# Patient Record
Sex: MALE | Race: BLACK OR AFRICAN AMERICAN | Employment: OTHER | URBAN - METROPOLITAN AREA
[De-identification: names, ages, dates, MRNs, and addresses within clinical notes are randomized per-mention and may not be internally consistent; named-entity substitution may affect disease eponyms.]

---

## 2017-01-25 RX ORDER — LORATADINE 10 MG/1
TABLET ORAL
Qty: 90 TABLET | Refills: 2 | Status: SHIPPED | OUTPATIENT
Start: 2017-01-25 | End: 2018-06-13 | Stop reason: SDUPTHER

## 2017-05-25 RX ORDER — ATORVASTATIN CALCIUM 20 MG/1
TABLET, FILM COATED ORAL
Qty: 30 TABLET | Refills: 4 | Status: SHIPPED | OUTPATIENT
Start: 2017-05-25 | End: 2017-10-26 | Stop reason: SDUPTHER

## 2017-05-25 RX ORDER — VALSARTAN 160 MG/1
TABLET ORAL
Qty: 30 TABLET | Refills: 4 | Status: SHIPPED | OUTPATIENT
Start: 2017-05-25 | End: 2017-10-26 | Stop reason: SDUPTHER

## 2017-05-25 RX ORDER — METOPROLOL SUCCINATE 25 MG/1
TABLET, EXTENDED RELEASE ORAL
Qty: 30 TABLET | Refills: 4 | Status: SHIPPED | OUTPATIENT
Start: 2017-05-25 | End: 2017-10-26 | Stop reason: SDUPTHER

## 2017-06-15 ENCOUNTER — OFFICE VISIT (OUTPATIENT)
Dept: FAMILY MEDICINE CLINIC | Age: 67
End: 2017-06-15

## 2017-06-15 VITALS
BODY MASS INDEX: 20.46 KG/M2 | RESPIRATION RATE: 16 BRPM | DIASTOLIC BLOOD PRESSURE: 60 MMHG | TEMPERATURE: 98.1 F | SYSTOLIC BLOOD PRESSURE: 110 MMHG | WEIGHT: 135 LBS | HEIGHT: 68 IN | HEART RATE: 80 BPM

## 2017-06-15 DIAGNOSIS — E78.5 HYPERLIPIDEMIA, UNSPECIFIED HYPERLIPIDEMIA TYPE: Chronic | ICD-10-CM

## 2017-06-15 DIAGNOSIS — Z11.59 NEED FOR HEPATITIS C SCREENING TEST: ICD-10-CM

## 2017-06-15 DIAGNOSIS — Z12.5 SCREENING PSA (PROSTATE SPECIFIC ANTIGEN): ICD-10-CM

## 2017-06-15 DIAGNOSIS — I10 ESSENTIAL HYPERTENSION: Primary | Chronic | ICD-10-CM

## 2017-06-15 PROCEDURE — G8599 NO ASA/ANTIPLAT THER USE RNG: HCPCS | Performed by: FAMILY MEDICINE

## 2017-06-15 PROCEDURE — G8420 CALC BMI NORM PARAMETERS: HCPCS | Performed by: FAMILY MEDICINE

## 2017-06-15 PROCEDURE — 3017F COLORECTAL CA SCREEN DOC REV: CPT | Performed by: FAMILY MEDICINE

## 2017-06-15 PROCEDURE — 4004F PT TOBACCO SCREEN RCVD TLK: CPT | Performed by: FAMILY MEDICINE

## 2017-06-15 PROCEDURE — 1123F ACP DISCUSS/DSCN MKR DOCD: CPT | Performed by: FAMILY MEDICINE

## 2017-06-15 PROCEDURE — G8427 DOCREV CUR MEDS BY ELIG CLIN: HCPCS | Performed by: FAMILY MEDICINE

## 2017-06-15 PROCEDURE — 99213 OFFICE O/P EST LOW 20 MIN: CPT | Performed by: FAMILY MEDICINE

## 2017-06-15 PROCEDURE — 4040F PNEUMOC VAC/ADMIN/RCVD: CPT | Performed by: FAMILY MEDICINE

## 2017-06-15 ASSESSMENT — ENCOUNTER SYMPTOMS
EYE PAIN: 0
COUGH: 0
ABDOMINAL PAIN: 0
CONSTIPATION: 0
RHINORRHEA: 0
SHORTNESS OF BREATH: 0
SORE THROAT: 0
NAUSEA: 0
DIARRHEA: 0
ABDOMINAL DISTENTION: 0
SINUS PRESSURE: 0

## 2017-07-12 LAB
ANTIBODY: NORMAL
CHOLESTEROL, TOTAL: 151 MG/DL
CHOLESTEROL/HDL RATIO: ABNORMAL
HDLC SERPL-MCNC: 82 MG/DL (ref 35–70)
LDL CHOLESTEROL CALCULATED: 56 MG/DL (ref 0–160)
PROSTATE SPECIFIC ANTIGEN: 2.4 NG/ML
TRIGL SERPL-MCNC: 66 MG/DL
VLDLC SERPL CALC-MCNC: ABNORMAL MG/DL

## 2017-07-27 RX ORDER — TADALAFIL 20 MG
TABLET ORAL
Qty: 18 TABLET | Refills: 5 | Status: SHIPPED | OUTPATIENT
Start: 2017-07-27 | End: 2018-10-16 | Stop reason: SDUPTHER

## 2017-10-26 RX ORDER — ATORVASTATIN CALCIUM 20 MG/1
TABLET, FILM COATED ORAL
Qty: 90 TABLET | Refills: 1 | Status: SHIPPED | OUTPATIENT
Start: 2017-10-26 | End: 2018-04-23 | Stop reason: SDUPTHER

## 2017-10-26 RX ORDER — VALSARTAN 160 MG/1
TABLET ORAL
Qty: 90 TABLET | Refills: 1 | Status: SHIPPED | OUTPATIENT
Start: 2017-10-26 | End: 2018-04-23 | Stop reason: SDUPTHER

## 2017-10-26 RX ORDER — METOPROLOL SUCCINATE 25 MG/1
TABLET, EXTENDED RELEASE ORAL
Qty: 90 TABLET | Refills: 1 | Status: SHIPPED | OUTPATIENT
Start: 2017-10-26 | End: 2018-04-23 | Stop reason: SDUPTHER

## 2018-04-24 RX ORDER — ATORVASTATIN CALCIUM 20 MG/1
TABLET, FILM COATED ORAL
Qty: 90 TABLET | Refills: 0 | Status: SHIPPED | OUTPATIENT
Start: 2018-04-24 | End: 2018-08-03 | Stop reason: SDUPTHER

## 2018-04-24 RX ORDER — VALSARTAN 160 MG/1
TABLET ORAL
Qty: 90 TABLET | Refills: 0 | Status: SHIPPED | OUTPATIENT
Start: 2018-04-24 | End: 2018-08-06

## 2018-04-24 RX ORDER — METOPROLOL SUCCINATE 25 MG/1
TABLET, EXTENDED RELEASE ORAL
Qty: 90 TABLET | Refills: 0 | Status: SHIPPED | OUTPATIENT
Start: 2018-04-24 | End: 2018-08-03 | Stop reason: SDUPTHER

## 2018-05-11 ENCOUNTER — OFFICE VISIT (OUTPATIENT)
Dept: FAMILY MEDICINE CLINIC | Age: 68
End: 2018-05-11
Payer: COMMERCIAL

## 2018-05-11 VITALS
WEIGHT: 137.2 LBS | RESPIRATION RATE: 20 BRPM | SYSTOLIC BLOOD PRESSURE: 108 MMHG | DIASTOLIC BLOOD PRESSURE: 80 MMHG | TEMPERATURE: 97.8 F | BODY MASS INDEX: 20.79 KG/M2 | HEIGHT: 68 IN | HEART RATE: 84 BPM

## 2018-05-11 DIAGNOSIS — E78.5 HYPERLIPIDEMIA, UNSPECIFIED HYPERLIPIDEMIA TYPE: Primary | Chronic | ICD-10-CM

## 2018-05-11 DIAGNOSIS — Z12.5 SCREENING PSA (PROSTATE SPECIFIC ANTIGEN): ICD-10-CM

## 2018-05-11 DIAGNOSIS — I10 ESSENTIAL HYPERTENSION: Chronic | ICD-10-CM

## 2018-05-11 PROCEDURE — G8420 CALC BMI NORM PARAMETERS: HCPCS | Performed by: FAMILY MEDICINE

## 2018-05-11 PROCEDURE — G8599 NO ASA/ANTIPLAT THER USE RNG: HCPCS | Performed by: FAMILY MEDICINE

## 2018-05-11 PROCEDURE — 4040F PNEUMOC VAC/ADMIN/RCVD: CPT | Performed by: FAMILY MEDICINE

## 2018-05-11 PROCEDURE — 3017F COLORECTAL CA SCREEN DOC REV: CPT | Performed by: FAMILY MEDICINE

## 2018-05-11 PROCEDURE — 1123F ACP DISCUSS/DSCN MKR DOCD: CPT | Performed by: FAMILY MEDICINE

## 2018-05-11 PROCEDURE — 99214 OFFICE O/P EST MOD 30 MIN: CPT | Performed by: FAMILY MEDICINE

## 2018-05-11 PROCEDURE — 4004F PT TOBACCO SCREEN RCVD TLK: CPT | Performed by: FAMILY MEDICINE

## 2018-05-11 PROCEDURE — G8427 DOCREV CUR MEDS BY ELIG CLIN: HCPCS | Performed by: FAMILY MEDICINE

## 2018-05-11 ASSESSMENT — PATIENT HEALTH QUESTIONNAIRE - PHQ9
SUM OF ALL RESPONSES TO PHQ9 QUESTIONS 1 & 2: 0
SUM OF ALL RESPONSES TO PHQ QUESTIONS 1-9: 0
2. FEELING DOWN, DEPRESSED OR HOPELESS: 0
1. LITTLE INTEREST OR PLEASURE IN DOING THINGS: 0

## 2018-05-13 ASSESSMENT — ENCOUNTER SYMPTOMS
COUGH: 0
EYE PAIN: 0
SINUS PRESSURE: 0
ABDOMINAL PAIN: 0
DIARRHEA: 0
ABDOMINAL DISTENTION: 0
NAUSEA: 0
SORE THROAT: 0
CONSTIPATION: 0
RHINORRHEA: 0
SHORTNESS OF BREATH: 0

## 2018-06-13 RX ORDER — LORATADINE 10 MG/1
TABLET ORAL
Qty: 90 TABLET | Refills: 3 | Status: SHIPPED | OUTPATIENT
Start: 2018-06-13 | End: 2019-08-01 | Stop reason: SDUPTHER

## 2018-08-03 RX ORDER — METOPROLOL SUCCINATE 25 MG/1
TABLET, EXTENDED RELEASE ORAL
Qty: 90 TABLET | Refills: 0 | Status: SHIPPED | OUTPATIENT
Start: 2018-08-03 | End: 2018-11-03 | Stop reason: SDUPTHER

## 2018-08-03 RX ORDER — ATORVASTATIN CALCIUM 20 MG/1
TABLET, FILM COATED ORAL
Qty: 90 TABLET | Refills: 0 | Status: SHIPPED | OUTPATIENT
Start: 2018-08-03 | End: 2018-11-03 | Stop reason: SDUPTHER

## 2018-08-06 RX ORDER — LOSARTAN POTASSIUM 50 MG/1
50 TABLET ORAL DAILY
Qty: 90 TABLET | Refills: 3 | Status: SHIPPED | OUTPATIENT
Start: 2018-08-06 | End: 2019-07-31 | Stop reason: SDUPTHER

## 2018-10-16 RX ORDER — TADALAFIL 20 MG/1
TABLET ORAL
Qty: 18 TABLET | Refills: 5 | Status: SHIPPED | OUTPATIENT
Start: 2018-10-16 | End: 2019-11-07 | Stop reason: SDUPTHER

## 2018-10-19 ENCOUNTER — NURSE ONLY (OUTPATIENT)
Dept: FAMILY MEDICINE CLINIC | Age: 68
End: 2018-10-19
Payer: COMMERCIAL

## 2018-10-19 DIAGNOSIS — Z12.5 SCREENING PSA (PROSTATE SPECIFIC ANTIGEN): ICD-10-CM

## 2018-10-19 DIAGNOSIS — E78.5 HYPERLIPIDEMIA, UNSPECIFIED HYPERLIPIDEMIA TYPE: Chronic | ICD-10-CM

## 2018-10-19 DIAGNOSIS — I10 ESSENTIAL HYPERTENSION: Chronic | ICD-10-CM

## 2018-10-19 LAB
ALBUMIN SERPL-MCNC: 5 G/DL (ref 3.5–5.1)
ALP BLD-CCNC: 66 U/L (ref 38–126)
ALT SERPL-CCNC: 15 U/L (ref 11–66)
ANION GAP SERPL CALCULATED.3IONS-SCNC: 15 MEQ/L (ref 8–16)
AST SERPL-CCNC: 25 U/L (ref 5–40)
BASOPHILS # BLD: 0.5 %
BASOPHILS ABSOLUTE: 0 THOU/MM3 (ref 0–0.1)
BILIRUB SERPL-MCNC: 1.4 MG/DL (ref 0.3–1.2)
BUN BLDV-MCNC: 11 MG/DL (ref 7–22)
CALCIUM SERPL-MCNC: 9.9 MG/DL (ref 8.5–10.5)
CHLORIDE BLD-SCNC: 102 MEQ/L (ref 98–111)
CHOLESTEROL, TOTAL: 165 MG/DL (ref 100–199)
CO2: 26 MEQ/L (ref 23–33)
CREAT SERPL-MCNC: 0.9 MG/DL (ref 0.4–1.2)
EOSINOPHIL # BLD: 0.2 %
EOSINOPHILS ABSOLUTE: 0 THOU/MM3 (ref 0–0.4)
ERYTHROCYTE [DISTWIDTH] IN BLOOD BY AUTOMATED COUNT: 11.9 % (ref 11.5–14.5)
ERYTHROCYTE [DISTWIDTH] IN BLOOD BY AUTOMATED COUNT: 48.4 FL (ref 35–45)
GFR SERPL CREATININE-BSD FRML MDRD: 84 ML/MIN/1.73M2
GLUCOSE BLD-MCNC: 70 MG/DL (ref 70–108)
HCT VFR BLD CALC: 41.1 % (ref 42–52)
HDLC SERPL-MCNC: 84 MG/DL
HEMOGLOBIN: 13.9 GM/DL (ref 14–18)
IMMATURE GRANS (ABS): 0.01 THOU/MM3 (ref 0–0.07)
IMMATURE GRANULOCYTES: 0.2 %
LDL CHOLESTEROL CALCULATED: 67 MG/DL
LYMPHOCYTES # BLD: 43.2 %
LYMPHOCYTES ABSOLUTE: 2.9 THOU/MM3 (ref 1–4.8)
MCH RBC QN AUTO: 37.2 PG (ref 26–33)
MCHC RBC AUTO-ENTMCNC: 33.8 GM/DL (ref 32.2–35.5)
MCV RBC AUTO: 109.9 FL (ref 80–94)
MONOCYTES # BLD: 9.7 %
MONOCYTES ABSOLUTE: 0.6 THOU/MM3 (ref 0.4–1.3)
NUCLEATED RED BLOOD CELLS: 0 /100 WBC
PLATELET # BLD: 238 THOU/MM3 (ref 130–400)
PMV BLD AUTO: 10 FL (ref 9.4–12.4)
POTASSIUM SERPL-SCNC: 4.3 MEQ/L (ref 3.5–5.2)
PROSTATE SPECIFIC ANTIGEN: 2.58 NG/ML (ref 0–1)
RBC # BLD: 3.74 MILL/MM3 (ref 4.7–6.1)
SEG NEUTROPHILS: 46.2 %
SEGMENTED NEUTROPHILS ABSOLUTE COUNT: 3 THOU/MM3 (ref 1.8–7.7)
SODIUM BLD-SCNC: 143 MEQ/L (ref 135–145)
TOTAL PROTEIN: 8 G/DL (ref 6.1–8)
TRIGL SERPL-MCNC: 72 MG/DL (ref 0–199)
WBC # BLD: 6.6 THOU/MM3 (ref 4.8–10.8)

## 2018-10-19 PROCEDURE — 90662 IIV NO PRSV INCREASED AG IM: CPT | Performed by: FAMILY MEDICINE

## 2018-10-19 PROCEDURE — 90471 IMMUNIZATION ADMIN: CPT | Performed by: FAMILY MEDICINE

## 2018-11-05 ENCOUNTER — TELEPHONE (OUTPATIENT)
Dept: FAMILY MEDICINE CLINIC | Age: 68
End: 2018-11-05

## 2018-11-05 RX ORDER — ATORVASTATIN CALCIUM 20 MG/1
TABLET, FILM COATED ORAL
Qty: 90 TABLET | Refills: 1 | Status: SHIPPED | OUTPATIENT
Start: 2018-11-05 | End: 2019-04-25 | Stop reason: SDUPTHER

## 2018-11-05 RX ORDER — METOPROLOL SUCCINATE 25 MG/1
TABLET, EXTENDED RELEASE ORAL
Qty: 90 TABLET | Refills: 1 | Status: SHIPPED | OUTPATIENT
Start: 2018-11-05 | End: 2019-04-25 | Stop reason: SDUPTHER

## 2018-11-29 ENCOUNTER — OFFICE VISIT (OUTPATIENT)
Dept: FAMILY MEDICINE CLINIC | Age: 68
End: 2018-11-29
Payer: COMMERCIAL

## 2018-11-29 VITALS
RESPIRATION RATE: 20 BRPM | BODY MASS INDEX: 20.61 KG/M2 | SYSTOLIC BLOOD PRESSURE: 110 MMHG | HEART RATE: 80 BPM | HEIGHT: 68 IN | WEIGHT: 136 LBS | DIASTOLIC BLOOD PRESSURE: 70 MMHG | TEMPERATURE: 97.6 F

## 2018-11-29 DIAGNOSIS — Z12.11 SCREEN FOR COLON CANCER: ICD-10-CM

## 2018-11-29 DIAGNOSIS — N40.1 BENIGN PROSTATIC HYPERPLASIA WITH WEAK URINARY STREAM: ICD-10-CM

## 2018-11-29 DIAGNOSIS — R39.12 BENIGN PROSTATIC HYPERPLASIA WITH WEAK URINARY STREAM: ICD-10-CM

## 2018-11-29 DIAGNOSIS — I10 ESSENTIAL HYPERTENSION: Primary | Chronic | ICD-10-CM

## 2018-11-29 DIAGNOSIS — E78.5 HYPERLIPIDEMIA, UNSPECIFIED HYPERLIPIDEMIA TYPE: Chronic | ICD-10-CM

## 2018-11-29 PROCEDURE — 4040F PNEUMOC VAC/ADMIN/RCVD: CPT | Performed by: FAMILY MEDICINE

## 2018-11-29 PROCEDURE — 3017F COLORECTAL CA SCREEN DOC REV: CPT | Performed by: FAMILY MEDICINE

## 2018-11-29 PROCEDURE — G8427 DOCREV CUR MEDS BY ELIG CLIN: HCPCS | Performed by: FAMILY MEDICINE

## 2018-11-29 PROCEDURE — 1101F PT FALLS ASSESS-DOCD LE1/YR: CPT | Performed by: FAMILY MEDICINE

## 2018-11-29 PROCEDURE — G8599 NO ASA/ANTIPLAT THER USE RNG: HCPCS | Performed by: FAMILY MEDICINE

## 2018-11-29 PROCEDURE — 90471 IMMUNIZATION ADMIN: CPT | Performed by: FAMILY MEDICINE

## 2018-11-29 PROCEDURE — G8482 FLU IMMUNIZE ORDER/ADMIN: HCPCS | Performed by: FAMILY MEDICINE

## 2018-11-29 PROCEDURE — G8420 CALC BMI NORM PARAMETERS: HCPCS | Performed by: FAMILY MEDICINE

## 2018-11-29 PROCEDURE — 4004F PT TOBACCO SCREEN RCVD TLK: CPT | Performed by: FAMILY MEDICINE

## 2018-11-29 PROCEDURE — 1123F ACP DISCUSS/DSCN MKR DOCD: CPT | Performed by: FAMILY MEDICINE

## 2018-11-29 PROCEDURE — 90732 PPSV23 VACC 2 YRS+ SUBQ/IM: CPT | Performed by: FAMILY MEDICINE

## 2018-11-29 PROCEDURE — 99214 OFFICE O/P EST MOD 30 MIN: CPT | Performed by: FAMILY MEDICINE

## 2018-11-29 RX ORDER — TAMSULOSIN HYDROCHLORIDE 0.4 MG/1
0.4 CAPSULE ORAL DAILY
Qty: 30 CAPSULE | Refills: 5 | Status: SHIPPED | OUTPATIENT
Start: 2018-11-29 | End: 2019-12-12 | Stop reason: SDUPTHER

## 2018-11-29 NOTE — PROGRESS NOTES
Immunization(s) given during visit:    Immunizations     Name Date Dose Route    Pneumococcal Polysaccharide ([id number]) 11/29/2018 0.5 mL Intramuscular    Site: Deltoid- Left    Lot: [id number]    NDC: 8260-2341-20          Most recent Vaccine Information Sheet given to pt

## 2018-12-02 ASSESSMENT — ENCOUNTER SYMPTOMS
BACK PAIN: 0
ABDOMINAL PAIN: 0
NAUSEA: 0
SHORTNESS OF BREATH: 0
VOMITING: 0
WHEEZING: 0
DIARRHEA: 0
SORE THROAT: 0
COUGH: 0
RHINORRHEA: 0
CONSTIPATION: 0

## 2018-12-02 NOTE — PROGRESS NOTES
[address] [place] [address],[address] [facility]  [address] [address]  Dept: [phone]  Dept Fax: [phone]  Loc: [phone]  PROGRESS NOTE      VisitDate: 11/29/2018    Diana Tafoya is a 70 y.o. male who presents today for:  Chief Complaint   Patient presents with    6 Month Follow-Up     HTN, Hyperlipidemia.  Labs Only     had labs in Oct    Other     needs a letter for a handicap placard    Immunizations     due for pneumovax         Subjective:  HPI  Follow up hypertension hyperlipidemia and arthralgias which appeared blood pressure cholesterol levels are stable. Most recent labs reviewed with the patient. He's had issues of erectile dysfunction gets good relief with Cialis. He's due for his Pneumovax. Review of Systems   Constitutional: Negative for chills, fatigue and fever. HENT: Negative for congestion, rhinorrhea and sore throat. Respiratory: Negative for cough, shortness of breath and wheezing. Cardiovascular: Negative for chest pain, palpitations and leg swelling. Gastrointestinal: Negative for abdominal pain, constipation, diarrhea, nausea and vomiting. Genitourinary: Negative for dysuria, frequency and urgency. Musculoskeletal: Negative for arthralgias, back pain and joint swelling. Skin: Negative for rash. Neurological: Negative for dizziness, light-headedness, numbness and headaches.      Past Medical History:   Diagnosis Date    CAD (coronary artery disease)     Erectile dysfunction     GERD (gastroesophageal reflux disease) 07/2012    Gastro ulcer    Hyperlipidemia     Hypertension       Past Surgical History:   Procedure Laterality Date    CARDIAC CATHETERIZATION      EYE SURGERY Left 2004    cataract    INGUINAL HERNIA REPAIR Right 5/27/15    Dr. Evaristo Sena 2011    Dr. Ryan Harding 2003    Dr. Irasema Quispe  5/27/15    Dr. Philippe Gutierrez Family History   Problem Relation Age of Onset    Heart Disease Mother      Social History   Substance Use Topics    Smoking status: Current Every Day Smoker     Packs/day: 1.00     Years: 40.00     Types: Cigarettes    Smokeless tobacco: Never Used      Comment: printed to avs    Alcohol use 0.0 oz/week      Comment: 2 -24 oz cans beer per day      Current Outpatient Prescriptions   Medication Sig Dispense Refill    tamsulosin (FLOMAX) 0.4 MG capsule Take 1 capsule by mouth daily 30 capsule 5    atorvastatin (LIPITOR) 20 MG tablet TAKE 1 TABLET BY MOUTH DAILY 90 tablet 1    metoprolol succinate (TOPROL XL) 25 MG extended release tablet TAKE 1 TABLET BY MOUTH DAILY 90 tablet 1    tadalafil (CIALIS) 20 MG tablet One qd prn 18 tablet 5    fluocinonide (LIDEX) 0.05 % cream APPLY EXTERNALLY TO THE AFFECTED AREA TWICE DAILY 60 g 4    losartan (COZAAR) 50 MG tablet Take 1 tablet by mouth daily 90 tablet 3    loratadine (CLARITIN) 10 MG tablet TAKE 1 TABLET BY MOUTH DAILY 90 tablet 3    COMBIGAN 0.2-0.5 % ophthalmic solution       nitroGLYCERIN (NITROSTAT) 0.4 MG SL tablet Place 0.4 mg under the tongue every 5 minutes as needed. No current facility-administered medications for this visit. No Known Allergies  Health Maintenance   Topic Date Due    AAA screen  1950    DTaP/Tdap/Td vaccine (1 - Tdap) 11/27/1968    Shingles Vaccine (1 of 2 - 2 Dose Series) 11/27/1999    Low dose CT lung screening  11/27/2004    Potassium monitoring  10/19/2019    Creatinine monitoring  10/19/2019    Colon cancer screen colonoscopy  08/31/2022    Lipid screen  10/19/2023    Flu vaccine  Completed    Pneumococcal low/med risk  Completed    Hepatitis C screen  Completed         Objective:     Physical Exam   Constitutional: He is oriented to person, place, and time. He appears well-developed and well-nourished. No distress. HENT:   Head: Normocephalic and atraumatic.    Right Ear: External ear

## 2019-04-25 RX ORDER — METOPROLOL SUCCINATE 25 MG/1
TABLET, EXTENDED RELEASE ORAL
Qty: 90 TABLET | Refills: 1 | Status: SHIPPED | OUTPATIENT
Start: 2019-04-25 | End: 2019-11-30 | Stop reason: SDUPTHER

## 2019-04-25 RX ORDER — ATORVASTATIN CALCIUM 20 MG/1
TABLET, FILM COATED ORAL
Qty: 90 TABLET | Refills: 1 | Status: SHIPPED | OUTPATIENT
Start: 2019-04-25 | End: 2019-11-17 | Stop reason: SDUPTHER

## 2019-06-09 ENCOUNTER — HOSPITAL ENCOUNTER (OUTPATIENT)
Age: 69
Setting detail: OBSERVATION
Discharge: HOME | End: 2019-06-10
Attending: INTERNAL MEDICINE | Admitting: INTERNAL MEDICINE
Payer: COMMERCIAL

## 2019-06-09 ENCOUNTER — APPOINTMENT (OUTPATIENT)
Dept: CT IMAGING | Age: 69
End: 2019-06-09
Payer: COMMERCIAL

## 2019-06-09 DIAGNOSIS — I63.9 CEREBROVASCULAR ACCIDENT (CVA), UNSPECIFIED MECHANISM (HCC): Primary | ICD-10-CM

## 2019-06-09 PROBLEM — R29.90 STROKE-LIKE SYMPTOM: Status: ACTIVE | Noted: 2019-06-09

## 2019-06-09 LAB
ALBUMIN SERPL-MCNC: 4.5 G/DL (ref 3.5–5.1)
ALP BLD-CCNC: 73 U/L (ref 38–126)
ALT SERPL-CCNC: 15 U/L (ref 11–66)
ANION GAP SERPL CALCULATED.3IONS-SCNC: 17 MEQ/L (ref 8–16)
AST SERPL-CCNC: 29 U/L (ref 5–40)
BACTERIA: ABNORMAL /HPF
BASOPHILS # BLD: 0.3 %
BASOPHILS ABSOLUTE: 0 THOU/MM3 (ref 0–0.1)
BILIRUB SERPL-MCNC: 1.2 MG/DL (ref 0.3–1.2)
BILIRUBIN DIRECT: < 0.2 MG/DL (ref 0–0.3)
BILIRUBIN URINE: NEGATIVE
BLOOD, URINE: NEGATIVE
BUN BLDV-MCNC: 14 MG/DL (ref 7–22)
CALCIUM SERPL-MCNC: 9.8 MG/DL (ref 8.5–10.5)
CASTS 2: ABNORMAL /LPF
CASTS UA: ABNORMAL /LPF
CHARACTER, URINE: CLEAR
CHLORIDE BLD-SCNC: 101 MEQ/L (ref 98–111)
CO2: 23 MEQ/L (ref 23–33)
COLOR: YELLOW
CREAT SERPL-MCNC: 1 MG/DL (ref 0.4–1.2)
CRYSTALS, UA: ABNORMAL
EKG ATRIAL RATE: 70 BPM
EKG P AXIS: 75 DEGREES
EKG P-R INTERVAL: 144 MS
EKG Q-T INTERVAL: 382 MS
EKG QRS DURATION: 86 MS
EKG QTC CALCULATION (BAZETT): 412 MS
EKG R AXIS: 26 DEGREES
EKG T AXIS: 55 DEGREES
EKG VENTRICULAR RATE: 70 BPM
EOSINOPHIL # BLD: 0.3 %
EOSINOPHILS ABSOLUTE: 0 THOU/MM3 (ref 0–0.4)
EPITHELIAL CELLS, UA: ABNORMAL /HPF
ERYTHROCYTE [DISTWIDTH] IN BLOOD BY AUTOMATED COUNT: 12 % (ref 11.5–14.5)
ERYTHROCYTE [DISTWIDTH] IN BLOOD BY AUTOMATED COUNT: 48.6 FL (ref 35–45)
GFR SERPL CREATININE-BSD FRML MDRD: 90 ML/MIN/1.73M2
GLUCOSE BLD-MCNC: 101 MG/DL (ref 70–108)
GLUCOSE BLD-MCNC: 99 MG/DL
GLUCOSE BLD-MCNC: 99 MG/DL (ref 70–108)
GLUCOSE URINE: NEGATIVE MG/DL
HCT VFR BLD CALC: 40.3 % (ref 42–52)
HEMOGLOBIN: 13.3 GM/DL (ref 14–18)
IMMATURE GRANS (ABS): 0.01 THOU/MM3 (ref 0–0.07)
IMMATURE GRANULOCYTES: 0.1 %
INR BLD: 0.95 (ref 0.85–1.13)
KETONES, URINE: NEGATIVE
LEUKOCYTE ESTERASE, URINE: NEGATIVE
LYMPHOCYTES # BLD: 26.4 %
LYMPHOCYTES ABSOLUTE: 1.8 THOU/MM3 (ref 1–4.8)
MCH RBC QN AUTO: 35.7 PG (ref 26–33)
MCHC RBC AUTO-ENTMCNC: 33 GM/DL (ref 32.2–35.5)
MCV RBC AUTO: 108 FL (ref 80–94)
MISCELLANEOUS 2: ABNORMAL
MONOCYTES # BLD: 7.5 %
MONOCYTES ABSOLUTE: 0.5 THOU/MM3 (ref 0.4–1.3)
NITRITE, URINE: NEGATIVE
NUCLEATED RED BLOOD CELLS: 0 /100 WBC
OSMOLALITY CALCULATION: 281.9 MOSMOL/KG (ref 275–300)
PH UA: 6 (ref 5–9)
PLATELET # BLD: 215 THOU/MM3 (ref 130–400)
PMV BLD AUTO: 9.5 FL (ref 9.4–12.4)
POTASSIUM SERPL-SCNC: 4.3 MEQ/L (ref 3.5–5.2)
PROTEIN UA: ABNORMAL
RBC # BLD: 3.73 MILL/MM3 (ref 4.7–6.1)
RBC URINE: ABNORMAL /HPF
RENAL EPITHELIAL, UA: ABNORMAL
SEG NEUTROPHILS: 65.4 %
SEGMENTED NEUTROPHILS ABSOLUTE COUNT: 4.5 THOU/MM3 (ref 1.8–7.7)
SODIUM BLD-SCNC: 141 MEQ/L (ref 135–145)
SPECIFIC GRAVITY, URINE: 1.02 (ref 1–1.03)
TOTAL PROTEIN: 7.8 G/DL (ref 6.1–8)
TROPONIN T: < 0.01 NG/ML
TROPONIN T: < 0.01 NG/ML
UROBILINOGEN, URINE: 0.2 EU/DL (ref 0–1)
WBC # BLD: 6.9 THOU/MM3 (ref 4.8–10.8)
WBC UA: ABNORMAL /HPF
YEAST: ABNORMAL

## 2019-06-09 PROCEDURE — 6370000000 HC RX 637 (ALT 250 FOR IP): Performed by: INTERNAL MEDICINE

## 2019-06-09 PROCEDURE — G0378 HOSPITAL OBSERVATION PER HR: HCPCS

## 2019-06-09 PROCEDURE — 2709999900 HC NON-CHARGEABLE SUPPLY

## 2019-06-09 PROCEDURE — 70498 CT ANGIOGRAPHY NECK: CPT

## 2019-06-09 PROCEDURE — 96372 THER/PROPH/DIAG INJ SC/IM: CPT

## 2019-06-09 PROCEDURE — 85610 PROTHROMBIN TIME: CPT

## 2019-06-09 PROCEDURE — 99220 PR INITIAL OBSERVATION CARE/DAY 70 MINUTES: CPT | Performed by: INTERNAL MEDICINE

## 2019-06-09 PROCEDURE — 70496 CT ANGIOGRAPHY HEAD: CPT

## 2019-06-09 PROCEDURE — 85025 COMPLETE CBC W/AUTO DIFF WBC: CPT

## 2019-06-09 PROCEDURE — 81001 URINALYSIS AUTO W/SCOPE: CPT

## 2019-06-09 PROCEDURE — 99285 EMERGENCY DEPT VISIT HI MDM: CPT

## 2019-06-09 PROCEDURE — 80048 BASIC METABOLIC PNL TOTAL CA: CPT

## 2019-06-09 PROCEDURE — 93010 ELECTROCARDIOGRAM REPORT: CPT | Performed by: NUCLEAR MEDICINE

## 2019-06-09 PROCEDURE — 82948 REAGENT STRIP/BLOOD GLUCOSE: CPT

## 2019-06-09 PROCEDURE — 2580000003 HC RX 258: Performed by: INTERNAL MEDICINE

## 2019-06-09 PROCEDURE — 36415 COLL VENOUS BLD VENIPUNCTURE: CPT

## 2019-06-09 PROCEDURE — 93005 ELECTROCARDIOGRAM TRACING: CPT | Performed by: INTERNAL MEDICINE

## 2019-06-09 PROCEDURE — 70450 CT HEAD/BRAIN W/O DYE: CPT

## 2019-06-09 PROCEDURE — 6360000004 HC RX CONTRAST MEDICATION: Performed by: INTERNAL MEDICINE

## 2019-06-09 PROCEDURE — 6360000002 HC RX W HCPCS: Performed by: INTERNAL MEDICINE

## 2019-06-09 PROCEDURE — 84484 ASSAY OF TROPONIN QUANT: CPT

## 2019-06-09 PROCEDURE — 80076 HEPATIC FUNCTION PANEL: CPT

## 2019-06-09 RX ORDER — SODIUM CHLORIDE 0.9 % (FLUSH) 0.9 %
10 SYRINGE (ML) INJECTION PRN
Status: DISCONTINUED | OUTPATIENT
Start: 2019-06-09 | End: 2019-06-10 | Stop reason: HOSPADM

## 2019-06-09 RX ORDER — POLYETHYLENE GLYCOL 3350 17 G/17G
17 POWDER, FOR SOLUTION ORAL DAILY
Status: DISCONTINUED | OUTPATIENT
Start: 2019-06-09 | End: 2019-06-10 | Stop reason: HOSPADM

## 2019-06-09 RX ORDER — TAMSULOSIN HYDROCHLORIDE 0.4 MG/1
0.4 CAPSULE ORAL DAILY
Status: DISCONTINUED | OUTPATIENT
Start: 2019-06-09 | End: 2019-06-10 | Stop reason: HOSPADM

## 2019-06-09 RX ORDER — METOPROLOL SUCCINATE 25 MG/1
25 TABLET, EXTENDED RELEASE ORAL DAILY
Status: DISCONTINUED | OUTPATIENT
Start: 2019-06-10 | End: 2019-06-10 | Stop reason: HOSPADM

## 2019-06-09 RX ORDER — SODIUM CHLORIDE 9 MG/ML
INJECTION, SOLUTION INTRAVENOUS CONTINUOUS
Status: DISCONTINUED | OUTPATIENT
Start: 2019-06-09 | End: 2019-06-10 | Stop reason: HOSPADM

## 2019-06-09 RX ORDER — ONDANSETRON 2 MG/ML
4 INJECTION INTRAMUSCULAR; INTRAVENOUS EVERY 6 HOURS PRN
Status: DISCONTINUED | OUTPATIENT
Start: 2019-06-09 | End: 2019-06-10 | Stop reason: HOSPADM

## 2019-06-09 RX ORDER — SODIUM CHLORIDE 0.9 % (FLUSH) 0.9 %
10 SYRINGE (ML) INJECTION EVERY 12 HOURS SCHEDULED
Status: DISCONTINUED | OUTPATIENT
Start: 2019-06-09 | End: 2019-06-10 | Stop reason: HOSPADM

## 2019-06-09 RX ORDER — ATORVASTATIN CALCIUM 20 MG/1
20 TABLET, FILM COATED ORAL NIGHTLY
Status: DISCONTINUED | OUTPATIENT
Start: 2019-06-09 | End: 2019-06-10 | Stop reason: HOSPADM

## 2019-06-09 RX ORDER — FAMOTIDINE 20 MG/1
20 TABLET, FILM COATED ORAL 2 TIMES DAILY
Status: DISCONTINUED | OUTPATIENT
Start: 2019-06-09 | End: 2019-06-10 | Stop reason: HOSPADM

## 2019-06-09 RX ORDER — ASPIRIN 81 MG/1
324 TABLET, CHEWABLE ORAL ONCE
Status: COMPLETED | OUTPATIENT
Start: 2019-06-09 | End: 2019-06-09

## 2019-06-09 RX ORDER — ASPIRIN 81 MG/1
81 TABLET ORAL DAILY
Status: DISCONTINUED | OUTPATIENT
Start: 2019-06-10 | End: 2019-06-10 | Stop reason: HOSPADM

## 2019-06-09 RX ORDER — ACETAMINOPHEN 325 MG/1
650 TABLET ORAL EVERY 4 HOURS PRN
Status: DISCONTINUED | OUTPATIENT
Start: 2019-06-09 | End: 2019-06-10 | Stop reason: HOSPADM

## 2019-06-09 RX ADMIN — SODIUM CHLORIDE, PRESERVATIVE FREE 10 ML: 5 INJECTION INTRAVENOUS at 22:33

## 2019-06-09 RX ADMIN — TAMSULOSIN HYDROCHLORIDE 0.4 MG: 0.4 CAPSULE ORAL at 22:31

## 2019-06-09 RX ADMIN — IOPAMIDOL 80 ML: 755 INJECTION, SOLUTION INTRAVENOUS at 12:42

## 2019-06-09 RX ADMIN — ENOXAPARIN SODIUM 40 MG: 40 INJECTION SUBCUTANEOUS at 18:35

## 2019-06-09 RX ADMIN — ATORVASTATIN CALCIUM 20 MG: 20 TABLET, FILM COATED ORAL at 22:31

## 2019-06-09 RX ADMIN — SODIUM CHLORIDE: 9 INJECTION, SOLUTION INTRAVENOUS at 18:35

## 2019-06-09 RX ADMIN — FAMOTIDINE 20 MG: 20 TABLET ORAL at 22:30

## 2019-06-09 RX ADMIN — ASPIRIN 81 MG 324 MG: 81 TABLET ORAL at 12:09

## 2019-06-09 ASSESSMENT — ENCOUNTER SYMPTOMS
DIARRHEA: 0
VOMITING: 0
NAUSEA: 0
COUGH: 0
SHORTNESS OF BREATH: 0
BACK PAIN: 0
WHEEZING: 0
EYE DISCHARGE: 0
SORE THROAT: 0
ABDOMINAL PAIN: 0
RHINORRHEA: 0
EYE REDNESS: 0

## 2019-06-09 ASSESSMENT — PAIN SCALES - GENERAL: PAINLEVEL_OUTOF10: 0

## 2019-06-09 NOTE — ED PROVIDER NOTES
[facility]  eMERGENCY dEPARTMENT eNCOUnter          CHIEF COMPLAINT       Chief Complaint   Patient presents with   Alejandro Song Cerebrovascular Accident       Nurses Notes reviewed and I agree except as noted in the HPI. HISTORY OF PRESENT ILLNESS    Jacqui Suarez Sr. is a 70 y.o. male who presents to the Emergency Department for the evaluation of dizziness. The patient reports that about 30 minutes ago he was walking and suddenly became dizzy and light headed. He states that he fell to the ground 2 separate times landing on his knees. He denies loss of consciousness or hitting his head. He has a history of a CVA. Patient's wife reports that the patient has a left sided facial droop. He denies fevers, chills, nausea, chest pain, or shortness of breath. Patient denies any further complaints at initial encounter. The HPI was provided by the patient. REVIEW OF SYSTEMS     Review of Systems   Constitutional: Negative for appetite change, chills, fatigue and fever. HENT: Negative for congestion, ear pain, rhinorrhea and sore throat. Eyes: Negative for discharge, redness and visual disturbance. Respiratory: Negative for cough, shortness of breath and wheezing. Cardiovascular: Negative for chest pain, palpitations and leg swelling. Gastrointestinal: Negative for abdominal pain, diarrhea, nausea and vomiting. Genitourinary: Negative for decreased urine volume, difficulty urinating, dysuria and hematuria. Musculoskeletal: Negative for arthralgias, back pain, joint swelling and neck pain. Skin: Negative for pallor and rash. Allergic/Immunologic: Negative for environmental allergies. Neurological: Positive for dizziness, facial asymmetry (per wife. left sided droop) and light-headedness. Negative for syncope, weakness, numbness and headaches. Hematological: Negative for adenopathy. Psychiatric/Behavioral: Negative for confusion and suicidal ideas. The patient is not nervous/anxious. PAST MEDICAL HISTORY    has a past medical history of CAD (coronary artery disease), Erectile dysfunction, GERD (gastroesophageal reflux disease), Hyperlipidemia, and Hypertension. SURGICAL HISTORY      has a past surgical history that includes Cardiac catheterization (-); Eye surgery (Left, ); Leg Surgery (Left, ); Testicle removal (Left, ); Inguinal hernia repair (Right, ); and Umbilical hernia repair (5/27/15). CURRENT MEDICATIONS       Previous Medications    ATORVASTATIN (LIPITOR) 20 MG TABLET    TAKE 1 TABLET BY MOUTH DAILY    COMBIGAN 0.2-0.5 % OPHTHALMIC SOLUTION        FLUOCINONIDE (LIDEX) 0.05 % CREAM    APPLY EXTERNALLY TO THE AFFECTED AREA TWICE DAILY    LORATADINE (CLARITIN) 10 MG TABLET    TAKE 1 TABLET BY MOUTH DAILY    LOSARTAN (COZAAR) 50 MG TABLET    Take 1 tablet by mouth daily    METOPROLOL SUCCINATE (TOPROL XL) 25 MG EXTENDED RELEASE TABLET    TAKE 1 TABLET BY MOUTH DAILY    TADALAFIL (CIALIS) 20 MG TABLET    One qd prn    TAMSULOSIN (FLOMAX) 0.4 MG CAPSULE    Take 1 capsule by mouth daily       ALLERGIES     has No Known Allergies. FAMILY HISTORY      indicated that his mother is . He indicated that his father is . family history includes Heart Disease in his mother. SOCIAL HISTORY      reports that he has been smoking cigarettes. He has a 40.00 pack-year smoking history. He has never used smokeless tobacco. He reports that he drinks alcohol. He reports that he has current or past drug history. Drug: Marijuana. PHYSICAL EXAM     INITIAL VITALS:  height is 5' 9\" (1.753 m) and weight is 135 lb (61.2 kg). His oral temperature is 98.4 °F (36.9 °C). His blood pressure is 130/78 and his pulse is 69. His respiration is 16 and oxygen saturation is 98%. Physical Exam   Constitutional: He is oriented to person, place, and time. He appears well-developed and well-nourished. Non-toxic appearance.    HENT:   Head: Normocephalic and atraumatic. Right Ear: Tympanic membrane and external ear normal.   Left Ear: Tympanic membrane and external ear normal.   Nose: Nose normal.   Mouth/Throat: Oropharynx is clear and moist and mucous membranes are normal. No oropharyngeal exudate, posterior oropharyngeal edema or posterior oropharyngeal erythema. Eyes: Conjunctivae and EOM are normal.   Neck: Normal range of motion. Neck supple. No JVD present. Cardiovascular: Normal rate, regular rhythm, normal heart sounds, intact distal pulses and normal pulses. Exam reveals no gallop and no friction rub. No murmur heard. Pulmonary/Chest: Effort normal and breath sounds normal. No respiratory distress. He has no decreased breath sounds. He has no wheezes. He has no rhonchi. He has no rales. Abdominal: Soft. Bowel sounds are normal. He exhibits no distension. There is no tenderness. There is no rebound, no guarding and no CVA tenderness. Musculoskeletal: Normal range of motion. He exhibits no edema. Neurological: He is alert and oriented to person, place, and time. He exhibits normal muscle tone. Coordination normal.   Patient has a left sided facial droop. He also has partial inattention. Skin: Skin is warm and dry. No rash noted. He is not diaphoretic. Nursing note and vitals reviewed. NIH Stroke Scale  NIH Stroke Scale Assessed: Yes  Interval: Baseline  Level of Consciousness (1a. ): Alert  LOC Questions (1b. ):  Answers both correctly  LOC Commands (1c. ): Obeys both correctly  Best Gaze (2. ): Normal  Visual (3. ): No visual loss  Facial Palsy (4. ): (!) Partial  Motor Arm, Left (5a. ): No drift  Motor Arm, Right (5b. ): No drift  Motor Leg, Left (6a. ): No drift  Motor Leg, Right (6b. ): No drift  Limb Ataxia (7. ): Absent  Sensory (8. ): Normal  Best Language (9. ): No aphasia  Dysarthria (10. ): Normal  Extinction and Inattention (11): (!) Partial neglect  Total: 3          DIAGNOSTIC RESULTS     EKG: All EKG's are interpreted by the within normal limits   ANION GAP - Abnormal; Notable for the following components:    Anion Gap 17.0 (*)     All other components within normal limits   URINE WITH REFLEXED MICRO - Abnormal; Notable for the following components:    Protein, UA TRACE (*)     All other components within normal limits   POCT GLUCOSE - Normal   BASIC METABOLIC PANEL   PROTIME-INR   HEPATIC FUNCTION PANEL   GLOMERULAR FILTRATION RATE, ESTIMATED   OSMOLALITY   POCT GLUCOSE       EMERGENCY DEPARTMENT COURSE:   Vitals:    Vitals:    06/09/19 1143 06/09/19 1222   BP: 121/71 130/78   Pulse: 68 69   Resp: 23 16   Temp: 98.4 °F (36.9 °C)    TempSrc: Oral    SpO2: 95% 98%   Weight: 135 lb (61.2 kg)    Height: 5' 9\" (1.753 m)        11:27 AM: The patient was seen and evaluated. MDM:  Patient's was promptly sent to CAT scan. \" Stroke was activated. Patient's CT scan of the head was normal. Patient was evaluated by Dr. Benita Meigs and it decided the patient should have a CTA done and be admitted. Patient was also started on aspirin. On his recommendation. Patient's labs, CT scan of the head and CT was reviewed. 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 Patient will be admitted by Dr. Cosmo Cardoso for further workup and management. CRITICAL CARE:   None     CONSULTS:  Dr. Dmitri Aly (neurologist)  Dr. Nico Edwards:  None     FINAL IMPRESSION     1. Cerebrovascular accident (CVA), unspecified mechanism ([facility] [address]          DISPOSITION/PLAN   Admitted    PATIENT REFERRED TO:  No follow-up provider specified.     DISCHARGE MEDICATIONS:     New Prescriptions    No medications on file       (Please note that portions of this note were completed with a voice recognition program.  Efforts were made to edit thedictations but occasionally words are mis-transcribed.)    Scribe: Guero Hayes   6/9/19 11:27 AM Scribing for and in the presence of Mi Lao MD    Signed by: Eun Sargent, 06/09/19 1:21 PM    Provider:  I personally performed the services described in the documentation, reviewed and edited the documentation which was dictated to the scribe in my presence, and it accurately records my words and actions.     Gely Hinds MD 6/9/19 1:21 PM       Gely Hinds MD  06/09/19 [id number]

## 2019-06-09 NOTE — H&P
History & Physical        Patient:  Mary Suarez Sr. YOB: 1950    MRN: [id number]     Acct: [de-identified]    PCP: Atul Schaffer MD    Date of Admission: 6/9/2019    Date of Service: Pt seen/examined on 6/9/2019 and Placed in Observation. Chief Complaint:    Chief Complaint   Patient presents with    Cerebrovascular Accident         History Of Present Illness:      70 y.o. male who presented to [facility] with \"evaluation of dizziness. The patient reports that about 30 minutes ago he was walking and suddenly became dizzy and light headed. He states that he fell to the ground 2 separate times landing on his knees. He denies loss of consciousness or hitting his head. He has a history of a CVA. Patient's wife reports that the patient has a left sided facial droop. He denies fevers, chills, nausea, chest pain, or shortness of breath. Patient denies any further complaints at initial encounter. \"-per er note and confirmed by myself    Addendum: he states he feel because he was dizzy, legs didn't work all that well        Past Medical History:          Diagnosis Date    CAD (coronary artery disease)     Erectile dysfunction     GERD (gastroesophageal reflux disease) 07/2012    Gastro ulcer    Hyperlipidemia     Hypertension        Past Surgical History:          Procedure Laterality Date    CARDIAC CATHETERIZATION      EYE SURGERY Left 2004    cataract   Earlene Dominic Right 5/27/15    Dr. Kimberlee Orellana 2011    Dr. Valerio Cooper Left 2003    Dr. Uma Stacy  5/27/15    Dr. Shayla Farmer       Medications Prior to Admission:      Prior to Admission medications    Medication Sig Start Date End Date Taking?  Authorizing Provider   metoprolol succinate (TOPROL XL) 25 MG extended release tablet TAKE 1 TABLET BY MOUTH DAILY 4/25/19  Yes Atul Schaffer MD   atorvastatin (LIPITOR) 20 MG tablet TAKE 1 TABLET BY MOUTH DAILY 4/25/19  Yes Amador Damon MD   tamsulosin [facility]) 0.4 MG capsule Take 1 capsule by mouth daily 11/29/18  Yes Amador Damon MD   tadalafil (CIALIS) 20 MG tablet One qd prn 10/16/18  Yes Amador Damon MD   fluocinonide (LIDEX) 0.05 % cream APPLY EXTERNALLY TO THE AFFECTED AREA TWICE DAILY 9/18/18  Yes Amador Damon MD   losartan (COZAAR) 50 MG tablet Take 1 tablet by mouth daily 8/6/18  Yes Amador Damon MD   loratadine (CLARITIN) 10 MG tablet TAKE 1 TABLET BY MOUTH DAILY 6/13/18  Yes Amador Damon MD   COMBIGAN 0.2-0.5 % ophthalmic solution  3/22/16  Yes Historical Provider, MD       Allergies:  Patient has no known allergies. Social History:      The patient currently lives spouse  TOBACCO:   reports that he has been smoking cigarettes. He has a 40.00 pack-year smoking history. He has never used smokeless tobacco.  ETOH:   reports that he drinks alcohol. Family History:       Reviewed in detail and negative for DM, CAD, Cancer, CVA. Positive as follows:        Problem Relation Age of Onset    Heart Disease Mother        Diet:  No diet orders on file    REVIEW OF SYSTEMS:   Pertinent positives as noted in the HPI. All other systems reviewed and negative. PHYSICAL EXAM:    /78   Pulse 69   Temp 98.4 °F (36.9 °C) (Oral)   Resp 16   Ht 5' 9\" (1.753 m)   Wt 135 lb (61.2 kg)   SpO2 98%   BMI 19.94 kg/m²     General appearance:  No apparent distress, appears stated age and cooperative. HEENT:  Normal cephalic, atraumatic without obvious deformity. Pupils equal, round, and reactive to light. Extra ocular muscles intact. Conjunctivae/corneas clear. l side facial droop  Neck: Supple, with full range of motion. no jugular venous distention. Trachea midline. no carotid bruits  Respiratory:  Normal respiratory effort. Clear to auscultation, bilaterally without Rales/Wheezes/Rhonchi.  Breath sounds equal bilaterally  Cardiovascular:  Regular rate and rhythm with normal S1/S2 without murmurs, rubs or gallops. PMI non displaced  Abdomen: Soft, non-tender, non-distended with normal bowel sounds. No guarding, rebound. Musculoskeletal:  No clubbing, cyanosis or edema bilaterally. Full range of motion without deformity. Skin: Skin color, texture, turgor normal.  No rashes or lesions, or suspicious lesions. Neurologic:  Neurovascularly intact without any focal sensory/motor deficits. Cranial nerves: II-XII intact, grossly non-focal.  Psychiatric:  Alert and oriented, thought content appropriate, normal insight  Capillary Refill: Brisk,< 2 seconds   Peripheral Pulses: +2 palpable, equal bilaterally upper and lower extremities  Lymphatics: no lymphadenopathy      Labs:     Recent Labs     06/09/19  1137   WBC 6.9   HGB 13.3*   HCT 40.3*        Recent Labs     06/09/19  1137      K 4.3      CO2 23   BUN 14   CREATININE 1.0   CALCIUM 9.8     Recent Labs     06/09/19  1137   AST 29   ALT 15   BILIDIR <0.2   BILITOT 1.2   ALKPHOS 73     Recent Labs     06/09/19  1152   INR 0.95     No results for input(s): CKTOTAL, TROPONINI in the last 72 hours. Urinalysis:      Lab Results   Component Value Date    NITRU NEGATIVE 06/09/2019    WBCUA 0-2 06/09/2019    BACTERIA NONE 06/09/2019    RBCUA 0-2 06/09/2019    BLOODU NEGATIVE 06/09/2019    GLUCOSEU NEGATIVE 06/09/2019       Radiology:     CXR: I have reviewed the CXR with the following interpretation:   EKG:  I have reviewed the EKG with the following interpretation:  Cta Head W Wo Contrast    Result Date: 6/9/2019  PROCEDURE: CTA NECK W WO CONTRAST, CTA HEAD W WO CONTRAST CLINICAL INFORMATION: cva. Dizziness. COMPARISON: Head CT 6/9/2019. TECHNIQUE: 1 mm axial images were obtained through the head and neck after the fast bolus administration of contrast. A noncontrast localizer was obtained. 3-D reconstructions were performed on a dedicated 3-D workstation. These include multiplanar MPR images and multiplanar MIP images. Centerline reconstructions were obtained of the carotid systems. Isovue intravenous contrast was given. All CT scans at this facility use dose modulation, iterative reconstruction, and/or weight-based dosing when appropriate to reduce radiation dose to as low as reasonably achievable. FINDINGS: CTA NECK: Aortic arch and branches: There is calcified atherosclerosis of the aortic arch. There is no stenosis of the origin innominate artery, left common carotid artery or either subclavian artery. Right common carotid artery/ICA: The right common carotid artery is normal. The right carotid bulb and right internal carotid artery normal. There is no atherosclerosis or stenosis. Left common carotid artery/ICA: The left common carotid artery is normal. The left carotid bulb is normal. In the proximal left internal carotid artery there is an area of eccentric soft plaque. There is a small calcified component. Using NASCET criteria  and the distal left internal carotid artery as the reference, there is a 55% stenosis. There is no stenosis distal to this. Vertebral arteries: There is a small area of calcified atherosclerosis near the origin the right vertebral artery. There is no stenosis. Both vertebral arteries are normal. The left vertebral artery is slightly larger than the right. CTA HEAD: Internal carotid arteries: There is calcified atherosclerosis of the cavernous segments of the internal carotid arteries bilaterally. There is no stenosis. Middle cerebral arteries: Normal. The proximal branches are also normal. Anterior cerebral arteries: Normal. The proximal branches are normal. There is a normal small anterior communicating artery. Vertebral arteries: The vertebral arteries are normal. Basilar artery: Normal. Superior cerebellar arteries: Normal. Posterior cerebral arteries: Normal. The proximal branches are also normal. No aneurysms, stenoses or occlusions are noted.  The superior sagittal sinus, vein of [place], internal cerebral veins, straight sinus, transverse sinuses and sigmoid sinuses are patent. Axial source data: The lung apices are clear. There is no upper mediastinal or cervical adenopathy. There are no suspicious findings in the neck soft tissues. Low-attenuation white matter the brain is likely related to chronic small vessel ischemic changes. 1. 55% stenosis in the proximal left internal carotid artery due to eccentric plaque. 2. No stenosis of the right carotid system. 3. No intracranial stenoses or occlusions. **This report has been created using voice recognition software. It may contain minor errors which are inherent in voice recognition technology. ** Final report electronically signed by Dr. Tracy Gonzalez on 6/9/2019 1:17 PM    Ct Head Wo Contrast (code Stroke)    Result Date: 6/9/2019  PROCEDURE: NONCONTRAST CT BRAIN CLINICAL INFORMATION: STROKE COMPARISON: 3/27/2008 TECHNIQUE: Multiple axial 5 mm images of the brain were obtained without administration of intravenous contrast material. ALL CT SCANS AT THIS FACILITY use dose modulation, iterative reconstruction, and/or weight-based dosing when appropriate to reduce radiation dose to as low as reasonably achievable. FINDINGS: Lateral, third, fourth ventricles: Moderately enlarged ventricles, consistent with central atrophy. Moderate diffuse cerebral cortical atrophy and mild cerebellar cortical atrophy are demonstrated. Parenchyma:  No acute infarction, mass lesion, or intracranial hemorrhage is seen. Evidence for moderate small vessel disease in the periventricular white matter bilaterally. Suggestion of an old punctate infarct in the centrum semiovale right side Mastoid processes: Well aerated. Normal in appearance. .   Paranasal sinuses/calvarium: Unremarkable     No acute intracranial process. **This report has been created using voice recognition software. It may contain minor errors which are inherent in voice recognition technology. ** Final report electronically signed by Dr. Jean Dodson on 6/9/2019 11:28 AM    Cta Neck W Wo Contrast    Result Date: 6/9/2019  PROCEDURE: CTA NECK W WO CONTRAST, CTA HEAD W WO CONTRAST CLINICAL INFORMATION: cva. Dizziness. COMPARISON: Head CT 6/9/2019. TECHNIQUE: 1 mm axial images were obtained through the head and neck after the fast bolus administration of contrast. A noncontrast localizer was obtained. 3-D reconstructions were performed on a dedicated 3-D workstation. These include multiplanar MPR images and multiplanar MIP images. Centerline reconstructions were obtained of the carotid systems. Isovue intravenous contrast was given. All CT scans at this facility use dose modulation, iterative reconstruction, and/or weight-based dosing when appropriate to reduce radiation dose to as low as reasonably achievable. FINDINGS: CTA NECK: Aortic arch and branches: There is calcified atherosclerosis of the aortic arch. There is no stenosis of the origin innominate artery, left common carotid artery or either subclavian artery. Right common carotid artery/ICA: The right common carotid artery is normal. The right carotid bulb and right internal carotid artery normal. There is no atherosclerosis or stenosis. Left common carotid artery/ICA: The left common carotid artery is normal. The left carotid bulb is normal. In the proximal left internal carotid artery there is an area of eccentric soft plaque. There is a small calcified component. Using NASCET criteria  and the distal left internal carotid artery as the reference, there is a 55% stenosis. There is no stenosis distal to this. Vertebral arteries: There is a small area of calcified atherosclerosis near the origin the right vertebral artery. There is no stenosis. Both vertebral arteries are normal. The left vertebral artery is slightly larger than the right. CTA HEAD: Internal carotid arteries:  There is calcified atherosclerosis of the cavernous segments of the internal carotid arteries bilaterally. There is no stenosis. Middle cerebral arteries: Normal. The proximal branches are also normal. Anterior cerebral arteries: Normal. The proximal branches are normal. There is a normal small anterior communicating artery. Vertebral arteries: The vertebral arteries are normal. Basilar artery: Normal. Superior cerebellar arteries: Normal. Posterior cerebral arteries: Normal. The proximal branches are also normal. No aneurysms, stenoses or occlusions are noted. The superior sagittal sinus, vein of [place], internal cerebral veins, straight sinus, transverse sinuses and sigmoid sinuses are patent. Axial source data: The lung apices are clear. There is no upper mediastinal or cervical adenopathy. There are no suspicious findings in the neck soft tissues. Low-attenuation white matter the brain is likely related to chronic small vessel ischemic changes. 1. 55% stenosis in the proximal left internal carotid artery due to eccentric plaque. 2. No stenosis of the right carotid system. 3. No intracranial stenoses or occlusions. **This report has been created using voice recognition software. It may contain minor errors which are inherent in voice recognition technology. ** Final report electronically signed by Dr. Estrella Campa on 6/9/2019 1:17 PM           DVT prophylaxis: [x] Lovenox                                 [] SCDs                                 [] SQ Heparin                                 [] Encourage ambulation           [] Already on Anticoagulation    Code Status: Prior      PT/OT Eval Status:     Disposition:    [x] Home       [] TCU       [] Rehab       [] Psych       [] SNF       [] [facility]       [] Other-    ASSESSMENT:    Active Hospital Problems    Diagnosis Date Noted    Stroke-like symptom [R29.90] 06/09/2019    Hypertension [I10]     Hyperlipidemia [E78.5]        PLAN:    1. Observation  2. Echo  3. Permissive htn  4. Asa  5. Neuro consulted  6.  Mri w/o        Thank you Wiley Hallman MD for the opportunity to be involved in this patient's care.     Electronically signed by Charles Good DO on 6/9/2019 at 1:32 PM

## 2019-06-09 NOTE — ED NOTES
Spoke to Layla Coker and let her know that the patient is on the way to the floor to [phone].      Senait Alfaro LPN  51/89/57 [id number]

## 2019-06-09 NOTE — ED NOTES
Bed: 024A  Expected date:   Expected time:   Means of arrival: LATISHA MÉNDEZ II.[facility] [employer]  Comments:     Micheline See RN  06/09/19 5255

## 2019-06-09 NOTE — PLAN OF CARE
Problem: Falls - Risk of:  Goal: Will remain free from falls  Description  Will remain free from falls  Outcome: Ongoing  Note:   Call light in reach, bed in lowest position, and bed alarm activated. Education given on use of call light before ambulation and when in need of assistance. Patient expressed understanding. Hourly visual checks performed and charted. Toileting offered to patient. No falls this shift, at any time. Arm band and falling star in place. Will continue to monitor. Problem: HEMODYNAMIC STATUS  Goal: Patient has stable vital signs and fluid balance  Outcome: Ongoing  Note:   VS stable  Vitals:    06/09/19 1929   BP: 118/69   Pulse: 69   Resp: 16   Temp: 98.6 °F (37 °C)   SpO2: 95%          Problem: ACTIVITY INTOLERANCE/IMPAIRED MOBILITY  Goal: Mobility/activity is maintained at optimum level for patient  Outcome: Ongoing  Note:   Ambulates with contact guard     Problem: COMMUNICATION IMPAIRMENT  Goal: Ability to express needs and understand communication  Outcome: Ongoing  Note:   Able to verbalize needs   Care plan reviewed with patient. Patient verbalizes understanding of the plan of care and contributed to goal setting.

## 2019-06-10 ENCOUNTER — APPOINTMENT (OUTPATIENT)
Dept: MRI IMAGING | Age: 69
End: 2019-06-10
Payer: COMMERCIAL

## 2019-06-10 ENCOUNTER — TELEPHONE (OUTPATIENT)
Dept: ADMINISTRATIVE | Age: 69
End: 2019-06-10

## 2019-06-10 VITALS
BODY MASS INDEX: 20.59 KG/M2 | TEMPERATURE: 98 F | HEART RATE: 76 BPM | RESPIRATION RATE: 18 BRPM | OXYGEN SATURATION: 98 % | DIASTOLIC BLOOD PRESSURE: 68 MMHG | HEIGHT: 69 IN | SYSTOLIC BLOOD PRESSURE: 118 MMHG | WEIGHT: 139 LBS

## 2019-06-10 LAB
CHOLESTEROL, TOTAL: 145 MG/DL (ref 100–199)
FOLATE: > 20 NG/ML (ref 4.8–24.2)
HDLC SERPL-MCNC: 68 MG/DL
LDL CHOLESTEROL CALCULATED: 57 MG/DL
LV EF: 65 %
LVEF MODALITY: NORMAL
SEDIMENTATION RATE, ERYTHROCYTE: 8 MM/HR (ref 0–10)
TRIGL SERPL-MCNC: 99 MG/DL (ref 0–199)
VITAMIN B-12: 666 PG/ML (ref 211–911)

## 2019-06-10 PROCEDURE — 70551 MRI BRAIN STEM W/O DYE: CPT

## 2019-06-10 PROCEDURE — 97165 OT EVAL LOW COMPLEX 30 MIN: CPT

## 2019-06-10 PROCEDURE — 6370000000 HC RX 637 (ALT 250 FOR IP): Performed by: INTERNAL MEDICINE

## 2019-06-10 PROCEDURE — G0378 HOSPITAL OBSERVATION PER HR: HCPCS

## 2019-06-10 PROCEDURE — 2709999900 HC NON-CHARGEABLE SUPPLY

## 2019-06-10 PROCEDURE — 93306 TTE W/DOPPLER COMPLETE: CPT

## 2019-06-10 PROCEDURE — 95819 EEG AWAKE AND ASLEEP: CPT

## 2019-06-10 PROCEDURE — 80061 LIPID PANEL: CPT

## 2019-06-10 PROCEDURE — 97116 GAIT TRAINING THERAPY: CPT

## 2019-06-10 PROCEDURE — 85651 RBC SED RATE NONAUTOMATED: CPT

## 2019-06-10 PROCEDURE — 83090 ASSAY OF HOMOCYSTEINE: CPT

## 2019-06-10 PROCEDURE — 82607 VITAMIN B-12: CPT

## 2019-06-10 PROCEDURE — 99217 PR OBSERVATION CARE DISCHARGE MANAGEMENT: CPT | Performed by: INTERNAL MEDICINE

## 2019-06-10 PROCEDURE — 97162 PT EVAL MOD COMPLEX 30 MIN: CPT

## 2019-06-10 PROCEDURE — 36415 COLL VENOUS BLD VENIPUNCTURE: CPT

## 2019-06-10 PROCEDURE — 82746 ASSAY OF FOLIC ACID SERUM: CPT

## 2019-06-10 RX ORDER — FAMOTIDINE 20 MG/1
20 TABLET, FILM COATED ORAL 2 TIMES DAILY
Qty: 60 TABLET | Refills: 3 | Status: SHIPPED | OUTPATIENT
Start: 2019-06-10 | End: 2021-01-18 | Stop reason: SDUPTHER

## 2019-06-10 RX ORDER — ASPIRIN 81 MG/1
81 TABLET ORAL DAILY
Qty: 30 TABLET | Refills: 3 | Status: SHIPPED | OUTPATIENT
Start: 2019-06-11 | End: 2021-01-18 | Stop reason: SDUPTHER

## 2019-06-10 RX ADMIN — FAMOTIDINE 20 MG: 20 TABLET ORAL at 09:22

## 2019-06-10 RX ADMIN — METOPROLOL SUCCINATE 25 MG: 25 TABLET, EXTENDED RELEASE ORAL at 09:22

## 2019-06-10 RX ADMIN — TAMSULOSIN HYDROCHLORIDE 0.4 MG: 0.4 CAPSULE ORAL at 09:22

## 2019-06-10 RX ADMIN — ASPIRIN 81 MG: 81 TABLET ORAL at 09:21

## 2019-06-10 ASSESSMENT — PAIN SCALES - GENERAL
PAINLEVEL_OUTOF10: 0

## 2019-06-10 NOTE — PROGRESS NOTES
[facility] Laboratory Technician Worksheet      EEG Date: 6/10/2019    Name: Jaki Sykes Sr.   : 1950   Age: 70 y.o.   SEX: male    ROOM: [place] [address] MRN: [id number]           CSN: [id number]      Ordering Provider: Francisca OCAMPO Number: [id number] Time of Test:  8618    Hand: Right   Sedation: no    H.V. Done: No NOT DONE Photic: Yes    Sleep: Yes  Drowsy: Yes   Sleep Deprived: No    Seizures observed: no    Technician Impression:3    Mentality: alert      Clinical History:ACUTE DIZZINESS WITH FALL, NO ACUTE ON MRI    Past Medical History:       Diagnosis Date    CAD (coronary artery disease)     [facility]     Erectile dysfunction     GERD (gastroesophageal reflux disease) 2012    Gastro ulcer    Hyperlipidemia     Hypertension        Scheduled Meds:   atorvastatin  20 mg Oral Nightly    metoprolol succinate  25 mg Oral Daily    tamsulosin  0.4 mg Oral Daily    sodium chloride flush  10 mL Intravenous 2 times per day    enoxaparin  40 mg Subcutaneous Q24H    aspirin  81 mg Oral Daily    polyethylene glycol  17 g Oral Daily    famotidine  20 mg Oral BID     Continuous Infusions:   sodium chloride 50 mL/hr at 19 1835     PRN Meds:.sodium chloride flush, ondansetron, acetaminophen    Technician: Letha Klinefelter 6/10/2019

## 2019-06-10 NOTE — DISCHARGE SUMMARY
Hospital Medicine Discharge Summary      Patient Identification:   Mak Suarez Sr.   : 1950  MRN: [id number]   Account: [de-identified]      Patient's PCP: Greg Holguin MD    Admit Date: 2019     Discharge Date:   6/10/19    Admitting Physician: Charity Jules DO     Discharge Physician: Yesenia Acevedo MD     Discharge Diagnoses: Active Hospital Problems    Diagnosis Date Noted    Stroke-like symptom [R29.90] 2019    Hypertension [I10]     Hyperlipidemia [E78.5]        The patient was seen and examined on day of discharge and this discharge summary is in conjunction with any daily progress note from day of discharge. Hospital Course:   70 y.o. male who presented to [address] [place] [address] with evaluation of dizziness. The patient reports that about 30 minutes ago he was walking and suddenly became dizzy and light headed. He states that he fell to the ground 2 separate times landing on his knees. He denies loss of consciousness or hitting his head. He has a history of a CVA. Patient's wife reports that the patient has a left sided facial droop. He denies fevers, chills, nausea, chest pain, or shortness of breath. Patient denies any further complaints at initial encounter. Patient received IVFs and felt much better the next day. MRI brain without any acute pathology. Discussed with neuro and OK for discharge.          Exam:     Vitals:  Vitals:    06/10/19 0339 06/10/19 0339 06/10/19 0441 06/10/19 0916   BP:  135/71  120/76   Pulse: 67 67  78   Resp:  16  18   Temp:  98 °F (36.7 °C)  98 °F (36.7 °C)   TempSrc:  Oral  Oral   SpO2:  91%  98%   Weight:   139 lb (63 kg)    Height:         Weight: Weight: 139 lb (63 kg)     24 hour intake/output:    Intake/Output Summary (Last 24 hours) at 6/10/2019 1345  Last data filed at 2019  Gross per 24 hour   Intake --   Output 200 ml   Net -200 ml         General appearance:  No apparent distress, appears stated age and cooperative. HEENT:  Normal cephalic, atraumatic without obvious deformity. Pupils equal, round, and reactive to light. Extra ocular muscles intact. Conjunctivae/corneas clear. Musculoskeletal:  No clubbing, cyanosis or edema bilaterally. Full range of motion without deformity. Skin: Skin color, texture, turgor normal.  No rashes or lesions. Neurologic:  Neurovascularly intact without any focal sensory/motor deficits. Cranial nerves: II-XII intact, grossly non-focal.  Psychiatric:  Alert and oriented, thought content appropriate, normal insight  Capillary Refill: Brisk,< 3 seconds   Peripheral Pulses: +2 palpable, equal bilaterally       Labs: For convenience and continuity at follow-up the following most recent labs are provided:      CBC:    Lab Results   Component Value Date    WBC 6.9 06/09/2019    HGB 13.3 06/09/2019    HCT 40.3 06/09/2019     06/09/2019       Renal:    Lab Results   Component Value Date     06/09/2019    K 4.3 06/09/2019     06/09/2019    CO2 23 06/09/2019    BUN 14 06/09/2019    CREATININE 1.0 06/09/2019    CALCIUM 9.8 06/09/2019         Significant Diagnostic Studies    Radiology:   MRI brain without contrast   Final Result       1. No evidence of an acute infarct. 2. Moderate severity chronic small vessel ischemic changes. This has progressed compared to 2008. **This report has been created using voice recognition software. It may contain minor errors which are inherent in voice recognition technology. **      Final report electronically signed by Dr. Nargis Childs on 6/10/2019 1:14 PM      CTA NECK W WO CONTRAST   Final Result       1. 55% stenosis in the proximal left internal carotid artery due to eccentric plaque. 2. No stenosis of the right carotid system. 3. No intracranial stenoses or occlusions. **This report has been created using voice recognition software.  It may contain minor errors which are inherent in voice recognition technology. **      Final report electronically signed by Dr. Zulema Pizarro on 6/9/2019 1:17 PM      CTA HEAD W WO CONTRAST   Final Result       1. 55% stenosis in the proximal left internal carotid artery due to eccentric plaque. 2. No stenosis of the right carotid system. 3. No intracranial stenoses or occlusions. **This report has been created using voice recognition software. It may contain minor errors which are inherent in voice recognition technology. **      Final report electronically signed by Dr. Zulema Pizarro on 6/9/2019 1:17 PM      CT HEAD WO CONTRAST (CODE STROKE)   Final Result   No acute intracranial process. **This report has been created using voice recognition software. It may contain minor errors which are inherent in voice recognition technology. **      Final report electronically signed by Dr. Maryana Brink on 6/9/2019 11:28 AM             Consults:     IP CONSULT TO NEUROLOGY    Disposition: Home  Condition at Discharge: Stable    Code Status:  Full Code     Patient Instructions:    Discharge lab work: none  Activity: activity as tolerated  Diet: DIET GENERAL;      Follow-up visits:   Paula Hillman MD  [address] [facility] [id number] [place] [address]. [place] [address]  [phone]               Discharge Medications:      Jannie Perez.    Home Medication Instructions EOP:[id number]    Printed on:06/10/19 1345   Medication Information                      aspirin 81 MG EC tablet  Take 1 tablet by mouth daily             atorvastatin (LIPITOR) 20 MG tablet  TAKE 1 TABLET BY MOUTH DAILY             COMBIGAN 0.2-0.5 % ophthalmic solution  Place 1 drop into the left eye every 12 hours              famotidine (PEPCID) 20 MG tablet  Take 1 tablet by mouth 2 times daily             fluocinonide (LIDEX) 0.05 % cream  APPLY EXTERNALLY TO THE AFFECTED AREA TWICE DAILY             loratadine (CLARITIN) 10 MG tablet  TAKE

## 2019-06-10 NOTE — TELEPHONE ENCOUNTER
06/10/2019 . Transition of Care visit scheduled.   Visit date not found  Patient is being discharged to home  Date of discharge [id number]  Discharge from facility [facility]  Reason for admission stroke like symptoms

## 2019-06-10 NOTE — PROGRESS NOTES
[facility]  INPATIENT PHYSICAL THERAPY  EVALUATION  [facility] 4A - 4A-18/018-A    Time In: 5463  Time Out: 1006  Timed Code Treatment Minutes: 8 Minutes  Minutes: 16          Date: 6/10/2019  Patient Name: Hu Chapman.,  Gender:  male        MRN: [id number]  : 1950  (70 y.o.)      Referring Practitioner: DR. Junaid Pearl  Diagnosis: stroke-like symptom  Additional Pertinent Hx: admit with above diagnosis, c/o dizziness, legs won't work, hx of old facial droop, hx TIAs     Past Medical History:   Diagnosis Date    CAD (coronary artery disease)     [facility] [address]     Erectile dysfunction     GERD (gastroesophageal reflux disease) 2012    Gastro ulcer    Hyperlipidemia     Hypertension      Past Surgical History:   Procedure Laterality Date    CARDIAC CATHETERIZATION      COLONOSCOPY      ENDOSCOPY, COLON, DIAGNOSTIC      EYE SURGERY Left     cataract    Ifeanyi Schuler Right 5/27/15    Dr. Gloria Stevens     Dr. Dara Ormond Left     Dr. Cherl Prader  5/27/15    Dr. Pastora Butler       Restrictions/Precautions:  General Precautions       Subjective:  Chart Reviewed: Yes  Patient assessed for rehabilitation services?: Yes  Family / Caregiver Present: No  Subjective: pleasant nad cooperative, per pt feels safe for return home    General:       Vision: Impaired(left eye with catarct-hx sx)    Hearing: Within functional limits    Pain:  Denies.   Pain Assessment  Pain Level: 0       Social/Functional History:    Type of Home: House  Home Layout: Two level, Performs ADL's on one level  Home Access: Stairs to enter with rails  Entrance Stairs - Number of Steps: 4  Home Equipment: Cane   Bathroom Shower/Tub: Tub/Shower unit  Bathroom Toilet: Standard  ADL Assistance: Independent  Homemaking Assistance: Needs assistance(does cooking & cleaning)  Ambulation NA    Goals:  Patient goals : return home  Short term goals  Time Frame for Short term goals: NA  Long term goals  Time Frame for Long term goals : NA    Evaluation Complexity: Based on the findings of patient history, examination, clinical presentation, and decision making during this evaluation, the evaluation of Obi Suarez Sr.  is of medium complexity.          AM-PAC Inpatient Mobility Raw Score : 24  AM-PAC Inpatient T-Scale Score : 61.14  Mobility Inpatient CMS 0-100% Score: 0  Mobility Inpatient CMS G-Code Modifier : [facility] AND [facility]

## 2019-06-10 NOTE — PROGRESS NOTES
[address]  INPATIENT OCCUPATIONAL THERAPY  [facility] NEUROSCIENCES 4A  EVALUATION    Time:  Time In: 1007  Time Out: 1021  Timed Code Treatment Minutes: 0 Minutes  Minutes: 14          Date: 6/10/2019  Patient Name: Guadalupe Cruz,   Gender: male      MRN: [id number]  : 1950  (70 y.o.)  Referring Practitioner: Dr. sIaura Maldonado  Diagnosis: stroke like symptoms  Additional Pertinent Hx: Per ER note on 19: 70 y.o. male who presents to the Emergency Department for the evaluation of dizziness. The patient reports that about 30 minutes ago he was walking and suddenly became dizzy and light headed. He states that he fell to the ground 2 separate times landing on his knees. He denies loss of consciousness or hitting his head. He has a history of a CVA. Patient's wife reports that the patient has a left sided facial droop. CT of head was  negative. Restrictions/Precautions:  General Precautions                            Past Medical History:   Diagnosis Date    CAD (coronary artery disease)     [facility] [address]     Erectile dysfunction     GERD (gastroesophageal reflux disease) 2012    Gastro ulcer    Hyperlipidemia     Hypertension      Past Surgical History:   Procedure Laterality Date    CARDIAC CATHETERIZATION      COLONOSCOPY      ENDOSCOPY, COLON, DIAGNOSTIC      EYE SURGERY Left     cataract    EYE SURGERY      INGUINAL HERNIA REPAIR Right 5/27/15    Dr. [facility]     Dr. Jaswinder Lundberg Left     Dr. Katherine Rawls  5/27/15    Dr. Solis Crew / Caregiver Present: No    Subjective: cooperative.  Pt reports he is back to himself, no further symptoms-no dizziness    General:       Vision: Impaired(left eye with catarct-hx sx)    Hearing: Within functional limits         Pain:  Pain Assessment  Patient Currently in Pain: No       Social/Functional History:  Type of Home: House  Home Layout: Two level, Performs ADL's on one level  Home Access: Stairs to enter with rails(2-3)     Bathroom Shower/Tub: Tub/Shower unit  Bathroom Toilet: Standard       ADL Assistance: Independent  Homemaking Assistance: Needs assistance(does cooking & cleaning)       Ambulation Assistance: Independent  Transfer Assistance: Independent          Additional Comments: Pt reports mobility without AD PLOF. Objective        Overall Cognitive Status: WFL         Sensation  Overall Sensation Status: WFL                  LUE AROM (degrees)  LUE AROM : WFL          RUE AROM (degrees)  RUE AROM : WFL       LUE Strength  Gross LUE Strength: WFL                RUE Strength  Gross RUE Strength: WFL             ADL  Grooming: Stand by assistance(stood at sink to put dentures in )  LE Dressing: Stand by assistance(for adjusting slipper socks)          Transfers  Sit to stand: Supervision  Stand to sit: Supervision    Balance  Sitting Balance: Independent  Standing Balance: Supervision           Functional Mobility  Functional - Mobility Device: No device  Activity: To/from bathroom  Assist Level: Stand by assistance  Functional Mobility Comments: no unsteadiness or LOB           Activity Tolerance:  Activity Tolerance: Patient Tolerated treatment well    TREATEMENT; Educated Pt on grab bar vs shower chair at home as Pt brought up concerns with tub/shower during discussion of bathroom s/u. Assessment:  Assessment: No further OT indicated at this time. Prognosis: Good  No Skilled OT: At baseline function    Clinical Decision Making: Clinical Decision making was of Low Complexity as the result of analysis of data from a problem focused assessment, a consideration of a limited number of treatment options, no significant comorbidities affecting the plan of care and no modification or assistance required to complete the evaluation.     Discharge Recommendations:  Home with assist PRN    Patient

## 2019-06-10 NOTE — CARE COORDINATION
6/10/19, 1:47 PM    Discharge plan discussed by  and . Discharge plan reviewed with patient/ family. Patient/ family verbalize understanding of discharge plan and are in agreement with plan. Understanding was demonstrated using the teach back method. Pt discharged to home with spouse. Denies needs or services.

## 2019-06-10 NOTE — CONSULTS
Consult to Neurology  Consult performed by: Faisal Valencia MD  Consult ordered by: Eric Zacarias DO          NEUROLOGY CONSULT NOTE      Requesting Physician: Mirna Merchant MD    Reason for Consult:  Evaluate for Stroke like symptoms    History of Present Illness:  Sudarshan Suarez Sr. is a 70 y.o. male admitted to [facility] on 6/9/2019. He presents?to the Emergency Department for the evaluation of dizziness. The patient reports he was walking and suddenly became dizzy and light headed. He states that he fell to the ground 2 separate times landing on his knees. He also reports his visual perception was off. Symptoms were severe and constant. Onset was sudden. Modifiers are unknown. Frequency is once, no history of similar symptoms in the past. He denies?loss of consciousness?or hitting his head. He has a history of a CVA. Patient's wife reports that the patient has a left sided facial droop. He denies fevers, chills, nausea, chest pain, or shortness of breath. He is not diabetic. No history of seizure. He does have history of stroke in the past that affected his left side. Reports no chest pain. No shortness of breath with exertion. Reports no neck pain. No vision changes. No dysphagia. No fever. No rash. No weight loss. CT head done 6/919 showed no concerning findings. CTA head and neck done 6/9/19 showed 55% stenosis in the proximal left internal carotid artery due to eccentric plaque. No stenosis of the right carotid system. No intracranial stenoses or occlusions. History provided by patient and review of medical record.     Past Medical History:        Diagnosis Date    CAD (coronary artery disease)     [facility] [address]     Erectile dysfunction     GERD (gastroesophageal reflux disease) 07/2012    Gastro ulcer    Hyperlipidemia     Hypertension            Procedure Laterality Date    CARDIAC CATHETERIZATION      COLONOSCOPY      ENDOSCOPY, COLON, place, and time and normal weight, he is awake laying in bed watching television, wife at the bedside. Mental status -Level of Alertness: awake  Orientation: person, place, time  Memory: normal  Fund of Knowledge: normal  Attention/Concentration: normal  Language: normal. Mood is normal  Neck - supple, no significant adenopathy, carotids upstroke normal bilaterally, no carotid bruits. There is no LAP in the neck. There is no thyroid enlargement. Neurological -   Cranial Lvulab-LE-JXC:   Cranial nerve II: Normal. There is full visual fields  Cranial nerve III: Pupils: equal, round, reactive to light   Cranial nerves III, IV, VI: Extraocular Movements: intact   Cranial nerve V: Facial sensation: intact   Cranial nerve VII:Facial strength: intact   Cranial nerve VIII: Hearing: intact   Cranial nerve IX: Palate Elevation intact bilaterally  Cranial nerve XI: Shoulder shrug intact bilaterally  Cranial nerve XII: Tongue midline   neck supple without rigidity  DTR's are decreased?distal and symmetric  Babinski sign is negative? on bilaterally. Motor exam is 5/5 in the upper and lower extremities. Normal muscle tone. There is no muscle atrophy. There is no muscle fasciculation   Sensory is intact forlight touch? Coordination: finger to nose intact  Gait and station not tested   Abnormal movement none. proprioception normal   Skin - no rashes or lesion  Superficial temporal artery pulses are normal.   Musculoskeletal: Has no hand arthritis, no limitation of ROM in any of the four extremities. no joint tenderness, deformity or swelling. There is no leg edema. The Heart was regular in rate and rhythm.  No heart murmur  Chest- Clear         Labs:    CBC: Recent Labs     06/09/19  1137   WBC 6.9   HGB 13.3*      .0*   MCH 35.7*   MCHC 33.0     CMP:  Recent Labs     06/09/19  1137 06/09/19  1150     --    K 4.3  --      --    CO2 23  --    BUN 14  --    CREATININE 1.0  --    LABGLOM 90  -- GLUCOSE 101 99   CALCIUM 9.8  --      Liver: Recent Labs     06/09/19  1137   AST 29   ALT 15   ALKPHOS 73   PROT 7.8   LABALBU 4.5   BILITOT 1.2      I reviewed the CTA brain and agree with interpretation. Results for orders placed during the hospital encounter of 06/09/19   CTA HEAD W WO CONTRAST    Narrative PROCEDURE: CTA NECK W WO CONTRAST, CTA HEAD W WO CONTRAST    CLINICAL INFORMATION: cva. Dizziness. COMPARISON: Head CT 6/9/2019. TECHNIQUE: 1 mm axial images were obtained through the head and neck after the fast bolus administration of contrast. A noncontrast localizer was obtained. 3-D reconstructions were performed on a dedicated 3-D workstation. These include multiplanar MPR   images and multiplanar MIP images. Centerline reconstructions were obtained of the carotid systems. Isovue intravenous contrast was given. All CT scans at this facility use dose modulation, iterative reconstruction, and/or weight-based dosing when appropriate to reduce radiation dose to as low as reasonably achievable. FINDINGS:      CTA NECK:    Aortic arch and branches: There is calcified atherosclerosis of the aortic arch. There is no stenosis of the origin innominate artery, left common carotid artery or either subclavian artery. Right common carotid artery/ICA: The right common carotid artery is normal. The right carotid bulb and right internal carotid artery normal. There is no atherosclerosis or stenosis. Left common carotid artery/ICA: The left common carotid artery is normal. The left carotid bulb is normal. In the proximal left internal carotid artery there is an area of eccentric soft plaque. There is a small calcified component. Using NASCET criteria   and the distal left internal carotid artery as the reference, there is a 55% stenosis. There is no stenosis distal to this. Vertebral arteries: There is a small area of calcified atherosclerosis near the origin the right vertebral artery.  There contrast. A noncontrast localizer was obtained. 3-D reconstructions were performed on a dedicated 3-D workstation. These include multiplanar MPR   images and multiplanar MIP images. Centerline reconstructions were obtained of the carotid systems. Isovue intravenous contrast was given. All CT scans at this facility use dose modulation, iterative reconstruction, and/or weight-based dosing when appropriate to reduce radiation dose to as low as reasonably achievable. FINDINGS:      CTA NECK:    Aortic arch and branches: There is calcified atherosclerosis of the aortic arch. There is no stenosis of the origin innominate artery, left common carotid artery or either subclavian artery. Right common carotid artery/ICA: The right common carotid artery is normal. The right carotid bulb and right internal carotid artery normal. There is no atherosclerosis or stenosis. Left common carotid artery/ICA: The left common carotid artery is normal. The left carotid bulb is normal. In the proximal left internal carotid artery there is an area of eccentric soft plaque. There is a small calcified component. Using NASCET criteria   and the distal left internal carotid artery as the reference, there is a 55% stenosis. There is no stenosis distal to this. Vertebral arteries: There is a small area of calcified atherosclerosis near the origin the right vertebral artery. There is no stenosis. Both vertebral arteries are normal. The left vertebral artery is slightly larger than the right. CTA HEAD:      Internal carotid arteries: There is calcified atherosclerosis of the cavernous segments of the internal carotid arteries bilaterally. There is no stenosis. Middle cerebral arteries: Normal. The proximal branches are also normal.    Anterior cerebral arteries: Normal. The proximal branches are normal. There is a normal small anterior communicating artery. Vertebral arteries:  The vertebral arteries are small vessel disease in the periventricular white matter bilaterally. Suggestion of an old punctate infarct in the centrum semiovale right side    Mastoid processes: Well aerated. Normal in appearance. .       Paranasal sinuses/calvarium: Unremarkable        Impression No acute intracranial process. **This report has been created using voice recognition software. It may contain minor errors which are inherent in voice recognition technology. **    Final report electronically signed by Dr. Mary Kay Ennis on 6/9/2019 11:28 AM         We reviewed the patient records and available information in the EHR       Impression:    Principal Problem:    Stroke-like symptom  Active Problems:    Hypertension    Hyperlipidemia  Resolved Problems:    * No resolved hospital problems. *  ? This is a 54-year-old male who presents for evaluation of stroke like symptoms. He reports he was walking when he suddenly became dizzy. He has history of stroke in the past that affected his left side. CT of the head on 6/9/2019 showed no concerning findings. He also underwent CTA head and neck that showed 55% stenosis of his left ICA. His exam is nonfocal. He will need to undergo MRI brain without contrast to evaluate for other organic causes of his symptoms, including stroke. After detailed discussion with patient we agreed on the following plan. Recommendations:                                              1. MRI brain WO contrast  2. Case was discussed with primary service. 3. All questions were answered. ?? It was my pleasure to evaluate [employer] today. Please call with questions.       Electronically signed by Axel Cline MD on 6/10/2019 at 8:27 AM

## 2019-06-11 ENCOUNTER — TELEPHONE (OUTPATIENT)
Dept: FAMILY MEDICINE CLINIC | Age: 69
End: 2019-06-11

## 2019-06-11 LAB — HOMOCYSTEINE, TOTAL: 8 UMOL/L

## 2019-06-11 NOTE — TELEPHONE ENCOUNTER
[facility] Transitions Initial Follow Up Call    Call within 2 business days of discharge: Yes     Patient: Pauline Barlow Sr. Patient : 1950 MRN: [id number]    [facility]- Stroke like symptoms.      RARS: Readmission Risk Score: 0       Spoke with: Patient    Discharge department/facility: [facility]    Non-face-to-face services provided:  Scheduled appointment with PCP- on 2019 @ 10:10am.     Follow Up  Future Appointments   Date Time Provider Rosie Roberts   2019 10:10 AM Luisa Dunbar MD [facility] - Imer Raman LPN

## 2019-06-25 ENCOUNTER — OFFICE VISIT (OUTPATIENT)
Dept: FAMILY MEDICINE CLINIC | Age: 69
End: 2019-06-25
Payer: COMMERCIAL

## 2019-06-25 VITALS
TEMPERATURE: 98.4 F | HEART RATE: 84 BPM | WEIGHT: 140.6 LBS | BODY MASS INDEX: 20.76 KG/M2 | RESPIRATION RATE: 16 BRPM | SYSTOLIC BLOOD PRESSURE: 120 MMHG | DIASTOLIC BLOOD PRESSURE: 64 MMHG

## 2019-06-25 DIAGNOSIS — J30.2 SEASONAL ALLERGIES: ICD-10-CM

## 2019-06-25 DIAGNOSIS — Z86.73 STATUS POST CVA: ICD-10-CM

## 2019-06-25 DIAGNOSIS — Z09 HOSPITAL DISCHARGE FOLLOW-UP: Primary | ICD-10-CM

## 2019-06-25 PROCEDURE — 1123F ACP DISCUSS/DSCN MKR DOCD: CPT | Performed by: NURSE PRACTITIONER

## 2019-06-25 PROCEDURE — 4004F PT TOBACCO SCREEN RCVD TLK: CPT | Performed by: NURSE PRACTITIONER

## 2019-06-25 PROCEDURE — G8420 CALC BMI NORM PARAMETERS: HCPCS | Performed by: NURSE PRACTITIONER

## 2019-06-25 PROCEDURE — 3017F COLORECTAL CA SCREEN DOC REV: CPT | Performed by: NURSE PRACTITIONER

## 2019-06-25 PROCEDURE — 99214 OFFICE O/P EST MOD 30 MIN: CPT | Performed by: NURSE PRACTITIONER

## 2019-06-25 PROCEDURE — 4040F PNEUMOC VAC/ADMIN/RCVD: CPT | Performed by: NURSE PRACTITIONER

## 2019-06-25 PROCEDURE — G8427 DOCREV CUR MEDS BY ELIG CLIN: HCPCS | Performed by: NURSE PRACTITIONER

## 2019-06-25 PROCEDURE — G8598 ASA/ANTIPLAT THER USED: HCPCS | Performed by: NURSE PRACTITIONER

## 2019-06-25 ASSESSMENT — PATIENT HEALTH QUESTIONNAIRE - PHQ9
2. FEELING DOWN, DEPRESSED OR HOPELESS: 0
SUM OF ALL RESPONSES TO PHQ QUESTIONS 1-9: 0
1. LITTLE INTEREST OR PLEASURE IN DOING THINGS: 0
SUM OF ALL RESPONSES TO PHQ QUESTIONS 1-9: 0
SUM OF ALL RESPONSES TO PHQ9 QUESTIONS 1 & 2: 0

## 2019-06-25 ASSESSMENT — ENCOUNTER SYMPTOMS
BACK PAIN: 0
EYE PAIN: 0
EYE DISCHARGE: 0
SORE THROAT: 0
SHORTNESS OF BREATH: 0
ABDOMINAL PAIN: 0
COLOR CHANGE: 0
CONSTIPATION: 0
VOMITING: 0
RHINORRHEA: 0
COUGH: 0
NAUSEA: 0
STRIDOR: 0
WHEEZING: 0
DIARRHEA: 0

## 2019-06-25 NOTE — PATIENT INSTRUCTIONS
Patient Education        Learning About Benefits From Quitting Smoking  How does quitting smoking make you healthier? If you're thinking about quitting smoking, you may have a few reasons to be smoke-free. Your health may be one of them. · When you quit smoking, you lower your risks for cancer, lung disease, heart attack, stroke, blood vessel disease, and blindness from macular degeneration. · When you're smoke-free, you get sick less often, and you heal faster. You are less likely to get colds, flu, bronchitis, and pneumonia. · As a nonsmoker, you may find that your mood is better and you are less stressed. When and how will you feel healthier? Quitting has real health benefits that start from day 1 of being smoke-free. And the longer you stay smoke-free, the healthier you get and the better you feel. The first hours  · After just 20 minutes, your blood pressure and heart rate go down. That means there's less stress on your heart and blood vessels. · Within 12 hours, the level of carbon monoxide in your blood drops back to normal. That makes room for more oxygen. With more oxygen in your body, you may notice that you have more energy than when you smoked. After 2 weeks  · Your lungs start to work better. · Your risk of heart attack starts to drop. After 1 month  · When your lungs are clear, you cough less and breathe deeper, so it's easier to be active. · Your sense of taste and smell return. That means you can enjoy food more than you have since you started smoking. Over the years  · After 1 year, your risk of heart disease is half what it would be if you kept smoking. · After 5 years, your risk of stroke starts to shrink. Within a few years after that, it's about the same as if you'd never smoked. · After 10 years, your risk of dying from lung cancer is cut by about half. And your risk for many other types of cancer is lower too. How would quitting help others in your life?   When you quit smoking, you improve the health of everyone who now breathes in your smoke. · Their heart, lung, and cancer risks drop, much like yours. · They are sick less. For babies and small children, living smoke-free means they're less likely to have ear infections, pneumonia, and bronchitis. · If you're a woman who is or will be pregnant someday, quitting smoking means a healthier . · Children who are close to you are less likely to become adult smokers. Where can you learn more? Go to https://[employer]. org and sign in to your [employer] account. Enter [phone] in the [facility] box to learn more about \"Learning About Benefits From Quitting Smoking. \"     If you do not have an account, please click on the \"Sign Up Now\" link. Current as of: 2018  Content Version: 12.0  © 8190-7400 [employer], [employer]. Care instructions adapted under license by [facility] ([facility]). If you have questions about a medical condition or this instruction, always ask your healthcare professional. [address] any warranty or liability for your use of this information.

## 2019-06-25 NOTE — PROGRESS NOTES
[address] [address]  Dept: [phone]  Dept Fax: [phone]  [address] is a 70 y.o.male here for follow-up after being hospitalized just over 2 weeks ago with a CVA. Since that time patient states he has been back to his normal life, mowing his lawn and has no residual weakness or problems. He states his family would like to know why he is having strokes. Patient Active Problem List   Diagnosis    CAD (coronary artery disease)    Hypertension    Erectile dysfunction    Hyperlipidemia    GI bleeding    Hx of asbestos exposure    Stroke-like symptom       Current Outpatient Medications   Medication Sig Dispense Refill    aspirin 81 MG EC tablet Take 1 tablet by mouth daily 30 tablet 3    famotidine (PEPCID) 20 MG tablet Take 1 tablet by mouth 2 times daily 60 tablet 3    metoprolol succinate (TOPROL XL) 25 MG extended release tablet TAKE 1 TABLET BY MOUTH DAILY 90 tablet 1    atorvastatin (LIPITOR) 20 MG tablet TAKE 1 TABLET BY MOUTH DAILY 90 tablet 1    tamsulosin (FLOMAX) 0.4 MG capsule Take 1 capsule by mouth daily 30 capsule 5    tadalafil (CIALIS) 20 MG tablet One qd prn 18 tablet 5    fluocinonide (LIDEX) 0.05 % cream APPLY EXTERNALLY TO THE AFFECTED AREA TWICE DAILY (Patient taking differently: Apply topically as needed APPLY EXTERNALLY TO THE AFFECTED AREA TWICE DAILY) 60 g 4    losartan (COZAAR) 50 MG tablet Take 1 tablet by mouth daily 90 tablet 3    loratadine (CLARITIN) 10 MG tablet TAKE 1 TABLET BY MOUTH DAILY 90 tablet 3    COMBIGAN 0.2-0.5 % ophthalmic solution Place 1 drop into the left eye every 12 hours        No current facility-administered medications for this visit. Review of Systems   Constitutional: Negative for activity change, appetite change, chills, fatigue and fever.    HENT: Negative for congestion, ear pain, rhinorrhea and sore throat. Eyes: Negative for pain and discharge. Respiratory: Negative for cough, shortness of breath, wheezing and stridor. Cardiovascular: Negative for chest pain. Gastrointestinal: Negative for abdominal pain, constipation, diarrhea, nausea and vomiting. Genitourinary: Negative for dysuria, flank pain and frequency. Musculoskeletal: Negative for arthralgias, back pain, myalgias and neck pain. Skin: Negative for color change and rash. Allergic/Immunologic: Negative for immunocompromised state. Neurological: Negative for dizziness, weakness and headaches. Psychiatric/Behavioral: Negative. OBJECTIVE     /64 (Site: Right Upper Arm)   Pulse 84   Temp 98.4 °F (36.9 °C) (Oral)   Resp 16   Wt 140 lb 9.6 oz (63.8 kg)   BMI 20.76 kg/m²     Wt Readings from Last 3 Encounters:   06/25/19 140 lb 9.6 oz (63.8 kg)   06/10/19 139 lb (63 kg)   11/29/18 136 lb (61.7 kg)     Body mass index is 20.76 kg/m². Physical Exam   Constitutional: He is oriented to person, place, and time. He appears well-developed and well-nourished. No distress. HENT:   Nose: Mucosal edema present. Eyes: Conjunctivae are normal. Right eye exhibits no discharge. Left eye exhibits no discharge. Neck: Normal range of motion. Neck supple. Cardiovascular: Normal rate, regular rhythm and normal heart sounds. No murmur heard. Pulmonary/Chest: Effort normal and breath sounds normal. No respiratory distress. Musculoskeletal: Normal range of motion. He exhibits no edema or tenderness. Neurological: He is alert and oriented to person, place, and time. He displays no tremor. No cranial nerve deficit. He displays a negative Romberg sign. Coordination and gait normal. GCS eye subscore is 4. GCS verbal subscore is 5. GCS motor subscore is 6. Skin: Skin is warm and dry. No rash noted. He is not diaphoretic. No erythema. No pallor. Psychiatric: He has a normal mood and affect.  His behavior is normal. Judgment and thought content normal.   Nursing note and vitals reviewed.       No results found for: LABA1C    Lab Results   Component Value Date    CHOL 145 06/10/2019    TRIG 99 06/10/2019    HDL 68 06/10/2019    LDLCALC 57 06/10/2019       The 10-year ASCVD risk score (Aureliano Werner et al., 2013) is: 22.6%    Values used to calculate the score:      Age: 70 years      Sex: Male      Is Non- : Yes      Diabetic: No      Tobacco smoker: Yes      Systolic Blood Pressure: 162 mmHg      Is BP treated: Yes      HDL Cholesterol: 68 mg/dL      Total Cholesterol: 145 mg/dL    Lab Results   Component Value Date     06/09/2019    K 4.3 06/09/2019     06/09/2019    CO2 23 06/09/2019    BUN 14 06/09/2019    CREATININE 1.0 06/09/2019    GLUCOSE 99 06/09/2019    CALCIUM 9.8 06/09/2019    PROT 7.8 06/09/2019    LABALBU 4.5 06/09/2019    BILITOT 1.2 06/09/2019    ALKPHOS 73 06/09/2019    AST 29 06/09/2019    ALT 15 06/09/2019    LABGLOM 90 06/09/2019       No results found for: LABMICR, [id number]    Lab Results   Component Value Date    TSH 0.790 11/07/2013       Lab Results   Component Value Date    WBC 6.9 06/09/2019    HGB 13.3 (L) 06/09/2019    HCT 40.3 (L) 06/09/2019    .0 (H) 06/09/2019     06/09/2019       Lab Results   Component Value Date    PSA 2.58 (H) 10/19/2018    PSA 2.4 07/12/2017    PSA 1.71 05/16/2015       Immunization History   Administered Date(s) Administered    Influenza 11/01/2011, 11/04/2013    Influenza Virus Vaccine 10/23/2014, 10/30/2015    Influenza Whole 10/05/2010    Influenza, High Dose (Fluzone 65 yrs and older) 10/19/2018    Influenza, Ashutosh Marshal, 3 Years and older, IM (Fluzone 3 yrs and older or Afluria 5 yrs and older) 10/05/2016    Pneumococcal Conjugate 13-valent ([id number]) 05/12/2016    Pneumococcal Polysaccharide ([id number]) 11/04/2013, 11/29/2018       Health Maintenance   Topic Date Due    AAA screen  1950    DTaP/Tdap/Td vaccine (1 - Tdap) 11/27/1968    Shingles Vaccine (1 of 2) 11/27/1999    Low dose CT lung screening  11/27/2004    Potassium monitoring  06/09/2020    Creatinine monitoring  06/09/2020    Lipid screen  06/10/2024    Colon cancer screen colonoscopy  02/04/2029    Flu vaccine  Completed    Pneumococcal 65+ years Vaccine  Completed    Hepatitis C screen  Completed       No future appointments. ASSESSMENT      Patient appears to be doing well today, he has a steady gait, normal Romberg and no abnormal coordination. His neurological exam is unremarkable for concerns. Lungs are clear and apical pulse is strong and regular, skin is warm and dry. He does have mild allergic rhinitis. Diagnosis Orders   1. Hospital discharge follow-up     2. Status post CVA     3. Seasonal allergies         PLAN      No prescriptions needed today. Activities as tolerated. Follow-up in 6 months or sooner as needed.   Electronically signed by ELAN De La O CNP on 6/25/2019 at 11:45 AM

## 2019-07-31 RX ORDER — LOSARTAN POTASSIUM 50 MG/1
50 TABLET ORAL DAILY
Qty: 90 TABLET | Refills: 3 | Status: SHIPPED | OUTPATIENT
Start: 2019-07-31 | End: 2020-04-13

## 2019-08-01 RX ORDER — LORATADINE 10 MG/1
TABLET ORAL
Qty: 90 TABLET | Refills: 3 | Status: SHIPPED | OUTPATIENT
Start: 2019-08-01 | End: 2020-10-09 | Stop reason: SDUPTHER

## 2019-11-08 RX ORDER — TADALAFIL 20 MG/1
TABLET ORAL
Qty: 18 TABLET | Refills: 0 | Status: SHIPPED | OUTPATIENT
Start: 2019-11-08 | End: 2020-05-15

## 2019-11-18 RX ORDER — ATORVASTATIN CALCIUM 20 MG/1
TABLET, FILM COATED ORAL
Qty: 90 TABLET | Refills: 0 | Status: SHIPPED | OUTPATIENT
Start: 2019-11-18 | End: 2020-02-03

## 2019-12-02 RX ORDER — METOPROLOL SUCCINATE 25 MG/1
TABLET, EXTENDED RELEASE ORAL
Qty: 90 TABLET | Refills: 1 | Status: SHIPPED | OUTPATIENT
Start: 2019-12-02 | End: 2020-05-28

## 2019-12-04 ENCOUNTER — OFFICE VISIT (OUTPATIENT)
Dept: FAMILY MEDICINE CLINIC | Age: 69
End: 2019-12-04
Payer: COMMERCIAL

## 2019-12-04 VITALS
RESPIRATION RATE: 18 BRPM | SYSTOLIC BLOOD PRESSURE: 110 MMHG | BODY MASS INDEX: 21.89 KG/M2 | TEMPERATURE: 99 F | DIASTOLIC BLOOD PRESSURE: 70 MMHG | WEIGHT: 148.2 LBS | HEART RATE: 77 BPM

## 2019-12-04 DIAGNOSIS — Z12.2 ENCOUNTER FOR SCREENING FOR LUNG CANCER: ICD-10-CM

## 2019-12-04 DIAGNOSIS — Z13.6 ENCOUNTER FOR ABDOMINAL AORTIC ANEURYSM (AAA) SCREENING: ICD-10-CM

## 2019-12-04 DIAGNOSIS — I25.10 CORONARY ARTERY DISEASE INVOLVING NATIVE HEART WITHOUT ANGINA PECTORIS, UNSPECIFIED VESSEL OR LESION TYPE: Chronic | ICD-10-CM

## 2019-12-04 DIAGNOSIS — Z87.891 PERSONAL HISTORY OF TOBACCO USE: Primary | ICD-10-CM

## 2019-12-04 DIAGNOSIS — I10 ESSENTIAL HYPERTENSION: Chronic | ICD-10-CM

## 2019-12-04 PROCEDURE — 3017F COLORECTAL CA SCREEN DOC REV: CPT | Performed by: FAMILY MEDICINE

## 2019-12-04 PROCEDURE — 4004F PT TOBACCO SCREEN RCVD TLK: CPT | Performed by: FAMILY MEDICINE

## 2019-12-04 PROCEDURE — G0296 VISIT TO DETERM LDCT ELIG: HCPCS | Performed by: FAMILY MEDICINE

## 2019-12-04 PROCEDURE — 4040F PNEUMOC VAC/ADMIN/RCVD: CPT | Performed by: FAMILY MEDICINE

## 2019-12-04 PROCEDURE — G8482 FLU IMMUNIZE ORDER/ADMIN: HCPCS | Performed by: FAMILY MEDICINE

## 2019-12-04 PROCEDURE — 1123F ACP DISCUSS/DSCN MKR DOCD: CPT | Performed by: FAMILY MEDICINE

## 2019-12-04 PROCEDURE — G8420 CALC BMI NORM PARAMETERS: HCPCS | Performed by: FAMILY MEDICINE

## 2019-12-04 PROCEDURE — 99213 OFFICE O/P EST LOW 20 MIN: CPT | Performed by: FAMILY MEDICINE

## 2019-12-04 PROCEDURE — G8598 ASA/ANTIPLAT THER USED: HCPCS | Performed by: FAMILY MEDICINE

## 2019-12-04 PROCEDURE — G8427 DOCREV CUR MEDS BY ELIG CLIN: HCPCS | Performed by: FAMILY MEDICINE

## 2019-12-08 ASSESSMENT — ENCOUNTER SYMPTOMS
RHINORRHEA: 0
DIARRHEA: 0
SINUS PRESSURE: 0
CONSTIPATION: 0
SORE THROAT: 0
ABDOMINAL PAIN: 0
EYE PAIN: 0
ABDOMINAL DISTENTION: 0
COUGH: 0
NAUSEA: 0
SHORTNESS OF BREATH: 0

## 2019-12-10 ENCOUNTER — HOSPITAL ENCOUNTER (OUTPATIENT)
Dept: INTERVENTIONAL RADIOLOGY/VASCULAR | Age: 69
Discharge: HOME | End: 2019-12-10

## 2019-12-10 DIAGNOSIS — Z13.6 ENCOUNTER FOR ABDOMINAL AORTIC ANEURYSM (AAA) SCREENING: ICD-10-CM

## 2019-12-12 RX ORDER — TAMSULOSIN HYDROCHLORIDE 0.4 MG/1
0.4 CAPSULE ORAL DAILY
Qty: 30 CAPSULE | Refills: 5 | Status: SHIPPED | OUTPATIENT
Start: 2019-12-12 | End: 2020-07-06

## 2019-12-23 ENCOUNTER — HOSPITAL ENCOUNTER (OUTPATIENT)
Dept: ULTRASOUND IMAGING | Age: 69
Discharge: HOME | End: 2019-12-23
Payer: MEDICARE

## 2019-12-23 ENCOUNTER — HOSPITAL ENCOUNTER (OUTPATIENT)
Dept: CT IMAGING | Age: 69
Discharge: HOME | End: 2019-12-23
Payer: MEDICARE

## 2019-12-23 DIAGNOSIS — Z87.891 PERSONAL HISTORY OF TOBACCO USE: ICD-10-CM

## 2019-12-23 PROCEDURE — G0297 LDCT FOR LUNG CA SCREEN: HCPCS

## 2019-12-23 PROCEDURE — 76706 US ABDL AORTA SCREEN AAA: CPT

## 2019-12-24 ENCOUNTER — TELEPHONE (OUTPATIENT)
Dept: FAMILY MEDICINE CLINIC | Age: 69
End: 2019-12-24

## 2020-02-03 RX ORDER — ATORVASTATIN CALCIUM 20 MG/1
TABLET, FILM COATED ORAL
Qty: 90 TABLET | Refills: 0 | Status: SHIPPED | OUTPATIENT
Start: 2020-02-03 | End: 2020-05-11

## 2020-04-13 RX ORDER — LOSARTAN POTASSIUM 50 MG/1
50 TABLET ORAL DAILY
Qty: 90 TABLET | Refills: 2 | Status: SHIPPED | OUTPATIENT
Start: 2020-04-13 | End: 2020-10-27 | Stop reason: SDUPTHER

## 2020-05-11 RX ORDER — ATORVASTATIN CALCIUM 20 MG/1
TABLET, FILM COATED ORAL
Qty: 90 TABLET | Refills: 2 | Status: SHIPPED | OUTPATIENT
Start: 2020-05-11 | End: 2021-01-18 | Stop reason: SDUPTHER

## 2020-05-15 RX ORDER — TADALAFIL 20 MG/1
TABLET ORAL
Qty: 18 TABLET | Refills: 2 | Status: SHIPPED | OUTPATIENT
Start: 2020-05-15 | End: 2021-01-18 | Stop reason: SDUPTHER

## 2020-05-28 RX ORDER — METOPROLOL SUCCINATE 25 MG/1
TABLET, EXTENDED RELEASE ORAL
Qty: 90 TABLET | Refills: 1 | Status: SHIPPED | OUTPATIENT
Start: 2020-05-28 | End: 2020-10-09 | Stop reason: SDUPTHER

## 2020-07-06 RX ORDER — TAMSULOSIN HYDROCHLORIDE 0.4 MG/1
0.4 CAPSULE ORAL DAILY
Qty: 90 CAPSULE | Refills: 1 | Status: SHIPPED | OUTPATIENT
Start: 2020-07-06 | End: 2021-01-18 | Stop reason: SDUPTHER

## 2020-10-12 RX ORDER — LORATADINE 10 MG/1
TABLET ORAL
Qty: 90 TABLET | Refills: 3 | Status: SHIPPED | OUTPATIENT
Start: 2020-10-12 | End: 2021-05-25 | Stop reason: SDUPTHER

## 2020-10-12 RX ORDER — METOPROLOL SUCCINATE 25 MG/1
TABLET, EXTENDED RELEASE ORAL
Qty: 90 TABLET | Refills: 0 | Status: SHIPPED | OUTPATIENT
Start: 2020-10-12 | End: 2021-01-18 | Stop reason: SDUPTHER

## 2020-10-27 RX ORDER — LOSARTAN POTASSIUM 50 MG/1
50 TABLET ORAL DAILY
Qty: 90 TABLET | Refills: 0 | Status: SHIPPED | OUTPATIENT
Start: 2020-10-27 | End: 2021-01-18 | Stop reason: SDUPTHER

## 2020-11-25 ENCOUNTER — TELEPHONE (OUTPATIENT)
Dept: FAMILY MEDICINE CLINIC | Age: 70
End: 2020-11-25

## 2020-12-08 RX ORDER — TADALAFIL 20 MG/1
TABLET ORAL
Qty: 18 TABLET | Refills: 2 | OUTPATIENT
Start: 2020-12-08

## 2020-12-10 NOTE — TELEPHONE ENCOUNTER
Obi Suarez Sr. called requesting a refill on the following medications:  Requested Prescriptions     Pending Prescriptions Disp Refills    fluocinonide (LIDEX) 0.05 % cream 60 g 4     Sig: APPLY EXTERNALLY TO THE AFFECTED AREA TWICE DAILY     Pharmacy verified: yes      Date of last visit: 12/14/2019  Date of next visit (if applicable): Visit date not found

## 2021-01-12 RX ORDER — METOPROLOL SUCCINATE 25 MG/1
TABLET, EXTENDED RELEASE ORAL
Qty: 90 TABLET | Refills: 0 | OUTPATIENT
Start: 2021-01-12

## 2021-01-14 NOTE — TELEPHONE ENCOUNTER
Pharmacy called regarding this request. Informed them that he is due for his yearly exam, so the med was denied. LM for patient to call back to schedule.

## 2021-01-18 ENCOUNTER — OFFICE VISIT (OUTPATIENT)
Dept: FAMILY MEDICINE CLINIC | Age: 71
End: 2021-01-18
Payer: MEDICARE

## 2021-01-18 VITALS
WEIGHT: 141 LBS | SYSTOLIC BLOOD PRESSURE: 124 MMHG | HEIGHT: 67 IN | HEART RATE: 88 BPM | BODY MASS INDEX: 22.13 KG/M2 | TEMPERATURE: 97 F | DIASTOLIC BLOOD PRESSURE: 86 MMHG | RESPIRATION RATE: 20 BRPM

## 2021-01-18 DIAGNOSIS — E78.5 HYPERLIPIDEMIA, UNSPECIFIED HYPERLIPIDEMIA TYPE: Chronic | ICD-10-CM

## 2021-01-18 DIAGNOSIS — Z00.00 ROUTINE GENERAL MEDICAL EXAMINATION AT A HEALTH CARE FACILITY: Primary | ICD-10-CM

## 2021-01-18 DIAGNOSIS — R41.3 MEMORY DEFICIT: ICD-10-CM

## 2021-01-18 PROCEDURE — G0438 PPPS, INITIAL VISIT: HCPCS | Performed by: FAMILY MEDICINE

## 2021-01-18 RX ORDER — LOSARTAN POTASSIUM 50 MG/1
50 TABLET ORAL DAILY
Qty: 90 TABLET | Refills: 3 | Status: SHIPPED | OUTPATIENT
Start: 2021-01-18 | End: 2022-01-27 | Stop reason: SDUPTHER

## 2021-01-18 RX ORDER — ASPIRIN 81 MG/1
81 TABLET ORAL DAILY
Qty: 30 TABLET | Refills: 11 | Status: SHIPPED | OUTPATIENT
Start: 2021-01-18 | End: 2021-10-15 | Stop reason: SDUPTHER

## 2021-01-18 RX ORDER — FAMOTIDINE 20 MG/1
20 TABLET, FILM COATED ORAL 2 TIMES DAILY
Qty: 60 TABLET | Refills: 11 | Status: SHIPPED | OUTPATIENT
Start: 2021-01-18 | End: 2022-01-27 | Stop reason: SDUPTHER

## 2021-01-18 RX ORDER — METOPROLOL SUCCINATE 25 MG/1
TABLET, EXTENDED RELEASE ORAL
Qty: 90 TABLET | Refills: 3 | Status: SHIPPED | OUTPATIENT
Start: 2021-01-18 | End: 2022-01-27 | Stop reason: SDUPTHER

## 2021-01-18 RX ORDER — TAMSULOSIN HYDROCHLORIDE 0.4 MG/1
0.4 CAPSULE ORAL DAILY
Qty: 90 CAPSULE | Refills: 3 | Status: SHIPPED | OUTPATIENT
Start: 2021-01-18 | End: 2021-03-24 | Stop reason: SDUPTHER

## 2021-01-18 RX ORDER — TADALAFIL 20 MG/1
TABLET ORAL
Qty: 18 TABLET | Refills: 3 | Status: SHIPPED | OUTPATIENT
Start: 2021-01-18 | End: 2021-03-25 | Stop reason: SDUPTHER

## 2021-01-18 RX ORDER — ATORVASTATIN CALCIUM 20 MG/1
TABLET, FILM COATED ORAL
Qty: 90 TABLET | Refills: 3 | Status: SHIPPED | OUTPATIENT
Start: 2021-01-18 | End: 2022-01-27 | Stop reason: SDUPTHER

## 2021-01-18 SDOH — HEALTH STABILITY: MENTAL HEALTH: HOW MANY STANDARD DRINKS CONTAINING ALCOHOL DO YOU HAVE ON A TYPICAL DAY?: 3 OR 4

## 2021-01-18 ASSESSMENT — PATIENT HEALTH QUESTIONNAIRE - PHQ9
2. FEELING DOWN, DEPRESSED OR HOPELESS: 0
SUM OF ALL RESPONSES TO PHQ QUESTIONS 1-9: 0

## 2021-01-18 ASSESSMENT — LIFESTYLE VARIABLES
HAS A RELATIVE, FRIEND, DOCTOR, OR ANOTHER HEALTH PROFESSIONAL EXPRESSED CONCERN ABOUT YOUR DRINKING OR SUGGESTED YOU CUT DOWN: 0
HOW MANY STANDARD DRINKS CONTAINING ALCOHOL DO YOU HAVE ON A TYPICAL DAY: 1
AUDIT-C TOTAL SCORE: 8
HOW OFTEN DURING THE LAST YEAR HAVE YOU FAILED TO DO WHAT WAS NORMALLY EXPECTED FROM YOU BECAUSE OF DRINKING: 0
HOW OFTEN DO YOU HAVE A DRINK CONTAINING ALCOHOL: 4

## 2021-01-18 NOTE — PATIENT INSTRUCTIONS
Patient Education        Stopping Smoking: Care Instructions  Your Care Instructions     Cigarette smokers crave the nicotine in cigarettes. Giving it up is much harder than simply changing a habit. Your body has to stop craving the nicotine. It is hard to quit, but you can do it. There are many tools that people use to quit smoking. You may find that combining tools works best for you. There are several steps to quitting. First you get ready to quit. Then you get support to help you. After that, you learn new skills and behaviors to become a nonsmoker. For many people, a necessary step is getting and using medicine. Your doctor will help you set up the plan that best meets your needs. You may want to attend a smoking cessation program to help you quit smoking. When you choose a program, look for one that has proven success. Ask your doctor for ideas. You will greatly increase your chances of success if you take medicine as well as get counseling or join a cessation program.  Some of the changes you feel when you first quit tobacco are uncomfortable. Your body will miss the nicotine at first, and you may feel short-tempered and grumpy. You may have trouble sleeping or concentrating. Medicine can help you deal with these symptoms. You may struggle with changing your smoking habits and rituals. The last step is the tricky one: Be prepared for the smoking urge to continue for a time. This is a lot to deal with, but keep at it. You will feel better. Follow-up care is a key part of your treatment and safety. Be sure to make and go to all appointments, and call your doctor if you are having problems. It's also a good idea to know your test results and keep a list of the medicines you take. How can you care for yourself at home? · Ask your family, friends, and coworkers for support. You have a better chance of quitting if you have help and support.   · Join a support group, such as Nicotine Anonymous, for people who are trying to quit smoking. · Consider signing up for a smoking cessation program, such as the American Lung Association's Freedom from Smoking program.  · Get text messaging support. Go to the website at www.smokefree. gov to sign up for the [facility] program.  · Set a quit date. Pick your date carefully so that it is not right in the middle of a big deadline or stressful time. Once you quit, do not even take a puff. Get rid of all ashtrays and lighters after your last cigarette. Clean your house and your clothes so that they do not smell of smoke. · Learn how to be a nonsmoker. Think about ways you can avoid those things that make you reach for a cigarette. ? Avoid situations that put you at greatest risk for smoking. For some people, it is hard to have a drink with friends without smoking. For others, they might skip a coffee break with coworkers who smoke. ? Change your daily routine. Take a different route to work or eat a meal in a different place. · Cut down on stress. Calm yourself or release tension by doing an activity you enjoy, such as reading a book, taking a hot bath, or gardening. · Talk to your doctor or pharmacist about nicotine replacement therapy, which replaces the nicotine in your body. You still get nicotine but you do not use tobacco. Nicotine replacement products help you slowly reduce the amount of nicotine you need. These products come in several forms, many of them available over-the-counter:  ? Nicotine patches  ? Nicotine gum and lozenges  ? Nicotine inhaler  · Ask your doctor about bupropion (Wellbutrin) or varenicline (Chantix), which are prescription medicines. They do not contain nicotine. They help you by reducing withdrawal symptoms, such as stress and anxiety. · Some people find hypnosis, acupuncture, and massage helpful for ending the smoking habit. · Eat a healthy diet and get regular exercise.  Having healthy habits will help your body move past its craving for nicotine. · Be prepared to keep trying. Most people are not successful the first few times they try to quit. Do not get mad at yourself if you smoke again. Make a list of things you learned and think about when you want to try again, such as next week, next month, or next year. Where can you learn more? Go to https://chpepiceweb.Queryly. org and sign in to your [employer] account. Enter J465 in the Qubole box to learn more about \"Stopping Smoking: Care Instructions. \"     If you do not have an account, please click on the \"Sign Up Now\" link. Current as of: March 12, 2020               Content Version: 12.6  © 2006-2020 [employer]. Care instructions adapted under license by [facility] ([facility]). If you have questions about a medical condition or this instruction, always ask your healthcare professional. [place] [address] any warranty or liability for your use of this information. Personalized Preventive Plan for Luz Marina Brsawell Daniela Sargent. - 1/18/2021  Medicare offers a range of preventive health benefits. Some of the tests and screenings are paid in full while other may be subject to a deductible, co-insurance, and/or copay. Some of these benefits include a comprehensive review of your medical history including lifestyle, illnesses that may run in your family, and various assessments and screenings as appropriate. After reviewing your medical record and screening and assessments performed today your provider may have ordered immunizations, labs, imaging, and/or referrals for you. A list of these orders (if applicable) as well as your Preventive Care list are included within your After Visit Summary for your review. Other Preventive Recommendations:    · A preventive eye exam performed by an eye specialist is recommended every 1-2 years to screen for glaucoma; cataracts, macular degeneration, and other eye disorders.   · A preventive dental visit is recommended every 6 months. · Try to get at least 150 minutes of exercise per week or 10,000 steps per day on a pedometer . · Order or download the FREE \"Exercise & Physical Activity: Your Everyday Guide\" from [employer]. Call 9-866.511.3847 or search [employer] online. · You need 2890-9853 mg of calcium and 4238-1676 IU of vitamin D per day. It is possible to meet your calcium requirement with diet alone, but a vitamin D supplement is usually necessary to meet this goal.  · When exposed to the sun, use a sunscreen that protects against both UVA and UVB radiation with an SPF of 30 or greater. Reapply every 2 to 3 hours or after sweating, drying off with a towel, or swimming. · Always wear a seat belt when traveling in a car. Always wear a helmet when riding a bicycle or motorcycle.

## 2021-01-18 NOTE — PROGRESS NOTES
CATHETERIZATION      COLONOSCOPY      ENDOSCOPY, COLON, DIAGNOSTIC      EYE SURGERY Left 2004    cataract    EYE SURGERY      INGUINAL HERNIA REPAIR Right 5/27/15    Dr. Francisca Lou 2011    Dr. Sujata Lake Left 2003    Dr. Luz Maria Johnston  5/27/15    Dr. West Bacon       Family History   Problem Relation Age of Onset    Heart Disease Mother        CareTeam (Including outside providers/suppliers regularly involved in providing care):   Patient Care Team:  Mariola Garza MD as PCP - General (Family Medicine)  Mariola Garza MD as PCP - [address] [place] [address]  [facility] Provider  Radha Archer MD as Consulting Physician (Ophthalmology)    Wt Readings from Last 3 Encounters:   01/18/21 141 lb (64 kg)   12/04/19 148 lb 3.2 oz (67.2 kg)   06/25/19 140 lb 9.6 oz (63.8 kg)     Vitals:    01/18/21 1242   BP: 124/86   Pulse: 88   Resp: 20   Temp: 97 °F (36.1 °C)   TempSrc: Temporal   Weight: 141 lb (64 kg)   Height: 5' 7.25\" (1.708 m)     Body mass index is 21.92 kg/m². Based upon direct observation of the patient, evaluation of cognition reveals remote memory intact, recent memory impaired.     General Appearance: alert and oriented to person, place and time, well developed and well- nourished, in no acute distress  Skin: warm and dry, no rash or erythema  Head: normocephalic and atraumatic  Eyes: pupils equal, round, and reactive to light, extraocular eye movements intact, conjunctivae normal  ENT: tympanic membrane, external ear and ear canal normal bilaterally, nose without deformity, nasal mucosa and turbinates normal without polyps  Neck: supple and non-tender without mass, no thyromegaly or thyroid nodules, no cervical lymphadenopathy  Pulmonary/Chest: clear to auscultation bilaterally- no wheezes, rales or rhonchi, normal air movement, no respiratory distress  Cardiovascular: normal rate, regular rhythm, normal S1 and S2, no murmurs, rubs, clicks, or gallops, distal pulses intact, no carotid bruits  Abdomen: soft, non-tender, non-distended, normal bowel sounds, no masses or organomegaly  Extremities: no cyanosis, clubbing or edema  Musculoskeletal: normal range of motion, no joint swelling, deformity or tenderness  Neurologic: reflexes normal and symmetric, no cranial nerve deficit, gait, coordination and speech normal    Patient's complete Health Risk Assessment and screening values have been reviewed and are found in Flowsheets. The following problems were reviewed today and where indicated follow up appointments were made and/or referrals ordered. Positive Risk Factor Screenings with Interventions:      Cognitive: Words recalled: 0 Words Recalled  Clock Drawing Test (CDT) Score: (!) Abnormal  Total Score Interpretation: Positive Mini-Cog  Cognitive Impairment Interventions:  · Labs ordered- see ordrs      Substance History:  Social History     Tobacco History     Smoking Status  Current Every Day Smoker Smoking Frequency  1 pack/day for 40 years (40 pk yrs) Smoking Tobacco Type  Cigarettes    Smokeless Tobacco Use  Never Used    Tobacco Comment  printed to avs. 3/4 ppd since 2019          Alcohol History     Alcohol Use Status  Yes Drinks/Week  0 Standard drinks or equivalent per week          Drug Use     Drug Use Status  Yes Types  Marijuana Frequency   7 times/week          Sexual Activity     Sexually Active  Never               Alcohol Screening: Audit-C Score: 8  Total Score: 8    A score of 8 or more is associated with harmful or hazardous drinking. A score of 13 or more in women, and 15 or more in men, is likely to indicate alcohol dependence. Substance Abuse Interventions:  · Recreational drug use:  patient is not ready to change his/her recreational drug use behavior at this time    General Health and ACP:  General  In general, how would you say your health is?: Very Good  In the past 7 days, have you experienced any of the following?  New or Increased Pain, New or Increased Fatigue, Loneliness, Social Isolation, Stress or Anger?: None of These  Do you get the social and emotional support that you need?: (!) No  Do you have a Living Will?: (!) No  Advance Directives     Power of  Living Will ACP-Advance Directive ACP-Power of     Not on File Not on File Not on File Not on File      General Health Risk Interventions:  · No Living Will: Patient declines ACP discussion/assistance     Hearing/Vision:  No exam data present  Hearing/Vision  Do you or your family notice any trouble with your hearing?: No  Do you have difficulty driving, watching TV, or doing any of your daily activities because of your eyesight?: (!) Yes  Have you had an eye exam within the past year?: Yes  Hearing/Vision Interventions:  · na    Safety:  Safety  Do you have working smoke detectors?: Yes  Have all throw rugs been removed or fastened?: Yes  Do you have non-slip mats or surfaces in all bathtubs/showers?: (!) No  Do all of your stairways have a railing or banister?: Yes  Are your doorways, halls and stairs free of clutter?: Yes  Do you always fasten your seatbelt when you are in a car?: (!) No  Safety Interventions:  · Home safety tips provided    ADL:  ADLs  In the past 7 days, did you need help from others to perform any of the following everyday activities? Eating, dressing, grooming, bathing, toileting, or walking/balance?: None  In the past 7 days, did you need help from others to take care of any of the following?  Laundry, housekeeping, banking/finances, shopping, telephone use, food preparation, transportation, or taking medications?: (!) Transportation  ADL Interventions:  · Patient declines any further evaluation/treatment for this issue    Personalized Preventive Plan   Current Health Maintenance Status  Immunization History   Administered Date(s) Administered    Influenza 11/01/2011, 11/04/2013    Influenza Virus Vaccine 10/23/2014, 10/30/2015    Influenza Whole 10/05/2010    Influenza, High Dose (Fluzone 65 yrs and older) 10/19/2018, 10/23/2019, 11/02/2020    Influenza, Adrian Days, IM, (6 mo and older Fluzone, Flulaval, Fluarix and 3 yrs and older Afluria) 10/05/2016    Pneumococcal Conjugate 13-valent ([id number]) 05/12/2016    Pneumococcal Polysaccharide ([id number]) 11/04/2013, 11/29/2018        Health Maintenance   Topic Date Due    DTaP/Tdap/Td vaccine (1 - Tdap) 11/27/1968    Shingles Vaccine (1 of 2) 11/27/1999    Potassium monitoring  06/09/2020    Creatinine monitoring  06/09/2020    Lipid screen  06/10/2020    Low dose CT lung screening  12/23/2020    Annual Wellness Visit (AWV)  01/17/2021    Colon cancer screen colonoscopy  02/04/2029    Flu vaccine  Completed    Pneumococcal 65+ years Vaccine  Completed    AAA screen  Completed    Hepatitis C screen  Completed    Hepatitis A vaccine  Aged Out    Hepatitis B vaccine  Aged Out    Hib vaccine  Aged Out    Meningococcal (ACWY) vaccine  Aged Out     Recommendations for [employer] Due: see orders and patient instructions/AVS.  . Recommended screening schedule for the next 5-10 years is provided to the patient in written form: see Patient Instructions/AVS.    Melissa Caceres was seen today for medicare awv, labs only and medication refill. Diagnoses and all orders for this visit:    Routine general medical examination at a health care facility  -     Lipid Panel; Future    Memory deficit  -     Comprehensive Metabolic Panel; Future  -     CBC Auto Differential; Future  -     Vitamin B12 & Folate; Future  -     T4, Free; Future  -     TSH without Reflex; Future    Other orders  -     losartan (COZAAR) 50 MG tablet; Take 1 tablet by mouth daily  -     metoprolol succinate (TOPROL XL) 25 MG extended release tablet; TAKE 1 TABLET BY MOUTH DAILY  -     tamsulosin (FLOMAX) 0.4 MG capsule;  Take 1 capsule by mouth daily  -     tadalafil (CIALIS) 20 MG tablet; TAKE 1 TABLET BY MOUTH EVERY DAY AS NEEDED  - atorvastatin (LIPITOR) 20 MG tablet; TAKE 1 TABLET BY MOUTH DAILY  -     aspirin 81 MG EC tablet; Take 1 tablet by mouth daily  -     fluocinonide (LIDEX) 0.05 % cream; APPLY EXTERNALLY TO THE AFFECTED AREA TWICE DAILY  -     famotidine (PEPCID) 20 MG tablet; Take 1 tablet by mouth 2 times daily             Seen today for wellness visit. Discussed the importance of a healthy life style. Balanced diet, nutrition, physical activity,and injury prevention. Also discussed the importance of up to date immunizations and annual screenings.

## 2021-02-24 ENCOUNTER — NURSE ONLY (OUTPATIENT)
Dept: LAB | Age: 71
End: 2021-02-24

## 2021-02-24 DIAGNOSIS — R41.3 MEMORY DEFICIT: ICD-10-CM

## 2021-02-24 DIAGNOSIS — Z00.00 ROUTINE GENERAL MEDICAL EXAMINATION AT A HEALTH CARE FACILITY: ICD-10-CM

## 2021-02-24 LAB
ALBUMIN SERPL-MCNC: 4.2 G/DL (ref 3.5–5.1)
ALP BLD-CCNC: 64 U/L (ref 38–126)
ALT SERPL-CCNC: 13 U/L (ref 11–66)
ANION GAP SERPL CALCULATED.3IONS-SCNC: 7 MEQ/L (ref 8–16)
AST SERPL-CCNC: 24 U/L (ref 5–40)
BASOPHILS # BLD: 0.4 %
BASOPHILS ABSOLUTE: 0 THOU/MM3 (ref 0–0.1)
BILIRUB SERPL-MCNC: 1.3 MG/DL (ref 0.3–1.2)
BUN BLDV-MCNC: 12 MG/DL (ref 7–22)
CALCIUM SERPL-MCNC: 9.7 MG/DL (ref 8.5–10.5)
CHLORIDE BLD-SCNC: 107 MEQ/L (ref 98–111)
CHOLESTEROL, TOTAL: 157 MG/DL (ref 100–199)
CO2: 27 MEQ/L (ref 23–33)
CREAT SERPL-MCNC: 1.1 MG/DL (ref 0.4–1.2)
EOSINOPHIL # BLD: 2.4 %
EOSINOPHILS ABSOLUTE: 0.2 THOU/MM3 (ref 0–0.4)
ERYTHROCYTE [DISTWIDTH] IN BLOOD BY AUTOMATED COUNT: 12.1 % (ref 11.5–14.5)
ERYTHROCYTE [DISTWIDTH] IN BLOOD BY AUTOMATED COUNT: 49.7 FL (ref 35–45)
FOLATE: 17.3 NG/ML (ref 4.8–24.2)
GFR SERPL CREATININE-BSD FRML MDRD: 66 ML/MIN/1.73M2
GLUCOSE BLD-MCNC: 79 MG/DL (ref 70–108)
HCT VFR BLD CALC: 40.4 % (ref 42–52)
HDLC SERPL-MCNC: 67 MG/DL
HEMOGLOBIN: 12.8 GM/DL (ref 14–18)
IMMATURE GRANS (ABS): 0.01 THOU/MM3 (ref 0–0.07)
IMMATURE GRANULOCYTES: 0.1 %
LDL CHOLESTEROL CALCULATED: 76 MG/DL
LYMPHOCYTES # BLD: 48.3 %
LYMPHOCYTES ABSOLUTE: 3.6 THOU/MM3 (ref 1–4.8)
MACROCYTES: PRESENT
MCH RBC QN AUTO: 35.2 PG (ref 26–33)
MCHC RBC AUTO-ENTMCNC: 31.7 GM/DL (ref 32.2–35.5)
MCV RBC AUTO: 111 FL (ref 80–94)
MONOCYTES # BLD: 10.6 %
MONOCYTES ABSOLUTE: 0.8 THOU/MM3 (ref 0.4–1.3)
NUCLEATED RED BLOOD CELLS: 0 /100 WBC
PLATELET # BLD: 222 THOU/MM3 (ref 130–400)
PMV BLD AUTO: 10.3 FL (ref 9.4–12.4)
POTASSIUM SERPL-SCNC: 4.3 MEQ/L (ref 3.5–5.2)
RBC # BLD: 3.64 MILL/MM3 (ref 4.7–6.1)
SEG NEUTROPHILS: 38.2 %
SEGMENTED NEUTROPHILS ABSOLUTE COUNT: 2.8 THOU/MM3 (ref 1.8–7.7)
SODIUM BLD-SCNC: 141 MEQ/L (ref 135–145)
T4 FREE: 1.28 NG/DL (ref 0.93–1.76)
TOTAL PROTEIN: 7.4 G/DL (ref 6.1–8)
TRIGL SERPL-MCNC: 68 MG/DL (ref 0–199)
TSH SERPL DL<=0.05 MIU/L-ACNC: 1.11 UIU/ML (ref 0.4–4.2)
VITAMIN B-12: 891 PG/ML (ref 211–911)
WBC # BLD: 7.4 THOU/MM3 (ref 4.8–10.8)

## 2021-03-11 ENCOUNTER — TELEPHONE (OUTPATIENT)
Dept: FAMILY MEDICINE CLINIC | Age: 71
End: 2021-03-11

## 2021-03-24 NOTE — TELEPHONE ENCOUNTER
Obi Suarez Sr. called requesting a refill on the following medications:  Requested Prescriptions     Pending Prescriptions Disp Refills    tamsulosin (FLOMAX) 0.4 MG capsule 90 capsule 3     Sig: Take 1 capsule by mouth daily     Pharmacy verified:YES  .pv      Date of last visit:   Date of next visit (if applicable): Visit date not found

## 2021-03-25 RX ORDER — TADALAFIL 20 MG/1
TABLET ORAL
Qty: 18 TABLET | Refills: 5 | Status: SHIPPED | OUTPATIENT
Start: 2021-03-25 | End: 2021-10-25

## 2021-03-25 RX ORDER — TAMSULOSIN HYDROCHLORIDE 0.4 MG/1
0.4 CAPSULE ORAL DAILY
Qty: 90 CAPSULE | Refills: 2 | Status: SHIPPED | OUTPATIENT
Start: 2021-03-25 | End: 2021-10-11

## 2021-03-26 ENCOUNTER — TELEPHONE (OUTPATIENT)
Dept: FAMILY MEDICINE CLINIC | Age: 71
End: 2021-03-26

## 2021-03-26 NOTE — TELEPHONE ENCOUNTER
Pt has AAA US and CT lung screen in 12/2019. Was to repeat AAA US in 7 months and Ct Lung in a yr. Orders were placed 11/25/20 with an expected date of 11/25/21. I changed the expected date to 3/26/21 as they are overdue. Orders were faxed to [facility] and Ct lung order to [facility]. Left a message on pt's machine informing him [facility] will be contacting him or he could call [facility] scheduling. See 12/24/19, 11/25/20 and 3/11/21 encounters.

## 2021-04-08 ENCOUNTER — TELEPHONE (OUTPATIENT)
Dept: FAMILY MEDICINE CLINIC | Age: 71
End: 2021-04-08

## 2021-04-21 ENCOUNTER — HOSPITAL ENCOUNTER (OUTPATIENT)
Dept: ULTRASOUND IMAGING | Age: 71
Discharge: HOME | End: 2021-04-21
Payer: MEDICARE

## 2021-04-21 ENCOUNTER — HOSPITAL ENCOUNTER (OUTPATIENT)
Dept: CT IMAGING | Age: 71
Discharge: HOME | End: 2021-04-21
Payer: MEDICARE

## 2021-04-21 DIAGNOSIS — Z87.891 PERSONAL HISTORY OF TOBACCO USE: ICD-10-CM

## 2021-04-21 DIAGNOSIS — Z12.2 ENCOUNTER FOR SCREENING FOR LUNG CANCER: ICD-10-CM

## 2021-04-21 DIAGNOSIS — I71.40 ABDOMINAL AORTIC ANEURYSM (AAA) WITHOUT RUPTURE: ICD-10-CM

## 2021-04-21 PROCEDURE — 71271 CT THORAX LUNG CANCER SCR C-: CPT

## 2021-04-21 PROCEDURE — 76775 US EXAM ABDO BACK WALL LIM: CPT

## 2021-05-25 RX ORDER — LORATADINE 10 MG/1
TABLET ORAL
Qty: 90 TABLET | Refills: 3 | Status: SHIPPED | OUTPATIENT
Start: 2021-05-25 | End: 2022-06-28

## 2021-10-04 NOTE — TELEPHONE ENCOUNTER
LOV 3-22-21 Patient is overdue for abdominal aortic US and due for repeat CT lung screen. Tests ordered for [facility] ([facility]). Left message for patient to inform.

## 2021-10-11 RX ORDER — TAMSULOSIN HYDROCHLORIDE 0.4 MG/1
0.4 CAPSULE ORAL DAILY
Qty: 90 CAPSULE | Refills: 2 | Status: SHIPPED | OUTPATIENT
Start: 2021-10-11 | End: 2022-03-23 | Stop reason: SDUPTHER

## 2021-10-15 RX ORDER — ASPIRIN 81 MG/1
81 TABLET ORAL DAILY
Qty: 30 TABLET | Refills: 11 | Status: SHIPPED | OUTPATIENT
Start: 2021-10-15 | End: 2022-07-05 | Stop reason: SDUPTHER

## 2021-10-15 NOTE — TELEPHONE ENCOUNTER
----- Message from Beth Ruiz sent at 10/15/2021 11:40 AM EDT -----  Subject: Refill Request    QUESTIONS  Name of Medication? aspirin 81 MG EC tablet  Patient-reported dosage and instructions? patient takes 1 tablet every day  How many days do you have left? 0  Preferred Pharmacy? [facility] #[id number]  Pharmacy phone number (if available)? [phone]  Additional Information for Provider? Patient took his last aspirin   yesterday; his wife went to pick them up at [facility] and they told his   wife to call the doctor about getting a refill on them.  ---------------------------------------------------------------------------  --------------  [address]  What is the best way for the office to contact you? OK to leave message on   voicemail  Preferred Call Back Phone Number?  [phone]

## 2021-10-25 RX ORDER — TADALAFIL 20 MG/1
TABLET ORAL
Qty: 18 TABLET | Refills: 5 | Status: SHIPPED | OUTPATIENT
Start: 2021-10-25 | End: 2022-06-21 | Stop reason: SDUPTHER

## 2022-01-08 RX ORDER — ATORVASTATIN CALCIUM 20 MG/1
TABLET, FILM COATED ORAL
Qty: 90 TABLET | Refills: 3 | OUTPATIENT
Start: 2022-01-08

## 2022-01-14 RX ORDER — METOPROLOL SUCCINATE 25 MG/1
TABLET, EXTENDED RELEASE ORAL
Qty: 90 TABLET | Refills: 3 | OUTPATIENT
Start: 2022-01-14

## 2022-01-14 RX ORDER — LOSARTAN POTASSIUM 50 MG/1
50 TABLET ORAL DAILY
Qty: 90 TABLET | Refills: 3 | OUTPATIENT
Start: 2022-01-14

## 2022-01-14 RX ORDER — FAMOTIDINE 20 MG/1
20 TABLET, FILM COATED ORAL 2 TIMES DAILY
Qty: 60 TABLET | Refills: 11 | OUTPATIENT
Start: 2022-01-14

## 2022-01-14 RX ORDER — TAMSULOSIN HYDROCHLORIDE 0.4 MG/1
0.4 CAPSULE ORAL DAILY
Qty: 90 CAPSULE | Refills: 2 | OUTPATIENT
Start: 2022-01-14

## 2022-01-27 ENCOUNTER — OFFICE VISIT (OUTPATIENT)
Dept: FAMILY MEDICINE CLINIC | Age: 72
End: 2022-01-27
Payer: MEDICARE

## 2022-01-27 VITALS
HEIGHT: 68 IN | SYSTOLIC BLOOD PRESSURE: 136 MMHG | WEIGHT: 143.8 LBS | TEMPERATURE: 97.6 F | RESPIRATION RATE: 16 BRPM | HEART RATE: 78 BPM | BODY MASS INDEX: 21.79 KG/M2 | DIASTOLIC BLOOD PRESSURE: 68 MMHG

## 2022-01-27 DIAGNOSIS — E78.5 HYPERLIPIDEMIA, UNSPECIFIED HYPERLIPIDEMIA TYPE: ICD-10-CM

## 2022-01-27 DIAGNOSIS — Z00.00 ROUTINE GENERAL MEDICAL EXAMINATION AT A HEALTH CARE FACILITY: Primary | ICD-10-CM

## 2022-01-27 DIAGNOSIS — I10 PRIMARY HYPERTENSION: ICD-10-CM

## 2022-01-27 PROCEDURE — G0439 PPPS, SUBSEQ VISIT: HCPCS | Performed by: FAMILY MEDICINE

## 2022-01-27 RX ORDER — METOPROLOL SUCCINATE 25 MG/1
TABLET, EXTENDED RELEASE ORAL
Qty: 90 TABLET | Refills: 3 | Status: SHIPPED | OUTPATIENT
Start: 2022-01-27 | End: 2022-02-12 | Stop reason: SDUPTHER

## 2022-01-27 RX ORDER — LOSARTAN POTASSIUM 50 MG/1
50 TABLET ORAL DAILY
Qty: 90 TABLET | Refills: 3 | Status: SHIPPED | OUTPATIENT
Start: 2022-01-27 | End: 2022-03-23 | Stop reason: SDUPTHER

## 2022-01-27 RX ORDER — FAMOTIDINE 20 MG/1
20 TABLET, FILM COATED ORAL 2 TIMES DAILY
Qty: 180 TABLET | Refills: 3 | Status: SHIPPED | OUTPATIENT
Start: 2022-01-27 | End: 2022-03-23 | Stop reason: SDUPTHER

## 2022-01-27 RX ORDER — BRIMONIDINE TARTRATE/TIMOLOL 0.2%-0.5%
1 DROPS OPHTHALMIC (EYE) EVERY 12 HOURS
Qty: 5 ML | Refills: 3 | Status: SHIPPED | OUTPATIENT
Start: 2022-01-27 | End: 2022-03-23 | Stop reason: SDUPTHER

## 2022-01-27 RX ORDER — ATORVASTATIN CALCIUM 20 MG/1
TABLET, FILM COATED ORAL
Qty: 90 TABLET | Refills: 3 | Status: SHIPPED | OUTPATIENT
Start: 2022-01-27 | End: 2022-03-23 | Stop reason: SDUPTHER

## 2022-01-27 SDOH — ECONOMIC STABILITY: FOOD INSECURITY: WITHIN THE PAST 12 MONTHS, THE FOOD YOU BOUGHT JUST DIDN'T LAST AND YOU DIDN'T HAVE MONEY TO GET MORE.: NEVER TRUE

## 2022-01-27 SDOH — ECONOMIC STABILITY: FOOD INSECURITY: WITHIN THE PAST 12 MONTHS, YOU WORRIED THAT YOUR FOOD WOULD RUN OUT BEFORE YOU GOT MONEY TO BUY MORE.: NEVER TRUE

## 2022-01-27 ASSESSMENT — LIFESTYLE VARIABLES
HOW OFTEN DURING THE LAST YEAR HAVE YOU HAD A FEELING OF GUILT OR REMORSE AFTER DRINKING: 0
AUDIT TOTAL SCORE: 3
HAVE YOU OR SOMEONE ELSE BEEN INJURED AS A RESULT OF YOUR DRINKING: 0
HOW OFTEN DURING THE LAST YEAR HAVE YOU FAILED TO DO WHAT WAS NORMALLY EXPECTED FROM YOU BECAUSE OF DRINKING: 0
HOW MANY STANDARD DRINKS CONTAINING ALCOHOL DO YOU HAVE ON A TYPICAL DAY: 0
HOW OFTEN DURING THE LAST YEAR HAVE YOU BEEN UNABLE TO REMEMBER WHAT HAPPENED THE NIGHT BEFORE BECAUSE YOU HAD BEEN DRINKING: 0
HOW OFTEN DURING THE LAST YEAR HAVE YOU NEEDED AN ALCOHOLIC DRINK FIRST THING IN THE MORNING TO GET YOURSELF GOING AFTER A NIGHT OF HEAVY DRINKING: 0
HOW OFTEN DO YOU HAVE SIX OR MORE DRINKS ON ONE OCCASION: 0
HOW OFTEN DURING THE LAST YEAR HAVE YOU FOUND THAT YOU WERE NOT ABLE TO STOP DRINKING ONCE YOU HAD STARTED: 0
HOW OFTEN DO YOU HAVE A DRINK CONTAINING ALCOHOL: 3
HAS A RELATIVE, FRIEND, DOCTOR, OR ANOTHER HEALTH PROFESSIONAL EXPRESSED CONCERN ABOUT YOUR DRINKING OR SUGGESTED YOU CUT DOWN: 0
AUDIT-C TOTAL SCORE: 3

## 2022-01-27 ASSESSMENT — PATIENT HEALTH QUESTIONNAIRE - PHQ9
SUM OF ALL RESPONSES TO PHQ QUESTIONS 1-9: 0
2. FEELING DOWN, DEPRESSED OR HOPELESS: 0
SUM OF ALL RESPONSES TO PHQ QUESTIONS 1-9: 0
SUM OF ALL RESPONSES TO PHQ9 QUESTIONS 1 & 2: 0
SUM OF ALL RESPONSES TO PHQ QUESTIONS 1-9: 0
1. LITTLE INTEREST OR PLEASURE IN DOING THINGS: 0
SUM OF ALL RESPONSES TO PHQ QUESTIONS 1-9: 0

## 2022-01-27 ASSESSMENT — SOCIAL DETERMINANTS OF HEALTH (SDOH): HOW HARD IS IT FOR YOU TO PAY FOR THE VERY BASICS LIKE FOOD, HOUSING, MEDICAL CARE, AND HEATING?: NOT HARD AT ALL

## 2022-01-27 NOTE — PATIENT INSTRUCTIONS
THE MOST IMPORTANT ACTION YOU CAN TAKE TO IMPROVE YOUR CURRENT AND FUTURE HEALTH IS TO QUIT SMOKING. Call the [facility] ([phone])    Smoking harms nonsmokers. When nonsmokers are around people who smoke, they absorb nicotine, carbon monoxide, and other ingredients of tobacco smoke. DO NOT SMOKE AROUND CHILDREN    Children exposed to secondhand smoke are at an increased risk of:  Sudden Infant Death Syndrome (SIDS), acute respiratory infections, inflammation of the middle ear, and severe asthma. Over a longer time, it causes heart disease and lung cancer. There is no safe level of exposure to secondhand smoke. Personalized Preventive Plan for Jaime Suarez Sr. - 1/27/2022  Medicare offers a range of preventive health benefits. Some of the tests and screenings are paid in full while other may be subject to a deductible, co-insurance, and/or copay. Some of these benefits include a comprehensive review of your medical history including lifestyle, illnesses that may run in your family, and various assessments and screenings as appropriate. After reviewing your medical record and screening and assessments performed today your provider may have ordered immunizations, labs, imaging, and/or referrals for you. A list of these orders (if applicable) as well as your Preventive Care list are included within your After Visit Summary for your review. Other Preventive Recommendations:    · A preventive eye exam performed by an eye specialist is recommended every 1-2 years to screen for glaucoma; cataracts, macular degeneration, and other eye disorders. · A preventive dental visit is recommended every 6 months. · Try to get at least 150 minutes of exercise per week or 10,000 steps per day on a pedometer . · Order or download the FREE \"Exercise & Physical Activity: Your Everyday Guide\" from [employer].  Call 1-372.961.4542 or search [employer] online. · You need 3945-5793 mg of calcium and 7321-3687 IU of vitamin D per day. It is possible to meet your calcium requirement with diet alone, but a vitamin D supplement is usually necessary to meet this goal.  · When exposed to the sun, use a sunscreen that protects against both UVA and UVB radiation with an SPF of 30 or greater. Reapply every 2 to 3 hours or after sweating, drying off with a towel, or swimming. · Always wear a seat belt when traveling in a car. Always wear a helmet when riding a bicycle or motorcycle.

## 2022-01-30 NOTE — PROGRESS NOTES
Medicare Annual Wellness Visit  Name: Chelsea Conklin Sr. [id number] Date: 2022   MRN: [id number] Sex: Male   Age: 66 y.o. Ethnicity: Non- / Non    : 1950 Race: Black /       Obi Suarez Sr. is here for Medicare AWV (due for labs)    Screenings for behavioral, psychosocial and functional/safety risks, and cognitive dysfunction are all negative except as indicated below. These results, as well as other patient data from the [address] form, are documented in Flowsheets linked to this Encounter. No Known Allergies    Prior to Visit Medications    Medication Sig Taking?  Authorizing Provider   losartan (COZAAR) 50 MG tablet Take 1 tablet by mouth daily Yes Marcela Lake MD   metoprolol succinate (TOPROL XL) 25 MG extended release tablet TAKE 1 TABLET BY MOUTH DAILY Yes Marcela Lake MD   atorvastatin (LIPITOR) 20 MG tablet TAKE 1 TABLET BY MOUTH DAILY Yes Marcela Lake MD   fluocinonide (LIDEX) 0.05 % cream APPLY EXTERNALLY TO THE AFFECTED AREA TWICE DAILY Yes Marcela Lake MD   famotidine (PEPCID) 20 MG tablet Take 1 tablet by mouth 2 times daily Yes Marcela Lake MD   COMBIGAN 0.2-0.5 % ophthalmic solution Place 1 drop into the left eye every 12 hours Yes Marcela Lake MD   tadalafil (CIALIS) 20 MG tablet TAKE 1 TABLET BY MOUTH EVERY DAY AS NEEDED Yes Marcela Lake MD   aspirin 81 MG EC tablet Take 1 tablet by mouth daily Yes Marcela Lake MD   tamsulosin (FLOMAX) 0.4 MG capsule TAKE 1 CAPSULE BY MOUTH DAILY Yes Marcela Lake MD   loratadine (CLARITIN) 10 MG tablet TAKE 1 TABLET BY MOUTH DAILY Yes Marcela Lake MD       Past Medical History:   Diagnosis Date    CAD (coronary artery disease)     [facility]cere[facility] vascular [facility] [address]     Erectile dysfunction     GERD (gastroesophageal reflux disease) 2012    Gastro ulcer    Hyperlipidemia     Hypertension        Past Surgical History:   Procedure Laterality Date    CARDIAC CATHETERIZATION      COLONOSCOPY      ENDOSCOPY, COLON, DIAGNOSTIC      EYE SURGERY Left 2004    cataract    EYE SURGERY      INGUINAL HERNIA REPAIR Right 5/27/15    Dr. Geeta Ruiz 2011    Dr. Al Argueta Left 2003    Dr. Pedro Scanlon  5/27/15    Dr. Lory Mehta       Family History   Problem Relation Age of Onset    Heart Disease Mother        CareTeam (Including outside providers/suppliers regularly involved in providing care):   Patient Care Team:  Marcela Lake MD as PCP - General (Family Medicine)  Marcela Lake MD as PCP - [facility] Empaneled Provider  Gerardo Matute MD as Consulting Physician (Ophthalmology)    Wt Readings from Last 3 Encounters:   01/27/22 143 lb 12.8 oz (65.2 kg)   01/18/21 141 lb (64 kg)   12/04/19 148 lb 3.2 oz (67.2 kg)     Vitals:    01/27/22 0959   BP: 136/68   Site: Left Upper Arm   Pulse: 78   Resp: 16   Temp: 97.6 °F (36.4 °C)   TempSrc: Oral   Weight: 143 lb 12.8 oz (65.2 kg)   Height: 5' 8\" (1.727 m)     Body mass index is 21.86 kg/m². Based upon direct observation of the patient, evaluation of cognition reveals recent and remote memory intact.     General Appearance: alert and oriented to person, place and time, well developed and well- nourished, in no acute distress  Skin: warm and dry, no rash or erythema  Head: normocephalic and atraumatic  Eyes: pupils equal, round, and reactive to light, extraocular eye movements intact, conjunctivae normal  ENT: tympanic membrane, external ear and ear canal normal bilaterally, nose without deformity, nasal mucosa and turbinates normal without polyps  Neck: supple and non-tender without mass, no thyromegaly or thyroid nodules, no cervical lymphadenopathy  Pulmonary/Chest: clear to auscultation bilaterally- no wheezes, rales or rhonchi, normal air movement, no respiratory distress  Cardiovascular: normal rate, regular rhythm, normal S1 and S2, no murmurs, rubs, clicks, or gallops, distal pulses intact, no carotid bruits  Abdomen: soft, non-tender, non-distended, normal bowel sounds, no masses or organomegaly  Extremities: no cyanosis, clubbing or edema  Musculoskeletal: normal range of motion, no joint swelling, deformity or tenderness  Neurologic: reflexes normal and symmetric, no cranial nerve deficit, gait, coordination and speech normal    Patient's complete Health Risk Assessment and screening values have been reviewed and are found in Flowsheets. The following problems were reviewed today and where indicated follow up appointments were made and/or referrals ordered. Positive Risk Factor Screenings with Interventions:      Cognitive: Words recalled: 2 Words Recalled  Clock Drawing Test (CDT) Score: (!) Abnormal  Total Score Interpretation: Positive Mini-Cog  Did the patient refuse to take the cognition test?: No  Cognitive Impairment Interventions:  · Patient declines any further evaluation/treatment for cognitive impairment      Substance History:  Social History     Tobacco History     Smoking Status  Current Every Day Smoker Smoking Frequency  1 pack/day for 40 years (40 pk yrs) Smoking Tobacco Type  Cigarettes    Smokeless Tobacco Use  Never Used    Tobacco Comment  printed to avs. 3/4 ppd since 2019          Alcohol History     Alcohol Use Status  Yes Drinks/Week  0 Standard drinks or equivalent per week          Drug Use     Drug Use Status  Yes Types  Marijuana (Weed) Frequency   7 times/week          Sexual Activity     Sexually Active  Never               Alcohol Screening: Audit-C Score: 3  Total Score: 3    A score of 8 or more is associated with harmful or hazardous drinking. A score of 13 or more in women, and 15 or more in men, is likely to indicate alcohol dependence.   Substance Abuse Interventions:  · na      General Health and ACP:  General  In general, how would you say your health is?: Good  In the past 7 days, have you experienced any of the following? New or Increased Pain, New or Increased Fatigue, Loneliness, Social Isolation, Stress or Anger?: None of These  Do you get the social and emotional support that you need?: Yes  Do you have a Living Will?: (!) No  Advance Directives     Power of [address] Will ACP-Advance Directive ACP-Power of     Not on File Not on File Not on File Not on File      General Health Risk Interventions:  · No Living Will: Patient declines ACP discussion/assistance     Hearing/Vision:  No exam data present  Hearing/Vision  Do you or your family notice any trouble with your hearing that hasn't been managed with hearing aids?: No  Do you have difficulty driving, watching TV, or doing any of your daily activities because of your eyesight?: (!) Yes  Have you had an eye exam within the past year?: Yes  Hearing/Vision Interventions:  · na     ADL:  ADLs  In the past 7 days, did you need help from others to perform any of the following everyday activities? Eating, dressing, grooming, bathing, toileting, or walking/balance?: (!) Walking/Balance  In the past 7 days, did you need help from others to take care of any of the following?  Laundry, housekeeping, banking/finances, shopping, telephone use, food preparation, transportation, or taking medications?: (!) [address] Preparation,Transportation  ADL Interventions:  · Patient declines any further evaluation/treatment for this issue      Personalized Preventive Plan   Current Health Maintenance Status  Immunization History   Administered Date(s) Administered    COVID-19, Reyes Meiers, Primary or Immunocompromised, PF, 100mcg/0.5mL 03/02/2021, 03/30/2021, 12/16/2021    Influenza 11/01/2011, 11/04/2013    Influenza Virus Vaccine 10/23/2014, 10/30/2015    Influenza Whole 10/05/2010    Influenza, High Dose (Fluzone 65 yrs and older) 10/19/2018, 10/23/2019, 11/02/2020    Influenza, High-dose, Quadv, 65 yrs +, IM (Fluzone) 11/02/2020    Influenza, Quadv, IM, (6 mo and older Fluzone, Flulaval, Fluarix and 3 yrs and older Afluria) 10/05/2016    Influenza, Quadv, IM, PF (6 mo and older Fluzone, Flulaval, Fluarix, and 3 yrs and older Afluria) 12/16/2021    Pneumococcal Conjugate 13-valent ([id number]) 05/12/2016    Pneumococcal Polysaccharide ([id number]) 11/04/2013, 11/29/2018        Health Maintenance   Topic Date Due    DTaP/Tdap/Td vaccine (1 - Tdap) Never done    Shingles Vaccine (1 of 2) Never done   [employer] Visit (AWV)  01/19/2022    Lipid screen  02/24/2022    Potassium monitoring  02/24/2022    Creatinine monitoring  02/24/2022    Low dose CT lung screening  04/21/2022    Depression Screen  01/27/2023    Colon cancer screen colonoscopy  02/04/2029    Flu vaccine  Completed    Pneumococcal 65+ years Vaccine  Completed    COVID-19 Vaccine  Completed    AAA screen  Completed    Hepatitis C screen  Completed    Hepatitis A vaccine  Aged Out    Hepatitis B vaccine  Aged Out    Hib vaccine  Aged Out    Meningococcal (ACWY) vaccine  Aged Out     Recommendations for [employer] Due: see orders and patient instructions/AVS.  . Recommended screening schedule for the next 5-10 years is provided to the patient in written form: see Patient Instructions/AVS.    Igor Craig was seen today for medicare awv. Diagnoses and all orders for this visit:    Routine general medical examination at a health care facility  -     Lipid Panel; Future  -     Comprehensive Metabolic Panel; Future  -     CBC Auto Differential; Future    Hyperlipidemia, unspecified hyperlipidemia type    Primary hypertension  -     Lipid Panel; Future  -     Comprehensive Metabolic Panel; Future  -     CBC Auto Differential; Future    Other orders  -     losartan (COZAAR) 50 MG tablet;  Take 1 tablet by mouth daily  -     metoprolol succinate (TOPROL XL) 25 MG extended release tablet; TAKE 1 TABLET BY MOUTH DAILY  -     atorvastatin (LIPITOR) 20 MG tablet; TAKE 1 TABLET BY MOUTH DAILY  -

## 2022-02-01 ENCOUNTER — NURSE ONLY (OUTPATIENT)
Dept: LAB | Age: 72
End: 2022-02-01

## 2022-02-01 DIAGNOSIS — Z00.00 ROUTINE GENERAL MEDICAL EXAMINATION AT A HEALTH CARE FACILITY: ICD-10-CM

## 2022-02-01 DIAGNOSIS — I10 PRIMARY HYPERTENSION: ICD-10-CM

## 2022-02-01 LAB
ALBUMIN SERPL-MCNC: 4.4 G/DL (ref 3.5–5.1)
ALP BLD-CCNC: 77 U/L (ref 38–126)
ALT SERPL-CCNC: 12 U/L (ref 11–66)
ANION GAP SERPL CALCULATED.3IONS-SCNC: 13 MEQ/L (ref 8–16)
AST SERPL-CCNC: 20 U/L (ref 5–40)
BASOPHILS # BLD: 0.4 %
BASOPHILS ABSOLUTE: 0 THOU/MM3 (ref 0–0.1)
BILIRUB SERPL-MCNC: 1.1 MG/DL (ref 0.3–1.2)
BUN BLDV-MCNC: 15 MG/DL (ref 7–22)
CALCIUM SERPL-MCNC: 9.7 MG/DL (ref 8.5–10.5)
CHLORIDE BLD-SCNC: 103 MEQ/L (ref 98–111)
CHOLESTEROL, TOTAL: 165 MG/DL (ref 100–199)
CO2: 25 MEQ/L (ref 23–33)
CREAT SERPL-MCNC: 1.3 MG/DL (ref 0.4–1.2)
EOSINOPHIL # BLD: 1.8 %
EOSINOPHILS ABSOLUTE: 0.1 THOU/MM3 (ref 0–0.4)
ERYTHROCYTE [DISTWIDTH] IN BLOOD BY AUTOMATED COUNT: 12 % (ref 11.5–14.5)
ERYTHROCYTE [DISTWIDTH] IN BLOOD BY AUTOMATED COUNT: 49.6 FL (ref 35–45)
GFR SERPL CREATININE-BSD FRML MDRD: 66 ML/MIN/1.73M2
GLUCOSE BLD-MCNC: 110 MG/DL (ref 70–108)
HCT VFR BLD CALC: 42.1 % (ref 42–52)
HDLC SERPL-MCNC: 64 MG/DL
HEMOGLOBIN: 13.2 GM/DL (ref 14–18)
IMMATURE GRANS (ABS): 0.01 THOU/MM3 (ref 0–0.07)
IMMATURE GRANULOCYTES: 0.1 %
LDL CHOLESTEROL CALCULATED: 79 MG/DL
LYMPHOCYTES # BLD: 37.4 %
LYMPHOCYTES ABSOLUTE: 2.5 THOU/MM3 (ref 1–4.8)
MACROCYTES: PRESENT
MCH RBC QN AUTO: 34.9 PG (ref 26–33)
MCHC RBC AUTO-ENTMCNC: 31.4 GM/DL (ref 32.2–35.5)
MCV RBC AUTO: 111.4 FL (ref 80–94)
MONOCYTES # BLD: 8.8 %
MONOCYTES ABSOLUTE: 0.6 THOU/MM3 (ref 0.4–1.3)
NUCLEATED RED BLOOD CELLS: 0 /100 WBC
PLATELET # BLD: 215 THOU/MM3 (ref 130–400)
PMV BLD AUTO: 9.6 FL (ref 9.4–12.4)
POTASSIUM SERPL-SCNC: 4.8 MEQ/L (ref 3.5–5.2)
RBC # BLD: 3.78 MILL/MM3 (ref 4.7–6.1)
SEG NEUTROPHILS: 51.5 %
SEGMENTED NEUTROPHILS ABSOLUTE COUNT: 3.5 THOU/MM3 (ref 1.8–7.7)
SODIUM BLD-SCNC: 141 MEQ/L (ref 135–145)
TOTAL PROTEIN: 7.1 G/DL (ref 6.1–8)
TRIGL SERPL-MCNC: 108 MG/DL (ref 0–199)
WBC # BLD: 6.8 THOU/MM3 (ref 4.8–10.8)

## 2022-02-07 RX ORDER — FAMOTIDINE 20 MG/1
TABLET, FILM COATED ORAL
Qty: 60 TABLET | OUTPATIENT
Start: 2022-02-07

## 2022-02-11 ENCOUNTER — TELEPHONE (OUTPATIENT)
Dept: FAMILY MEDICINE CLINIC | Age: 72
End: 2022-02-11

## 2022-02-11 NOTE — TELEPHONE ENCOUNTER
----- Message from Jose Gilbert sent at 2/11/2022 12:27 PM EST -----  Subject: Refill Request    QUESTIONS  Name of Medication? metoprolol succinate (TOPROL XL) 25 MG extended   release tablet  Patient-reported dosage and instructions? One time daily  How many days do you have left? 0  Preferred Pharmacy? [facility] #[id number]  Pharmacy phone number (if available)? [phone]  ---------------------------------------------------------------------------  --------------  CALL BACK INFO  What is the best way for the office to contact you? OK to leave message on   voicemail  Preferred Call Back Phone Number?  [phone]

## 2022-02-12 ENCOUNTER — TELEPHONE (OUTPATIENT)
Dept: FAMILY MEDICINE CLINIC | Age: 72
End: 2022-02-12

## 2022-02-12 DIAGNOSIS — I10 PRIMARY HYPERTENSION: Primary | ICD-10-CM

## 2022-02-12 RX ORDER — METOPROLOL SUCCINATE 25 MG/1
TABLET, EXTENDED RELEASE ORAL
Qty: 30 TABLET | Refills: 1 | Status: SHIPPED | OUTPATIENT
Start: 2022-02-12 | End: 2022-03-23 | Stop reason: SDUPTHER

## 2022-02-12 NOTE — TELEPHONE ENCOUNTER
Pt phoned on call service stated he did not have his metoprolol 25 mg XL and needed a refill sent to local pharmacy. I did advise him a 90 day supply went to [place] on [address] 1/27/2022, he stated he did not have it and needed more. 30 day supply sent in for him to [address] on [employer] [address]. Pt voiced understanding.

## 2022-02-16 ENCOUNTER — TELEPHONE (OUTPATIENT)
Dept: FAMILY MEDICINE CLINIC | Age: 72
End: 2022-02-16

## 2022-02-16 NOTE — TELEPHONE ENCOUNTER
----- Message from Floyd Klein sent at 2/16/2022  4:16 PM EST -----  Subject: Message to Provider    QUESTIONS  Information for Provider? PT called and stated that the office called him. E[facility] did not see any messages. Office is closed. Please call PT back to   discuss. ---------------------------------------------------------------------------  --------------  Jerod Danger INFO  What is the best way for the office to contact you? OK to leave message on   voicemail  Preferred Call Back Phone Number? [phone]  ---------------------------------------------------------------------------  --------------  SCRIPT ANSWERS  Relationship to Patient?  Self

## 2022-02-23 ENCOUNTER — TELEPHONE (OUTPATIENT)
Dept: FAMILY MEDICINE CLINIC | Age: 72
End: 2022-02-23

## 2022-03-21 ENCOUNTER — TELEPHONE (OUTPATIENT)
Dept: FAMILY MEDICINE CLINIC | Age: 72
End: 2022-03-21

## 2022-03-21 NOTE — TELEPHONE ENCOUNTER
----- Message from Mary Alice Coffman sent at 3/21/2022  4:15 PM EDT -----  Subject: Message to Provider    QUESTIONS  Information for Provider? [facility] called requesting the patient the   medication list.   ---------------------------------------------------------------------------  --------------  CALL BACK INFO  What is the best way for the office to contact you? OK to leave message on   voicemail  Preferred Call Back Phone Number? [phone]  ---------------------------------------------------------------------------  --------------  SCRIPT ANSWERS  Relationship to Patient?  Third Party  Representative Name? Anna Mcdowell

## 2022-03-23 DIAGNOSIS — I10 PRIMARY HYPERTENSION: ICD-10-CM

## 2022-03-23 RX ORDER — LOSARTAN POTASSIUM 50 MG/1
50 TABLET ORAL DAILY
Qty: 90 TABLET | Refills: 3 | Status: SHIPPED | OUTPATIENT
Start: 2022-03-23

## 2022-03-23 RX ORDER — BRIMONIDINE TARTRATE/TIMOLOL 0.2%-0.5%
1 DROPS OPHTHALMIC (EYE) EVERY 12 HOURS
Qty: 5 ML | Refills: 3 | Status: SHIPPED | OUTPATIENT
Start: 2022-03-23 | End: 2022-10-24

## 2022-03-23 RX ORDER — METOPROLOL SUCCINATE 25 MG/1
TABLET, EXTENDED RELEASE ORAL
Qty: 90 TABLET | Refills: 3 | Status: ON HOLD | OUTPATIENT
Start: 2022-03-23 | End: 2022-05-10 | Stop reason: HOSPADM

## 2022-03-23 RX ORDER — ATORVASTATIN CALCIUM 20 MG/1
TABLET, FILM COATED ORAL
Qty: 90 TABLET | Refills: 3 | Status: SHIPPED | OUTPATIENT
Start: 2022-03-23

## 2022-03-23 RX ORDER — FAMOTIDINE 20 MG/1
20 TABLET, FILM COATED ORAL 2 TIMES DAILY
Qty: 180 TABLET | Refills: 3 | Status: SHIPPED | OUTPATIENT
Start: 2022-03-23

## 2022-03-23 RX ORDER — TAMSULOSIN HYDROCHLORIDE 0.4 MG/1
0.4 CAPSULE ORAL DAILY
Qty: 90 CAPSULE | Refills: 3 | Status: SHIPPED | OUTPATIENT
Start: 2022-03-23

## 2022-04-08 RX ORDER — LOSARTAN POTASSIUM 50 MG/1
50 TABLET ORAL DAILY
Qty: 90 TABLET | Refills: 3 | OUTPATIENT
Start: 2022-04-08

## 2022-04-08 NOTE — TELEPHONE ENCOUNTER
Please approve or deny     Last Visit Date:  1/27/2022       Next Visit Date:    Visit date not found

## 2022-04-10 ENCOUNTER — HOSPITAL ENCOUNTER (EMERGENCY)
Age: 72
Discharge: HOME | End: 2022-04-10
Payer: MEDICARE

## 2022-04-10 ENCOUNTER — APPOINTMENT (OUTPATIENT)
Dept: GENERAL RADIOLOGY | Age: 72
End: 2022-04-10
Payer: MEDICARE

## 2022-04-10 VITALS
HEART RATE: 84 BPM | BODY MASS INDEX: 21.92 KG/M2 | RESPIRATION RATE: 18 BRPM | OXYGEN SATURATION: 98 % | WEIGHT: 148 LBS | HEIGHT: 69 IN | SYSTOLIC BLOOD PRESSURE: 169 MMHG | TEMPERATURE: 97.8 F | DIASTOLIC BLOOD PRESSURE: 88 MMHG

## 2022-04-10 DIAGNOSIS — S22.41XA CLOSED FRACTURE OF MULTIPLE RIBS OF RIGHT SIDE, INITIAL ENCOUNTER: ICD-10-CM
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
## 2022-04-10 PROCEDURE — 71101 X-RAY EXAM UNILAT RIBS/CHEST: CPT

## 2022-04-10 PROCEDURE — 6370000000 HC RX 637 (ALT 250 FOR IP): Performed by: NURSE PRACTITIONER

## 2022-04-10 PROCEDURE — 99284 EMERGENCY DEPT VISIT MOD MDM: CPT

## 2022-04-10 RX ORDER — LIDOCAINE 50 MG/G
1 PATCH TOPICAL DAILY
Qty: 30 PATCH | Refills: 0 | Status: SHIPPED | OUTPATIENT
Start: 2022-04-10 | End: 2022-05-19

## 2022-04-10 RX ORDER — HYDROCODONE BITARTRATE AND ACETAMINOPHEN 5; 325 MG/1; MG/1
1 TABLET ORAL ONCE
Status: COMPLETED | OUTPATIENT
Start: 2022-04-10 | End: 2022-04-10

## 2022-04-10 RX ORDER — LIDOCAINE 4 G/G
1 PATCH TOPICAL ONCE
Status: DISCONTINUED | OUTPATIENT
Start: 2022-04-10 | End: 2022-04-10 | Stop reason: HOSPADM

## 2022-04-10 RX ORDER — HYDROCODONE BITARTRATE AND ACETAMINOPHEN 5; 325 MG/1; MG/1
1 TABLET ORAL EVERY 8 HOURS PRN
Qty: 9 TABLET | Refills: 0 | Status: SHIPPED | OUTPATIENT
Start: 2022-04-10 | End: 2022-04-13

## 2022-04-10 RX ADMIN — HYDROCODONE BITARTRATE AND ACETAMINOPHEN 1 TABLET: 5; 325 TABLET ORAL at 18:59

## 2022-04-10 ASSESSMENT — ENCOUNTER SYMPTOMS
SINUS PRESSURE: 0
SHORTNESS OF BREATH: 0
RHINORRHEA: 0
EYE PAIN: 0
COUGH: 0
WHEEZING: 0
ABDOMINAL PAIN: 0
BACK PAIN: 1

## 2022-04-10 ASSESSMENT — PAIN SCALES - GENERAL: PAINLEVEL_OUTOF10: 7

## 2022-04-10 NOTE — ED PROVIDER NOTES
[facility] [address] [address] EMERGENCY DEPT  EMERGENCY MEDICINE     Pt Name: Maximo Cook Sr. MRN: [id number]  Birthdate 1950  Date of evaluation: 4/10/2022  PCP:    Silvia Carroll MD  Provider: ELAN Oliver - IDA    CHIEF COMPLAINT       Chief Complaint   Patient presents with   [facility]    Rib Pain           HISTORY OF PRESENT ILLNESS    Arron Suarez Sr. is a 66 y.o. male patient that presents to ER with complaints of right rib pain. Patient reports he fell about 3 hours ago on concrete and landed on his right side. He states he did not hit his head, lose consciousness and he is not on blood thinners. He denies chest pain, shortness of breath, lightheadedness, and dizziness. He reports the pain is in the right ribcage posteriorly and anteriorly. Radiation of pain to sternum and to the thoracic back. Denies any pain in cervical and lumbar back regions, denies extremity pain and abdominal pain. Triage notes and Nursing notes were reviewed by myself. Any discrepancies are addressed above.     PAST MEDICAL HISTORY     Past Medical History:   Diagnosis Date    CAD (coronary artery disease)     CVA [facility]cerebral vascular accident) ([facility] [address]     Erectile dysfunction     GERD (gastroesophageal reflux disease) 07/2012    Gastro ulcer    Hyperlipidemia     Hypertension        SURGICAL HISTORY       Past Surgical History:   Procedure Laterality Date    CARDIAC CATHETERIZATION      COLONOSCOPY      ENDOSCOPY, COLON, DIAGNOSTIC      EYE SURGERY Left 2004    cataract    EYE SURGERY      INGUINAL HERNIA REPAIR Right 5/27/15    Dr. Herminio Bower 2011    Dr. Pete Lane Left 2003    Dr. Vivek Hernandez  5/27/15    Dr. Davie Hendrickson       Previous Medications    ASPIRIN 81 MG EC TABLET    Take 1 tablet by mouth daily    ATORVASTATIN (LIPITOR) 20 MG TABLET    TAKE 1 TABLET BY MOUTH DAILY    COMBIGAN 0.2-0.5 % OPHTHALMIC SOLUTION    Place 1 drop into the left eye every 12 hours    FAMOTIDINE (PEPCID) 20 MG TABLET    Take 1 tablet by mouth 2 times daily    FLUOCINONIDE (LIDEX) 0.05 % CREAM    APPLY EXTERNALLY TO THE AFFECTED AREA TWICE DAILY    LORATADINE (CLARITIN) 10 MG TABLET    TAKE 1 TABLET BY MOUTH DAILY    LOSARTAN (COZAAR) 50 MG TABLET    Take 1 tablet by mouth daily    METOPROLOL SUCCINATE (TOPROL XL) 25 MG EXTENDED RELEASE TABLET    TAKE 1 TABLET BY MOUTH DAILY    TADALAFIL (CIALIS) 20 MG TABLET    TAKE 1 TABLET BY MOUTH EVERY DAY AS NEEDED    TAMSULOSIN (FLOMAX) 0.4 MG CAPSULE    Take 1 capsule by mouth daily       ALLERGIES       No Known Allergies    FAMILY HISTORY       Family History   Problem Relation Age of Onset    Heart Disease Mother         SOCIAL HISTORY       Social History     Socioeconomic History    Marital status:      Spouse name: Adwoa Durand Number of children: 3    Years of education: None    Highest education level: None   Occupational History    Occupation: disability-[employer]   Tobacco Use    Smoking status: Current Every Day Smoker     Packs/day: 1.00     Years: 40.00     Pack years: 40.00     Types: Cigarettes    Smokeless tobacco: Never Used    Tobacco comment: printed to avs. 3/4 ppd since 2019   Vaping Use    Vaping Use: Never used   Substance and Sexual Activity    Alcohol use: Yes    Drug use: Yes     Frequency: 7.0 times per week     Types: Marijuana Fronie Inge)    Sexual activity: Never   Other Topics Concern    None   Social History Narrative    None     Social Determinants of Health     Financial Resource Strain: Low Risk     Difficulty of Paying Living Expenses: Not hard at all   Food Insecurity: No Food Insecurity    Worried About Running Out of Food in the Last Year: Never true    [address] in the Last Year: Never true   Transportation Needs:     Lack of Transportation (Medical): Not on file    Lack of Transportation (Non-Medical):  Not on file   Physical Activity:     Days of Exercise per Week: Not on file    Minutes of Exercise per Session: Not on file   Stress:     Feeling of Stress : Not on file   Social Connections:     Frequency of Communication with Friends and Family: Not on file    Frequency of Social Gatherings with Friends and Family: Not on file    Attends Confucianist Services: Not on file    Active Member of [address] [place] [address] or Organizations: Not on file    Attends Club or Organization Meetings: Not on file    Marital Status: Not on file   Intimate Partner Violence:     Fear of Current or Ex-Partner: Not on file    Emotionally Abused: Not on file    Physically Abused: Not on file    Sexually Abused: Not on file   Housing Stability:     Unable to Pay for Housing in the Last Year: Not on file    Number of [place] in the Last Year: Not on file    Unstable Housing in the Last Year: Not on file       REVIEW OF SYSTEMS     Review of Systems   Constitutional: Negative for appetite change, chills, fatigue, fever and unexpected weight change. HENT: Negative for ear pain, rhinorrhea and sinus pressure. Eyes: Negative for pain and visual disturbance. Respiratory: Negative for cough, shortness of breath and wheezing. Cardiovascular: Negative for chest pain, palpitations and leg swelling. Gastrointestinal: Negative for abdominal pain. Genitourinary: Negative for dysuria, frequency and hematuria. Musculoskeletal: Positive for back pain and myalgias (around right rib cage and thoracic back). Negative for arthralgias, joint swelling, neck pain and neck stiffness. Skin: Negative for rash. Neurological: Negative for dizziness, syncope, weakness, light-headedness and headaches. Except as noted above the remainder of the review of systems was reviewed and is negative.   SCREENINGS        Sophia Coma Scale  Eye Opening: Spontaneous  Best Verbal Response: Oriented  Best Motor Response: Obeys commands  Sophia Coma Scale Score: 15               PHYSICAL EXAM    (up to 7 for level 4, 8 or more for level 5)     ED Triage Vitals [02/19/22 1512]   BP Temp Temp Source Pulse Resp SpO2 Height Weight   (!) 134/95 98.2 °F (36.8 °C) Oral 124 16 100 % -- --       Physical Exam  Vitals and nursing note reviewed. Constitutional:       General: He is not in acute distress. Appearance: Normal appearance. He is well-developed. He is not diaphoretic. HENT:      Head: Normocephalic and atraumatic. Right Ear: External ear normal.      Left Ear: External ear normal.   Eyes:      Conjunctiva/sclera: Conjunctivae normal.      Pupils: Pupils are equal, round, and reactive to light. Cardiovascular:      Rate and Rhythm: Normal rate and regular rhythm. Heart sounds: Normal heart sounds. No murmur heard. No gallop. Pulmonary:      Effort: Pulmonary effort is normal. No respiratory distress. Breath sounds: Normal breath sounds. No wheezing or rales. Chest:      Chest wall: Tenderness (right rib cage pain) present. Abdominal:      General: Abdomen is flat. There is no distension. Palpations: Abdomen is soft. Musculoskeletal:      Cervical back: Normal and normal range of motion. Thoracic back: Signs of trauma present. No lacerations, tenderness (right sided only) or bony tenderness. Lumbar back: Normal.      Right hip: No tenderness or bony tenderness. Normal range of motion. Left hip: No tenderness or bony tenderness. Normal range of motion. Right upper leg: Normal.      Left upper leg: Normal.      Right knee: No bony tenderness or crepitus. Normal range of motion. No tenderness. Normal alignment. Left knee: No bony tenderness or crepitus. Normal range of motion. No tenderness. Normal alignment. Right lower leg: Normal.      Left lower leg: Normal.      Right ankle: No tenderness. Normal range of motion. Left ankle: No tenderness. Normal range of motion. Right foot: Normal range of motion. No tenderness or bony tenderness.       Left foot: Normal range of motion. No tenderness or bony tenderness. Skin:     General: Skin is warm and dry. Neurological:      General: No focal deficit present. Mental Status: He is alert and oriented to person, place, and time. Psychiatric:         Mood and Affect: Mood normal.         Behavior: Behavior normal.         Thought Content: Thought content normal.         Judgment: Judgment normal.           DIAGNOSTIC RESULTS     EKG:(none if blank)  All EKGs are interpreted by the Emergency Department Physician who either signs or Co-signs this chart in the absence of a cardiologist.        RADIOLOGY: (none if blank)   I directly visualized the following images and reviewed the radiologist interpretations. Interpretation per the Radiologist below, if available at the time of this note:  XR RIBS RIGHT INCLUDE CHEST (MIN 3 VIEWS)   Final Result   1. Mild atelectatic/fibrotic stranding retrocardiac region left lung base. 2. Acute nondisplaced fractures posterior/lateral aspect right seventh and eighth ribs. **This report has been created using voice recognition software. It may contain minor errors which are inherent in voice recognition technology. **      Final report electronically signed by Dr. Brian Mancera on 4/10/2022 6:38 PM          LABS:  Labs Reviewed - No data to display    All other labs were within normal range or not returned as of this dictation. Please note, any cultures that may have been sent were not resulted at the time of this patient visit.     EMERGENCY DEPARTMENT COURSE and Medical Decision Making:     Vitals:    Vitals:    04/10/22 1805   BP: (!) 169/88   Pulse: 84   Resp: 18   Temp: 97.8 °F (36.6 °C)   TempSrc: Oral   SpO2: 98%   Weight: 148 lb (67.1 kg)   Height: 5' 9\" (1.753 m)       PROCEDURES: (None if blank)  Procedures       MDM  Number of Diagnoses or Management Options  Patient presented to the ER with stable vital signs and complaints of right rib pain and back pain. Patient reports a fall where he landed on his right side. He denies loss of consciousness or use of blood thinners. Tender to palpation along right rib cage. Imaging ordered and revealed acute nondisplaced fractures posterior/lateral aspect right seventh and eight ribs. Interventions include lidocaine patch and 1 tablet Norco for pain. Patient stable for discharge and follow up with PCP. Incentive spirometer given for pneumonia prevention secondary to rib pain. Strict return precautions and follow up instructions were discussed with the patient with which the patient agrees    ED Medications administered this visit:    Medications   lidocaine 4 % external patch 1 patch (has no administration in time range)   HYDROcodone-acetaminophen (NORCO) 5-325 MG per tablet 1 tablet (has no administration in time range)         FINAL IMPRESSION      1. Fall, initial encounter    2. Closed fracture of multiple ribs of right side, initial encounter          DISPOSITION/PLAN   DISPOSITION        PATIENT REFERRED TO:  No follow-up provider specified. DISCHARGE MEDICATIONS:  New Prescriptions    HYDROCODONE-ACETAMINOPHEN (NORCO) 5-325 MG PER TABLET    Take 1 tablet by mouth every 8 hours as needed for Pain for up to 3 days. Intended supply: 3 days. Take lowest dose possible to manage pain    LIDOCAINE (LIDODERM) 5 %    Place 1 patch onto the skin daily 12 hours on, 12 hours off.               ELAN Power - CNP (electronically signed)         ELAN Power CNP  04/10/22 [address] [place] [address], APRN - CNP  04/10/22 [address] [place] [address], ELAN - CNP  04/11/22 5828

## 2022-04-10 NOTE — ED NOTES
Pt to er pt states he slipped and fell while outside and hurt rt side of ribs. Denies hitting head. No LOC. No blood thinners per pt. Pt a & o x 4. Denies pain anywhere else. Resp regular. Family at side.       Meeta Deal RN  04/10/22 6351

## 2022-04-11 ASSESSMENT — ENCOUNTER SYMPTOMS
COUGH: 0
SINUS PRESSURE: 0
RHINORRHEA: 0
WHEEZING: 0
BACK PAIN: 1
EYE PAIN: 0
SHORTNESS OF BREATH: 0
ABDOMINAL PAIN: 0

## 2022-04-17 ENCOUNTER — HOSPITAL ENCOUNTER (EMERGENCY)
Age: 72
Discharge: HOME | End: 2022-04-17
Payer: MEDICARE

## 2022-04-17 VITALS
WEIGHT: 150 LBS | RESPIRATION RATE: 18 BRPM | SYSTOLIC BLOOD PRESSURE: 158 MMHG | OXYGEN SATURATION: 96 % | TEMPERATURE: 97.6 F | HEIGHT: 69 IN | DIASTOLIC BLOOD PRESSURE: 88 MMHG | HEART RATE: 83 BPM | BODY MASS INDEX: 22.22 KG/M2

## 2022-04-17 DIAGNOSIS — K40.91 UNILATERAL RECURRENT INGUINAL HERNIA WITHOUT OBSTRUCTION OR GANGRENE: Primary | ICD-10-CM

## 2022-04-17 PROCEDURE — 99282 EMERGENCY DEPT VISIT SF MDM: CPT

## 2022-04-17 ASSESSMENT — ENCOUNTER SYMPTOMS
ABDOMINAL DISTENTION: 0
NAUSEA: 0
COLOR CHANGE: 0
VOMITING: 0
ABDOMINAL PAIN: 0

## 2022-04-17 NOTE — ED PROVIDER NOTES
[facility] Emergency [address] [place] [address]       Chief Complaint   Patient presents with    Pelvic Pain    Other     \"knot in pelvis\"       Nurses Notes reviewed and I agree except as noted in the HPI. HISTORY OF PRESENT ILLNESS    Jose Suarez Sr. is a 66 y.o. male who presents to the ED for evaluation of pelvic pain. Patient notes yesterday he developed a bulge in his right groin, he notes some tenderness to the area. He denies history of this in the past.  Denies nausea or vomiting. Notes a history of having one of his testicles removed. He is unsure of why. He denies any other symptoms at this time. He was recently seen here in the ER after a fall, was found to have rib fracture. Patient has a history of a right inguinal hernia repair by Dr. Jannie Finnegan in 2015. HPI was provided by the patient. REVIEW OF SYSTEMS     Review of Systems   Gastrointestinal: Negative for abdominal distention, abdominal pain, nausea and vomiting. Genitourinary: Negative for decreased urine volume, difficulty urinating, dysuria, scrotal swelling and testicular pain. Skin: Negative for color change and wound. PAST MEDICAL HISTORY     Past Medical History:   Diagnosis Date    CAD (coronary artery disease)     CVA (cere[facility] vascular accident) ([facility] [address]     Erectile dysfunction     GERD (gastroesophageal reflux disease) 07/2012    Gastro ulcer    Hyperlipidemia     Hypertension        SURGICALHISTORY      has a past surgical history that includes Cardiac catheterization (); Eye surgery (Left, 2004); Leg Surgery (Left, 2011); Testicle removal (Left, 2003); Inguinal hernia repair (Right, 5/27/15); Umbilical hernia repair (5/27/15); eye surgery; Colonoscopy; and Endoscopy, colon, diagnostic.     CURRENT MEDICATIONS       Previous Medications    ASPIRIN 81 MG EC TABLET    Take 1 tablet by mouth daily    ATORVASTATIN (LIPITOR) 20 MG TABLET    TAKE 1 TABLET BY MOUTH DAILY    COMBIGAN 0.2-0.5 % OPHTHALMIC SOLUTION    Place 1 drop into the left eye every 12 hours    FAMOTIDINE (PEPCID) 20 MG TABLET    Take 1 tablet by mouth 2 times daily    FLUOCINONIDE (LIDEX) 0.05 % CREAM    APPLY EXTERNALLY TO THE AFFECTED AREA TWICE DAILY    LIDOCAINE (LIDODERM) 5 %    Place 1 patch onto the skin daily 12 hours on, 12 hours off. LORATADINE (CLARITIN) 10 MG TABLET    TAKE 1 TABLET BY MOUTH DAILY    LOSARTAN (COZAAR) 50 MG TABLET    Take 1 tablet by mouth daily    METOPROLOL SUCCINATE (TOPROL XL) 25 MG EXTENDED RELEASE TABLET    TAKE 1 TABLET BY MOUTH DAILY    TADALAFIL (CIALIS) 20 MG TABLET    TAKE 1 TABLET BY MOUTH EVERY DAY AS NEEDED    TAMSULOSIN (FLOMAX) 0.4 MG CAPSULE    Take 1 capsule by mouth daily       ALLERGIES     has No Known Allergies. FAMILY HISTORY     He indicated that his mother is . He indicated that his father is . family history includes Heart Disease in his mother. SOCIAL HISTORY       Social History     Socioeconomic History    Marital status:      Spouse name: Shabnam Cope Number of children: 3    Years of education: Not on file    Highest education level: Not on file   Occupational History    Occupation: disability-[employer]   Tobacco Use    Smoking status: Current Every Day Smoker     Packs/day: 1.00     Years: 40.00     Pack years: 40.00     Types: Cigarettes    Smokeless tobacco: Never Used    Tobacco comment: printed to avs. 3/4 ppd since 2019   Vaping Use    Vaping Use: Never used   Substance and Sexual Activity    Alcohol use:  Yes    Drug use: Yes     Frequency: 7.0 times per week     Types: Marijuana Barbara Ruiz)    Sexual activity: Never   Other Topics Concern    Not on file   Social History Narrative    Not on file     Social Determinants of Health     Financial Resource Strain: Low Risk     Difficulty of Paying Living Expenses: Not hard at all   Food Insecurity: No Food Insecurity    Worried About [address] [employer] in the Last Year: Never true    Ran Out of Food in the Last Year: Never true   Transportation Needs:     Lack of Transportation (Medical): Not on file    Lack of Transportation (Non-Medical): Not on file   Physical Activity:     Days of Exercise per Week: Not on file    Minutes of Exercise per Session: Not on file   Stress:     Feeling of Stress : Not on file   Social Connections:     Frequency of Communication with Friends and Family: Not on file    Frequency of Social Gatherings with Friends and Family: Not on file    Attends Congregation Services: Not on file    Active Member of [address] [place] [address] [employer] or Organizations: Not on file    Attends Club or Organization Meetings: Not on file    Marital Status: Not on file   Intimate Partner Violence:     Fear of Current or Ex-Partner: Not on file    Emotionally Abused: Not on file    Physically Abused: Not on file    Sexually Abused: Not on file   Housing Stability:     Unable to Pay for Housing in the Last Year: Not on file    Number of [place] in the Last Year: Not on file    Unstable Housing in the Last Year: Not on file       PHYSICAL EXAM     INITIAL VITALS:  height is 5' 9\" (1.753 m) and weight is 150 lb (68 kg). His temperature is 97.6 °F (36.4 °C). His blood pressure is 158/88 (abnormal) and his pulse is 83. His respiration is 18 and oxygen saturation is 96%. Physical Exam  Vitals and nursing note reviewed. Constitutional:       Appearance: Normal appearance. He is well-developed. HENT:      Head: Normocephalic. Right Ear: External ear normal.      Left Ear: External ear normal.      Nose: Nose normal.      Mouth/Throat:      Pharynx: Uvula midline. Eyes:      Conjunctiva/sclera: Conjunctivae normal.   Cardiovascular:      Rate and Rhythm: Normal rate and regular rhythm. Heart sounds: Normal heart sounds, S1 normal and S2 normal.   Pulmonary:      Effort: Pulmonary effort is normal. No respiratory distress. Breath sounds: Normal breath sounds.    Chest:      Chest wall: No tenderness. Abdominal:      General: Bowel sounds are normal. There is no distension. Palpations: Abdomen is soft. Tenderness: There is no abdominal tenderness. Hernia: A hernia (Right groin) is present. Genitourinary:     Penis: Normal.       Testes: Normal.      Comments: Single testicle palpated in the scrotum  Musculoskeletal:         General: Normal range of motion. Cervical back: Normal range of motion and neck supple. Skin:     General: Skin is warm and dry. Capillary Refill: Capillary refill takes less than 2 seconds. Coloration: Skin is not pale. Findings: No erythema or rash. Neurological:      Mental Status: He is alert and oriented to person, place, and time. Psychiatric:         Behavior: Behavior normal.         Thought Content: Thought content normal.         Judgment: Judgment normal.         DIFFERENTIAL DIAGNOSIS:   Hernia, herniated testicle,    DIAGNOSTIC RESULTS          RADIOLOGY: non-plainfilm images(s) such as CT, Ultrasound and MRI are read by the radiologist.  Plain radiographic images are visualized and preliminarily interpreted by the emergency physician unless otherwise stated below. No orders to display         LABS:   Labs Reviewed - No data to display    EMERGENCY DEPARTMENT COURSE:   Vitals:    Vitals:    04/17/22 1344   BP: (!) 158/88   Pulse: 83   Resp: 18   Temp: 97.6 °F (36.4 °C)   SpO2: 96%   Weight: 150 lb (68 kg)   Height: 5' 9\" (1.753 m)       MDM    Patient was seen and evaluated in the emergency department, patient appeared to be in no acute distress, vital signs reviewed, no significant findings noted. Physical exam was completed, there is a small bulge in the right groin appears to be consistent with a hernia, he has 1 testicle in the scrotum which is consistent with his history.   Ice was applied to the area, he was placed in Trendelenburg position, he was given 30 minutes to rest with the ice, he was reevaluated, I attempted to manually reduce hernia, but failed. Discussed the case with Dr. Silver Christopher of general surgery service, patient has no signs of infection, or strangulation. Patient to be discharged, follow-up with Dr. Tamara Mahoney. Discussed this with the patient he verbalized understanding. He is given strict return precautions and he verbalized understanding. Medications - No data to display    Patient was seenindependently by myself. The patient's final impression and disposition and plan was determined by myself. CRITICAL CARE:   None    CONSULTS:  None    PROCEDURES:  None    FINAL IMPRESSION     1. Unilateral recurrent inguinal hernia without obstruction or gangrene          DISPOSITION/PLAN   Patient discharged    PATIENT REFERREDTO:  Krista Pike [facility] [address]  [facility]  [phone]    Call   For follow up and evaluation      DISCHARGE MEDICATIONS:  New Prescriptions    No medications on file       (Please note that portions of this note were completed with a voice recognition program.  Efforts were made to edit the dictations but occasionally words are mis-transcribed.)    Provider:  I personally performed the services described in the documentation,reviewed and edited the documentation which was dictated to the scribe in my presence, and it accurately records my words and actions.     Paramjit Vega CNP 04/17/22 3:03 PM    Fransisca Vega, APRN - CNP        [place], APRN - CNP  04/17/22 4847

## 2022-04-20 ENCOUNTER — TELEPHONE (OUTPATIENT)
Dept: FAMILY MEDICINE CLINIC | Age: 72
End: 2022-04-20

## 2022-04-20 DIAGNOSIS — Z87.891 PERSONAL HISTORY OF TOBACCO USE: Primary | ICD-10-CM

## 2022-04-20 NOTE — TELEPHONE ENCOUNTER
Pt on recall calendar for CT Lung Screen 1 yr follow up. See 4/21/21 Ct for Dr. Dave Vann order. Order placed in epic  Left a message on pt's machine informing him to call [facility] to schedule.

## 2022-04-25 ENCOUNTER — ANESTHESIA (OUTPATIENT)
Dept: OPERATING ROOM | Age: 72
DRG: 853 | End: 2022-04-25
Payer: MEDICARE

## 2022-04-25 ENCOUNTER — APPOINTMENT (OUTPATIENT)
Dept: CT IMAGING | Age: 72
DRG: 853 | End: 2022-04-25
Payer: MEDICARE

## 2022-04-25 ENCOUNTER — APPOINTMENT (OUTPATIENT)
Dept: GENERAL RADIOLOGY | Age: 72
DRG: 853 | End: 2022-04-25
Payer: MEDICARE

## 2022-04-25 ENCOUNTER — APPOINTMENT (OUTPATIENT)
Dept: ULTRASOUND IMAGING | Age: 72
DRG: 853 | End: 2022-04-25
Payer: MEDICARE

## 2022-04-25 ENCOUNTER — ANESTHESIA EVENT (OUTPATIENT)
Dept: OPERATING ROOM | Age: 72
DRG: 853 | End: 2022-04-25
Payer: MEDICARE

## 2022-04-25 ENCOUNTER — HOSPITAL ENCOUNTER (INPATIENT)
Age: 72
LOS: 15 days | Discharge: HOME HEALTH | DRG: 853 | End: 2022-05-10
Attending: EMERGENCY MEDICINE | Admitting: STUDENT IN AN ORGANIZED HEALTH CARE EDUCATION/TRAINING PROGRAM
Payer: MEDICARE

## 2022-04-25 VITALS
OXYGEN SATURATION: 88 % | DIASTOLIC BLOOD PRESSURE: 50 MMHG | RESPIRATION RATE: 10 BRPM | SYSTOLIC BLOOD PRESSURE: 121 MMHG | TEMPERATURE: 97.9 F

## 2022-04-25 DIAGNOSIS — A41.9 SEPSIS ASSOCIATED HYPOTENSION (HCC): Primary | ICD-10-CM

## 2022-04-25 DIAGNOSIS — I95.9 SEPSIS ASSOCIATED HYPOTENSION (HCC): Primary | ICD-10-CM

## 2022-04-25 PROBLEM — N19 RENAL FAILURE: Status: ACTIVE | Noted: 2022-04-25

## 2022-04-25 LAB
ALBUMIN SERPL-MCNC: 2.9 G/DL (ref 3.5–5.1)
ALLEN TEST: ABNORMAL
ALLEN TEST: POSITIVE
ALP BLD-CCNC: 106 U/L (ref 38–126)
ALT SERPL-CCNC: 19 U/L (ref 11–66)
ANION GAP SERPL CALCULATED.3IONS-SCNC: 20 MEQ/L (ref 8–16)
ANION GAP SERPL CALCULATED.3IONS-SCNC: 29 MEQ/L (ref 8–16)
AST SERPL-CCNC: 28 U/L (ref 5–40)
BASE EXCESS (CALCULATED): -1.9 MMOL/L (ref -2.5–2.5)
BASE EXCESS (CALCULATED): -2.2 MMOL/L (ref -2.5–2.5)
BASE EXCESS (CALCULATED): -7.8 MMOL/L (ref -2.5–2.5)
BASOPHILIA: ABNORMAL
BASOPHILS # BLD: 0.6 %
BASOPHILS ABSOLUTE: 0.1 THOU/MM3 (ref 0–0.1)
BILIRUB SERPL-MCNC: 3.1 MG/DL (ref 0.3–1.2)
BILIRUBIN DIRECT: 0.9 MG/DL (ref 0–0.3)
BUN BLDV-MCNC: 175 MG/DL (ref 7–22)
BUN BLDV-MCNC: 190 MG/DL (ref 7–22)
CALCIUM IONIZED SERUM: 0.91 MMOL/L (ref 1.12–1.32)
CALCIUM IONIZED: 1.07 MMOL/L (ref 1.12–1.32)
CALCIUM SERPL-MCNC: 8.4 MG/DL (ref 8.5–10.5)
CALCIUM SERPL-MCNC: 8.9 MG/DL (ref 8.5–10.5)
CHLORIDE BLD-SCNC: 93 MEQ/L (ref 98–111)
CHLORIDE BLD-SCNC: 96 MEQ/L (ref 98–111)
CO2: 16 MEQ/L (ref 23–33)
CO2: 23 MEQ/L (ref 23–33)
COLLECTED BY:: ABNORMAL
CORTISOL: 47.48 UG/DL
CREAT SERPL-MCNC: 10.4 MG/DL (ref 0.4–1.2)
CREAT SERPL-MCNC: 11.2 MG/DL (ref 0.4–1.2)
DEVICE: ABNORMAL
DEVICE: ABNORMAL
DIFFERENTIAL TYPE: ABNORMAL
EKG ATRIAL RATE: 163 BPM
EKG P AXIS: 71 DEGREES
EKG Q-T INTERVAL: 364 MS
EKG QRS DURATION: 82 MS
EKG QTC CALCULATION (BAZETT): 499 MS
EKG R AXIS: 67 DEGREES
EKG T AXIS: 48 DEGREES
EKG VENTRICULAR RATE: 113 BPM
EOSINOPHIL # BLD: 0 %
EOSINOPHILS ABSOLUTE: 0 THOU/MM3 (ref 0–0.4)
ERYTHROCYTE [DISTWIDTH] IN BLOOD BY AUTOMATED COUNT: 12.6 % (ref 11.5–14.5)
ERYTHROCYTE [DISTWIDTH] IN BLOOD BY AUTOMATED COUNT: 54.6 FL (ref 35–45)
GFR SERPL CREATININE-BSD FRML MDRD: 5 ML/MIN/1.73M2
GLUCOSE BLD-MCNC: 111 MG/DL (ref 70–108)
GLUCOSE BLD-MCNC: 145 MG/DL (ref 70–108)
GLUCOSE, WHOLE BLOOD: 99 MG/DL (ref 70–108)
HCO3: 18 MMOL/L (ref 23–28)
HCO3: 22 MMOL/L (ref 23–28)
HCO3: 22 MMOL/L (ref 23–28)
HCT VFR BLD CALC: 36.6 % (ref 42–52)
HEMOGLOBIN FINGERSTICK, POC: 8.6 G/DL (ref 14–18)
HEMOGLOBIN: 10.8 GM/DL (ref 14–18)
IFIO2: 100
IMMATURE GRANS (ABS): 0.22 THOU/MM3 (ref 0–0.07)
IMMATURE GRANULOCYTES: 1 %
LACTIC ACID, SEPSIS: 2.2 MMOL/L (ref 0.5–1.9)
LACTIC ACID, SEPSIS: 2.4 MMOL/L (ref 0.5–1.9)
LACTIC ACID: 1.8 MMOL/L (ref 0.5–2)
LYMPHOCYTES # BLD: 3.7 %
LYMPHOCYTES ABSOLUTE: 0.8 THOU/MM3 (ref 1–4.8)
MACROCYTES: PRESENT
MAGNESIUM: 2.6 MG/DL (ref 1.6–2.4)
MCH RBC QN AUTO: 34.8 PG (ref 26–33)
MCHC RBC AUTO-ENTMCNC: 29.5 GM/DL (ref 32.2–35.5)
MCV RBC AUTO: 118.1 FL (ref 80–94)
MODE: ABNORMAL
MONOCYTES # BLD: 4.3 %
MONOCYTES ABSOLUTE: 0.9 THOU/MM3 (ref 0.4–1.3)
NUCLEATED RED BLOOD CELLS: 0 /100 WBC
O2 SATURATION: 100 %
O2 SATURATION: 92 %
O2 SATURATION: 99 %
OSMOLALITY CALCULATION: 339.7 MOSMOL/KG (ref 275–300)
PATHOLOGIST REVIEW: ABNORMAL
PCO2: 31 MMHG (ref 35–45)
PCO2: 36 MMHG (ref 35–45)
PCO2: 40 MMHG (ref 35–45)
PH BLOOD GAS: 7.27 (ref 7.35–7.45)
PH BLOOD GAS: 7.4 (ref 7.35–7.45)
PH BLOOD GAS: 7.45 (ref 7.35–7.45)
PHOSPHORUS: 8.4 MG/DL (ref 2.4–4.7)
PLATELET # BLD: 459 THOU/MM3 (ref 130–400)
PLATELET ESTIMATE: ABNORMAL
PMV BLD AUTO: 9.9 FL (ref 9.4–12.4)
PO2: 138 MMHG (ref 71–104)
PO2: 388 MMHG (ref 71–104)
PO2: 59 MMHG (ref 71–104)
POTASSIUM SERPL-SCNC: 4 MEQ/L (ref 3.5–5.2)
POTASSIUM SERPL-SCNC: 4.6 MEQ/L (ref 3.5–5.2)
POTASSIUM, WHOLE BLOOD: 3 MEQ/L (ref 3.5–4.9)
RBC # BLD: 3.1 MILL/MM3 (ref 4.7–6.1)
SCAN OF BLOOD SMEAR: NORMAL
SEG NEUTROPHILS: 90.4 %
SEGMENTED NEUTROPHILS ABSOLUTE COUNT: 19.7 THOU/MM3 (ref 1.8–7.7)
SET PEEP: 10 MMHG
SET RESPIRATORY RATE: 16 BPM
SODIUM BLD-SCNC: 138 MEQ/L (ref 135–145)
SODIUM BLD-SCNC: 139 MEQ/L (ref 135–145)
SODIUM, WHOLE BLOOD: 144 MEQ/L (ref 138–146)
SOURCE, BLOOD GAS: ABNORMAL
SOURCE, BLOOD GAS: ABNORMAL
TOTAL PROTEIN: 6.9 G/DL (ref 6.1–8)
TROPONIN T: 0.03 NG/ML
TROPONIN T: 0.04 NG/ML
TROPONIN T: 0.05 NG/ML
WBC # BLD: 21.8 THOU/MM3 (ref 4.8–10.8)

## 2022-04-25 PROCEDURE — 2500000003 HC RX 250 WO HCPCS: Performed by: INTERNAL MEDICINE

## 2022-04-25 PROCEDURE — 36600 WITHDRAWAL OF ARTERIAL BLOOD: CPT

## 2022-04-25 PROCEDURE — 0DB80ZZ EXCISION OF SMALL INTESTINE, OPEN APPROACH: ICD-10-PCS | Performed by: SURGERY

## 2022-04-25 PROCEDURE — 2500000003 HC RX 250 WO HCPCS

## 2022-04-25 PROCEDURE — 87641 MR-STAPH DNA AMP PROBE: CPT

## 2022-04-25 PROCEDURE — 87040 BLOOD CULTURE FOR BACTERIA: CPT

## 2022-04-25 PROCEDURE — 84132 ASSAY OF SERUM POTASSIUM: CPT

## 2022-04-25 PROCEDURE — 2709999900 HC NON-CHARGEABLE SUPPLY: Performed by: SURGERY

## 2022-04-25 PROCEDURE — 31500 INSERT EMERGENCY AIRWAY: CPT

## 2022-04-25 PROCEDURE — 36415 COLL VENOUS BLD VENIPUNCTURE: CPT

## 2022-04-25 PROCEDURE — 99285 EMERGENCY DEPT VISIT HI MDM: CPT

## 2022-04-25 PROCEDURE — 87086 URINE CULTURE/COLONY COUNT: CPT

## 2022-04-25 PROCEDURE — 96361 HYDRATE IV INFUSION ADD-ON: CPT

## 2022-04-25 PROCEDURE — 99223 1ST HOSP IP/OBS HIGH 75: CPT | Performed by: INTERNAL MEDICINE

## 2022-04-25 PROCEDURE — 3600000004 HC SURGERY LEVEL 4 BASE: Performed by: SURGERY

## 2022-04-25 PROCEDURE — 84484 ASSAY OF TROPONIN QUANT: CPT

## 2022-04-25 PROCEDURE — 3700000000 HC ANESTHESIA ATTENDED CARE: Performed by: SURGERY

## 2022-04-25 PROCEDURE — 82533 TOTAL CORTISOL: CPT

## 2022-04-25 PROCEDURE — 44120 REMOVAL OF SMALL INTESTINE: CPT | Performed by: SURGERY

## 2022-04-25 PROCEDURE — 82803 BLOOD GASES ANY COMBINATION: CPT

## 2022-04-25 PROCEDURE — 99223 1ST HOSP IP/OBS HIGH 75: CPT | Performed by: SURGERY

## 2022-04-25 PROCEDURE — 2580000003 HC RX 258: Performed by: STUDENT IN AN ORGANIZED HEALTH CARE EDUCATION/TRAINING PROGRAM

## 2022-04-25 PROCEDURE — 85025 COMPLETE CBC W/AUTO DIFF WBC: CPT

## 2022-04-25 PROCEDURE — 82436 ASSAY OF URINE CHLORIDE: CPT

## 2022-04-25 PROCEDURE — 2500000003 HC RX 250 WO HCPCS: Performed by: STUDENT IN AN ORGANIZED HEALTH CARE EDUCATION/TRAINING PROGRAM

## 2022-04-25 PROCEDURE — 74176 CT ABD & PELVIS W/O CONTRAST: CPT

## 2022-04-25 PROCEDURE — 6360000002 HC RX W HCPCS: Performed by: STUDENT IN AN ORGANIZED HEALTH CARE EDUCATION/TRAINING PROGRAM

## 2022-04-25 PROCEDURE — 71045 X-RAY EXAM CHEST 1 VIEW: CPT

## 2022-04-25 PROCEDURE — P9045 ALBUMIN (HUMAN), 5%, 250 ML: HCPCS | Performed by: ANESTHESIOLOGY

## 2022-04-25 PROCEDURE — 0BH17EZ INSERTION OF ENDOTRACHEAL AIRWAY INTO TRACHEA, VIA NATURAL OR ARTIFICIAL OPENING: ICD-10-PCS | Performed by: SURGERY

## 2022-04-25 PROCEDURE — 82947 ASSAY GLUCOSE BLOOD QUANT: CPT

## 2022-04-25 PROCEDURE — 99024 POSTOP FOLLOW-UP VISIT: CPT | Performed by: SURGERY

## 2022-04-25 PROCEDURE — 5A1945Z RESPIRATORY VENTILATION, 24-96 CONSECUTIVE HOURS: ICD-10-PCS | Performed by: SURGERY

## 2022-04-25 PROCEDURE — 83735 ASSAY OF MAGNESIUM: CPT

## 2022-04-25 PROCEDURE — 2500000003 HC RX 250 WO HCPCS: Performed by: ANESTHESIOLOGY

## 2022-04-25 PROCEDURE — 49020 DRAINAGE ABDOM ABSCESS OPEN: CPT | Performed by: SURGERY

## 2022-04-25 PROCEDURE — APPSS60 APP SPLIT SHARED TIME 46-60 MINUTES: Performed by: PHYSICIAN ASSISTANT

## 2022-04-25 PROCEDURE — 85018 HEMOGLOBIN: CPT

## 2022-04-25 PROCEDURE — 96360 HYDRATION IV INFUSION INIT: CPT

## 2022-04-25 PROCEDURE — 37799 UNLISTED PX VASCULAR SURGERY: CPT

## 2022-04-25 PROCEDURE — 6360000002 HC RX W HCPCS: Performed by: ANESTHESIOLOGY

## 2022-04-25 PROCEDURE — 84100 ASSAY OF PHOSPHORUS: CPT

## 2022-04-25 PROCEDURE — 99223 1ST HOSP IP/OBS HIGH 75: CPT | Performed by: PHYSICIAN ASSISTANT

## 2022-04-25 PROCEDURE — 88305 TISSUE EXAM BY PATHOLOGIST: CPT

## 2022-04-25 PROCEDURE — 2000000000 HC ICU R&B

## 2022-04-25 PROCEDURE — 36556 INSERT NON-TUNNEL CV CATH: CPT

## 2022-04-25 PROCEDURE — 82330 ASSAY OF CALCIUM: CPT

## 2022-04-25 PROCEDURE — 3700000001 HC ADD 15 MINUTES (ANESTHESIA): Performed by: SURGERY

## 2022-04-25 PROCEDURE — 84295 ASSAY OF SERUM SODIUM: CPT

## 2022-04-25 PROCEDURE — 36620 INSERTION CATHETER ARTERY: CPT

## 2022-04-25 PROCEDURE — 85027 COMPLETE CBC AUTOMATED: CPT

## 2022-04-25 PROCEDURE — 84300 ASSAY OF URINE SODIUM: CPT

## 2022-04-25 PROCEDURE — 80202 ASSAY OF VANCOMYCIN: CPT

## 2022-04-25 PROCEDURE — 93010 ELECTROCARDIOGRAM REPORT: CPT | Performed by: INTERNAL MEDICINE

## 2022-04-25 PROCEDURE — 76770 US EXAM ABDO BACK WALL COMP: CPT

## 2022-04-25 PROCEDURE — 94761 N-INVAS EAR/PLS OXIMETRY MLT: CPT

## 2022-04-25 PROCEDURE — 70450 CT HEAD/BRAIN W/O DYE: CPT

## 2022-04-25 PROCEDURE — 82248 BILIRUBIN DIRECT: CPT

## 2022-04-25 PROCEDURE — 0KBN0ZZ EXCISION OF RIGHT HIP MUSCLE, OPEN APPROACH: ICD-10-PCS | Performed by: SURGERY

## 2022-04-25 PROCEDURE — 84156 ASSAY OF PROTEIN URINE: CPT

## 2022-04-25 PROCEDURE — 87077 CULTURE AEROBIC IDENTIFY: CPT

## 2022-04-25 PROCEDURE — 87070 CULTURE OTHR SPECIMN AEROBIC: CPT

## 2022-04-25 PROCEDURE — 82570 ASSAY OF URINE CREATININE: CPT

## 2022-04-25 PROCEDURE — 3600000014 HC SURGERY LEVEL 4 ADDTL 15MIN: Performed by: SURGERY

## 2022-04-25 PROCEDURE — 2580000003 HC RX 258: Performed by: INTERNAL MEDICINE

## 2022-04-25 PROCEDURE — 80053 COMPREHEN METABOLIC PANEL: CPT

## 2022-04-25 PROCEDURE — 87186 SC STD MICRODIL/AGAR DIL: CPT

## 2022-04-25 PROCEDURE — 87205 SMEAR GRAM STAIN: CPT

## 2022-04-25 PROCEDURE — 93005 ELECTROCARDIOGRAM TRACING: CPT | Performed by: STUDENT IN AN ORGANIZED HEALTH CARE EDUCATION/TRAINING PROGRAM

## 2022-04-25 PROCEDURE — 94002 VENT MGMT INPAT INIT DAY: CPT

## 2022-04-25 PROCEDURE — 02HV33Z INSERTION OF INFUSION DEVICE INTO SUPERIOR VENA CAVA, PERCUTANEOUS APPROACH: ICD-10-PCS | Performed by: EMERGENCY MEDICINE

## 2022-04-25 PROCEDURE — 83605 ASSAY OF LACTIC ACID: CPT

## 2022-04-25 PROCEDURE — 87075 CULTR BACTERIA EXCEPT BLOOD: CPT

## 2022-04-25 PROCEDURE — 84133 ASSAY OF URINE POTASSIUM: CPT

## 2022-04-25 PROCEDURE — 2580000003 HC RX 258: Performed by: ANESTHESIOLOGY

## 2022-04-25 PROCEDURE — 93005 ELECTROCARDIOGRAM TRACING: CPT | Performed by: NURSE PRACTITIONER

## 2022-04-25 PROCEDURE — 6360000002 HC RX W HCPCS

## 2022-04-25 PROCEDURE — 36556 INSERT NON-TUNNEL CV CATH: CPT | Performed by: NURSE PRACTITIONER

## 2022-04-25 PROCEDURE — 82306 VITAMIN D 25 HYDROXY: CPT

## 2022-04-25 PROCEDURE — 2700000000 HC OXYGEN THERAPY PER DAY

## 2022-04-25 PROCEDURE — 51702 INSERT TEMP BLADDER CATH: CPT

## 2022-04-25 PROCEDURE — 88112 CYTOPATH CELL ENHANCE TECH: CPT

## 2022-04-25 PROCEDURE — 88307 TISSUE EXAM BY PATHOLOGIST: CPT

## 2022-04-25 PROCEDURE — 81001 URINALYSIS AUTO W/SCOPE: CPT

## 2022-04-25 RX ORDER — CALCIUM GLUCONATE 20 MG/ML
2000 INJECTION, SOLUTION INTRAVENOUS ONCE
Status: COMPLETED | OUTPATIENT
Start: 2022-04-25 | End: 2022-04-26

## 2022-04-25 RX ORDER — ACETAMINOPHEN 650 MG/1
650 SUPPOSITORY RECTAL EVERY 6 HOURS PRN
Status: DISCONTINUED | OUTPATIENT
Start: 2022-04-25 | End: 2022-05-10 | Stop reason: HOSPADM

## 2022-04-25 RX ORDER — CALCIUM CHLORIDE 100 MG/ML
INJECTION INTRAVENOUS; INTRAVENTRICULAR PRN
Status: DISCONTINUED | OUTPATIENT
Start: 2022-04-25 | End: 2022-04-25 | Stop reason: SDUPTHER

## 2022-04-25 RX ORDER — PROPOFOL 10 MG/ML
5-50 INJECTION, EMULSION INTRAVENOUS CONTINUOUS
Status: DISCONTINUED | OUTPATIENT
Start: 2022-04-25 | End: 2022-04-26

## 2022-04-25 RX ORDER — SODIUM CHLORIDE 9 MG/ML
INJECTION, SOLUTION INTRAVENOUS CONTINUOUS PRN
Status: DISCONTINUED | OUTPATIENT
Start: 2022-04-25 | End: 2022-04-25 | Stop reason: SDUPTHER

## 2022-04-25 RX ORDER — ALBUMIN, HUMAN INJ 5% 5 %
SOLUTION INTRAVENOUS PRN
Status: DISCONTINUED | OUTPATIENT
Start: 2022-04-25 | End: 2022-04-25 | Stop reason: SDUPTHER

## 2022-04-25 RX ORDER — SODIUM CHLORIDE 9 MG/ML
INJECTION, SOLUTION INTRAVENOUS PRN
Status: DISCONTINUED | OUTPATIENT
Start: 2022-04-25 | End: 2022-05-10 | Stop reason: HOSPADM

## 2022-04-25 RX ORDER — ONDANSETRON 2 MG/ML
4 INJECTION INTRAMUSCULAR; INTRAVENOUS EVERY 6 HOURS PRN
Status: DISCONTINUED | OUTPATIENT
Start: 2022-04-25 | End: 2022-05-10 | Stop reason: HOSPADM

## 2022-04-25 RX ORDER — POLYETHYLENE GLYCOL 3350 17 G/17G
17 POWDER, FOR SOLUTION ORAL DAILY PRN
Status: DISCONTINUED | OUTPATIENT
Start: 2022-04-25 | End: 2022-05-10 | Stop reason: HOSPADM

## 2022-04-25 RX ORDER — VASOPRESSIN 20 U/ML
INJECTION PARENTERAL PRN
Status: DISCONTINUED | OUTPATIENT
Start: 2022-04-25 | End: 2022-04-25 | Stop reason: SDUPTHER

## 2022-04-25 RX ORDER — 0.9 % SODIUM CHLORIDE 0.9 %
1000 INTRAVENOUS SOLUTION INTRAVENOUS ONCE
Status: COMPLETED | OUTPATIENT
Start: 2022-04-25 | End: 2022-04-25

## 2022-04-25 RX ORDER — NICOTINE 21 MG/24HR
1 PATCH, TRANSDERMAL 24 HOURS TRANSDERMAL EVERY 24 HOURS
Status: DISCONTINUED | OUTPATIENT
Start: 2022-04-25 | End: 2022-04-25

## 2022-04-25 RX ORDER — SODIUM CHLORIDE 0.9 % (FLUSH) 0.9 %
5-40 SYRINGE (ML) INJECTION EVERY 12 HOURS SCHEDULED
Status: DISCONTINUED | OUTPATIENT
Start: 2022-04-25 | End: 2022-05-10 | Stop reason: HOSPADM

## 2022-04-25 RX ORDER — ROCURONIUM BROMIDE 10 MG/ML
INJECTION, SOLUTION INTRAVENOUS PRN
Status: DISCONTINUED | OUTPATIENT
Start: 2022-04-25 | End: 2022-04-25 | Stop reason: SDUPTHER

## 2022-04-25 RX ORDER — PROPOFOL 10 MG/ML
INJECTION, EMULSION INTRAVENOUS
Status: COMPLETED
Start: 2022-04-25 | End: 2022-04-25

## 2022-04-25 RX ORDER — FENTANYL CITRATE-0.9 % NACL/PF 10 MCG/ML
25-200 PLASTIC BAG, INJECTION (ML) INTRAVENOUS CONTINUOUS
Status: DISCONTINUED | OUTPATIENT
Start: 2022-04-25 | End: 2022-04-26

## 2022-04-25 RX ORDER — FENTANYL CITRATE 50 UG/ML
50 INJECTION, SOLUTION INTRAMUSCULAR; INTRAVENOUS ONCE
Status: COMPLETED | OUTPATIENT
Start: 2022-04-25 | End: 2022-04-25

## 2022-04-25 RX ORDER — NOREPINEPHRINE BIT/0.9 % NACL 16MG/250ML
1-100 INFUSION BOTTLE (ML) INTRAVENOUS CONTINUOUS
Status: DISCONTINUED | OUTPATIENT
Start: 2022-04-26 | End: 2022-04-29 | Stop reason: ALTCHOICE

## 2022-04-25 RX ORDER — FAMOTIDINE 10 MG/ML
10 INJECTION, SOLUTION INTRAVENOUS DAILY
Status: DISCONTINUED | OUTPATIENT
Start: 2022-04-26 | End: 2022-05-01

## 2022-04-25 RX ORDER — ACETAMINOPHEN 325 MG/1
650 TABLET ORAL EVERY 6 HOURS PRN
Status: DISCONTINUED | OUTPATIENT
Start: 2022-04-25 | End: 2022-05-10 | Stop reason: HOSPADM

## 2022-04-25 RX ORDER — ONDANSETRON 4 MG/1
4 TABLET, ORALLY DISINTEGRATING ORAL EVERY 8 HOURS PRN
Status: DISCONTINUED | OUTPATIENT
Start: 2022-04-25 | End: 2022-05-10 | Stop reason: HOSPADM

## 2022-04-25 RX ORDER — SODIUM CHLORIDE, SODIUM LACTATE, POTASSIUM CHLORIDE, CALCIUM CHLORIDE 600; 310; 30; 20 MG/100ML; MG/100ML; MG/100ML; MG/100ML
INJECTION, SOLUTION INTRAVENOUS CONTINUOUS PRN
Status: DISCONTINUED | OUTPATIENT
Start: 2022-04-25 | End: 2022-04-25 | Stop reason: SDUPTHER

## 2022-04-25 RX ORDER — HEPARIN SODIUM 5000 [USP'U]/ML
5000 INJECTION, SOLUTION INTRAVENOUS; SUBCUTANEOUS 3 TIMES DAILY
Status: DISCONTINUED | OUTPATIENT
Start: 2022-04-25 | End: 2022-05-10

## 2022-04-25 RX ORDER — SUCCINYLCHOLINE/SOD CL,ISO/PF 200MG/10ML
SYRINGE (ML) INTRAVENOUS PRN
Status: DISCONTINUED | OUTPATIENT
Start: 2022-04-25 | End: 2022-04-25 | Stop reason: SDUPTHER

## 2022-04-25 RX ORDER — SODIUM CHLORIDE 0.9 % (FLUSH) 0.9 %
5-40 SYRINGE (ML) INJECTION PRN
Status: DISCONTINUED | OUTPATIENT
Start: 2022-04-25 | End: 2022-05-10 | Stop reason: HOSPADM

## 2022-04-25 RX ORDER — SODIUM CHLORIDE 9 MG/ML
INJECTION, SOLUTION INTRAVENOUS CONTINUOUS
Status: DISCONTINUED | OUTPATIENT
Start: 2022-04-25 | End: 2022-04-27

## 2022-04-25 RX ORDER — KETAMINE HCL IN NACL, ISO-OSM 100MG/10ML
SYRINGE (ML) INJECTION PRN
Status: DISCONTINUED | OUTPATIENT
Start: 2022-04-25 | End: 2022-04-25 | Stop reason: SDUPTHER

## 2022-04-25 RX ORDER — FENTANYL CITRATE 50 UG/ML
INJECTION, SOLUTION INTRAMUSCULAR; INTRAVENOUS PRN
Status: DISCONTINUED | OUTPATIENT
Start: 2022-04-25 | End: 2022-04-25 | Stop reason: SDUPTHER

## 2022-04-25 RX ORDER — NOREPINEPHRINE BIT/0.9 % NACL 16MG/250ML
INFUSION BOTTLE (ML) INTRAVENOUS
Status: COMPLETED
Start: 2022-04-25 | End: 2022-04-25

## 2022-04-25 RX ORDER — LABETALOL 20 MG/4 ML (5 MG/ML) INTRAVENOUS SYRINGE
10 EVERY 4 HOURS PRN
Status: DISCONTINUED | OUTPATIENT
Start: 2022-04-25 | End: 2022-04-27

## 2022-04-25 RX ORDER — FENTANYL CITRATE 50 UG/ML
INJECTION, SOLUTION INTRAMUSCULAR; INTRAVENOUS
Status: COMPLETED
Start: 2022-04-25 | End: 2022-04-25

## 2022-04-25 RX ORDER — MIDAZOLAM HYDROCHLORIDE 1 MG/ML
INJECTION INTRAMUSCULAR; INTRAVENOUS PRN
Status: DISCONTINUED | OUTPATIENT
Start: 2022-04-25 | End: 2022-04-25 | Stop reason: SDUPTHER

## 2022-04-25 RX ORDER — DEXAMETHASONE SODIUM PHOSPHATE 10 MG/ML
INJECTION, EMULSION INTRAMUSCULAR; INTRAVENOUS PRN
Status: DISCONTINUED | OUTPATIENT
Start: 2022-04-25 | End: 2022-04-25 | Stop reason: SDUPTHER

## 2022-04-25 RX ADMIN — ALBUMIN (HUMAN) 12.5 G: 12.5 INJECTION, SOLUTION INTRAVENOUS at 21:24

## 2022-04-25 RX ADMIN — SODIUM CHLORIDE: 9 INJECTION, SOLUTION INTRAVENOUS at 20:43

## 2022-04-25 RX ADMIN — SODIUM BICARBONATE 100 MEQ: 84 INJECTION, SOLUTION INTRAVENOUS at 21:25

## 2022-04-25 RX ADMIN — Medication 2 MCG/MIN: at 23:38

## 2022-04-25 RX ADMIN — CALCIUM CHLORIDE 1 G: 100 INJECTION, SOLUTION INTRAVENOUS at 21:35

## 2022-04-25 RX ADMIN — Medication 160 MG: at 20:34

## 2022-04-25 RX ADMIN — SODIUM CHLORIDE 1000 ML: 9 INJECTION, SOLUTION INTRAVENOUS at 14:55

## 2022-04-25 RX ADMIN — MIDAZOLAM 2 MG: 1 INJECTION INTRAMUSCULAR; INTRAVENOUS at 20:34

## 2022-04-25 RX ADMIN — SODIUM CHLORIDE: 9 INJECTION, SOLUTION INTRAVENOUS at 21:15

## 2022-04-25 RX ADMIN — DEXAMETHASONE SODIUM PHOSPHATE 10 MG: 10 INJECTION, EMULSION INTRAMUSCULAR; INTRAVENOUS at 20:53

## 2022-04-25 RX ADMIN — PROPOFOL 49.02 MCG/KG/MIN: 10 INJECTION, EMULSION INTRAVENOUS at 22:54

## 2022-04-25 RX ADMIN — ALBUMIN (HUMAN) 12.5 G: 12.5 INJECTION, SOLUTION INTRAVENOUS at 21:03

## 2022-04-25 RX ADMIN — PHENYLEPHRINE HYDROCHLORIDE 100 MCG: 10 INJECTION INTRAVENOUS at 20:49

## 2022-04-25 RX ADMIN — FENTANYL CITRATE 100 MCG: 50 INJECTION, SOLUTION INTRAMUSCULAR; INTRAVENOUS at 22:13

## 2022-04-25 RX ADMIN — SODIUM BICARBONATE: 84 INJECTION, SOLUTION INTRAVENOUS at 18:16

## 2022-04-25 RX ADMIN — LABETALOL 20 MG/4 ML (5 MG/ML) INTRAVENOUS SYRINGE 10 MG: at 23:02

## 2022-04-25 RX ADMIN — FENTANYL CITRATE 50 MCG: 50 INJECTION, SOLUTION INTRAMUSCULAR; INTRAVENOUS at 20:34

## 2022-04-25 RX ADMIN — FENTANYL CITRATE 50 MCG: 50 INJECTION, SOLUTION INTRAMUSCULAR; INTRAVENOUS at 23:36

## 2022-04-25 RX ADMIN — PHENYLEPHRINE HYDROCHLORIDE 100 MCG: 10 INJECTION INTRAVENOUS at 20:41

## 2022-04-25 RX ADMIN — Medication 50 MG: at 20:34

## 2022-04-25 RX ADMIN — SODIUM CHLORIDE, POTASSIUM CHLORIDE, SODIUM LACTATE AND CALCIUM CHLORIDE: 600; 310; 30; 20 INJECTION, SOLUTION INTRAVENOUS at 21:32

## 2022-04-25 RX ADMIN — ROCURONIUM BROMIDE 50 MG: 50 INJECTION, SOLUTION INTRAVENOUS at 20:51

## 2022-04-25 RX ADMIN — VASOPRESSIN 2 UNITS: 20 INJECTION INTRAVENOUS at 20:43

## 2022-04-25 RX ADMIN — VANCOMYCIN HYDROCHLORIDE 1500 MG: 5 INJECTION, POWDER, LYOPHILIZED, FOR SOLUTION INTRAVENOUS at 18:25

## 2022-04-25 RX ADMIN — SODIUM CHLORIDE: 9 INJECTION, SOLUTION INTRAVENOUS at 20:19

## 2022-04-25 RX ADMIN — PIPERACILLIN AND TAZOBACTAM 3375 MG: 3; .375 INJECTION, POWDER, LYOPHILIZED, FOR SOLUTION INTRAVENOUS at 17:40

## 2022-04-25 RX ADMIN — SODIUM CHLORIDE 1000 ML: 9 INJECTION, SOLUTION INTRAVENOUS at 18:16

## 2022-04-25 RX ADMIN — VASOPRESSIN 2 UNITS: 20 INJECTION INTRAVENOUS at 21:03

## 2022-04-25 RX ADMIN — VASOPRESSIN 2 UNITS: 20 INJECTION INTRAVENOUS at 20:47

## 2022-04-25 ASSESSMENT — PULMONARY FUNCTION TESTS
PIF_VALUE: 16
PIF_VALUE: 16
PIF_VALUE: 14
PIF_VALUE: 15
PIF_VALUE: 15
PIF_VALUE: 19
PIF_VALUE: 19
PIF_VALUE: 18
PIF_VALUE: 15
PIF_VALUE: 18
PIF_VALUE: 15
PIF_VALUE: 18
PIF_VALUE: 15
PIF_VALUE: 16
PIF_VALUE: 15
PIF_VALUE: 15
PIF_VALUE: 16
PIF_VALUE: 15
PIF_VALUE: 16
PIF_VALUE: 15
PIF_VALUE: 16
PIF_VALUE: 16
PIF_VALUE: 19
PIF_VALUE: 16
PIF_VALUE: 19
PIF_VALUE: 15
PIF_VALUE: 18
PIF_VALUE: 16
PIF_VALUE: 14
PIF_VALUE: 18
PIF_VALUE: 16
PIF_VALUE: 14
PIF_VALUE: 19
PIF_VALUE: 15
PIF_VALUE: 18
PIF_VALUE: 16
PIF_VALUE: 14
PIF_VALUE: 15
PIF_VALUE: 19
PIF_VALUE: 15
PIF_VALUE: 14
PIF_VALUE: 19
PIF_VALUE: 17
PIF_VALUE: 15
PIF_VALUE: 19
PIF_VALUE: 14
PIF_VALUE: 19
PIF_VALUE: 14
PIF_VALUE: 18
PIF_VALUE: 19
PIF_VALUE: 0
PIF_VALUE: 16
PIF_VALUE: 20
PIF_VALUE: 19
PIF_VALUE: 18
PIF_VALUE: 19
PIF_VALUE: 15
PIF_VALUE: 17
PIF_VALUE: 17
PIF_VALUE: 20
PIF_VALUE: 15
PIF_VALUE: 16
PIF_VALUE: 14
PIF_VALUE: 17
PIF_VALUE: 16
PIF_VALUE: 15
PIF_VALUE: 20
PIF_VALUE: 17
PIF_VALUE: 19
PIF_VALUE: 14
PIF_VALUE: 15
PIF_VALUE: 18
PIF_VALUE: 15
PIF_VALUE: 14
PIF_VALUE: 15
PIF_VALUE: 17
PIF_VALUE: 15
PIF_VALUE: 20
PIF_VALUE: 15
PIF_VALUE: 16
PIF_VALUE: 16

## 2022-04-25 ASSESSMENT — ENCOUNTER SYMPTOMS
CHEST TIGHTNESS: 0
DIARRHEA: 0
ALLERGIC/IMMUNOLOGIC NEGATIVE: 1
RESPIRATORY NEGATIVE: 1
NAUSEA: 0
ABDOMINAL PAIN: 1
COUGH: 1
NAUSEA: 1
EYES NEGATIVE: 1
VOMITING: 1
COLOR CHANGE: 1
VOMITING: 0
ABDOMINAL PAIN: 0
WHEEZING: 0
SHORTNESS OF BREATH: 0

## 2022-04-25 ASSESSMENT — VISUAL ACUITY: OU: 1

## 2022-04-25 ASSESSMENT — PAIN - FUNCTIONAL ASSESSMENT: PAIN_FUNCTIONAL_ASSESSMENT: NONE - DENIES PAIN

## 2022-04-25 NOTE — CONSULTS
[address] Surgery Consultation - Katarzyna Le PA-C  On behalf of Dr. Chelo Roberts    Pt Name: Rei Cifuentes. MRN: [id number]  YOB: 1950  Date of evaluation: 4/25/2022  Primary Care Physician: Cyrus Hollingsworth MD  Patient evaluated at the request of  Nicolas Mnedez PA-C  Reason for evaluation: suspected incarcerated inguinal hernia  IMPRESSIONS/RECOMMENDATION:   Incarcerated right inguinal hernia causing small bowel obstruction with CT evidence of perforation   - NPO, IV fluids, NG tube to LIWS   - CT abdomen/pelvis with several gas collections, possible ischemic bowel vs perforation   - IV Zosyn   - Pain control    - Hold chemical DVT prophylaxis   - Plan for surgical intervention tonight with Dr. Chetna Miller for repair of incarcerated right inguinal hernia, exploratory laparotomy with small bowel resection    Leukocytosis, ROBSON, Elevated troponin, elevated bilirubin, Syncope   - Continue IV Zosyn   - Hospitalist primary and managing    - Nephrology consulted    - On [place] gtt    Past medical history of CAD (coronary artery disease), CVA (cerebral vascular accident) ([address] Erectile dysfunction, GERD (gastroesophageal reflux disease), Hyperlipidemia, and Hypertension.   - Hospitalist primary and managing   4. Acute renal failure secondary to small bowel obstruction  SUBJECTIVE:   History of Chief Complaint:    Yeny Sanderson is a 66 y.o.male who presented to emergency department with syncope. Per report patient has not been feeling well rt ecently and today had a syncopal episode when getting out of the shower. Wife at bedside stated she caught the patient before he fell and slowly laid him down to the ground. Patient did not hit his head. ED work-up revealed elevated troponin, leukocytosis, and markedly elevated creatinine. Creatinine 11.2, previously 1.3 two months ago. Patient was admitted to the hospitalist service for acute renal failure, elevated troponin, and syncope.  Hospitalist called with concerns for incarcerated right inguinal hernia prior to admission. Upon assessment in ED patient and wife at bedside noted that he has had this incarcerated hernia but unable to tell me for how long. Chart was reviewed and patient was seen on 4/17/2022 in the emergency department for this right inguinal hernia that was documented to have developed the day prior to assessment. Patient was discharged from the emergency department with outpatient follow-up with Dr. Leeann Machado on 4/27/22. Surgical history reviewed. Patient with previous repair of right inguinal and umbilical hernia with Dr. Leeann Machado in May 2015. Surgical history also noted testicle removal of left in 2003. Patient reports acute abdominal pain over right inguinal area. Patient also endorsed decrease urine production and nausea/vomiting. He denied any other acute pain or complaints. Denied any headaches, lightheadedness, dizziness, chest pain, shortness of breath, dysuria, hematochezia, and hematuria. No blood thinners reported. Home medication list reviewed, no anticoagulation noted. Patient on 81 mg of aspirin daily. Patient denied any constipation and reported his last bowel movement was this morning. On exam patient with large bulge noted over right inguinal area that is firm and indurated with overlying skin erythema. Hernia unable to be reduced. Notable tenderness to palpation over hernia. Labs and vital signs reviewed. Vitals in ED on assessment noted, mild tachycardia. Blood pressure stable. Lactate 2.4. Leukocytosis noted at 21.8. Hemoglobin 10.8. Creatinine 11.2. Troponin 0.047. CT abdomen pelvis reviewed. Right inguinal hernia containing small bowel and causing small bowel obstruction. Several gas collections in the right inguinal hernia possibly ischemia or perforated bowel. Patient seen and case discussed with general surgeon on-call, Dr. Mindy Navarrete. NG tube ordered to ANITA. Continue IV antibiotics.  Plan for repair of incarcerated right inguinal hernia, exploratory laparotomy with small bowel resection tonight with Dr. Malinda Castillo. Past Medical History   has a past medical history of CAD (coronary artery disease), CVA (cerebral vascular accident) ([address] Erectile dysfunction, GERD (gastroesophageal reflux disease), Hyperlipidemia, and Hypertension. Past Surgical History   has a past surgical history that includes Cardiac catheterization (); Eye surgery (Left, 2004); Leg Surgery (Left, 2011); Testicle removal (Left, 2003); Inguinal hernia repair (Right, 5/27/15); Umbilical hernia repair (5/27/15); eye surgery; Colonoscopy; and Endoscopy, colon, diagnostic. Medications  Prior to Admission medications    Medication Sig Start Date End Date Taking?  Authorizing Provider   atorvastatin (LIPITOR) 20 MG tablet TAKE 1 TABLET BY MOUTH DAILY 3/23/22   Alea Diamond MD   COMBIGAN 0.2-0.5 % ophthalmic solution Place 1 drop into the left eye every 12 hours 3/23/22   Alea Diamond MD   famotidine (PEPCID) 20 MG tablet Take 1 tablet by mouth 2 times daily 3/23/22   Alea Diamond MD   losartan (COZAAR) 50 MG tablet Take 1 tablet by mouth daily 3/23/22   Alea Diamond MD   metoprolol succinate (TOPROL XL) 25 MG extended release tablet TAKE 1 TABLET BY MOUTH DAILY 3/23/22   Alea Diamond MD   tamsulosin [facility]) 0.4 MG capsule Take 1 capsule by mouth daily 3/23/22   Alea Diamond MD   lidocaine (LIDODERM) 5 % Place 1 patch onto the skin daily 12 hours on, 12 hours off. 4/10/22   ELAN Allen CNP   fluocinonide (LIDEX) 0.05 % cream APPLY EXTERNALLY TO THE AFFECTED AREA TWICE DAILY 1/27/22   Alea Diamond MD   tadalafil (CIALIS) 20 MG tablet TAKE 1 TABLET BY MOUTH EVERY DAY AS NEEDED 10/25/21   Alea Diamond MD   aspirin 81 MG EC tablet Take 1 tablet by mouth daily 10/15/21   Alea Diamond MD   loratadine (CLARITIN) 10 MG tablet TAKE 1 TABLET BY MOUTH DAILY 5/25/21   Alea Diamond MD    Scheduled Meds:   sodium chloride flush  5-40 mL IntraVENous 2 times per day    heparin (porcine)  5,000 Units SubCUTAneous TID    nicotine  1 patch TransDERmal Q24H    piperacillin-tazobactam  2,250 mg IntraVENous Q8H    famotidine (PEPCID) injection  10 mg IntraVENous Daily     Continuous Infusions:   sodium bicarbonate infusion 125 mL/hr at 22    sodium chloride       PRN Meds:.sodium chloride flush, sodium chloride, ondansetron **OR** ondansetron, polyethylene glycol, acetaminophen **OR** acetaminophen  Allergies  has No Known Allergies. Family History  family history includes Heart Disease in his mother. Social History   reports that he has been smoking cigarettes. He has a 40.00 pack-year smoking history. He has never used smokeless tobacco. He reports current alcohol use. He reports current drug use. Frequency: 7.00 times per week. Drug: Marijuana Megan Dasen). Review of Systems  General endorses decreased appetite, denies any fever or chills  HEENT Denies any headaches, headaches, dizziness  Resp Denies any shortness of breath, cough or wheezing  Cardiac Denies any chest pain, palpitations  GI endorses abdominal pain and nausea/vomiting. Denies constipation and hematochezia   endorses decreased urinary output but denies any dysuria or hematuria  Heme Denies bruising or bleeding easily, denies history of blood clots  Endocrine denies polydipsia, polyuria, heat and cold intolerance  Neuro Denies any focal motor or sensory deficits  Musculoskeletal: Denies neck pain, back pain, or pain in extremities   SUBJECTIVE:   CURRENT VITALS:  height is 5' 9\" (1.753 m) and weight is 150 lb (68 kg). His oral temperature is 97.2 °F (36.2 °C). His blood pressure is 105/56 (abnormal) and his pulse is 97. His respiration is 23 and oxygen saturation is 89% (abnormal). Body mass index is 22.15 kg/m².   Temperature Range (24h):Temp: 97.2 °F (36.2 °C) Temp  Av.2 °F (36.2 °C)  Min: 97.2 °F (36.2 °C)  Max: 97.2 °F (36.2 °C)  BP Range (24h): Systolic (63TYZ), AZQ:425 , Min:91 , JWA:598     Diastolic (40UTZ), GTM:58, Min:55, Max:77    Pulse Range (24h): Pulse  Av.3  Min: 97  Max: 102  Respiration Range (24h): Resp  Av.1  Min: 16  Max: 23  Current Pulse Ox (24h):  SpO2: (!) 89 %  Pulse Ox Range (24h):  SpO2  Av.1 %  Min: 89 %  Max: 96 %  Oxygen Amount and Delivery: O2 Flow Rate (L/min): (S) 3 L/min (placed on O2 per spo2 and ABG)  CONSTITUTIONAL: Alert, no acute distress  SKIN: Warm and dry. HEENT: Head is normocephalic, atraumatic. EOMI, PERRL. NECK: Supple, symmetrical, trachea midline  CHEST/LUNGS: chest symmetric with normal A/P diameter, normal respiratory rate and rhythm, lungs clear to auscultation without wheezes, rales or rhonchi. No accessory muscle use. CARDIOVASCULAR: Mild tachycardia and regular rhythm without murmur, gallop or rub. Normal S1 and S2.   ABDOMEN: Large bulge noted over right inguinal area that is firm and indurated with overlying skin erythema. Hernia unable to be reduced. Notable tenderness to palpation over hernia. Diffuse abdominal tenderness to palpation. Midline incisional scar noted. RECTAL: deferred, not clinically indicated  NEUROLOGIC: There are no focalizing motor or sensory deficits. CN II-XII are grossly intact. Graylon Adriano EXTREMITIES: no cyanosis and no edema. LABS:     Recent Labs     22  1547   WBC 21.8*   HGB 10.8*   HCT 36.6*   *      K 4.0   CL 93*   CO2 16*   *   CREATININE 11.2*   CALCIUM 8.9   AST 28   ALT 19   BILITOT 3.1*   BILIDIR 0.9*     RADIOLOGY:     CT ABDOMEN PELVIS WO CONTRAST Additional Contrast? None   Final Result   Impression:   Right inguinal hernia containing small bowel. This is causing a small    bowel obstruction. There are several gas collections within the right    inguinal hernia. Ischemic or perforated bowel could have this appearance. Mild to moderate patchy consolidation bilateral lower lobes.  This could    represent aspiration or pneumonia. Prominent thickening of the distal esophagus and gastroesophageal    junction. Neoplasm not excluded. Recommend direct visualization. This document has been electronically signed by: Joaquin Pinedo MD on    04/25/2022 07:20 PM      All CTs at this facility use dose modulation techniques and iterative    reconstructions, and/or weight-based dosing   when appropriate to reduce radiation to a low as reasonably achievable. US RENAL COMPLETE   Final Result   Impression:   Multifocal simple bilateral renal cyst. Echogenic bilateral renal    parenchyma. This document has been electronically signed by: Joaquin Pinedo MD on    04/25/2022 06:30 PM      CT HEAD WO CONTRAST   Final Result       1. Stable CT scan of the brain, no interval change since previous MRI scan dated 10 Mel[address] 2019.   2. Mild atrophy and dilatation of the third and lateral ventricles. 3. Probable ischemic changes in the white matter, left basal ganglia and in the bernadette. 4. Calcification the cavernous segments of both internal carotid arteries. 5. Increased density in the external auditory canals which may represent cerumen bilaterally. **This report has been created using voice recognition software. It may contain minor errors which are inherent in voice recognition technology. **      Final report electronically signed by DR Diana Lind on 4/25/2022 4:01 PM      XR CHEST PORTABLE   Final Result   1. Normal heart size. No effusion. 2. Persistent mild atelectatic/fibrotic stranding retrocardiac region left lung base. **This report has been created using voice recognition software. It may contain minor errors which are inherent in voice recognition technology. **      Final report electronically signed by Dr. Deann Bernal on 4/25/2022 3:05 PM            Electronically signed by Landy Henley PA-C on 4/25/2022 at 6:53 PM    Patient seen and examined independently by me 4/25/2022     I personally supervised the PA/NP in the evaluation, management and development of the treatment plan for Cedric Suarez Sr.  on the same date of service as above. I personally interviewed John Rai Sr.   and  discussed his review of symptoms as able due to the patient's condition, as well as performed an individual physical exam on the same date of service as above. In addition I discussed the patient's condition and treatment options with the patient, if able, and/or designated family if available. I have also reviewed and agree with the past medical,  family and social history updates as well as care plans unless otherwise noted below. All questions were answered. I examined independently and reviewed relevant data myself and may have done so in the context of team rounds. A full chart review was performed by me. I attest that this medical record entry accurately reflects signatures and notations that I made in my capacity as an M. D. when I treated and diagnosed John Rai Sr. on the date of service above     I was responsible for all medical decision making involving this encounter. I identified and/or confirmed all problems associated with this patient encounter by my own direct physical examination of this patient and review of all radiology studies and labwork  that were ordered and available. I  discussed the management of all of the identified problems with the APN or PA. I formulated the treatment plan for all identified problems and discussed those with the APN or PA . This management plan was then carried out and the patient's orders for care by the APN or PA. Total time spent on this patient encounter was 90 minutes which includes the direct physical exam as well as all the other encounter activities described above. Time was discontiguous. Time does not include procedures.     Please see our orders that were directed and approved by me if there are any new ones for the updated patient care plan. Above discussed and I agree with documentation and orders placed by Daron Woods PA-C    See my additional comments below for any other updated orders and plans. Patient in the emergency department was being admitted by the medicine service they called us and they were concerned for an incarcerated right inguinal hernia. Patient had been in the emergency department 8 days previous as well. He arrived after he lost consciousness. Had some nausea and vomiting. Markedly abnormal labs as above creatinine of 11. Potassium normal.  Recommended NG tube placed. Facilitated CT scan which revealed incarcerated small bowel with obstruction related to the hernia with evidence of perforation. Patient focally tender exquisitely in the right groin with incarcerated hernia. Recommend urgent operative intervention. Broad-spectrum antibiotics ordered. Discussed with patient and wife agreeable to proceed. Discussed with them need for bowel resection and even ICU support postoperatively. All medical decision making made by myself agree as documented.

## 2022-04-25 NOTE — ED NOTES
Patient resting in bed. Respirations easy and unlabored. No distress noted. Call light within reach.        Dk Mirza RN  04/25/22 [id number]

## 2022-04-25 NOTE — ED NOTES
Report received from [facility]. Upon first contact, pt is resting in bed. Updated on IP bed status. No needs expressed at this time.  [address] [facility],  [address], [facility]  04/25/22 1302

## 2022-04-25 NOTE — ED NOTES
Patient resting in bed. Respirations easy and unlabored. No distress noted. Call light within reach.        Erika Caballero RN  04/25/22 0826

## 2022-04-25 NOTE — ED NOTES
Patient resting in bed. Respirations easy and unlabored. No distress noted. Call light within reach.        Kizzy Pulliam RN  04/25/22 [id number]

## 2022-04-25 NOTE — CONSULTS
[facility]    [address], [address]  [facility]  4/25/2022 5:22 PM    Pt Name:    Osiris Bautista Sr. MRN:     [id number]   [id number]  YOB: 1950  Admit Date:    4/25/2022  2:08 PM  Primary Care Physician:  Luann Alas MD        Reason for Consult:  ROBSON    History:   The patient is a 66 y.o. male seen in nephrology consult for acute kidney injury. Has a past medical history of CVA, hypertension, hyperlipidemia, GERD, coronary artery disease. Presented to the hospital with a syncopal episode. Wife reports she was helping him get out of the shower and patient noted to just slumped over. Patient also had a syncopal episode while they were in [facility] a few days prior. He has recently been diagnosed with an inguinal hernia and wife reports it has gotten bigger in size. Patient has been more fatigued with decreased appetite especially over the past week and he has had some vomiting today. In the emergency department he was noted to have an elevated BUN and creatinine level BUN of 190 and a creatinine of 11.2 mg/dL. His creatinine 2 months ago was 1.3 mg/dL. Renal ultrasound was unremarkable. CAT scan did show findings of right inguinal hernia containing small bowel causing a small bowel obstruction. He has been being taken to the [address] [place] [address] by general surgery. His blood pressure was low on admission in the 57Z systolic. Patient reports his urine output has decreased. He denies any new medications besides receiving some Norco and a lidocaine patch recently for rib fracture. At home he takes losartan. He denies any NSAID use.     Past Medical History:  Past Medical History:   Diagnosis Date    CAD (coronary artery disease)     CVA [facility]cerebral vascular accident) ([facility] [address]     Erectile dysfunction     GERD (gastroesophageal reflux disease) 07/2012    Gastro ulcer    Hyperlipidemia     Hypertension        Past Surgical History:  Past Surgical History:   Procedure Laterality Date    CARDIAC CATHETERIZATION      COLONOSCOPY      ENDOSCOPY, COLON, DIAGNOSTIC      EYE SURGERY Left 2004    cataract    EYE SURGERY      INGUINAL HERNIA REPAIR Right 5/27/15    Dr. Jaswant Allen 2011    Dr. Luis E Manning Left 2003    Dr. Sandip Lake  5/27/15    Dr. Kenyatta Barksdale       Family History:  Family History   Problem Relation Age of Onset    Heart Disease Mother        Social History:  Social History     Socioeconomic History    Marital status:      Spouse name: Jimena Angeles Number of children: 3    Years of education: Not on file    Highest education level: Not on file   Occupational History    Occupation: disability-[employer]   Tobacco Use    Smoking status: Current Every Day Smoker     Packs/day: 1.00     Years: 40.00     Pack years: 40.00     Types: Cigarettes    Smokeless tobacco: Never Used    Tobacco comment: printed to avs. 3/4 ppd since 2019   Vaping Use    Vaping Use: Never used   Substance and Sexual Activity    Alcohol use: Yes    Drug use: Yes     Frequency: 7.0 times per week     Types: Marijuana Gaynelle [place])    Sexual activity: Never   Other Topics Concern    Not on file   Social History Narrative    Not on file     Social Determinants of Health     Financial Resource Strain: Low Risk     Difficulty of Paying Living Expenses: Not hard at all   Food Insecurity: No Food Insecurity    Worried About [address] in the Last Year: Never true    [address] in the Last Year: Never true   Transportation Needs:     Lack of Transportation (Medical): Not on file    Lack of Transportation (Non-Medical):  Not on file   Physical Activity:     Days of Exercise per Week: Not on file    Minutes of Exercise per Session: Not on file   Stress:     Feeling of Stress : Not on file   Social Connections:     Frequency of Communication with Friends and Family: Not on file    Frequency of Social Gatherings with Friends and Family: Not on file    Attends [employer] Services: Not on file    Active Member of Clubs or Organizations: Not on file    Attends Club or Organization Meetings: Not on file    Marital Status: Not on file   Intimate Partner Violence:     Fear of Current or Ex-Partner: Not on file    Emotionally Abused: Not on file    Physically Abused: Not on file    Sexually Abused: Not on file   Housing Stability:     Unable to Pay for Housing in the Last Year: Not on file    Number of [place] in the Last Year: Not on file    Unstable Housing in the Last Year: Not on file       Home Meds:  Prior to Admission medications    Medication Sig Start Date End Date Taking?  Authorizing Provider   atorvastatin (LIPITOR) 20 MG tablet TAKE 1 TABLET BY MOUTH DAILY 3/23/22   Radha Vences MD   COMBIGAN 0.2-0.5 % ophthalmic solution Place 1 drop into the left eye every 12 hours 3/23/22   Radha Vences MD   famotidine (PEPCID) 20 MG tablet Take 1 tablet by mouth 2 times daily 3/23/22   Radha Vences MD   losartan (COZAAR) 50 MG tablet Take 1 tablet by mouth daily 3/23/22   Radha Vences MD   metoprolol succinate (TOPROL XL) 25 MG extended release tablet TAKE 1 TABLET BY MOUTH DAILY 3/23/22   Radha Vences MD   tamsulosin [facility]) 0.4 MG capsule Take 1 capsule by mouth daily 3/23/22   Radha Vences MD   lidocaine (LIDODERM) 5 % Place 1 patch onto the skin daily 12 hours on, 12 hours off. 4/10/22   ELAN Mendez CNP   fluocinonide (LIDEX) 0.05 % cream APPLY EXTERNALLY TO THE AFFECTED AREA TWICE DAILY 1/27/22   Radha Vences MD   tadalafil (CIALIS) 20 MG tablet TAKE 1 TABLET BY MOUTH EVERY DAY AS NEEDED 10/25/21   Radha Vences MD   aspirin 81 MG EC tablet Take 1 tablet by mouth daily 10/15/21   Radha Vences MD   loratadine (CLARITIN) 10 MG tablet TAKE 1 TABLET BY MOUTH DAILY 5/25/21   Radha Vences MD       Review of Systems:  Constitutional: no fever or chills +weakness +syncope  Head: No headaches  Eyes: no blurry vision, no discharge  Ears: no ear pain or hearing changes  Nose: no runny nose or epistaxis  Respiratory: no shortness of breath or cough or sputum production  Cardiovascular: no chest pain, no edema  GI:+nausea, vomiting, abdominal pain  : denies any hematuria, no flank pain  Skin: no rash  Musculoskeletal: no joint pain, moves all ext  Neuro: no tremor, no slurred speech  Psychiatric: stable mood, no depression or insomnia    Current Meds:  Infusion:   Meds:    piperacillin-tazobactam  3,375 mg IntraVENous Once    vancomycin  1,500 mg IntraVENous Once     Meds prn:      Allergies/Intolerances: ALLERGIES: Patient has no known allergies. 24HR INTAKE/OUTPUT:  No intake or output data in the 24 hours ending 04/25/22 1722  No intake/output data recorded. No intake/output data recorded. Admission weight: 150 lb (68 kg)  Wt Readings from Last 3 Encounters:   04/25/22 150 lb (68 kg)   04/17/22 150 lb (68 kg)   04/10/22 148 lb (67.1 kg)     Body mass index is 22.15 kg/m². Physical Examination:  VITALS:  BP 91/77   Pulse 101   Temp 97.2 °F (36.2 °C) (Oral)   Resp 22   Ht 5' 9\" (1.753 m)   Wt 150 lb (68 kg)   SpO2 96%   BMI 22.15 kg/m²   Weight:   Wt Readings from Last 3 Encounters:   04/25/22 150 lb (68 kg)   04/17/22 150 lb (68 kg)   04/10/22 148 lb (67.1 kg)     Constitutional and General Appearance: awake, alert but poor historian  Eyes: no icteric sclera, no pallor conjunctiva, no discharge seen from either eye  Ears and Nose: normal external appearance of left and right ear and nose. No active drainage from nose.    Oral: dry oral mucus membranes  Neck: No jugular venous distention, appears symmetric, good ROM  Lungs: Air entry B/L, no crackles or rales, no use of accessory muscles  Heart: irregular  Extremities: no LE edema  GI: soft, RLQ tenderness  Skin: no rash seen on exposed extremities  Musculo: moves all extremities  Neuro: no slurred speech,  Psychiatric: Awake, alert, Oriented    Lab Data  CBC:   Recent Labs     04/25/22  1547   WBC 21.8*   HGB 10.8*   HCT 36.6*   *     BMP:  Recent Labs     04/25/22  1547      K 4.0   CL 93*   CO2 16*   *   CREATININE 11.2*   GLUCOSE 111*   CALCIUM 8.9     PTH: @PTH@  TSH: No results for input(s): TSH in the last 72 hours. HgBa1c: No results for input(s): LABA1C in the last 72 hours. Hepatic:   Recent Labs     04/25/22  1547   LABALBU 2.9*   AST 28   ALT 19   BILITOT 3.1*   ALKPHOS 106     ABGs: No results found for: PHART, PO2ART, MIA7RDF  Troponin: No results for input(s): TROPONINI in the last 72 hours. BNP: No results for input(s): BNP in the last 72 hours. Old labs reviewed. Impression and Plan:  1. ROBSON, no evidence of obstructive uropathy. Likely prerenal from severe volume depletion and hypotension from sepsis. Continue aggressive IV fluids. Check urine studies, can straight cath for specimen. Hold ARB. Avoid nephrotoxic agents and hypotension. No urgent indication for RRT. Will follow closely. 2. Acid/Base: mixed metabolic acidosis and resp alkalosis. PH 7.45. hold bicarb  0.9 @ 125 ml/hour. 3. Incarcerated inguinal hernia  4. Hypotension  5. Sepsis  6. Lactic acidosis    Thank you for allowing me to participate in the care of this patient. Please feel free to call me if you have any questions.      Electronically signed by Kalpana Valdez DO on 4/25/22 at 5:22 PM EDT    [facility]

## 2022-04-25 NOTE — ED PROVIDER NOTES
[place] ENCOUNTER          Pt Name: Jass Suarez Sr. MRN: [id number]  Birthdate 1950  Date of evaluation: 4/25/2022  Treating Resident Physician: Jeovany Collazo MD  Supervising Physician: Dr. Jefferson Bañuelos       Chief Complaint   Patient presents with    Loss of Consciousness     History obtained from the patient. HISTORY OF PRESENT ILLNESS    HPI  Jass Suarez Sr. is a 66 y.o. male who presents to the emergency department for evaluation of syncope at home. He was showering at home and his wife was drying him off when she noticed that he began staring off into the distance, turned flush, and fell to the ground. He was not complaining of chest pain or shortness of breath at that time. He also had another episode of syncope a few days in [place] and EMS responded but he refused care and did not want to be brought to the hospital at that time. The patient has no other acute complaints at this time. REVIEW OF SYSTEMS   Review of Systems   Constitutional: Positive for activity change and appetite change. HENT: Negative. Eyes: Negative. Respiratory: Negative. Cardiovascular: Negative for chest pain. Gastrointestinal: Negative for abdominal pain, nausea and vomiting. Endocrine: Negative. Genitourinary: Negative. Musculoskeletal: Negative. Skin: Negative. Allergic/Immunologic: Negative. Neurological: Positive for syncope. Hematological: Negative. Psychiatric/Behavioral: Negative.           PAST MEDICAL AND SURGICAL HISTORY     Past Medical History:   Diagnosis Date    CAD (coronary artery disease)     [facility]ce[facility] vascular accident[facility] ([facility] [address]     Erectile dysfunction     GERD (gastroesophageal reflux disease) 07/2012    Gastro ulcer    Hyperlipidemia     Hypertension      Past Surgical History:   Procedure Laterality Date    CARDIAC CATHETERIZATION      COLONOSCOPY      ENDOSCOPY, COLON, DIAGNOSTIC      EYE SURGERY Left 2004    cataract    EYE SURGERY      INGUINAL HERNIA REPAIR Right 5/27/15    Dr. Radha Ramos 2011    Dr. Kayleigh Rodriguez Left 2003    Dr. Afshin Sargent  5/27/15    Dr. Elliot Gonzalez     Current Facility-Administered Medications:     piperacillin-tazobactam (ZOSYN) 3,375 mg in dextrose 5 % 50 mL IVPB (mini-bag), 3,375 mg, IntraVENous, Once, Karena Baptiste MD    vancomycin (VANCOCIN) 1,500 mg in dextrose 5 % 500 mL IVPB, 1,500 mg, IntraVENous, Once, Karena Baptiste MD    Current Outpatient Medications:     atorvastatin (LIPITOR) 20 MG tablet, TAKE 1 TABLET BY MOUTH DAILY, Disp: 90 tablet, Rfl: 3    COMBIGAN 0.2-0.5 % ophthalmic solution, Place 1 drop into the left eye every 12 hours, Disp: 5 mL, Rfl: 3    famotidine (PEPCID) 20 MG tablet, Take 1 tablet by mouth 2 times daily, Disp: 180 tablet, Rfl: 3    losartan (COZAAR) 50 MG tablet, Take 1 tablet by mouth daily, Disp: 90 tablet, Rfl: 3    metoprolol succinate (TOPROL XL) 25 MG extended release tablet, TAKE 1 TABLET BY MOUTH DAILY, Disp: 90 tablet, Rfl: 3    tamsulosin (FLOMAX) 0.4 MG capsule, Take 1 capsule by mouth daily, Disp: 90 capsule, Rfl: 3    lidocaine (LIDODERM) 5 %, Place 1 patch onto the skin daily 12 hours on, 12 hours off., Disp: 30 patch, Rfl: 0    fluocinonide (LIDEX) 0.05 % cream, APPLY EXTERNALLY TO THE AFFECTED AREA TWICE DAILY, Disp: 60 g, Rfl: 4    tadalafil (CIALIS) 20 MG tablet, TAKE 1 TABLET BY MOUTH EVERY DAY AS NEEDED, Disp: 18 tablet, Rfl: 5    aspirin 81 MG EC tablet, Take 1 tablet by mouth daily, Disp: 30 tablet, Rfl: 11    loratadine (CLARITIN) 10 MG tablet, TAKE 1 TABLET BY MOUTH DAILY, Disp: 90 tablet, Rfl: 3      SOCIAL HISTORY     Social History     Social History Narrative    Not on file     Social History     Tobacco Use    Smoking status: Current Every Day Smoker     Packs/day: 1.00 Years: 40.00     Pack years: 40.00     Types: Cigarettes    Smokeless tobacco: Never Used    Tobacco comment: printed to avs. 3/4 ppd since 2019   Vaping Use    Vaping Use: Never used   Substance Use Topics    Alcohol use: Yes    Drug use: Yes     Frequency: 7.0 times per week     Types: Marijuana (Weed)         ALLERGIES   No Known Allergies      FAMILY HISTORY     Family History   Problem Relation Age of Onset    Heart Disease Mother          PREVIOUS RECORDS   Previous records reviewed. PHYSICAL EXAM     ED Triage Vitals [04/25/22 1415]   BP Temp Temp Source Pulse Resp SpO2 Height Weight   127/72 97.2 °F (36.2 °C) Oral 98 20 95 % 5' 9\" (1.753 m) 150 lb (68 kg)     Initial vital signs and nursing assessment reviewed and normal. Body mass index is 22.15 kg/m². Pulsoximetry is normal per my interpretation. Additional Vital Signs:  Vitals:    04/25/22 1657   BP: 91/77   Pulse: 101   Resp: 22   Temp:    SpO2: 96%       Physical Exam  Vitals reviewed. Constitutional:       General: He is not in acute distress. Appearance: He is underweight. He is not toxic-appearing or diaphoretic. HENT:      Head: Normocephalic and atraumatic. Nose: Nose normal.   Eyes:      General: Vision grossly intact. Conjunctiva/sclera: Conjunctivae normal.      Pupils: Pupils are equal.   Cardiovascular:      Rate and Rhythm: Tachycardia present. Rhythm irregular. Heart sounds: Heart sounds are distant. Pulmonary:      Effort: Pulmonary effort is normal.      Breath sounds: Normal breath sounds. Chest:      Chest wall: No mass, lacerations or deformity. Abdominal:      General: Abdomen is flat. Palpations: Abdomen is soft. Tenderness: There is no abdominal tenderness. Musculoskeletal:      Cervical back: Normal range of motion. Right lower leg: No edema. Left lower leg: No edema. Neurological:      General: No focal deficit present. Mental Status: He is alert.       GCS: GCS eye subscore is 4. GCS verbal subscore is 5. GCS motor subscore is 6. Sensory: Sensation is intact. Motor: Motor function is intact. MEDICAL DECISION MAKING   Initial Assessment: This is a 69-year-old male coming in with syncope. Several cardiovascular risk factors present. On exam no external signs of trauma. Neuro exam is nonfocal.  Afebrile but there is tachycardic with an irregular rhythm. Differential diagnosis includes but is not limited to: ACS, sepsis, ROBSON, leukemia, septic shock, pneumonia, UTI, pyelonephritis. CT head unremarkable. EKG showing MAT. Lungs sound clear and CXR unremarakble. But labs are impressive. He has a significant leukocytosis and an anion gap metabolic acidosis so I am treating him for sepsis of unknown origin. Started fluids and broad sepctrum antibiotics. Renal function also significantly down and he has a mildly elevated trop (no chest pain). Nephrology aware and c/s placed. He is stable appearing now. 1. Sepsis of unknown origin.   Plan:    Labs   CT head non con   Cardiac w/u   IVF bolus   Broad spectrum antibiotics   Admit to hospitalist service        ED RESULTS   Laboratory results:  Labs Reviewed   CBC WITH AUTO DIFFERENTIAL - Abnormal; Notable for the following components:       Result Value    WBC 21.8 (*)     RBC 3.10 (*)     Hemoglobin 10.8 (*)     Hematocrit 36.6 (*)     .1 (*)     MCH 34.8 (*)     MCHC 29.5 (*)     RDW-SD 54.6 (*)     Platelets 679 (*)     All other components within normal limits   BASIC METABOLIC PANEL - Abnormal; Notable for the following components:    Chloride 93 (*)     CO2 16 (*)     Glucose 111 (*)      (*)     CREATININE 11.2 (*)     All other components within normal limits   TROPONIN - Abnormal; Notable for the following components:    Troponin T 0.047 (*)     All other components within normal limits   HEPATIC FUNCTION PANEL - Abnormal; Notable for the following components:    Albumin 2.9 (*) Total Bilirubin 3.1 (*)     Bilirubin, Direct 0.9 (*)     All other components within normal limits   ANION GAP - Abnormal; Notable for the following components:    Anion Gap 29.0 (*)     All other components within normal limits   GLOMERULAR FILTRATION RATE, ESTIMATED - Abnormal; Notable for the following components:    Est, Glom Filt Rate 5 (*)     All other components within normal limits   OSMOLALITY - Abnormal; Notable for the following components:    Osmolality Calc 339.7 (*)     All other components within normal limits   CULTURE, BLOOD 1   CULTURE, BLOOD 2   SCAN OF BLOOD SMEAR   URINALYSIS WITH REFLEX TO CULTURE   LACTATE, SEPSIS   LACTATE, SEPSIS       Radiologic studies results:  CT HEAD WO CONTRAST   Final Result       1. Stable CT scan of the brain, no interval change since previous MRI scan dated 10 Melody 2019.   2. Mild atrophy and dilatation of the third and lateral ventricles. 3. Probable ischemic changes in the white matter, left basal ganglia and in the bernadette. 4. Calcification the cavernous segments of both internal carotid arteries. 5. Increased density in the external auditory canals which may represent cerumen bilaterally. **This report has been created using voice recognition software. It may contain minor errors which are inherent in voice recognition technology. **      Final report electronically signed by DR Dorothy Lion on 4/25/2022 4:01 PM      XR CHEST PORTABLE   Final Result   1. Normal heart size. No effusion. 2. Persistent mild atelectatic/fibrotic stranding retrocardiac region left lung base. **This report has been created using voice recognition software. It may contain minor errors which are inherent in voice recognition technology. **      Final report electronically signed by Dr. Denice Arellano on 4/25/2022 3:05 PM          ED Medications administered this visit:   Medications   piperacillin-tazobactam (ZOSYN) 3,375 mg in dextrose 5 % 50 mL IVPB (mini-bag) (has no administration in time range)   vancomycin (VANCOCIN) 1,500 mg in dextrose 5 % 500 mL IVPB (has no administration in time range)   0.9 % sodium chloride bolus (0 mLs IntraVENous Stopped 4/25/22 1638)         ED COURSE     ED Course as of 04/25/22 1724   Mon Apr 25, 2022   1631 XR CHEST PORTABLE  IMPRESSION:  1. Normal heart size. No effusion. 2. Persistent mild atelectatic/fibrotic stranding retrocardiac region left lung base [JT]   1631 CT HEAD WO CONTRAST  IMPRESSION:     1. Stable CT scan of the brain, no interval change since previous MRI scan dated 10 Melody 2019.  2. Mild atrophy and dilatation of the third and lateral ventricles. 3. Probable ischemic changes in the white matter, left basal ganglia and in the bernadette. 4. Calcification the cavernous segments of both internal carotid arteries. 5. Increased density in the external auditory canals which may represent cerumen bilaterally. [JT]   1642 Troponin(!):    Troponin T 0.047(!)  Elevated [JT]   1657 Differential Type: see below [JT]   1657 CBC with Auto Differential(!):    WBC 21.8(!)   RBC 3.10(!)   Hemoglobin Quant 10.8(!)   Hematocrit 36.6(!)   .1(!)   MCH 34.8(!)   MCHC 29.5(!)   RDW-CV 12.6   RDW-SD 54.6(!)   Platelet Count 293(!)   Differential Type see below   Platelet Estimate INCREASED  CBC showing significant leukocytosis and mild anemia. Also thrombocytosis [JT]   1700 EKG 12 Lead  EKG showing several PVCs and multifocal atrial tachycardia. [JT]   4813 Basic Metabolic Panel(!!):    Sodium 138   Potassium 4.0   Chloride 93(!)   CO2 16(!)   GLUCOSE, FASTING,(!)   BUN,BUNPL 190(!)   Creatinine 11.2(!!)   CALCIUM, SERUM, 803137 8.9  BMP showing metabolic acidosis with anion gap as well as a very elevated creatinine compared to his baseline. Not hyperkalemic at this time and does not appear to be fluid overloaded. Therefore dialysis not indicated at this time.  [JT]      ED Course User Index  [JT] Urszula Singh, MD           MEDICATION CHANGES     New Prescriptions    No medications on file         FINAL DISPOSITION     Final diagnoses:   Sepsis associated hypotension ([facility] [address]     Condition: condition: stable  Dispo: Admit to med/surg floor      This transcription was electronically signed. Parts of this transcriptions may have been dictated by use of voice recognition software and electronically transcribed, and parts may have been transcribed with the assistance of an ED scribe. The transcription may contain errors not detected in proofreading. Please refer to my supervising physician's documentation if my documentation differs.     Electronically Signed: Nemesio Madrigal MD, 04/25/22, 5:24 PM          Nemesio Madrigal MD  Resident  04/25/22 3963

## 2022-04-25 NOTE — ED TRIAGE NOTES
Presents to ED with c/o loss of consciousness. Squad reports upon their arrival the patient was on the floor. Squad reports hypotension in route. Upon arrival to ED patient alert and oriented. Respirations easy and unlabored.

## 2022-04-25 NOTE — ED NOTES
ED nurse-to-nurse bedside report    Chief Complaint   Patient presents with    Loss of Consciousness      LOC: alert and orientated to name, place, date  Vital signs   Vitals:    04/25/22 1657 04/25/22 1742 04/25/22 1826 04/25/22 1850   BP: 91/77 (!) 109/59 (!) 105/56    Pulse: 101 102 97    Resp: 22 20 20 23   Temp:       TempSrc:       SpO2: 96% 94% 94% (!) 89%   Weight:       Height:          Pain:    Pain Interventions: none  Pain Goal: 0  Oxygen: No    Current needs required none   Telemetry: Yes  LDAs:   Peripheral IV 04/25/22 Right External Jugular (Active)       Peripheral IV 04/25/22 Left Hand (Active)   Site Assessment Clean, dry & intact 04/25/22 1740       Peripheral IV 04/25/22 Left Antecubital (Active)   Site Assessment Clean, dry & intact 04/25/22 1824     Continuous Infusions:    sodium bicarbonate infusion 125 mL/hr at 04/25/22 1816    sodium chloride       Mobility: Requires assistance * 2  Baker Fall Risk Score: Fall Risk 1/27/2022 1/18/2021 6/25/2019 5/11/2018 10/5/2016 5/21/2015   2 or more falls in past year? no no no no no no   Fall with injury in past year? no no no no no no       Report given to:  TESSA Alvarado RN  04/25/22 7912

## 2022-04-25 NOTE — ED PROVIDER NOTES
Transfer of Care Note:   Physician Signing out: Haley Hadley MD  Receiving Physician: Jack Hogue MD  Sign out time: 1700    Brief history:  Patient 43-year-old male chief complaint loss of consciousness earlier today. No chest pain or shortness of breath at this time. Had an episode of syncope few days ago in [place] but refused care. In the ED seen to have sepsis of unknown origin. Hypotension. Elevated Trope at 0.047. WBC 21.8. Lactate 2.4. Creatinine 11.2. EKG showing MAT. Items pending that need to be checked:  Pending admission    Tentative Impression of patient:  1. Watcher    Expected disposition of patient:  Pending results, admitted. Additional Assessment and results:   I have personally performed a face to face diagnostic evaluation on this patient. The patient's initial evaluation and plan have been discussed with the prior physician who initially evaluated the patient. Nursing Notes, Past Medical Hx, Past Surgical Hx, Social Hx, Allergies, vital signs and Family Hx were all reviewed. Vitals:    04/25/22 1914   BP: (!) 92/51   Pulse: 99   Resp: 22   Temp:    SpO2: 94%     Physical Exam  Constitutional:       Appearance: He is not diaphoretic. Comments: Emesis noted on patients beard, looks to be bilious   HENT:      Head: Normocephalic and atraumatic. Right Ear: Tympanic membrane and external ear normal.      Left Ear: Tympanic membrane and external ear normal.      Nose: Nose normal. No congestion or rhinorrhea. Mouth/Throat:      Pharynx: No oropharyngeal exudate or posterior oropharyngeal erythema. Eyes:      General: No scleral icterus. Right eye: No discharge. Left eye: No discharge. Extraocular Movements: Extraocular movements intact. Conjunctiva/sclera: Conjunctivae normal.      Pupils: Pupils are equal, round, and reactive to light. Neck:      Vascular: No carotid bruit.    Cardiovascular:      Rate and Rhythm: Normal rate and regular rhythm. Pulses: Normal pulses. Heart sounds: Normal heart sounds. No murmur heard. No friction rub. No gallop. Pulmonary:      Effort: Pulmonary effort is normal. No respiratory distress. Breath sounds: Normal breath sounds. No stridor. No wheezing. Chest:      Chest wall: No tenderness. Abdominal:      General: Abdomen is protuberant. Bowel sounds are normal. There is distension. Palpations: Abdomen is rigid. There is no mass. Tenderness: There is abdominal tenderness in the right lower quadrant. There is no guarding or rebound. Hernia: A hernia is present. Hernia is present in the right inguinal area. Musculoskeletal:         General: No swelling, tenderness, deformity or signs of injury. Normal range of motion. Cervical back: Normal range of motion and neck supple. No rigidity or tenderness. Right lower leg: No edema. Left lower leg: No edema. Lymphadenopathy:      Cervical: No cervical adenopathy. Skin:     General: Skin is warm and dry. Capillary Refill: Capillary refill takes less than 2 seconds. Coloration: Skin is not jaundiced. Findings: No bruising, erythema, lesion or rash. Neurological:      General: No focal deficit present. Mental Status: He is alert and oriented to person, place, and time. Motor: No weakness. Psychiatric:         Mood and Affect: Mood normal.         Behavior: Behavior normal.         Thought Content:  Thought content normal.       Labs Reviewed   CBC WITH AUTO DIFFERENTIAL - Abnormal; Notable for the following components:       Result Value    WBC 21.8 (*)     RBC 3.10 (*)     Hemoglobin 10.8 (*)     Hematocrit 36.6 (*)     .1 (*)     MCH 34.8 (*)     MCHC 29.5 (*)     RDW-SD 54.6 (*)     Platelets 307 (*)     Segs Absolute 19.7 (*)     Lymphocytes Absolute 0.8 (*)     Immature Grans (Abs) 0.22 (*)     All other components within normal limits   BASIC METABOLIC PANEL - Abnormal; Notable for the following components:    Chloride 93 (*)     CO2 16 (*)     Glucose 111 (*)      (*)     CREATININE 11.2 (*)     All other components within normal limits   TROPONIN - Abnormal; Notable for the following components:    Troponin T 0.047 (*)     All other components within normal limits   HEPATIC FUNCTION PANEL - Abnormal; Notable for the following components:    Albumin 2.9 (*)     Total Bilirubin 3.1 (*)     Bilirubin, Direct 0.9 (*)     All other components within normal limits   LACTATE, SEPSIS - Abnormal; Notable for the following components:    Lactic Acid, Sepsis 2.4 (*)     All other components within normal limits   LACTATE, SEPSIS - Abnormal; Notable for the following components:    Lactic Acid, Sepsis 2.2 (*)     All other components within normal limits   ANION GAP - Abnormal; Notable for the following components:    Anion Gap 29.0 (*)     All other components within normal limits   GLOMERULAR FILTRATION RATE, ESTIMATED - Abnormal; Notable for the following components:    Est, Glom Filt Rate 5 (*)     All other components within normal limits   OSMOLALITY - Abnormal; Notable for the following components:    Osmolality Calc 339.7 (*)     All other components within normal limits   BLOOD GAS, ARTERIAL - Abnormal; Notable for the following components:    PCO2 31 (*)     PO2 59 (*)     HCO3 22 (*)     All other components within normal limits   CULTURE, BLOOD 1   CULTURE, BLOOD 2   SCAN OF BLOOD SMEAR   URINALYSIS WITH REFLEX TO CULTURE   URINALYSIS WITH MICROSCOPIC   ELECTROLYTES URINE RANDOM   PROTEIN / CREATININE RATIO, URINE   BASIC METABOLIC PANEL W/ REFLEX TO MG FOR LOW K   CBC WITH AUTO DIFFERENTIAL   TROPONIN   TROPONIN   BASIC METABOLIC PANEL         Medications   sodium chloride flush 0.9 % injection 5-40 mL (has no administration in time range)   sodium chloride flush 0.9 % injection 5-40 mL (has no administration in time range)   0.9 % sodium chloride infusion (has no administration in time range)   heparin (porcine) injection 5,000 Units ( SubCUTAneous Automatically Held 4/28/22 2100)   ondansetron (ZOFRAN-ODT) disintegrating tablet 4 mg (has no administration in time range)     Or   ondansetron (ZOFRAN) injection 4 mg (has no administration in time range)   polyethylene glycol (GLYCOLAX) packet 17 g (has no administration in time range)   acetaminophen (TYLENOL) tablet 650 mg (has no administration in time range)     Or   acetaminophen (TYLENOL) suppository 650 mg (has no administration in time range)   nicotine (NICODERM CQ) 21 MG/24HR 1 patch (has no administration in time range)   piperacillin-tazobactam (ZOSYN) 2,250 mg in dextrose 5 % 50 mL IVPB (mini-bag) (has no administration in time range)   famotidine (PEPCID) 10 mg in sodium chloride (PF) 10 mL injection (has no administration in time range)   0.9 % sodium chloride infusion (has no administration in time range)   0.9 % sodium chloride bolus (0 mLs IntraVENous Stopped 4/25/22 1638)   piperacillin-tazobactam (ZOSYN) 3,375 mg in dextrose 5 % 50 mL IVPB (mini-bag) (0 mg IntraVENous Stopped 4/25/22 1810)   0.9 % sodium chloride bolus (0 mLs IntraVENous Stopped 4/25/22 1827)         CT ABDOMEN PELVIS WO CONTRAST Additional Contrast? None   Final Result   Impression:   Right inguinal hernia containing small bowel. This is causing a small    bowel obstruction. There are several gas collections within the right    inguinal hernia. Ischemic or perforated bowel could have this appearance. Mild to moderate patchy consolidation bilateral lower lobes. This could    represent aspiration or pneumonia. Prominent thickening of the distal esophagus and gastroesophageal    junction. Neoplasm not excluded. Recommend direct visualization.       This document has been electronically signed by: Quincy Veliz MD on    04/25/2022 07:20 PM      All CTs at this facility use dose modulation techniques and iterative reconstructions, and/or weight-based dosing   when appropriate to reduce radiation to a low as reasonably achievable. US RENAL COMPLETE   Final Result   Impression:   Multifocal simple bilateral renal cyst. Echogenic bilateral renal    parenchyma. This document has been electronically signed by: Saphpire Kraus MD on    04/25/2022 06:30 PM      CT HEAD WO CONTRAST   Final Result       1. Stable CT scan of the brain, no interval change since previous MRI scan dated 10 Melody 2019.   2. Mild atrophy and dilatation of the third and lateral ventricles. 3. Probable ischemic changes in the white matter, left basal ganglia and in the bernadette. 4. Calcification the cavernous segments of both internal carotid arteries. 5. Increased density in the external auditory canals which may represent cerumen bilaterally. **This report has been created using voice recognition software. It may contain minor errors which are inherent in voice recognition technology. **      Final report electronically signed by DR Tomasa Mccarthy on 4/25/2022 4:01 PM      XR CHEST PORTABLE   Final Result   1. Normal heart size. No effusion. 2. Persistent mild atelectatic/fibrotic stranding retrocardiac region left lung base. **This report has been created using voice recognition software. It may contain minor errors which are inherent in voice recognition technology. **      Final report electronically signed by Dr. Arturo Chamberlain on 4/25/2022 3:05 PM            ED Course as of 04/25/22 2014 Mon Apr 25, 2022   1631 XR CHEST PORTABLE  IMPRESSION:  1. Normal heart size. No effusion. 2. Persistent mild atelectatic/fibrotic stranding retrocardiac region left lung base [JT]   1631 CT HEAD WO CONTRAST  IMPRESSION:     1. Stable CT scan of the brain, no interval change since previous MRI scan dated 10 Mel[address] 2019.  2. Mild atrophy and dilatation of the third and lateral ventricles.   3. Probable ischemic changes in the white matter, left basal ganglia and in the bernadette. 4. Calcification the cavernous segments of both internal carotid arteries. 5. Increased density in the external auditory canals which may represent cerumen bilaterally. [JT]   1642 Troponin(!):    Troponin T 0.047(!)  Elevated [JT]   1657 Differential Type: see below [JT]   1657 CBC with Auto Differential(!):    WBC 21.8(!)   RBC 3.10(!)   Hemoglobin Quant 10.8(!)   Hematocrit 36.6(!)   .1(!)   MCH 34.8(!)   MCHC 29.5(!)   RDW-CV 12.6   RDW-SD 54.6(!)   Platelet Count 351(!)   Differential Type see below   Platelet Estimate INCREASED  CBC showing significant leukocytosis and mild anemia. Also thrombocytosis [JT]   1700 EKG 12 Lead  EKG showing several PVCs and multifocal atrial tachycardia. [JT]   4327 Basic Metabolic Panel(!!):    Sodium 138   Potassium 4.0   Chloride 93(!)   CO2 16(!)   GLUCOSE, FASTING,(!)   BUN,BUNPL 190(!)   Creatinine 11.2(!!)   CALCIUM, SERUM, 274129 8.9  BMP showing metabolic acidosis with anion gap as well as a very elevated creatinine compared to his baseline. Not hyperkalemic at this time and does not appear to be fluid overloaded. Therefore dialysis not indicated at this time. [JT]   2013 CT ABDOMEN PELVIS WO CONTRAST Additional Contrast? None  Impression:  Right inguinal hernia containing small bowel. This is causing a small   bowel obstruction. There are several gas collections within the right   inguinal hernia. Ischemic or perforated bowel could have this appearance.     Mild to moderate patchy consolidation bilateral lower lobes. This could   represent aspiration or pneumonia.     Prominent thickening of the distal esophagus and gastroesophageal   junction. Neoplasm not excluded. Recommend direct visualization.  [EL]      ED Course User Index  [EL] Crow Barney MD  [JT] Alf Dobbins MD       Further MDM and disposition:   Assessment:   78-year-old male with chief complaint of loss of consciousness, sepsis of unknown origin. Zosyn and vanc ordered. Admitted to hospitalist.  Hospitalist came down to evaluate patient, noted distended abdomen and consulted surgery. Surgery came down to evaluate patient and ordered CT abdomen pelvis Noncon. Ct showing: Right inguinal hernia containing small bowel causing a small bowel obstruction. There are several gas collections within the right   inguinal hernia. Ischemic or perforated bowel could have this appearance. Mild to moderate patchy consolidation bilateral lower lobes. This could   represent aspiration or pneumonia. Prominent thickening of the distal esophagus and gastroesophageal   1. junction. Neoplasm not excluded. Recommend direct visualization. Plan:    Max cath    NG tube   Patient to surgery    Final diagnoses:   Sepsis associated hypotension ([facility] [address]     New Prescriptions    No medications on file       Condition: condition: fair  Dispo: Admit to surgery    This transcription was electronically signed. It was dictated by use of voice recognition software and electronically transcribed. The transcription may contain errors not detected in proofreading.       Ladarius Lucas MD  Resident  04/25/22 2014

## 2022-04-25 NOTE — H&P
Hospitalist - History & Physical      Patient: Mercedes Suarez Sr. Unit/Bed:02/002A  YOB: 1950  MRN: [id number]   Acct: [de-identified]   PCP: Nancy Yu MD      Assessment and Plan:        1. Anuric acute renal failure  a. Nephrology consulted. Ultrasound of kidneys and urinalysis pending. Order Max  2. Right inguinal hernia- suspect incarcerated  a. Stat CT of the abdomen and pelvis without contrast given #1. General surgery consulted  3. Anion gap metabolic acidosis-suspect secondary to uremia and lactic acidosis  a. Nephrology on board and initiated bicarb   b. Order ABG  4. Elevated troponin-   a. Continue to trend x 3   b. Tele  5. Multifocal atrial tachycardia   a. Repeat EKG in AM or if rhythm change  6. Sepsis-suspect due to intra-abdominal infection  a. IV Zosyn. Continue with fluids  b. Repeat lactic acid every 2 hours  7. Recent Rib Fracture-   a. CXR performed in ED negative for pneumothorax or effusion  8. Hypertension-  Home meds of losartan 50mg and Metoprolol 25mg XR on hold   9. BPH- possibly contributing to #1. Home meds of tamsulosin and tadalafil- on hold. 10. Glaucoma  11. Tobacco abuse  12. History of CVA-  15 years ago- unknown etiology and treatment  13. GERD- takes pepcid 20mg at home   a. Initiate 10mg IV once daily     CC:  Syncope  HPI: Patient is a 66year old [facility] American man with PMHx of distant CVA, BPH, HTN, GERD, and Glaucoma who presents to the ED after syncopal episode x 2. Wife is primarly giving the history and she states that approximately 14 days ago he fell in the yard while pulling weeds and broke his ribs. He came to [facility] at that time and was discharged with pain medication. About 7 days ago, he presented again to [facility] with complaints of right lower abdomen bulge. He was diagnosed with inguinal hernia and recommended to follow up OP which was scheduled for tomorrow.      Wife states that the hernia has gotten significantly worse over the last 5 days. Syncope occurred first when they were in [facility] several days ago. They had been sitting for a while and he started to feel nauseous and hot so he got up to go outside and passed out (described as legs giving out from him). Denies hitting his head. This morning, wife was getting him out of the bathtub and he just \"slumped over\". Denies hitting his head on this occasion as well. Wife states that he has vomited 3 times over the last 24 hours and that is brown/yellow in color. She states he has not been having BM and has been peeing less. Also notes he has been weak and sleeping \"20 hours a day\". Initial evaluation in the ED revealed  Creatinine of 11 with BUN of 190. Bicarb low at 16 with a gap of 29. Serum osmol 339. Initial troponin slightly elevated- will continue to trend. Lactic acid elevated at 2.4. Leukocytosis with WBC of 21.8    CT abdomen and pelvis ordered and pending   Renal US ordered and pending. ROS: Review of Systems   Constitutional: Positive for activity change (lethargic), appetite change (decreased appetite), fatigue and unexpected weight change. Negative for fever. HENT: Negative. Eyes: Negative. Respiratory: Positive for cough. Negative for chest tightness, shortness of breath and wheezing. Cardiovascular: Negative for chest pain, palpitations and leg swelling. Gastrointestinal: Positive for abdominal pain, nausea and vomiting. Negative for diarrhea. Endocrine: Negative for cold intolerance, polydipsia and polyphagia. Genitourinary: Positive for decreased urine volume, difficulty urinating and frequency. Negative for enuresis and hematuria. Musculoskeletal: Negative for arthralgias. Skin: Positive for color change (yellowing of skin and eyes) and pallor. Neurological: Positive for syncope, weakness, light-headedness and headaches. Psychiatric/Behavioral: Positive for confusion and decreased concentration.       PMH:    Past Medical History:   Diagnosis Date    CAD (coronary artery disease)     [facility]cere[facility] vascular accident) ([facility] [address]     Erectile dysfunction     GERD (gastroesophageal reflux disease) 07/2012    Gastro ulcer    Hyperlipidemia     Hypertension      SHX:    Social History     Socioeconomic History    Marital status:      Spouse name: Gus Gold Number of children: 3    Years of education: Not on file    Highest education level: Not on file   Occupational History    Occupation: disability-[employer]   Tobacco Use    Smoking status: Current Every Day Smoker     Packs/day: 1.00     Years: 40.00     Pack years: 40.00     Types: Cigarettes    Smokeless tobacco: Never Used    Tobacco comment: printed to avs. 3/4 ppd since 2019   Vaping Use    Vaping Use: Never used   Substance and Sexual Activity    Alcohol use: Yes    Drug use: Yes     Frequency: 7.0 times per week     Types: Marijuana Evaristo Bilberry)    Sexual activity: Never   Other Topics Concern    Not on file   Social History Narrative    Not on file     Social Determinants of Health     Financial Resource Strain: Low Risk     Difficulty of Paying Living Expenses: Not hard at all   Food Insecurity: No Food Insecurity    Worried About [address] [facility] in the Last Year: Never true    [facility] in the Last Year: Never true   Transportation Needs:     Lack of Transportation (Medical): Not on file    Lack of Transportation (Non-Medical):  Not on file   Physical Activity:     Days of Exercise per Week: Not on file    Minutes of Exercise per Session: Not on file   Stress:     Feeling of Stress : Not on file   Social Connections:     Frequency of Communication with Friends and Family: Not on file    Frequency of Social Gatherings with Friends and Family: Not on file    Attends Jehovah's witness Services: Not on file    Active Member of Clubs or Organizations: Not on file    Attends Club or Organization Meetings: Not on file    Marital Status: Not on file Intimate Partner Violence:     Fear of Current or Ex-Partner: Not on file    Emotionally Abused: Not on file    Physically Abused: Not on file    Sexually Abused: Not on file   Housing Stability:     Unable to Pay for Housing in the Last Year: Not on file    Number of Places Lived in the Last Year: Not on file    Unstable Housing in the Last Year: Not on file     FHX:   Family History   Problem Relation Age of Onset    Heart Disease Mother      Allergies: No Known Allergies  Medications:     sodium bicarbonate infusion 125 mL/hr at 04/25/22 1816    sodium chloride        vancomycin  1,500 mg IntraVENous Once    sodium chloride flush  5-40 mL IntraVENous 2 times per day    heparin (porcine)  5,000 Units SubCUTAneous TID    sodium chloride  1,000 mL IntraVENous Once    nicotine  1 patch TransDERmal Q24H    piperacillin-tazobactam  2,250 mg IntraVENous Q8H     sodium chloride flush, 5-40 mL, PRN  sodium chloride, , PRN  ondansetron, 4 mg, Q8H PRN   Or  ondansetron, 4 mg, Q6H PRN  polyethylene glycol, 17 g, Daily PRN  acetaminophen, 650 mg, Q6H PRN   Or  acetaminophen, 650 mg, Q6H PRN        Labs:   Recent Results (from the past 24 hour(s))   EKG 12 Lead    Collection Time: 04/25/22  2:08 PM   Result Value Ref Range    Ventricular Rate 113 BPM    Atrial Rate 163 BPM    QRS Duration 82 ms    Q-T Interval 364 ms    QTc Calculation (Bazett) 499 ms    P Axis 71 degrees    R Axis 67 degrees    T Axis 48 degrees   CBC with Auto Differential    Collection Time: 04/25/22  3:47 PM   Result Value Ref Range    WBC 21.8 (H) 4.8 - 10.8 thou/mm3    RBC 3.10 (L) 4.70 - 6.10 mill/mm3    Hemoglobin 10.8 (L) 14.0 - 18.0 gm/dl    Hematocrit 36.6 (L) 42.0 - 52.0 %    .1 (H) 80.0 - 94.0 fL    MCH 34.8 (H) 26.0 - 33.0 pg    MCHC 29.5 (L) 32.2 - 35.5 gm/dl    RDW-CV 12.6 11.5 - 14.5 %    RDW-SD 54.6 (H) 35.0 - 45.0 fL    Platelets 695 (H) 679 - 400 thou/mm3    Differential Type see below     Platelet Estimate INCREASED Adequate   Basic Metabolic Panel    Collection Time: 04/25/22  3:47 PM   Result Value Ref Range    Sodium 138 135 - 145 meq/L    Potassium 4.0 3.5 - 5.2 meq/L    Chloride 93 (L) 98 - 111 meq/L    CO2 16 (L) 23 - 33 meq/L    Glucose 111 (H) 70 - 108 mg/dL     (H) 7 - 22 mg/dL    CREATININE 11.2 (HH) 0.4 - 1.2 mg/dL    Calcium 8.9 8.5 - 10.5 mg/dL   Troponin    Collection Time: 04/25/22  3:47 PM   Result Value Ref Range    Troponin T 0.047 (A) ng/ml   Hepatic Function Panel    Collection Time: 04/25/22  3:47 PM   Result Value Ref Range    Albumin 2.9 (L) 3.5 - 5.1 g/dL    Total Bilirubin 3.1 (H) 0.3 - 1.2 mg/dL    Bilirubin, Direct 0.9 (H) 0.0 - 0.3 mg/dL    Alkaline Phosphatase 106 38 - 126 U/L    AST 28 5 - 40 U/L    ALT 19 11 - 66 U/L    Total Protein 6.9 6.1 - 8.0 g/dL   Scan of Blood Smear    Collection Time: 04/25/22  3:47 PM   Result Value Ref Range    SCAN OF BLOOD SMEAR see below    Anion Gap    Collection Time: 04/25/22  3:47 PM   Result Value Ref Range    Anion Gap 29.0 (H) 8.0 - 16.0 meq/L   Glomerular Filtration Rate, Estimated    Collection Time: 04/25/22  3:47 PM   Result Value Ref Range    Est, Glom Filt Rate 5 (A) ml/min/1.73m2   Osmolality    Collection Time: 04/25/22  3:47 PM   Result Value Ref Range    Osmolality Calc 339.7 (H) 275.0 - 300.0 mOsmol/kg   Lactate, Sepsis    Collection Time: 04/25/22  5:30 PM   Result Value Ref Range    Lactic Acid, Sepsis 2.4 (H) 0.5 - 1.9 mmol/L       Radiology:  CT HEAD WO CONTRAST    Result Date: 4/25/2022  PROCEDURE: CT HEAD WO CONTRAST CLINICAL INFORMATION: syncope, fall. COMPARISON: MRI scan of the brain dated 10 Melody 2019. TECHNIQUE: Noncontrast 5 mm axial images were obtained through the brain. Sagittal and coronal reconstructions were obtained. All CT scans at this facility use dose modulation, iterative reconstruction, and/or weight-based dosing when appropriate to reduce radiation dose to as low as reasonably achievable.  FINDINGS: There is mild atrophy and dilatation of the third and lateral ventricles. There is diminished attenuation in the white matter, in the left basal ganglia and in the bernadette most likely representing ischemic changes. There is calcification the cavernous segments of both internal carotid arteries. There is no hemorrhage. There are no intra-or extra-axial collections. There is no  midline shift or mass effect. . The paranasal sinuses and mastoid air cells are normally aerated. There is no suspicious calvarial abnormality. There is increased density in the external auditory canals bilaterally. This may represent cerumen. 1. Stable CT scan of the brain, no interval change since previous MRI scan dated 10 Melody 2019. 2. Mild atrophy and dilatation of the third and lateral ventricles. 3. Probable ischemic changes in the white matter, left basal ganglia and in the bernadette. 4. Calcification the cavernous segments of both internal carotid arteries. 5. Increased density in the external auditory canals which may represent cerumen bilaterally. **This report has been created using voice recognition software. It may contain minor errors which are inherent in voice recognition technology. ** Final report electronically signed by DR Daphne Ly on 4/25/2022 4:01 PM    XR CHEST PORTABLE    Result Date: 4/25/2022  PROCEDURE: XR CHEST PORTABLE CLINICAL INFORMATION: syncope COMPARISON: 4/10/2022 TECHNIQUE: A single mobile view of the chest was obtained. 1. Normal heart size. No effusion. 2. Persistent mild atelectatic/fibrotic stranding retrocardiac region left lung base. **This report has been created using voice recognition software. It may contain minor errors which are inherent in voice recognition technology. ** Final report electronically signed by Dr. Renetta Cleveland on 4/25/2022 3:05 PM        Vital Signs: Blood pressure (!) 109/59, pulse 102, temperature 97.2 °F (36.2 °C), temperature source Oral, resp.  rate 20, height 5' 9\" (1.753 m), weight 150 lb (68 kg), SpO2 94 %. Physical Exam  Constitutional:       General: He is not in acute distress. Appearance: He is ill-appearing and toxic-appearing. He is not diaphoretic. HENT:      Mouth/Throat:      Mouth: Mucous membranes are dry. Eyes:      General: Scleral icterus present. Cardiovascular:      Rate and Rhythm: Regular rhythm. Tachycardia present. Heart sounds: Murmur (2/6 LUSB systolic) heard. Pulmonary:      Effort: Pulmonary effort is normal.      Breath sounds: Normal breath sounds. No wheezing. Abdominal:      Tenderness: There is abdominal tenderness (lower quadrants). There is guarding and rebound. Hernia: A hernia (large 6cm indurated, erythematous, and warm right inguinal hernia) is present. Genitourinary:     Penis: Normal.       Comments: Unilateral testes  Musculoskeletal:      Cervical back: No tenderness. Neurological:      Mental Status: He is disoriented. Motor: Weakness present. Comments: Alert to person and place  GCS 14          Data: (All radiographs, tracings, PFTs, and imaging are personally viewed and interpreted unless otherwise noted).          Electronically signed by  Nolberto Tenorio PA-C

## 2022-04-26 PROBLEM — N17.9 ACUTE KIDNEY INJURY (HCC): Status: ACTIVE | Noted: 2022-04-26

## 2022-04-26 PROBLEM — J96.01 ACUTE RESPIRATORY FAILURE WITH HYPOXIA (HCC): Status: ACTIVE | Noted: 2022-04-26

## 2022-04-26 PROBLEM — E87.6 HYPOKALEMIA: Status: ACTIVE | Noted: 2022-04-26

## 2022-04-26 PROBLEM — A41.9 SEPTIC SHOCK (HCC): Status: ACTIVE | Noted: 2022-04-26

## 2022-04-26 PROBLEM — N19 RENAL FAILURE: Status: RESOLVED | Noted: 2022-04-25 | Resolved: 2022-04-26

## 2022-04-26 PROBLEM — R55 SYNCOPE: Status: ACTIVE | Noted: 2022-04-26

## 2022-04-26 PROBLEM — J18.9 SEVERE PNEUMONIA: Status: ACTIVE | Noted: 2022-04-26

## 2022-04-26 PROBLEM — R65.21 SEPTIC SHOCK (HCC): Status: ACTIVE | Noted: 2022-04-26

## 2022-04-26 PROBLEM — E88.09 HYPOALBUMINEMIA: Status: ACTIVE | Noted: 2022-04-26

## 2022-04-26 PROBLEM — K63.1 SMALL BOWEL PERFORATION (HCC): Status: ACTIVE | Noted: 2022-04-26

## 2022-04-26 PROBLEM — E43 SEVERE MALNUTRITION (HCC): Chronic | Status: ACTIVE | Noted: 2022-04-26

## 2022-04-26 PROBLEM — D53.9 ANEMIA, MACROCYTIC: Status: ACTIVE | Noted: 2022-04-26

## 2022-04-26 PROBLEM — K40.30 INCARCERATED RIGHT INGUINAL HERNIA: Status: ACTIVE | Noted: 2022-04-26

## 2022-04-26 LAB
ACINETOBACTER CALCOACETICUS-BAUMANNII BY PCR: NOT DETECTED
ADENOVIRUS BY PCR: NOT DETECTED
ALBUMIN SERPL-MCNC: 2.7 G/DL (ref 3.5–5.1)
ALP BLD-CCNC: 77 U/L (ref 38–126)
ALT SERPL-CCNC: 17 U/L (ref 11–66)
ANION GAP SERPL CALCULATED.3IONS-SCNC: 16 MEQ/L (ref 8–16)
ANION GAP SERPL CALCULATED.3IONS-SCNC: 16 MEQ/L (ref 8–16)
ANION GAP SERPL CALCULATED.3IONS-SCNC: 18 MEQ/L (ref 8–16)
AST SERPL-CCNC: 28 U/L (ref 5–40)
BACTERIA: ABNORMAL
BILIRUB SERPL-MCNC: 3.9 MG/DL (ref 0.3–1.2)
BILIRUBIN URINE: NEGATIVE
BLOOD, URINE: ABNORMAL
BUN BLDV-MCNC: 125 MG/DL (ref 7–22)
BUN BLDV-MCNC: 147 MG/DL (ref 7–22)
BUN BLDV-MCNC: 149 MG/DL (ref 7–22)
CALCIUM IONIZED: 1.12 MMOL/L (ref 1.12–1.32)
CALCIUM SERPL-MCNC: 8.1 MG/DL (ref 8.5–10.5)
CALCIUM SERPL-MCNC: 8.2 MG/DL (ref 8.5–10.5)
CALCIUM SERPL-MCNC: 8.9 MG/DL (ref 8.5–10.5)
CASTS: ABNORMAL /LPF
CHARACTER, URINE: CLEAR
CHLAMYDIA PNEUMONIAE BY PCR: NOT DETECTED
CHLORIDE BLD-SCNC: 104 MEQ/L (ref 98–111)
CHLORIDE BLD-SCNC: 104 MEQ/L (ref 98–111)
CHLORIDE BLD-SCNC: 110 MEQ/L (ref 98–111)
CHLORIDE, URINE: 71 MEQ/L
CO2: 20 MEQ/L (ref 23–33)
CO2: 20 MEQ/L (ref 23–33)
CO2: 23 MEQ/L (ref 23–33)
COLOR: YELLOW
CORTISOL: 71.88 UG/DL
CREAT SERPL-MCNC: 6 MG/DL (ref 0.4–1.2)
CREAT SERPL-MCNC: 7.8 MG/DL (ref 0.4–1.2)
CREAT SERPL-MCNC: 8.3 MG/DL (ref 0.4–1.2)
CREATININE URINE: 48.4 MG/DL
CRYSTALS: ABNORMAL
EKG ATRIAL RATE: 106 BPM
EKG ATRIAL RATE: 89 BPM
EKG P AXIS: 84 DEGREES
EKG P AXIS: 85 DEGREES
EKG P-R INTERVAL: 132 MS
EKG P-R INTERVAL: 132 MS
EKG Q-T INTERVAL: 342 MS
EKG Q-T INTERVAL: 350 MS
EKG QRS DURATION: 92 MS
EKG QRS DURATION: 92 MS
EKG QTC CALCULATION (BAZETT): 425 MS
EKG QTC CALCULATION (BAZETT): 454 MS
EKG R AXIS: 50 DEGREES
EKG R AXIS: 52 DEGREES
EKG T AXIS: 46 DEGREES
EKG T AXIS: 69 DEGREES
EKG VENTRICULAR RATE: 106 BPM
EKG VENTRICULAR RATE: 89 BPM
ENTEROBACTER CLOACAE COMPLEX BY PCR: NOT DETECTED
EPITHELIAL CELLS, UA: ABNORMAL /HPF
ERYTHROCYTE [DISTWIDTH] IN BLOOD BY AUTOMATED COUNT: 12.6 % (ref 11.5–14.5)
ERYTHROCYTE [DISTWIDTH] IN BLOOD BY AUTOMATED COUNT: 12.6 % (ref 11.5–14.5)
ERYTHROCYTE [DISTWIDTH] IN BLOOD BY AUTOMATED COUNT: 48.5 FL (ref 35–45)
ERYTHROCYTE [DISTWIDTH] IN BLOOD BY AUTOMATED COUNT: 48.8 FL (ref 35–45)
ESCHERICHIA COLI BY PCR: DETECTED
GLUCOSE BLD-MCNC: 127 MG/DL (ref 70–108)
GLUCOSE BLD-MCNC: 129 MG/DL (ref 70–108)
GLUCOSE BLD-MCNC: 92 MG/DL (ref 70–108)
GLUCOSE, URINE: NEGATIVE MG/DL
HAEMOPHILUS INFLUENZAE BY PCR: DETECTED
HCT VFR BLD CALC: 27.6 % (ref 42–52)
HCT VFR BLD CALC: 28.3 % (ref 42–52)
HEMOGLOBIN: 9 GM/DL (ref 14–18)
HEMOGLOBIN: 9.3 GM/DL (ref 14–18)
INFLUENZA A BY PCR: NOT DETECTED
INFLUENZA B BY PCR: NOT DETECTED
KETONES, URINE: NEGATIVE
KLEBSIELLA AEROGENES BY PCR: NOT DETECTED
KLEBSIELLA OXYTOCA BY PCR: NOT DETECTED
KLEBSIELLA PNEUMONIAE GROUP BY PCR: NOT DETECTED
LEGIONELLA PNEUMOPHILIA BY PCR: NOT DETECTED
LEUKOCYTE ESTERASE, URINE: ABNORMAL
LV EF: 50 %
LVEF MODALITY: NORMAL
MAGNESIUM: 1.7 MG/DL (ref 1.6–2.4)
MAGNESIUM: 1.8 MG/DL (ref 1.6–2.4)
MCH RBC QN AUTO: 34.4 PG (ref 26–33)
MCH RBC QN AUTO: 34.7 PG (ref 26–33)
MCHC RBC AUTO-ENTMCNC: 32.6 GM/DL (ref 32.2–35.5)
MCHC RBC AUTO-ENTMCNC: 32.9 GM/DL (ref 32.2–35.5)
MCV RBC AUTO: 105.3 FL (ref 80–94)
MCV RBC AUTO: 105.6 FL (ref 80–94)
METAPNEUMOVIRUS BY PCR: NOT DETECTED
MORAXELLA CATARRHALIS BY PCR: NOT DETECTED
MRSA SCREEN RT-PCR: NEGATIVE
MUCUS: ABNORMAL
MYCOPLASMA PNEUMONIAE BY PCR: NOT DETECTED
NITRITE, URINE: NEGATIVE
NON-SARS CORONAVIRUS: NOT DETECTED
PARAINFLUENZA VIRUS BY PCR: NOT DETECTED
PH UA: 5.5 (ref 5–9)
PHOSPHORUS: 6.4 MG/DL (ref 2.4–4.7)
PLATELET # BLD: 368 THOU/MM3 (ref 130–400)
PLATELET # BLD: 396 THOU/MM3 (ref 130–400)
PMV BLD AUTO: 9.7 FL (ref 9.4–12.4)
PMV BLD AUTO: 9.9 FL (ref 9.4–12.4)
POTASSIUM REFLEX MAGNESIUM: 4.7 MEQ/L (ref 3.5–5.2)
POTASSIUM REFLEX MAGNESIUM: 4.9 MEQ/L (ref 3.5–5.2)
POTASSIUM SERPL-SCNC: 3.4 MEQ/L (ref 3.5–5.2)
POTASSIUM, URINE: 9.2 MEQ/L
PROT/CREAT RATIO, UR: 0.36
PROTEIN UA: NEGATIVE MG/DL
PROTEIN, URINE: 17.4 MG/DL
PROTEUS SPECIES BY PCR: NOT DETECTED
PSEUDOMONAS AERUGINOSA BY PCR: NOT DETECTED
RBC # BLD: 2.62 MILL/MM3 (ref 4.7–6.1)
RBC # BLD: 2.68 MILL/MM3 (ref 4.7–6.1)
RBC URINE: ABNORMAL /HPF
RESISTANT GENE CTX-M BY PCR: NOT DETECTED
RESISTANT GENE IMP BY PCR: NOT DETECTED
RESISTANT GENE KPC BY PCR: NOT DETECTED
RESISTANT GENE MECA/C & MREJ BY PCR: ABNORMAL
RESISTANT GENE NDM BY PCR: NOT DETECTED
RESISTANT GENE OXA-48-LIKE BY PCR: NOT DETECTED
RESISTANT GENE VIM BY PCR: NOT DETECTED
RESPIRATORY SYNCYTIAL VIRUS BY PCR: NOT DETECTED
RHINOVIRUS ENTEROVIRUS PCR: NOT DETECTED
SERRATIA MARCESCENS BY PCR: NOT DETECTED
SODIUM BLD-SCNC: 142 MEQ/L (ref 135–145)
SODIUM BLD-SCNC: 143 MEQ/L (ref 135–145)
SODIUM BLD-SCNC: 146 MEQ/L (ref 135–145)
SODIUM URINE: 94 MEQ/L
SOURCE: ABNORMAL
SPECIFIC GRAVITY UA: 1.01 (ref 1–1.03)
SPECIMEN ACCEPTABILITY: ABNORMAL
STAPH AUREUS BY PCR: NOT DETECTED
STREP AGALACTIAE BY PCR: NOT DETECTED
STREP PNEUMONIAE BY PCR: NOT DETECTED
STREP PYOGENES BY PCR: NOT DETECTED
TOTAL PROTEIN: 5.1 G/DL (ref 6.1–8)
UROBILINOGEN, URINE: 0.2 EU/DL (ref 0–1)
VANCOMYCIN RANDOM: 13.1 UG/ML (ref 0.1–39.9)
VANCOMYCIN RESISTANT ENTEROCOCCUS: NEGATIVE
VITAMIN D 25-HYDROXY: 10 NG/ML (ref 30–100)
WBC # BLD: 13.2 THOU/MM3 (ref 4.8–10.8)
WBC # BLD: 4.3 THOU/MM3 (ref 4.8–10.8)
WBC UA: ABNORMAL /HPF
YEAST: ABNORMAL

## 2022-04-26 PROCEDURE — 2700000000 HC OXYGEN THERAPY PER DAY

## 2022-04-26 PROCEDURE — 87798 DETECT AGENT NOS DNA AMP: CPT

## 2022-04-26 PROCEDURE — 2580000003 HC RX 258: Performed by: STUDENT IN AN ORGANIZED HEALTH CARE EDUCATION/TRAINING PROGRAM

## 2022-04-26 PROCEDURE — 2500000003 HC RX 250 WO HCPCS: Performed by: NURSE PRACTITIONER

## 2022-04-26 PROCEDURE — 99291 CRITICAL CARE FIRST HOUR: CPT | Performed by: INTERNAL MEDICINE

## 2022-04-26 PROCEDURE — 87077 CULTURE AEROBIC IDENTIFY: CPT

## 2022-04-26 PROCEDURE — 87150 DNA/RNA AMPLIFIED PROBE: CPT

## 2022-04-26 PROCEDURE — 99232 SBSQ HOSP IP/OBS MODERATE 35: CPT | Performed by: INTERNAL MEDICINE

## 2022-04-26 PROCEDURE — 80048 BASIC METABOLIC PNL TOTAL CA: CPT

## 2022-04-26 PROCEDURE — P9016 RBC LEUKOCYTES REDUCED: HCPCS

## 2022-04-26 PROCEDURE — 93010 ELECTROCARDIOGRAM REPORT: CPT | Performed by: INTERNAL MEDICINE

## 2022-04-26 PROCEDURE — 94761 N-INVAS EAR/PLS OXIMETRY MLT: CPT

## 2022-04-26 PROCEDURE — 2580000003 HC RX 258: Performed by: PHYSICIAN ASSISTANT

## 2022-04-26 PROCEDURE — 94003 VENT MGMT INPAT SUBQ DAY: CPT

## 2022-04-26 PROCEDURE — 82330 ASSAY OF CALCIUM: CPT

## 2022-04-26 PROCEDURE — 84100 ASSAY OF PHOSPHORUS: CPT

## 2022-04-26 PROCEDURE — 82533 TOTAL CORTISOL: CPT

## 2022-04-26 PROCEDURE — 93306 TTE W/DOPPLER COMPLETE: CPT

## 2022-04-26 PROCEDURE — 93005 ELECTROCARDIOGRAM TRACING: CPT | Performed by: PHYSICIAN ASSISTANT

## 2022-04-26 PROCEDURE — 87205 SMEAR GRAM STAIN: CPT

## 2022-04-26 PROCEDURE — 2580000003 HC RX 258: Performed by: INTERNAL MEDICINE

## 2022-04-26 PROCEDURE — 2580000003 HC RX 258: Performed by: NURSE PRACTITIONER

## 2022-04-26 PROCEDURE — 87070 CULTURE OTHR SPECIMN AEROBIC: CPT

## 2022-04-26 PROCEDURE — 2000000000 HC ICU R&B

## 2022-04-26 PROCEDURE — 6360000002 HC RX W HCPCS: Performed by: STUDENT IN AN ORGANIZED HEALTH CARE EDUCATION/TRAINING PROGRAM

## 2022-04-26 PROCEDURE — 2500000003 HC RX 250 WO HCPCS: Performed by: PHYSICIAN ASSISTANT

## 2022-04-26 PROCEDURE — 87500 VANOMYCIN DNA AMP PROBE: CPT

## 2022-04-26 PROCEDURE — 87486 CHLMYD PNEUM DNA AMP PROBE: CPT

## 2022-04-26 PROCEDURE — 87581 M.PNEUMON DNA AMP PROBE: CPT

## 2022-04-26 PROCEDURE — 6360000002 HC RX W HCPCS

## 2022-04-26 PROCEDURE — 83735 ASSAY OF MAGNESIUM: CPT

## 2022-04-26 PROCEDURE — 87186 SC STD MICRODIL/AGAR DIL: CPT

## 2022-04-26 PROCEDURE — 2500000003 HC RX 250 WO HCPCS: Performed by: STUDENT IN AN ORGANIZED HEALTH CARE EDUCATION/TRAINING PROGRAM

## 2022-04-26 PROCEDURE — 85027 COMPLETE CBC AUTOMATED: CPT

## 2022-04-26 PROCEDURE — 87631 RESP VIRUS 3-5 TARGETS: CPT

## 2022-04-26 PROCEDURE — 99024 POSTOP FOLLOW-UP VISIT: CPT | Performed by: SURGERY

## 2022-04-26 PROCEDURE — 87541 LEGION PNEUMO DNA AMP PROB: CPT

## 2022-04-26 RX ORDER — MAGNESIUM SULFATE 1 G/100ML
1000 INJECTION INTRAVENOUS ONCE
Status: COMPLETED | OUTPATIENT
Start: 2022-04-26 | End: 2022-04-26

## 2022-04-26 RX ORDER — ERGOCALCIFEROL 1.25 MG/1
50000 CAPSULE ORAL WEEKLY
Status: DISCONTINUED | OUTPATIENT
Start: 2022-04-26 | End: 2022-04-26

## 2022-04-26 RX ORDER — 0.9 % SODIUM CHLORIDE 0.9 %
2000 INTRAVENOUS SOLUTION INTRAVENOUS ONCE
Status: COMPLETED | OUTPATIENT
Start: 2022-04-26 | End: 2022-04-26

## 2022-04-26 RX ORDER — MIDAZOLAM IN NACL,ISO-OSMOT/PF 50 MG/50ML
1-10 INFUSION BOTTLE (ML) INTRAVENOUS CONTINUOUS
Status: DISCONTINUED | OUTPATIENT
Start: 2022-04-26 | End: 2022-04-27

## 2022-04-26 RX ORDER — ERGOCALCIFEROL 1.25 MG/1
50000 CAPSULE ORAL WEEKLY
Status: DISCONTINUED | OUTPATIENT
Start: 2022-04-27 | End: 2022-05-10 | Stop reason: HOSPADM

## 2022-04-26 RX ORDER — FENTANYL CITRATE-0.9 % NACL/PF 10 MCG/ML
25-200 PLASTIC BAG, INJECTION (ML) INTRAVENOUS CONTINUOUS
Status: DISCONTINUED | OUTPATIENT
Start: 2022-04-26 | End: 2022-04-27

## 2022-04-26 RX ORDER — POTASSIUM CHLORIDE 29.8 MG/ML
20 INJECTION INTRAVENOUS
Status: COMPLETED | OUTPATIENT
Start: 2022-04-26 | End: 2022-04-26

## 2022-04-26 RX ADMIN — SODIUM CHLORIDE: 9 INJECTION, SOLUTION INTRAVENOUS at 14:40

## 2022-04-26 RX ADMIN — PIPERACILLIN AND TAZOBACTAM 2250 MG: 2; .25 INJECTION, POWDER, LYOPHILIZED, FOR SOLUTION INTRAVENOUS at 02:51

## 2022-04-26 RX ADMIN — SODIUM CHLORIDE, PRESERVATIVE FREE 10 ML: 5 INJECTION INTRAVENOUS at 08:41

## 2022-04-26 RX ADMIN — FAMOTIDINE 10 MG: 10 INJECTION, SOLUTION INTRAVENOUS at 08:42

## 2022-04-26 RX ADMIN — SODIUM CHLORIDE 1000 ML: 9 INJECTION, SOLUTION INTRAVENOUS at 04:24

## 2022-04-26 RX ADMIN — FENTANYL CITRATE 25 MCG/HR: 50 INJECTION, SOLUTION INTRAMUSCULAR; INTRAVENOUS at 00:27

## 2022-04-26 RX ADMIN — CALCIUM GLUCONATE 2000 MG: 20 INJECTION, SOLUTION INTRAVENOUS at 02:42

## 2022-04-26 RX ADMIN — FENTANYL CITRATE 100 MCG/HR: 50 INJECTION, SOLUTION INTRAMUSCULAR; INTRAVENOUS at 23:33

## 2022-04-26 RX ADMIN — VASOPRESSIN 0.03 UNITS/MIN: 20 INJECTION PARENTERAL at 02:49

## 2022-04-26 RX ADMIN — VASOPRESSIN 0.03 UNITS/MIN: 20 INJECTION PARENTERAL at 11:50

## 2022-04-26 RX ADMIN — POTASSIUM CHLORIDE 20 MEQ: 29.8 INJECTION, SOLUTION INTRAVENOUS at 02:40

## 2022-04-26 RX ADMIN — FENTANYL CITRATE 75 MCG/HR: 50 INJECTION, SOLUTION INTRAMUSCULAR; INTRAVENOUS at 11:06

## 2022-04-26 RX ADMIN — PIPERACILLIN AND TAZOBACTAM 2250 MG: 2; .25 INJECTION, POWDER, LYOPHILIZED, FOR SOLUTION INTRAVENOUS at 17:58

## 2022-04-26 RX ADMIN — Medication 2 MG/HR: at 05:59

## 2022-04-26 RX ADMIN — VASOPRESSIN 0.03 UNITS/MIN: 20 INJECTION PARENTERAL at 23:32

## 2022-04-26 RX ADMIN — SODIUM CHLORIDE: 9 INJECTION, SOLUTION INTRAVENOUS at 00:25

## 2022-04-26 RX ADMIN — MAGNESIUM SULFATE HEPTAHYDRATE 1000 MG: 1 INJECTION, SOLUTION INTRAVENOUS at 18:40

## 2022-04-26 RX ADMIN — POTASSIUM CHLORIDE 20 MEQ: 29.8 INJECTION, SOLUTION INTRAVENOUS at 03:50

## 2022-04-26 RX ADMIN — PIPERACILLIN AND TAZOBACTAM 2250 MG: 2; .25 INJECTION, POWDER, LYOPHILIZED, FOR SOLUTION INTRAVENOUS at 09:49

## 2022-04-26 RX ADMIN — Medication 18 MCG/MIN: at 11:04

## 2022-04-26 ASSESSMENT — PULMONARY FUNCTION TESTS: PIF_VALUE: 17

## 2022-04-26 NOTE — OP NOTE
[id number] [id number]  Operative Report    PATIENT NAME: Jose Suarez Sr. MEDICAL RECORD NO. [id number]  SURGEON: Armando Pena MD   Primary Care Physician: Angelia Mccullough MD  Date: 4/25/2022, 10:26 PM     PROCEDURE PERFORMED: Exploratory laparotomy small bowel resection, single primary anastomosis, and drainage of pelvic abscess and right groin exploration drainage of abscess debridement of necrotic subcutaneous fat fascia and muscle and excision of necrotic hernia sac. Right groin and pelvic abscess present at the time of surgery  PREOPERATIVE DIAGNOSIS:   Active Hospital Problems    Diagnosis Date Noted    Renal failure [N19] 04/25/2022     Priority: Medium      POSTOPERATIVE DIAGNOSIS: Same  SURGEON:  Armando Pena MD   ANESTHESIA:  General endotracheal anesthesia and local  ESTIMATED BLOOD LOSS:  50  ml  SPECIMEN: Small bowel, purulent necrotic fluid for aerobic anaerobic cultures  COMPLICATIONS: None immediate previously placed left arm IV did infiltrate   DRAINS: (1) 19 Quincy drain(s) in the right abdomen pelvis  DISPOSITION: Intensive Care  CONDITION: Fair on multiple pressors to remain intubated    Narrative: Indications see surgical consultation. Patient with incarcerated right groin hernia acute renal failure and evidence of bowel perforation and right hernia with need for urgent operative intervention. Discussed with patient and wife who agreed to procedure. Procedure: Patient was brought to the operating suite placed supine on the operating table he been given vancomycin and Zosyn. He was placed supine on the operating table. NG tube in place to the emergency department was draining greenish fluid. After induction of general anesthesia Max catheter was sterilely inserted. Anesthesia placed additional lines and resuscitated the patient. The markedly swollen tender right groin area was open dissection was carried down there was purulent necrotic tissue encountered.   Was very foul-smelling. Laparotomy was then made in the midline from above the umbilicus down to the pubis. Abdominal cavity was entered there was feculent material in the pelvis present at the time of surgery. Small bowel was reduced there is markedly dilated proximal bowel all bowel proximal appeared viable. The perforated small bowel was brought up involved full-thickness. The bowel was clamped off to prevent any further soilage. Pelvis and groin were copiously irrigated. The groin had necrotic tissue which was debrided down to the fascia overlying femoral and inguinal vessels. There is actually very small defect. The polypropylene plug had seem to be well incorporated was not overtly infected it was walled off with scar tissue. The small defect medially towards the tubercle was suture ligated with Prolene suture. That wound was irrigated. Small bowel with area of perforation was resected the mesentery was serially clamped divided and ligated after 2 ends of bowel were divided with a TLC 75 stapling device. Side-to-side anastomosis was completed after the abdomen was copiously irrigated with saline followed by Irrisept. Common channel was made with another firing of the TLC 55 stapling device and the enterotomy was closed with a TX 60 stapling device. Suture line was oversewn with Lembert sutures of Vicryl. The bowel was placed back in the abdominal cavity 19 mm Quincy drain was brought through a stab wound in the right lateral abdomen placed in the right gutter and down into the pelvis. It was secured to skin using a silk suture. Clean closure set was then utilized new instruments were utilized and all staff changed gowns and gloves. Midline fascia was closed with running looped PDS sutures. Subcutaneous tissues were irrigated the midline skin was loosely closed with staples. The right groin was left open and packed with Kerlix soaked with Dakin's gauze.   Patient was left intubated and transported directly to the ICU. Handoff was performed to ICU staff.

## 2022-04-26 NOTE — PROGRESS NOTES
CRITICAL CARE PROGRESS NOTE      Patient:  Marcela Suarez Sr. Unit/Bed:4D-10/010-A  YOB: 1950  MRN: [id number]   PCP: Saqib Bye MD  Date of Admission: 4/25/2022  Chief Complaint:- Syncope    Assessment and Plan:     1. Perforated small bowel, soilage of the pelvic cavity, right groin and pelvic abscesses, and necrotic hernial sac: Postop day #1 exploratory laparotomy with bowel resection, primary anastomosis, drainage of pelvic abscess as well as right groin exploration, drainage of abscess, and debridement of necrotic subcutaneous fat, fascia and muscle. Patient was in the empirically started on Zosyn and vancomycin. Patient has no risk factors for MRSA so Vanco was discontinued this morning. Patient will continue to have Zosyn. Abdominal fluid cultures are pending. Surgery is still following. Day #2 of Zosyn  2. Aspiration pneumonia: Imaging findings suggestion of infiltrate along with gastric contents suctioned from ET tube is consistent with aspiration pneumonia. Patient receiving antibiotics as above  3. Sepsis secondary to intra-abdominal infection and aspiration pneumonia: SIRS 2 out of 4, hypotension requiring pressor support. Pneumonia panel demonstrating haemophilus and Klebsiella. Abdominal fluid cultures and blood cultures pending. Patient had initially received aggressive fluid resuscitation per sepsis protocol in the emergency department. On antibiotics as above. Currently requiring 28 micrograms of Levophed and 0.03 of vasopressin  4. ROBSON on CKD: Likely secondary to volume depletion, poor oral intake, and hypotension. On admit patient creatinine elevated at 11.2 from a baseline of 1.3. Patient's current creatinine 7.8. Patient being aggressively fluid resuscitated and placed on pressure support to increase perfusion. Nephrology is recommending continued IV fluids at 125/h and maintaining MAP greater than 65. Nephrology continuing to follow.   5. Acid-base disorder: Anion gap metabolic acidosis with respiratory compensation likely secondary to sepsis and ROBSON with an additional metabolic alkalosis secondary to volume depletion. 6. Acute hypoxic respiratory failure: Secondary to aspiration pneumonia. Patient is currently intubated on pressure support. We will wean ventilation as tolerated. Continue to monitor patient's respiratory status. 7. Hyperphosphatemia: Moderately elevated likely secondary to ROBSON we will continue to monitor. Will improve with renal function return  8. Hypermagnesemia: Mildly elevated likely secondary to ROBSON, will continue to trend. Will improve his renal function returns. 9. Thickened esophagus: Seen incidentally on imaging, follow-up with EGD when stable  10. Troponinemia: Mildly elevated troponin on admit of 0.047 downtrending with inverted T waves found on EKG. Likely demand ischemia secondary to hypotension. We will continue to monitor. 11. Hypocalcemia:   Patient borderline low, continue to monitor. Replace per protocol. Consider supplemental vitamin D as needed. 12. Coronary artery disease: Home medications include Lipitor 20 mg, Toprol-XL 25 mg and baby aspirin. His medications were held secondary to hypotension and postoperative state. Will on hold these medications as patient improves and tolerates. 13. GERD: Patient is on pepcid 10mg daily at home we will continue. INITIAL H AND P AND ICU COURSE:  4/26/2022: No acute events overnight. Patient has been afebrile overnight. Patient still sedated on Versed and fentanyl. Patient still requiring 28 mcg of Levophed as well as 0.03 vasopressin. Patient does have mild rhonchi on the right. Patient does appear dry with dry mucous membranes and borderline capillary refill. No evidence for MRSA so vancomycin was discontinued this morning. Patient will continue on Zosyn at this time. Remaining cultures pending.     From H&P: \"The patient was sedated and intubated and therefore could not give any history. Therefore, history is primarily taken from the chart. Patient's wife states that the patient fell in his yard approximately 14 days ago and went to the [facility]. This time, he was discharged with pain medication. Approximately 7 days ago, he again presented to [facility] with complaints of a right lower abdominal bulge at which point he was diagnosed with inguinal hernia and is scheduled with surgical follow-up. Patient presented to [facility] on 4/25/2022 for syncope and worsening inguinal hernia. Wife states that syncope first occurred in [facility] several days ago when the patient had nausea and subjective feelings of excessive heat while sitting. After this, he went to go outside and then passed out. On the morning of admission, patient's wife was given of the bathtub when he \"slumped over. \"  Wife stated that the patient vomited 3 times in the last 24 hours with brown/yellow vomitus. Wife also reports that he has not been having bowel movements, has had less frequent urination, and that he has been weak and sleeping \"20 hours a day. \"  While in the ED, the patient was found to have profound renal failure with a creatinine of 11.2 with a baseline of approximately 1.3. Patient also noted to have a profound anion gap acidosis which improved with aggressive fluid resuscitation. Nephrology was consulted given profound renal failure. CT of the abdomen and pelvis without contrast was performed which demonstrated mild to moderate patchy consolidation of the bilateral lower lobes, thickening of the distal esophagus and gastroesophageal junction, a right inguinal hernia causing a small bowel obstruction, and multiple gas collections in the right inguinal hernia highly suspicious for ischemic or perforated bowel.   On the evening of 4/25/2022 Dr. Mellisa Olivo performed exploratory laparotomy with small bowel resection, single primary anastomosis, drainage of pelvic abscess, exploration and drainage of right groin abscess, and debridement of necrotic subcutaneous fat/fascia/muscle in the right groin along with excision of the necrotic hernia sac in the right groin. Empiric coverage with vancomycin and Zosyn to necrotic small bowel as well as probable aspiration pneumonia. Family was extensively counseled. \"    Past Medical History:   Diagnosis Date    CAD (coronary artery disease)     CVA (cerebral vascular accident) ([facility] [address]     Erectile dysfunction     GERD (gastroesophageal reflux disease) 07/2012    Gastro ulcer    Hyperlipidemia     Hypertension      Family History   Problem Relation Age of Onset    Heart Disease Mother      Social History     Socioeconomic History    Marital status:      Spouse name: Mirian Hays Number of children: 1    Years of education: Not on file    Highest education level: Not on file   Occupational History    Occupation: disability-[employer]   Tobacco Use    Smoking status: Current Every Day Smoker     Packs/day: 1.00     Years: 40.00     Pack years: 40.00     Types: Cigarettes    Smokeless tobacco: Never Used    Tobacco comment: printed to avs. 3/4 ppd since 2019   Vaping Use    Vaping Use: Never used   Substance and Sexual Activity    Alcohol use: Yes    Drug use: Yes     Frequency: 7.0 times per week     Types: Marijuana Rodena Capes)    Sexual activity: Never   Other Topics Concern    Not on file   Social History Narrative    Not on file     Social Determinants of Health     Financial Resource Strain: Low Risk     Difficulty of Paying Living Expenses: Not hard at all   Food Insecurity: No Food Insecurity    Worried About [address] in the Last Year: Never true    [facility] [facility] in the Last Year: Never true   Transportation Needs:     Lack of Transportation (Medical): Not on file    Lack of Transportation (Non-Medical):  Not on file   Physical Activity:     Days of Exercise per Week: Not on file    Minutes of Exercise per Session: Not on file   Stress:     Feeling of Stress : Not on file   Social Connections:     Frequency of Communication with Friends and Family: Not on file    Frequency of Social Gatherings with Friends and Family: Not on file    Attends Scientologist Services: Not on file    Active Member of [address] [place] [address] or Organizations: Not on file    Attends Club or Organization Meetings: Not on file    Marital Status: Not on file   Intimate Partner Violence:     Fear of Current or Ex-Partner: Not on file    Emotionally Abused: Not on file    Physically Abused: Not on file    Sexually Abused: Not on file   Housing Stability:     Unable to Pay for Housing in the Last Year: Not on file    Number of [place] in the Last Year: Not on file    Unstable Housing in the Last Year: Not on file       ROS   Review of Systems   Unable to perform ROS: Acuity of condition           Scheduled Meds:   vitamin D  50,000 Units Oral Weekly    sodium chloride flush  5-40 mL IntraVENous 2 times per day    [Held by provider] heparin (porcine)  5,000 Units SubCUTAneous TID    famotidine (PEPCID) injection  10 mg IntraVENous Daily    piperacillin-tazobactam  2,250 mg IntraVENous Q8H     Continuous Infusions:   vasopressin (Septic Shock) infusion 0.03 Units/min (04/26/22 0741)    midazolam 2 mg/hr (04/26/22 0741)    fentaNYL 100 mcg/hr (04/26/22 0758)    sodium chloride      sodium chloride 125 mL/hr at 04/26/22 0741    norepinephrine 28 mcg/min (04/26/22 0741)       PHYSICAL EXAMINATION:  T:  97.7.  P:  89. RR:  8. B/P:  103/59. FiO2:  65. O2 Sat:  100. I/O:  +8144  Body mass index is 20.45 kg/m². GCS:  3  PC: 14/6: TV: 450: RRTotal: 16: Ti:1 sec: ETCO2: n/a  General: In bed, intubated, not arousable  HEENT:  normocephalic and atraumatic. No scleral icterus. PERR  Neck: supple. No Thyromegaly. Lungs: Mildly rhonchorous on the right, left side clear to auscultation.   No retractions  Cardiac: RRR.  No JVD. Abdomen: Incision clean dry and intact, ALFONSO drain with minimal serosanguineous drainage. No soft. Nontender. Extremities:  No clubbing, cyanosis, or edema x 4. Vasculature: capillary refill ~ 3 seconds. Palpable dorsalis pedis pulses. Skin:  warm and dry. Psych: Unable to evaluate secondary to patient acuity  Lymph:  No supraclavicular adenopathy. Neurologic: Unable to evaluate secondary to patient acuity    Data: (All radiographs, tracings, PFTs, and imaging are personally viewed and interpreted unless otherwise noted).  Electrolytes within normal limits   Creatinine improved from 10.4 to to 7.8,   Liver function panel unremarkable   Glucose well controlled   White count improved from 21.8-13.2   Hemoglobin is stable from 8.6-9.3   Lactic acid improved from 2.2-1.8   Respiratory molecular PCR demonstrating haemophilus and E. coli   Telemetry shows normal sinus rhythm        Seen with multidisciplinary ICU team .  Meets Continued ICU Level Care Criteria:    [x] Yes   [] No - Transfer Planned to listed location:  [] HOSPITALIST CONTACTED- DR     Case and plan discussed with Dr. Gera Li. Electronically signed by Liyah Man MD  [address] [place] [address] Way    Patient seen by me including key components of medical care. Case discussed with resident physician. Case discussed with NP. Septic Shock on pressors and antibiotics. Renal function improved. Italicized font, if present,  represents changes to the note made by me. CC time 35 minutes. Time was discontiguous. Time does not include procedure. Time does include my direct assessment of the patient and coordination of care. Time represents more than 50% of the time involved with patient care by the [address] [place] [address] team.  Electronically signed by Mary Gotti.  Gera Li MD.

## 2022-04-26 NOTE — ED NOTES
NG placed by this RN. Pt tolerated fairly well. Gastric contents noted. Pt to surgery at this time.       [place], [address] [facility]  04/25/22 2005

## 2022-04-26 NOTE — H&P
CRITICAL CARE- History & Physical      Patient: Gabriella Suarez Sr. Unit/Bed:4D-10/010-A  YOB: 1950  MRN: [id number]   Acct: [de-identified]   PCP: Sanjiv Crawford MD    Date of Service: Pt seen/examined on 04/26/22  and Admitted to Inpatient with expected LOS greater than two midnights due to medical therapy. Chief Complaint: Syncope    Assessment and Plan:-  1.  Perforated Small Bowel, Soilage of the Pelvic Cavity, Right Groin and Pelvic Abscess, and Necrotic Hernia Sac. Met 2 out of 4 SIRS criteria initially with tachypnea and leukocytosis. Previous physical exam noted a large 6 cm indurated/erythematous/warm/tender right inguinal hernia. Imaging findings highly suspicious for necrotic/perforated bowel. Diagnosis confirmed on exploratory laparotomy. At this time, aggressive drainage and debridement. Monitor with every 8 hour BMP and daily CBC. Recent modestly elevated lactate which has since normalized. Empiric coverage with renally dosed Zosyn and vancomycin. Abdominal fluid cultures pending. Surgery will follow. 2.  Aspiration Pneumonia. Highly suspicious imaging findings along with need for continued postoperative intubation. Per RT report, gastric contents suctioned from the ET tube. Coverage with Zosyn as above. Pneumonia panel and respiratory cultures pending. 3.  Sepsis Secondary to Intra-Abdominal Infection and Aspiration Pneumonia. Met 2 out of 4 SIRS criteria. Labile blood pressures as below requiring at different times pressor support and IV labetalol. Urinalysis, urine cultures, respiratory cultures, pneumonia panel, blood cultures, and abdominal fluid cultures are pending. Repeat echocardiogram pending. Otherwise treat as above. 4.  Stage III ROBSON on stage II CKD, gradually improving. Chronic CKD probably secondary to hypertension.   Profound ROBSON probably secondary to severe volume depletion from poor oral intake and impaired GI absorption as above. Received a cumulative 6 L since hospitalization and receiving normal saline under 25 cc/h. This has improved renal function. If patient develops adequate urinary output, no need for dialysis. Monitor with every 8 hour BMP. Nephrology will follow. 5.  Labile Blood Pressure. History of benign essential hypertension on Cozaar 50 mg daily and Toprol-XL 25 mg daily. Takes Flomax which is not an antihypertensive but does reduce blood pressure. Intraoperatively hypotensive and postoperatively severely hypertensive. Blood pressure dropped with one-time dose of labetalol and sedation. Currently on Levophed and vasopressin. Wean as able. 6.  Mixed Acid-Base Disorder. Anion gap metabolic acidosis with appropriate respiratory compensation secondary to sepsis and ROBSON. Additional metabolic alkalosis probably secondary to volume depletion. Treat as above. 7.  Acute Hypoxic Respiratory Failure Secondary to Postoperative and to Aspiration Pneumonia. Currently intubated. Wean ventilator settings as able. 8.  Hyperphosphatemia Secondary to ROBSON. Highly elevated with phosphorus of 8.4. Continue fluid resuscitation as above and recheck phosphorus. 9.  Hypermagnesemia Secondary to ROBSON. Mildly elevated with a magnesium of 2.6. Treat as above and trend. 10.  Thickening of the Distal Esophagus and Gastroesophageal Junction, unspecified. Seen incidentally on imaging. Patient needs EGD follow-up when clinically stable. 11.  Troponinemia, unspecified. Elevated troponin T along with EKG changes showing new onset inferior ischemic signs. These are probably secondary to demand ischemia from volume depletion and sepsis. Ischemic signs improved with aggressive volume resuscitation. 12.  Hypocalcemia Secondary to Vitamin D Deficiency. Low vitamin D of 10. Replete calcium and started vitamin D 50,000 units weekly. 13.  Coronary Artery Disease, unspecified.   On Lipitor 20 mg nightly, Toprol-XL 25 mg daily, and baby aspirin at baseline. Will hold these medications in the immediate postoperative setting and restart as clinically tolerated. 14.  Other. On Flomax for unclear reasons presumably related to benign prostatic hyperplasia. Holding as above. 15.  Gastroesophageal Reflux Disease, unspecified. Maintain on Pepcid 10 mg daily which will also represent appropriate stress ulcer prophylaxis. 16.  Tobacco Abuse. Current smoker with a 40-pack-year history. Counts with regards to smoking cessation when extubated. Opening Statement:  Patient is a 67-year-old male current everyday smoker with a 40-pack-year history with a past medical history of coronary artery disease with last known cath in 2007, hypertension, erectile dysfunction, hyperlipidemia, GERD, and personal history of CVA who presented with a chief complaint of syncope and who we are managing primarily for small bowel obstruction with incarcerated hernia, sepsis secondary to the same, anuric renal failure secondary to severe volume depletion, need for postoperative intubation, and presumptive aspiration pneumonia. History Of Present Illness/Summary Hospital Course:    Note: The patient was sedated and intubated and therefore could not give any history. Therefore, history is primarily taken from the chart. Patient's wife states that the patient fell in his yard approximately 14 days ago and went to the [facility]. This time, he was discharged with pain medication. Approximately 7 days ago, he again presented to [facility] with complaints of a right lower abdominal bulge at which point he was diagnosed with inguinal hernia and is scheduled with surgical follow-up. Patient presented to [facility] on 4/25/2022 for syncope and worsening inguinal hernia.   Wife states that syncope first occurred in [place] several days ago when the patient had nausea and subjective feelings of excessive heat while sitting. After this, he went to go outside and then passed out. On the morning of admission, patient's wife was given of the bathtub when he \"slumped over. \"  Wife stated that the patient vomited 3 times in the last 24 hours with brown/yellow vomitus. Wife also reports that he has not been having bowel movements, has had less frequent urination, and that he has been weak and sleeping \"20 hours a day. \"  While in the ED, the patient was found to have profound renal failure with a creatinine of 11.2 with a baseline of approximately 1.3. Patient also noted to have a profound anion gap acidosis which improved with aggressive fluid resuscitation. Nephrology was consulted given profound renal failure. CT of the abdomen and pelvis without contrast was performed which demonstrated mild to moderate patchy consolidation of the bilateral lower lobes, thickening of the distal esophagus and gastroesophageal junction, a right inguinal hernia causing a small bowel obstruction, and multiple gas collections in the right inguinal hernia highly suspicious for ischemic or perforated bowel. On the evening of 4/25/2022 Dr. Monique Toney performed exploratory laparotomy with small bowel resection, single primary anastomosis, drainage of pelvic abscess, exploration and drainage of right groin abscess, and debridement of necrotic subcutaneous fat/fascia/muscle in the right groin along with excision of the necrotic hernia sac in the right groin. Empiric coverage with vancomycin and Zosyn to necrotic small bowel as well as probable aspiration pneumonia. Family was extensively counseled.     Past Medical History:        Diagnosis Date    CAD (coronary artery disease)     [facility]A [facility]cere[facility] vascular accident) [facility] [address]     Erectile dysfunction     GERD (gastroesophageal reflux disease) 07/2012    Gastro ulcer    Hyperlipidemia     Hypertension        Past Surgical History:        Procedure Laterality Date    CARDIAC CATHETERIZATION      COLONOSCOPY      ENDOSCOPY, COLON, DIAGNOSTIC      EYE SURGERY Left 2004    cataract    EYE SURGERY      INGUINAL HERNIA REPAIR Right 5/27/15    Dr. Dalia Staton 2011    Dr. Su Harris Left 2003    Dr. Estevan Monterroso  5/27/15    Dr. Bryon Styles Medications:   No current facility-administered medications on file prior to encounter. Current Outpatient Medications on File Prior to Encounter   Medication Sig Dispense Refill    atorvastatin (LIPITOR) 20 MG tablet TAKE 1 TABLET BY MOUTH DAILY 90 tablet 3    COMBIGAN 0.2-0.5 % ophthalmic solution Place 1 drop into the left eye every 12 hours 5 mL 3    famotidine (PEPCID) 20 MG tablet Take 1 tablet by mouth 2 times daily 180 tablet 3    losartan (COZAAR) 50 MG tablet Take 1 tablet by mouth daily 90 tablet 3    metoprolol succinate (TOPROL XL) 25 MG extended release tablet TAKE 1 TABLET BY MOUTH DAILY 90 tablet 3    tamsulosin (FLOMAX) 0.4 MG capsule Take 1 capsule by mouth daily 90 capsule 3    lidocaine (LIDODERM) 5 % Place 1 patch onto the skin daily 12 hours on, 12 hours off. 30 patch 0    fluocinonide (LIDEX) 0.05 % cream APPLY EXTERNALLY TO THE AFFECTED AREA TWICE DAILY 60 g 4    tadalafil (CIALIS) 20 MG tablet TAKE 1 TABLET BY MOUTH EVERY DAY AS NEEDED 18 tablet 5    aspirin 81 MG EC tablet Take 1 tablet by mouth daily 30 tablet 11    loratadine (CLARITIN) 10 MG tablet TAKE 1 TABLET BY MOUTH DAILY 90 tablet 3       Allergies:    Patient has no known allergies. Social History:    reports that he has been smoking cigarettes. He has a 40.00 pack-year smoking history. He has never used smokeless tobacco. He reports current alcohol use. He reports current drug use. Frequency: 7.00 times per week. Drug: Marijuana Milly Kearns).     Family History:       Problem Relation Age of Onset    Heart Disease Mother        Diet:  Diet NPO    Review of systems: Unobtainable as the patient was sedated and intubated. PHYSICAL EXAMINATION:  Patient Vitals for the past 8 hrs:   BP Temp Temp src Pulse Resp SpO2   04/26/22 0045 (!) 89/56 -- -- 84 16 --   04/26/22 0030 95/62 -- -- 88 27 --   04/26/22 0015 93/68 -- -- 100 16 --   04/26/22 0000 86/64 96.8 °F (36 °C) Bladder 81 16 100 %   04/25/22 2345 (!) 71/51 -- -- 82 16 --   04/25/22 2330 (!) 91/55 -- -- 82 30 --   04/25/22 2315 (!) 106/59 -- -- 94 20 --   04/25/22 2300 (!) 163/77 -- -- 116 16 96 %   04/25/22 2245 -- -- -- 111 16 95 %   04/25/22 2233 -- -- -- 111 16 100 %   04/25/22 2230 -- -- -- 119 (P) 18 --   04/25/22 2228 (!) 149/89 97.2 °F (36.2 °C) Bladder 104 20 94 %   04/25/22 1914 (!) 92/51 -- -- 99 22 94 %   04/25/22 1850 -- -- -- -- 23 (!) 89 %   04/25/22 1826 (!) 105/56 -- -- 97 20 94 %   04/25/22 1742 (!) 109/59 -- -- 102 20 94 %     No intake/output data recorded. Wt Readings from Last 3 Encounters:   04/25/22 150 lb (68 kg)   04/17/22 150 lb (68 kg)   04/10/22 148 lb (67.1 kg)      Body mass index is 22.15 kg/m². Physical Exam  Constitutional:       General: He is not in acute distress. Appearance: He is ill-appearing. Interventions: He is sedated and intubated. Comments: The patient is sedated, intubated, and completely unresponsive. Brief point-of-care ultrasound was performed which found normal left ventricular function, normal right ventricular function, no obvious pericardial effusion, and IVC that collapses with inspiration. HENT:      Right Ear: External ear normal.      Left Ear: External ear normal.      Mouth/Throat:      Mouth: Mucous membranes are moist.      Pharynx: Oropharynx is clear. No oropharyngeal exudate. Eyes:      Comments: Pinpoint pupil of the right eye. Large cataract noted over the left eye. Cardiovascular:      Rate and Rhythm: Regular rhythm. Tachycardia present. Pulses:           Radial pulses are 1+ on the right side and 1+ on the left side. Dorsalis pedis pulses are 1+ on the right side and 1+ on the left side. Heart sounds: Normal heart sounds. Pulmonary:      Effort: He is intubated. Breath sounds: Normal air entry. No decreased air movement. Rhonchi present. Comments: Wet rhonchi noted throughout lung fields. Per report from RT, gastric contents were aspirated from the tube. Abdominal:      General: Abdomen is flat. There is no distension. Palpations: Abdomen is soft. Comments: Abdomen is covered in large surgical bandages with drains noted. Hard mass noted over the right lower quadrant. This represents the patient's known hernia. Musculoskeletal:      Right lower leg: No edema. Left lower leg: No edema. Skin:     General: Skin is warm and dry. Data: (All radiographs, tracings, PFTs, and imaging are personally viewed and interpreted unless otherwise noted).  Electrolyte panel notable for normal sodium, normal potassium, slightly low chloride of 96, repeat bicarb in the normal range with previously low bicarb, elevated anion gap, highly elevated creatinine of 11.2 and then 10.4 with a baseline of 1.3, estimate GFR of 6, estimated creatinine clearance of 6, mildly elevated glucose 145, slightly low calcium 8.4, and a low ionized calcium 0.91.   Troponin T slightly elevated 0.044. This is probably demand related secondary to volume depletion and sepsis.  Lactic acid slightly elevated 2.2.   Initial EKG demonstrated inferior ischemic signs which resolved on repeat EKG.  Chest x-ray demonstrated appropriately placed central line, bilateral bibasilar infiltrates, and a high endotracheal tube.  Respiratory culture, pneumonia panel, urine culture, blood cultures, and abdominal fluid cultures pending.  Urinalysis pending.    Last known echocardiogram on 6/9/2022 was essentially unremarkable except for mild tricuspid regurgitation, mild aortic regurgitation, mildly enlarged left atrium, and right ventricular systolic pressure of 50 mmHg, per Cardiology.  CT of the head demonstrated no obvious acute intracranial abnormalities, per Radiology   Imaging findings as above per Radiology.  Renal ultrasound demonstrated multifocal simple bilateral renal cyst along with echogenic bilateral renal parenchyma, per Radiology.       CURRENT PARENTERAL VASOACTIVE / INOTROPIC AGENTS:  [x] None Vasopressors:  [] Norepinephrine  [] Dopamine  [] Phenylephrine  [] Vasopressin  [] Epinephrine  [] Other: Sedation:  [] No Sedation  [] Propofol gtt-    [] BZD gtt -    [] Opiate gtt  -    [] Dexmedetomidine  [] Paralytics Antihypertensives gtt  [] Ca Channel Antagonist:  [] Beta-blocker:  [] Nitroglycerin  [] Nitroprusside     SUPPORT DEVICES:  [x] ETT   PCV+  Pressure control: 15  PEEP: 10  Percent O2: 90  Pressure peak: 24  VTE: 522  RR: 16    Vent Information  Ventilator Day(s): 1  Vent Mode: AC/PC  Ventilator Initiate: Yes  Additional Respiratory Assessments  Pulse: 84  Resp: 16  SpO2: 100 %  Position: Supine  Humidification Source: HME  Subglottic Suction Done: No  Oxygen Delivery - O2 Flow Rate (L/min): 3 L/min    CENTRAL LINES/CHEMOPORT/TUNNEL CATH:-    [] No   [x] Yes   (Date )           If yes -    [] Right IJ   [] Left IJ [] Right Femoral [] Left Femoral     [x] Right Subclavian [] Left Subclavian  [x] Peripheral IV access  [x] Arterial Line (Specify Site)    FAN CATHETER:-   [] No  [x] Yes  (Date: 4/25/2022)     ICU PROPHYLAXIS/THERAPY:   Stress ulcer:  [] PPI Agent  [x] H2RA [] Sucralfate [] Other:   VTE:     [] Enoxaparin    [] Warfarin [] NOAC [x] PCD Device:Bilat LE   [] Heparin: [] Subcut / [] IV       EKG: Occasional PACs but otherwise normal rate and rhythm and no ischemic signs    CONSULTS:-  [] Cardiology  [x] Nephrology  [] Hemo onco   [] GI   [] ID  [] Endocrine  [] Pulmo      [] Neuro    [] Psych   [] Urology  [] ENT   [x] Landy Daunt   []Ortho    []CV surg        [] Palliative  [] Hospice [] Pain management   []    []TCU   [] PT/OT  OTHERS:-    CODE STATUS:-  [x] Full resuscitation [] DNR-Comfort Care-Arrest  [] DNR-Comfort Care   [] Limited Resuscitation   [] No ET intubation   [] No CPR   [] No shock for non-perfusing rhythm      Electronically signed by Danni Menendez MD, MPH, [facility] on 4/26/2022 at 1:21 AM  Supervised by Dr. Carlos Sánchez

## 2022-04-26 NOTE — ANESTHESIA POSTPROCEDURE EVALUATION
Department of Anesthesiology  Postprocedure Note    Patient: Myrtle Adrian Sr. MRN: [id number]  YOB: 1950  Date of evaluation: 4/26/2022  Time:  8:44 AM     Procedure Summary     Date: 04/25/22 Room / Location: [place] [id number] / Denise [facility]azo    Anesthesia Start: 2019 Anesthesia Stop: 2238    Procedure: LAPAROTOMY EXPLORATORY FOR SMALL BOWEL OBSTRUCTION WITH INCARCERATED HERNIA REPAIR (N/A Abdomen) Diagnosis: (Abdomibnal Pain)    Surgeons: Sonja Camacho MD Responsible Provider: Mary Leonard MD    Anesthesia Type: general ASA Status: 5 - Emergent          Anesthesia Type: general    Dianne Phase I:      Dianne Phase II:      Last vitals: Reviewed and per EMR flowsheets.        Anesthesia Post Evaluation    Patient location during evaluation: ICU  Patient participation: complete - patient cannot participate  Level of consciousness: sedated and ventilated  Airway patency: patent  Complications: no  Cardiovascular status: vasoactive/inotropes  Respiratory status: ventilator

## 2022-04-26 NOTE — PLAN OF CARE
Problem: Respiratory - Adult  Goal: Achieves optimal ventilation and oxygenation  Flowsheets (Taken 4/26/2022 0308)  Achieves optimal ventilation and oxygenation:   Assess for changes in respiratory status   Position to facilitate oxygenation and minimize respiratory effort   Assess the need for suctioning and aspirate as needed   Respiratory therapy support as indicated   Assess and instruct to report shortness of breath or any respiratory difficulty   Encourage broncho-pulmonary hygiene including cough, deep breathe, incentive spirometry   Oxygen supplementation based on oxygen saturation or arterial blood gases   Assess for changes in mentation and behavior     Patient intubated on the ventilator. Current settings PC 15, RR 16, Peep 10, 100%. Adequate tidal volumes achieved. Airway secured properly. Will wean as tolerated.

## 2022-04-26 NOTE — BRIEF OP NOTE
Brief Postoperative Note      Patient: Loma Lennox Cage Sr. YOB: 1950  MRN: [id number]    Date of Procedure: 4/25/2022    Pre-Op Diagnosis: Incarcerated right inguinal hernia with small bowel obstruction and perforation    Post-Op Diagnosis: Same feculent soilage of right groin and pelvis present at time of surgery       Procedure(s):  LAPAROTOMY EXPLORATORY FOR SMALL BOWEL OBSTRUCTION WITH INCARCERATED HERNIA REPAIR. Debridement necrotic subcutaneous fat fascia and muscle right groin    Surgeon(s):  Chano Lindsay MD    Assistant:  * No surgical staff found *    Anesthesia: General    Estimated Blood Loss (mL): less than 50     Complications: None    Specimens:   ID Type Source Tests Collected by Time Destination   A : Hernia Fluid Culture Body Fluid Abdomen SURGICAL PATHOLOGY, CULTURE, ANAEROBIC AND AEROBIC Chano Lindsay MD 4/25/2022 2100    B : small bowel Tissue Jejunum SURGICAL PATHOLOGY Chano Lindsay MD 4/25/2022 2120        Implants:  * No implants in log *      Drains:   Closed/Suction Drain Superior;Midline Abdomen Bulb (Active)       NG/OG/NJ/NE Tube Nasogastric 18 fr Left nostril (Active)   Surrounding Skin Clean, dry & intact 04/25/22 2002   Securement device Adhesive based young 04/25/22 2002   Status Clamped 04/25/22 2002   Placement Verified Gastric Contents 04/25/22 2002   NG/OG/NJ/NE External Measurement (cm) 60 cm 04/25/22 2002   Drainage Appearance Yellow;Green 04/25/22 2002       Urinary Catheter Max (Active)       Findings: Ischemic perforated incarcerated small bowel and necrotic hernia sac right groin.   Fascial defect quite diminutive    Electronically signed by Chano Lindsay MD on 4/25/2022 at 10:18 PM

## 2022-04-26 NOTE — PROGRESS NOTES
Jovana Flanagan MD  Postoperative Progress Note  Pt Name: Mickle Severance Record Number: [id number]  Date of Birth 1950   Today's Date: 4/26/2022  ASSESSMENT   1. POD # 1 sepsis secondary to incarcerated right inguinal hernia with perforation and obstruction of small bowel. Necrotic right groin wound status postdebridement  2.  has a past medical history of CAD (coronary artery disease), CVA (cerebral vascular accident) ([address] Erectile dysfunction, GERD (gastroesophageal reflux disease), Hyperlipidemia, and Hypertension. PLANS   1. Analgesics and antiemetics as needed  2. IV hydration  3. Continue broad-spectrum antibiotic  4. I patient remains on pressor support and fluid support  5. DVT prophylaxis per primary service. Okay for heparin from a surgical standpoint  6. Change groin wound packing twice daily. Dry gauze. 7. Pneumonia panel positive E. coli and haemophilus influenza  KRISS Aponte remains in ICU intubated and sedated on pressors. Making urine. CURRENT MEDICATIONS   Scheduled Meds:   [START ON 4/27/2022] vitamin D  50,000 Units Oral Weekly    sodium chloride flush  5-40 mL IntraVENous 2 times per day    [Held by provider] heparin (porcine)  5,000 Units SubCUTAneous TID    famotidine (PEPCID) injection  10 mg IntraVENous Daily    piperacillin-tazobactam  2,250 mg IntraVENous Q8H     Continuous Infusions:   vasopressin (Septic Shock) infusion 0.03 Units/min (04/26/22 0846)    midazolam 2 mg/hr (04/26/22 0846)    fentaNYL 75 mcg/hr (04/26/22 0924)    sodium chloride      sodium chloride 125 mL/hr at 04/26/22 0846    norepinephrine 20 mcg/min (04/26/22 0923)     PRN Meds:.sodium chloride flush, sodium chloride, ondansetron **OR** ondansetron, polyethylene glycol, acetaminophen **OR** acetaminophen, labetalol  OBJECTIVE   CURRENT VITALS:  height is 5' 9\" (1.753 m) and weight is 138 lb 7.2 oz (62.8 kg).  His bladder temperature is 98.1 °F (36.7 °C). His blood pressure is 113/76 and his pulse is 82. His respiration is 15 and oxygen saturation is 98%. Body mass index is 20.45 kg/m². Temperature Range (24h):Temp: 98.1 °F (36.7 °C) Temp  Av.6 °F (37 °C)  Min: 96.8 °F (36 °C)  Max: 99.7 °F (37.6 °C)  BP Range (65Q): Systolic (27IBT), TFH:308 , Min:41 , WNN:280     Diastolic (15IWO), RFS:13, Min:24, Max:89    Pulse Range (24h): Pulse  Av.5  Min: 81  Max: 119  Respiration Range (24h): Resp  Av.6  Min: 0  Max: 30  Current Pulse Ox (24h):  SpO2: 98 %  Pulse Ox Range (24h):  SpO2  Av.1 %  Min: 79 %  Max: 100 %  Oxygen Amount and Delivery: O2 Flow Rate (L/min): 3 L/min  Incentive Spirometry Tx:            GENERAL: Sedated no acute distress  LUNGS: Clear diminished bases   HEART: Pulse 106  ABDOMEN: Nursing in place   INCISION dressing in place   EXTREMITY: No edema  In: 9806.4 [I.V.:9597]  Out: 1455 [Urine:725; Drains:30]  Closed/Suction Drain Superior;Midline Abdomen Bulb-Output (ml): 30 ml  Date 22 0000 - 22   Shift 7220-2096 2365-2629 1854-7201 24 Hour Total   INTAKE   P.O.(mL/kg/hr) 0(0)   0   I. V.(mL/kg) 0651.4(63) 722.0(9.3)  5285(47.4)   IV Piggyback(mL/kg) 657.6(7.3) 0(0)  209.4(3.3)   Shift Total(mL/kg) 3889. 8(49.4) 206.6(3.3)  3306. 4(52.6)   OUTPUT   Urine(mL/kg/hr) 625(1.2)   625   Emesis/NG output(mL/kg) 600(9.6)   600(9.6)   Drains(mL/kg) 30(0.5)   30(0.5)   Other(mL/kg) 0(0)   0(0)   Stool(mL/kg) 0(0)   0(0)   Blood(mL/kg) 0(0)   0(0)   Shift Total(mL/kg) 1255(20)   1255(20)   Weight (kg) 62.8 62.8 62.8 62.8     LABS     Recent Labs     22  1547 22  1547 22  1942 22  2121 22  2309 22  0419   WBC 21.8*  --   --   --  4.3* 13.2*   HGB 10.8*  --   --  8.6* 9.0* 9.3*   HCT 36.6*  --   --   --  27.6* 28.3*   *  --   --   --  368 396      < > 139 144 143 142   K 4.0   < > 4.6 3.0* 3.4* 4.9   CL 93*   < > 96*  --  104 104   CO2 16*   < > 23  --  23 20*   * < > 175*  --  147* 149*   CREATININE 11.2*   < > 10.4*  --  8.3* 7.8*   MG 2.6*  --   --   --   --  1.8   PHOS 8.4*  --   --   --   --  6.4*   CALCIUM 8.9   < > 8.4*  --  8.1* 8.9    < > = values in this interval not displayed. No results for input(s): PTT, INR in the last 72 hours. Invalid input(s): PT  Recent Labs     04/25/22  1547 04/25/22  2309   AST 28 28   ALT 19 17   BILITOT 3.1* 3.9*   BILIDIR 0.9*  --    LACTA  --  1.8     Recent Labs     04/25/22  1547 04/25/22  1942 04/25/22  2309   TROPONINT 0.047* 0.044* 0.032*         RADIOLOGY     XR CHEST PORTABLE   Final Result   Impression:   1. Satisfactory positioning of the endotracheal tube and right central    line. Probable slightly high positioning of the orogastric tube. Consider    advancing 3-4 cm. 2. Interval development of small bilateral pleural effusions, right    greater than left. 3. Bilateral lower lobe airspace opacities secondary to atelectasis and/or    infiltrates, right greater than left. 4. Bilateral interstitial changes secondary to pneumonitis or edema. This document has been electronically signed by: Karma Rodriguez DO on    04/26/2022 12:01 AM      CT ABDOMEN PELVIS WO CONTRAST Additional Contrast? None   Final Result   Impression:   Right inguinal hernia containing small bowel. This is causing a small    bowel obstruction. There are several gas collections within the right    inguinal hernia. Ischemic or perforated bowel could have this appearance. Mild to moderate patchy consolidation bilateral lower lobes. This could    represent aspiration or pneumonia. Prominent thickening of the distal esophagus and gastroesophageal    junction. Neoplasm not excluded. Recommend direct visualization.       This document has been electronically signed by: Danial Ying MD on    04/25/2022 07:20 PM      All CTs at this facility use dose modulation techniques and iterative    reconstructions, and/or weight-based dosing when appropriate to reduce radiation to a low as reasonably achievable. US RENAL COMPLETE   Final Result   Impression:   Multifocal simple bilateral renal cyst. Echogenic bilateral renal    parenchyma. This document has been electronically signed by: Jessie Fuentes MD on    04/25/2022 06:30 PM      CT HEAD WO CONTRAST   Final Result       1. Stable CT scan of the brain, no interval change since previous MRI scan dated 10 Melody 2019.   2. Mild atrophy and dilatation of the third and lateral ventricles. 3. Probable ischemic changes in the white matter, left basal ganglia and in the bernadette. 4. Calcification the cavernous segments of both internal carotid arteries. 5. Increased density in the external auditory canals which may represent cerumen bilaterally. **This report has been created using voice recognition software. It may contain minor errors which are inherent in voice recognition technology. **      Final report electronically signed by DR Katheryn Marin on 4/25/2022 4:01 PM      XR CHEST PORTABLE   Final Result   1. Normal heart size. No effusion. 2. Persistent mild atelectatic/fibrotic stranding retrocardiac region left lung base. **This report has been created using voice recognition software. It may contain minor errors which are inherent in voice recognition technology. **      Final report electronically signed by Dr. Lucita Canavan on 4/25/2022 3:05 PM          Electronically signed by Lucita Willard MD on 4/26/2022 at 10:20 AM

## 2022-04-26 NOTE — SIGNIFICANT EVENT
Brief Note:  Increasing need for pressor support with Levophed up to 30 along with Vasopressin. Related to sepsis, volume depletion, sedation, and onetime dose of Labetalol. Therefore, onetime 2 liter normal saline bolus ordered.        Angelo Barrientos MD, MPH, [facility]

## 2022-04-26 NOTE — FLOWSHEET NOTE
04/26/22 1049   Encounter Summary   Encounter Overview/Reason  Spiritual/Emotional Needs   Service Provided For: Patient   Referral/Consult From: [address] Family members   Last Encounter  04/26/22  (NR)   Complexity of Encounter Low   Begin Time 1035   End Time  1049   Total Time Calculated 14 min   Encounter    Type Initial Screen/Assessment   Assessment/Intervention/Outcome   Assessment Unable to assess   Intervention Prayer (assurance of)/[place]   Outcome Did not respond   In my encounter with the 66  yr old patient, I attempted to see the patient, but they were unresponsive at this time. No family was present in the room. I offered a prayer at the pts side. A  will attempt to see the patient at a later time as a follow up. The pt was admitted due to acute respiratory failure.

## 2022-04-26 NOTE — CARE COORDINATION
4/26/22, 8:45 AM EDT  DISCHARGE PLANNING EVALUATION:    Anatoly Suarez Sr. Admitted: 4/25/2022/ [facility] day: 1   Location: [facility]10/010-A Reason for admit: Renal failure [N19]  Sepsis associated hypotension (HCC) [A41.9, I95.9]   PMH:  has a past medical history of CAD (coronary artery disease), CVA (cerebral vascular accident) ([address] Erectile dysfunction, GERD (gastroesophageal reflux disease), Hyperlipidemia, and Hypertension. Procedure:   4/25 CXR: Persistent mild atelectatic/fibrotic stranding retrocardiac region left lung base; no effusion  4/25 CT Head:   1. Stable CT scan of the brain, no interval change since previous MRI scan dated 10 Melody 2019.   2. Mild atrophy and dilatation of the third and lateral ventricles. 3. Probable ischemic changes in the white matter, left basal ganglia and in the bernadette. 4. Calcification the cavernous segments of both internal carotid arteries. 5. Increased density in the external auditory canals which may represent cerumen bilaterally     4/25 Renal US: Multifocal simple bilateral renal cyst. Echogenic bilateral renal parenchyma  4/25 CT Abd/pelvis:   Right inguinal hernia containing small bowel. This is causing a small    bowel obstruction. There are several gas collections within the right    inguinal hernia. Ischemic or perforated bowel could have this appearance.       Mild to moderate patchy consolidation bilateral lower lobes. This could    represent aspiration or pneumonia.       Prominent thickening of the distal esophagus and gastroesophageal    junction. Neoplasm not excluded. Recommend direct visualization     4/25 LAPAROTOMY EXPLORATORY FOR SMALL BOWEL OBSTRUCTION WITH INCARCERATED HERNIA REPAIR. Debridement necrotic subcutaneous fat fascia and muscle right groin  4/25 Intubated in OR  4/25 CVC Right subclavian   4/25 CXR:   1. Satisfactory positioning of the endotracheal tube and right central    line.  Probable slightly high positioning of the orogastric tube. Consider    advancing 3-4 cm. 2. Interval development of small bilateral pleural effusions, right    greater than left. 3. Bilateral lower lobe airspace opacities secondary to atelectasis and/or    infiltrates, right greater than left. 4. Bilateral interstitial changes secondary to pneumonitis or edema. Barriers to Discharge: S/P fall 14 days prior, seen in ED and discharged. Presented to ED 7 days ago with c/o RLQ bulge and was diagnosed with inguinal hernia and scheduled for OP surgical f/u. Presented to ED yesterday with c/o syncope and worsening inguinal hernia. Wife reports syncope first occurred in [place] several days ago when had nausea and feelings of excessive heat while sitting. He went to go outside and then passed out. On morning of presentation, wife was giving him a bath and he \"slumped over\". Wife also reports he had vomited 3 times in the past 24 hours, has not been having BMs, less frequent urination, and has been weak and sleeping \"20 hours a day\". In ED, found to have creatinine 11.2. Nephrology consulted for ROBSON. General Surgery consulted for suspected incarcerated right inguinal hernia. Taken to surgery yesterday for exploratory lap with small bowel resection, drainage of pelvic abscess and right groin abscess, and debridement of necrotic sq fat/fascia/muscle in right groin along with excision of necrotic hernia sac in right groin. Admitted to ICU post-op on vent. Aspiration pneumonia. CVC placed. Echo ordered. Remains on vent w/ETT on PCV, peep 8, FIO2 40%, sats 100%. Afebrile. NSR. Unable to follow commands; COOMBS 4 to painful stim. . ALFONSO x1 with 30 ml out since surgery. NG with 600 ml out since surgery. Respiratory culture +HFlu and EColi by PCR. Telemetry, CVC, teddy, OG to LIWS, wound care, drain care, iris, SCDs. IVF, fentanyl @ 100 mcg/hr, versed @ 2 mg/hr, levo @ 24 mcg/min, vasopressin @ 0.03 units/min, IV pepcid, prn IV labetalol, IV zosyn.  Received 2L in fld bolus and IV vancomycin x1 yesterday. Received 2L fld bolus x1, Ca+ and K+ replacement today.  - now 149, Creat 10.4 - now 7.8, LA 2.2 - now 1.8, Ical 0.91, phos 8.4 - now 6.4, trop 0.044 - then 0.032, alb 2.7, bili 3.1 - now 3.9, direct bili 0.9, wbc 21.8 - now 13.2, hgb 8.6 - now 9.3. Blood,urine, and abdominal fluid sent for culture. PCP: Radha Vences MD  Readmission Risk Score: 21.9 ( )%    Patient Goals/Plan/Treatment Preferences: No family present at time of rounds. From home with wife. Need meet & greet. Monitor for needs as course progresses. Transportation/Food Security/Housekeeping Addressed:  No issues identified.

## 2022-04-26 NOTE — PLAN OF CARE
Problem: Discharge Planning  Goal: Discharge to home or other facility with appropriate resources  Outcome: Progressing  Note: Remains in ICU this shift. Problem: Pain  Goal: Verbalizes/displays adequate comfort level or baseline comfort level  Outcome: Progressing  Note: Remains sedated on ventilator this shift. Problem: Respiratory - Adult  Goal: Achieves optimal ventilation and oxygenation  4/26/2022 1336 by Raúl Nieto RN  Outcome: Progressing  Note: Weaning ventilator settings as patient tolerates     Problem: Chronic Conditions and Co-morbidities  Goal: Patient's chronic conditions and co-morbidity symptoms are monitored and maintained or improved  Outcome: Progressing  Note: Patient remains sedated on ventilator, remains on blood pressure support medications at this time. Problem: Safety - Adult  Goal: Free from fall injury  Outcome: Progressing  Note: Remains free from injury - remains in bed with fall precautions in place. Problem: ABCDS Injury Assessment  Goal: Absence of physical injury  Outcome: Progressing  Note: Remains free from injury     Problem: Skin/Tissue Integrity  Goal: Absence of new skin breakdown  Description: 1. Monitor for areas of redness and/or skin breakdown  2. Assess vascular access sites hourly  3. Every 4-6 hours minimum:  Change oxygen saturation probe site  4. Every 4-6 hours:  If on nasal continuous positive airway pressure, respiratory therapy assess nares and determine need for appliance change or resting period. Outcome: Progressing  Note: No new skin breakdown noted. Patient turned and repositioned q2h and prn     Problem: Safety - Medical Restraint  Goal: Remains free of injury from restraints (Restraint for Interference with Medical Device)  Description: INTERVENTIONS:  1. Determine that other, less restrictive measures have been tried or would not be effective before applying the restraint  2.  Evaluate the patient's condition at the time of restraint application  3. Inform patient/family regarding the reason for restraint  4. Q2H: Monitor safety, psychosocial status, comfort, nutrition and hydration  Outcome: Progressing  Note: Remains free from injury related to restraints       Care plan reviewed with patient and family at bedside. Patient unable to verbalize, family verbalizes understanding of the plan of care and contribute to goal setting.

## 2022-04-26 NOTE — ANESTHESIA PROCEDURE NOTES
Arterial Line:    An arterial line was placed using surface landmarks, in the OR for the following indication(s): continuous blood pressure monitoring and blood sampling needed. A 20 gauge (size), 1 and 1/4 inch (length), Arrow (type) catheter was placed, Seldinger technique used, into the left radial artery, secured by suture, tape and Tegaderm. Anesthesia type: General    Events:  patient tolerated procedure well with no complications and EBL < 5mL.   Anesthesiologist: Crow Farrell MD  Preanesthetic Checklist  Completed: patient identified, IV checked, site marked, risks and benefits discussed, surgical consent, monitors and equipment checked, pre-op evaluation, timeout performed, anesthesia consent given, oxygen available and patient being monitored

## 2022-04-26 NOTE — PLAN OF CARE
Problem: Nutrition Deficit:  Goal: Optimize nutritional status  Outcome: Not Progressing   Nutrition Problem #1: Severe malnutrition,In context of chronic illness  Intervention: Food and/or Nutrient Delivery: Continue NPO (recommend trophic EN as GI status allows but if unable to begin in next 24h then PN)

## 2022-04-26 NOTE — PROGRESS NOTES
Renal Progress Note  [facility]    Patient :  Juan Diaz Sr.; 66 y.o. MRN# [id number]  Location:  4D-10/010-A  Attending:  Kalyani Juarez MD  Admit Date:  4/25/2022   Hospital Day: 1      Subjective:     Nephrology is following the patient for ROBSON. Patient seen and examined in ICU. On vent 65% FiO2, 10 PEEP. On 28 mcg levophed as well as Vasopressin. Urine output 725 mL overnight. He is s/p ex lap with small bowel resection ,had drainage of pelvic and right groin abscess with excision of necrotic hernia sac. Outpatient Medications:     Medications Prior to Admission: atorvastatin (LIPITOR) 20 MG tablet, TAKE 1 TABLET BY MOUTH DAILY  COMBIGAN 0.2-0.5 % ophthalmic solution, Place 1 drop into the left eye every 12 hours  famotidine (PEPCID) 20 MG tablet, Take 1 tablet by mouth 2 times daily  losartan (COZAAR) 50 MG tablet, Take 1 tablet by mouth daily  metoprolol succinate (TOPROL XL) 25 MG extended release tablet, TAKE 1 TABLET BY MOUTH DAILY  tamsulosin (FLOMAX) 0.4 MG capsule, Take 1 capsule by mouth daily  lidocaine (LIDODERM) 5 %, Place 1 patch onto the skin daily 12 hours on, 12 hours off.   fluocinonide (LIDEX) 0.05 % cream, APPLY EXTERNALLY TO THE AFFECTED AREA TWICE DAILY  tadalafil (CIALIS) 20 MG tablet, TAKE 1 TABLET BY MOUTH EVERY DAY AS NEEDED  aspirin 81 MG EC tablet, Take 1 tablet by mouth daily  loratadine (CLARITIN) 10 MG tablet, TAKE 1 TABLET BY MOUTH DAILY    Current Medications:     Scheduled Meds:    vitamin D  50,000 Units Oral Weekly    sodium chloride flush  5-40 mL IntraVENous 2 times per day    [Held by provider] heparin (porcine)  5,000 Units SubCUTAneous TID    famotidine (PEPCID) injection  10 mg IntraVENous Daily    piperacillin-tazobactam  2,250 mg IntraVENous Q8H     Continuous Infusions:    vasopressin (Septic Shock) infusion 0.03 Units/min (04/26/22 2895)    midazolam 2 mg/hr (04/26/22 9336)    fentaNYL      sodium chloride      sodium chloride 125 mL/hr at 22 0025    norepinephrine 28 mcg/min (22 0622)     PRN Meds:  sodium chloride flush, sodium chloride, ondansetron **OR** ondansetron, polyethylene glycol, acetaminophen **OR** acetaminophen, labetalol    Input/Output:       I/O last 3 completed shifts: In: 9333.5 [I.V.:9209.6; IV Piggyback:123.9]  Out: 1455 [Urine:725; Emesis/NG output:600; Drains:30; Blood:100].       Patient Vitals for the past 96 hrs (Last 3 readings):   Weight   22 0600 138 lb 7.2 oz (62.8 kg)   22 1415 150 lb (68 kg)       Vital Signs:   Temperature:  Temp: 97.7 °F (36.5 °C)  TMax:   Temp (24hrs), Av.6 °F (37 °C), Min:96.8 °F (36 °C), Max:99.7 °F (37.6 °C)    Respirations:  Resp: 16  Pulse:   Pulse: 87  BP:    BP: (!) 105/53  BP Range: Systolic (90RCW), M , Min:41 , GXJ:202       Diastolic (80LRG), GLH:23, Min:25, Max:89      Physical Examination:     General:  Intubated, sedated  HEENT: NC/AT/ MMM  Chest:               Clear B/L no rales  Cardiac:  S1 S2   Abdomen: Firm, abdominal binder +ALFONSO drain  Neuro:  sedated  SKIN:  No rashes,   Extremities:  No edema,     Labs:       Recent Labs     22  1547 22  2121 22  2309 22  0419   WBC 21.8*  --  4.3* 13.2*   RBC 3.10*  --  2.62* 2.68*   HGB 10.8* 8.6* 9.0* 9.3*   HCT 36.6*  --  27.6* 28.3*   .1*  --  105.3* 105.6*   MCH 34.8*  --  34.4* 34.7*   MCHC 29.5*  --  32.6 32.9   *  --  368 396   MPV 9.9  --  9.7 9.9      BMP:   Recent Labs     22  1942 22    144 143 142   K 4.6 3.0* 3.4* 4.9   CL 96*  --  104 104   CO2 23  --  23 20*   *  --  147* 149*   CREATININE 10.4*  --  8.3* 7.8*   GLUCOSE 145*  --  92 129*   CALCIUM 8.4*  --  8.1* 8.9      Phosphorus:     Recent Labs     22  1547 22   PHOS 8.4* 6.4*     Magnesium:    Recent Labs     22   MG 2.6* 1.8     Albumin:    Recent Labs     22 LABALBU 2.9* 2.7*     BNP:    No results found for: BNP  KHUSHBOO:    No results found for: KHUSHBOO  SPEP:  Lab Results   Component Value Date    PROT 5.1 04/25/2022     UPEP:   No results found for: LABPE  C3:   No results found for: C3  C4:   No results found for: C4  MPO ANCA:   No results found for: MPO  PR3 ANCA:   No results found for: PR3  Anti-GBM:   No results found for: GBMABIGG  Hep BsAg:       No results found for: HEPBSAG  Hep C AB:        No results found for: HEPCAB    Urinalysis/Chemistries:      Lab Results   Component Value Date    NITRU NEGATIVE 04/25/2022    COLORU YELLOW 04/25/2022    PHUR 5.5 04/25/2022    LABCAST >15 HYALINE 04/25/2022    WBCUA 10-15 04/25/2022    RBCUA 10-15 04/25/2022    MUCUS NONE SEEN 04/25/2022    YEAST NONE SEEN 04/25/2022    BACTERIA FEW 04/25/2022    SPECGRAV 1.015 04/25/2022    LEUKOCYTESUR TRACE 04/25/2022    UROBILINOGEN 0.2 04/25/2022    BILIRUBINUR NEGATIVE 04/25/2022    BLOODU MODERATE 04/25/2022    GLUCOSEU NEGATIVE 06/09/2019    KETUA NEGATIVE 04/25/2022     Urine Sodium:   No results found for: MING  Urine Potassium:  No results found for: KUR  Urine Chloride:  No results found for: CLUR  Urine Osmolarity: No results found for: OSMOU  Urine Protein:   No components found for: TOTALPROTEIN, URINE   Urine Creatinine:   No results found for: LABCREA  Urine Eosinophils:  No components found for: UEOS        Impression and Plan:  1. ROBSON , prerenal due to volume depletion and hypotension from sepsis: improving. Continue IV fluids @ 125 ml/hour. Maintain MAP > 65. Volume status looks ok  2. Lytes: stable  3. Respiratory failure on vent  4. Hyperphosphatemia from ROBSON, expect improvement as renal fxn improves  5. Septic Shock  6. S/p ex lap with small bowel resection, drainage of abscess and debridement necrotic fat fascia and hernia sac  7. Leukocytosis  8. Pneumonia: panel growing H. Influenza and E. Coli  9.  Anemia        Please don't hesitate to call with any questions.   Electronically signed by Afshin Aguilar DO on 4/26/2022 at 7:15 AM

## 2022-04-26 NOTE — PROGRESS NOTES
Comprehensive Nutrition Assessment    Type and Reason for Visit:  Initial,Consult (new vent; nutrition support recommendations)    Nutrition Recommendations/Plan:   1. As GI status allows recommend Vital 1.2 trophic feeds at 10ml/hour. If unable to begin EN in next 24h recommend PN as pt. appears malnourished - starting at 10 kcals/kgm, 1 gm protein/kgm (monitoring renal status), 30% lipid kcals, dosing wt. 63kgm - monitor for refeediing syndrome. Malnutrition Assessment:  Malnutrition Status:  Severe malnutrition (04/26/22 1443)    Context:  Chronic Illness     Findings of the 6 clinical characteristics of malnutrition:  Energy Intake:  Unable to assess  Weight Loss:  5% over 1 month (7% in 3 weeks)     Body Fat Loss:  Severe body fat loss Orbital,Triceps,Fat Overlying Ribs   Muscle Mass Loss:  Severe muscle mass loss Temples (temporalis),Clavicles (pectoralis & deltoids),Calf (gastrocnemius)  Fluid Accumulation:  No significant fluid accumulation     Strength:  Not Performed    Nutrition Assessment:      Pt. severely malnourished AEB criteria listed below. At risk for further nutritional compromise r/t admit with ROBSON, intubated 4/25, emergent GI surgery 4/25 d/t perforated SB with soilage of pelvic cavity, pelvic abscess and necrotic hernial sac, aspiration pneumonia, sepsis; per H&P N/V few days pta and underlying medical condition GERD, HTN, CVA, CAD. Nutrition Related Findings:    Pt. Report/Treatments/Miscellaneous: intubated, NGT to [facility] for today per Dr. Susy Pina; discussed on rounds with Resident -  may try trophic EN in next 24h otherwise as admit with severe malnutrition ?  PN start 4/27; dehydrated on admit, making urine  GI Status: 100ml out NGT; BM 0  Pertinent Labs:   Creatinine 7.8  Glucose 129  K+ 4.9  Phosphorus 6.4  MAP 88  Na 142  Pertinent Meds: ATB, fentanyl, levophed, vasopressin, vitamin D    Wound Type: Surgical Incision (4/25 laparotomy for SBO with hernia repair) Current Nutrition Intake & Therapies:     Diet: NPO    Anthropometric Measures:  Height: 5' 9\" (175.3 cm)  Ideal Body Weight (IBW): 160 lbs (73 kg)    Admission Body Weight: 138 lb (62.6 kg) (4/26 with no edema)  Current Body Weight: 138 lb (62.6 kg) (4/26 with no edema),     Current BMI (kg/m2): 20.4  Usual Body Weight: 148 lb (67.1 kg) (4/10/22; 143# 1/27/22)  % Weight Change (Calculated): -6.8    BMI Categories: Underweight (BMI less than 22) age over 72    Estimated Daily Nutrient Needs:  Energy Requirements Based On: Kcal/kg  Weight Used for Energy Requirements: Current (63kgm)  Energy (kcal/day): 8230-9213 (25-30/kgm)  Weight Used for Protein Requirements: Current  Protein (g/day): 107gms (1.7/kgm) IF ROBSON improves with hydration - monitoring; 63gms if no improvement  Fluid (ml/day): per physician    Nutrition Diagnosis:   · Severe malnutrition,In context of chronic illness related to inadequate protein-energy intake as evidenced by BMI,weight loss,severe muscle loss,severe loss of subcutaneous fat    Nutrition Interventions:   Food and/or Nutrient Delivery: Continue NPO (recommend trophic EN as GI status allows but if unable to begin in next 24h then PN)  Nutrition Education/Counseling: Education not appropriate  Coordination of Nutrition Care: Continue to monitor while inpatient,Interdisciplinary Rounds  Plan of Care discussed with: Resident and RN    Goals:  Previous Goal Met: No Progress toward Goal(s)  Goals: Initiate nutrition support    Nutrition Monitoring and Evaluation:   Behavioral-Environmental Outcomes: None Identified  Food/Nutrient Intake Outcomes:  (NPO monitor)  Physical Signs/Symptoms Outcomes: Biochemical Data,GI Status,Fluid Status or Edema,Hemodynamic Status,Nutrition Focused Physical Findings,Skin,Weight    Discharge Planning:     Too soon to determine     [address], [place], [place]  Contact: [phone]

## 2022-04-26 NOTE — ANESTHESIA PRE PROCEDURE
Department of Anesthesiology  Preprocedure Note       Name:  Callie Padilla Sr.   Age:  66 y.o.  :  1950                                          MRN:  [id number]         Date:  2022      Surgeon: Jagruti Carroll):  Gino Murguia MD    Procedure: Procedure(s):  LAPAROTOMY EXPLORATORY FOR SMALL BOWEL OBSTRUCTION WITH INCARCERATED HERNIA REPAIR    Medications prior to admission:   Prior to Admission medications    Medication Sig Start Date End Date Taking?  Authorizing Provider   atorvastatin (LIPITOR) 20 MG tablet TAKE 1 TABLET BY MOUTH DAILY 3/23/22   Batsheva Peñaloza MD   COMBIGAN 0.2-0.5 % ophthalmic solution Place 1 drop into the left eye every 12 hours 3/23/22   Batsheva Peñaloza MD   famotidine (PEPCID) 20 MG tablet Take 1 tablet by mouth 2 times daily 3/23/22   Batsheva Peñaloza MD   losartan (COZAAR) 50 MG tablet Take 1 tablet by mouth daily 3/23/22   Batsheva Peñaloza MD   metoprolol succinate (TOPROL XL) 25 MG extended release tablet TAKE 1 TABLET BY MOUTH DAILY 3/23/22   Batsheva Peñaloza MD   tamsulosin [facility]) 0.4 MG capsule Take 1 capsule by mouth daily 3/23/22   Batsheva Peñaloza MD   lidocaine (LIDODERM) 5 % Place 1 patch onto the skin daily 12 hours on, 12 hours off. 4/10/22   ELNA Mallory CNP   fluocinonide (LIDEX) 0.05 % cream APPLY EXTERNALLY TO THE AFFECTED AREA TWICE DAILY 22   Batsheva Peñaloza MD   tadalafil (CIALIS) 20 MG tablet TAKE 1 TABLET BY MOUTH EVERY DAY AS NEEDED 10/25/21   Batsheva Peñaloza MD   aspirin 81 MG EC tablet Take 1 tablet by mouth daily 10/15/21   Batsheva Peñaloza MD   loratadine (CLARITIN) 10 MG tablet TAKE 1 TABLET BY MOUTH DAILY 21   Batsheva Peñaloza MD       Current medications:    Current Facility-Administered Medications   Medication Dose Route Frequency Provider Last Rate Last Admin    sodium chloride flush 0.9 % injection 5-40 mL  5-40 mL IntraVENous 2 times per day Inessa Lassiter PA-C        sodium chloride flush 0.9 % injection 5-40 mL  5-40 mL IntraVENous PRN Nori Gomes, PA-C        0.9 % sodium chloride infusion   IntraVENous PRN Mariaelena Espana PA-C        [Held by provider] heparin (porcine) injection 5,000 Units  5,000 Units SubCUTAneous TID Nori Gomes, PA-C        ondansetron (ZOFRAN-ODT) disintegrating tablet 4 mg  4 mg Oral Q8H PRN Nori Gomes, PA-C        Or    ondansetron (ZOFRAN) injection 4 mg  4 mg IntraVENous Q6H PRN Nori Eliseo, PA-C        polyethylene glycol (GLYCOLAX) packet 17 g  17 g Oral Daily PRN Nori Gomes, PA-C        acetaminophen (TYLENOL) tablet 650 mg  650 mg Oral Q6H PRN Nori Gomes, PA-C        Or    acetaminophen (TYLENOL) suppository 650 mg  650 mg Rectal Q6H PRN Nori Gomes, PA-C        nicotine (NICODERM CQ) 21 MG/24HR 1 patch  1 patch TransDERmal Q24H Nori Gomes, PA-C        piperacillin-tazobactam (ZOSYN) 2,250 mg in dextrose 5 % 50 mL IVPB (mini-bag)  2,250 mg IntraVENous Q8H Nori Gomes, PA-C        famotidine (PEPCID) 10 mg in sodium chloride (PF) 10 mL injection  10 mg IntraVENous Daily Nori Eliseo, PA-C        0.9 % sodium chloride infusion   IntraVENous Continuous Khadijah Alcala DO         Current Outpatient Medications   Medication Sig Dispense Refill    atorvastatin (LIPITOR) 20 MG tablet TAKE 1 TABLET BY MOUTH DAILY 90 tablet 3    COMBIGAN 0.2-0.5 % ophthalmic solution Place 1 drop into the left eye every 12 hours 5 mL 3    famotidine (PEPCID) 20 MG tablet Take 1 tablet by mouth 2 times daily 180 tablet 3    losartan (COZAAR) 50 MG tablet Take 1 tablet by mouth daily 90 tablet 3    metoprolol succinate (TOPROL XL) 25 MG extended release tablet TAKE 1 TABLET BY MOUTH DAILY 90 tablet 3    tamsulosin (FLOMAX) 0.4 MG capsule Take 1 capsule by mouth daily 90 capsule 3    lidocaine (LIDODERM) 5 % Place 1 patch onto the skin daily 12 hours on, 12 hours off.  30 patch 0    fluocinonide (LIDEX) 0.05 % cream APPLY EXTERNALLY TO THE AFFECTED AREA TWICE DAILY 60 g 4    tadalafil (CIALIS) 20 MG tablet TAKE 1 TABLET BY MOUTH EVERY DAY AS NEEDED 18 tablet 5    aspirin 81 MG EC tablet Take 1 tablet by mouth daily 30 tablet 11    loratadine (CLARITIN) 10 MG tablet TAKE 1 TABLET BY MOUTH DAILY 90 tablet 3       Allergies:  No Known Allergies    Problem List:    Patient Active Problem List   Diagnosis Code    CAD (coronary artery disease) I25.10    Hypertension I10    Erectile dysfunction N52.9    Hyperlipidemia E78.5    GI bleeding K92.2    Hx of asbestos exposure Z77.090    Stroke-like symptom R29.90    Renal failure N19    Sepsis associated hypotension (HCC) A41.9, I95.9       Past Medical History:        Diagnosis Date    CAD (coronary artery disease)     CV[facility]cerebral vascular accident) [facility] [address]     Erectile dysfunction     GERD (gastroesophageal reflux disease) 07/2012    Gastro ulcer    Hyperlipidemia     Hypertension        Past Surgical History:        Procedure Laterality Date    CARDIAC CATHETERIZATION      COLONOSCOPY      ENDOSCOPY, COLON, DIAGNOSTIC      EYE SURGERY Left 2004    cataract    EYE SURGERY      INGUINAL HERNIA REPAIR Right 5/27/15    Dr. Jeannine Francois 2011    Dr. Nellie Charles Left 2003    Dr. Debra Paz  5/27/15    Dr. Jeanette Duong       Social History:    Social History     Tobacco Use    Smoking status: Current Every Day Smoker     Packs/day: 1.00     Years: 40.00     Pack years: 40.00     Types: Cigarettes    Smokeless tobacco: Never Used    Tobacco comment: printed to avs. 3/4 ppd since 2019   Substance Use Topics    Alcohol use:  Yes                                Ready to quit: Not Answered  Counseling given: Not Answered  Comment: printed to avs. 3/4 ppd since 2019      Vital Signs (Current):   Vitals:    04/25/22 1742 04/25/22 1826 04/25/22 1850 04/25/22 1914   BP: (!) 109/59 (!) 105/56  (!) 92/51   Pulse: 102 97  99   Resp: 20 20 23 22   Temp: TempSrc:       SpO2: 94% 94% (!) 89% 94%   Weight:       Height:                                                  BP Readings from Last 3 Encounters:   04/25/22 (!) 92/51   04/17/22 (!) 158/88   04/10/22 (!) 169/88       NPO Status:                                                                                 BMI:   Wt Readings from Last 3 Encounters:   04/25/22 150 lb (68 kg)   04/17/22 150 lb (68 kg)   04/10/22 148 lb (67.1 kg)     Body mass index is 22.15 kg/m². CBC:   Lab Results   Component Value Date    WBC 21.8 04/25/2022    RBC 3.10 04/25/2022    RBC 3.91 05/24/2016    HGB 10.8 04/25/2022    HCT 36.6 04/25/2022    .1 04/25/2022    RDW 11.9 05/24/2016     04/25/2022       CMP:   Lab Results   Component Value Date     04/25/2022    K 4.0 04/25/2022    CL 93 04/25/2022    CO2 16 04/25/2022     04/25/2022    CREATININE 11.2 04/25/2022    LABGLOM 5 04/25/2022    GLUCOSE 111 04/25/2022    GLUCOSE 78 05/24/2016    PROT 6.9 04/25/2022    CALCIUM 8.9 04/25/2022    BILITOT 3.1 04/25/2022    ALKPHOS 106 04/25/2022    AST 28 04/25/2022    ALT 19 04/25/2022       POC Tests: No results for input(s): POCGLU, POCNA, POCK, POCCL, POCBUN, POCHEMO, POCHCT in the last 72 hours.     Coags:   Lab Results   Component Value Date    INR 0.95 06/09/2019       HCG (If Applicable): No results found for: PREGTESTUR, PREGSERUM, HCG, HCGQUANT     ABGs: No results found for: PHART, PO2ART, TOV4NHQ, GTF5OHV, BEART, L7HBCGHC     Type & Screen (If Applicable):  Lab Results   Component Value Date    LABRH POS 07/25/2012       Drug/Infectious Status (If Applicable):  No results found for: HIV, HEPCAB    COVID-19 Screening (If Applicable): No results found for: COVID19        Anesthesia Evaluation    Airway: Mallampati: II        Dental:          Pulmonary:   (+) pneumonia:  COPD:  decreased breath sounds,  rales,                             Cardiovascular:    (+) hypertension:, CAD:,         Rhythm: regular                      Neuro/Psych:   (+) CVA:,             GI/Hepatic/Renal:   (+) GERD:, renal disease: ARF,           Endo/Other:                     Abdominal:             Vascular: Other Findings:             Anesthesia Plan      general     ASA 5 - emergent     (Pt.'s wife gives history of recent fall rib fracturs one week ago)  Induction: intravenous and rapid sequence. MIPS: Postoperative opioids intended, Prophylactic antiemetics administered and Postoperative ventilation. Anesthetic plan and risks discussed with patient and spouse. Plan discussed with CRNA.                   Alea Martinez MD   4/25/2022

## 2022-04-26 NOTE — PROCEDURES
ICU PROCEDURE - CVC PLACEMENT:    Risks and benefits to the procedure were discussed. Alternatives and their risks were discussed as well. INDICATION: Acute respiratory failure, hypoxia, septic shock, aspiration pneumonia, status post exploratory lap and drainage of pelvic abscess and right groin exploration drainage of abscess debridement of necrotic subcutaneous fat fascia and muscle and excision of necrotic hernia sac. SITE: Right Subclavian Vein      CONSENT: was obtained after explaining indication/risks to patient and/or next of kin    TIME OUT: taken    STERILE PREP: Prior to the procedure all involved washed their hands. Full maximum sterile field/barrier technique was followed (with cap and mask, gloves and sterile gown, as well as broad field sterile drapes). Local disinfection was performed with broad field application of orange dyed chloraprep solution, which was allowed to dry fully prior to the initiation of the procedure. LOCAL ANESTHETIC: Aqueous lidocaine 1%     ESTIMATED BLOOD LOSS: Minimal    ULTRASOUND GUIDANCE USED: No    PROCEDURE:  Using modified seldinger technique, the appropriate vessel was located with the introducer needle subsequent to the use of a 22g x 1.5 inch \"\" needle. The flexible J-tip wire was passed without difficulty or resistance, followed by minimal skin incisions over the introducer needle. The introducer needle was withdrawn and discarded, maintaining manual control of the guidewire at all times. After dilation of the tract, a preflushed 3 lumen CVC catheter was advanced over the guidewire without difficulty or resistance to its full extent. The guidewire was withdrawn and discarded and good return of nonpulsatile, dark venous blood assured through all lumes, with easy flushing of the lumens. The line was sutured in place then the site was reprepped with a  chlorprep solution followed by a Biopatch device.  After hemostasis was assured, an op site was applied and all sharps and materials were discarded appropriately. EBL < 5 ml.     COMPLICATIONS: None    POST PROCEDURE CHEST XRAY HAS BEEN ORDERED    Electronically signed by     ELAN Mendoza CNP on 4/25/2022 at 11:27 PM

## 2022-04-26 NOTE — PROCEDURES
12 lead EKG completed. Results handed to THE [facility] [place] [facility].  Maralee Litten CET

## 2022-04-26 NOTE — PROGRESS NOTES
[facility] [facility] Pharmacokinetic Monitoring Service - Vancomycin     Mercedes Suarez Sr. is a 66 y.o. male starting on vancomycin therapy for sepsis. Pharmacy consulted by Dr. Mariaelena Pineda for monitoring and adjustment. Target Concentration: Dosing based on anticipated concentration <15 mg/L due to renal impairment/insufficiency    Additional Antimicrobials: Zosyn    Pertinent Laboratory Values: Wt Readings from Last 1 Encounters:   04/25/22 150 lb (68 kg)     Temp Readings from Last 1 Encounters:   04/25/22 (P) 97.2 °F (36.2 °C) ((P) Bladder)     Estimated Creatinine Clearance: 6 mL/min (A) (based on SCr of 10.4 mg/dL [facility])). Recent Labs     04/25/22  1547 04/25/22  1942   CREATININE 11.2* 10.4*   WBC 21.8*  --          Pertinent Cultures:  Culture Date Source Results   4/25/22 Blood Pending (NGTD)   4/25/22 Urine Pending (NGTD)    4/25/22 Abdominal fluid Pending (NGTD)    MRSA Nasal Swab: N/A. Non-respiratory infection. Plan:  Concentration-guided dosing due to renal impairment/insufficiency  Vancomycin 1500mg IV x1 given 4/25/22 at 1825  Renal labs as indicated   Vancomycin concentration ordered for 4/26/22 @ 1800   Pharmacy will continue to monitor patient and adjust therapy as indicated    Thank you for the consult,  Sharla Bashir.  Melina Maldonado, [facility]  4/25/2022 10:56 PM

## 2022-04-27 LAB
ABO: NORMAL
ALBUMIN SERPL-MCNC: 2.4 G/DL (ref 3.5–5.1)
ALP BLD-CCNC: 73 U/L (ref 38–126)
ALT SERPL-CCNC: 30 U/L (ref 11–66)
ANION GAP SERPL CALCULATED.3IONS-SCNC: 10 MEQ/L (ref 8–16)
ANION GAP SERPL CALCULATED.3IONS-SCNC: 11 MEQ/L (ref 8–16)
ANION GAP SERPL CALCULATED.3IONS-SCNC: 12 MEQ/L (ref 8–16)
ANION GAP SERPL CALCULATED.3IONS-SCNC: 13 MEQ/L (ref 8–16)
ANION GAP SERPL CALCULATED.3IONS-SCNC: 9 MEQ/L (ref 8–16)
ANTIBODY SCREEN: NORMAL
AST SERPL-CCNC: 58 U/L (ref 5–40)
BILIRUB SERPL-MCNC: 3.7 MG/DL (ref 0.3–1.2)
BILIRUBIN DIRECT: 1.8 MG/DL (ref 0–0.3)
BUN BLDV-MCNC: 110 MG/DL (ref 7–22)
BUN BLDV-MCNC: 114 MG/DL (ref 7–22)
BUN BLDV-MCNC: 79 MG/DL (ref 7–22)
BUN BLDV-MCNC: 85 MG/DL (ref 7–22)
BUN BLDV-MCNC: 99 MG/DL (ref 7–22)
CALCIUM IONIZED: 1.15 MMOL/L (ref 1.12–1.32)
CALCIUM SERPL-MCNC: 8.2 MG/DL (ref 8.5–10.5)
CALCIUM SERPL-MCNC: 8.2 MG/DL (ref 8.5–10.5)
CALCIUM SERPL-MCNC: 8.3 MG/DL (ref 8.5–10.5)
CALCIUM SERPL-MCNC: 8.5 MG/DL (ref 8.5–10.5)
CALCIUM SERPL-MCNC: 8.5 MG/DL (ref 8.5–10.5)
CHLORIDE BLD-SCNC: 111 MEQ/L (ref 98–111)
CHLORIDE BLD-SCNC: 113 MEQ/L (ref 98–111)
CHLORIDE BLD-SCNC: 113 MEQ/L (ref 98–111)
CHLORIDE BLD-SCNC: 114 MEQ/L (ref 98–111)
CHLORIDE BLD-SCNC: 114 MEQ/L (ref 98–111)
CO2: 21 MEQ/L (ref 23–33)
CO2: 22 MEQ/L (ref 23–33)
CO2: 24 MEQ/L (ref 23–33)
CO2: 28 MEQ/L (ref 23–33)
CO2: 28 MEQ/L (ref 23–33)
CREAT SERPL-MCNC: 2.5 MG/DL (ref 0.4–1.2)
CREAT SERPL-MCNC: 2.7 MG/DL (ref 0.4–1.2)
CREAT SERPL-MCNC: 3.5 MG/DL (ref 0.4–1.2)
CREAT SERPL-MCNC: 4.3 MG/DL (ref 0.4–1.2)
CREAT SERPL-MCNC: 4.7 MG/DL (ref 0.4–1.2)
EKG ATRIAL RATE: 104 BPM
EKG ATRIAL RATE: 109 BPM
EKG ATRIAL RATE: 315 BPM
EKG ATRIAL RATE: 99 BPM
EKG P AXIS: -98 DEGREES
EKG P AXIS: 83 DEGREES
EKG P AXIS: 83 DEGREES
EKG P AXIS: 86 DEGREES
EKG P-R INTERVAL: 126 MS
EKG P-R INTERVAL: 126 MS
EKG P-R INTERVAL: 160 MS
EKG Q-T INTERVAL: 304 MS
EKG Q-T INTERVAL: 326 MS
EKG Q-T INTERVAL: 342 MS
EKG Q-T INTERVAL: 352 MS
EKG QRS DURATION: 144 MS
EKG QRS DURATION: 82 MS
EKG QRS DURATION: 86 MS
EKG QRS DURATION: 88 MS
EKG QTC CALCULATION (BAZETT): 439 MS
EKG QTC CALCULATION (BAZETT): 449 MS
EKG QTC CALCULATION (BAZETT): 451 MS
EKG QTC CALCULATION (BAZETT): 503 MS
EKG R AXIS: 44 DEGREES
EKG R AXIS: 45 DEGREES
EKG R AXIS: 48 DEGREES
EKG R AXIS: 49 DEGREES
EKG T AXIS: -67 DEGREES
EKG T AXIS: 47 DEGREES
EKG T AXIS: 51 DEGREES
EKG T AXIS: 60 DEGREES
EKG VENTRICULAR RATE: 104 BPM
EKG VENTRICULAR RATE: 109 BPM
EKG VENTRICULAR RATE: 165 BPM
EKG VENTRICULAR RATE: 99 BPM
ERYTHROCYTE [DISTWIDTH] IN BLOOD BY AUTOMATED COUNT: 12.9 % (ref 11.5–14.5)
ERYTHROCYTE [DISTWIDTH] IN BLOOD BY AUTOMATED COUNT: 51.4 FL (ref 35–45)
GLUCOSE BLD-MCNC: 107 MG/DL (ref 70–108)
GLUCOSE BLD-MCNC: 126 MG/DL (ref 70–108)
GLUCOSE BLD-MCNC: 130 MG/DL (ref 70–108)
GLUCOSE BLD-MCNC: 140 MG/DL (ref 70–108)
GLUCOSE BLD-MCNC: 85 MG/DL (ref 70–108)
GRAM STAIN RESULT: ABNORMAL
HCT VFR BLD CALC: 22.8 % (ref 42–52)
HCT VFR BLD CALC: 25.1 % (ref 42–52)
HCT VFR BLD CALC: 26.5 % (ref 42–52)
HEMOGLOBIN: 7.1 GM/DL (ref 14–18)
HEMOGLOBIN: 8.1 GM/DL (ref 14–18)
HEMOGLOBIN: 8.4 GM/DL (ref 14–18)
MAGNESIUM: 2.1 MG/DL (ref 1.6–2.4)
MAGNESIUM: 2.2 MG/DL (ref 1.6–2.4)
MCH RBC QN AUTO: 34.3 PG (ref 26–33)
MCHC RBC AUTO-ENTMCNC: 31.1 GM/DL (ref 32.2–35.5)
MCV RBC AUTO: 110.1 FL (ref 80–94)
ORGANISM: ABNORMAL
PHOSPHORUS: 2.9 MG/DL (ref 2.4–4.7)
PHOSPHORUS: 4.6 MG/DL (ref 2.4–4.7)
PLATELET # BLD: 289 THOU/MM3 (ref 130–400)
PMV BLD AUTO: 10.1 FL (ref 9.4–12.4)
POTASSIUM REFLEX MAGNESIUM: 3.9 MEQ/L (ref 3.5–5.2)
POTASSIUM REFLEX MAGNESIUM: 4.1 MEQ/L (ref 3.5–5.2)
POTASSIUM REFLEX MAGNESIUM: 4.4 MEQ/L (ref 3.5–5.2)
POTASSIUM REFLEX MAGNESIUM: 4.5 MEQ/L (ref 3.5–5.2)
POTASSIUM SERPL-SCNC: 3.8 MEQ/L (ref 3.5–5.2)
RBC # BLD: 2.07 MILL/MM3 (ref 4.7–6.1)
RESPIRATORY CULTURE: ABNORMAL
RESPIRATORY CULTURE: ABNORMAL
RH FACTOR: NORMAL
SODIUM BLD-SCNC: 147 MEQ/L (ref 135–145)
SODIUM BLD-SCNC: 147 MEQ/L (ref 135–145)
SODIUM BLD-SCNC: 149 MEQ/L (ref 135–145)
SODIUM BLD-SCNC: 149 MEQ/L (ref 135–145)
SODIUM BLD-SCNC: 151 MEQ/L (ref 135–145)
TOTAL PROTEIN: 5.3 G/DL (ref 6.1–8)
TSH SERPL DL<=0.05 MIU/L-ACNC: 0.65 UIU/ML (ref 0.4–4.2)
WBC # BLD: 27.2 THOU/MM3 (ref 4.8–10.8)

## 2022-04-27 PROCEDURE — 84100 ASSAY OF PHOSPHORUS: CPT

## 2022-04-27 PROCEDURE — 2500000003 HC RX 250 WO HCPCS: Performed by: PHYSICIAN ASSISTANT

## 2022-04-27 PROCEDURE — 6360000002 HC RX W HCPCS: Performed by: STUDENT IN AN ORGANIZED HEALTH CARE EDUCATION/TRAINING PROGRAM

## 2022-04-27 PROCEDURE — 2500000003 HC RX 250 WO HCPCS: Performed by: NURSE PRACTITIONER

## 2022-04-27 PROCEDURE — 2500000003 HC RX 250 WO HCPCS

## 2022-04-27 PROCEDURE — 84443 ASSAY THYROID STIM HORMONE: CPT

## 2022-04-27 PROCEDURE — 80053 COMPREHEN METABOLIC PANEL: CPT

## 2022-04-27 PROCEDURE — 82248 BILIRUBIN DIRECT: CPT

## 2022-04-27 PROCEDURE — 86900 BLOOD TYPING SEROLOGIC ABO: CPT

## 2022-04-27 PROCEDURE — 6360000002 HC RX W HCPCS: Performed by: NURSE PRACTITIONER

## 2022-04-27 PROCEDURE — 85027 COMPLETE CBC AUTOMATED: CPT

## 2022-04-27 PROCEDURE — 93005 ELECTROCARDIOGRAM TRACING: CPT | Performed by: NURSE PRACTITIONER

## 2022-04-27 PROCEDURE — 93010 ELECTROCARDIOGRAM REPORT: CPT | Performed by: INTERNAL MEDICINE

## 2022-04-27 PROCEDURE — 31720 CLEARANCE OF AIRWAYS: CPT

## 2022-04-27 PROCEDURE — 99291 CRITICAL CARE FIRST HOUR: CPT | Performed by: INTERNAL MEDICINE

## 2022-04-27 PROCEDURE — 82330 ASSAY OF CALCIUM: CPT

## 2022-04-27 PROCEDURE — 2700000000 HC OXYGEN THERAPY PER DAY

## 2022-04-27 PROCEDURE — 86923 COMPATIBILITY TEST ELECTRIC: CPT

## 2022-04-27 PROCEDURE — 2580000003 HC RX 258: Performed by: STUDENT IN AN ORGANIZED HEALTH CARE EDUCATION/TRAINING PROGRAM

## 2022-04-27 PROCEDURE — 86901 BLOOD TYPING SEROLOGIC RH(D): CPT

## 2022-04-27 PROCEDURE — 2580000003 HC RX 258: Performed by: NURSE PRACTITIONER

## 2022-04-27 PROCEDURE — 2580000003 HC RX 258: Performed by: PHYSICIAN ASSISTANT

## 2022-04-27 PROCEDURE — 36430 TRANSFUSION BLD/BLD COMPNT: CPT

## 2022-04-27 PROCEDURE — 6370000000 HC RX 637 (ALT 250 FOR IP): Performed by: SURGERY

## 2022-04-27 PROCEDURE — 94761 N-INVAS EAR/PLS OXIMETRY MLT: CPT

## 2022-04-27 PROCEDURE — 99232 SBSQ HOSP IP/OBS MODERATE 35: CPT | Performed by: INTERNAL MEDICINE

## 2022-04-27 PROCEDURE — 2000000000 HC ICU R&B

## 2022-04-27 PROCEDURE — 99024 POSTOP FOLLOW-UP VISIT: CPT | Performed by: SURGERY

## 2022-04-27 PROCEDURE — 85018 HEMOGLOBIN: CPT

## 2022-04-27 PROCEDURE — 6360000002 HC RX W HCPCS: Performed by: INTERNAL MEDICINE

## 2022-04-27 PROCEDURE — 83735 ASSAY OF MAGNESIUM: CPT

## 2022-04-27 PROCEDURE — 85014 HEMATOCRIT: CPT

## 2022-04-27 PROCEDURE — 94003 VENT MGMT INPAT SUBQ DAY: CPT

## 2022-04-27 PROCEDURE — 2580000003 HC RX 258: Performed by: INTERNAL MEDICINE

## 2022-04-27 PROCEDURE — 86850 RBC ANTIBODY SCREEN: CPT

## 2022-04-27 RX ORDER — SODIUM CHLORIDE 450 MG/100ML
INJECTION, SOLUTION INTRAVENOUS CONTINUOUS
Status: DISCONTINUED | OUTPATIENT
Start: 2022-04-27 | End: 2022-04-28

## 2022-04-27 RX ORDER — METOPROLOL TARTRATE 5 MG/5ML
INJECTION INTRAVENOUS
Status: COMPLETED
Start: 2022-04-27 | End: 2022-04-27

## 2022-04-27 RX ORDER — SODIUM CHLORIDE, SODIUM LACTATE, POTASSIUM CHLORIDE, AND CALCIUM CHLORIDE .6; .31; .03; .02 G/100ML; G/100ML; G/100ML; G/100ML
500 INJECTION, SOLUTION INTRAVENOUS ONCE
Status: COMPLETED | OUTPATIENT
Start: 2022-04-27 | End: 2022-04-27

## 2022-04-27 RX ORDER — METOPROLOL TARTRATE 5 MG/5ML
2.5 INJECTION INTRAVENOUS ONCE
Status: COMPLETED | OUTPATIENT
Start: 2022-04-27 | End: 2022-04-27

## 2022-04-27 RX ORDER — MORPHINE SULFATE 2 MG/ML
1 INJECTION, SOLUTION INTRAMUSCULAR; INTRAVENOUS EVERY 4 HOURS PRN
Status: DISCONTINUED | OUTPATIENT
Start: 2022-04-27 | End: 2022-05-01

## 2022-04-27 RX ORDER — POTASSIUM CHLORIDE 7.45 MG/ML
10 INJECTION INTRAVENOUS ONCE
Status: COMPLETED | OUTPATIENT
Start: 2022-04-27 | End: 2022-04-27

## 2022-04-27 RX ORDER — POTASSIUM CHLORIDE 29.8 MG/ML
20 INJECTION INTRAVENOUS PRN
Status: DISCONTINUED | OUTPATIENT
Start: 2022-04-27 | End: 2022-04-27 | Stop reason: SDUPTHER

## 2022-04-27 RX ORDER — DEXMEDETOMIDINE HYDROCHLORIDE 4 UG/ML
.1-1.5 INJECTION, SOLUTION INTRAVENOUS CONTINUOUS
Status: DISCONTINUED | OUTPATIENT
Start: 2022-04-27 | End: 2022-04-27

## 2022-04-27 RX ORDER — SODIUM HYPOCHLORITE 1.25 MG/ML
SOLUTION TOPICAL DAILY
Status: DISCONTINUED | OUTPATIENT
Start: 2022-04-27 | End: 2022-05-10 | Stop reason: HOSPADM

## 2022-04-27 RX ORDER — SODIUM CHLORIDE 9 MG/ML
INJECTION, SOLUTION INTRAVENOUS PRN
Status: DISCONTINUED | OUTPATIENT
Start: 2022-04-27 | End: 2022-05-01

## 2022-04-27 RX ORDER — POTASSIUM CHLORIDE 7.45 MG/ML
10 INJECTION INTRAVENOUS PRN
Status: DISCONTINUED | OUTPATIENT
Start: 2022-04-27 | End: 2022-04-27 | Stop reason: SDUPTHER

## 2022-04-27 RX ORDER — MORPHINE SULFATE 4 MG/ML
4 INJECTION, SOLUTION INTRAMUSCULAR; INTRAVENOUS ONCE
Status: COMPLETED | OUTPATIENT
Start: 2022-04-27 | End: 2022-04-27

## 2022-04-27 RX ORDER — DILTIAZEM HYDROCHLORIDE 5 MG/ML
10 INJECTION INTRAVENOUS ONCE
Status: COMPLETED | OUTPATIENT
Start: 2022-04-27 | End: 2022-04-27

## 2022-04-27 RX ADMIN — PIPERACILLIN AND TAZOBACTAM 2250 MG: 2; .25 INJECTION, POWDER, LYOPHILIZED, FOR SOLUTION INTRAVENOUS at 09:19

## 2022-04-27 RX ADMIN — METOPROLOL TARTRATE 2.5 MG: 5 INJECTION INTRAVENOUS at 20:05

## 2022-04-27 RX ADMIN — DAKIN'S SOLUTION 0.125% (QUARTER STRENGTH): 0.12 SOLUTION at 11:12

## 2022-04-27 RX ADMIN — MORPHINE SULFATE 1 MG: 2 INJECTION, SOLUTION INTRAMUSCULAR; INTRAVENOUS at 23:33

## 2022-04-27 RX ADMIN — FAMOTIDINE 10 MG: 10 INJECTION, SOLUTION INTRAVENOUS at 08:41

## 2022-04-27 RX ADMIN — DILTIAZEM HYDROCHLORIDE 10 MG: 5 INJECTION INTRAVENOUS at 20:22

## 2022-04-27 RX ADMIN — MORPHINE SULFATE 1 MG: 2 INJECTION, SOLUTION INTRAMUSCULAR; INTRAVENOUS at 18:37

## 2022-04-27 RX ADMIN — SODIUM CHLORIDE, PRESERVATIVE FREE 10 ML: 5 INJECTION INTRAVENOUS at 08:42

## 2022-04-27 RX ADMIN — METOPROLOL TARTRATE 2.5 MG: 5 INJECTION INTRAVENOUS at 20:00

## 2022-04-27 RX ADMIN — MORPHINE SULFATE 4 MG: 4 INJECTION, SOLUTION INTRAMUSCULAR; INTRAVENOUS at 19:09

## 2022-04-27 RX ADMIN — METOPROLOL TARTRATE 2.5 MG: 1 INJECTION, SOLUTION INTRAVENOUS at 20:05

## 2022-04-27 RX ADMIN — PIPERACILLIN AND TAZOBACTAM 2250 MG: 2; .25 INJECTION, POWDER, LYOPHILIZED, FOR SOLUTION INTRAVENOUS at 02:57

## 2022-04-27 RX ADMIN — SODIUM CHLORIDE, POTASSIUM CHLORIDE, SODIUM LACTATE AND CALCIUM CHLORIDE 500 ML: 600; 310; 30; 20 INJECTION, SOLUTION INTRAVENOUS at 20:08

## 2022-04-27 RX ADMIN — METOPROLOL TARTRATE 2.5 MG: 5 INJECTION INTRAVENOUS at 19:42

## 2022-04-27 RX ADMIN — SODIUM CHLORIDE: 4.5 INJECTION, SOLUTION INTRAVENOUS at 08:41

## 2022-04-27 RX ADMIN — METOPROLOL TARTRATE 2.5 MG: 1 INJECTION, SOLUTION INTRAVENOUS at 20:00

## 2022-04-27 RX ADMIN — DEXMEDETOMIDINE HYDROCHLORIDE 0.2 MCG/KG/HR: 4 INJECTION, SOLUTION INTRAVENOUS at 06:45

## 2022-04-27 RX ADMIN — CALCIUM GLUCONATE: 98 INJECTION, SOLUTION INTRAVENOUS at 18:17

## 2022-04-27 RX ADMIN — METOPROLOL TARTRATE 2.5 MG: 1 INJECTION, SOLUTION INTRAVENOUS at 19:42

## 2022-04-27 RX ADMIN — AMIODARONE HYDROCHLORIDE 1 MG/MIN: 1.8 INJECTION, SOLUTION INTRAVENOUS at 20:47

## 2022-04-27 RX ADMIN — SODIUM CHLORIDE, PRESERVATIVE FREE 10 ML: 5 INJECTION INTRAVENOUS at 20:10

## 2022-04-27 RX ADMIN — AMIODARONE HYDROCHLORIDE 150 MG: 1.5 INJECTION, SOLUTION INTRAVENOUS at 20:37

## 2022-04-27 RX ADMIN — POTASSIUM CHLORIDE 10 MEQ: 7.46 INJECTION, SOLUTION INTRAVENOUS at 22:46

## 2022-04-27 RX ADMIN — SODIUM CHLORIDE, POTASSIUM CHLORIDE, SODIUM LACTATE AND CALCIUM CHLORIDE 500 ML: 600; 310; 30; 20 INJECTION, SOLUTION INTRAVENOUS at 20:55

## 2022-04-27 RX ADMIN — PIPERACILLIN AND TAZOBACTAM 2250 MG: 2; .25 INJECTION, POWDER, LYOPHILIZED, FOR SOLUTION INTRAVENOUS at 18:16

## 2022-04-27 ASSESSMENT — PAIN SCALES - GENERAL
PAINLEVEL_OUTOF10: 3
PAINLEVEL_OUTOF10: 0
PAINLEVEL_OUTOF10: 3
PAINLEVEL_OUTOF10: 3
PAINLEVEL_OUTOF10: 0
PAINLEVEL_OUTOF10: 6
PAINLEVEL_OUTOF10: 5

## 2022-04-27 ASSESSMENT — PULMONARY FUNCTION TESTS
PIF_VALUE: 15
PIF_VALUE: 18
PIF_VALUE: 15

## 2022-04-27 ASSESSMENT — PAIN DESCRIPTION - ORIENTATION
ORIENTATION: MID
ORIENTATION: MID

## 2022-04-27 ASSESSMENT — PAIN DESCRIPTION - DESCRIPTORS
DESCRIPTORS: ACHING
DESCRIPTORS: ACHING

## 2022-04-27 ASSESSMENT — PAIN DESCRIPTION - LOCATION
LOCATION: ABDOMEN
LOCATION: ABDOMEN

## 2022-04-27 ASSESSMENT — PAIN - FUNCTIONAL ASSESSMENT
PAIN_FUNCTIONAL_ASSESSMENT: PREVENTS OR INTERFERES SOME ACTIVE ACTIVITIES AND ADLS
PAIN_FUNCTIONAL_ASSESSMENT: PREVENTS OR INTERFERES SOME ACTIVE ACTIVITIES AND ADLS

## 2022-04-27 ASSESSMENT — PAIN DESCRIPTION - ONSET: ONSET: ON-GOING

## 2022-04-27 ASSESSMENT — PAIN DESCRIPTION - PAIN TYPE: TYPE: ACUTE PAIN;SURGICAL PAIN

## 2022-04-27 ASSESSMENT — PAIN DESCRIPTION - FREQUENCY: FREQUENCY: CONTINUOUS

## 2022-04-27 NOTE — PLAN OF CARE
Problem: Discharge Planning  Goal: Discharge to home or other facility with appropriate resources  Outcome: Progressing  Flowsheets (Taken 4/26/2022 2000)  Discharge to home or other facility with appropriate resources:   Identify barriers to discharge with patient and caregiver   Arrange for needed discharge resources and transportation as appropriate   Identify discharge learning needs (meds, wound care, etc)   Arrange for interpreters to assist at discharge as needed   Refer to discharge planning if patient needs post-hospital services based on physician order or complex needs related to functional status, cognitive ability or social support system  Note: Patient not a candidate for discharge at this time. Patient remains in ICU for management. Patient will be discharged to a lower level of care once stabilized. Education and teaching to be provided throughout hospital stay and reinforced prior to discharge to private residence/facility. Problem: Pain  Goal: Verbalizes/displays adequate comfort level or baseline comfort level  Outcome: Progressing  Flowsheets (Taken 4/26/2022 2000)  Verbalizes/displays adequate comfort level or baseline comfort level:   Encourage patient to monitor pain and request assistance   Assess pain using appropriate pain scale   Administer analgesics based on type and severity of pain and evaluate response   Implement non-pharmacological measures as appropriate and evaluate response   Consider cultural and social influences on pain and pain management   Notify Licensed Independent Practitioner if interventions unsuccessful or patient reports new pain  Note: Patient comfort level measured using CPOT.      Problem: Respiratory - Adult  Goal: Achieves optimal ventilation and oxygenation  4/27/2022 0624 by Zane Chen RN  Outcome: Progressing  Flowsheets (Taken 4/26/2022 2000)  Achieves optimal ventilation and oxygenation:   Assess for changes in respiratory status   Assess for changes in mentation and behavior   Position to facilitate oxygenation and minimize respiratory effort   Oxygen supplementation based on oxygen saturation or arterial blood gases   Initiate smoking cessation protocol as indicated   Encourage broncho-pulmonary hygiene including cough, deep breathe, incentive spirometry   Assess the need for suctioning and aspirate as needed   Assess and instruct to report shortness of breath or any respiratory difficulty   Respiratory therapy support as indicated  Note: Patient remains on mechanical ventilation at this time. Vent settings decreased overnight. Possible extubation today. Problem: Chronic Conditions and Co-morbidities  Goal: Patient's chronic conditions and co-morbidity symptoms are monitored and maintained or improved  Outcome: Progressing  Flowsheets (Taken 4/26/2022 2000)  Care Plan - Patient's Chronic Conditions and Co-Morbidity Symptoms are Monitored and Maintained or Improved:   Monitor and assess patient's chronic conditions and comorbid symptoms for stability, deterioration, or improvement   Collaborate with multidisciplinary team to address chronic and comorbid conditions and prevent exacerbation or deterioration   Update acute care plan with appropriate goals if chronic or comorbid symptoms are exacerbated and prevent overall improvement and discharge  Note: Patient on continuous cardiac monitoring. Labs and blood work monitored daily. Problem: Safety - Adult  Goal: Free from fall injury  Outcome: Progressing  Flowsheets (Taken 4/26/2022 2000)  Free From Fall Injury:   Instruct family/caregiver on patient safety   Based on caregiver fall risk screen, instruct family/caregiver to ask for assistance with transferring infant if caregiver noted to have fall risk factors  Note: Pt remains free of falls throughout the shift. Bed in lowest position. Side rails raised x4 (residing on critical care unit). Hourly rounding performed.  Non-skid slippers and bed alarm utilized. Continuing to closely monitor and assess. Problem: ABCDS Injury Assessment  Goal: Absence of physical injury  Outcome: Progressing  Note: Patient remains free from signs of injury. Patient needs accessed hourly to improve patient safety throughout shift. Problem: Skin/Tissue Integrity  Goal: Absence of new skin breakdown  Description: 1. Monitor for areas of redness and/or skin breakdown  2. Assess vascular access sites hourly  3. Every 4-6 hours minimum:  Change oxygen saturation probe site  4. Every 4-6 hours:  If on nasal continuous positive airway pressure, respiratory therapy assess nares and determine need for appliance change or resting period. Outcome: Progressing  Note: No new skin breakdown noted throughout the shift. Ongoing assessments & interventions provided. Encouraging/assisting patient to turn q2h and prn. Pillow support in place to prevent pressure sores. Sacral patch applied to coccyx. Lines and devices free from direct contact with skin. Problem: Safety - Medical Restraint  Goal: Remains free of injury from restraints (Restraint for Interference with Medical Device)  Description: INTERVENTIONS:  1. Determine that other, less restrictive measures have been tried or would not be effective before applying the restraint  2. Evaluate the patient's condition at the time of restraint application  3. Inform patient/family regarding the reason for restraint  4.  Q2H: Monitor safety, psychosocial status, comfort, nutrition and hydration  Outcome: Progressing  Flowsheets (Taken 4/27/2022 0424)  Remains free of injury from restraints (restraint for interference with medical device):   Determine that other, less restrictive measures have been tried or would not be effective before applying the restraint   Evaluate the patient's condition at the time of restraint application   Inform patient/family regarding the reason for restraint   Every 2 hours: Monitor safety, psychosocial status, comfort, nutrition and hydration  Note: Patient shows no sign of skin breakdown or injury related to soft wrist restraint use throughout this shift. Problem: Nutrition Deficit:  Goal: Optimize nutritional status  Outcome: Progressing  Note: Patient not receiving nutrition at this time due to bowel surgery. Dietary consulted and continuing to follow case to achieve adequate nutritional intake when patient is stable. Care plan reviewed with patient/family. Patient/family able to verbalize understanding of the plan of care and contribute to goal setting during this shift. Patient/family educated on plan of care provided and reinforced during every shift change.

## 2022-04-27 NOTE — PLAN OF CARE
Problem: Respiratory - Adult  Goal: Achieves optimal ventilation and oxygenation  4/27/2022 0317 by Favian Fernandez RCP  Outcome: Progressing

## 2022-04-27 NOTE — FLOWSHEET NOTE
Assessment completed with pts wife and son, state they would prefer IPR when medically ready, if they cannot accept family will consider taking pt home with [facility]. Son states pt will not be going to an SNF. [facility] is not an option due to the type of insurance pt carries.         04/27/22 3076   Service Assessment   Patient Orientation Alert and Oriented   History Provided By Significant Other   Primary Caregiver Self   Support Systems Children;Spouse/Significant Other   Prior Functional Level Independent in ADLs/IADLs   Current Functional Level Assistance with the following:   Can patient return to prior living arrangement Yes   Family able to assist with home care needs: Yes   Financial Resources Other (Comment)  ([employer] Medicare)   Social/Functional History   Lives With Spouse   Receives Help From Family   Active  No   Occupation Retired   Discharge Planning   Living Arrangements Spouse/Significant Other   [address]   Patient expects to be discharged to: Acute rehab

## 2022-04-27 NOTE — PLAN OF CARE
Problem: Discharge Planning  Goal: Discharge to home or other facility with appropriate resources  4/27/2022 1441 by Dhara Quezada RN  Outcome: Not Progressing    Problem: Pain  Goal: Verbalizes/displays adequate comfort level or baseline comfort level  4/27/2022 1441 by Dhara Quezada RN  Outcome: Not Progressing  Note: Patient comfort level utilizing 0-10 scale at this time. Patient denying any pain this shift. Problem: Chronic Conditions and Co-morbidities  Goal: Patient's chronic conditions and co-morbidity symptoms are monitored and maintained or improved  4/27/2022 1441 by Dhara Quezada RN  Outcome: Progressing       Problem: Safety - Adult  Goal: Free from fall injury  4/27/2022 1441 by Dhara Quezada RN  Outcome: Not Progressing  Note: Patient at risk for falls due to decreased mobility. Fall assessment completed. Patient able to use call light for needs this shift. Fall band in place on patient's arm. Fall signs posted. Bed alarm in place and functioning properly. Problem: ABCDS Injury Assessment  Goal: Absence of physical injury  4/27/2022 1441 by Dhara Quezada RN  Outcome: Not Progressing  Note: Patient free from physical injury this shift. Will continue to monitor. Problem: Skin/Tissue Integrity  Goal: Absence of new skin breakdown  Description: 1. Monitor for areas of redness and/or skin breakdown  2. Assess vascular access sites hourly  3. Every 4-6 hours minimum:  Change oxygen saturation probe site  4. Every 4-6 hours:  If on nasal continuous positive airway pressure, respiratory therapy assess nares and determine need for appliance change or resting period. 4/27/2022 1441 by Dhara Quezada RN  Outcome: Not Progressing  Note: Patient at risk for decreased skin integrity due to decreased mobility. Patient turned and repositioned every 2 hours and PRN t/o this shift. No new skin breakdown noted this shift.         Problem: Safety - Medical Restraint  Goal: Remains free of injury from restraints (Restraint for Interference with Medical Device)  Description: INTERVENTIONS:  1. Determine that other, less restrictive measures have been tried or would not be effective before applying the restraint  2. Evaluate the patient's condition at the time of restraint application  3. Inform patient/family regarding the reason for restraint  4. Q2H: Monitor safety, psychosocial status, comfort, nutrition and hydration  4/27/2022 1441 by Blu Buck RN  Outcome: Completed    Problem: Nutrition Deficit:  Goal: Optimize nutritional status  4/27/2022 1441 by Blu Buck RN  Outcome: Not Progressing  Note: Orders to start TPN this shift. Care plan reviewed with patient and family. Patient and family verbalize understanding of the plan of care and contribute to goal setting.

## 2022-04-27 NOTE — FLOWSHEET NOTE
4521: Rylan Ceron at bedside for extubation. 5816: Patient extubated to 4L NC at this time SPO2 100%. Patient unable to cough for RN at this time. Suction  placed in patient mouth to suction deep, patient without gag at this time.

## 2022-04-27 NOTE — PLAN OF CARE
Problem: Respiratory - Adult  Goal: Achieves optimal ventilation and oxygenation  4/27/2022 1340 by [facility], [facility]  Outcome: Completed

## 2022-04-27 NOTE — PROGRESS NOTES
Comprehensive Nutrition Assessment    Type and Reason for Visit:  Reassess,Consult (PN macronutrients)    Nutrition Recommendations/Plan:   1. Begin TPN at 15 kcals/kgm, 1 gms protein/kgm, 30% lipid kcals, dosing wt 63kgm. Monitor for refeeding syndrome. Increasing macronutrients as appropriate. 2. As GI status allows recommend trophic EN (possibly next 1-2d per Dr. Nataliia Barillas) Vital 1.2 10ml/hour     Malnutrition Assessment:  Malnutrition Status:  Severe malnutrition (04/26/22 1443)    Context:  Chronic Illness     Findings of the 6 clinical characteristics of malnutrition:  Energy Intake:  Unable to assess  Weight Loss:  5% over 1 month (7% in 3 weeks)     Body Fat Loss:  Severe body fat loss Orbital,Triceps,Fat Overlying Ribs   Muscle Mass Loss:  Severe muscle mass loss Temples (temporalis),Clavicles (pectoralis & deltoids),Calf (gastrocnemius)  Fluid Accumulation:  No significant fluid accumulation     Strength:  Not Performed    Nutrition Assessment:      Pt. severely malnourished AEB criteria listed above. At risk for further nutritional compromise r/t admit with ROBSON, intubated 4/25, emergent GI surgery 4/25 d/t perforated SB with soilage of pelvic cavity, pelvic abscess and necrotic hernial sac, aspiration pneumonia, sepsis; LOS day 2, per H&P N/V few days pta and underlying medical condition GERD, HTN, CVA, CAD. Nutrition Related Findings:    Pt. Report/Treatments/Miscellaneous: intubated; NGT to LIWS with dark output per RN; spoke with Patience Zurdo Hall and Dr. Nataliia Barillas - starting TPN today as pt.  Admit with malnutrition and unable to start EN yet d/t GI status  GI Status: BM 0; no edema  Pertinent Labs: glucose 130    Creatinine 4.3  K+ 4.4   Pertinent Meds: ATB, precedex, fentanyl, levophed    Wound Type: Surgical Incision (4/25 laparotomy for SBO with hernia repair)       Current Nutrition Intake & Therapies:          Diet NPO  PN-Adult  3 IN 1 Central Line (Custom)  Current Parenteral Nutrition Orders:  · Type and Formula: 3-in-1 Custom (15 kcals/kgm, 1gm protein/kgm, 30% lipid kcals; dosing wt. 63kgm)   · Lipids: Daily  · Duration: Continuous  · Rate/Volume: 60ml/hour per pharmacy  · Current PN Order Provides: 945 kcals, 63 gms protein, 120 gms cho/24h    Anthropometric Measures:  Height: 5' 9\" (175.3 cm)  Ideal Body Weight (IBW): 160 lbs (73 kg)    Admission Body Weight: 138 lb (62.6 kg) (4/26 with no edema)  Current Body Weight: 138 lb (62.6 kg) (4/26 with no edema),   IBW.  Weight Source: Bed Scale  Current BMI (kg/m2): 20.4  Usual Body Weight: 148 lb (67.1 kg) (4/10/22; 143# 1/27/22)  % Weight Change (Calculated): -6.8                    BMI Categories: Underweight (BMI less than 22) age over 72    Estimated Daily Nutrient Needs:  Energy Requirements Based On: Kcal/kg  Weight Used for Energy Requirements: Current (63kgm)  Energy (kcal/day): 2489-0218 (25-30/kgm)  Weight Used for Protein Requirements: Current  Protein (g/day): 107gms (1.7/kgm) IF ROBSON improves with hydration - monitoring; 63gms if no improvement     Fluid (ml/day): per physician    Nutrition Diagnosis:   · Severe malnutrition,In context of chronic illness related to inadequate protein-energy intake as evidenced by BMI,weight loss,severe muscle loss,severe loss of subcutaneous fat    Nutrition Interventions:   Food and/or Nutrient Delivery: Continue NPO,Start Parenteral Nutrition (plan EN as GI status allows)  Nutrition Education/Counseling: Education not appropriate  Coordination of Nutrition Care: Continue to monitor while inpatient,Interdisciplinary Rounds  Plan of Care discussed with: doesn't flow to note    Goals:  Previous Goal Met: Progressing toward Goal(s)  Goals: Initiate nutrition support       Nutrition Monitoring and Evaluation:   Behavioral-Environmental Outcomes: None Identified  Food/Nutrient Intake Outcomes: Parenteral Nutrition Intake/Tolerance  Physical Signs/Symptoms Outcomes: Biochemical Data,GI Status,Fluid Status or Edema,Hemodynamic Status,Nutrition Focused Physical Findings,Skin,Weight    Discharge Planning:     Too soon to determine     [address], [place], [place]  Contact: [phone]

## 2022-04-27 NOTE — CARE COORDINATION
4/27/22, 4:48 PM EDT    DISCHARGE ON GOING EVALUATION    Gema Suarez . Hospital day: 2  Location: -10/010-A Reason for admit: Renal failure [N19]  Sepsis associated hypotension ([address] [A41.9, I95.9]   Procedure:   4/25 CXR: Persistent mild atelectatic/fibrotic stranding retrocardiac region left lung base; no effusion  4/25 CT Head:   1. Stable CT scan of the brain, no interval change since previous MRI scan dated 10 Melody 2019.   2. Mild atrophy and dilatation of the third and lateral ventricles. 3. Probable ischemic changes in the white matter, left basal ganglia and in the bernadette. 4. Calcification the cavernous segments of both internal carotid arteries. 5. Increased density in the external auditory canals which may represent cerumen bilaterally      4/25 Renal US: Multifocal simple bilateral renal cyst. Echogenic bilateral renal parenchyma  4/25 CT Abd/pelvis:   Right inguinal hernia containing small bowel. This is causing a small    bowel obstruction. There are several gas collections within the right    inguinal hernia. Ischemic or perforated bowel could have this appearance.       Mild to moderate patchy consolidation bilateral lower lobes. This could    represent aspiration or pneumonia.       Prominent thickening of the distal esophagus and gastroesophageal    junction. Neoplasm not excluded. Recommend direct visualization      4/25 LAPAROTOMY EXPLORATORY FOR SMALL BOWEL OBSTRUCTION WITH INCARCERATED HERNIA REPAIR.  Debridement necrotic subcutaneous fat fascia and muscle right groin  4/25 Intubated in OR  4/25 CVC Right subclavian   4/27 Extubated    Barriers to Discharge: POD #2. Extubated today. Required NT suctioning this afternoon. Will need PT/OT when more alert. TPN initiated. Received 1 PRBC today. Sats 94% on 3L O2. Afebrile. -110's. Oriented to person and time. Follows commands x4. ALFONSO x1 with 80 ml out in 24 hours. Groin wound with dakins dressing changes daily.  Respiratory culture +HFlu and EColi by PCR. Telemetry, CVC, teddy, NG to LIWS, wound care, drain care, simmons, SCDs. IVF, TPN, IV pepcid, IV zosyn, dakins. Na+ 149, BUN 99, Creat 3.5, wbc 27.2, hgb 7.1 - now 8.1. PCP: Libby Keita MD  Readmission Risk Score: 21.1 ( )%  Patient Goals/Plan/Treatment Preferences: From home with wife. Plan for IPR if accepted. Backup plan for home with wife and new HH. SW on case. Will need Physiatry ordered after therapy ordered and evals.

## 2022-04-27 NOTE — PROGRESS NOTES
Patient Weaning Progress    The patient's vent settings was not able to be weaned this shift. Ventilator settings that were weaned              [] Mode   [] Pressure support weaned   [] Fio2 weaned   [] Peep weaned             Spontaneous weaning trial  was attempted. Evac tube was not  hooked up with continuous low suction(20-30mmHg)  Patient's tube does not have an Evac tube  Cuff was  deflated to determine cuff leak. A leak  was detected.  Cuff leak was 14%       *Specific details of weaning located in Ventilator documentation flowsheets*

## 2022-04-27 NOTE — SIGNIFICANT EVENT
Daughter Marvin Kahn return phone call consent obtained for blood transfusion. Electronically signed by Shelly Orr.  Erinn Garcias CNP on 4/27/2022 at 9:24 AM

## 2022-04-27 NOTE — PROGRESS NOTES
Pt was nt suctioned down rt and left nares without success. 14fr suction catheter was advanced down mouth and pt had a strong cough which produced a small/moderate amount of pale yellow. Pt tolerated well.

## 2022-04-27 NOTE — PROGRESS NOTES
CRITICAL CARE PROGRESS NOTE      Patient:  Sandra Suarez Sr. Unit/Bed:4D-10/010-A  YOB: 1950  MRN: [id number]   PCP: Sharon Arguello MD  Date of Admission: 4/25/2022  Chief Complaint:-Septic shock    Assessment and Plan:      Acute postoperative pulmonary insufficiency: Patient required intubation for surgical procedure. Postoperatively unable to extubate. Patient had apnea during spontaneous breathing trial 4/27. Continue to wean sedation. Small bowel obstruction with inguinal hernia: Status postsurgical intervention with Dr. Sharon Arambula, patient underwent exploratory laparotomy with small bowel resection and primary anastomosis. Drainage of abscess and debridement of necrotic subcutaneous fascia and muscle. Zosyn day #2. Septic shock: Requiring vasopressors post fluid resuscitation. Multifactorial with soiling of abdominal cavity and pneumonia. Weaning vasopressors. Continue Zosyn day #2. Pneumonia: Respiratory culture positive for E. coli and haemophilus influenza. Zosyn day #2. ROBSON on CKD: Nephrology following. Creatinine improving. Patient has no acute need for dialysis urine output over the last 24 hours 1700 cc. Creatinine improving. 4.3. Hypernatremia: Sodium 147, likely secondary to high volume resuscitation with normal saline. Stop normal saline infusion. Non-anion gap metabolic acidosis: Mild, improving with improvement of renal failure. Leukocytosis: WBC 27.2. Patient continues on Zosyn day #2. No fever. Macrocytic anemia: No obvious signs of bleeding. Will give 1 unit of packed red blood cells for hemoglobin of 7.1 in the setting of use of pressors and tachycardia. Goal hemoglobin 8. GERD: Pepcid  Coronary artery disease: History. Patient typically on ASA this has been held secondary to surgical intervention. Holding home blood pressure medication due to hypotension and need for vasopressors. Thickened esophagus: Patient on CT.   This should be followed up outpatient. Possible need for EGD. Hypertension: Currently hypotensive requiring vasopressors. Home blood pressure medications held. INITIAL H AND P AND ICU COURSE:    Ethelda Angelucci is a 65-year-old black male who presented to [facility] on 4/25/2022 with complaints of syncope and worsening inguinal hernia. He has a past medical history of current everyday smoker, CAD, CVA, ED, GERD, hyperlipidemia, hypertension. Per report patient had presented to [facility] approximately 7 days prior due to a right bulge in his lower abdomen which he was diagnosed with an inguinal hernia at that time and scheduled for surgical follow-up. On 4/25/2022 was reported that patient had syncope per his wife. She also reported that he had had syncope in [place] several days prior with nausea and feelings of excessive heat. After this he went to outside and passed out. On the morning of admission patient's wife stated that he was in the bathtub and was noted to be \"slumped over\". Wife stated he had vomited 3 times in the last 24 hours. Wife also reports he was not having bowel movements and was having less frequent urination. He was noted to be weak and sleeping most of the day. In the emergency department he was found to have acute renal failure with creatinine 11.2. He was also found to have anion gap metabolic acidosis and was given aggressive fluid resuscitation. Nephrology was consulted. CT abdomen pelvis was performed which did show right inguinal hernia with small bowel obstruction and multiple gas collections in the right inguinal hernia suspicious for ischemia or perforation. On the evening of 4/25/2022 Dr. Chuck Modi performed exploratory laparotomy with small bowel resection and anastomosis and drainage of pelvic abscess debridement of necrotic subcutaneous fat along the right groin and necrotic hernia sac in the right groin. Patient was then admitted to ICU.     Past Medical History: per HPI  Family History: Mother: heart disease   Social History: Current every day smoker, alcohol abuse, marijuana use. ROS   Intubated and sedated on mechanical ventilation    Scheduled Meds:   vitamin D  50,000 Units Oral Weekly    sodium chloride flush  5-40 mL IntraVENous 2 times per day    [Held by provider] heparin (porcine)  5,000 Units SubCUTAneous TID    famotidine (PEPCID) injection  10 mg IntraVENous Daily    piperacillin-tazobactam  2,250 mg IntraVENous Q8H     Continuous Infusions:   vasopressin (Septic Shock) infusion Stopped (04/27/22 0428)    midazolam Stopped (04/27/22 0427)    fentaNYL 50 mcg/hr (04/27/22 0427)    sodium chloride      sodium chloride 125 mL/hr at 04/26/22 1440    norepinephrine 10 mcg/min (04/27/22 0137)       PHYSICAL EXAMINATION:  T:  98.2.  P:  97. RR:  13. B/P:  94/52. FiO2:  30. O2 Sat:  97.  I/O:  3786/1630  Body mass index is 20.45 kg/m². PC: 10/6: TV: 498: RRTotal: 14: Ti:1 sec  General:   Acute on chronically ill-appearing black male  HEENT:  normocephalic and atraumatic. No scleral icterus. PERR  Neck: supple. No Thyromegaly. Lungs: clear to auscultation. No retractions  Cardiac: RRR. No JVD. Abdomen: soft. + tender, midline incision   Extremities:  No clubbing, cyanosis, or edema x 4. Vasculature: capillary refill < 3 seconds. Palpable dorsalis pedis pulses. Skin:  warm and dry. Psych: Sedated on mechanical ventilation. Awakes and follows commands   Lymph:  No supraclavicular adenopathy. Neurologic:  No focal deficit. No seizures. Data: (All radiographs, tracings, PFTs, and imaging are personally viewed and interpreted unless otherwise noted). Sodium 147, potassium 4.4, chloride 113, CO2 22, , creatinine 4.3, anion gap 12.0, glucose 130. WBC 27.2, hemoglobin 7.1, hematocrit 22.8, platelet count 027  Telemetry shows normal sinus rhythm  Pneumonia panel positive for H. influenzae and E. Coli. Blood cultures negative.   CT head 4/25/2022 reports stable CT of head. Probable ischemic changes of the white matter. CT abdomen pelvis 4/25/2022 reports right inguinal hernia containing small bowel causing small bowel obstruction. Several gas collections within the right inguinal hernia ischemic or perforated bowel could have this appearance. Chest x-ray 4/25/2022 reports development of small right bilateral pleural effusion. Bilateral lower lobe airspace opacifications. Interstitial changes. Renal ultrasound 4/25/2022 reports Multi focal small simple bilateral cysts. EKG 4/26/2022 reports normal sinus rhythm  Echocardiogram 4/26/2022 reports ejection fraction 50%, normal LV function. Mild to moderate aortic regurgitation noted moderate mitral regurgitation noted. Meets Continued ICU Level Care Criteria:    [x] Yes   [] No - Transfer Planned to listed location:  [] HOSPITALIST CONTACTED-      Case and plan discussed with Dr. Kedar Boyle. Electronically signed by Brian Mancera. ELAN [facility] - [facility]  CRITICAL CARE SPECIALIST  Patient seen by me including key components of medical care. Case discussed with NP. Patient on SBT and doing well. Expect to extubate later today. Italicized font, if present,  represents changes to the note made by me. CC time 35 minutes. Time was discontiguous. Time does not include procedure. Time does include my direct assessment of the patient and coordination of care. Time represents more than 50% of the time involved with patient care by the [address] [place] [address] team.  Electronically signed by Amisha Llily.  Kedar Boyle MD.

## 2022-04-27 NOTE — PROGRESS NOTES
The patient was not able to tolerate SBT. RSBI  was 25 with EtCO2 of 30 and SpO2 of 97 on 30% FiO2. Spontanteous VT was 548 and RR 14 breaths/min.   The patient was on SBT for 5 minutes

## 2022-04-27 NOTE — PROGRESS NOTES
Jovana Flanagan MD  Postoperative Progress Note  Pt Name: Rebecca Bell Record Number: [id number]  Date of Birth 1950   Today's Date: 4/27/2022  ASSESSMENT   1. POD # 2 sepsis secondary to incarcerated right inguinal hernia with perforation and obstruction of small bowel. Necrotic right groin wound status postdebridement drainage of abdominal abscess present at the time of surgery  2. Sepsis resolving pressors weaning off  3. Acute renal failure improving  4. Postop respiratory failure patient extubated this morning  5. Leukocytosis   has a past medical history of CAD (coronary artery disease), CVA (cerebral vascular accident) ([facility] [address] Erectile dysfunction, GERD (gastroesophageal reflux disease), Hyperlipidemia, and Hypertension. PLANS   1. Analgesics and antiemetics as needed. Continue NG tube. 2. IV hydration  3. Continue broad-spectrum antibiotic monitor cultures  4. Pressors being weaned  5. DVT prophylaxis per primary service. Hemoglobin has dropped no active signs of any surgical bleeding drain is not bloody. Transfusion ordered per primary service as well as HIT labs   6. change groin wound packing twice daily. Dry gauze. Dakin's solution ordered. Michi [place] extubated this morning. Still appears somewhat sedated. Sclera appear more icteric. Wound right groin still foul-smelling. Necrotic tissue immediately above the femoral vessels. Midline wound recommend removed few staples. Some drainage which is totally expected.   CURRENT MEDICATIONS   Scheduled Meds:   sodium hypochlorite   Irrigation Daily    vitamin D  50,000 Units Oral Weekly    sodium chloride flush  5-40 mL IntraVENous 2 times per day    [Held by provider] heparin (porcine)  5,000 Units SubCUTAneous TID    famotidine (PEPCID) injection  10 mg IntraVENous Daily    piperacillin-tazobactam  2,250 mg IntraVENous Q8H     Continuous Infusions:   sodium chloride      dexmedetomidine 0.2 mcg/kg/hr (22 0647)    sodium chloride 100 mL/hr at 22 0841    fentaNYL Stopped (22)    sodium chloride      norepinephrine 1 mcg/min (22)     PRN Meds:.sodium chloride, sodium chloride flush, sodium chloride, ondansetron **OR** ondansetron, polyethylene glycol, acetaminophen **OR** acetaminophen  OBJECTIVE   CURRENT VITALS:  height is 5' 9\" (1.753 m) and weight is 138 lb 7.2 oz (62.8 kg). His bladder temperature is 98.6 °F (37 °C). His blood pressure is 125/54 (abnormal) and his pulse is 110. His respiration is 16 and oxygen saturation is 92%. Body mass index is 20.45 kg/m². Temperature Range (24h):Temp: 98.6 °F (37 °C) Temp  Av.6 °F (37 °C)  Min: 98.2 °F (36.8 °C)  Max: 99.1 °F (37.3 °C)  BP Range (03B): Systolic (26MMX), FHP:187 , Min:89 , UBE:440     Diastolic (40ZZN), FRW:86, Min:49, Max:89    Pulse Range (24h): Pulse  Av.9  Min: 76  Max: 115  Respiration Range (24h): Resp  Av.2  Min: 0  Max: 20  Current Pulse Ox (24h):  SpO2: 92 %  Pulse Ox Range (24h):  SpO2  Av.4 %  Min: 90 %  Max: 100 %  Oxygen Amount and Delivery: O2 Flow Rate (L/min): 3 L/min  Incentive Spirometry Tx:            GENERAL: Sedated no acute distress  LUNGS: Clear diminished bases   HEART: Pulse 106  ABDOMEN: Soft little bit of purulent drainage upper aspect  INCISION right groin open wound discussed with nurse irrigations and Dakin's packing should be performed every 8 to 12 hours  EXTREMITY: No edema  In: 4059.5 [I.V.:3827.2]  Out: 9599 [Urine:2300; Drains:80]  Closed/Suction Drain Superior;Midline Abdomen Bulb-Output (ml): 25 ml  Date 22 0000 - 22 2359   Shift 1801-2683 9364-4343 7208-9223 24 Hour Total   INTAKE   I.V.(mL/kg) 2558. 6(40.7)   2558. 6(40.7)   IV Piggyback(mL/kg) 96.4(1.5)   96.4(1.5)   Shift Total(mL/kg) 2655. 1(42.3)   2655. 1(42.3)   OUTPUT   Urine(mL/kg/hr) 800(1.6)   800   Emesis/NG output(mL/kg) 50(0.8)   50(0.8)   Drains(mL/kg) 25(0.4)   25(0.4)   Shift Total(mL/kg) 875(13.9)   875(13.9)   Weight (kg) 62.8 62.8 62.8 62.8     LABS     Recent Labs     04/25/22 1547 04/25/22 1942 04/25/22 2309 04/25/22 2309 04/26/22  0419 04/26/22  0419 04/26/22  1420 04/26/22  2330 04/27/22 0412   WBC 21.8*   < > 4.3*  --  13.2*  --   --   --  27.2*   HGB 10.8*   < > 9.0*  --  9.3*  --   --   --  7.1*   HCT 36.6*   < > 27.6*  --  28.3*  --   --   --  22.8*   *   < > 368  --  396  --   --   --  289      < > 143   < > 142   < > 146* 147* 147*   K 4.0   < > 3.4*  --  4.9   < > 4.7 4.5 4.4   CL 93*   < > 104   < > 104   < > 110 113* 113*   CO2 16*   < > 23   < > 20*   < > 20* 21* 22*   *   < > 147*   < > 149*   < > 125* 114* 110*   CREATININE 11.2*   < > 8.3*   < > 7.8*   < > 6.0* 4.7* 4.3*   MG 2.6*  --   --   --  1.8  --  1.7  --   --    PHOS 8.4*  --   --   --  6.4*  --   --   --   --    CALCIUM 8.9   < > 8.1*   < > 8.9   < > 8.2* 8.2* 8.2*    < > = values in this interval not displayed. No results for input(s): PTT, INR in the last 72 hours. Invalid input(s): PT  Recent Labs     04/25/22 1547 04/25/22 2309   AST 28 28   ALT 19 17   BILITOT 3.1* 3.9*   BILIDIR 0.9*  --    LACTA  --  1.8     Recent Labs     04/25/22 1547 04/25/22  1942 04/25/22  2309   TROPONINT 0.047* 0.044* 0.032*         RADIOLOGY     XR CHEST PORTABLE   Final Result   Impression:   1. Satisfactory positioning of the endotracheal tube and right central    line. Probable slightly high positioning of the orogastric tube. Consider    advancing 3-4 cm. 2. Interval development of small bilateral pleural effusions, right    greater than left. 3. Bilateral lower lobe airspace opacities secondary to atelectasis and/or    infiltrates, right greater than left. 4. Bilateral interstitial changes secondary to pneumonitis or edema. This document has been electronically signed by: Kaylee Verma.  DO Jennifer on    04/26/2022 12:01 AM      CT ABDOMEN PELVIS WO CONTRAST Additional Contrast? None   Final Result   Impression:   Right inguinal hernia containing small bowel. This is causing a small    bowel obstruction. There are several gas collections within the right    inguinal hernia. Ischemic or perforated bowel could have this appearance. Mild to moderate patchy consolidation bilateral lower lobes. This could    represent aspiration or pneumonia. Prominent thickening of the distal esophagus and gastroesophageal    junction. Neoplasm not excluded. Recommend direct visualization. This document has been electronically signed by: Fang Collins MD on    04/25/2022 07:20 PM      All CTs at this facility use dose modulation techniques and iterative    reconstructions, and/or weight-based dosing   when appropriate to reduce radiation to a low as reasonably achievable. US RENAL COMPLETE   Final Result   Impression:   Multifocal simple bilateral renal cyst. Echogenic bilateral renal    parenchyma. This document has been electronically signed by: Fang Collins MD on    04/25/2022 06:30 PM      CT HEAD WO CONTRAST   Final Result       1. Stable CT scan of the brain, no interval change since previous MRI scan dated 10 Melody 2019.   2. Mild atrophy and dilatation of the third and lateral ventricles. 3. Probable ischemic changes in the white matter, left basal ganglia and in the bernadette. 4. Calcification the cavernous segments of both internal carotid arteries. 5. Increased density in the external auditory canals which may represent cerumen bilaterally. **This report has been created using voice recognition software. It may contain minor errors which are inherent in voice recognition technology. **      Final report electronically signed by DR Dorothy Lion on 4/25/2022 4:01 PM      XR CHEST PORTABLE   Final Result   1. Normal heart size. No effusion. 2. Persistent mild atelectatic/fibrotic stranding retrocardiac region left lung base. **This report has been created using voice recognition software. It may contain minor errors which are inherent in voice recognition technology. **      Final report electronically signed by Dr. Shellie Cullen on 4/25/2022 3:05 PM          Electronically signed by Malia Young MD on 4/27/2022 at 9:37 AM

## 2022-04-27 NOTE — SIGNIFICANT EVENT
Attempted to called wife and daughter again for blood consent no answer. Electronically signed by Bing Orf. Marchia Gaucher - CNP on 4/27/2022 at 6:24 AM

## 2022-04-27 NOTE — PROGRESS NOTES
Renal Progress Note  [facility]    Patient :  Mary Lou Vasquez Sr.; 66 y.o. MRN# [id number]  Location:  4D-10/010-A  Attending:  Kade Cuevas MD  Admit Date:  4/25/2022   Hospital Day: 2      Subjective:     Nephrology is following the patient for ROBSON. Patient was seen and examined. Urine output 2.3 liters/24 hours. Pressor requirement improving only on 2 mcg levophed. 30% FiO2 on vent. Outpatient Medications:     Medications Prior to Admission: atorvastatin (LIPITOR) 20 MG tablet, TAKE 1 TABLET BY MOUTH DAILY  COMBIGAN 0.2-0.5 % ophthalmic solution, Place 1 drop into the left eye every 12 hours  famotidine (PEPCID) 20 MG tablet, Take 1 tablet by mouth 2 times daily  losartan (COZAAR) 50 MG tablet, Take 1 tablet by mouth daily  metoprolol succinate (TOPROL XL) 25 MG extended release tablet, TAKE 1 TABLET BY MOUTH DAILY  tamsulosin (FLOMAX) 0.4 MG capsule, Take 1 capsule by mouth daily  lidocaine (LIDODERM) 5 %, Place 1 patch onto the skin daily 12 hours on, 12 hours off.   fluocinonide (LIDEX) 0.05 % cream, APPLY EXTERNALLY TO THE AFFECTED AREA TWICE DAILY  tadalafil (CIALIS) 20 MG tablet, TAKE 1 TABLET BY MOUTH EVERY DAY AS NEEDED  aspirin 81 MG EC tablet, Take 1 tablet by mouth daily  loratadine (CLARITIN) 10 MG tablet, TAKE 1 TABLET BY MOUTH DAILY    Current Medications:     Scheduled Meds:    vitamin D  50,000 Units Oral Weekly    sodium chloride flush  5-40 mL IntraVENous 2 times per day    [Held by provider] heparin (porcine)  5,000 Units SubCUTAneous TID    famotidine (PEPCID) injection  10 mg IntraVENous Daily    piperacillin-tazobactam  2,250 mg IntraVENous Q8H     Continuous Infusions:    sodium chloride      dexmedetomidine 0.2 mcg/kg/hr (04/27/22 0647)    sodium chloride      fentaNYL 25 mcg/hr (04/27/22 0647)    sodium chloride      norepinephrine 2 mcg/min (04/27/22 0647)     PRN Meds:  sodium chloride, sodium chloride flush, sodium chloride, ondansetron **OR** ondansetron, polyethylene glycol, acetaminophen **OR** acetaminophen    Input/Output:       I/O last 3 completed shifts: In: 62876 [I.V.:13429.8; IV Piggyback:356.2]  Out: 4694 [WYVME:2900; Emesis/NG output:650; Drains:110; Blood:100].       Patient Vitals for the past 96 hrs (Last 3 readings):   Weight   22 0600 138 lb 7.2 oz (62.8 kg)   22 1415 150 lb (68 kg)       Vital Signs:   Temperature:  Temp: 98.2 °F (36.8 °C)  TMax:   Temp (24hrs), Av.6 °F (37 °C), Min:98.2 °F (36.8 °C), Max:99.1 °F (37.3 °C)    Respirations:  Resp: 16  Pulse:   Pulse: 110  BP:    BP: (!) 125/54  BP Range: Systolic (03RGB), LGH:207 , Min:89 , OPD:332       Diastolic (26KUL), MZS:25, Min:49, Max:89      Physical Examination:     General:  Intubated, sedated but arousable  HEENT: NC/AT/ MMM  Chest:               Clear B/L no rales  Cardiac:  S1 S2 tachycardic  Abdomen: soft, abdominal binder +ALFONSO drain  Neuro:  sedated  SKIN:  No rashes,   Extremities:  No edema,     Labs:       Recent Labs     22  2309 22  0419 22  0412   WBC 4.3* 13.2* 27.2*   RBC 2.62* 2.68* 2.07*   HGB 9.0* 9.3* 7.1*   HCT 27.6* 28.3* 22.8*   .3* 105.6* 110.1*   MCH 34.4* 34.7* 34.3*   MCHC 32.6 32.9 31.1*    396 289   MPV 9.7 9.9 10.1      BMP:   Recent Labs     22  1420 22  2330 22  0412   * 147* 147*   K 4.7 4.5 4.4    113* 113*   CO2 20* 21* 22*   * 114* 110*   CREATININE 6.0* 4.7* 4.3*   GLUCOSE 127* 140* 130*   CALCIUM 8.2* 8.2* 8.2*      Phosphorus:     Recent Labs     22  1547 22  0419   PHOS 8.4* 6.4*     Magnesium:    Recent Labs     22  1547 22  0419 22  1420   MG 2.6* 1.8 1.7     Albumin:    Recent Labs     22  1547 22  2309   LABALBU 2.9* 2.7*     BNP:    No results found for: BNP  KHUSHBOO:    No results found for: KHUSHBOO  SPEP:  Lab Results   Component Value Date    PROT 5.1 2022     UPEP:   No results found for: LABPE  C3:   No results found for: C3  C4:   No results found for: C4  MPO ANCA:   No results found for: MPO  PR3 ANCA:   No results found for: PR3  Anti-GBM:   No results found for: GBMABIGG  Hep BsAg:       No results found for: HEPBSAG  Hep C AB:        No results found for: HEPCAB    Urinalysis/Chemistries:      Lab Results   Component Value Date    NITRU NEGATIVE 04/25/2022    COLORU YELLOW 04/25/2022    PHUR 5.5 04/25/2022    LABCAST >15 HYALINE 04/25/2022    WBCUA 10-15 04/25/2022    RBCUA 10-15 04/25/2022    MUCUS NONE SEEN 04/25/2022    YEAST NONE SEEN 04/25/2022    BACTERIA FEW 04/25/2022    SPECGRAV 1.015 04/25/2022    LEUKOCYTESUR TRACE 04/25/2022    UROBILINOGEN 0.2 04/25/2022    BILIRUBINUR NEGATIVE 04/25/2022    BLOODU MODERATE 04/25/2022    GLUCOSEU NEGATIVE 06/09/2019    KETUA NEGATIVE 04/25/2022     Urine Sodium:   No results found for: MING  Urine Potassium:  No results found for: KUR  Urine Chloride:  No results found for: CLUR  Urine Osmolarity: No results found for: OSMOU  Urine Protein:   No components found for: TOTALPROTEIN, URINE   Urine Creatinine:   No results found for: LABCREA  Urine Eosinophils:  No components found for: UEOS        Impression and Plan:  1. ROBSON , prerenal due to volume depletion and hypotension from sepsis: improving. No acute need for RRT. Change IV fluids to 0.45 NaCl due to hypernatremia  2. Lytes: stable  3. Respiratory failure on vent  4. Hypernatremia, change to hypotonic fluids  5. Septic Shock  6. S/p ex lap with small bowel resection, drainage of abscess and debridement necrotic fat fascia and hernia sac  7. Leukocytosis  8. Pneumonia: panel growing H. Influenza and E. Coli  9. Anemia, blood transfusion ordered        Please don't hesitate to call with any questions.   Electronically signed by Jonah Kwok DO on 4/27/2022 at 8:40 AM

## 2022-04-27 NOTE — CONSENT
I have discussed with the daughter the rationale for blood component transfusion; its benefits in treating or preventing fatigue, organ damage, or death; and its risk which includes mild transfusion reactions, rare risk of blood borne infection, or more serious but rare reactions. I have discussed the alternatives to transfusion, including the risk and consequences of not receiving transfusion. The daughter had an opportunity to ask questions and had agreed to proceed with transfusion of blood components. Electronically signed by Brianna Morgan.  Marysol Garcias CNP on 4/27/2022 at 9:24 AM

## 2022-04-27 NOTE — PROGRESS NOTES
Pharmacy Consult       TPN    Ordering Provider: Herbie Lloyd CNP    Indication for TPN: NPO, malnutrition    Macronutrients   DW: 63 kg   AA: 63 g/kg   Total Kcal: 15 Kcal/kg   Lipids: 30 % of Total Kcal   Infusion Rate: 60 mL/hr    Electrolytes (per bag)   Na Acetate:    90 mEq      K Phos:      10 mmol      Calcium Gluc:   4.65 mEq      MV:      10 mL   Trace Elements: 1 mL    Electrolyte Replacement: none    Assessment/ Plan:  S/p bowel surgery for incarcerated hernia  NOP with pre-existing malnutrition        Gustavo Dhillon, Pharm. D., BCPS, BCCCP 4/27/2022 1:20 PM

## 2022-04-27 NOTE — PROGRESS NOTES
Physician Progress Note      Spencer Bearden  CSN #:                  [id number]  :                       1950  ADMIT DATE:       2022 2:08 PM  DISCH DATE:  RESPONDING  PROVIDER #:        Jaquelin ANSARI - IDA          QUERY TEXT:    Dr Ny Bloom,    Patient admitted with Small bowel obstruction with inguinal hernia and had   exploratory surgery. Noted documentation of Acute Hypoxic Respiratory Failure   Secondary to Postoperative and to Aspiration Pneumonia in  HP and Acute   postoperative pulmonary insufficiency: Patient required intubation for   surgical procedure. Postoperatively unable to extubate per  progress   note. If possible, please document in progress notes and discharge summary if   you are evaluating and /or treating any of the following: The medical record reflects the following:  Risk Factors:  Small bowel obstruction with inguinal hernia and had   exploratory surgery. Clinical Indicators:  Noted documentation of Acute Hypoxic Respiratory Failure   Secondary to Postoperative and to Aspiration Pneumonia in  HP.   IM   Progress note: Acute postoperative pulmonary insufficiency: Patient required   intubation for surgical procedure. Postoperatively unable to extubate. Treatment: continued intubation following procedure  Options provided:  -- Acute postoperative pulmonary insufficiency confirmed and acute   Postoperative Hypoxic Respiratory Failure ruled out  -- Acute Postoperative Hypoxic Respiratory Failure confirmed and Acute   postoperative pulmonary insufficiency ruled out  -- Other - I will add my own diagnosis  -- Disagree - Not applicable / Not valid  -- Disagree - Clinically unable to determine / Unknown  -- Refer to Clinical Documentation Reviewer    PROVIDER RESPONSE TEXT:    After study, Acute postoperative pulmonary insufficiency confirmed and Acute   Postoperative Hypoxic Respiratory Failure ruled out.     Query created by: Pauline Siddiqui on 4/27/2022 8:27 AM      Electronically signed by:  Joey Garcias CNP 4/27/2022 12:26 PM

## 2022-04-27 NOTE — SIGNIFICANT EVENT
Attempted to call wife and daughter for consent for blood unable to be reached. Will attempt to call back. Electronically signed by Sydnee Maldonado.  ELAN Davis CNP on 4/27/2022 at 5:59 AM

## 2022-04-27 NOTE — CARE COORDINATION
04/27/22 1303   Service Assessment   Patient Orientation Alert and Oriented   Cognition Alert   History Provided By Spouse   Primary Caregiver Spouse   Accompanied By/Relationship [address] Spouse/Significant Other;Children   Patient's Healthcare Decision Maker is: Legal Next of Kin   PCP Verified by CM Yes   Last Visit to PCP [facility] last year   Prior Functional Level Independent in ADLs/IADLs   Current Functional Level Independent in ADLs/IADLs   Can patient return to prior living arrangement Yes   Ability to make needs known: Unable   Family able to assist with home care needs: Yes   Social/Functional History   Lives With Spouse   Type of Home House   ADL Assistance Independent   Homemaking Assistance Independent   Ambulation Assistance Independent   Transfer Assistance Independent   Active  No   Patient's New [place]   Mode of Transportation Car   Discharge Planning   Type of [address] Prior To Admission None   DME Ordered? Cane   Patient expects to be discharged to: House   Condition of Participation: Discharge Planning   The Plan for Transition of Care is related to the following treatment goals: Maintain airway(extubated 4/27), Advance diet as tolerated   Freedom of Choice list was provided with basic dialogue that supports the patient's individualized plan of care/goals, treatment preferences, and shares the quality data associated with the providers?   Yes  (Explained that if services needed will get list of preferences for services)

## 2022-04-27 NOTE — PLAN OF CARE
Problem: Nutrition Deficit:  Goal: Optimize nutritional status  4/27/2022 1444 by Nehemias Triana RD, LD  Outcome: Progressing   Nutrition Problem #1: Severe malnutrition,In context of chronic illness  Intervention: Food and/or Nutrient Delivery: Continue NPO,Start Parenteral Nutrition (plan EN as GI status allows)

## 2022-04-27 NOTE — PROGRESS NOTES
Patient has been successfully weaned from Mechanical Ventilation. RSBI before extubation was 20 with EtCO2 of 31 and SpO2 of 97 on 30% FiO2. Patient extubated and placed on 4 liters/min via nasal cannula. Post extubation SpO2 is 99% with HR  110 bpm and RR 13 breaths/min. Patient had mild cough that was productive of cloudy/white sputum. Extubation Well tolerated by patient. Alejandro Scott

## 2022-04-28 ENCOUNTER — APPOINTMENT (OUTPATIENT)
Dept: GENERAL RADIOLOGY | Age: 72
DRG: 853 | End: 2022-04-28
Payer: MEDICARE

## 2022-04-28 LAB
ALBUMIN SERPL-MCNC: 2.5 G/DL (ref 3.5–5.1)
ALP BLD-CCNC: 80 U/L (ref 38–126)
ALT SERPL-CCNC: 33 U/L (ref 11–66)
ANION GAP SERPL CALCULATED.3IONS-SCNC: 10 MEQ/L (ref 8–16)
ANION GAP SERPL CALCULATED.3IONS-SCNC: 8 MEQ/L (ref 8–16)
ANION GAP SERPL CALCULATED.3IONS-SCNC: 9 MEQ/L (ref 8–16)
AST SERPL-CCNC: 62 U/L (ref 5–40)
BILIRUB SERPL-MCNC: 2.9 MG/DL (ref 0.3–1.2)
BILIRUBIN DIRECT: 1.4 MG/DL (ref 0–0.3)
BUN BLDV-MCNC: 58 MG/DL (ref 7–22)
BUN BLDV-MCNC: 69 MG/DL (ref 7–22)
BUN BLDV-MCNC: 75 MG/DL (ref 7–22)
CALCIUM IONIZED: 1.16 MMOL/L (ref 1.12–1.32)
CALCIUM SERPL-MCNC: 8.4 MG/DL (ref 8.5–10.5)
CALCIUM SERPL-MCNC: 8.4 MG/DL (ref 8.5–10.5)
CALCIUM SERPL-MCNC: 8.5 MG/DL (ref 8.5–10.5)
CHLORIDE BLD-SCNC: 107 MEQ/L (ref 98–111)
CHLORIDE BLD-SCNC: 109 MEQ/L (ref 98–111)
CHLORIDE BLD-SCNC: 111 MEQ/L (ref 98–111)
CO2: 30 MEQ/L (ref 23–33)
CO2: 33 MEQ/L (ref 23–33)
CO2: 35 MEQ/L (ref 23–33)
CREAT SERPL-MCNC: 1.8 MG/DL (ref 0.4–1.2)
CREAT SERPL-MCNC: 2.2 MG/DL (ref 0.4–1.2)
CREAT SERPL-MCNC: 2.4 MG/DL (ref 0.4–1.2)
EKG ATRIAL RATE: 87 BPM
EKG P AXIS: 78 DEGREES
EKG P-R INTERVAL: 128 MS
EKG Q-T INTERVAL: 384 MS
EKG QRS DURATION: 84 MS
EKG QTC CALCULATION (BAZETT): 462 MS
EKG R AXIS: 51 DEGREES
EKG T AXIS: 54 DEGREES
EKG VENTRICULAR RATE: 87 BPM
ERYTHROCYTE [DISTWIDTH] IN BLOOD BY AUTOMATED COUNT: 16.2 % (ref 11.5–14.5)
ERYTHROCYTE [DISTWIDTH] IN BLOOD BY AUTOMATED COUNT: 61.1 FL (ref 35–45)
GLUCOSE BLD-MCNC: 133 MG/DL (ref 70–108)
GLUCOSE BLD-MCNC: 147 MG/DL (ref 70–108)
GLUCOSE BLD-MCNC: 148 MG/DL (ref 70–108)
GLUCOSE BLD-MCNC: 163 MG/DL (ref 70–108)
GLUCOSE BLD-MCNC: 170 MG/DL (ref 70–108)
HCT VFR BLD CALC: 24.7 % (ref 42–52)
HEMOGLOBIN: 8.2 GM/DL (ref 14–18)
MAGNESIUM: 2 MG/DL (ref 1.6–2.4)
MCH RBC QN AUTO: 34.2 PG (ref 26–33)
MCHC RBC AUTO-ENTMCNC: 33.2 GM/DL (ref 32.2–35.5)
MCV RBC AUTO: 102.9 FL (ref 80–94)
PATHOLOGIST REVIEW: ABNORMAL
PHOSPHORUS: 2.5 MG/DL (ref 2.4–4.7)
PLATELET # BLD: 259 THOU/MM3 (ref 130–400)
PMV BLD AUTO: 9.8 FL (ref 9.4–12.4)
POTASSIUM REFLEX MAGNESIUM: 3.6 MEQ/L (ref 3.5–5.2)
POTASSIUM REFLEX MAGNESIUM: 3.6 MEQ/L (ref 3.5–5.2)
POTASSIUM SERPL-SCNC: 3.9 MEQ/L (ref 3.5–5.2)
RBC # BLD: 2.4 MILL/MM3 (ref 4.7–6.1)
SCAN OF BLOOD SMEAR: NORMAL
SODIUM BLD-SCNC: 150 MEQ/L (ref 135–145)
SODIUM BLD-SCNC: 151 MEQ/L (ref 135–145)
SODIUM BLD-SCNC: 151 MEQ/L (ref 135–145)
TOTAL PROTEIN: 5.3 G/DL (ref 6.1–8)
URINE CULTURE, ROUTINE: NORMAL
WBC # BLD: 33.6 THOU/MM3 (ref 4.8–10.8)

## 2022-04-28 PROCEDURE — 2500000003 HC RX 250 WO HCPCS: Performed by: NURSE PRACTITIONER

## 2022-04-28 PROCEDURE — 36415 COLL VENOUS BLD VENIPUNCTURE: CPT

## 2022-04-28 PROCEDURE — 2580000003 HC RX 258: Performed by: PHYSICIAN ASSISTANT

## 2022-04-28 PROCEDURE — 99223 1ST HOSP IP/OBS HIGH 75: CPT | Performed by: PHYSICAL MEDICINE & REHABILITATION

## 2022-04-28 PROCEDURE — 74018 RADEX ABDOMEN 1 VIEW: CPT

## 2022-04-28 PROCEDURE — 93010 ELECTROCARDIOGRAM REPORT: CPT | Performed by: INTERNAL MEDICINE

## 2022-04-28 PROCEDURE — 99232 SBSQ HOSP IP/OBS MODERATE 35: CPT | Performed by: INTERNAL MEDICINE

## 2022-04-28 PROCEDURE — 6360000002 HC RX W HCPCS: Performed by: STUDENT IN AN ORGANIZED HEALTH CARE EDUCATION/TRAINING PROGRAM

## 2022-04-28 PROCEDURE — 2580000003 HC RX 258: Performed by: NURSE PRACTITIONER

## 2022-04-28 PROCEDURE — 82948 REAGENT STRIP/BLOOD GLUCOSE: CPT

## 2022-04-28 PROCEDURE — 2500000003 HC RX 250 WO HCPCS: Performed by: PHYSICIAN ASSISTANT

## 2022-04-28 PROCEDURE — 99024 POSTOP FOLLOW-UP VISIT: CPT | Performed by: SURGERY

## 2022-04-28 PROCEDURE — 2500000003 HC RX 250 WO HCPCS

## 2022-04-28 PROCEDURE — 2140000000 HC CCU INTERMEDIATE R&B

## 2022-04-28 PROCEDURE — 84100 ASSAY OF PHOSPHORUS: CPT

## 2022-04-28 PROCEDURE — 93005 ELECTROCARDIOGRAM TRACING: CPT | Performed by: NURSE PRACTITIONER

## 2022-04-28 PROCEDURE — 82248 BILIRUBIN DIRECT: CPT

## 2022-04-28 PROCEDURE — 83735 ASSAY OF MAGNESIUM: CPT

## 2022-04-28 PROCEDURE — 2580000003 HC RX 258: Performed by: STUDENT IN AN ORGANIZED HEALTH CARE EDUCATION/TRAINING PROGRAM

## 2022-04-28 PROCEDURE — 2580000003 HC RX 258: Performed by: INTERNAL MEDICINE

## 2022-04-28 PROCEDURE — 82330 ASSAY OF CALCIUM: CPT

## 2022-04-28 PROCEDURE — 6370000000 HC RX 637 (ALT 250 FOR IP): Performed by: NURSE PRACTITIONER

## 2022-04-28 PROCEDURE — 85027 COMPLETE CBC AUTOMATED: CPT

## 2022-04-28 PROCEDURE — 99233 SBSQ HOSP IP/OBS HIGH 50: CPT | Performed by: INTERNAL MEDICINE

## 2022-04-28 PROCEDURE — 80053 COMPREHEN METABOLIC PANEL: CPT

## 2022-04-28 PROCEDURE — 92610 EVALUATE SWALLOWING FUNCTION: CPT | Performed by: SPEECH-LANGUAGE PATHOLOGIST

## 2022-04-28 RX ORDER — DEXTROSE MONOHYDRATE 50 MG/ML
INJECTION, SOLUTION INTRAVENOUS CONTINUOUS
Status: DISCONTINUED | OUTPATIENT
Start: 2022-04-28 | End: 2022-05-01

## 2022-04-28 RX ORDER — DEXTROSE MONOHYDRATE 25 G/50ML
12.5 INJECTION, SOLUTION INTRAVENOUS PRN
Status: DISCONTINUED | OUTPATIENT
Start: 2022-04-28 | End: 2022-04-28

## 2022-04-28 RX ORDER — NICOTINE POLACRILEX 4 MG
15 LOZENGE BUCCAL PRN
Status: DISCONTINUED | OUTPATIENT
Start: 2022-04-28 | End: 2022-05-10 | Stop reason: HOSPADM

## 2022-04-28 RX ORDER — METOPROLOL TARTRATE 5 MG/5ML
INJECTION INTRAVENOUS
Status: COMPLETED
Start: 2022-04-28 | End: 2022-04-28

## 2022-04-28 RX ORDER — DEXTROSE MONOHYDRATE 50 MG/ML
100 INJECTION, SOLUTION INTRAVENOUS PRN
Status: DISCONTINUED | OUTPATIENT
Start: 2022-04-28 | End: 2022-05-10 | Stop reason: HOSPADM

## 2022-04-28 RX ORDER — INSULIN LISPRO 100 [IU]/ML
0-6 INJECTION, SOLUTION INTRAVENOUS; SUBCUTANEOUS EVERY 6 HOURS SCHEDULED
Status: DISCONTINUED | OUTPATIENT
Start: 2022-04-28 | End: 2022-05-08

## 2022-04-28 RX ORDER — METOPROLOL TARTRATE 5 MG/5ML
2.5 INJECTION INTRAVENOUS EVERY 6 HOURS
Status: DISCONTINUED | OUTPATIENT
Start: 2022-04-28 | End: 2022-04-29

## 2022-04-28 RX ADMIN — INSULIN LISPRO 1 UNITS: 100 INJECTION, SOLUTION INTRAVENOUS; SUBCUTANEOUS at 18:06

## 2022-04-28 RX ADMIN — FAMOTIDINE 10 MG: 10 INJECTION, SOLUTION INTRAVENOUS at 10:02

## 2022-04-28 RX ADMIN — METOPROLOL TARTRATE 2.5 MG: 1 INJECTION, SOLUTION INTRAVENOUS at 03:28

## 2022-04-28 RX ADMIN — PIPERACILLIN AND TAZOBACTAM 2250 MG: 2; .25 INJECTION, POWDER, LYOPHILIZED, FOR SOLUTION INTRAVENOUS at 02:56

## 2022-04-28 RX ADMIN — AMIODARONE HYDROCHLORIDE 0.5 MG/MIN: 1.8 INJECTION, SOLUTION INTRAVENOUS at 13:20

## 2022-04-28 RX ADMIN — INSULIN LISPRO 1 UNITS: 100 INJECTION, SOLUTION INTRAVENOUS; SUBCUTANEOUS at 11:42

## 2022-04-28 RX ADMIN — DEXTROSE MONOHYDRATE: 50 INJECTION, SOLUTION INTRAVENOUS at 07:50

## 2022-04-28 RX ADMIN — AMIODARONE HYDROCHLORIDE 150 MG: 1.5 INJECTION, SOLUTION INTRAVENOUS at 02:39

## 2022-04-28 RX ADMIN — SODIUM CHLORIDE, PRESERVATIVE FREE 10 ML: 5 INJECTION INTRAVENOUS at 22:08

## 2022-04-28 RX ADMIN — CALCIUM GLUCONATE: 98 INJECTION, SOLUTION INTRAVENOUS at 17:47

## 2022-04-28 RX ADMIN — METOPROLOL TARTRATE 2.5 MG: 5 INJECTION INTRAVENOUS at 10:10

## 2022-04-28 RX ADMIN — SODIUM CHLORIDE, PRESERVATIVE FREE 10 ML: 5 INJECTION INTRAVENOUS at 10:06

## 2022-04-28 RX ADMIN — METOPROLOL TARTRATE 2.5 MG: 5 INJECTION INTRAVENOUS at 22:01

## 2022-04-28 RX ADMIN — PIPERACILLIN AND TAZOBACTAM 2250 MG: 2; .25 INJECTION, POWDER, LYOPHILIZED, FOR SOLUTION INTRAVENOUS at 18:02

## 2022-04-28 RX ADMIN — DEXTROSE MONOHYDRATE: 50 INJECTION, SOLUTION INTRAVENOUS at 16:08

## 2022-04-28 RX ADMIN — METOPROLOL TARTRATE 2.5 MG: 5 INJECTION INTRAVENOUS at 03:28

## 2022-04-28 RX ADMIN — PIPERACILLIN AND TAZOBACTAM 2250 MG: 2; .25 INJECTION, POWDER, LYOPHILIZED, FOR SOLUTION INTRAVENOUS at 10:36

## 2022-04-28 RX ADMIN — METOPROLOL TARTRATE 2.5 MG: 5 INJECTION INTRAVENOUS at 15:27

## 2022-04-28 RX ADMIN — DAKIN'S SOLUTION 0.125% (QUARTER STRENGTH): 0.12 SOLUTION at 13:13

## 2022-04-28 RX ADMIN — SODIUM CHLORIDE 500 ML: 4.5 INJECTION, SOLUTION INTRAVENOUS at 01:53

## 2022-04-28 RX ADMIN — AMIODARONE HYDROCHLORIDE 0.5 MG/MIN: 1.8 INJECTION, SOLUTION INTRAVENOUS at 02:55

## 2022-04-28 ASSESSMENT — ENCOUNTER SYMPTOMS
COUGH: 0
SHORTNESS OF BREATH: 0
VOMITING: 0
DIARRHEA: 0

## 2022-04-28 ASSESSMENT — PAIN SCALES - GENERAL: PAINLEVEL_OUTOF10: 0

## 2022-04-28 NOTE — PROGRESS NOTES
Jovana Flanagan MD  Postoperative Progress Note  Pt Name: Sarahi Salas Record Number: [id number]  Date of Birth 1950   Today's Date: 4/28/2022  ASSESSMENT   1. POD # 3 sepsis secondary to incarcerated right inguinal hernia with perforation and obstruction of small bowel. Necrotic right groin wound status postdebridement drainage of abdominal abscess present at the time of surgery  2. Sepsis resolving . 3. Acute renal failure improving  4. Postop respiratory failure patient extubated this morning  5. Leukocytosis White count continues to rise. 6. A. katelynn   has a past medical history of CAD (coronary artery disease), CVA (cerebral vascular accident) ([facility] [address] Erectile dysfunction, GERD (gastroesophageal reflux disease), Hyperlipidemia, and Hypertension. PLANS   1. Analgesics and antiemetics as needed. Continue NG tube. High outputs expected patient was obstructed with significant fecalization of contents of proximal small bowel. 2. IV hydration  3. Continue broad-spectrum antibiotic monitor cultures. If white count continues to rise recommend CT imaging of the abdomen and pelvis for any on drained abscess or fluid collections  4. Pressors weaned off  5. DVT prophylaxis per primary service. Hemoglobin has dropped no active signs of any surgical bleeding drain is not bloody. Transfusion ordered per primary service as well as HIT labs. Hemoglobin stable post 1 unit. NG output does appear slightly bloody. Risk of bleeding with anticoagulation at this point  6. change groin wound packing twice daily. Dry gauze. Dakin's solution ordered. 7.  Continue Zosyn. Intra-abdominal cultures E. coli and Morganella morganii I both sensitive  SUBJECTIVE   [facility] remains extubated he states he feels okay. I doubt he would ever complain. Dressing is dry. Discussed with nurse at bedside. She stated no purulence from midline incision. Right groin wound is open. Drainage from intra-abdominal drain appears serous there is old pus in the periphery of the bulb. I have asked her to change drain to a new bulb suction device. CURRENT MEDICATIONS   Scheduled Meds:   [Held by provider] metoprolol tartrate  12.5 mg Oral BID    metoprolol  2.5 mg IntraVENous Q6H    insulin lispro  0-6 Units SubCUTAneous 4 times per day    sodium hypochlorite   Irrigation Daily    vitamin D  50,000 Units Oral Weekly    sodium chloride flush  5-40 mL IntraVENous 2 times per day    [Held by provider] heparin (porcine)  5,000 Units SubCUTAneous TID    famotidine (PEPCID) injection  10 mg IntraVENous Daily    piperacillin-tazobactam  2,250 mg IntraVENous Q8H     Continuous Infusions:   dextrose 100 mL/hr at 22 08    dextrose      sodium chloride      PN-Adult  3 IN 1 Central Line (Custom) 60 mL/hr at 22 08    amiodarone 0.5 mg/min (22 0817)    sodium chloride      norepinephrine Stopped (22 1013)     PRN Meds:.glucose, glucagon (rDNA), dextrose, dextrose bolus (hypoglycemia) **OR** dextrose bolus (hypoglycemia), sodium chloride, morphine, sodium chloride flush, sodium chloride, ondansetron **OR** ondansetron, polyethylene glycol, acetaminophen **OR** acetaminophen  OBJECTIVE   CURRENT VITALS:  height is 5' 9\" (1.753 m) and weight is 138 lb 7.2 oz (62.8 kg). His oral temperature is 97.6 °F (36.4 °C). His blood pressure is 139/77 and his pulse is 92. His respiration is 19 and oxygen saturation is 100%. Body mass index is 20.45 kg/m².   Temperature Range (24h):Temp: 97.6 °F (36.4 °C) Temp  Av.8 °F (36.6 °C)  Min: 96.7 °F (35.9 °C)  Max: 98.3 °F (36.8 °C)  BP Range (43K): Systolic (40SGX), DARIELA , Min:106 , GBR:076     Diastolic (84JNE), XAU:34, Min:50, Max:99    Pulse Range (24h): Pulse  Av.6  Min: 85  Max: 163  Respiration Range (24h): Resp  Av.9  Min: 9  Max: 33  Current Pulse Ox (24h):  SpO2: 100 %  Pulse Ox Range (24h):  SpO2  Av % Min: 91 %  Max: 100 %  Oxygen Amount and Delivery: O2 Flow Rate (L/min): 5 L/min  Incentive Spirometry Tx:            GENERAL: Sedated no acute distress  LUNGS: Clear diminished bases   HEART: Pulse 92  ABDOMEN: Soft dressing intact. NG output thin but slightly blood-tinged  INCISION dressings dry and intact  EXTREMITY: No edema  In: 3326.8 [I.V.:1333.6]  Out: 4605 [Urine:2100; Drains:105]  Closed/Suction Drain Superior;Midline Abdomen Bulb-Output (ml): 30 ml  Date 04/28/22 0000 - 04/28/22 2359   Shift 6494-1295 1997-9256 7551-9161 24 Hour Total   INTAKE   I.V.(mL/kg) 271(4.3) 100.2(1.6)  371.2(5.9)   IV Piggyback(mL/kg) 9891.0(19.7) 0(0)  1113.8(17.7)   TPN(mL/kg) 087.7(82.5) 22(0.4)  830.4(13.2)   Shift Total(mL/kg) 2193. 2(34.9) 122.2(1.9)  2315. 4(36.9)   OUTPUT   Urine(mL/kg/hr) 1000(2)   1000   Emesis/NG output(mL/kg) 1050(16.7)   1050(16.7)   Drains(mL/kg) 30(0.5)   30(0.5)   Shift Total(mL/kg) 6253(23.9)   3530(96.8)   Weight (kg) 62.8 62.8 62.8 62.8     LABS     Recent Labs     04/26/22  0419 04/26/22  1420 04/27/22  0412 04/27/22  1319 04/27/22  1537 04/27/22  2029 04/27/22  2258 04/28/22  0136 04/28/22  0551   WBC 13.2*  --  27.2*  --   --   --   --  33.6*  --    HGB 9.3*  --  7.1*  --  8.1* 8.4*  --  8.2*  --    HCT 28.3*  --  22.8*  --  25.1* 26.5*  --  24.7*  --      --  289  --   --   --   --  259  --       < > 147*   < >  --  151* 149* 151* 151*   K 4.9   < > 4.4   < >  --  3.8 3.9 3.9 3.6      < > 113*   < >  --  114* 111 111 109   CO2 20*   < > 22*   < >  --  28 28 30 33   *   < > 110*   < >  --  85* 79* 75* 69*   CREATININE 7.8*   < > 4.3*   < >  --  2.7* 2.5* 2.4* 2.2*   MG 1.8   < > 2.2  --   --  2.1  --   --  2.0   PHOS 6.4*  --  4.6  --   --  2.9  --   --  2.5   CALCIUM 8.9   < > 8.2*   < >  --  8.5 8.5 8.4* 8.4*    < > = values in this interval not displayed. No results for input(s): PTT, INR in the last 72 hours.     Invalid input(s): PT  Recent Labs 04/25/22  1547 04/25/22  1547 04/25/22  2309 04/27/22 2029 04/28/22  0136   AST 28   < > 28 58* 62*   ALT 19   < > 17 30 33   BILITOT 3.1*   < > 3.9* 3.7* 2.9*   BILIDIR 0.9*  --   --  1.8* 1.4*   LACTA  --   --  1.8  --   --     < > = values in this interval not displayed. Recent Labs     04/25/22  1547 04/25/22 1942 04/25/22 2309   TROPONINT 0.047* 0.044* 0.032*         RADIOLOGY     XR CHEST PORTABLE   Final Result   Impression:   1. Satisfactory positioning of the endotracheal tube and right central    line. Probable slightly high positioning of the orogastric tube. Consider    advancing 3-4 cm. 2. Interval development of small bilateral pleural effusions, right    greater than left. 3. Bilateral lower lobe airspace opacities secondary to atelectasis and/or    infiltrates, right greater than left. 4. Bilateral interstitial changes secondary to pneumonitis or edema. This document has been electronically signed by: Mikala Rodriguez DO on    04/26/2022 12:01 AM      CT ABDOMEN PELVIS WO CONTRAST Additional Contrast? None   Final Result   Impression:   Right inguinal hernia containing small bowel. This is causing a small    bowel obstruction. There are several gas collections within the right    inguinal hernia. Ischemic or perforated bowel could have this appearance. Mild to moderate patchy consolidation bilateral lower lobes. This could    represent aspiration or pneumonia. Prominent thickening of the distal esophagus and gastroesophageal    junction. Neoplasm not excluded. Recommend direct visualization. This document has been electronically signed by: Brijesh Moser MD on    04/25/2022 07:20 PM      All CTs at this facility use dose modulation techniques and iterative    reconstructions, and/or weight-based dosing   when appropriate to reduce radiation to a low as reasonably achievable.       US RENAL COMPLETE   Final Result   Impression:   Multifocal simple bilateral renal cyst. Echogenic bilateral renal    parenchyma. This document has been electronically signed by: Danial Ying MD on    04/25/2022 06:30 PM      CT HEAD WO CONTRAST   Final Result       1. Stable CT scan of the brain, no interval change since previous MRI scan dated 10 Melody 2019.   2. Mild atrophy and dilatation of the third and lateral ventricles. 3. Probable ischemic changes in the white matter, left basal ganglia and in the bernadette. 4. Calcification the cavernous segments of both internal carotid arteries. 5. Increased density in the external auditory canals which may represent cerumen bilaterally. **This report has been created using voice recognition software. It may contain minor errors which are inherent in voice recognition technology. **      Final report electronically signed by DR Hiro Austin on 4/25/2022 4:01 PM      XR CHEST PORTABLE   Final Result   1. Normal heart size. No effusion. 2. Persistent mild atelectatic/fibrotic stranding retrocardiac region left lung base. **This report has been created using voice recognition software. It may contain minor errors which are inherent in voice recognition technology. **      Final report electronically signed by Dr. Sherine uRsso on 4/25/2022 3:05 PM          Electronically signed by Stalin Willis MD on 4/28/2022 at 8:25 AM

## 2022-04-28 NOTE — PROGRESS NOTES
[address] ICU 4D  Clinical Swallow Evaluation      SLP Individual Minutes  Time In: 4704  Time Out: 8468  Minutes: 16  Timed Code Treatment Minutes: 0 Minutes       Date: 2022  Patient Name: So Nava.      CSN: [id number]   : 1950  (66 y.o.)  Gender: male   Referring Physician:   Aaron Inman MD  Diagnosis: Renal failure    History of Present Illness/Injury: Patient admitted with the above diagnosis. POD#3 with sepsis secondary to incarcerated right inguinal hernia with perforation and obstruction of small bowel, as well as necrotic right groin wound status post debridement. NG placed from management of obstructed bowel. ST consulted to assess swallow function given concerns for aspiration pneumonia based on results of chest x-ray. Past Medical History:   Diagnosis Date    CAD (coronary artery disease)     [facility] [address]     Erectile dysfunction     GERD (gastroesophageal reflux disease) 2012    Gastro ulcer    Hyperlipidemia     Hypertension        SUBJECTIVE:  Obtained permission from Dr. Claude Comber for completion of swallow assessment. Patient seen at bedside, alert and pleasant, but with limited direction following. Assist provided to elevate head of bed for improved positioning. OBJECTIVE:    Pain:  No pain reported.     Current Diet: NPO    Respiratory Status:  Nasal Canula- 4 liters    Behavioral Observation:  Alert and Limited Direction Following    CRANIAL NERVE ASSESSMENT   CN V (Trigeminal) Closes and Opens Mandible WFL    Rotary Jaw Movement Impaired      CN VII (Facial) Cheeks Hold Food out of Sulci Not Tested    Opens, Closes/Seals, Protrudes, Retracts Lips Impaired: Limited labial seal, Open mouth posture at rest    General Appearance Impaired and Slight weakness evident on left    Sensation WFL      CN X (Vagus - Pharyngeal) Raises Back of Tongue WFL      CN XI (Accessory) Lifts Soft Palate WFL      CN XII (Hypoglossal) Elevates Tongue Up and Back WFL    Lateralizes Tongue Impaired    Sensation WFL      Other Observations Dentition Edentulous upper palate; poor lower dentition    Vocal Quality Intact     Cough Not tested     Patient Evaluated Using: Ice Chips x2    Oral Phase:  Impaired:  Impaired AP Movement and Reduced bolus control    Pharyngeal Phase: Impaired:  Delayed Swallow, Decreased Hyolaryngeal Elevation and Suspected Decreased Airway Protection    Signs and Symptoms of Laryngeal Penetration/Aspiration: No signs/symptoms of aspiration evident in this evaluation, but cannot rule out silent aspiration. Impressions: Patient presents with mild-moderate oral dysphagia with inability to fully discern potential presence of pharyngeal phase deficits without formal instrumentation. Patient with appropriate labial closure for removal of ice from spoon, but with minimal oral manipulation to follow. Reduced oral sensation and initiation evident with patient engaging in oral bolus holding with no awareness of ice in the oral cavity. Verbal cues provided to \"swallow\", patient stating \"yep, I did,\" however no laryngeal movement was palpated. Provided further verbal encouragement to elicit a swallow. Delayed swallow response evident with both attempts of ice chips. No overt s/s of aspiration evident, however continued concern exists regarding silent airway invasion events given results of recent chest x-ray, poor oral initiation/sensation, and delayed swallow initiation. Recommend continuation of NPO status with follow up tomorrow, 4/29/22, to determine if patient is appropriate for instrumental swallow assessment.      RECOMMENDATIONS/ASSESSMENT:  Instrumental Evaluation: Fiberoptic Endoscopic Evaluation of the Swallow (FEES)- When clinically indicated  Diet Recommendations:  NPO  Strategies:  Recommend NPO   Rehabilitation Potential: good    EDUCATION:  Learner: Patient  Education:  Reviewed results and recommendations of this evaluation and Reviewed diet and strategies  Evaluation of Education: No evidence of learning and Family not present    PLAN:  Skilled SLP intervention on acute care 3-5 x per week or until goals met and/or pt plateaus in function. Specific interventions for next session may include: Ice chip trials to determine if patient is appropriate for instrumental swallow assessment. PATIENT GOAL:    Return to least restrictive diet. SHORT TERM GOALS:  Short-term Goals  Timeframe for Short-term Goals: 2 weeks  Goal 1: Patient and caregivers will demonstrate understanding and completion of oral care per oral hygiene protocol to reduce oral bacteria colonization and reduce risk for aspiration pneumonia. Goal 2: Patient will complete trials of ice chips, and thin liquids  with SLP ONLY to determine appropriateness for completion of instrumental swallow assessment (Need clearance for solids due to small bowel obstruction). Goal 3: Complete FEES vs MBS when clinically indicated  Goal 4: Continue to monitor cognitive function and evaluation as clinically indicated. LONG TERM GOALS:  No long term goals established due to estimated length of stay. Jennifer Michael.  [address] [facility], [facility] [address], [address]

## 2022-04-28 NOTE — PROGRESS NOTES
Patient seen and evaluated after nurse reported increased NG tube output. Nurse reported at 21:11 that patient had 1L of bile/old blood colored of NG output over last five hours and again noted at 00:51 that patient had 400 ml out of same drainage since 21:30. Chart reviewed. Patient POD#2 of exploratory laparotomy for small bowel obstruction with incarcerated right inguinal hernia repair and debridement of necrotic subcutaneous fat, fascia, and muscle right groin. Patient had small bowel resection with single primary anastomosis and drainage of right groin and pelvic abscess present at the time of surgery. Nurse reported patient was extubated today and denied any changes with abdominal exam throughout shift. No nausea or emesis reported. Upon assessment patient denied having any acute pain and stated multiple times \"I am fine\". He denied having any abdominal pain or nausea/vomiting. Patient knew he was in hospital but appeared to have some confusion on exam. Patient abdomen was soft, mildly distended, with diffuse tenderness to palpation. No guarding or peritoneal signs. ALFONSO drain right lower quadrant with 75 ml out over last 18 hours. Serosanguinous drainage with minimal amount purulent appearing drainage in bulb. Midline incision intact with exception to 1 cm area opening to superior portion of incision which nurse reported Dr. Veronica Lux removed a staple today. Purulent drainage noted to dressing over midline incision otherwise no active drainage noted to midline incision during assessment. NG tube in place with reports of 1400 ml out since 7pm. Output appears dark green/light brown in color. Repeat Hgb at 20:29 was 8.4 following 1 unit pRBC transfusion for hgb of 7.1 this morning. Vitals at 01:00 reviewed, no tachycardia, BP stable. Plan to continue to monitor hgb. Repeat labs in AM. Continue NG to suction and monitor serial abdominal exams. Plan to update Dr. Veronica Lux this morning.     ICU NP reported patient had brief episode (1-2 hrs) of Aflutter and asked about anticoagulation. I had previously discussed case with general surgeon on call, Dr. Nany Palmer, at 22:18. From a surgical perspective recommend no anticoagulation at this time. Dr. Leyda Dang to follow up tomorrow morning regarding starting anticoagulation.     Electronically signed by Larissa Chester PA-C on 4/28/2022 at 2:10 AM

## 2022-04-28 NOTE — SIGNIFICANT EVENT
I was called to the bedside by the bedside RN for SVT with a rate in the 160's and stable BP. On arrival, /75. ECG showed atrial flutter with RVR. Patient awake and alert, responsive and appropriate. He had normal S1/S2 without murmur. Lung sounds were clear. Abdomen was non-distended and soft, but painful to palpation. Abdominal surgical site covered by gauze dressing, without significant drainage or ecchymosis. ALFONSO drain with minimal serosanguinous output, <5 mL. Nursing noted he had 1.1 L of light brown NG tube output over this evening. He does appear dry, with flaky skin, dry mucous membranes, no peripheral edema. He denied any symptoms, stating \"I feel just fine. \" He denied any chest pain, palpitations, dizziness, lightheadedness, dyspnea, abdominal pain, generalized pain, anxiety, N/V/D. Patient given 3 doses of 2.5 mg IV metoprolol, and home metoprolol PO regimen resumed for the morning. Given findings above, patient was also started on a 1 L bolus of LR for suspected volume depletion. Otherwise will continue 0.45% NS at 100 mL/h for now. Patient had no response to multiple metoprolol pushes. Cardizem 10 mg IV given without response. At that point I ordered amiodarone bolus (150 mg IV) and infusion. Patient subsequently converted to NSR. He remained asymptomatic and BP was still stable. Labs are pending, including BMP, CBC, electrolyte panel, TSH. Will follow up. Patient with a calculated CHADs2-Vasc score of 5, indicating a high stroke risk over the course of a year. He also has a current HAS-Bled score of 4, indicating a high risk for major bleeding. He is POD #2 from ex lap, small bowel resection, primary anastomosis, drainage of pelvic abscess, and debridement of necrotic inguinal herniated tissue. This appears to be new atrial flutter, and was relatively brief-duration (about 1-2 hours). He is maintaining NSR at this time.  Upon further discussion with surgical team, they request no anticoagulation at this time. Will need to monitor for recurrent episodes of A.flutter/fib, and re-consider anticoagulation as indicated and in tandem with surgical team.    Surgical team updated about increased NG tube output as above. They will evaluate. Case discussed with Dr. Alfonso Paniagua and KALEE Skaggs.      Electronically signed by ELAN Landrum CNP on 4/27/2022 at 10:05 PM

## 2022-04-28 NOTE — PLAN OF CARE
Problem: Nutrition Deficit:  Goal: Optimize nutritional status  Outcome: Not Progressing  Note: Remains NPO this shift per physician orders      Problem: ABCDS Injury Assessment  Goal: Absence of physical injury  Outcome: Progressing  Note: Pt remains free of any physical injury this shift. Will continue to meet pts physical needs      Problem: Skin/Tissue Integrity  Goal: Absence of new skin breakdown  Description: 1. Monitor for areas of redness and/or skin breakdown  2. Assess vascular access sites hourly  3. Every 4-6 hours minimum:  Change oxygen saturation probe site  4. Every 4-6 hours:  If on nasal continuous positive airway pressure, respiratory therapy assess nares and determine need for appliance change or resting period. Outcome: Progressing  Note: Skin integrity remains stable and at baseline this shift. No new breakdown noted    Care plan reviewed with patient and family. Patient and family verbalize understanding of the plan of care and contribute to goal setting.

## 2022-04-28 NOTE — PROGRESS NOTES
EN/PN NUTRITION SUPPORT    RECOMMENDATIONS/PLAN:   Next bag of TPN change to 20 kcals/kgm, 1.5gms protein/kgm, 30% lipid kcals, dosing wt. 63kgm to provide 1260 kcals, 95gms protein, 148gms cho/24h  As GI status allows recommend trophic EN Vital 1.2 10ml/hour    CURRENT NUTRITION THERAPIES  Diet NPO  PN-Adult  3 IN 1 Central Line (Custom)  Current Parenteral Nutrition Orders:  · Type and Formula: 3-in-1 Custom (15 kcals/kgm, 1gm protein/kgm, 30% lipid kcals; dosing wt. 63kgm)   · Lipids: Daily  · Duration: Continuous  · Rate/Volume: 60ml/hour  · Current PN Order Provides: 945 kcals, 63 gms protein, 120 gms cho/24h  ·  Next bag change to 20 kcals/kgm, 1.5gms protein/kgm, 30% lipid kcals, dosing wt. 63kgm to provide 1260 kcals, 95gms protein, 148gms cho/24h    COMPARATIVE STANDARDS  Energy (kcal):  7510-1837 (25-30/kgm); Weight Used for Energy Requirements:  Current (63kgm)     Protein (g):  107gms (1.7/kgm) IF ROBSON improves with hydration - monitoring; 63gms if no improvement; Weight Used for Protein Requirements:  Current        Fluid (ml/day):  per physician;         NUTRITIONAL SUMMARY/STATUS:    Pt. severely malnourished AEB criteria in 4/27 MNT note.  At risk for further nutritional compromise r/t admit with ROBSON, intubated 4/25 and extubated 4/29, emergent GI surgery 4/25 d/t perforated SB with soilage of pelvic cavity, pelvic abscess and necrotic hernial sac, aspiration pneumonia, sepsis; LOS day 3, per H&P N/V few days pta and underlying medical condition GERD, HTN, CVA, CAD    NUTRITION RELATED FINDINGS:   Treatments: extubated this am; NGT to LIWS with high output; started TPN 4/27 as pt.  Admit with malnutrition and unable to start EN yet d/t GI status; abd drain; good UO  TPN Status: infusing at 60ml/hour - increasing macronutrients today  GI Status: NGT output 2400ml past 24h; BM 0; per Dr. Chetna Reading High outputs expected as patient was obstructed with significant fecalization of contents of proximal small Pertinent Labs: glucose 148  BUN 69  Creatinine 2.2  K+ 3.6  Na 151  Phosphorus 2.5 Mg 2 WBC 33.6  Pertinent Meds: humalog, ATB, amiodorone  Current Weight: 138 lb (62.6 kg) (4/26 with no edema)  Admission Weight:  138 lb (62.6 kg) (4/26 with no edema)  Wounds: Surgical Incision (4/25 laparotomy for SBO with heria repair; open groin wound)  Malnutrition Status: Severe malnutrition    Please refer to initial nutrition assessment for additional details.    [address]  [address], [place], [place]:    Contact Number: [phone]

## 2022-04-28 NOTE — PLAN OF CARE
Problem: Nutrition Deficit:  Goal: Optimize nutritional status  4/28/2022 1613 by Javier Quijano, CAITLYN, LD  Outcome: Progressing   Nutrition Problem #1: Severe malnutrition,In context of chronic illness  Intervention: Food and/or Nutrient Delivery: Continue NPO,Modify Parenteral Nutrition

## 2022-04-28 NOTE — PROGRESS NOTES
CRITICAL CARE PROGRESS NOTE      Patient:  Reji Suarez Sr. Unit/Bed:4D-10/010-A  YOB: 1950  MRN: [id number]   PCP: Chelsea Latham MD  Date of Admission: 4/25/2022  Chief Complaint:- Syncope     Assessment and Plan:     Perforated small bowel, soilage of the pelvic cavity, right groin and pelvic abscesses, and necrotic hernial sac: Postop day #3 exploratory laparotomy with bowel resection, primary anastomosis, drainage of pelvic abscess as well as right groin exploration, drainage of abscess, and debridement of necrotic subcutaneous fat, fascia and muscle. Day 3 of Zosyn. Culture Growing Morganella Morganii and Ecoli. Susceptibility is pending. Recommended antibiotics for Morganella are penicillin and aminoglycoside. The patient's poor kidney function aminoglycoside will be held at this time unless patient fails treatment or sensitivities demonstrate resistance to Zosyn. Aspiration pneumonia: Imaging findings suggestion of infiltrate along with gastric contents suctioned from ET tube is consistent with aspiration pneumonia. Patient receiving antibiotics as above. Sepsis secondary to intra-abdominal infection and aspiration pneumonia - improving: On antibiotics as above. White count significantly elevated to 33.6 up from 21.8 postoperatively. Patient remains afebrile. Patient no longer requiring pressure support. A. fib with RVR: New onset A. fib with RVR refractory to Lopressor and Cardizem. Patient converted with amiodarone. Electrolytes within acceptable limits. [id number] is a 5. Surgery recommends holding anticoagulation still at this time. We will continue to follow-up with surgery regarding recommendations. Will maintain potassium greater than 4 magnesium greater than 2. Continue telemetry  ROBSON on CKD: Likely secondary to volume depletion, poor oral intake, and hypotension. Patient creatinine improved to 2.4 from 10.4. Admits. Patient's baseline 1.3. Nephrology currently recommending IV fluids of half-normal saline because of hyponatremia at a rate of 125 an hour. Nephrology continuing to follow. Acid-base disorder: Anion gap metabolic acidosis with respiratory compensation likely secondary to sepsis and ROBSON with an additional metabolic alkalosis secondary to volume depletion. Acute hypoxic respiratory failure: Secondary to aspiration pneumonia. Patient is currently intubated on pressure support. We will wean ventilation as tolerated. Continue to monitor patient's respiratory status. Hyperphosphatemia -resolved:  Continue to monitor  Hypermagnesemia - resolved:  Continue to monitor  Acute postoperative pulmonary insufficiency - resolved: Patient initially intubated for surgical procedure and was unable to be extubated initially. Patient was extubated yesterday and is doing well. Thickened esophagus: Seen incidentally on imaging, follow-up with EGD when stable  Troponinemia: Mildly elevated troponin on admit of 0.047 downtrending with inverted T waves found on EKG. Likely demand ischemia secondary to hypotension. We will continue to monitor. Hypocalcemia-resolved:    Continue to monitor  Coronary artery disease: Home medications include Lipitor 20 mg, Toprol-XL 25 mg and baby aspirin. His medications were held secondary to hypotension and postoperative state. Will on hold these medications as patient improves and tolerates. GERD: Patient is on pepcid 10mg daily at home we will continue. INITIAL H AND P AND ICU COURSE:    4/28/2022: Overnight patient went to A. fib with RVR that was refractory to both Lopressor and Cardizem. Patient was given amiodarone which converted him out. Patient continues to have frequent PACs on the monitor. Surgery was consulted regarding anticoagulation however they are recommending holding anticoagulation at this time. We will continue to follow-up with surgery as far as when we can start anticoagulation. Patient overall is awake and alert self, year, but only occasionally to place. Patient denies any pain or acute complaints at this time. Patient is frequently repeating himself however concern for underlying delirium. 4/26/2022: No acute events overnight. Patient has been afebrile overnight. Patient still sedated on Versed and fentanyl. Patient still requiring 28 mcg of Levophed as well as 0.03 vasopressin. Patient does have mild rhonchi on the right. Patient does appear dry with dry mucous membranes and borderline capillary refill. No evidence for MRSA so vancomycin was discontinued this morning. Patient will continue on Zosyn at this time. Remaining cultures pending. From H&P: \"The patient was sedated and intubated and therefore could not give any history. Therefore, history is primarily taken from the chart. Patient's wife states that the patient fell in his yard approximately 14 days ago and went to the [facility] ED. This time, he was discharged with pain medication. Approximately 7 days ago, he again presented to [facility] with complaints of a right lower abdominal bulge at which point he was diagnosed with inguinal hernia and is scheduled with surgical follow-up. Patient presented to [facility] on 4/25/2022 for syncope and worsening inguinal hernia. Wife states that syncope first occurred in [place] several days ago when the patient had nausea and subjective feelings of excessive heat while sitting. After this, he went to go outside and then passed out. On the morning of admission, patient's wife was given of the bathtub when he \"slumped over. \"  Wife stated that the patient vomited 3 times in the last 24 hours with brown/yellow vomitus. Wife also reports that he has not been having bowel movements, has had less frequent urination, and that he has been weak and sleeping \"20 hours a day. \"  While in the ED, the patient was found to have profound renal failure with a creatinine of 11.2 with a baseline of approximately 1.3. Patient also noted to have a profound anion gap acidosis which improved with aggressive fluid resuscitation. Nephrology was consulted given profound renal failure. CT of the abdomen and pelvis without contrast was performed which demonstrated mild to moderate patchy consolidation of the bilateral lower lobes, thickening of the distal esophagus and gastroesophageal junction, a right inguinal hernia causing a small bowel obstruction, and multiple gas collections in the right inguinal hernia highly suspicious for ischemic or perforated bowel. On the evening of 4/25/2022 Dr. Germán You performed exploratory laparotomy with small bowel resection, single primary anastomosis, drainage of pelvic abscess, exploration and drainage of right groin abscess, and debridement of necrotic subcutaneous fat/fascia/muscle in the right groin along with excision of the necrotic hernia sac in the right groin. Empiric coverage with vancomycin and Zosyn to necrotic small bowel as well as probable aspiration pneumonia. Family was extensively counseled. \"     Past Medical History        Past Medical History:   Diagnosis Date    CAD (coronary artery disease)      CVA (cerebral vascular accident) [facility])      Erectile dysfunction      GERD (gastroesophageal reflux disease) 07/2012     Gastro ulcer    Hyperlipidemia      Hypertension           Family History         Family History   Problem Relation Age of Onset    Heart Disease Mother           Social History            ROS   Review of Systems   Respiratory: Negative for cough and shortness of breath. Cardiovascular: Negative for chest pain, palpitations and leg swelling. Gastrointestinal: Negative for diarrhea and vomiting. All other systems reviewed and are negative.           Scheduled Meds:   [Held by provider] metoprolol tartrate  12.5 mg Oral BID    metoprolol  2.5 mg IntraVENous Q6H    sodium hypochlorite Irrigation Daily    vitamin D  50,000 Units Oral Weekly    sodium chloride flush  5-40 mL IntraVENous 2 times per day    [Held by provider] heparin (porcine)  5,000 Units SubCUTAneous TID    famotidine (PEPCID) injection  10 mg IntraVENous Daily    piperacillin-tazobactam  2,250 mg IntraVENous Q8H     Continuous Infusions:   dextrose      sodium chloride      PN-Adult  3 IN 1 Central Line (Custom) 60 mL/hr at 04/27/22 1817    amiodarone 0.5 mg/min (04/28/22 0255)    sodium chloride      norepinephrine Stopped (04/27/22 1013)       PHYSICAL EXAMINATION:  T:  98.  P:  97. RR:  18. B/P:  129/78. FiO2:  21. O2 Sat:  94.  I/O:  +7014  Body mass index is 20.45 kg/m². GCS:   15  General:   Laying in bed awake and alert x2, no apparent distress  HEENT:  normocephalic and atraumatic. No scleral icterus. PERR  Neck: supple. No Thyromegaly. Lungs: Lungs are clear to auscultation. No retractions  Cardiac: Tachycardic with regular rhythm . No murmurs gallops or rubs. no JVD. Abdomen: soft. Nontender clean bandage in place over abdominal incision. ALFONSO drain with small amount of purulent discharge. .  Extremities:  No clubbing, cyanosis, or edema x 4. Vasculature: capillary refill < 3 seconds. Palpable dorsalis pedis pulses. Skin:  warm and dry. Psych:  Alert and oriented x2 only occasionally oriented to place. Affect appropriate. Patient frequently repeating answers to questions multiple times. Lymph:  No supraclavicular adenopathy. Neurologic: No focal deficit. No seizures. Data: (All radiographs, tracings, PFTs, and imaging are personally viewed and interpreted unless otherwise noted). Patient sodium elevated at 151  Patient's potassium 3.9  Patient's BUN continues to elevate a 75  Patient creatinine improving to 2.4  Patient's magnesium at 2.1. Patient's glucose well controlled  Patient's white count markedly elevated at 33.6  Patient's hemoglobin 8.2   Telemetry shows sinus tach with intermittent A. fib        Seen with multidisciplinary ICU team.  Meets Continued ICU Level Care Criteria:    [x] Yes    [] No - Transfer Planned to listed location:  [] HOSPITALIST CONTACTED-      Case and plan discussed with Dr. Latoya Mendez. Electronically signed by Arnaud Florez MD  [address]  Patient seen by me including key components of medical care. Case discussed with resident physician. CXR consistent with aspiration pneumonia. Transfer to step down. Italicized font, if present,  represents changes to the note made by me. CC time 35 minutes. Time was discontiguous. Time does not include procedure. Time does include my direct assessment of the patient and coordination of care. Time represents more than 50% of the time involved with patient care by the [address] [place] [address] team.  Electronically signed by Shayy Felix.  Latoya Mendez MD.

## 2022-04-28 NOTE — CARE COORDINATION
4/28/22, 1:36 PM EDT    DISCHARGE PLANNING EVALUATION    sw attempted to meet with family in pts. room to continue discharge planning process, no one available. SW spoke with pts son over the phone, discussed pt moving off of ICU today and order in for physiatry to evaluate and therapy consulted. Son appreciated update however, he was still not wanting to discuss other discharge options such as SNF.

## 2022-04-28 NOTE — PROGRESS NOTES
RN called to bedside. Patient found to have pulled out his central line and NG tube. Patient states he thinks he pulled his lines out in his sleep. Patient alert and oriented. Minimal bleeding noted to central line site- quick clot placed over site. 19:15: Report to oncoming shift Shailesh ZARATE. Physician notified of central line removal and NG removal. Awaiting reply for plan of re-insertion of lines.

## 2022-04-28 NOTE — PROGRESS NOTES
Consult received, chart reviewed, will follow clinical course for further determination of patient needs.

## 2022-04-28 NOTE — PROGRESS NOTES
Renal Progress Note  [facility]    Patient :  Jacey Maravilla Sr.; 66 y.o. MRN# [id number]  Location:  -10/010-A  Attending:  Joel Persaud MD  Admit Date:  4/25/2022   Hospital Day: 3      Subjective:     Nephrology is following the patient for ROBSON. Patient was seen and examined. Extubated. Off pressors. Urine output is good. Had 2 liters out from NG tube overnight. On TPN. Outpatient Medications:     Medications Prior to Admission: atorvastatin (LIPITOR) 20 MG tablet, TAKE 1 TABLET BY MOUTH DAILY  COMBIGAN 0.2-0.5 % ophthalmic solution, Place 1 drop into the left eye every 12 hours  famotidine (PEPCID) 20 MG tablet, Take 1 tablet by mouth 2 times daily  losartan (COZAAR) 50 MG tablet, Take 1 tablet by mouth daily  metoprolol succinate (TOPROL XL) 25 MG extended release tablet, TAKE 1 TABLET BY MOUTH DAILY  tamsulosin (FLOMAX) 0.4 MG capsule, Take 1 capsule by mouth daily  lidocaine (LIDODERM) 5 %, Place 1 patch onto the skin daily 12 hours on, 12 hours off.   fluocinonide (LIDEX) 0.05 % cream, APPLY EXTERNALLY TO THE AFFECTED AREA TWICE DAILY  tadalafil (CIALIS) 20 MG tablet, TAKE 1 TABLET BY MOUTH EVERY DAY AS NEEDED  aspirin 81 MG EC tablet, Take 1 tablet by mouth daily  loratadine (CLARITIN) 10 MG tablet, TAKE 1 TABLET BY MOUTH DAILY    Current Medications:     Scheduled Meds:    [Held by provider] metoprolol tartrate  12.5 mg Oral BID    metoprolol  2.5 mg IntraVENous Q6H    sodium hypochlorite   Irrigation Daily    vitamin D  50,000 Units Oral Weekly    sodium chloride flush  5-40 mL IntraVENous 2 times per day    [Held by provider] heparin (porcine)  5,000 Units SubCUTAneous TID    famotidine (PEPCID) injection  10 mg IntraVENous Daily    piperacillin-tazobactam  2,250 mg IntraVENous Q8H     Continuous Infusions:    dextrose      sodium chloride      PN-Adult  3 IN 1 Central Line (Custom) 60 mL/hr at 04/27/22 1817    amiodarone 0.5 mg/min (04/28/22 0255)    sodium chloride      norepinephrine Stopped (22 1013)     PRN Meds:  sodium chloride, morphine, sodium chloride flush, sodium chloride, ondansetron **OR** ondansetron, polyethylene glycol, acetaminophen **OR** acetaminophen    Input/Output:       I/O last 3 completed shifts: In: 3666.4 [I.V.:3521; IV Piggyback:145.4]  Out: 5120 [Urine:2600; Emesis/NG output:2350; Drains:170]. Patient Vitals for the past 96 hrs (Last 3 readings):   Weight   22 0600 138 lb 7.2 oz (62.8 kg)   22 1415 150 lb (68 kg)       Vital Signs:   Temperature:  Temp: 98 °F (36.7 °C)  TMax:   Temp (24hrs), Av.1 °F (36.7 °C), Min:97.6 °F (36.4 °C), Max:98.6 °F (37 °C)    Respirations:  Resp: 18  Pulse:   Pulse: 97  BP:    BP: 129/78  BP Range: Systolic (90UQN), PDO:723 , Min:106 , NIJ:578       Diastolic (12INL), UNB:88, Min:50, Max:99      Physical Examination:     General:  Awake, alert  HEENT: NC/AT/ MMM +NG tube  Chest:               Clear B/L no rales  Cardiac:  S1 S2   Abdomen: soft, abdominal binder +ALFONSO drain  Neuro:  sedated  SKIN:  No rashes,   Extremities:  No edema,     Labs:       Recent Labs     22  0419 22  0419 22  0412 22  0412 22  1537 22  2029 22  0136   WBC 13.2*  --  27.2*  --   --   --  33.6*   RBC 2.68*  --  2.07*  --   --   --  2.40*   HGB 9.3*   < > 7.1*   < > 8.1* 8.4* 8.2*   HCT 28.3*   < > 22.8*   < > 25.1* 26.5* 24.7*   .6*  --  110.1*  --   --   --  102.9*   MCH 34.7*  --  34.3*  --   --   --  34.2*   MCHC 32.9  --  31.1*  --   --   --  33.2     --  289  --   --   --  259   MPV 9.9  --  10.1  --   --   --  9.8    < > = values in this interval not displayed.       BMP:   Recent Labs     22  2258 22  0136 22  0551   * 151* 151*   K 3.9 3.9 3.6    111 109   CO2 28 30 33   BUN 79* 75* 69*   CREATININE 2.5* 2.4* 2.2*   GLUCOSE 126* 133* 148*   CALCIUM 8.5 8.4* 8.4*      Phosphorus:     Recent Labs     22  0412 04/27/22 2029 04/28/22  0551   PHOS 4.6 2.9 2.5     Magnesium:    Recent Labs     04/27/22  0412 04/27/22 2029 04/28/22  0551   MG 2.2 2.1 2.0     Albumin:    Recent Labs     04/25/22  2309 04/27/22 2029 04/28/22  0136   LABALBU 2.7* 2.4* 2.5*     BNP:    No results found for: BNP  KHUSHBOO:    No results found for: KHUSHBOO  SPEP:  Lab Results   Component Value Date    PROT 5.3 04/28/2022     UPEP:   No results found for: LABPE  C3:   No results found for: C3  C4:   No results found for: C4  MPO ANCA:   No results found for: MPO  PR3 ANCA:   No results found for: PR3  Anti-GBM:   No results found for: GBMABIGG  Hep BsAg:       No results found for: HEPBSAG  Hep C AB:        No results found for: HEPCAB    Urinalysis/Chemistries:      Lab Results   Component Value Date    NITRU NEGATIVE 04/25/2022    COLORU YELLOW 04/25/2022    PHUR 5.5 04/25/2022    LABCAST >15 HYALINE 04/25/2022    WBCUA 10-15 04/25/2022    RBCUA 10-15 04/25/2022    MUCUS NONE SEEN 04/25/2022    YEAST NONE SEEN 04/25/2022    BACTERIA FEW 04/25/2022    SPECGRAV 1.015 04/25/2022    LEUKOCYTESUR TRACE 04/25/2022    UROBILINOGEN 0.2 04/25/2022    BILIRUBINUR NEGATIVE 04/25/2022    BLOODU MODERATE 04/25/2022    GLUCOSEU NEGATIVE 06/09/2019    KETUA NEGATIVE 04/25/2022     Urine Sodium:   No results found for: MING  Urine Potassium:  No results found for: KUR  Urine Chloride:  No results found for: CLUR  Urine Osmolarity: No results found for: OSMOU  Urine Protein:   No components found for: TOTALPROTEIN, URINE   Urine Creatinine:   No results found for: LABCREA  Urine Eosinophils:  No components found for: UEOS        Impression and Plan:  1. ROBSON , prerenal due to volume depletion and hypotension from sepsis: improving. 2. Hypernatremia: worse. Change IV fluids to d5W @ 100 ml/hour. Also need to reduce sodium acetate in TPN, bicarb is rising too. RN will discuss with pharmacy  3. Respiratory failure on vent  4. Septic Shock  5.  S/p ex lap with small bowel resection, drainage of abscess and debridement necrotic fat fascia and hernia sac  6. Leukocytosis, worsening  7. Pneumonia  8. Anemia, s/p blood transfusion        Please don't hesitate to call with any questions.   Electronically signed by Javed Del Valle DO on 2022 at 7:37 AM

## 2022-04-28 NOTE — PROGRESS NOTES
Patient in aflutter with rate of 160s, Valdo Christopher, NP notified. Reinaldo to bedside to evaluate patient. Blood pressures and assessment stable at this time.

## 2022-04-28 NOTE — CARE COORDINATION
4/28/22, 2:33 PM EDT    DISCHARGE ON GOING EVALUATION    Mark Kotavivek Suarez Sr. Hospital day: 3  Location: -10/010-A Reason for admit: Renal failure [N19]  Sepsis associated hypotension ([facility] [address] [A41.9, I95.9]   Procedure:   4/25 CXR: Persistent mild atelectatic/fibrotic stranding retrocardiac region left lung base; no effusion  4/25 CT Head:   1. Stable CT scan of the brain, no interval change since previous MRI scan dated 10 Mel[address] 2019.   2. Mild atrophy and dilatation of the third and lateral ventricles. 3. Probable ischemic changes in the white matter, left basal ganglia and in the bernadette. 4. Calcification the cavernous segments of both internal carotid arteries. 5. Increased density in the external auditory canals which may represent cerumen bilaterally      4/25 Renal US: Multifocal simple bilateral renal cyst. Echogenic bilateral renal parenchyma  4/25 CT Abd/pelvis:   Right inguinal hernia containing small bowel. This is causing a small    bowel obstruction. There are several gas collections within the right    inguinal hernia. Ischemic or perforated bowel could have this appearance.       Mild to moderate patchy consolidation bilateral lower lobes. This could    represent aspiration or pneumonia.       Prominent thickening of the distal esophagus and gastroesophageal    junction. Neoplasm not excluded. Recommend direct visualization      4/25 LAPAROTOMY EXPLORATORY FOR SMALL BOWEL OBSTRUCTION WITH INCARCERATED HERNIA REPAIR.  Debridement necrotic subcutaneous fat fascia and muscle right groin  4/25 Intubated in OR  4/25 CVC Right subclavian   4/27 Extubated    Barriers to Discharge: POD #3. Aflutter RVR last night with rate in 160's, BP stable. Given IV lopressor 2.5 mg x3 and home po lopressor regimen resumed for the morning. Given 1L fld bolus. Remained in RVR and was given 10 mg IV cardizem, no response.  Amio bolus and drip started and patient converted to NSR. 2L out NGT overnight; to be expected per Surgeon. Remains on TPN. Hypernatremia worse; changed IVF to D5W. Therapies consulted. Physiatry consulted. Order to transfer to stepdown; awaiting bed assignment. Sats 94% on 3L O2. Afebrile. -110's. Oriented to person and time. Follows commands x4. ALFONSO x1 with 105 ml out in 24 hours. Groin wound with dakins dressing changes daily. Respiratory culture +HFlu and EColi by PCR. Intra-abdominal culture +morganella morganii and EColi. SLP/PT/OT. Intensivist, General Surgery, and Nephrology following. Telemetry, CVC, teddy, NG to LIWS, wound care, drain care, simmons, SCDs. Amio @ 0.5 mg/min, D5W @ 100 ml/hr, TPN, SSI, IV lopressor 2.5 mg Q6H, prn IV morphine, IV zosyn, dakins daily. Na+ 151, BUN 69, Creat 2.2, alb 2.5, ast 62, bili 2.9, direct bili 1.4, wbc 33.6, hgb 8.2. PCP: Libby Keita MD  Readmission Risk Score: 20.9 ( )%  Patient Goals/Plan/Treatment Preferences: From home with wife. Plan for IPR if accepted. Backup plan for home with wife and new HH. SW on case.  Therapy to eval then Physiatry to eval.

## 2022-04-28 NOTE — SIGNIFICANT EVENT
Transfer request put in for stepdown unit. No stepdown beds are currently available. Will give patient handoff once bed is established.

## 2022-04-28 NOTE — CONSULTS
Physical Medicine & Rehabilitation   Consult Note      Admitting Physician: Mane Acevedo MD    Primary Care Provider: Arnulfo Hagan MD     Reason for Consult:  Syncope at home;  S/P exploratory laparotomy per Dr. Reed Shaffer 4/25/22 with incarcerated hernia; Sepsis; Respiratory Failure; Renal Failure; Rehab needs    History of Present Illness:  Sona Martin Sr. is a 66 y.o.  male admitted to [address] [place] [address] on 4/25/2022 with PMHx of distant CVA, BPH, HTN, GERD, and Glaucoma who presented to the ED after syncopal episodes x 2. Wife zayda provided the history and she stated that approximately 14 days ago he fell in the yard while pulling weeds and broke his ribs. He came to [facility] at that time and was discharged with pain medication. About 7 days ago, he presented again to [facility] with complaints of right lower abdomen bulge. He was diagnosed with inguinal hernia and recommended to follow up OP which was scheduled for tomorrow (4/26/22).    Wife stated that the hernia has gotten significantly worse over the lprevious 5 days. Syncope occurred first when they were in [place] several days ago. They had been sitting for a while and he started to feel nauseated and hot so he got up to go outside and passed out (described as legs giving out from him). Denied hitting his head. The morning of admission, wife was getting him out of the bathtub and he just \"slumped over\". Denied hitting his head on that occasion as well. Wife stated that he had vomited 3 times over the previous 24 hours and that it was brown/yellow in color. She stated he had not been having BM's and had been peeing less. Also noted he had been weak and sleeping \"20 hours a day\".    Initial evaluation in the ED revealed  Creatinine of 11 with BUN of 190. Bicarb low at 16 with a gap of 29. Serum osmol 339. Initial troponin slightly elevated.       Lactic acid elevated at 2.4.    Leukocytosis with WBC of 21.8     CT abdomen and pelvis ordered and on 4/25/22 showed:  Right inguinal hernia containing small bowel. This is causing a small bowel obstruction. There are several gas collections within the right inguinal hernia. Ischemic or perforated bowel could have this appearance. Mild to moderate patchy consolidation bilateral lower lobes. This could represent aspiration or pneumonia. Prominent thickening of the distal esophagus and gastroesophageal junction. Neoplasm not excluded. Recommend direct visualization. Renal US ordered and on 4/25/22 showed multifocal simple bilateral renal cyst.  Echogenic bilateral renal parenchyma. Head CT on 4/25/22 showed no acute findings. On 4/25/2022, the patient was taken to the operating room per Kelly Devine MD,  with diagnosis of serrated right inguinal hernia with small bowel obstruction and perforation, and at the time of surgery some feculent soilage of the right groin and pelvis. Procedure included exploratory laparotomy, small bowel resection, single primary anastomosis, and drainage of pelvic abscess and right groin exploration, drainage of abscess, debridement of necrotic subcutaneous fat and fascia and muscle, and excision of necrotic hernia sac. Right groin and pelvic abscess were present at the time of surgery. Postoperatively, she was admitted to the ICU secondary to acute respiratory failure, hypoxia, septic shock, aspiration pneumonia, and status post exploratory lap and drainage as above. Nutrition per TPN. After stabilization and extubation on 4/27/22, patient transferred to  on 4/28/22.       Current Rehabilitation Assessments:  PT:  Evaluation pending  OT:  Evaluation pending  ST:  Evaluation pending    Past Medical History:        Diagnosis Date    CAD (coronary artery disease)     CVA [facility]cerebral vascular accident) ([facility] [address]     Erectile dysfunction     GERD (gastroesophageal reflux disease) 07/2012    Gastro ulcer    Hyperlipidemia     Hypertension        Past Surgical History:        Procedure Laterality Date    CARDIAC CATHETERIZATION      COLONOSCOPY      ENDOSCOPY, COLON, DIAGNOSTIC      EYE SURGERY Left     cataract    EYE SURGERY      INGUINAL HERNIA REPAIR Right 5/27/15    Dr. Ambrosio Tomas N/A 2022    LAPAROTOMY EXPLORATORY FOR SMALL BOWEL OBSTRUCTION WITH INCARCERATED HERNIA REPAIR performed by Noris Clemente MD at [facility] . Left     Dr. Nellie Charles Left     Dr. Debra Paz  5/27/15    Dr. Jeanette Duong       Allergies:    No Known Allergies     Current Medications:   Current Facility-Administered Medications: [Held by provider] metoprolol tartrate (LOPRESSOR) tablet 12.5 mg, 12.5 mg, Oral, BID  metoprolol (LOPRESSOR) injection 2.5 mg, 2.5 mg, IntraVENous, Q6H  dextrose 5 % solution, , IntraVENous, Continuous  insulin lispro (HUMALOG) injection vial 0-6 Units, 0-6 Units, SubCUTAneous, 4 times per day  glucose (GLUTOSE) 40 % oral gel 15 g, 15 g, Oral, PRN  glucagon (rDNA) injection 1 mg, 1 mg, IntraMUSCular, PRN  dextrose 5 % solution, 100 mL/hr, IntraVENous, PRN  dextrose bolus (hypoglycemia) 10% 125 mL, 125 mL, IntraVENous, PRN **OR** dextrose bolus (hypoglycemia) 10% 250 mL, 250 mL, IntraVENous, PRN  PN-Adult  3 IN 1 Central Line (Custom), , IntraVENous, Continuous TPN  0.9 % sodium chloride infusion, , IntraVENous, PRN  sodium hypochlorite (DAKINS) 0.125 % external solution, , Irrigation, Daily  PN-Adult  3 IN 1 Central Line (Custom), , IntraVENous, Continuous TPN  morphine (PF) injection 1 mg, 1 mg, IntraVENous, Q4H PRN  [COMPLETED] amiodarone (NEXTERONE) 150 mg in dextrose 5% 100 ml, 150 mg, IntraVENous, Once **FOLLOWED BY** [] amiodarone (NEXTERONE) 360 mg in dextrose 5% 200 ml, 1 mg/min, IntraVENous, Continuous **FOLLOWED BY** amiodarone (NEXTERONE) 360 mg in dextrose 5% 200 ml, 0.5 mg/min, IntraVENous, Continuous  vitamin D (ERGOCALCIFEROL) capsule 50,000 Units, 50,000 Units, Oral, Weekly  sodium chloride flush 0.9 % injection 5-40 mL, 5-40 mL, IntraVENous, 2 times per day  sodium chloride flush 0.9 % injection 5-40 mL, 5-40 mL, IntraVENous, PRN  0.9 % sodium chloride infusion, , IntraVENous, PRN  [Held by provider] heparin (porcine) injection 5,000 Units, 5,000 Units, SubCUTAneous, TID  ondansetron (ZOFRAN-ODT) disintegrating tablet 4 mg, 4 mg, Oral, Q8H PRN **OR** ondansetron (ZOFRAN) injection 4 mg, 4 mg, IntraVENous, Q6H PRN  polyethylene glycol (GLYCOLAX) packet 17 g, 17 g, Oral, Daily PRN  acetaminophen (TYLENOL) tablet 650 mg, 650 mg, Oral, Q6H PRN **OR** acetaminophen (TYLENOL) suppository 650 mg, 650 mg, Rectal, Q6H PRN  famotidine (PEPCID) injection 10 mg, 10 mg, IntraVENous, Daily  piperacillin-tazobactam (ZOSYN) 2,250 mg in dextrose 5 % 50 mL IVPB (mini-bag), 2,250 mg, IntraVENous, Q8H  norepinephrine (LEVOPHED) 16 mg in sodium chloride 0.9 % 250 mL infusion, 1-100 mcg/min, IntraVENous, Continuous    Social History:  Social History     Socioeconomic History    Marital status:      Spouse name: Shukri Hopkins Number of children: 3    Years of education: Not on file    Highest education level: Not on file   Occupational History    Occupation: disability-[employer]   Tobacco Use    Smoking status: Current Every Day Smoker     Packs/day: 1.00     Years: 40.00     Pack years: 40.00     Types: Cigarettes    Smokeless tobacco: Never Used    Tobacco comment: printed to avs. 3/4 ppd since 2019   Vaping Use    Vaping Use: Never used   Substance and Sexual Activity    Alcohol use:  Yes    Drug use: Yes     Frequency: 7.0 times per week     Types: Marijuana Myrtis Regulus)    Sexual activity: Never   Other Topics Concern    Not on file   Social History Narrative    Not on file     Social Determinants of Health     Financial Resource Strain: Low Risk     Difficulty of Paying Living Expenses: Not hard at all   Food Insecurity: No Food Insecurity    Worried About Running Out of Food in the Last Year: Never true    [place] of Food in the Last Year: Never true   Transportation Needs:     Lack of Transportation (Medical): Not on file    Lack of Transportation (Non-Medical): Not on file   Physical Activity:     Days of Exercise per Week: Not on file    Minutes of Exercise per Session: Not on file   Stress:     Feeling of Stress : Not on file   Social Connections:     Frequency of Communication with Friends and Family: Not on file    Frequency of Social Gatherings with Friends and Family: Not on file    Attends Muslim Services: Not on file    Active Member of [address] [place] [address] or Organizations: Not on file    Attends Club or Organization Meetings: Not on file    Marital Status: Not on file   Intimate Partner Violence:     Fear of Current or Ex-Partner: Not on file    Emotionally Abused: Not on file    Physically Abused: Not on file    Sexually Abused: Not on file   Housing Stability:     Unable to Pay for Housing in the Last Year: Not on file    Number of [place] in the Last Year: Not on file    Unstable Housing in the Last Year: Not on file     Retired; from home with wife;  Hoping for admission to the IPR Unit        Family History:       Problem Relation Age of Onset    Heart Disease Mother        Review of Systems:  CONSTITUTIONAL:  positive for  fatigue  EYES:  negative  HEENT:  positive for  NG in left nares; poor dentition  RESPIRATORY:  positive for  cough with sputum and on 4L of supplemental O2 per NC  CARDIOVASCULAR:  Recent A-fib with RVR; converted with Amiodorone  GASTROINTESTINAL:  On TPN  GENITOURINARY:  negative  SKIN:  negative  HEMATOLOGIC/LYMPHATIC:  Recent sepsis  MUSCULOSKELETAL:  positive for  myalgias, arthralgias, pain, decreased range of motion and muscle weakness  NEUROLOGICAL:  positive for speech problems, gait problems, weakness and syncope  BEHAVIOR/PSYCH:  negative  System review otherwise negative    Physical Exam:  /70   Pulse 81   Temp 97.6 °F (36.4 °C) (Oral)   Resp 16   Ht 5' 9\" (1.753 m)   Wt 138 lb 7.2 oz (62.8 kg)   SpO2 96%   BMI 20.45 kg/m²   awake  Orientation:   person, place, time  Mood: within normal limits  Affect: spontaneous  General appearance: well groomed and in no acute distress    Memory:   normal,   Attention/Concentration: normal  Language:  abnormal - dysarthric    Cranial Nerves:  cranial nerves II-XII are grossly intact  ROM:  abnormal - secondary to pain  Motor Exam:  Motor exam is symmetrical 5- out of 5 all extremities bilaterally  Tone:  normal  Muscle bulk: within normal limits  Sensory:  Sensory intact  Coordination:   normal  Deep Tendon Reflexes:  Reflexes are intact and symmetrical bilaterally  Plantar Response:  Right:  downgoing  Left:  downgoing  Gait: not tested      Heart: normal rate, regular rhythm, normal S1, S2, no murmurs, rubs, clicks or gallops  Lungs: expiratory wheezes  Abdomen: soft, non-tender, non-distended, normal bowel sounds, no masses or organomegaly    Skin: warm and dry, no rash or erythema  Peripheral vascular: Pulses: Normal upper and lower extremity pulses; Edema: no      Diagnostics:  Recent Results (from the past 24 hour(s))   Hemoglobin and Hematocrit    Collection Time: 04/27/22  3:37 PM   Result Value Ref Range    Hemoglobin 8.1 (L) 14.0 - 18.0 gm/dl    Hematocrit 25.1 (L) 42.0 - 52.0 %   EKG 12 Lead    Collection Time: 04/27/22  3:49 PM   Result Value Ref Range    Ventricular Rate 109 BPM    Atrial Rate 109 BPM    P-R Interval 126 ms    QRS Duration 88 ms    Q-T Interval 326 ms    QTc Calculation (Bazett) 439 ms    P Axis 83 degrees    R Axis 49 degrees    T Axis 60 degrees   EKG 12 Lead    Collection Time: 04/27/22  7:27 PM   Result Value Ref Range    Ventricular Rate 165 BPM    Atrial Rate 315 BPM    QRS Duration 144 ms    Q-T Interval 304 ms    QTc Calculation (Bazett) 503 ms    P Axis -98 degrees    R Axis 44 degrees T Axis -67 degrees   Basic Metabolic Panel    Collection Time: 04/27/22  8:29 PM   Result Value Ref Range    Sodium 151 (H) 135 - 145 meq/L    Potassium 3.8 3.5 - 5.2 meq/L    Chloride 114 (H) 98 - 111 meq/L    CO2 28 23 - 33 meq/L    Glucose 107 70 - 108 mg/dL    BUN 85 (H) 7 - 22 mg/dL    CREATININE 2.7 (H) 0.4 - 1.2 mg/dL    Calcium 8.5 8.5 - 10.5 mg/dL   Magnesium    Collection Time: 04/27/22  8:29 PM   Result Value Ref Range    Magnesium 2.1 1.6 - 2.4 mg/dL   Phosphorus    Collection Time: 04/27/22  8:29 PM   Result Value Ref Range    Phosphorus 2.9 2.4 - 4.7 mg/dL   Calcium, Ionized    Collection Time: 04/27/22  8:29 PM   Result Value Ref Range    Calcium, Ion 1.15 1.12 - 1.32 mmol/L   Hemoglobin and Hematocrit    Collection Time: 04/27/22  8:29 PM   Result Value Ref Range    Hemoglobin 8.4 (L) 14.0 - 18.0 gm/dl    Hematocrit 26.5 (L) 42.0 - 52.0 %   Hepatic Function Panel    Collection Time: 04/27/22  8:29 PM   Result Value Ref Range    Albumin 2.4 (L) 3.5 - 5.1 g/dL    Total Bilirubin 3.7 (H) 0.3 - 1.2 mg/dL    Bilirubin, Direct 1.8 (H) 0.0 - 0.3 mg/dL    Alkaline Phosphatase 73 38 - 126 U/L    AST 58 (H) 5 - 40 U/L    ALT 30 11 - 66 U/L    Total Protein 5.3 (L) 6.1 - 8.0 g/dL   TSH with Reflex    Collection Time: 04/27/22  8:29 PM   Result Value Ref Range    TSH 0.647 0.400 - 4.200 uIU/mL   Anion Gap    Collection Time: 04/27/22  8:29 PM   Result Value Ref Range    Anion Gap 9.0 8.0 - 16.0 meq/L   Glomerular Filtration Rate, Estimated    Collection Time: 04/27/22  8:29 PM   Result Value Ref Range    Est, Glom Filt Rate 28 (A) ml/min/1.73m2   EKG 12 Lead    Collection Time: 04/27/22  9:21 PM   Result Value Ref Range    Ventricular Rate 104 BPM    Atrial Rate 104 BPM    P-R Interval 160 ms    QRS Duration 82 ms    Q-T Interval 342 ms    QTc Calculation (Bazett) 449 ms    P Axis 86 degrees    R Axis 45 degrees    T Axis 47 degrees   Basic Metabolic Panel w/ Reflex to MG    Collection Time: 04/27/22 10:58 PM Result Value Ref Range    Sodium 149 (H) 135 - 145 meq/L    Potassium reflex Magnesium 3.9 3.5 - 5.2 meq/L    Chloride 111 98 - 111 meq/L    CO2 28 23 - 33 meq/L    Glucose 126 (H) 70 - 108 mg/dL    BUN 79 (H) 7 - 22 mg/dL    CREATININE 2.5 (H) 0.4 - 1.2 mg/dL    Calcium 8.5 8.5 - 10.5 mg/dL   Anion Gap    Collection Time: 04/27/22 10:58 PM   Result Value Ref Range    Anion Gap 10.0 8.0 - 16.0 meq/L   Glomerular Filtration Rate, Estimated    Collection Time: 04/27/22 10:58 PM   Result Value Ref Range    Est, Glom Filt Rate 31 (A) ml/min/1.73m2   CBC    Collection Time: 04/28/22  1:36 AM   Result Value Ref Range    WBC 33.6 (HH) 4.8 - 10.8 thou/mm3    RBC 2.40 (L) 4.70 - 6.10 mill/mm3    Hemoglobin 8.2 (L) 14.0 - 18.0 gm/dl    Hematocrit 24.7 (L) 42.0 - 52.0 %    .9 (H) 80.0 - 94.0 fL    MCH 34.2 (H) 26.0 - 33.0 pg    MCHC 33.2 32.2 - 35.5 gm/dl    RDW-CV 16.2 (H) 11.5 - 14.5 %    RDW-SD 61.1 (H) 35.0 - 45.0 fL    Platelets 077 727 - 663 thou/mm3    MPV 9.8 9.4 - 12.4 fL    Pathologist Review DESEAN SIMMONS     Basic Metabolic Panel    Collection Time: 04/28/22  1:36 AM   Result Value Ref Range    Sodium 151 (H) 135 - 145 meq/L    Potassium 3.9 3.5 - 5.2 meq/L    Chloride 111 98 - 111 meq/L    CO2 30 23 - 33 meq/L    Glucose 133 (H) 70 - 108 mg/dL    BUN 75 (H) 7 - 22 mg/dL    CREATININE 2.4 (H) 0.4 - 1.2 mg/dL    Calcium 8.4 (L) 8.5 - 10.5 mg/dL   Hepatic Function Panel    Collection Time: 04/28/22  1:36 AM   Result Value Ref Range    Albumin 2.5 (L) 3.5 - 5.1 g/dL    Total Bilirubin 2.9 (H) 0.3 - 1.2 mg/dL    Bilirubin, Direct 1.4 (H) 0.0 - 0.3 mg/dL    Alkaline Phosphatase 80 38 - 126 U/L    AST 62 (H) 5 - 40 U/L    ALT 33 11 - 66 U/L    Total Protein 5.3 (L) 6.1 - 8.0 g/dL   Anion Gap    Collection Time: 04/28/22  1:36 AM   Result Value Ref Range    Anion Gap 10.0 8.0 - 16.0 meq/L   Glomerular Filtration Rate, Estimated    Collection Time: 04/28/22  1:36 AM   Result Value Ref Range    Est, Glom Filt Rate 32 (A) ml/min/1.73m2   Scan of Blood Smear    Collection Time: 04/28/22  1:36 AM   Result Value Ref Range    SCAN OF BLOOD SMEAR see below    Magnesium    Collection Time: 04/28/22  5:51 AM   Result Value Ref Range    Magnesium 2.0 1.6 - 2.4 mg/dL   Phosphorus    Collection Time: 04/28/22  5:51 AM   Result Value Ref Range    Phosphorus 2.5 2.4 - 4.7 mg/dL   Calcium, Ionized    Collection Time: 04/28/22  5:51 AM   Result Value Ref Range    Calcium, Ion 1.16 1.12 - 1.32 mmol/L   Basic Metabolic Panel w/ Reflex to MG    Collection Time: 04/28/22  5:51 AM   Result Value Ref Range    Sodium 151 (H) 135 - 145 meq/L    Potassium reflex Magnesium 3.6 3.5 - 5.2 meq/L    Chloride 109 98 - 111 meq/L    CO2 33 23 - 33 meq/L    Glucose 148 (H) 70 - 108 mg/dL    BUN 69 (H) 7 - 22 mg/dL    CREATININE 2.2 (H) 0.4 - 1.2 mg/dL    Calcium 8.4 (L) 8.5 - 10.5 mg/dL   Anion Gap    Collection Time: 04/28/22  5:51 AM   Result Value Ref Range    Anion Gap 9.0 8.0 - 16.0 meq/L   Glomerular Filtration Rate, Estimated    Collection Time: 04/28/22  5:51 AM   Result Value Ref Range    Est, Glom Filt Rate 36 (A) ml/min/1.73m2   EKG 12 Lead    Collection Time: 04/28/22  6:46 AM   Result Value Ref Range    Ventricular Rate 87 BPM    Atrial Rate 87 BPM    P-R Interval 128 ms    QRS Duration 84 ms    Q-T Interval 384 ms    QTc Calculation (Bazett) 462 ms    P Axis 78 degrees    R Axis 51 degrees    T Axis 54 degrees   POCT glucose    Collection Time: 04/28/22 11:37 AM   Result Value Ref Range    POC Glucose 170 (H) 70 - 108 mg/dl           Impression:  · Right inguinal hernia, incarcerated, perforated small bowel, soilage of the pelvic cavity, right groin and pelvic abscesses, and necrotic hernial sac; laparotomy per Dr. Jaylin Booker 4/25/22  · Aspiration pneumonia  · Sepsis-likely due to intra-abdominal infection and aspiration pneumonia secondary to haemophilus and Klebsiella  · Acute hypoxic respiratory failure secondary to aspiration pneumonia  · Hypotension  · Leukocytosis with WBC of 21.8  · Anuric acute renal failure on chronic kidney disease; likely prerenal from severe volume depletion and hypotension from sepsis  · Anion gap metabolic acidosis-suspect secondary to uremia and lactic acidosis  · Elevated troponin-likely demand ischemia secondary to hypotension  · Macrocytic anemia  · Supraventricular tachycardia with a rate in the 160s and stable blood pressure on 4/27/2022  · New onset of A. fib with RVR refractory to Lopressor and Cardizem; patient converted with amiodarone on 4/28/2022. · Multifocal atrial tachycardia   · Coronary artery disease  · Recent Rib Fracture-   · Hypertension-  Home meds of losartan 50mg and Metoprolol 25mg XR on hold   · BPH- possibly contributing to #1. Home meds of tamsulosin and tadalafil- on hold.    · Glaucoma  · Tobacco abuse  · History of CVA-  15 years ago- unknown etiology and treatment  · GERD- takes pepcid 20mg at home   · Erectile dysfunction  · Hyperlipidemia        Recommendations:  · Await therapy evaluations  · Patient will likely be a good candidate for and will benefit from admission to the IPR Unit      Patient is appropriate for IPR for the diagnoses of:    · Right inguinal hernia, incarcerated, perforated small bowel, soilage of the pelvic cavity, right groin and pelvic abscesses, and necrotic hernial sac; laparotomy per Dr. Petty Avers 4/25/22  · Aspiration pneumonia  · Sepsis-likely due to intra-abdominal infection and aspiration pneumonia secondary to haemophilus and Klebsiella  · Acute hypoxic respiratory failure secondary to aspiration pneumonia  · Hypotension  · Leukocytosis with WBC of 21.8  · Anuric acute renal failure on chronic kidney disease; likely prerenal from severe volume depletion and hypotension from sepsis  · Anion gap metabolic acidosis-suspect secondary to uremia and lactic acidosis  · Elevated troponin-likely demand ischemia secondary to hypotension  · Macrocytic anemia  · Supraventricular tachycardia with a rate in the 160s and stable blood pressure on 4/27/2022  · New onset of A. fib with RVR refractory to Lopressor and Cardizem; patient converted with amiodarone on 4/28/2022. · Multifocal atrial tachycardia   · Coronary artery disease  · Recent Rib Fracture-   · Hypertension-  Home meds of losartan 50mg and Metoprolol 25mg XR on hold   · BPH- possibly contributing to #1. Home meds of tamsulosin and tadalafil- on hold. · Patient can participate in and does require an intensive rehabilitation therapy program, generally consisting of 3 hours of therapy per day at least 5 days per week  · Patient is expected to actively participate in, and benefit significantly from, the intensive rehabilitation therapy program (the patients condition and functional status are such that the patient can reasonably be expected to make measurable improvement, expected to be made within a prescribed period of time and as a result of the intensive rehabilitation therapy program, that will be of practical value to improve the patients functional capacity or adaptation to impairments)  · Patient requires physician supervision by a rehabilitation physician, with face-to-face visits at least 3 days per week to assess the patient both medically and functionally and to modify the course of treatment as needed. Medical conditions to include for management include : pain, nutrition (currently NPO on TPN), HTN, Recent A-fib with RVR requiring IV medications  · Require an intensive and coordinated interdisciplinary team approach to the delivery of rehabilitative care. It was my pleasure to evaluate [employer] today. Please call with questions.     Adithya Mccann MD

## 2022-04-28 NOTE — FLOWSHEET NOTE
Pt was in bed at the time of the visit. He was dealing with acute respiratory failure but was alert and active. He was talking and was hopeful. He wanted prayer to cope and heal. Prayer was appreciated. 04/28/22 1501   Encounter Summary   Encounter Overview/Reason  Initial Encounter   Service Provided For: Patient   Referral/Consult From: [facility] Family members   Last Encounter  04/28/22   Complexity of Encounter Low   Begin Time 0845   End Time  0854   Total Time Calculated 9 min   Encounter    Type Follow up   Spiritual/Emotional needs   Type Spiritual Support   Assessment/Intervention/Outcome   Assessment Calm; Hopeful   Intervention Active listening;Empowerment;Nurtured Hope   Outcome Acceptance;Expressed Gratitude;Encouraged

## 2022-04-28 NOTE — PROGRESS NOTES
TPN Follow Up Note    Assessment: sepsis improving. Renal function improving. Electrolyte Replacement:   none    TPN changes for (today) at 1800:   Decrease NaAc to 45 mEq  Add KCl 20mEq  Increase KPhos to 15 mmol    Re-check BMP, Mg, PO4, iCa in am      [employer], [facility]. D., BCPS, [facility] 4/28/2022 12:21 PM

## 2022-04-28 NOTE — PROGRESS NOTES
2111 Noted patient NG output 1L in past 5 hours. Output bile/maroon colored. KALEE Hernandez notified a this time. Calib in to assess patient and informed this RN to continue monitoring patient NG output. [facility] notified of 400ml NG output since 2130. Patient vitals otherwise stable. Calib to bedside to assess patient.

## 2022-04-29 ENCOUNTER — APPOINTMENT (OUTPATIENT)
Dept: GENERAL RADIOLOGY | Age: 72
DRG: 853 | End: 2022-04-29
Payer: MEDICARE

## 2022-04-29 LAB
ANION GAP SERPL CALCULATED.3IONS-SCNC: 11 MEQ/L (ref 8–16)
ANION GAP SERPL CALCULATED.3IONS-SCNC: 8 MEQ/L (ref 8–16)
BUN BLDV-MCNC: 42 MG/DL (ref 7–22)
BUN BLDV-MCNC: 48 MG/DL (ref 7–22)
CALCIUM SERPL-MCNC: 8.7 MG/DL (ref 8.5–10.5)
CALCIUM SERPL-MCNC: 8.7 MG/DL (ref 8.5–10.5)
CHLORIDE BLD-SCNC: 107 MEQ/L (ref 98–111)
CHLORIDE BLD-SCNC: 109 MEQ/L (ref 98–111)
CO2: 33 MEQ/L (ref 23–33)
CO2: 34 MEQ/L (ref 23–33)
CREAT SERPL-MCNC: 1.6 MG/DL (ref 0.4–1.2)
CREAT SERPL-MCNC: 1.6 MG/DL (ref 0.4–1.2)
ERYTHROCYTE [DISTWIDTH] IN BLOOD BY AUTOMATED COUNT: 16.3 % (ref 11.5–14.5)
ERYTHROCYTE [DISTWIDTH] IN BLOOD BY AUTOMATED COUNT: 63.4 FL (ref 35–45)
GLUCOSE BLD-MCNC: 106 MG/DL (ref 70–108)
GLUCOSE BLD-MCNC: 113 MG/DL (ref 70–108)
GLUCOSE BLD-MCNC: 143 MG/DL (ref 70–108)
GLUCOSE BLD-MCNC: 148 MG/DL (ref 70–108)
GLUCOSE BLD-MCNC: 187 MG/DL (ref 70–108)
HCT VFR BLD CALC: 27.2 % (ref 42–52)
HEMOGLOBIN: 8.6 GM/DL (ref 14–18)
MAGNESIUM: 2 MG/DL (ref 1.6–2.4)
MCH RBC QN AUTO: 33.9 PG (ref 26–33)
MCHC RBC AUTO-ENTMCNC: 31.6 GM/DL (ref 32.2–35.5)
MCV RBC AUTO: 107.1 FL (ref 80–94)
PLATELET # BLD: 240 THOU/MM3 (ref 130–400)
PMV BLD AUTO: 10.5 FL (ref 9.4–12.4)
POTASSIUM REFLEX MAGNESIUM: 3.6 MEQ/L (ref 3.5–5.2)
POTASSIUM SERPL-SCNC: 3.7 MEQ/L (ref 3.5–5.2)
RBC # BLD: 2.54 MILL/MM3 (ref 4.7–6.1)
SODIUM BLD-SCNC: 145 MEQ/L (ref 135–145)
SODIUM BLD-SCNC: 148 MEQ/L (ref 135–145)
SODIUM BLD-SCNC: 149 MEQ/L (ref 135–145)
SODIUM BLD-SCNC: 153 MEQ/L (ref 135–145)
WBC # BLD: 33.1 THOU/MM3 (ref 4.8–10.8)

## 2022-04-29 PROCEDURE — 84295 ASSAY OF SERUM SODIUM: CPT

## 2022-04-29 PROCEDURE — 97530 THERAPEUTIC ACTIVITIES: CPT

## 2022-04-29 PROCEDURE — 36415 COLL VENOUS BLD VENIPUNCTURE: CPT

## 2022-04-29 PROCEDURE — 97166 OT EVAL MOD COMPLEX 45 MIN: CPT

## 2022-04-29 PROCEDURE — 2140000000 HC CCU INTERMEDIATE R&B

## 2022-04-29 PROCEDURE — 2580000003 HC RX 258: Performed by: PHYSICIAN ASSISTANT

## 2022-04-29 PROCEDURE — 2580000003 HC RX 258: Performed by: INTERNAL MEDICINE

## 2022-04-29 PROCEDURE — 76937 US GUIDE VASCULAR ACCESS: CPT

## 2022-04-29 PROCEDURE — 99232 SBSQ HOSP IP/OBS MODERATE 35: CPT | Performed by: INTERNAL MEDICINE

## 2022-04-29 PROCEDURE — 6360000002 HC RX W HCPCS: Performed by: STUDENT IN AN ORGANIZED HEALTH CARE EDUCATION/TRAINING PROGRAM

## 2022-04-29 PROCEDURE — 94760 N-INVAS EAR/PLS OXIMETRY 1: CPT

## 2022-04-29 PROCEDURE — 97110 THERAPEUTIC EXERCISES: CPT

## 2022-04-29 PROCEDURE — 82948 REAGENT STRIP/BLOOD GLUCOSE: CPT

## 2022-04-29 PROCEDURE — 02HV33Z INSERTION OF INFUSION DEVICE INTO SUPERIOR VENA CAVA, PERCUTANEOUS APPROACH: ICD-10-PCS | Performed by: INTERNAL MEDICINE

## 2022-04-29 PROCEDURE — 2580000003 HC RX 258

## 2022-04-29 PROCEDURE — 80048 BASIC METABOLIC PNL TOTAL CA: CPT

## 2022-04-29 PROCEDURE — 99233 SBSQ HOSP IP/OBS HIGH 50: CPT | Performed by: FAMILY MEDICINE

## 2022-04-29 PROCEDURE — C1751 CATH, INF, PER/CENT/MIDLINE: HCPCS

## 2022-04-29 PROCEDURE — 99024 POSTOP FOLLOW-UP VISIT: CPT | Performed by: SURGERY

## 2022-04-29 PROCEDURE — 36568 INSJ PICC <5 YR W/O IMAGING: CPT

## 2022-04-29 PROCEDURE — 92526 ORAL FUNCTION THERAPY: CPT

## 2022-04-29 PROCEDURE — 6370000000 HC RX 637 (ALT 250 FOR IP): Performed by: NURSE PRACTITIONER

## 2022-04-29 PROCEDURE — 2500000003 HC RX 250 WO HCPCS: Performed by: NURSE PRACTITIONER

## 2022-04-29 PROCEDURE — 85027 COMPLETE CBC AUTOMATED: CPT

## 2022-04-29 PROCEDURE — 2580000003 HC RX 258: Performed by: STUDENT IN AN ORGANIZED HEALTH CARE EDUCATION/TRAINING PROGRAM

## 2022-04-29 PROCEDURE — 6360000002 HC RX W HCPCS

## 2022-04-29 PROCEDURE — 71045 X-RAY EXAM CHEST 1 VIEW: CPT

## 2022-04-29 PROCEDURE — 2700000000 HC OXYGEN THERAPY PER DAY

## 2022-04-29 PROCEDURE — 2500000003 HC RX 250 WO HCPCS: Performed by: PHYSICIAN ASSISTANT

## 2022-04-29 PROCEDURE — 6360000002 HC RX W HCPCS: Performed by: PHYSICIAN ASSISTANT

## 2022-04-29 PROCEDURE — 2500000003 HC RX 250 WO HCPCS

## 2022-04-29 RX ORDER — LEUCINE, PHENYLALANINE, LYSINE, METHIONINE, ISOLEUCINE, VALINE, HISTIDINE, THREONINE, TRYPTOPHAN, ALANINE, GLYCINE, ARGININE, PROLINE, SERINE, TYROSINE, DEXTROSE 365; 280; 290; 200; 300; 290; 240; 210; 90; 1035; 515; 575; 340; 250; 20; 15 MG/100ML; MG/100ML; MG/100ML; MG/100ML; MG/100ML; MG/100ML; MG/100ML; MG/100ML; MG/100ML; MG/100ML; MG/100ML; MG/100ML; MG/100ML; MG/100ML; MG/100ML; G/100ML
2000 INJECTION INTRAVENOUS
Status: DISPENSED | OUTPATIENT
Start: 2022-04-29 | End: 2022-04-30

## 2022-04-29 RX ORDER — METOPROLOL TARTRATE 5 MG/5ML
5 INJECTION INTRAVENOUS EVERY 8 HOURS
Status: DISCONTINUED | OUTPATIENT
Start: 2022-04-29 | End: 2022-05-04

## 2022-04-29 RX ADMIN — SODIUM CHLORIDE, PRESERVATIVE FREE 10 ML: 5 INJECTION INTRAVENOUS at 03:03

## 2022-04-29 RX ADMIN — AMIODARONE HYDROCHLORIDE 0.5 MG/MIN: 1.8 INJECTION, SOLUTION INTRAVENOUS at 02:08

## 2022-04-29 RX ADMIN — INSULIN LISPRO 1 UNITS: 100 INJECTION, SOLUTION INTRAVENOUS; SUBCUTANEOUS at 17:34

## 2022-04-29 RX ADMIN — ONDANSETRON 4 MG: 2 INJECTION INTRAMUSCULAR; INTRAVENOUS at 03:03

## 2022-04-29 RX ADMIN — LEUCINE, PHENYLALANINE, LYSINE, METHIONINE, ISOLEUCINE, VALINE, HISTIDINE, THREONINE, TRYPTOPHAN, ALANINE, GLYCINE, ARGININE, PROLINE, SERINE, TYROSINE, DEXTROSE 2000 ML: 365; 280; 290; 200; 300; 290; 240; 210; 90; 1035; 515; 575; 340; 250; 20; 15 INJECTION INTRAVENOUS at 18:10

## 2022-04-29 RX ADMIN — AMIODARONE HYDROCHLORIDE 0.5 MG/MIN: 1.8 INJECTION, SOLUTION INTRAVENOUS at 14:57

## 2022-04-29 RX ADMIN — PIPERACILLIN AND TAZOBACTAM 3375 MG: 3; .375 INJECTION, POWDER, LYOPHILIZED, FOR SOLUTION INTRAVENOUS at 10:33

## 2022-04-29 RX ADMIN — METOPROLOL TARTRATE 5 MG: 5 INJECTION INTRAVENOUS at 17:34

## 2022-04-29 RX ADMIN — INSULIN LISPRO 1 UNITS: 100 INJECTION, SOLUTION INTRAVENOUS; SUBCUTANEOUS at 13:32

## 2022-04-29 RX ADMIN — SODIUM CHLORIDE, PRESERVATIVE FREE 10 ML: 5 INJECTION INTRAVENOUS at 10:15

## 2022-04-29 RX ADMIN — METOPROLOL TARTRATE 2.5 MG: 5 INJECTION INTRAVENOUS at 03:07

## 2022-04-29 RX ADMIN — PIPERACILLIN AND TAZOBACTAM 3375 MG: 3; .375 INJECTION, POWDER, LYOPHILIZED, FOR SOLUTION INTRAVENOUS at 17:40

## 2022-04-29 RX ADMIN — DEXTROSE MONOHYDRATE: 50 INJECTION, SOLUTION INTRAVENOUS at 13:29

## 2022-04-29 RX ADMIN — FAMOTIDINE 10 MG: 10 INJECTION, SOLUTION INTRAVENOUS at 10:06

## 2022-04-29 RX ADMIN — DAKIN'S SOLUTION 0.125% (QUARTER STRENGTH): 0.12 SOLUTION at 12:47

## 2022-04-29 RX ADMIN — PIPERACILLIN AND TAZOBACTAM 2250 MG: 2; .25 INJECTION, POWDER, LYOPHILIZED, FOR SOLUTION INTRAVENOUS at 03:13

## 2022-04-29 RX ADMIN — METOPROLOL TARTRATE 2.5 MG: 5 INJECTION INTRAVENOUS at 10:06

## 2022-04-29 RX ADMIN — DEXTROSE MONOHYDRATE: 50 INJECTION, SOLUTION INTRAVENOUS at 20:45

## 2022-04-29 ASSESSMENT — ENCOUNTER SYMPTOMS
NAUSEA: 0
DIARRHEA: 0
CONSTIPATION: 0
VOMITING: 0
ABDOMINAL PAIN: 1
SHORTNESS OF BREATH: 0
WHEEZING: 0
COUGH: 0

## 2022-04-29 NOTE — FLOWSHEET NOTE
04/28/22 2333   Treatment Team Notification   Reason for Communication Evaluate   Team Member Name [id number]uffy   Treatment Team Role Physician Assistant   Method of Communication Secure Message   Response Waiting for response   Notification Time [phone] Patient pulled out his N/g, CVC and 2 IV sites at 31 75 62. I did get the N/g back in and 2 peripheral IV sites. He had TPN infusing. Do you want a PPN instead? POD#3 with sepsis secondary to incarcerated right inguinal hernia with perforation and obstruction of small bowel, as well as necrotic right groin wound status post debridement.

## 2022-04-29 NOTE — PROGRESS NOTES
EN/PN NUTRITION SUPPORT    RECOMMENDATIONS/PLAN:   1. Once PICC line successfully placed and verified recommend initating clinimix 5/15: 10 kcals/kg (dosing weight 63 kg). Clinimix 5/15 @ 40 ml/hr would provide patient with 682 kcals, 48 g protein, 144 g CHO in total volume of 960 ml.   2. Transition to 3-in-1 TPN when able vs EN via TF when appropriate to utilize gut and okay per surgery. Recommend trophic EN vital 1.2 @ 10 ml/hr as GI status allows. 3. Monitor nutrition related lab values and adjust recommendations as necessary. CURRENT NUTRITION THERAPIES  Diet NPO  Current Parenteral Nutrition Orders:  · Type and Formula: 5/15 Clinimix: 10 kcals/kg (dosing wt 63 kg)  · Lipids: None   · Duration: Continuous  · Rate/Volume: TPN not infusing at this time as pt had pulled central line out last evening. · Clinimix 5/15 (10 kcals/kg) Provides: approximately 682 kcals, 48 g protein, 144 g CHO, in total volume of 960 ml.     COMPARATIVE STANDARDS  Energy (kcal):  8704-1943 (25-30/kgm); Weight Used for Energy Requirements:  Current (63kgm)     Protein (g):  107gms (1.7/kgm) IF ROBSON improves with hydration - monitoring; 63gms if no improvement; Weight Used for Protein Requirements:  Current        Fluid (ml/day):  per physician;     NUTRITIONAL SUMMARY/STATUS:   Pt. severely malnourished AEB criteria in 4/27 MNT note. At risk for further nutritional compromise r/t admit with ROBSON, intubated 4/25 and extubated 4/29, emergent GI surgery 4/25 d/t perforated SB with soilage of pelvic cavity, pelvic abscess and necrotic hernial sac, aspiration pneumonia, sepsis; LOS day 4, per H&P N/V few days pta and underlying medical condition GERD, HTN, CVA, CAD    NUTRITION RELATED FINDINGS:   Treatments: Extubated 4/28; NGT to LIWS with high output; started TPN 4/27 as pt. Admit with malnutrition and unable to start EN yet d/t GI status; abd drain; good UO (4/29: 800 ml). 4/28 pt pulled out NGT and central line during his sleep. Per surgery okay for TPN to restart once picc line access is achieved. Will be unable to initiate 3-in-1 TPN formula over the weekend - pt's nutrition support will have to be initiated with use of 5/15 clinimix today and over the weekend. NGT placed for suction; high output (700 ml documented today); per surgery's note output appears bloody. Renal values much improved. Creatinine had been 11.2 on admission. TPN Status: Not infusing since 4/28 d/t pulled central line. GI Status: high NGT output  Pertinent Labs: Na 153, K 3.6, BUN 42, creatinine 1.6, glucose 106, hgb 8.6  Pertinent Meds: pepcid, humalog, metoprolol, zosyn, amiodarone, zofran (PRN)  Current Weight: 137 lb 12.6 oz (62.5 kg) (4/29: no edema reported)  Admission Weight:  138 lb (62.6 kg) (4/26 with no edema)  Wounds: Surgical Incision (4/25 laparotomy for SBO with heria repair; open groin wound)  Malnutrition Status: Severe malnutrition    Please refer to initial nutrition assessment for additional details.    Rylee Maloney RD:    Contact Number: [phone]

## 2022-04-29 NOTE — CARE COORDINATION
4/29/22, 2:45 PM EDT    DISCHARGE ON GOING EVALUATION    Jessica Suarez Sr. Hospital day: 4  Location: -26/026-A Reason for admit: Renal failure [N19]  Sepsis associated hypotension ([address] [A41.9, I95.9]   Procedure:   4/25 CXR: Persistent mild atelectatic/fibrotic stranding retrocardiac region left lung base; no effusion  4/25 CT Head:  Stable CT scan of the brain, no interval change since previous MRI scan dated 10 Melody 2019. Mild atrophy and dilatation of the third and lateral ventricles. Probable ischemic changes in the white matter, left basal ganglia and in the bernadette. Calcification the cavernous segments of both internal carotid arteries. Increased density in the external auditory canals which may represent cerumen bilaterally. 4/25 Renal US: Multifocal simple bilateral renal cyst. Echogenic bilateral renal parenchyma. 4/25 CT Abd/pelvis: Right inguinal hernia containing small bowel. This is causing a small bowel obstruction. There are several gas collections within the right inguinal hernia. Ischemic or perforated bowel could have this appearance. Mild to moderate patchy consolidation bilateral lower lobes. This could represent aspiration or pneumonia. Prominent thickening of the distal esophagus and gastroesophageal junction. Neoplasm not excluded. Recommend direct visualization. 4/25 LAPAROTOMY EXPLORATORY FOR SMALL BOWEL OBSTRUCTION WITH INCARCERATED HERNIA REPAIR.  Debridement necrotic subcutaneous fat fascia and muscle right groin  4/25 Intubated in OR  4/25 CVC Right subclavian   4/27 Extubated    Barriers to Discharge: Transferred from ICU to . Hospitalist, Surgery, Nephrology and PMR following. POD #4. NGT to suction. ALFONSO drain. External simmons. O2 at 4L/nc, sat 100%. PT/OT/SLP. NPO. Pt pulled out central line. TPN off. Planning to place PICC line and then restart TPN. IVF. Pepcid iv daily, Lopressor iv q6hr, Zosyn iv q8hr. WBC 33.1, Hgb 8.6. Sodium 153.  Abdominal wound culture (collected 4/25) Morganella morganii ssp morganii; E.Coli. Dakins to wound. PCP: Pushpa Barrientos MD  Readmission Risk Score: 21.6 ( )%  Patient Goals/Plan/Treatment Preferences: From home with wife. Awaiting therapy recommendations for either IPR vs home with [place]. SW following.

## 2022-04-29 NOTE — PROGRESS NOTES
PICC Procedure Note    Yanely Suarez Sr.    Admitted- 4/25/2022  2:08 PM  Admission diagnosis- Renal failure [N19]  Sepsis associated hypotension ([facility] [address] [A41.9, I95.9]      Attending Physician- Keith Whatley MD  Ordering Physician- [facility]  Indication for Insertion: TPN    Catheter Insertion Date- 4/29/2022   Lot Number- [id number]  Gauge-4  Lumen-dual    Insertion Site- PATRICE Basilic  Vein Diameter- 9.76 cm  Catheter Length- 40 cm  Internal Length- 40 cm  Exposed Catheter Length- 0cm   Upper Arm Circumference- 25cm  Tip Confirmation System Bundle met- No: chest xray needed  If X-ray required, Tip Location- CA Projection  Radiologist- Dr Meredith Munoz    PICC insertion successful- Yes  Ultrasound- yes    Okay To Use PICC- Yes    Electronically signed by João Block, RN, RN on 4/29/2022 at 4:20 PM

## 2022-04-29 NOTE — PROGRESS NOTES
Renal Adjustment Follow-up    Recent Labs     04/28/22  2313 04/29/22  0527   BUN 48* 42*   CREATININE 1.6* 1.6*     Estimated Creatinine Clearance: 37 mL/min (A) (based on SCr of 1.6 mg/dL (H)).     Plan: Change to zosyn 3.375 G q8h

## 2022-04-29 NOTE — PROGRESS NOTES
Renal Progress Note  [facility]    Patient :  Osiris Bautista Sr.; 66 y.o. MRN# [id number]  Location:  3B-26/026-A  Attending:  Regina Flores MD  Admit Date:  4/25/2022   Hospital Day: 4      Subjective:     Nephrology is following the patient for ROBSON. Patient was seen and examined this morning. Good urine output. Bps ok. NG output 1600 mL/24 hours. Pt denies complaints. No SOB. On TPN. Outpatient Medications:     Medications Prior to Admission: atorvastatin (LIPITOR) 20 MG tablet, TAKE 1 TABLET BY MOUTH DAILY  COMBIGAN 0.2-0.5 % ophthalmic solution, Place 1 drop into the left eye every 12 hours  famotidine (PEPCID) 20 MG tablet, Take 1 tablet by mouth 2 times daily  losartan (COZAAR) 50 MG tablet, Take 1 tablet by mouth daily  metoprolol succinate (TOPROL XL) 25 MG extended release tablet, TAKE 1 TABLET BY MOUTH DAILY  tamsulosin (FLOMAX) 0.4 MG capsule, Take 1 capsule by mouth daily  lidocaine (LIDODERM) 5 %, Place 1 patch onto the skin daily 12 hours on, 12 hours off.   fluocinonide (LIDEX) 0.05 % cream, APPLY EXTERNALLY TO THE AFFECTED AREA TWICE DAILY  tadalafil (CIALIS) 20 MG tablet, TAKE 1 TABLET BY MOUTH EVERY DAY AS NEEDED  aspirin 81 MG EC tablet, Take 1 tablet by mouth daily  loratadine (CLARITIN) 10 MG tablet, TAKE 1 TABLET BY MOUTH DAILY    Current Medications:     Scheduled Meds:    piperacillin-tazobactam  3,375 mg IntraVENous Q8H    [Held by provider] metoprolol tartrate  12.5 mg Oral BID    metoprolol  2.5 mg IntraVENous Q6H    insulin lispro  0-6 Units SubCUTAneous 4 times per day    sodium hypochlorite   Irrigation Daily    vitamin D  50,000 Units Oral Weekly    sodium chloride flush  5-40 mL IntraVENous 2 times per day    [Held by provider] heparin (porcine)  5,000 Units SubCUTAneous TID    famotidine (PEPCID) injection  10 mg IntraVENous Daily     Continuous Infusions:    dextrose 100 mL/hr at 04/29/22 5058    dextrose      sodium chloride      amiodarone 0.5 mg/min (22 0330)    sodium chloride       PRN Meds:  glucose, glucagon (rDNA), dextrose, dextrose bolus (hypoglycemia) **OR** dextrose bolus (hypoglycemia), sodium chloride, morphine, sodium chloride flush, sodium chloride, ondansetron **OR** ondansetron, polyethylene glycol, acetaminophen **OR** acetaminophen    Input/Output:       I/O last 3 completed shifts: In: 4235.2 [I.V.:1590.5; IV Piggyback:1187.7]  Out: 8821 [Urine:3000; Emesis/NG output:3750; Drains:275]. Patient Vitals for the past 96 hrs (Last 3 readings):   Weight   22 0346 137 lb 12.6 oz (62.5 kg)   22 0600 138 lb 7.2 oz (62.8 kg)   22 1415 150 lb (68 kg)       Vital Signs:   Temperature:  Temp: 97.8 °F (36.6 °C)  TMax:   Temp (24hrs), Av °F (36.7 °C), Min:97.4 °F (36.3 °C), Max:99.4 °F (37.4 °C)    Respirations:  Resp: 28  Pulse:   Pulse: 118  BP:    BP: 133/70  BP Range: Systolic (83TVW), LIVIA:892 , Min:111 , UCM:756       Diastolic (99IFC), WDL:86, Min:61, Max:87      Physical Examination:     General:  Awake, alert  HEENT: NC/AT/ MMM +NG tube  Chest:               Clear B/L no rales  Cardiac:  S1 S2   Abdomen: soft, abdominal binder +ALFONSO drain  Neuro:  sedated  SKIN:  No rashes,   Extremities:  No edema,     Labs:       Recent Labs     22  0412 22  1537 229 22  0136 22  0527   WBC 27.2*  --   --  33.6* 33.1*   RBC 2.07*  --   --  2.40* 2.54*   HGB 7.1*   < > 8.4* 8.2* 8.6*   HCT 22.8*   < > 26.5* 24.7* 27.2*   .1*  --   --  102.9* 107.1*   MCH 34.3*  --   --  34.2* 33.9*   MCHC 31.1*  --   --  33.2 31.6*     --   --  259 240   MPV 10.1  --   --  9.8 10.5    < > = values in this interval not displayed.       BMP:   Recent Labs     22  1411 22  2313 22  0527   * 149* 153*   K 3.6 3.7 3.6    107 109   CO2 35* 34* 33   BUN 58* 48* 42*   CREATININE 1.8* 1.6* 1.6*   GLUCOSE 163* 113* 106   CALCIUM 8.5 8.7 8.7      Phosphorus: Recent Labs     04/27/22  0412 04/27/22 2029 04/28/22  0551   PHOS 4.6 2.9 2.5     Magnesium:    Recent Labs     04/27/22 2029 04/28/22  0551 04/28/22  2313   MG 2.1 2.0 2.0     Albumin:    Recent Labs     04/27/22 2029 04/28/22  0136   LABALBU 2.4* 2.5*     BNP:    No results found for: BNP  KHUSHBOO:    No results found for: KHUSHBOO  SPEP:  Lab Results   Component Value Date    PROT 5.3 04/28/2022     UPEP:   No results found for: LABPE  C3:   No results found for: C3  C4:   No results found for: C4  MPO ANCA:   No results found for: MPO  PR3 ANCA:   No results found for: PR3  Anti-GBM:   No results found for: GBMABIGG  Hep BsAg:       No results found for: HEPBSAG  Hep C AB:        No results found for: HEPCAB    Urinalysis/Chemistries:      Lab Results   Component Value Date    NITRU NEGATIVE 04/25/2022    COLORU YELLOW 04/25/2022    PHUR 5.5 04/25/2022    LABCAST >15 HYALINE 04/25/2022    WBCUA 10-15 04/25/2022    RBCUA 10-15 04/25/2022    MUCUS NONE SEEN 04/25/2022    YEAST NONE SEEN 04/25/2022    BACTERIA FEW 04/25/2022    SPECGRAV 1.015 04/25/2022    LEUKOCYTESUR TRACE 04/25/2022    UROBILINOGEN 0.2 04/25/2022    BILIRUBINUR NEGATIVE 04/25/2022    BLOODU MODERATE 04/25/2022    GLUCOSEU NEGATIVE 06/09/2019    KETUA NEGATIVE 04/25/2022     Urine Sodium:   No results found for: MING  Urine Potassium:  No results found for: KUR  Urine Chloride:  No results found for: CLUR  Urine Osmolarity: No results found for: OSMOU  Urine Protein:   No components found for: TOTALPROTEIN, URINE   Urine Creatinine:   No results found for: LABCREA  Urine Eosinophils:  No components found for: UEOS        Impression and Plan:  1. ROBSON , prerenal due to volume depletion and hypotension from sepsis: improving. 2. Hypernatremia: worsening. Increase D5W to 150 ml/hr. Check sodium q 6 hours  3. Respiratory failure on vent  4. Sepsis  5.  S/p ex lap with small bowel resection, drainage of abscess and debridement necrotic fat fascia and hernia sac  6. Leukocytosis  7. Pneumonia  8. Anemia  9. AFib        Please don't hesitate to call with any questions.   Electronically signed by Monico Brumfield DO on 4/29/2022 at 9:54 AM

## 2022-04-29 NOTE — PROGRESS NOTES
[facility]  INPATIENT SPEECH THERAPY  [facility] CCU-STEPDOWN 3B  DAILY NOTE    TIME   SLP Individual Minutes  Time In: 515  Time Out:   Minutes: 11  Timed Code Treatment Minutes: 0 Minutes       Date: 2022  Patient Name: Angelina Truong.      CSN: [id number]   : 1950  (66 y.o.)  Gender: male   Referring Physician: Paramjit Chery MD  Diagnosis: Renal failure  Precautions: aspiration precautions   Current Diet: NPO with NGT for suctioning for bowel obstruction; no source of nutrition until approved by surgery. Swallowing Strategies: Not Applicable and oral care completion   Date of Last MBS/FEES: Not Applicable    Pain:  No pain reported. Subjective:  Session approved by RN, Amandeep Soriano. Patient seen upright in bed. Alert with confusion present. Short-Term Goals:  SHORT TERM GOAL #1:  Goal 1: Patient and caregivers will demonstrate understanding and completion of oral care per oral hygiene protocol to reduce oral bacteria colonization and reduce risk for aspiration pneumonia. INTERVENTIONS: Completion of comprehensive oral care (via toothette) with equal parts hydrogen peroxide and water solution to oral structures (I.e. upper/lower gums and teeth, hard palate, lingual dorsum, buccal cavities) in order to maximize oral hygiene. Patient with good tolerance. Dry/cracked lingual dorsum. Recommendations for continuation of comprehensive routine oral care every x2 hours in order to reduce risk of colonization of oral bacteria and minimize pt risks for aspiration pneumonia. SHORT TERM GOAL #2:  Goal 2: Patient will complete trials of ice chips, and thin liquids  with SLP ONLY to determine appropriateness for completion of instrumental swallow assessment (Need clearance for solids due to small bowel obstruction). INTERVENTIONS: PO trials to determine need for possible instrumental assessment prior to advancement of clears by surgery when clinically indicated.    Thin liquids by spoon: x3- no overt s/s of airway invasion   Thin liquids by cup: x3- no overt s/s of airway invasion   Thin liquids by straw: x3- no overt s/s of airway invasion     *Patient with occasional oral swishing/oral holding and a delayed/audible swallow. Certainly cannot r/o premature pharyngeal entry. No immediate s/s of airway invasion. Slight delayed throat clear following end of PO trials. Vocal quality remained dry and clear. No associated coughing. ST approval for clear liquids when approved by surgery. Will continue to monitor need for possible instrumental swallowing assessment. SHORT TERM GOAL #3:  Goal 3: Complete FEES vs MBS when clinically indicated  INTERVENTIONS: Will continue to monitor need. SHORT TERM GOAL #4:  Goal 4: Continue to monitor cognitive function and evaluation as clinically indicated. INTERVENTIONS: Will continue to monitor need. Long-Term Goals:    No established LTG's given short ELOS     EDUCATION:  Learner: Patient  Education:  Reviewed results and recommendations of this evaluation, Reviewed diet and strategies and Reviewed ST goals and Plan of Care  Evaluation of Education: Needs further instruction, No evidence of learning and Family not present    ASSESSMENT/PLAN:  Activity Tolerance:  Patient tolerance of  treatment: good. Assessment/Plan: Patient progressing toward established goals. Continues to require skilled care of licensed speech pathologist to progress toward achievement of established goals and plan of care. .     Plan for Next Session: PO trials as clinically indicated, monitor need for MBS      Rita Funes M.S. [id number] [phone] 4/29/2022

## 2022-04-29 NOTE — CARE COORDINATION
4/29/22, 7:21 AM EDT    DISCHARGE BARRIERS        Patient transferred to 3B 26. Report given to unit SW, Pat MOODY, regarding discharge plan for this patient.

## 2022-04-29 NOTE — FLOWSHEET NOTE
Call ICU, spoke to Jessica Contreras RN  & reminded to have patient's care transferred to Hospitalist; this [facility] had called at 1915 to have care transferred as well.

## 2022-04-29 NOTE — PROGRESS NOTES
Jovana Flanagan MD  Postoperative Progress Note  Pt Name: Bharati Yeung Record Number: [id number]  Date of Birth 1950   Today's Date: 4/29/2022  ASSESSMENT   1. POD # 4 sepsis secondary to incarcerated right inguinal hernia with perforation and obstruction of small bowel. Necrotic right groin wound status postdebridement drainage of abdominal abscess present at the time of surgery  2. Sepsis resolving . 3. Acute renal failure improving  4. Remains extubated and transferred to stepdown  5. Persistent leukocytosis but stable  6. A. Fib  7. Hypernatremia  8. NG tube outputs remained fairly high bowel sounds hypoactive   has a past medical history of CAD (coronary artery disease), CVA (cerebral vascular accident) ([facility] [address] Erectile dysfunction, GERD (gastroesophageal reflux disease), Hyperlipidemia, and Hypertension. PLANS   1. Analgesics and antiemetics as needed. Continue NG tube. High outputs expected patient was obstructed with significant fecalization of contents of proximal small bowel. 2. IV hydration per medicine service. Normal saline stopped on D5 water  3. Continue broad-spectrum antibiotic monitor cultures. If white count continues to rise recommend CT imaging of the abdomen and pelvis for any on drained abscess or fluid collections  4. Pressors off. Amiodarone drip for atrial fibrillation  5. DVT prophylaxis per primary service. Hemoglobin has dropped no active signs of any surgical bleeding drain is not bloody. Transfusion ordered per primary service as well as HIT labs. Hemoglobin stable post 1 unit. NG output does appear slightly bloody. Risk of bleeding with anticoagulation at this point  6. change groin wound packing twice daily. Dry gauze. Dakin's solution ordered. Wounds look okay  7. Continue Zosyn. Intra-abdominal cultures E. coli and Morganella morganii I both sensitive  8.   Okay to resume TPN from a surgical standpoint  Cecilio Negro thinks he is doing better. Wounds look good drain output more serous. CURRENT MEDICATIONS   Scheduled Meds:   [Held by provider] metoprolol tartrate  12.5 mg Oral BID    metoprolol  2.5 mg IntraVENous Q6H    insulin lispro  0-6 Units SubCUTAneous 4 times per day    sodium hypochlorite   Irrigation Daily    vitamin D  50,000 Units Oral Weekly    sodium chloride flush  5-40 mL IntraVENous 2 times per day    [Held by provider] heparin (porcine)  5,000 Units SubCUTAneous TID    famotidine (PEPCID) injection  10 mg IntraVENous Daily    piperacillin-tazobactam  2,250 mg IntraVENous Q8H     Continuous Infusions:   dextrose 100 mL/hr at 22 7291    dextrose      sodium chloride      amiodarone 0.5 mg/min (22 0330)    sodium chloride      norepinephrine Stopped (22 1013)     PRN Meds:.glucose, glucagon (rDNA), dextrose, dextrose bolus (hypoglycemia) **OR** dextrose bolus (hypoglycemia), sodium chloride, morphine, sodium chloride flush, sodium chloride, ondansetron **OR** ondansetron, polyethylene glycol, acetaminophen **OR** acetaminophen  OBJECTIVE   CURRENT VITALS:  height is 5' 9\" (1.753 m) and weight is 137 lb 12.6 oz (62.5 kg). His axillary temperature is 99.4 °F (37.4 °C). His blood pressure is 128/67 and his pulse is 82. His respiration is 18 and oxygen saturation is 100%. Body mass index is 20.35 kg/m².   Temperature Range (24h):Temp: 99.4 °F (37.4 °C) Temp  Av.1 °F (36.7 °C)  Min: 97.4 °F (36.3 °C)  Max: 99.4 °F (37.4 °C)  BP Range (58N): Systolic (13LJQ), HEY:663 , Min:111 , DUH:166     Diastolic (48JVB), AJQ:39, Min:61, Max:87    Pulse Range (24h): Pulse  Av.1  Min: 75  Max: 126  Respiration Range (24h): Resp  Av.1  Min: 15  Max: 23  Current Pulse Ox (24h):  SpO2: 100 %  Pulse Ox Range (24h):  SpO2  Av.9 %  Min: 89 %  Max: 100 %  Oxygen Amount and Delivery: O2 Flow Rate (L/min): 3 L/min  Incentive Spirometry Tx: GENERAL: Sedated no acute distress  LUNGS: Clear diminished bases   HEART: Pulse 92  ABDOMEN: Soft dressing intact. NG output thin but slightly blood-tinged  INCISION dressings dry and intact  EXTREMITY: No edema  In: 4235.2 [I.V.:1590.5]  Out: 3520 [Urine:1700; Drains:220]  Closed/Suction Drain Superior;Midline Abdomen Bulb-Output (ml): 70 ml  Date 04/29/22 0000 - 04/29/22 2359   Shift 9960-5543 0025-2718 5502-6758 24 Hour Total   INTAKE   I.V.(mL/kg) 302. 9(4.8)   302. 9(4.8)   IV Piggyback(mL/kg) 13.5(0.2)   13.5(0.2)   TPN(mL/kg) 156.3(2.5)   156.3(2.5)   Shift Total(mL/kg) 472.7(7.6)   472.7(7.6)   OUTPUT   Urine(mL/kg/hr) 800(1.6)   800   Emesis/NG output(mL/kg) 850(13.6)   850(13.6)   Drains(mL/kg) 70(1.1)   70(1.1)   Shift Total(mL/kg) 7685(94.3)   1720(27.5)   Weight (kg) 62.5 62.5 62.5 62.5     LABS     Recent Labs     04/27/22  0412 04/27/22  1319 04/27/22  2029 04/27/22  2258 04/28/22  0136 04/28/22  0551 04/28/22  0551 04/28/22  1411 04/28/22  2313 04/29/22  0527   WBC 27.2*  --   --   --  33.6*  --   --   --   --  33.1*   HGB 7.1*   < > 8.4*  --  8.2*  --   --   --   --  8.6*   HCT 22.8*   < > 26.5*  --  24.7*  --   --   --   --  27.2*     --   --   --  259  --   --   --   --  240   *   < > 151*   < > 151* 151*   < > 150* 149* 153*   K 4.4   < > 3.8   < > 3.9 3.6   < > 3.6 3.7 3.6   *   < > 114*   < > 111 109   < > 107 107 109   CO2 22*   < > 28   < > 30 33   < > 35* 34* 33   *   < > 85*   < > 75* 69*   < > 58* 48* 42*   CREATININE 4.3*   < > 2.7*   < > 2.4* 2.2*   < > 1.8* 1.6* 1.6*   MG 2.2  --  2.1  --   --  2.0  --   --  2.0  --    PHOS 4.6  --  2.9  --   --  2.5  --   --   --   --    CALCIUM 8.2*   < > 8.5   < > 8.4* 8.4*   < > 8.5 8.7 8.7    < > = values in this interval not displayed. No results for input(s): PTT, INR in the last 72 hours.     Invalid input(s): PT  Recent Labs     04/27/22 2029 04/28/22  0136   AST 58* 62*   ALT 30 33   BILITOT 3.7* 2.9* BILIDIR 1.8* 1.4*     No results for input(s): TROPONINT in the last 72 hours. RADIOLOGY     XR ABDOMEN FOR NG/OG/NE TUBE PLACEMENT   Final Result   Impression:      NG tube tip proximal stomach. This document has been electronically signed by: Stacy Jaffe MD on    04/28/2022 11:56 PM      XR CHEST PORTABLE   Final Result   Impression:   1. Satisfactory positioning of the endotracheal tube and right central    line. Probable slightly high positioning of the orogastric tube. Consider    advancing 3-4 cm. 2. Interval development of small bilateral pleural effusions, right    greater than left. 3. Bilateral lower lobe airspace opacities secondary to atelectasis and/or    infiltrates, right greater than left. 4. Bilateral interstitial changes secondary to pneumonitis or edema. This document has been electronically signed by: Alvaro Rodriguez DO on    04/26/2022 12:01 AM      CT ABDOMEN PELVIS WO CONTRAST Additional Contrast? None   Final Result   Impression:   Right inguinal hernia containing small bowel. This is causing a small    bowel obstruction. There are several gas collections within the right    inguinal hernia. Ischemic or perforated bowel could have this appearance. Mild to moderate patchy consolidation bilateral lower lobes. This could    represent aspiration or pneumonia. Prominent thickening of the distal esophagus and gastroesophageal    junction. Neoplasm not excluded. Recommend direct visualization. This document has been electronically signed by: Alisa Shelton MD on    04/25/2022 07:20 PM      All CTs at this facility use dose modulation techniques and iterative    reconstructions, and/or weight-based dosing   when appropriate to reduce radiation to a low as reasonably achievable. US RENAL COMPLETE   Final Result   Impression:   Multifocal simple bilateral renal cyst. Echogenic bilateral renal    parenchyma.       This document has been electronically signed by: Adry Holbrook MD on    04/25/2022 06:30 PM      CT HEAD WO CONTRAST   Final Result       1. Stable CT scan of the brain, no interval change since previous MRI scan dated 10 Melody 2019.   2. Mild atrophy and dilatation of the third and lateral ventricles. 3. Probable ischemic changes in the white matter, left basal ganglia and in the bernadette. 4. Calcification the cavernous segments of both internal carotid arteries. 5. Increased density in the external auditory canals which may represent cerumen bilaterally. **This report has been created using voice recognition software. It may contain minor errors which are inherent in voice recognition technology. **      Final report electronically signed by DR Estephania Cowart on 4/25/2022 4:01 PM      XR CHEST PORTABLE   Final Result   1. Normal heart size. No effusion. 2. Persistent mild atelectatic/fibrotic stranding retrocardiac region left lung base. **This report has been created using voice recognition software. It may contain minor errors which are inherent in voice recognition technology. **      Final report electronically signed by Dr. Martina Mar on 4/25/2022 3:05 PM          Electronically signed by Jing Resendiz MD on 4/29/2022 at 8:54 AM

## 2022-04-29 NOTE — SIGNIFICANT EVENT
Received sign out from ICU team. Patient stable on floor. Weaning O2 and off of pressors. Day team to follow.      Electronically signed by Aracely Dudley MD on 4/29/2022 at 4:25 AM

## 2022-04-29 NOTE — PLAN OF CARE
Problem: Nutrition Deficit:  Goal: Optimize nutritional status  Outcome: Not Progressing   Nutrition Problem #1: Severe malnutrition,In context of chronic illness  Intervention: Food and/or Nutrient Delivery: Continue NPO (restart nutrition support when appropriate)

## 2022-04-29 NOTE — PROGRESS NOTES
PROGRESS NOTE      Patient:  Vida Suarez Sr. Unit/Bed:3B-26/026-A    YOB: 1950    MRN: [id number]       Acct: [de-identified]     PCP: Kirsten Pickard MD    Date of Admission: 4/25/2022      Assessment/Plan:    Anticipated Discharge in : Pending    Active Hospital Problems    Diagnosis Date Noted    Acute respiratory failure with hypoxia ([address] [J96.01] 04/26/2022     Priority: Medium    Septic shock ([address] [A41.9, R65.21] 04/26/2022     Priority: Medium    Incarcerated right inguinal hernia [K40.30] 04/26/2022     Priority: Medium    Small bowel perforation ([address] [K63.1] 04/26/2022     Priority: Medium    Severe pneumonia [J18.9] 04/26/2022     Priority: Medium    Acute kidney injury ([address] [N17.9] 04/26/2022     Priority: Medium    Hypoalbuminemia [E88.09] 04/26/2022     Priority: Medium    Syncope [R55] 04/26/2022     Priority: Medium    Hypokalemia [E87.6] 04/26/2022     Priority: Medium    Anemia, macrocytic [D53.9] 04/26/2022     Priority: Medium    Severe malnutrition ([address] [E43] 04/26/2022     Priority: Medium       Perforated small bowel, soilage of the pelvic cavity, right groin and pelvic abscesses, and necrotic hernial sac: POD #4 exploratory laparotomy with bowel resection, primary anastomosis, drainage of pelvic abscess as well as right groin exploration, drainage of abscess, and debridement of necrotic subcutaneous fat, fascia and muscle. On Day 4 of Zosyn for Culture Growing Morganella Morganii and Ecoli. final Susceptibility is pending, however prelim demonstrates sensatitivites to Zosyn. Surgery on board and following. If WBC count increases consider additional un drained abscess or fluid collections. High NGT OPT noted. Dressing changes and wound care. Will require TPN however lost Central access, on SSI. As currently NPO. Crystal Yanely   Aspiration pneumonia: Imaging findings suggestion of infiltrate w/ gastric contents suctioned from ET tube is consistent with aspiration pneumonia. Culture growing ecoli with H. influ on PCR detected. Patient receiving antibiotics as above. Sepsis secondary to intra-abdominal infection and aspiration pneumonia - improving: On antibiotics as above. White count significantly elevated to 33.6 up from 21.8 postoperatively, however Patient remains afebrile and O2 support requirements are being weaned, BP grossly stable, monitor    A. fib with RVR: noted Apr/27/2022 New onset A. fib with RVR refractory to Lopressor and Cardizem. Patient converted with amiodarone. K WNL, Mg WNL, [id number] 5. Surgery recommends holding anticoagulation still at this time in setting of bleed risk. Note Pt is Not optimally controlled on amiodrip + lopressor, as intermittent PVCs and afib. Continue amio drip  Increase lopressor to 5 TID with holding parameters. If persistently remains in intermittent consider Cardiology consult for optimization as Pt will likely be high bleed risk for some time vs [address] [place] [address] Road use. Continue telemetry  Keep Mg > 2, K > 4,       ROBSON on CKD: Likely 2/2 volume depletion, poor oral intake, and hypotension 2/2 sepsis. Pt creatinine improved to 1.6 from 10.4 on Admit  Patient's baseline 1.3. improved with Hydration. Nephrology on board, appreciate rec. Good UOPT. Mixed Acid-base disorder:  Mixed Anion gap metabolic acidosis with respiratory compensation likely secondary to sepsis and ROBSON with an additional metabolic alkalosis likely secondary to volume depletion in setting of GI sx. AG improving, Nephrology on board, appreciate recs. Acute hypoxic respiratory failure: likely 2/2 to aspiration pneumonia as above. Extubated Apr/27/2022, currently on 6L NC.   Acute postoperative pulmonary insufficiency - resolved: Extubated Apr/27/2022,     Troponinemia: Mildly elevated troponin on admit of 0.047 downtrending with inverted T waves found on EKG.  Likely 2/2 demand ischemia 2/2 hypotension.  Monitor    Hypernatremia - noted likely 2/2 current medical acuity, started on D5W at 100 overnight, worsened to 153 in AM, Nephrology on board increased D5W to 150 ml/Hr with Na checks Q6H, improved to 148 in PM.   Hyperphosphatemia -resolved:  Continue to monitor, restart on TPN  Hypermagnesemia - resolved:  Continue to monitor, restart on TPN  Hypocalcemia-resolved: Continue to monitor, restart on TPN. CAD: Hx of,  continue home Lipitor 20 mg,  Holding ASA  For bleed risk. GERD: hx of, home regiment of pepcid 10mg daily, continued for admission IV form. . Noted incidental finding of thickened esophagus on imaging, F/U opt when stable for scope. Delirium? - pulled out central access line overnight, Noted echolalia and persistence. Given medical acuity and advance age likely sundowning as behavior is overnight. Reorient and monitor. If requires consider Seroqel QHS. Chief Complaint:     Hospital Course:    Per H&P:    \" \"The patient was sedated and intubated and therefore could not give any history.  Therefore, history is primarily taken from the chart. Patient's wife states that the patient fell in his yard approximately 14 days ago and went to the [facility] ED. This time, he was discharged with pain medication. Approximately 7 days ago, he again presented to [facility] with complaints of a right lower abdominal bulge at which point he was diagnosed with inguinal hernia and is scheduled with surgical follow-up. Patient presented to [facility] on 4/25/2022 for syncope and worsening inguinal hernia. Wife states that syncope first occurred in [facility] several days ago when the patient had nausea and subjective feelings of excessive heat while sitting.  After this, he went to go outside and then passed out. On the morning of admission, patient's wife was given of the bathtub when he \"slumped over. \"  Wife stated that the patient vomited 3 times in the last 24 hours with brown/yellow vomitus.   Wife also reports that he has not been having bowel movements, has had less frequent urination, and that he has been weak and sleeping \"20 hours a day. \"  While in the ED, the patient was found to have profound renal failure with a creatinine of 11.2 with a baseline of approximately 1.3. Patient also noted to have a profound anion gap acidosis which improved with aggressive fluid resuscitation. Nephrology was consulted given profound renal failure. CT of the abdomen and pelvis without contrast was performed which demonstrated mild to moderate patchy consolidation of the bilateral lower lobes, thickening of the distal esophagus and gastroesophageal junction, a right inguinal hernia causing a small bowel obstruction, and multiple gas collections in the right inguinal hernia highly suspicious for ischemic or perforated bowel. On the evening of 4/25/2022 Dr. Malik performed exploratory laparotomy with small bowel resection, single primary anastomosis, drainage of pelvic abscess, exploration and drainage of right groin abscess, and debridement of necrotic subcutaneous fat/fascia/muscle in the right groin along with excision of the necrotic hernia sac in the right groin. Empiric coverage with vancomycin and Zosyn to necrotic small bowel as well as probable aspiration pneumonia. Family was extensively counseled. \"    Pt had course in ICU from Apr/25-28/2022, Overnight patient went to A. fib with RVR that was refractory to both Lopressor and Cardizem. Patient was given amiodarone which converted him out. Patient continues to have frequent PACs on the monitor. Surgery was consulted regarding anticoagulation however they are recommending holding anticoagulation at this time. We will continue to follow-up with surgery as far as when we can start anticoagulation. Patient overall is awake and alert self, year, but only occasionally to place. Patient denies any pain or acute complaints at this time.   Patient is frequently repeating himself however concern for underlying delirium. Subjective:    Pt seen and examined. Loss of TPN access as Pt pulled out central lines this AM. Likely 2/2 underlying delirium in setting of acuity of general medical condition. Noted Remains Afib in AM, improved with lopressor in PM however still some PVC and intermittent Afib on Tele    Review of Systems   Unable to perform ROS: Acuity of condition   Constitutional: Positive for appetite change. Negative for chills, diaphoresis, fatigue and fever. HENT: Positive for dental problem. Respiratory: Negative for cough, shortness of breath and wheezing. Cardiovascular: Negative for chest pain and palpitations. Gastrointestinal: Positive for abdominal pain. Negative for constipation, diarrhea, nausea and vomiting. Genitourinary: Negative for decreased urine volume, difficulty urinating, dysuria, frequency, hematuria and urgency. Neurological: Positive for weakness. Negative for seizures and syncope. Psychiatric/Behavioral: Positive for confusion and decreased concentration.        Medications:  Reviewed    Infusion Medications    CLINIMIX 5/15      dextrose 150 mL/hr at 04/29/22 1329    dextrose      sodium chloride      amiodarone 0.5 mg/min (04/29/22 1457)    sodium chloride       Scheduled Medications    piperacillin-tazobactam  3,375 mg IntraVENous Q8H    metoprolol  5 mg IntraVENous Q8H    [Held by provider] metoprolol tartrate  12.5 mg Oral BID    insulin lispro  0-6 Units SubCUTAneous 4 times per day    sodium hypochlorite   Irrigation Daily    vitamin D  50,000 Units Oral Weekly    sodium chloride flush  5-40 mL IntraVENous 2 times per day    [Held by provider] heparin (porcine)  5,000 Units SubCUTAneous TID    famotidine (PEPCID) injection  10 mg IntraVENous Daily     PRN Meds: glucose, glucagon (rDNA), dextrose, dextrose bolus (hypoglycemia) **OR** dextrose bolus (hypoglycemia), sodium chloride, morphine, sodium chloride flush, sodium chloride, ondansetron **OR** ondansetron, polyethylene glycol, acetaminophen **OR** acetaminophen      Intake/Output Summary (Last 24 hours) at 4/29/2022 1742  Last data filed at 4/29/2022 1323  Gross per 24 hour   Intake 572.68 ml   Output 2825 ml   Net -2252.32 ml       Diet:  Diet NPO    Exam:  BP (!) 148/71   Pulse 81   Temp 98.1 °F (36.7 °C) (Axillary)   Resp 28   Ht 5' 9\" (1.753 m)   Wt 137 lb 12.6 oz (62.5 kg)   SpO2 100%   BMI 20.35 kg/m²     Physical Exam  Constitutional:       General: He is not in acute distress. Appearance: He is ill-appearing. He is not toxic-appearing or diaphoretic. Interventions: He is not intubated. Nasal cannula in place. Comments: NG tube in place to wall. HENT:      Head: Normocephalic and atraumatic. Nose: Nose normal. No congestion or rhinorrhea. Mouth/Throat:      Mouth: Mucous membranes are dry. Pharynx: Oropharynx is clear. No oropharyngeal exudate or posterior oropharyngeal erythema. Eyes:      General: No scleral icterus. Right eye: No discharge. Left eye: No discharge. Extraocular Movements: Extraocular movements intact. Conjunctiva/sclera: Conjunctivae normal.      Pupils: Pupils are equal, round, and reactive to light. Cardiovascular:      Rate and Rhythm: Tachycardia present. Rhythm irregular. Pulses: Normal pulses. Heart sounds: Normal heart sounds. No murmur heard. No friction rub. No gallop. Pulmonary:      Effort: Pulmonary effort is normal. No accessory muscle usage or respiratory distress. He is not intubated. Breath sounds: Examination of the right-lower field reveals rhonchi. Examination of the left-lower field reveals rhonchi. Rhonchi present. No wheezing or rales. Abdominal:      General: A surgical scar is present. Bowel sounds are increased. Tenderness: There is abdominal tenderness. There is no guarding. Comments: Abdominal Drain in place.   Abdominal band in place.   Neurological:      Mental Status: He is disoriented. Cranial Nerves: No dysarthria or facial asymmetry. Motor: No weakness. Comments: Noted some echolalia with some peserevence    Psychiatric:         Behavior: Behavior is cooperative. Labs:   Recent Labs     04/27/22 0412 04/27/22  1537 04/27/22 2029 04/28/22  0136 04/29/22  0527   WBC 27.2*  --   --  33.6* 33.1*   HGB 7.1*   < > 8.4* 8.2* 8.6*   HCT 22.8*   < > 26.5* 24.7* 27.2*     --   --  259 240    < > = values in this interval not displayed. Recent Labs     04/27/22 0412 04/27/22  1319 04/27/22 2029 04/27/22  2258 04/28/22  0551 04/28/22  0551 04/28/22  1411 04/28/22  1411 04/28/22  2313 04/29/22  0527 04/29/22  1600   *   < > 151*   < > 151*   < > 150*   < > 149* 153* 148*   K 4.4   < > 3.8   < > 3.6   < > 3.6  --  3.7 3.6  --    *   < > 114*   < > 109   < > 107  --  107 109  --    CO2 22*   < > 28   < > 33   < > 35*  --  34* 33  --    *   < > 85*   < > 69*   < > 58*  --  48* 42*  --    CREATININE 4.3*   < > 2.7*   < > 2.2*   < > 1.8*  --  1.6* 1.6*  --    CALCIUM 8.2*   < > 8.5   < > 8.4*   < > 8.5  --  8.7 8.7  --    PHOS 4.6  --  2.9  --  2.5  --   --   --   --   --   --     < > = values in this interval not displayed. Recent Labs     04/27/22 2029 04/28/22 0136   AST 58* 62*   ALT 30 33   BILIDIR 1.8* 1.4*   BILITOT 3.7* 2.9*   ALKPHOS 73 80     No results for input(s): INR in the last 72 hours. No results for input(s): Jadene Moh in the last 72 hours. Urinalysis:      Lab Results   Component Value Date    NITRU NEGATIVE 04/25/2022    WBCUA 10-15 04/25/2022    BACTERIA FEW 04/25/2022    RBCUA 10-15 04/25/2022    BLOODU MODERATE 04/25/2022    SPECGRAV 1.015 04/25/2022    GLUCOSEU NEGATIVE 06/09/2019       Radiology:  XR CHEST PORTABLE   Final Result   A right arm PICC line tip terminates at the cavoatrial projection. No pneumothorax is observed.  Small bilateral pleural effusions and bilateral lower lobe airspace opacity, right greater than left, are not significantly changed. **This report has been created using voice recognition software. It may contain minor errors which are inherent in voice recognition technology. **      Final report electronically signed by Dr Anthony Rowan on 4/29/2022 4:04 PM      XR ABDOMEN FOR NG/OG/NE TUBE PLACEMENT   Final Result   Impression:      NG tube tip proximal stomach. This document has been electronically signed by: Tika Sanchez MD on    04/28/2022 11:56 PM      XR CHEST PORTABLE   Final Result   Impression:   1. Satisfactory positioning of the endotracheal tube and right central    line. Probable slightly high positioning of the orogastric tube. Consider    advancing 3-4 cm. 2. Interval development of small bilateral pleural effusions, right    greater than left. 3. Bilateral lower lobe airspace opacities secondary to atelectasis and/or    infiltrates, right greater than left. 4. Bilateral interstitial changes secondary to pneumonitis or edema. This document has been electronically signed by: Kaylee Verma. DO Jennifer on    04/26/2022 12:01 AM      CT ABDOMEN PELVIS WO CONTRAST Additional Contrast? None   Final Result   Impression:   Right inguinal hernia containing small bowel. This is causing a small    bowel obstruction. There are several gas collections within the right    inguinal hernia. Ischemic or perforated bowel could have this appearance. Mild to moderate patchy consolidation bilateral lower lobes. This could    represent aspiration or pneumonia. Prominent thickening of the distal esophagus and gastroesophageal    junction. Neoplasm not excluded. Recommend direct visualization.       This document has been electronically signed by: Santos Dalton MD on    04/25/2022 07:20 PM      All CTs at this facility use dose modulation techniques and iterative    reconstructions, and/or weight-based dosing   when appropriate to reduce radiation to a low as reasonably achievable. US RENAL COMPLETE   Final Result   Impression:   Multifocal simple bilateral renal cyst. Echogenic bilateral renal    parenchyma. This document has been electronically signed by: Kash Colmenares MD on    04/25/2022 06:30 PM      CT HEAD WO CONTRAST   Final Result       1. Stable CT scan of the brain, no interval change since previous MRI scan dated 10 Mel[address] 2019.   2. Mild atrophy and dilatation of the third and lateral ventricles. 3. Probable ischemic changes in the white matter, left basal ganglia and in the bernadette. 4. Calcification the cavernous segments of both internal carotid arteries. 5. Increased density in the external auditory canals which may represent cerumen bilaterally. **This report has been created using voice recognition software. It may contain minor errors which are inherent in voice recognition technology. **      Final report electronically signed by DR Sulaiman Burr on 4/25/2022 4:01 PM      XR CHEST PORTABLE   Final Result   1. Normal heart size. No effusion. 2. Persistent mild atelectatic/fibrotic stranding retrocardiac region left lung base. **This report has been created using voice recognition software. It may contain minor errors which are inherent in voice recognition technology. **      Final report electronically signed by Dr. Nayan Peace on 4/25/2022 3:05 PM          Diet: Diet NPO    DVT prophylaxis: [] Lovenox                                 [] SCDs                                 [] SQ Heparin                                 [] Encourage ambulation           [] Already on Anticoagulation     Disposition:    [] Home       [] TCU       [] Rehab       [] Psych       [] SNF       [] [place]       [x] Other-     Code Status: Full Code    PT/OT Eval Status:      Electronically signed by Kemal Amor MD on 4/29/2022 at 5:42 PM    This note was electronically signed. Parts of this note may have been dictated by use of voice recognition software and electronically transcribed. The note may contain errors not detected in proofreading. Please refer to my supervising physician's documentation if my documentation differs.

## 2022-04-29 NOTE — PLAN OF CARE
Problem: Discharge Planning  Goal: Discharge to home or other facility with appropriate resources  Description: Continue discharge planning. Patient currently with NG tube, PICC line inserted today.  following. Patient able to take few steps at bedside with PT/OT today. Outcome: Progressing  Flowsheets (Taken 4/29/2022 0107)  Discharge to home or other facility with appropriate resources: Identify barriers to discharge with patient and caregiver  Note: Continue discharge planning. Patient able to stand at bedside and take few steps with PT/OT today.  following case. Problem: Pain  Goal: Verbalizes/displays adequate comfort level or baseline comfort level  Description: Patient denies pain today. Outcome: Progressing  Flowsheets (Taken 4/28/2022 1200 by Mindi Christina RN)  Verbalizes/displays adequate comfort level or baseline comfort level: Assess pain using appropriate pain scale  Note: Patient denies pain this shift. Declines need for pain medication. Problem: Chronic Conditions and Co-morbidities  Goal: Patient's chronic conditions and co-morbidity symptoms are monitored and maintained or improved  Outcome: Progressing  Flowsheets (Taken 4/29/2022 1608)  Care Plan - Patient's Chronic Conditions and Co-Morbidity Symptoms are Monitored and Maintained or Improved: Monitor and assess patient's chronic conditions and comorbid symptoms for stability, deterioration, or improvement  Note: Continue to monitor VS, telemetry. Problem: Safety - Adult  Goal: Free from fall injury  Outcome: Progressing     Problem: ABCDS Injury Assessment  Goal: Absence of physical injury  Outcome: Progressing     Problem: Skin/Tissue Integrity  Goal: Absence of new skin breakdown  Description: 1. Monitor for areas of redness and/or skin breakdown  2. Assess vascular access sites hourly  3. Every 4-6 hours minimum:  Change oxygen saturation probe site  4.   Every 4-6 hours:  If on nasal continuous positive airway pressure, respiratory therapy assess nares and determine need for appliance change or resting period. Outcome: Progressing  Note: Continue to monitor skin. Turn and reposition every 2 hours. Elevate heels. Dressing changes to abd/groin incision. Problem: Nutrition Deficit:  Goal: Optimize nutritional status  4/29/2022 1608 by Harpreet Marin RN  Outcome: Progressing  Note: PICC line placed today for TPN. Continue NG and NPO status. Care plan reviewed with patient. Patient verbalize understanding of the plan of care and contribute to goal setting.

## 2022-04-29 NOTE — PROGRESS NOTES
[place] [address]  INPATIENT OCCUPATIONAL THERAPY  [facility] CCU-STEPDOWN 3B  EVALUATION    Time:    Time In: 1411  Time Out: 1445  Timed Code Treatment Minutes: 23 Minutes  Minutes: 34          Date: 2022  Patient Name: Amberly Callaway,   Gender: male      MRN: [id number]  : 1950  (66 y.o.)  Referring Practitioner: Dr. Cherelle Mcguire  Diagnosis: renal failure  Additional Pertinent Hx: a 66 y.o.  male admitted to [address] [place] [address] on 2022 with PMHx of distant CVA, BPH, HTN, GERD, and Glaucoma who presented to the ED after syncopal episodes x 2. Wife zayda provided the history and she stated that approximately 14 days ago he fell in the yard while pulling weeds and broke his ribs. He came to [facility] at that time and was discharged with pain medication. About 7 days ago, he presented again to [facility] with complaints of right lower abdomen bulge. He was diagnosed with inguinal hernia and recommended to follow up OP which was scheduled for tomorrow (22). Wife stated that the hernia has gotten significantly worse over the lprevious 5 days. Syncope occurred first when they were in [facility] several days ago. They had been sitting for a while and he started to feel nauseated and hot so he got up to go outside and passed out (described as legs giving out from him). Denied hitting his head. The morning of admission, wife was getting him out of the bathtub and he just \"slumped over\". Denied hitting his head on that occasion as well. Wife stated that he had vomited 3 times over the previous 24 hours and that it was brown/yellow in color. She stated he had not been having BM's and had been peeing less. Also noted he had been weak and sleeping \"20 hours a day\". On 2022, the patient was taken to the operating room per Stephanie Gay MD,  with diagnosis of serrated right inguinal hernia with small bowel obstruction and perforation, and at the time of surgery some feculent soilage of the right groin and pelvis. Procedure included exploratory laparotomy, small bowel resection, single primary anastomosis, and drainage of pelvic abscess and right groin exploration, drainage of abscess, debridement of necrotic subcutaneous fat and fascia and muscle, and excision of necrotic hernia sac. Right groin and pelvic abscess were present at the time of surgery. Postoperatively, she was admitted to the ICU secondary to acute respiratory failure, hypoxia, septic shock, aspiration pneumonia, and status post exploratory lap and drainage as above. Nutrition per TPN. After stabilization and extubation on 4/27/22, patient transferred to  on 4/28/22. Restrictions/Precautions:  Restrictions/Precautions: Surgical Protocols,General Precautions,Fall Risk  Position Activity Restriction  Other position/activity restrictions: NG tube, ALFONSO drain on right side of abdomen    Subjective  Chart Reviewed: Yes,Orders,Progress Notes,History and Physical  Patient assessed for rehabilitation services?: Yes    Subjective: Heather ZARATE approving session. Pt lying in bed awake waving to therapist upon arrival. Pt difficult to understand at times and seems to be slightly confused as well. son presenting at initiation of session and throughout session.     Pain: 0 /10: pt questioned on multiple occasions with pt denying pain each time     Vitals: Vitals not assessed per clinical judgement, see nursing flowsheet    Social/Functional History:  Lives With: Spouse  Type of Home: House  Home Layout: One level  Home Access: Stairs to enter with rails  Entrance Stairs - Number of Steps: 3   Bathroom Shower/Tub: Walk-in shower  Bathroom Toilet: Handicap height  Bathroom Accessibility: Accessible    Receives Help From: Family  ADL Assistance: Independent  Homemaking Assistance: Independent  Ambulation Assistance: Independent  Transfer Assistance: Independent    Active : No  Patient's  Info: Spouse Marsha drives  Mode of Transportation: Car  Occupation: Retired  Additional Comments: Per son, pts bathroom is currently in process of being remodeled to be handicap accessible. pt did not use AD for mobility and was indep with his ADL tasks at home. Pt has 4 children that assist    VISION:WNL    HEARING:  slightly hard of hearing     COGNITION: Decreased Recall, Decreased Insight, Impaired Memory, Decreased Problem Solving and Decreased Safety Awareness Pt difficulty to understand at times     RANGE OF MOTION:  Right Upper Extremity: WNL  Left Upper Extremity:  WNL but pt is not able to complete shoulder flexion with as much active movement as right. STRENGTH:  Right Upper Extremity: general weakness and deconditioning with MMT at shoulders of 4/5  Left Upper Extremity:  general weaknss with left UE being weaker than right with MMT at shoulder of approx 4-/5    SENSATION:   WFL    ADL:   Lower Extremity Dressing: Maximum Assistance. donning/doffing socks. BALANCE:  Sitting Balance:  Minimal Assistance. progressing to CGA once properly positioned at EOB ith bilateral feet under him. initiatlly pt tended to lean more to left side instead at sitting at midline. BED MOBILITY:  Supine to Sit: Maximum Assistance    Sit to Supine: Maximum Assistance    *pt with increased difficulty following commands to sequence steps to get OOB    TRANSFERS:  Sit to Stand:  Minimal Assistance. from eOB and bedside chair with education on safety and max cues to follow through  Stand to Sit: Minimal Assistance. onto chair and EOB with education on safety and max cues to turn allt eh way around. whne sitting in bedside chaiir, pt kept saying he could feel the chair on legs and would start to sit but then he would turn his trunk more sideways bringing hip out closer to edge increasing his fall risk    FUNCTIONAL MOBILITY:  Assistive Device: Rolling Walker  Assist Level:  Minimal Assistance.    Distance: x 4 feet x2  Bed to chair for a seated rest break and then back to bed. Cues for sfaety of use of walker. Noted increased weakness in left side when returning back to bed           Activity Tolerance:  Patient tolerance of  treatment: fair. Assessment:  Assessment: Pt admitted having subsequent surgery d/t incarcerated inguiinal hernia. pt currenlty with NG tube in place and haaving a ALFONSO draing. Pt requiring increased assistance for all ADL tasks and mobility at thistime d/t above deficits with left side being weaker than right side as well. Pt would benfit bucky lay skilled OT services to icnrease his endurance and strength, improve his balance to decrease fall risk and to increae his indep with ADL task. Performance deficits / Impairments: Decreased functional mobility ,Decreased endurance,Decreased ADL status,Decreased balance,Decreased strength,Decreased safe awareness,Decreased cognition  Prognosis: Fair  REQUIRES OT FOLLOW-UP: Yes  Decision Making: Medium Complexity    Treatment Initiated: Treatment and education initiated within context of evaluation. Evaluation time included review of current medical information, gathering information related to past medical, social and functional history, completion of standardized testing, formal and informal observation of tasks, assessment of data and development of plan of care and goals. Treatment time included skilled education and facilitation of tasks to increase safety and independence with ADL's for improved functional independence and quality of life.     Discharge Recommendations:  Mehran Garza would benefit from continued therapy after discharge    Patient Education:     Patient Education  Education Given To: Macario Smith  Education Provided: Role of Therapy,Transfer Training  Education Method: Demonstration,Verbal  Barriers to Learning: Cognition  Education Outcome: Verbalized understanding    Equipment Recommendations:  Equipment Needed: Yes  Mobility Devices: Julio Machado    Plan:  Times per Week: 5x  Current Treatment Recommendations: Strengthening,Balance training,Safety education & training,Functional mobility training,Endurance training,Equipment evaluation, education, & procurement,Patient/Caregiver education & training. See long-term goal time frame for expected duration of plan of care. If no long-term goals established, a short length of stay is anticipated. Goals:  Patient goals : get stronger to go home  Short Term Goals  Time Frame for Short term goals: by discharge  Short Term Goal 1: pt to navigate to/from various surfaces including BSC/chair progressing to bathroom and further distances using AD with CGA to icnrease his endurance and indep with ADL tasks  Short Term Goal 2: Pt to demo dynamic standing iwth unilateral UE support and CGA to assist with completion of ADL tasks such as clothing management after dressing and toileting  Short Term Goal 3: pt to complete UB ADL tasks iwth CGA and LB with min A  Short Term Goal 4: Pt to increase endurance to tolerate > 25 min of consistent activity with 3 or less rst breaks to increase ease of ADL asks  Short Term Goal 5: pt to increase bilateral UE strength (especially left) by completing HEP with mod resistance and use of handout with min cues for tech and need for less than 2 rest breaks         Following session, patient left in safe position with all fall risk precautions in place.

## 2022-04-30 PROBLEM — J69.0 ASPIRATION PNEUMONIA (HCC): Status: ACTIVE | Noted: 2022-04-26

## 2022-04-30 LAB
ABO: NORMAL
ANION GAP SERPL CALCULATED.3IONS-SCNC: 9 MEQ/L (ref 8–16)
ANTIBODY SCREEN: NORMAL
BUN BLDV-MCNC: 27 MG/DL (ref 7–22)
CALCIUM SERPL-MCNC: 7.4 MG/DL (ref 8.5–10.5)
CHLORIDE BLD-SCNC: 96 MEQ/L (ref 98–111)
CO2: 36 MEQ/L (ref 23–33)
CREAT SERPL-MCNC: 1.2 MG/DL (ref 0.4–1.2)
ERYTHROCYTE [DISTWIDTH] IN BLOOD BY AUTOMATED COUNT: 15.7 % (ref 11.5–14.5)
ERYTHROCYTE [DISTWIDTH] IN BLOOD BY AUTOMATED COUNT: 60.3 FL (ref 35–45)
GLUCOSE BLD-MCNC: 119 MG/DL (ref 70–108)
GLUCOSE BLD-MCNC: 132 MG/DL (ref 70–108)
GLUCOSE BLD-MCNC: 133 MG/DL (ref 70–108)
GLUCOSE BLD-MCNC: 153 MG/DL (ref 70–108)
HCT VFR BLD CALC: 24.2 % (ref 42–52)
HEMOGLOBIN: 7.6 GM/DL (ref 14–18)
MCH RBC QN AUTO: 33.5 PG (ref 26–33)
MCHC RBC AUTO-ENTMCNC: 31.4 GM/DL (ref 32.2–35.5)
MCV RBC AUTO: 106.6 FL (ref 80–94)
PLATELET # BLD: 197 THOU/MM3 (ref 130–400)
PMV BLD AUTO: 10.3 FL (ref 9.4–12.4)
POTASSIUM SERPL-SCNC: 2.9 MEQ/L (ref 3.5–5.2)
RBC # BLD: 2.27 MILL/MM3 (ref 4.7–6.1)
RH FACTOR: NORMAL
SODIUM BLD-SCNC: 140 MEQ/L (ref 135–145)
SODIUM BLD-SCNC: 141 MEQ/L (ref 135–145)
SODIUM BLD-SCNC: 142 MEQ/L (ref 135–145)
WBC # BLD: 24.9 THOU/MM3 (ref 4.8–10.8)

## 2022-04-30 PROCEDURE — 86901 BLOOD TYPING SEROLOGIC RH(D): CPT

## 2022-04-30 PROCEDURE — 2140000000 HC CCU INTERMEDIATE R&B

## 2022-04-30 PROCEDURE — 82948 REAGENT STRIP/BLOOD GLUCOSE: CPT

## 2022-04-30 PROCEDURE — 2500000003 HC RX 250 WO HCPCS: Performed by: NURSE PRACTITIONER

## 2022-04-30 PROCEDURE — 84295 ASSAY OF SERUM SODIUM: CPT

## 2022-04-30 PROCEDURE — 6360000002 HC RX W HCPCS

## 2022-04-30 PROCEDURE — 99024 POSTOP FOLLOW-UP VISIT: CPT | Performed by: SURGERY

## 2022-04-30 PROCEDURE — 86850 RBC ANTIBODY SCREEN: CPT

## 2022-04-30 PROCEDURE — 2580000003 HC RX 258: Performed by: PHYSICIAN ASSISTANT

## 2022-04-30 PROCEDURE — 2580000003 HC RX 258: Performed by: INTERNAL MEDICINE

## 2022-04-30 PROCEDURE — P9016 RBC LEUKOCYTES REDUCED: HCPCS

## 2022-04-30 PROCEDURE — 36430 TRANSFUSION BLD/BLD COMPNT: CPT

## 2022-04-30 PROCEDURE — 2500000003 HC RX 250 WO HCPCS: Performed by: PHYSICIAN ASSISTANT

## 2022-04-30 PROCEDURE — 86923 COMPATIBILITY TEST ELECTRIC: CPT

## 2022-04-30 PROCEDURE — 2500000003 HC RX 250 WO HCPCS: Performed by: PHARMACIST

## 2022-04-30 PROCEDURE — 86900 BLOOD TYPING SEROLOGIC ABO: CPT

## 2022-04-30 PROCEDURE — 2500000003 HC RX 250 WO HCPCS

## 2022-04-30 PROCEDURE — 85027 COMPLETE CBC AUTOMATED: CPT

## 2022-04-30 PROCEDURE — 80048 BASIC METABOLIC PNL TOTAL CA: CPT

## 2022-04-30 PROCEDURE — 99232 SBSQ HOSP IP/OBS MODERATE 35: CPT | Performed by: INTERNAL MEDICINE

## 2022-04-30 PROCEDURE — 99233 SBSQ HOSP IP/OBS HIGH 50: CPT | Performed by: FAMILY MEDICINE

## 2022-04-30 PROCEDURE — 2580000003 HC RX 258

## 2022-04-30 PROCEDURE — 36415 COLL VENOUS BLD VENIPUNCTURE: CPT

## 2022-04-30 RX ORDER — SODIUM CHLORIDE 9 MG/ML
INJECTION, SOLUTION INTRAVENOUS PRN
Status: DISCONTINUED | OUTPATIENT
Start: 2022-04-30 | End: 2022-05-01

## 2022-04-30 RX ORDER — POTASSIUM CHLORIDE 29.8 MG/ML
20 INJECTION INTRAVENOUS
Status: COMPLETED | OUTPATIENT
Start: 2022-04-30 | End: 2022-04-30

## 2022-04-30 RX ORDER — LEUCINE, PHENYLALANINE, LYSINE, METHIONINE, ISOLEUCINE, VALINE, HISTIDINE, THREONINE, TRYPTOPHAN, ALANINE, GLYCINE, ARGININE, PROLINE, SERINE, TYROSINE, DEXTROSE 365; 280; 290; 200; 300; 290; 240; 210; 90; 1035; 515; 575; 340; 250; 20; 15 MG/100ML; MG/100ML; MG/100ML; MG/100ML; MG/100ML; MG/100ML; MG/100ML; MG/100ML; MG/100ML; MG/100ML; MG/100ML; MG/100ML; MG/100ML; MG/100ML; MG/100ML; G/100ML
2000 INJECTION INTRAVENOUS
Status: DISPENSED | OUTPATIENT
Start: 2022-04-30 | End: 2022-05-01

## 2022-04-30 RX ADMIN — LEUCINE, PHENYLALANINE, LYSINE, METHIONINE, ISOLEUCINE, VALINE, HISTIDINE, THREONINE, TRYPTOPHAN, ALANINE, GLYCINE, ARGININE, PROLINE, SERINE, TYROSINE, DEXTROSE 2000 ML: 365; 280; 290; 200; 300; 290; 240; 210; 90; 1035; 515; 575; 340; 250; 20; 15 INJECTION INTRAVENOUS at 18:28

## 2022-04-30 RX ADMIN — PIPERACILLIN AND TAZOBACTAM 3375 MG: 3; .375 INJECTION, POWDER, LYOPHILIZED, FOR SOLUTION INTRAVENOUS at 01:37

## 2022-04-30 RX ADMIN — AMIODARONE HYDROCHLORIDE 0.5 MG/MIN: 1.8 INJECTION, SOLUTION INTRAVENOUS at 02:59

## 2022-04-30 RX ADMIN — PIPERACILLIN AND TAZOBACTAM 3375 MG: 3; .375 INJECTION, POWDER, LYOPHILIZED, FOR SOLUTION INTRAVENOUS at 18:29

## 2022-04-30 RX ADMIN — METOPROLOL TARTRATE 5 MG: 5 INJECTION INTRAVENOUS at 01:34

## 2022-04-30 RX ADMIN — POTASSIUM CHLORIDE 20 MEQ: 29.8 INJECTION, SOLUTION INTRAVENOUS at 15:43

## 2022-04-30 RX ADMIN — METOPROLOL TARTRATE 5 MG: 5 INJECTION INTRAVENOUS at 18:24

## 2022-04-30 RX ADMIN — FAMOTIDINE 10 MG: 10 INJECTION, SOLUTION INTRAVENOUS at 10:39

## 2022-04-30 RX ADMIN — POTASSIUM CHLORIDE 20 MEQ: 29.8 INJECTION, SOLUTION INTRAVENOUS at 14:38

## 2022-04-30 RX ADMIN — SODIUM CHLORIDE, PRESERVATIVE FREE 10 ML: 5 INJECTION INTRAVENOUS at 13:13

## 2022-04-30 RX ADMIN — POTASSIUM CHLORIDE 20 MEQ: 29.8 INJECTION, SOLUTION INTRAVENOUS at 13:30

## 2022-04-30 RX ADMIN — DEXTROSE MONOHYDRATE: 50 INJECTION, SOLUTION INTRAVENOUS at 03:04

## 2022-04-30 RX ADMIN — DEXTROSE MONOHYDRATE: 50 INJECTION, SOLUTION INTRAVENOUS at 13:31

## 2022-04-30 RX ADMIN — AMIODARONE HYDROCHLORIDE 0.5 MG/MIN: 1.8 INJECTION, SOLUTION INTRAVENOUS at 15:09

## 2022-04-30 RX ADMIN — PIPERACILLIN AND TAZOBACTAM 3375 MG: 3; .375 INJECTION, POWDER, LYOPHILIZED, FOR SOLUTION INTRAVENOUS at 10:47

## 2022-04-30 RX ADMIN — METOPROLOL TARTRATE 5 MG: 5 INJECTION INTRAVENOUS at 10:43

## 2022-04-30 RX ADMIN — DAKIN'S SOLUTION 0.125% (QUARTER STRENGTH): 0.12 SOLUTION at 10:43

## 2022-04-30 ASSESSMENT — PAIN SCALES - GENERAL: PAINLEVEL_OUTOF10: 0

## 2022-04-30 NOTE — PROGRESS NOTES
Renal Progress Note  [facility]    Patient :  Cristian Jules Sr.; 66 y.o. MRN# [id number]  Location:  3B-26/026-A  Attending:  Barbara Peralta MD  Admit Date:  4/25/2022   Hospital Day: 5      Subjective:     Nephrology is following the patient for ROBSON. Patient was seen and examined this morning. No new events per RN. Good urine output. Pt denies complaints. Continuing to get TPN. On D5W @ 150 ml/hour. Outpatient Medications:     Medications Prior to Admission: atorvastatin (LIPITOR) 20 MG tablet, TAKE 1 TABLET BY MOUTH DAILY  COMBIGAN 0.2-0.5 % ophthalmic solution, Place 1 drop into the left eye every 12 hours  famotidine (PEPCID) 20 MG tablet, Take 1 tablet by mouth 2 times daily  losartan (COZAAR) 50 MG tablet, Take 1 tablet by mouth daily  metoprolol succinate (TOPROL XL) 25 MG extended release tablet, TAKE 1 TABLET BY MOUTH DAILY  tamsulosin (FLOMAX) 0.4 MG capsule, Take 1 capsule by mouth daily  lidocaine (LIDODERM) 5 %, Place 1 patch onto the skin daily 12 hours on, 12 hours off.   fluocinonide (LIDEX) 0.05 % cream, APPLY EXTERNALLY TO THE AFFECTED AREA TWICE DAILY  tadalafil (CIALIS) 20 MG tablet, TAKE 1 TABLET BY MOUTH EVERY DAY AS NEEDED  aspirin 81 MG EC tablet, Take 1 tablet by mouth daily  loratadine (CLARITIN) 10 MG tablet, TAKE 1 TABLET BY MOUTH DAILY    Current Medications:     Scheduled Meds:    piperacillin-tazobactam  3,375 mg IntraVENous Q8H    metoprolol  5 mg IntraVENous Q8H    [Held by provider] metoprolol tartrate  12.5 mg Oral BID    insulin lispro  0-6 Units SubCUTAneous 4 times per day    sodium hypochlorite   Irrigation Daily    vitamin D  50,000 Units Oral Weekly    sodium chloride flush  5-40 mL IntraVENous 2 times per day    [Held by provider] heparin (porcine)  5,000 Units SubCUTAneous TID    famotidine (PEPCID) injection  10 mg IntraVENous Daily     Continuous Infusions:    CLINIMIX 5/15 40 mL/hr at 04/30/22 0611    dextrose 150 mL/hr at 22 2513    dextrose      sodium chloride      amiodarone 0.5 mg/min (22 0611)    sodium chloride       PRN Meds:  glucose, glucagon (rDNA), dextrose, dextrose bolus (hypoglycemia) **OR** dextrose bolus (hypoglycemia), sodium chloride, morphine, sodium chloride flush, sodium chloride, ondansetron **OR** ondansetron, polyethylene glycol, acetaminophen **OR** acetaminophen    Input/Output:       I/O last 3 completed shifts: In: 4963.8 [I.V.:3935; NG/GT:180; IV Piggyback:216.7]  Out: 5555 [Urine:2200; Emesis/NG output:3100; Drains:255].       Patient Vitals for the past 96 hrs (Last 3 readings):   Weight   22 0600 137 lb (62.1 kg)   22 0346 137 lb 12.6 oz (62.5 kg)       Vital Signs:   Temperature:  Temp: 98 °F (36.7 °C)  TMax:   Temp (24hrs), Av.1 °F (36.7 °C), Min:97.8 °F (36.6 °C), Max:98.8 °F (37.1 °C)    Respirations:  Resp: 20  Pulse:   Pulse: 72  BP:    BP: 111/72  BP Range: Systolic (76LGK), NLN:527 , Min:111 , HCA:371       Diastolic (27YRE), DNH:65, Min:67, Max:76      Physical Examination:     General:  Awake, alert  HEENT: NC/AT/ MMM +NG tube  Chest:               Clear B/L no rales  Cardiac:  S1 S2   Abdomen: soft, abdominal binder +ALFONSO drain  Neuro:  sedated  SKIN:  No rashes,   Extremities:  No edema,     Labs:       Recent Labs     226 22   WBC  --  33.6* 33.1*   RBC  --  2.40* 2.54*   HGB 8.4* 8.2* 8.6*   HCT 26.5* 24.7* 27.2*   MCV  --  102.9* 107.1*   MCH  --  34.2* 33.9*   MCHC  --  33.2 31.6*   PLT  --  259 240   MPV  --  9.8 10.5      BMP:   Recent Labs     22  1411 22  1411 22  2313 04/28/22  2313 04/29/22  0527 04/29/22  0522  1600 22  2240 22  0444   *   < > 149*   < > 153*   < > 148* 145 142   K 3.6  --  3.7  --  3.6  --   --   --   --      --  107  --  109  --   --   --   --    CO2 35*  --  34*  --  33  --   --   --   --    BUN 58*  --  48*  --  42*  --   --   --   -- CREATININE 1.8*  --  1.6*  --  1.6*  --   --   --   --    GLUCOSE 163*  --  113*  --  106  --   --   --   --    CALCIUM 8.5  --  8.7  --  8.7  --   --   --   --     < > = values in this interval not displayed. Phosphorus:     Recent Labs     04/27/22 2029 04/28/22  0551   PHOS 2.9 2.5     Magnesium:    Recent Labs     04/27/22 2029 04/28/22  0551 04/28/22  2313   MG 2.1 2.0 2.0     Albumin:    Recent Labs     04/27/22 2029 04/28/22  0136   LABALBU 2.4* 2.5*     BNP:    No results found for: BNP  KHUSHBOO:    No results found for: KHUSHBOO  SPEP:  Lab Results   Component Value Date    PROT 5.3 04/28/2022     UPEP:   No results found for: LABPE  C3:   No results found for: C3  C4:   No results found for: C4  MPO ANCA:   No results found for: MPO  PR3 ANCA:   No results found for: PR3  Anti-GBM:   No results found for: GBMABIGG  Hep BsAg:       No results found for: HEPBSAG  Hep C AB:        No results found for: HEPCAB    Urinalysis/Chemistries:      Lab Results   Component Value Date    NITRU NEGATIVE 04/25/2022    COLORU YELLOW 04/25/2022    PHUR 5.5 04/25/2022    LABCAST >15 HYALINE 04/25/2022    WBCUA 10-15 04/25/2022    RBCUA 10-15 04/25/2022    MUCUS NONE SEEN 04/25/2022    YEAST NONE SEEN 04/25/2022    BACTERIA FEW 04/25/2022    SPECGRAV 1.015 04/25/2022    LEUKOCYTESUR TRACE 04/25/2022    UROBILINOGEN 0.2 04/25/2022    BILIRUBINUR NEGATIVE 04/25/2022    BLOODU MODERATE 04/25/2022    GLUCOSEU NEGATIVE 06/09/2019    KETUA NEGATIVE 04/25/2022     Urine Sodium:   No results found for: MING  Urine Potassium:  No results found for: KUR  Urine Chloride:  No results found for: CLUR  Urine Osmolarity: No results found for: OSMOU  Urine Protein:   No components found for: TOTALPROTEIN, URINE   Urine Creatinine:   No results found for: LABCREA  Urine Eosinophils:  No components found for: UEOS        Impression and Plan:  1.  ROBSON , prerenal due to volume depletion and hypotension from sepsis: bmp is not back yet today but renal function has been improving every day  2. Hypernatremia: better. Reduce D5W to 50 ml/hour. Repeat this afternoon  3. sepsis  4. S/p ex lap with small bowel resection, drainage of abscess and debridement necrotic fat fascia and hernia sac  5. Leukocytosis  6. Pneumonia  7. Anemia  8. Afib, on Amio drip        Please don't hesitate to call with any questions.   Electronically signed by Griselda Karvonen, DO on 4/30/2022 at 7:38 AM

## 2022-04-30 NOTE — PROGRESS NOTES
TPN Follow Up Note    Assessment: CL diet    Electrolyte Replacement:   KCL 60  meq x1    TPN changes for (today) at 1800:   None - continue clinimix 5/15 @ 40 ml/hr    Re-check BMP, Mg, PO4, iCa 5/1/22

## 2022-04-30 NOTE — PROGRESS NOTES
PROGRESS NOTE      Patient:  Loma Lennox Cage Sr. Unit/Bed:3B-26/026-A    YOB: 1950    MRN: [id number]       Acct: [de-identified]     PCP: Leighann Hodgson MD    Date of Admission: 4/25/2022      Assessment/Plan:    Anticipated Discharge in : Pending    Active Hospital Problems    Diagnosis Date Noted    Acute respiratory failure with hypoxia ([address] [J96.01] 04/26/2022     Priority: Medium    Septic shock ([address] [A41.9, R65.21] 04/26/2022     Priority: Medium    Incarcerated right inguinal hernia [K40.30] 04/26/2022     Priority: Medium    Small bowel perforation ([address] [K63.1] 04/26/2022     Priority: Medium    Severe pneumonia [J18.9] 04/26/2022     Priority: Medium    Acute kidney injury ([address] [N17.9] 04/26/2022     Priority: Medium    Hypoalbuminemia [E88.09] 04/26/2022     Priority: Medium    Syncope [R55] 04/26/2022     Priority: Medium    Hypokalemia [E87.6] 04/26/2022     Priority: Medium    Anemia, macrocytic [D53.9] 04/26/2022     Priority: Medium    Severe malnutrition ([address] [E43] 04/26/2022     Priority: Medium       Perforated small bowel, soilage of the pelvic cavity, right groin and pelvic abscesses, and necrotic hernial sac: POD #5 exploratory laparotomy with bowel resection, primary anastomosis, drainage of pelvic abscess as well as right groin exploration, drainage of abscess, and debridement of necrotic subcutaneous fat, fascia and muscle. On Day 5 of Zosyn for Culture Growing Morganella Morganii and Ecoli. final Susceptibility is pending, however prelim demonstrates sensatitivites to Zosyn. Surgery on board and following. If WBC count increases consider additional un drained abscess or fluid collections. Off NGT suction per Surgery will challenge with PO food, Dressing changes and wound care. Continue on SSI, may require TPN supplement in addition if not tolerating PO.   .  Aspiration pneumonia: Imaging findings suggestion of infiltrate w/ gastric contents suctioned from ET tube is consistent with aspiration pneumonia. Culture growing ecoli with H. influ on PCR detected. Patient receiving antibiotics as above. Sepsis secondary to intra-abdominal infection and aspiration pneumonia - improving: On antibiotics as above. Current WBC 24.9 and downtrend. Previous White count significantly elevated to 33.6 up from 21.8 postoperatively, however Patient remains afebrile and O2 support requirements are being weaned, BP grossly stable, monitor    A. fib with RVR: noted Apr/27/2022 New onset A. fib with RVR refractory to Lopressor and Cardizem. Patient converted with amiodarone. K WNL, Mg WNL, [id number] 5. Surgery recommends holding anticoagulation still at this time in setting of bleed risk. Note Pt is Not optimally controlled on amiodrip + lopressor, however improving control with HR 80's despite some intermittent arrhythmia on tele. Continue amio drip  Continue lopressor 5 TID with holding parameters. BP grossly stable at 120's. If persistently remains in intermittent consider Cardiology consult for optimization as Pt will likely be high bleed risk for some time vs [facility] use. Continue telemetry  Keep Mg > 2, K > 4,       ROBSON on CKD: Likely 2/2 volume depletion, poor oral intake, and hypotension 2/2 sepsis. Pt creatinine improved to 1.6 from 10.4 on Admit  Patient's baseline 1.3. improved with Hydration. Nephrology on board, appreciate rec. Good UOPT. On D5w at 50ml/Hr    Mixed Acid-base disorder:  Mixed Anion gap metabolic acidosis with respiratory compensation likely secondary to sepsis and ROBSON with an additional metabolic alkalosis likely secondary to volume depletion in setting of GI sx. AG improving, Nephrology on board, appreciate recs. Acute hypoxic respiratory failure: likely 2/2 to aspiration pneumonia as above. Extubated Apr/27/2022, currently on 3L NC. Noted hgb 7.6, in setting of recent Sx and respiratory needs, will tx 1 x PRBC.   Acute postoperative pulmonary insufficiency - resolved: Extubated Apr/27/2022,     Troponinemia: Mildly elevated troponin on admit of 0.047 downtrending with inverted T waves found on EKG.  Likely 2/2 demand ischemia 2/2 hypotension.  Monitor     Hypokalemia - Noted Apr/30/2022, K 2.9 replete with IV 60 meq, on K protocol. Monitor AM.  Hypernatremia - Resolved. Nephrology on board , on D5W to 50 ml/Hr  Hyperphosphatemia -resolved:  Continue to monitor, PO challenge as above, may supplement with TPN  Hypermagnesemia - resolved:  Continue to monitor, PO challenge as above, may supplement with TPN  Hypocalcemia: Continue to monitor, PO challenge as above, may supplement with TPN    CAD: Hx of,  continue home Lipitor 20 mg,  Holding ASA  For bleed risk. GERD: hx of, home regiment of pepcid 10mg daily, continued for admission IV form. . Noted incidental finding of thickened esophagus on imaging, F/U opt when stable for scope. Delirium? - pulled out central access line Apr/28/2022 PM, improved mentation however remains quite laconic. Given medical acuity and advance age likely sundowning as behavior is overnight. Reorient and monitor. If requires consider Seroqel QHS. Chief Complaint:     Hospital Course:    Per H&P:    \" \"The patient was sedated and intubated and therefore could not give any history.  Therefore, history is primarily taken from the chart. Patient's wife states that the patient fell in his yard approximately 14 days ago and went to the [facility] ED. This time, he was discharged with pain medication. Approximately 7 days ago, he again presented to [facility] with complaints of a right lower abdominal bulge at which point he was diagnosed with inguinal hernia and is scheduled with surgical follow-up. Patient presented to [facility] on 4/25/2022 for syncope and worsening inguinal hernia.   Wife states that syncope first occurred in [facility] several days ago when the patient had nausea and subjective feelings of excessive heat while sitting.  After this, he went to go outside and then passed out. On the morning of admission, patient's wife was given of the bathtub when he \"slumped over. \"  Wife stated that the patient vomited 3 times in the last 24 hours with brown/yellow vomitus. Wife also reports that he has not been having bowel movements, has had less frequent urination, and that he has been weak and sleeping \"20 hours a day. \"  While in the ED, the patient was found to have profound renal failure with a creatinine of 11.2 with a baseline of approximately 1.3. Patient also noted to have a profound anion gap acidosis which improved with aggressive fluid resuscitation. Nephrology was consulted given profound renal failure. CT of the abdomen and pelvis without contrast was performed which demonstrated mild to moderate patchy consolidation of the bilateral lower lobes, thickening of the distal esophagus and gastroesophageal junction, a right inguinal hernia causing a small bowel obstruction, and multiple gas collections in the right inguinal hernia highly suspicious for ischemic or perforated bowel. On the evening of 4/25/2022 Dr. Malik performed exploratory laparotomy with small bowel resection, single primary anastomosis, drainage of pelvic abscess, exploration and drainage of right groin abscess, and debridement of necrotic subcutaneous fat/fascia/muscle in the right groin along with excision of the necrotic hernia sac in the right groin. Empiric coverage with vancomycin and Zosyn to necrotic small bowel as well as probable aspiration pneumonia. Family was extensively counseled. \"    Pt had course in ICU from Apr/25-28/2022, Overnight patient went to A. fib with RVR that was refractory to both Lopressor and Cardizem. Patient was given amiodarone which converted him out. Patient continues to have frequent PACs on the monitor.   Surgery was consulted regarding anticoagulation however they are recommending holding anticoagulation at this time. We will continue to follow-up with surgery as far as when we can start anticoagulation. Patient overall is awake and alert self, year, but only occasionally to place. Patient denies any pain or acute complaints at this time. Patient is frequently repeating himself however concern for underlying delirium. Subjective:    Pt seen and examined. Afebrile overnight, no behavioral issues, PICC in place, Surgery attempt Oral challenge with clears. Telemetry improved. Pt mentation improved however still speaks very little. Review of Systems   Unable to perform ROS: Acuity of condition   Constitutional: Positive for appetite change. Negative for chills, diaphoresis, fatigue and fever. HENT: Positive for dental problem. Respiratory: Negative for cough, shortness of breath and wheezing. Cardiovascular: Negative for chest pain and palpitations. Gastrointestinal: Positive for abdominal pain. Negative for constipation, diarrhea, nausea and vomiting. Genitourinary: Negative for decreased urine volume, difficulty urinating, dysuria, frequency, hematuria and urgency. Neurological: Positive for weakness. Negative for seizures and syncope. Psychiatric/Behavioral: Positive for confusion and decreased concentration.        Medications:  Reviewed    Infusion Medications    CLINIMIX 5/15      sodium chloride      CLINIMIX 5/15 40 mL/hr at 04/30/22 0611    dextrose 50 mL/hr at 04/30/22 1331    dextrose      sodium chloride      amiodarone 0.5 mg/min (04/30/22 1509)    sodium chloride       Scheduled Medications    piperacillin-tazobactam  3,375 mg IntraVENous Q8H    metoprolol  5 mg IntraVENous Q8H    [Held by provider] metoprolol tartrate  12.5 mg Oral BID    insulin lispro  0-6 Units SubCUTAneous 4 times per day    sodium hypochlorite   Irrigation Daily    vitamin D  50,000 Units Oral Weekly    sodium chloride flush  5-40 mL IntraVENous 2 times per day    [Held by provider] heparin (porcine)  5,000 Units SubCUTAneous TID    famotidine (PEPCID) injection  10 mg IntraVENous Daily     PRN Meds: sodium chloride, glucose, glucagon (rDNA), dextrose, dextrose bolus (hypoglycemia) **OR** dextrose bolus (hypoglycemia), sodium chloride, morphine, sodium chloride flush, sodium chloride, ondansetron **OR** ondansetron, polyethylene glycol, acetaminophen **OR** acetaminophen      Intake/Output Summary (Last 24 hours) at 4/30/2022 1627  Last data filed at 4/30/2022 1345  Gross per 24 hour   Intake 4491.12 ml   Output 3580 ml   Net 911.12 ml       Diet:  ADULT DIET; Clear Liquid    Exam:  /69   Pulse 78   Temp 98 °F (36.7 °C) (Oral)   Resp 16   Ht 5' 9\" (1.753 m)   Wt 137 lb (62.1 kg)   SpO2 97%   BMI 20.23 kg/m²     Physical Exam  Constitutional:       General: He is not in acute distress. Appearance: He is ill-appearing. He is not toxic-appearing or diaphoretic. Interventions: He is not intubated. Nasal cannula in place. Comments: NG tube off. HENT:      Head: Normocephalic and atraumatic. Nose: Nose normal. No congestion or rhinorrhea. Mouth/Throat:      Mouth: Mucous membranes are dry. Pharynx: Oropharynx is clear. No oropharyngeal exudate or posterior oropharyngeal erythema. Eyes:      General: No scleral icterus. Right eye: No discharge. Left eye: No discharge. Extraocular Movements: Extraocular movements intact. Conjunctiva/sclera: Conjunctivae normal.      Pupils: Pupils are equal, round, and reactive to light. Cardiovascular:      Rate and Rhythm: Normal rate, present. Rhythm irregular. Pulses: Normal pulses. Heart sounds: Normal heart sounds. No murmur heard. No friction rub. No gallop. Pulmonary:      Effort: Pulmonary effort is normal. No accessory muscle usage or respiratory distress. He is not intubated.       Breath sounds: Examination of the right-lower field reveals rhonchi. Examination of the left-lower field reveals rhonchi. Rhonchi present. No wheezing or rales. Abdominal:      General: A surgical scar is present. Bowel sounds are increased. Tenderness: There is abdominal tenderness. There is no guarding. Comments: Abdominal Drain in place, Abdominal band in place. Neurological:      Mental Status: He is disoriented. Cranial Nerves: No dysarthria or facial asymmetry. Motor: No weakness. Comments: Noted improved mentation, however still speaks very little. Psychiatric:         Behavior: Behavior is cooperative. Labs:   Recent Labs     04/28/22  0136 04/29/22  0527 04/30/22  1047   WBC 33.6* 33.1* 24.9*   HGB 8.2* 8.6* 7.6*   HCT 24.7* 27.2* 24.2*    240 197     Recent Labs     04/27/22 2029 04/27/22  2258 04/28/22  0551 04/28/22  1411 04/28/22  2313 04/28/22  2313 04/29/22  0527 04/29/22  1600 04/30/22  0444 04/30/22  1047 04/30/22  1344   *   < > 151*   < > 149*   < > 153*   < > 142 141 140   K 3.8   < > 3.6   < > 3.7  --  3.6  --   --  2.9*  --    *   < > 109   < > 107  --  109  --   --  96*  --    CO2 28   < > 33   < > 34*  --  33  --   --  36*  --    BUN 85*   < > 69*   < > 48*  --  42*  --   --  27*  --    CREATININE 2.7*   < > 2.2*   < > 1.6*  --  1.6*  --   --  1.2  --    CALCIUM 8.5   < > 8.4*   < > 8.7  --  8.7  --   --  7.4*  --    PHOS 2.9  --  2.5  --   --   --   --   --   --   --   --     < > = values in this interval not displayed. Recent Labs     04/27/22 2029 04/28/22 0136   AST 58* 62*   ALT 30 33   BILIDIR 1.8* 1.4*   BILITOT 3.7* 2.9*   ALKPHOS 73 80     No results for input(s): INR in the last 72 hours. No results for input(s): Jadene Moh in the last 72 hours.     Urinalysis:      Lab Results   Component Value Date    NITRU NEGATIVE 04/25/2022    WBCUA 10-15 04/25/2022    BACTERIA FEW 04/25/2022    RBCUA 10-15 04/25/2022    BLOODU MODERATE 04/25/2022    SPECGRAV 1.015 04/25/2022 GLUCOSEU NEGATIVE 06/09/2019       Radiology:  XR CHEST PORTABLE   Final Result   A right arm PICC line tip terminates at the cavoatrial projection. No pneumothorax is observed. Small bilateral pleural effusions and bilateral lower lobe airspace opacity, right greater than left, are not significantly changed. **This report has been created using voice recognition software. It may contain minor errors which are inherent in voice recognition technology. **      Final report electronically signed by Dr Linda Aj on 4/29/2022 4:04 PM      XR ABDOMEN FOR NG/OG/NE TUBE PLACEMENT   Final Result   Impression:      NG tube tip proximal stomach. This document has been electronically signed by: Leonel Qureshi MD on    04/28/2022 11:56 PM      XR CHEST PORTABLE   Final Result   Impression:   1. Satisfactory positioning of the endotracheal tube and right central    line. Probable slightly high positioning of the orogastric tube. Consider    advancing 3-4 cm. 2. Interval development of small bilateral pleural effusions, right    greater than left. 3. Bilateral lower lobe airspace opacities secondary to atelectasis and/or    infiltrates, right greater than left. 4. Bilateral interstitial changes secondary to pneumonitis or edema. This document has been electronically signed by: Jovanni Gonzalez. DO Jennifer on    04/26/2022 12:01 AM      CT ABDOMEN PELVIS WO CONTRAST Additional Contrast? None   Final Result   Impression:   Right inguinal hernia containing small bowel. This is causing a small    bowel obstruction. There are several gas collections within the right    inguinal hernia. Ischemic or perforated bowel could have this appearance. Mild to moderate patchy consolidation bilateral lower lobes. This could    represent aspiration or pneumonia. Prominent thickening of the distal esophagus and gastroesophageal    junction. Neoplasm not excluded. Recommend direct visualization.       This document has been electronically signed by: Yue Mays MD on    04/25/2022 07:20 PM      All CTs at this facility use dose modulation techniques and iterative    reconstructions, and/or weight-based dosing   when appropriate to reduce radiation to a low as reasonably achievable. US RENAL COMPLETE   Final Result   Impression:   Multifocal simple bilateral renal cyst. Echogenic bilateral renal    parenchyma. This document has been electronically signed by: Yue Mays MD on    04/25/2022 06:30 PM      CT HEAD WO CONTRAST   Final Result       1. Stable CT scan of the brain, no interval change since previous MRI scan dated 10 Melody 2019.   2. Mild atrophy and dilatation of the third and lateral ventricles. 3. Probable ischemic changes in the white matter, left basal ganglia and in the bernadette. 4. Calcification the cavernous segments of both internal carotid arteries. 5. Increased density in the external auditory canals which may represent cerumen bilaterally. **This report has been created using voice recognition software. It may contain minor errors which are inherent in voice recognition technology. **      Final report electronically signed by DR Jani Osei on 4/25/2022 4:01 PM      XR CHEST PORTABLE   Final Result   1. Normal heart size. No effusion. 2. Persistent mild atelectatic/fibrotic stranding retrocardiac region left lung base. **This report has been created using voice recognition software. It may contain minor errors which are inherent in voice recognition technology. **      Final report electronically signed by Dr. Aleksandr Talley on 4/25/2022 3:05 PM          Diet: ADULT DIET;  Clear Liquid    DVT prophylaxis: [] Lovenox                                 [] SCDs                                 [] SQ Heparin                                 [] Encourage ambulation           [] Already on Anticoagulation     Disposition:    [] Home       [] TCU       [] Rehab       [] Psych       [] SNF       [] [place]       [x] Other-     Code Status: Full Code    PT/OT Eval Status:      Electronically signed by Diaz Dawson MD on 4/30/2022 at 4:27 PM    This note was electronically signed. Parts of this note may have been dictated by use of voice recognition software and electronically transcribed. The note may contain errors not detected in proofreading. Please refer to my supervising physician's documentation if my documentation differs.

## 2022-04-30 NOTE — PROGRESS NOTES
Jovana Flanagan MD  Postoperative Progress Note  Pt Name: Prachi Gomez Record Number: [id number]  Date of Birth 1950   Today's Date: 4/30/2022  ASSESSMENT   1. POD # 5 sepsis secondary to incarcerated right inguinal hernia with perforation and obstruction of small bowel. Necrotic right groin wound status postdebridement drainage of abdominal abscess present at the time of surgery  2. Sepsis resolving . 3. Acute renal failure improving  4. Remains extubated and transferred to stepdown  5. Persistent leukocytosis but stable  6. A. Fib  7. Hypernatremia resolved  8. pneumonia  9. NG tube outputs decreased. Active bowel sounds   has a past medical history of CAD (coronary artery disease), CVA (cerebral vascular accident) ([facility] [address] Erectile dysfunction, GERD (gastroesophageal reflux disease), Hyperlipidemia, and Hypertension. PLANS   1. Analgesics and antiemetics as needed. 2. IV hydration per medicine service. Normal saline stopped on D5 water. 3. Continue broad-spectrum antibiotic monitor cultures. If white count continues to rise recommend CT imaging of the abdomen and pelvis for any on drained abscess or fluid collections  4. Amiodarone drip for atrial fibrillation  5. DVT prophylaxis per primary service. 6. change groin wound packing twice daily. Dry gauze. Dakin's solution ordered. Wounds look okay  7. Continue Zosyn. Intra-abdominal cultures E. coli and Morganella morganii I both sensitive  8. TPN. Remove NG and start clear liquid diet. 9.  Patient afebrile monitor white count. Recheck ordered for tomorrow  Neelam Felton thinks he is doing better. Wounds look good drain output more serous.   CURRENT MEDICATIONS   Scheduled Meds:   piperacillin-tazobactam  3,375 mg IntraVENous Q8H    metoprolol  5 mg IntraVENous Q8H    [Held by provider] metoprolol tartrate  12.5 mg Oral BID    insulin lispro  0-6 Units SubCUTAneous 4 times per day  sodium hypochlorite   Irrigation Daily    vitamin D  50,000 Units Oral Weekly    sodium chloride flush  5-40 mL IntraVENous 2 times per day    [Held by provider] heparin (porcine)  5,000 Units SubCUTAneous TID    famotidine (PEPCID) injection  10 mg IntraVENous Daily     Continuous Infusions:   CLINIMIX 5/15 40 mL/hr at 22 5169    dextrose 50 mL/hr at 22 0848    dextrose      sodium chloride      amiodarone 0.5 mg/min (22 0611)    sodium chloride       PRN Meds:.glucose, glucagon (rDNA), dextrose, dextrose bolus (hypoglycemia) **OR** dextrose bolus (hypoglycemia), sodium chloride, morphine, sodium chloride flush, sodium chloride, ondansetron **OR** ondansetron, polyethylene glycol, acetaminophen **OR** acetaminophen  OBJECTIVE   CURRENT VITALS:  height is 5' 9\" (1.753 m) and weight is 137 lb (62.1 kg). His oral temperature is 98.4 °F (36.9 °C). His blood pressure is 123/66 and his pulse is 86. His respiration is 16 and oxygen saturation is 100%. Body mass index is 20.23 kg/m². Temperature Range (24h):Temp: 98.4 °F (36.9 °C) Temp  Av.1 °F (36.7 °C)  Min: 97.8 °F (36.6 °C)  Max: 98.8 °F (37.1 °C)  BP Range (94Q): Systolic (69XTG), RDQ:909 , Min:111 , BJS:954     Diastolic (86XZF), VRA:33, Min:66, Max:76    Pulse Range (24h): Pulse  Av.6  Min: 72  Max: 118  Respiration Range (24h): Resp  Av.2  Min: 16  Max: 28  Current Pulse Ox (24h):  SpO2: 100 %  Pulse Ox Range (24h):  SpO2  Av.9 %  Min: 92 %  Max: 100 %  Oxygen Amount and Delivery: O2 Flow Rate (L/min): 3 L/min  Incentive Spirometry Tx:            GENERAL: Sedated no acute distress  LUNGS: Clear diminished bases   HEART: Pulse 92  ABDOMEN: Soft dressing intact.   NG output thin but slightly blood-tinged  INCISION dressings dry and intact  EXTREMITY: No edema  In: 4481.1 [I.V.:3622; NG/GT:180]  Out: 3795 [Urine:1400; Drains:145]  Closed/Suction Drain Superior;Midline Abdomen Bulb-Output (ml): 30 ml  Date 22 0000 - 04/30/22 2359   Shift 9556-5372 4259-0035 2004-7509 24 Hour Total   INTAKE   I.V.(mL/kg) 1184. 3(19.1)   1184. 3(19.1)   NG/GT(mL/kg) 60(1)   60(1)   IV Piggyback(mL/kg) 50.8(0.8)   50.8(0.8)   TPN(mL/kg) 285. 1(4.6)   285. 1(4.6)   Shift Total(mL/kg) 1580. 2(25.4)   1580. 2(25.4)   OUTPUT   Emesis/NG output(mL/kg) 1250(20.1)   1250(20.1)   Drains(mL/kg) 30(0.5)   30(0.5)   Shift Total(mL/kg) 1280(20.6)   1280(20.6)   Weight (kg) 62.1 62.1 62.1 62.1     LABS     Recent Labs     04/27/22 2029 04/27/22  2258 04/28/22  0136 04/28/22  0551 04/28/22  0551 04/28/22  1411 04/28/22  1411 04/28/22  2313 04/28/22  2313 04/29/22  0527 04/29/22  0527 04/29/22  1600 04/29/22  2240 04/30/22  0444   WBC  --   --  33.6*  --   --   --   --   --   --  33.1*  --   --   --   --    HGB 8.4*  --  8.2*  --   --   --   --   --   --  8.6*  --   --   --   --    HCT 26.5*  --  24.7*  --   --   --   --   --   --  27.2*  --   --   --   --    PLT  --   --  259  --   --   --   --   --   --  240  --   --   --   --    *   < > 151* 151*   < > 150*   < > 149*   < > 153*   < > 148* 145 142   K 3.8   < > 3.9 3.6   < > 3.6  --  3.7  --  3.6  --   --   --   --    *   < > 111 109   < > 107  --  107  --  109  --   --   --   --    CO2 28   < > 30 33   < > 35*  --  34*  --  33  --   --   --   --    BUN 85*   < > 75* 69*   < > 58*  --  48*  --  42*  --   --   --   --    CREATININE 2.7*   < > 2.4* 2.2*   < > 1.8*  --  1.6*  --  1.6*  --   --   --   --    MG 2.1  --   --  2.0  --   --   --  2.0  --   --   --   --   --   --    PHOS 2.9  --   --  2.5  --   --   --   --   --   --   --   --   --   --    CALCIUM 8.5   < > 8.4* 8.4*   < > 8.5  --  8.7  --  8.7  --   --   --   --     < > = values in this interval not displayed. No results for input(s): PTT, INR in the last 72 hours.     Invalid input(s): PT  Recent Labs     04/27/22 2029 04/28/22  0136   AST 58* 62*   ALT 30 33   BILITOT 3.7* 2.9*   BILIDIR 1.8* 1.4*     No results for input(s): TROPONINT in the last 72 hours. RADIOLOGY     XR CHEST PORTABLE   Final Result   A right arm PICC line tip terminates at the cavoatrial projection. No pneumothorax is observed. Small bilateral pleural effusions and bilateral lower lobe airspace opacity, right greater than left, are not significantly changed. **This report has been created using voice recognition software. It may contain minor errors which are inherent in voice recognition technology. **      Final report electronically signed by Dr Danisha Gallego on 4/29/2022 4:04 PM      XR ABDOMEN FOR NG/OG/NE TUBE PLACEMENT   Final Result   Impression:      NG tube tip proximal stomach. This document has been electronically signed by: Chelo Cullen MD on    04/28/2022 11:56 PM      XR CHEST PORTABLE   Final Result   Impression:   1. Satisfactory positioning of the endotracheal tube and right central    line. Probable slightly high positioning of the orogastric tube. Consider    advancing 3-4 cm. 2. Interval development of small bilateral pleural effusions, right    greater than left. 3. Bilateral lower lobe airspace opacities secondary to atelectasis and/or    infiltrates, right greater than left. 4. Bilateral interstitial changes secondary to pneumonitis or edema. This document has been electronically signed by: Louis Bhatt. DO Jennifer on    04/26/2022 12:01 AM      CT ABDOMEN PELVIS WO CONTRAST Additional Contrast? None   Final Result   Impression:   Right inguinal hernia containing small bowel. This is causing a small    bowel obstruction. There are several gas collections within the right    inguinal hernia. Ischemic or perforated bowel could have this appearance. Mild to moderate patchy consolidation bilateral lower lobes. This could    represent aspiration or pneumonia. Prominent thickening of the distal esophagus and gastroesophageal    junction. Neoplasm not excluded. Recommend direct visualization.       This document has been electronically signed by: Merritt Caldwell MD on    04/25/2022 07:20 PM      All CTs at this facility use dose modulation techniques and iterative    reconstructions, and/or weight-based dosing   when appropriate to reduce radiation to a low as reasonably achievable. US RENAL COMPLETE   Final Result   Impression:   Multifocal simple bilateral renal cyst. Echogenic bilateral renal    parenchyma. This document has been electronically signed by: Merritt Caldwell MD on    04/25/2022 06:30 PM      CT HEAD WO CONTRAST   Final Result       1. Stable CT scan of the brain, no interval change since previous MRI scan dated 10 Melody 2019.   2. Mild atrophy and dilatation of the third and lateral ventricles. 3. Probable ischemic changes in the white matter, left basal ganglia and in the bernadette. 4. Calcification the cavernous segments of both internal carotid arteries. 5. Increased density in the external auditory canals which may represent cerumen bilaterally. **This report has been created using voice recognition software. It may contain minor errors which are inherent in voice recognition technology. **      Final report electronically signed by DR Ebony Mosher on 4/25/2022 4:01 PM      XR CHEST PORTABLE   Final Result   1. Normal heart size. No effusion. 2. Persistent mild atelectatic/fibrotic stranding retrocardiac region left lung base. **This report has been created using voice recognition software. It may contain minor errors which are inherent in voice recognition technology. **      Final report electronically signed by Dr. Reed Carvajal on 4/25/2022 3:05 PM          Electronically signed by Barbara Sarah MD on 4/30/2022 at 9:14 AM

## 2022-04-30 NOTE — PLAN OF CARE
Problem: Discharge Planning  Goal: Discharge to home or other facility with appropriate resources  Description: Continue discharge planning. Patient currently with NG tube, PICC line inserted today.  following. Patient able to take few steps at bedside with PT/OT today. Outcome: Progressing     Problem: Pain  Goal: Verbalizes/displays adequate comfort level or baseline comfort level  Description: Patient denies pain today. Outcome: Progressing     Problem: Chronic Conditions and Co-morbidities  Goal: Patient's chronic conditions and co-morbidity symptoms are monitored and maintained or improved  Outcome: Progressing     Problem: Safety - Adult  Goal: Free from fall injury  Outcome: Progressing     Problem: ABCDS Injury Assessment  Goal: Absence of physical injury  Outcome: Progressing     Problem: Skin/Tissue Integrity  Goal: Absence of new skin breakdown  Description: 1. Monitor for areas of redness and/or skin breakdown  2. Assess vascular access sites hourly  3. Every 4-6 hours minimum:  Change oxygen saturation probe site  4. Every 4-6 hours:  If on nasal continuous positive airway pressure, respiratory therapy assess nares and determine need for appliance change or resting period.   Outcome: Progressing     Problem: Nutrition Deficit:  Goal: Optimize nutritional status  Outcome: Progressing     Problem: Respiratory - Adult  Goal: Achieves optimal ventilation and oxygenation  Outcome: Progressing     Problem: Cardiovascular - Adult  Goal: Maintains optimal cardiac output and hemodynamic stability  Outcome: Progressing  Goal: Absence of cardiac dysrhythmias or at baseline  Outcome: Progressing     Problem: Gastrointestinal - Adult  Goal: Minimal or absence of nausea and vomiting  Outcome: Progressing  Goal: Maintains or returns to baseline bowel function  Outcome: Progressing     Problem: Infection - Adult  Goal: Absence of infection at discharge  Outcome: Progressing  Goal: Absence of infection during hospitalization  Outcome: Progressing  Goal: Absence of fever/infection during anticipated neutropenic period  Outcome: Progressing  Care plan reviewed with patient. Patient verbalizes understanding of the care plan and contributed to goal setting.

## 2022-05-01 ENCOUNTER — ANESTHESIA EVENT (OUTPATIENT)
Dept: OPERATING ROOM | Age: 72
DRG: 853 | End: 2022-05-01
Payer: MEDICARE

## 2022-05-01 ENCOUNTER — ANESTHESIA (OUTPATIENT)
Dept: OPERATING ROOM | Age: 72
DRG: 853 | End: 2022-05-01
Payer: MEDICARE

## 2022-05-01 VITALS — SYSTOLIC BLOOD PRESSURE: 183 MMHG | OXYGEN SATURATION: 79 % | DIASTOLIC BLOOD PRESSURE: 82 MMHG

## 2022-05-01 LAB
AEROBIC CULTURE: ABNORMAL
ANAEROBIC CULTURE: ABNORMAL
ANION GAP SERPL CALCULATED.3IONS-SCNC: 11 MEQ/L (ref 8–16)
BLOOD CULTURE, ROUTINE: NORMAL
BLOOD CULTURE, ROUTINE: NORMAL
BUN BLDV-MCNC: 26 MG/DL (ref 7–22)
CALCIUM IONIZED: 1.07 MMOL/L (ref 1.12–1.32)
CALCIUM SERPL-MCNC: 7.8 MG/DL (ref 8.5–10.5)
CHLORIDE BLD-SCNC: 95 MEQ/L (ref 98–111)
CO2: 30 MEQ/L (ref 23–33)
CREAT SERPL-MCNC: 1.3 MG/DL (ref 0.4–1.2)
ERYTHROCYTE [DISTWIDTH] IN BLOOD BY AUTOMATED COUNT: 17.1 % (ref 11.5–14.5)
ERYTHROCYTE [DISTWIDTH] IN BLOOD BY AUTOMATED COUNT: 61.4 FL (ref 35–45)
GLUCOSE BLD-MCNC: 134 MG/DL (ref 70–108)
GLUCOSE BLD-MCNC: 134 MG/DL (ref 70–108)
GLUCOSE BLD-MCNC: 138 MG/DL (ref 70–108)
GLUCOSE BLD-MCNC: 189 MG/DL (ref 70–108)
GRAM STAIN RESULT: ABNORMAL
HCT VFR BLD CALC: 27.7 % (ref 42–52)
HEMOGLOBIN: 9.1 GM/DL (ref 14–18)
MAGNESIUM: 1.2 MG/DL (ref 1.6–2.4)
MCH RBC QN AUTO: 33.6 PG (ref 26–33)
MCHC RBC AUTO-ENTMCNC: 32.9 GM/DL (ref 32.2–35.5)
MCV RBC AUTO: 102.2 FL (ref 80–94)
ORGANISM: ABNORMAL
PHOSPHORUS: 1.4 MG/DL (ref 2.4–4.7)
PLATELET # BLD: 198 THOU/MM3 (ref 130–400)
PMV BLD AUTO: 10.8 FL (ref 9.4–12.4)
POTASSIUM SERPL-SCNC: 3.4 MEQ/L (ref 3.5–5.2)
RBC # BLD: 2.71 MILL/MM3 (ref 4.7–6.1)
SODIUM BLD-SCNC: 136 MEQ/L (ref 135–145)
WBC # BLD: 24.4 THOU/MM3 (ref 4.8–10.8)

## 2022-05-01 PROCEDURE — 49900 REPAIR OF ABDOMINAL WALL: CPT | Performed by: SURGERY

## 2022-05-01 PROCEDURE — 3600000013 HC SURGERY LEVEL 3 ADDTL 15MIN: Performed by: SURGERY

## 2022-05-01 PROCEDURE — 3700000001 HC ADD 15 MINUTES (ANESTHESIA): Performed by: SURGERY

## 2022-05-01 PROCEDURE — 2500000003 HC RX 250 WO HCPCS: Performed by: SURGERY

## 2022-05-01 PROCEDURE — 0W9J0ZZ DRAINAGE OF PELVIC CAVITY, OPEN APPROACH: ICD-10-PCS | Performed by: SURGERY

## 2022-05-01 PROCEDURE — 6360000002 HC RX W HCPCS

## 2022-05-01 PROCEDURE — 6360000002 HC RX W HCPCS: Performed by: ANESTHESIOLOGY

## 2022-05-01 PROCEDURE — 6360000002 HC RX W HCPCS: Performed by: NURSE ANESTHETIST, CERTIFIED REGISTERED

## 2022-05-01 PROCEDURE — 2500000003 HC RX 250 WO HCPCS: Performed by: PHARMACIST

## 2022-05-01 PROCEDURE — 6360000002 HC RX W HCPCS: Performed by: SURGERY

## 2022-05-01 PROCEDURE — 10060 I&D ABSCESS SIMPLE/SINGLE: CPT | Performed by: SURGERY

## 2022-05-01 PROCEDURE — 99232 SBSQ HOSP IP/OBS MODERATE 35: CPT | Performed by: INTERNAL MEDICINE

## 2022-05-01 PROCEDURE — 2700000000 HC OXYGEN THERAPY PER DAY

## 2022-05-01 PROCEDURE — 51702 INSERT TEMP BLADDER CATH: CPT

## 2022-05-01 PROCEDURE — 2500000003 HC RX 250 WO HCPCS: Performed by: NURSE ANESTHETIST, CERTIFIED REGISTERED

## 2022-05-01 PROCEDURE — 84100 ASSAY OF PHOSPHORUS: CPT

## 2022-05-01 PROCEDURE — 2140000000 HC CCU INTERMEDIATE R&B

## 2022-05-01 PROCEDURE — 2500000003 HC RX 250 WO HCPCS

## 2022-05-01 PROCEDURE — 7100000000 HC PACU RECOVERY - FIRST 15 MIN: Performed by: SURGERY

## 2022-05-01 PROCEDURE — 99024 POSTOP FOLLOW-UP VISIT: CPT | Performed by: SURGERY

## 2022-05-01 PROCEDURE — 0DN80ZZ RELEASE SMALL INTESTINE, OPEN APPROACH: ICD-10-PCS | Performed by: SURGERY

## 2022-05-01 PROCEDURE — 82948 REAGENT STRIP/BLOOD GLUCOSE: CPT

## 2022-05-01 PROCEDURE — 2580000003 HC RX 258: Performed by: NURSE ANESTHETIST, CERTIFIED REGISTERED

## 2022-05-01 PROCEDURE — 2500000003 HC RX 250 WO HCPCS: Performed by: INTERNAL MEDICINE

## 2022-05-01 PROCEDURE — 82330 ASSAY OF CALCIUM: CPT

## 2022-05-01 PROCEDURE — 80048 BASIC METABOLIC PNL TOTAL CA: CPT

## 2022-05-01 PROCEDURE — 99233 SBSQ HOSP IP/OBS HIGH 50: CPT | Performed by: FAMILY MEDICINE

## 2022-05-01 PROCEDURE — 7100000001 HC PACU RECOVERY - ADDTL 15 MIN: Performed by: SURGERY

## 2022-05-01 PROCEDURE — 85027 COMPLETE CBC AUTOMATED: CPT

## 2022-05-01 PROCEDURE — 3700000000 HC ANESTHESIA ATTENDED CARE: Performed by: SURGERY

## 2022-05-01 PROCEDURE — 2580000003 HC RX 258: Performed by: INTERNAL MEDICINE

## 2022-05-01 PROCEDURE — 6370000000 HC RX 637 (ALT 250 FOR IP): Performed by: SURGERY

## 2022-05-01 PROCEDURE — 83735 ASSAY OF MAGNESIUM: CPT

## 2022-05-01 PROCEDURE — 6360000002 HC RX W HCPCS: Performed by: PHARMACIST

## 2022-05-01 PROCEDURE — 2500000003 HC RX 250 WO HCPCS: Performed by: NURSE PRACTITIONER

## 2022-05-01 PROCEDURE — 3600000003 HC SURGERY LEVEL 3 BASE: Performed by: SURGERY

## 2022-05-01 PROCEDURE — 94760 N-INVAS EAR/PLS OXIMETRY 1: CPT

## 2022-05-01 PROCEDURE — 2580000003 HC RX 258

## 2022-05-01 PROCEDURE — 6360000002 HC RX W HCPCS: Performed by: INTERNAL MEDICINE

## 2022-05-01 PROCEDURE — 2709999900 HC NON-CHARGEABLE SUPPLY: Performed by: SURGERY

## 2022-05-01 PROCEDURE — 2580000003 HC RX 258: Performed by: SURGERY

## 2022-05-01 RX ORDER — POTASSIUM CHLORIDE 29.8 MG/ML
20 INJECTION INTRAVENOUS
Status: COMPLETED | OUTPATIENT
Start: 2022-05-01 | End: 2022-05-01

## 2022-05-01 RX ORDER — SUCCINYLCHOLINE/SOD CL,ISO/PF 200MG/10ML
SYRINGE (ML) INTRAVENOUS PRN
Status: DISCONTINUED | OUTPATIENT
Start: 2022-05-01 | End: 2022-05-01 | Stop reason: SDUPTHER

## 2022-05-01 RX ORDER — MORPHINE SULFATE 2 MG/ML
1 INJECTION, SOLUTION INTRAMUSCULAR; INTRAVENOUS EVERY 5 MIN PRN
Status: DISCONTINUED | OUTPATIENT
Start: 2022-05-01 | End: 2022-05-01 | Stop reason: HOSPADM

## 2022-05-01 RX ORDER — LABETALOL 20 MG/4 ML (5 MG/ML) INTRAVENOUS SYRINGE
10
Status: DISCONTINUED | OUTPATIENT
Start: 2022-05-01 | End: 2022-05-01 | Stop reason: HOSPADM

## 2022-05-01 RX ORDER — POTASSIUM CHLORIDE 29.8 MG/ML
INJECTION INTRAVENOUS
Status: COMPLETED
Start: 2022-05-01 | End: 2022-05-01

## 2022-05-01 RX ORDER — ONDANSETRON 2 MG/ML
INJECTION INTRAMUSCULAR; INTRAVENOUS PRN
Status: DISCONTINUED | OUTPATIENT
Start: 2022-05-01 | End: 2022-05-01 | Stop reason: SDUPTHER

## 2022-05-01 RX ORDER — LIDOCAINE HCL/PF 100 MG/5ML
SYRINGE (ML) INJECTION PRN
Status: DISCONTINUED | OUTPATIENT
Start: 2022-05-01 | End: 2022-05-01 | Stop reason: SDUPTHER

## 2022-05-01 RX ORDER — ROCURONIUM BROMIDE 10 MG/ML
INJECTION, SOLUTION INTRAVENOUS PRN
Status: DISCONTINUED | OUTPATIENT
Start: 2022-05-01 | End: 2022-05-01 | Stop reason: SDUPTHER

## 2022-05-01 RX ORDER — FENTANYL CITRATE 50 UG/ML
50 INJECTION, SOLUTION INTRAMUSCULAR; INTRAVENOUS EVERY 5 MIN PRN
Status: COMPLETED | OUTPATIENT
Start: 2022-05-01 | End: 2022-05-01

## 2022-05-01 RX ORDER — PROPOFOL 10 MG/ML
INJECTION, EMULSION INTRAVENOUS PRN
Status: DISCONTINUED | OUTPATIENT
Start: 2022-05-01 | End: 2022-05-01 | Stop reason: SDUPTHER

## 2022-05-01 RX ORDER — SODIUM CHLORIDE 9 MG/ML
INJECTION, SOLUTION INTRAVENOUS CONTINUOUS PRN
Status: DISCONTINUED | OUTPATIENT
Start: 2022-05-01 | End: 2022-05-01 | Stop reason: SDUPTHER

## 2022-05-01 RX ORDER — SODIUM CHLORIDE 9 MG/ML
INJECTION, SOLUTION INTRAVENOUS PRN
Status: DISCONTINUED | OUTPATIENT
Start: 2022-05-01 | End: 2022-05-01 | Stop reason: HOSPADM

## 2022-05-01 RX ORDER — SODIUM CHLORIDE 0.9 % (FLUSH) 0.9 %
5-40 SYRINGE (ML) INJECTION EVERY 12 HOURS SCHEDULED
Status: DISCONTINUED | OUTPATIENT
Start: 2022-05-01 | End: 2022-05-01 | Stop reason: HOSPADM

## 2022-05-01 RX ORDER — LEUCINE, PHENYLALANINE, LYSINE, METHIONINE, ISOLEUCINE, VALINE, HISTIDINE, THREONINE, TRYPTOPHAN, ALANINE, GLYCINE, ARGININE, PROLINE, SERINE, TYROSINE, DEXTROSE 365; 280; 290; 200; 300; 290; 240; 210; 90; 1035; 515; 575; 340; 250; 20; 15 MG/100ML; MG/100ML; MG/100ML; MG/100ML; MG/100ML; MG/100ML; MG/100ML; MG/100ML; MG/100ML; MG/100ML; MG/100ML; MG/100ML; MG/100ML; MG/100ML; MG/100ML; G/100ML
2000 INJECTION INTRAVENOUS
Status: DISPENSED | OUTPATIENT
Start: 2022-05-01 | End: 2022-05-02

## 2022-05-01 RX ORDER — MORPHINE SULFATE 2 MG/ML
2 INJECTION, SOLUTION INTRAMUSCULAR; INTRAVENOUS
Status: DISCONTINUED | OUTPATIENT
Start: 2022-05-01 | End: 2022-05-10 | Stop reason: HOSPADM

## 2022-05-01 RX ORDER — CALCIUM GLUCONATE 20 MG/ML
2000 INJECTION, SOLUTION INTRAVENOUS ONCE
Status: COMPLETED | OUTPATIENT
Start: 2022-05-01 | End: 2022-05-01

## 2022-05-01 RX ORDER — SODIUM CHLORIDE 0.9 % (FLUSH) 0.9 %
5-40 SYRINGE (ML) INJECTION PRN
Status: DISCONTINUED | OUTPATIENT
Start: 2022-05-01 | End: 2022-05-01 | Stop reason: HOSPADM

## 2022-05-01 RX ORDER — FAMOTIDINE 10 MG/ML
20 INJECTION, SOLUTION INTRAVENOUS DAILY
Status: DISCONTINUED | OUTPATIENT
Start: 2022-05-02 | End: 2022-05-10 | Stop reason: HOSPADM

## 2022-05-01 RX ADMIN — LEUCINE, PHENYLALANINE, LYSINE, METHIONINE, ISOLEUCINE, VALINE, HISTIDINE, THREONINE, TRYPTOPHAN, ALANINE, GLYCINE, ARGININE, PROLINE, SERINE, TYROSINE, DEXTROSE 2000 ML: 365; 280; 290; 200; 300; 290; 240; 210; 90; 1035; 515; 575; 340; 250; 20; 15 INJECTION INTRAVENOUS at 18:17

## 2022-05-01 RX ADMIN — ONDANSETRON 4 MG: 2 INJECTION INTRAMUSCULAR; INTRAVENOUS at 04:45

## 2022-05-01 RX ADMIN — PIPERACILLIN AND TAZOBACTAM 3375 MG: 3; .375 INJECTION, POWDER, LYOPHILIZED, FOR SOLUTION INTRAVENOUS at 01:25

## 2022-05-01 RX ADMIN — SUGAMMADEX 200 MG: 100 INJECTION, SOLUTION INTRAVENOUS at 05:06

## 2022-05-01 RX ADMIN — MAGNESIUM SULFATE HEPTAHYDRATE 3000 MG: 500 INJECTION, SOLUTION INTRAMUSCULAR; INTRAVENOUS at 11:54

## 2022-05-01 RX ADMIN — Medication 140 MG: at 04:24

## 2022-05-01 RX ADMIN — PIPERACILLIN AND TAZOBACTAM 3375 MG: 3; .375 INJECTION, POWDER, LYOPHILIZED, FOR SOLUTION INTRAVENOUS at 18:09

## 2022-05-01 RX ADMIN — METOPROLOL TARTRATE 5 MG: 5 INJECTION INTRAVENOUS at 18:08

## 2022-05-01 RX ADMIN — ROCURONIUM BROMIDE 20 MG: 50 INJECTION, SOLUTION INTRAVENOUS at 04:24

## 2022-05-01 RX ADMIN — FAMOTIDINE 10 MG: 10 INJECTION, SOLUTION INTRAVENOUS at 08:33

## 2022-05-01 RX ADMIN — POTASSIUM CHLORIDE 20 MEQ: 29.8 INJECTION, SOLUTION INTRAVENOUS at 13:30

## 2022-05-01 RX ADMIN — POTASSIUM CHLORIDE 20 MEQ: 400 INJECTION, SOLUTION INTRAVENOUS at 14:33

## 2022-05-01 RX ADMIN — ROCURONIUM BROMIDE 30 MG: 50 INJECTION, SOLUTION INTRAVENOUS at 04:39

## 2022-05-01 RX ADMIN — MORPHINE SULFATE 2 MG: 2 INJECTION, SOLUTION INTRAMUSCULAR; INTRAVENOUS at 18:08

## 2022-05-01 RX ADMIN — PROPOFOL 100 MG: 10 INJECTION, EMULSION INTRAVENOUS at 04:24

## 2022-05-01 RX ADMIN — POTASSIUM PHOSPHATE, MONOBASIC AND POTASSIUM PHOSPHATE, DIBASIC 20 MMOL: 224; 236 INJECTION, SOLUTION, CONCENTRATE INTRAVENOUS at 08:43

## 2022-05-01 RX ADMIN — AMIODARONE HYDROCHLORIDE 0.5 MG/MIN: 1.8 INJECTION, SOLUTION INTRAVENOUS at 03:35

## 2022-05-01 RX ADMIN — METOPROLOL TARTRATE 5 MG: 5 INJECTION INTRAVENOUS at 01:22

## 2022-05-01 RX ADMIN — FENTANYL CITRATE 50 MCG: 50 INJECTION, SOLUTION INTRAMUSCULAR; INTRAVENOUS at 05:34

## 2022-05-01 RX ADMIN — CALCIUM GLUCONATE 2000 MG: 20 INJECTION, SOLUTION INTRAVENOUS at 18:11

## 2022-05-01 RX ADMIN — POTASSIUM CHLORIDE 20 MEQ: 400 INJECTION, SOLUTION INTRAVENOUS at 13:30

## 2022-05-01 RX ADMIN — SODIUM CHLORIDE: 9 INJECTION, SOLUTION INTRAVENOUS at 04:16

## 2022-05-01 RX ADMIN — METOPROLOL TARTRATE 5 MG: 5 INJECTION INTRAVENOUS at 10:25

## 2022-05-01 RX ADMIN — DAKIN'S SOLUTION 0.125% (QUARTER STRENGTH): 0.12 SOLUTION at 13:32

## 2022-05-01 RX ADMIN — MORPHINE SULFATE 2 MG: 2 INJECTION, SOLUTION INTRAMUSCULAR; INTRAVENOUS at 10:37

## 2022-05-01 RX ADMIN — ONDANSETRON 4 MG: 4 TABLET, ORALLY DISINTEGRATING ORAL at 18:09

## 2022-05-01 RX ADMIN — Medication 100 MG: at 04:24

## 2022-05-01 RX ADMIN — AMIODARONE HYDROCHLORIDE 0.5 MG/MIN: 1.8 INJECTION, SOLUTION INTRAVENOUS at 15:37

## 2022-05-01 RX ADMIN — PIPERACILLIN AND TAZOBACTAM 3375 MG: 3; .375 INJECTION, POWDER, LYOPHILIZED, FOR SOLUTION INTRAVENOUS at 10:40

## 2022-05-01 RX ADMIN — FENTANYL CITRATE 50 MCG: 50 INJECTION, SOLUTION INTRAMUSCULAR; INTRAVENOUS at 05:41

## 2022-05-01 ASSESSMENT — PULMONARY FUNCTION TESTS
PIF_VALUE: 3
PIF_VALUE: 18
PIF_VALUE: 20
PIF_VALUE: 16
PIF_VALUE: 19
PIF_VALUE: 18
PIF_VALUE: 18
PIF_VALUE: 19
PIF_VALUE: 18
PIF_VALUE: 20
PIF_VALUE: 1
PIF_VALUE: 19
PIF_VALUE: 18
PIF_VALUE: 19
PIF_VALUE: 18
PIF_VALUE: 1
PIF_VALUE: 19
PIF_VALUE: 13
PIF_VALUE: 17
PIF_VALUE: 19
PIF_VALUE: 19
PIF_VALUE: 1
PIF_VALUE: 1
PIF_VALUE: 19
PIF_VALUE: 1
PIF_VALUE: 17
PIF_VALUE: 18
PIF_VALUE: 1
PIF_VALUE: 16
PIF_VALUE: 16
PIF_VALUE: 18
PIF_VALUE: 17
PIF_VALUE: 3
PIF_VALUE: 20
PIF_VALUE: 19
PIF_VALUE: 16
PIF_VALUE: 18
PIF_VALUE: 18
PIF_VALUE: 19
PIF_VALUE: 19
PIF_VALUE: 20
PIF_VALUE: 18
PIF_VALUE: 14
PIF_VALUE: 18
PIF_VALUE: 19
PIF_VALUE: 19
PIF_VALUE: 18
PIF_VALUE: 18
PIF_VALUE: 20

## 2022-05-01 ASSESSMENT — PAIN SCALES - GENERAL
PAINLEVEL_OUTOF10: 6
PAINLEVEL_OUTOF10: 8
PAINLEVEL_OUTOF10: 3
PAINLEVEL_OUTOF10: 8
PAINLEVEL_OUTOF10: 8

## 2022-05-01 ASSESSMENT — PAIN DESCRIPTION - LOCATION
LOCATION: ABDOMEN
LOCATION: ABDOMEN

## 2022-05-01 NOTE — ANESTHESIA PRE PROCEDURE
Department of Anesthesiology  Preprocedure Note       Name:  Chris Gross Sr.   Age:  66 y.o.  :  1950                                          MRN:  [id number]         Date:  2022      Surgeon: Lizz Pichardo):  Rosana Velez MD    Procedure: Procedure(s):  LAPAROTOMY EXPLORATORY    Medications prior to admission:   Prior to Admission medications    Medication Sig Start Date End Date Taking?  Authorizing Provider   atorvastatin (LIPITOR) 20 MG tablet TAKE 1 TABLET BY MOUTH DAILY 3/23/22   Libby Keita MD   COMBIGAN 0.2-0.5 % ophthalmic solution Place 1 drop into the left eye every 12 hours 3/23/22   Libby Keita MD   famotidine (PEPCID) 20 MG tablet Take 1 tablet by mouth 2 times daily 3/23/22   Libby Keita MD   losartan (COZAAR) 50 MG tablet Take 1 tablet by mouth daily 3/23/22   Libby Keita MD   metoprolol succinate (TOPROL XL) 25 MG extended release tablet TAKE 1 TABLET BY MOUTH DAILY 3/23/22   Libby Keita MD   [facility]) 0.4 MG capsule Take 1 capsule by mouth daily 3/23/22   Libby Keita MD   lidocaine (LIDODERM) 5 % Place 1 patch onto the skin daily 12 hours on, 12 hours off. 4/10/22   ELAN Buchanan CNP   fluocinonide (LIDEX) 0.05 % cream APPLY EXTERNALLY TO THE AFFECTED AREA TWICE DAILY 22   Libby Keita MD   tadalafil (CIALIS) 20 MG tablet TAKE 1 TABLET BY MOUTH EVERY DAY AS NEEDED 10/25/21   Libby Keita MD   aspirin 81 MG EC tablet Take 1 tablet by mouth daily 10/15/21   Libby Keita MD   loratadine (CLARITIN) 10 MG tablet TAKE 1 TABLET BY MOUTH DAILY 21   Libby Keita MD       Current medications:    Current Facility-Administered Medications   Medication Dose Route Frequency Provider Last Rate Last [facility] 5/15 SOLN 2,000 mL  2,000 mL IntraVENous Continuous TPN Kong Monroy RPH 40 mL/hr at 22 0354 Rate Verify at 22 0354    0.9 % sodium chloride infusion   IntraVENous PRN MD Katelynn Brito Morven piperacillin-tazobactam (ZOSYN) 3,375 mg in dextrose 5 % 50 mL IVPB extended infusion (mini-bag)  3,375 mg IntraVENous Q8H Marquis Dion MD 12.5 mL/hr at 05/01/22 0354 Rate Verify at 05/01/22 0354    metoprolol (LOPRESSOR) injection 5 mg  5 mg IntraVENous Q8H Marquis Dion MD   5 mg at 05/01/22 0122    [Held by provider] metoprolol tartrate (LOPRESSOR) tablet 12.5 mg  12.5 mg Oral BID Tennie Pasha APRN - CNP        dextrose 5 % solution   IntraVENous Continuous Khadijah Alcala DO 50 mL/hr at 05/01/22 0354 Rate Verify at 05/01/22 0354    insulin lispro (HUMALOG) injection vial 0-6 Units  0-6 Units SubCUTAneous 4 times per day ELAN Castañeda - CNP   1 Units at 04/29/22 1734    glucose (GLUTOSE) 40 % oral gel 15 g  15 g Oral PRN Jenny Vickers APRN - CNP        glucagon (rDNA) injection 1 mg  1 mg IntraMUSCular PRN Jenny Vickers APRN - CNP        dextrose 5 % solution  100 mL/hr IntraVENous PRN Jenny Vickers APRN - CNP        dextrose bolus (hypoglycemia) 10% 125 mL  125 mL IntraVENous PRN Jenny Vickers APRN - CNP        Or    dextrose bolus (hypoglycemia) 10% 250 mL  250 mL IntraVENous PRN ELAN Castañeda - CNP        0.9 % sodium chloride infusion   IntraVENous PRN Jenny Vickers APRN - CNP        sodium hypochlorite (DAKINS) 0.125 % external solution   Irrigation Daily Mag Adan MD   Given at 04/30/22 1043    morphine (PF) injection 1 mg  1 mg IntraVENous Q4H PRN Ej Petersen MD   1 mg at 04/27/22 2333    amiodarone (NEXTERONE) 360 mg in dextrose 5% 200 ml  0.5 mg/min IntraVENous Continuous TenELAN Munoz - CNP 16.7 mL/hr at 05/01/22 0354 0.5 mg/min at 05/01/22 0354    vitamin D (ERGOCALCIFEROL) capsule 50,000 Units  50,000 Units Oral Weekly Khadijah E Hemmelgarn, DO        sodium chloride flush 0.9 % injection 5-40 mL  5-40 mL IntraVENous 2 times per day Isaac Newsome PA-C   10 mL at 04/30/22 1313    sodium chloride flush 0.9 % injection 5-40 mL  5-40 mL IntraVENous PRN Salome Osmany, PA-C   10 mL at 04/29/22 0303    0.9 % sodium chloride infusion   IntraVENous PRN Salome Osmany, PA-C        [Held by provider] heparin (porcine) injection 5,000 Units  5,000 Units SubCUTAneous TID Salome Osmany, PA-C        ondansetron (ZOFRAN-ODT) disintegrating tablet 4 mg  4 mg Oral Q8H PRN Nori Zerfoss, PA-C        Or    ondansetron (ZOFRAN) injection 4 mg  4 mg IntraVENous Q6H PRN Salome Osmany, PA-C   4 mg at 04/29/22 0303    polyethylene glycol (GLYCOLAX) packet 17 g  17 g Oral Daily PRN Salome Osmany, PA-C        acetaminophen (TYLENOL) tablet 650 mg  650 mg Oral Q6H PRN Nori Zerfoss, PA-C        Or    acetaminophen (TYLENOL) suppository 650 mg  650 mg Rectal Q6H PRN Nori Zerfoss, PA-C        famotidine (PEPCID) injection 10 mg  10 mg IntraVENous Daily Nori Zerfoss, PA-C   10 mg at 04/30/22 1039       Allergies:  No Known Allergies    Problem List:    Patient Active Problem List   Diagnosis Code    CAD (coronary artery disease) I25.10    Hypertension I10    Erectile dysfunction N52.9    Hyperlipidemia E78.5    GI bleeding K92.2    Hx of asbestos exposure Z77.090    Stroke-like symptom R29.90    Acute respiratory failure with hypoxia (HCC) J96.01    Septic shock ([facility] [address] A41.9, R65.21    Incarcerated right inguinal hernia K40.30    Small bowel perforation (HCC) K63.1    Aspiration pneumonia ([facility] [address] J69.0    Acute kidney injury ([facility] [address] N17.9    Hypoalbuminemia E88.09    Syncope R55    Hypokalemia E87.6    Anemia, macrocytic D53.9    Severe malnutrition ([facility]) E43    Atrial fibrillation with RVR ([facility]) I48.91       Past Medical History:        Diagnosis Date    CAD (coronary artery disease)     CVA (cerebral vascular accident) ([facility] [address]     Erectile dysfunction     GERD (gastroesophageal reflux disease) 07/2012    Gastro ulcer    Hyperlipidemia     Hypertension        Past Surgical History:        Procedure Laterality Date    CARDIAC CATHETERIZATION      COLONOSCOPY      ENDOSCOPY, COLON, DIAGNOSTIC      EYE SURGERY Left 2004    cataract    EYE SURGERY      INGUINAL HERNIA REPAIR Right 5/27/15    Dr. Gian Campbell N/A 4/25/2022    LAPAROTOMY EXPLORATORY FOR SMALL BOWEL OBSTRUCTION WITH INCARCERATED HERNIA REPAIR performed by Jaime Morrissey MD at [facility] Left 2011    Dr. Misty Kuhn Left 2003    Dr. Antoine Yanes  5/27/15    Dr. Helene Crawford       Social History:    Social History     Tobacco Use    Smoking status: Current Every Day Smoker     Packs/day: 1.00     Years: 40.00     Pack years: 40.00     Types: Cigarettes    Smokeless tobacco: Never Used    Tobacco comment: printed to avs. 3/4 ppd since 2019   Substance Use Topics    Alcohol use: Yes                                Ready to quit: Not Answered  Counseling given: Not Answered  Comment: printed to avs. 3/4 ppd since 2019      Vital Signs (Current):   Vitals:    04/30/22 1744 04/30/22 2004 04/30/22 2015 04/30/22 2304   BP: 128/72 112/61 112/61 130/83   Pulse: 77 78 78 82   Resp: 16 16 16 16   Temp: 98.1 °F (36.7 °C) 98 °F (36.7 °C) 98 °F (36.7 °C) 99.2 °F (37.3 °C)   TempSrc: Oral Oral  Oral   SpO2: 97% 97% 97% 97%   Weight:       Height:                                                  BP Readings from Last 3 Encounters:   04/30/22 130/83   04/25/22 (!) 121/50   04/17/22 (!) 158/88       NPO Status:                                                                                 BMI:   Wt Readings from Last 3 Encounters:   04/30/22 137 lb (62.1 kg)   04/17/22 150 lb (68 kg)   04/10/22 148 lb (67.1 kg)     Body mass index is 20.23 kg/m².     CBC:   Lab Results   Component Value Date    WBC 24.9 04/30/2022    RBC 2.27 04/30/2022    RBC 3.91 05/24/2016    HGB 7.6 04/30/2022    HCT 24.2 04/30/2022    .6 04/30/2022    RDW 11.9 05/24/2016     04/30/2022       CMP:   Lab Results   Component Value Date  04/30/2022    K 2.9 04/30/2022    K 3.6 04/29/2022    CL 96 04/30/2022    CO2 36 04/30/2022    BUN 27 04/30/2022    CREATININE 1.2 04/30/2022    LABGLOM 72 04/30/2022    GLUCOSE 119 04/30/2022    GLUCOSE 78 05/24/2016    PROT 5.3 04/28/2022    CALCIUM 7.4 04/30/2022    BILITOT 2.9 04/28/2022    ALKPHOS 80 04/28/2022    AST 62 04/28/2022    ALT 33 04/28/2022       POC Tests:   Recent Labs     05/01/22  0021   POCGLU 138*       Coags:   Lab Results   Component Value Date    INR 0.95 06/09/2019       HCG (If Applicable): No results found for: PREGTESTUR, PREGSERUM, HCG, HCGQUANT     ABGs: No results found for: PHART, PO2ART, PTY1GTO, JMX3IGY, BEART, S8EFFCQB     Type & Screen (If Applicable):  Lab Results   Component Value Date    LABRH POS 04/30/2022       Drug/Infectious Status (If Applicable):  No results found for: HIV, HEPCAB    COVID-19 Screening (If Applicable): No results found for: COVID19        Anesthesia Evaluation    Airway: Mallampati: II  TM distance: >3 FB   Neck ROM: full  Mouth opening: > = 3 FB Dental:          Pulmonary:   (+) pneumonia:  decreased breath sounds,                             Cardiovascular:    (+) hypertension:, CAD:,         Rhythm: regular                      Neuro/Psych:   (+) CVA:,             GI/Hepatic/Renal:   (+) GERD:, renal disease:,           Endo/Other:                     Abdominal:             Vascular: Other Findings:             Anesthesia Plan      general     ASA 4 - emergent     (Poor dentition  I took care of this pt. For previous surgery. Possible post op vent. Support  Discussed   Consented  This time pt.'s wife is not along with pt.)  Induction: intravenous. MIPS: Postoperative opioids intended and Prophylactic antiemetics administered. Anesthetic plan and risks discussed with patient. Use of blood products discussed with patient whom consented to blood products. Plan discussed with CRNA.                   Eufemia Melchor MD 5/1/2022

## 2022-05-01 NOTE — BRIEF OP NOTE
Brief Postoperative Note      Patient: Epifanio Suarez Sr. YOB: 1950  MRN: [id number]    Date of Procedure: 4/25/2022    Pre-Op Diagnosis: Wound Dehiscence    Post-Op Diagnosis: Same , small bowel inflammatory adhesions,right groin abscess       Procedure(s):  LAPAROTOMY EXPLORATORY FOR SMALL BOWEL OBSTRUCTION WITH INCARCERATED HERNIA REPAIR, lysis of small bowel adhesions    Surgeon(s):  Nash Cary MD    Assistant:  * No surgical staff found *    Anesthesia: General    Estimated Blood Loss (mL): less than 50     Complications: None    Specimens:   ID Type Source Tests Collected by Time Destination   A : Hernia Fluid Culture Body Fluid Abdomen SURGICAL PATHOLOGY (Canceled), CULTURE, ANAEROBIC AND AEROBIC Nash Cary MD 4/25/2022 2100    B : small bowel Tissue Jejunum SURGICAL PATHOLOGY (Canceled) Nash Cary MD 4/25/2022 2120        Implants:  * No implants in log *      Drains:   Closed/Suction Drain Superior;Midline Abdomen Bulb (Active)   Site Description Clean, dry & intact 04/30/22 2024   Dressing Status Clean, dry & intact 04/30/22 2024   Drainage Appearance Serous 04/30/22 2024   Drain Status Compressed 04/30/22 2024   Output (ml) 50 ml 05/01/22 0355       NG/OG/NJ/NE Tube Nasogastric 14 fr Left nostril (Active)   Surrounding Skin Clean, dry & intact 05/01/22 0355   Securement device Adhesive based young 05/01/22 0355   Status Clamped 05/01/22 0355   Placement Verified Respiratory Status; External Catheter Length 05/01/22 0355   NG/OG/NJ/NE External Measurement (cm) 73 cm 05/01/22 0355       [REMOVED] NG/OG/NJ/NE Tube Nasogastric 18 fr Left nostril (Removed)   Surrounding Skin Clean, dry & intact 04/28/22 1600   Securement device Adhesive based young 04/28/22 1600   Status Suction-low intermittent 04/28/22 1600   Placement Verified External Catheter Length;Gastric Contents 04/28/22 1600   NG/OG/NJ/NE External Measurement (cm) 60 cm 04/28/22 1600   Drainage Appearance Coffee Ground 04/28/22 1600   Output (mL) 350 ml 04/28/22 1705   Action Taken Other (comment) 04/28/22 0400       [REMOVED] NG/OG/NJ/NE Tube Nasogastric 14 fr Left nostril (Removed)   Surrounding Skin Clean, dry & intact 04/30/22 0810   Securement device Adhesive based young 04/30/22 0810   Status Suction-low intermittent 04/30/22 0810   Placement Verified Gastric Contents 04/30/22 0810   NG/OG/NJ/NE External Measurement (cm) 70 cm 04/30/22 0810   Drainage Appearance Bile;Green 04/30/22 0810   Free Water/Flush (mL) 30 mL 04/30/22 0446   Output (mL) 300 ml 04/30/22 0810   Action Taken Placement verified (comment) 04/29/22 2025       [REMOVED] Urinary Catheter Max (Removed)   Catheter Indications Need for fluid volume management of the critically ill patient in a critical care setting 04/27/22 0800   Site Assessment Pink 04/27/22 0800   Urine Color Yellow; Alondra 04/27/22 0800   Urine Appearance Clear 04/27/22 0800   Collection Container Standard 04/27/22 0800   Securement Method Securing device (Describe) 04/27/22 0800   Catheter Care Completed Yes 04/26/22 2000   Catheter Best Practices  Drainage tube clipped to bed;Catheter secured to thigh; Tamper seal intact; Bag below bladder;Bag not on floor; Lack of dependent loop in tubing;Drainage bag less than half full 04/27/22 0800   Status Draining 04/27/22 0800   Output (mL) 800 mL 04/27/22 0500   Discontinuation Reason Per nurse-driven protocol 53/38/43 1000       [REMOVED] External Urinary Catheter (Removed)   Site Assessment Clean,dry & intact 04/29/22 2025   Placement Repositioned 04/29/22 2025   Securement Method Leg strap 04/29/22 2025   Catheter Care Catheter/Wick replaced 04/30/22 0333   Perineal Care Yes 04/30/22 0333   Suction 40 mmgHg continuous 04/29/22 2025   Urine Color Alondra 04/30/22 1345   Urine Appearance Clear 04/30/22 1345   Output (mL) 550 mL 04/30/22 1345       Findings: Focal fascial dehiscence with herniation of pink small bowel.   Inflammatory abdominal adhesions no intra-abdominal abscess. Undrained right groin abscess.     Electronically signed by Dev Barr MD on 5/1/2022 at 5:15 AM

## 2022-05-01 NOTE — PROGRESS NOTES
Renal Progress Note  [facility]    Patient :  Josi Moreno Sr.; 66 y.o. MRN# [id number]  Location:  3B-26/026-A  Attending:  [place] MD  Admit Date:  4/25/2022   Hospital Day: 6      Subjective:     Nephrology is following the patient for ROBSON. Patient was seen and examined this morning. He was taken to OR overnight because his abdominal wound dehisced. Doing better now. Awake and alert. Has NG tube. TPN running. Outpatient Medications:     Medications Prior to Admission: atorvastatin (LIPITOR) 20 MG tablet, TAKE 1 TABLET BY MOUTH DAILY  COMBIGAN 0.2-0.5 % ophthalmic solution, Place 1 drop into the left eye every 12 hours  famotidine (PEPCID) 20 MG tablet, Take 1 tablet by mouth 2 times daily  losartan (COZAAR) 50 MG tablet, Take 1 tablet by mouth daily  metoprolol succinate (TOPROL XL) 25 MG extended release tablet, TAKE 1 TABLET BY MOUTH DAILY  tamsulosin (FLOMAX) 0.4 MG capsule, Take 1 capsule by mouth daily  lidocaine (LIDODERM) 5 %, Place 1 patch onto the skin daily 12 hours on, 12 hours off.   fluocinonide (LIDEX) 0.05 % cream, APPLY EXTERNALLY TO THE AFFECTED AREA TWICE DAILY  tadalafil (CIALIS) 20 MG tablet, TAKE 1 TABLET BY MOUTH EVERY DAY AS NEEDED  aspirin 81 MG EC tablet, Take 1 tablet by mouth daily  loratadine (CLARITIN) 10 MG tablet, TAKE 1 TABLET BY MOUTH DAILY    Current Medications:     Scheduled Meds:    magnesium sulfate  3,000 mg IntraVENous Once    potassium phosphate IVPB  20 mmol IntraVENous Once    piperacillin-tazobactam  3,375 mg IntraVENous Q8H    metoprolol  5 mg IntraVENous Q8H    [Held by provider] metoprolol tartrate  12.5 mg Oral BID    insulin lispro  0-6 Units SubCUTAneous 4 times per day    sodium hypochlorite   Irrigation Daily    vitamin D  50,000 Units Oral Weekly    sodium chloride flush  5-40 mL IntraVENous 2 times per day    [Held by provider] heparin (porcine)  5,000 Units SubCUTAneous TID    famotidine (PEPCID) injection  10 mg IntraVENous Daily     Continuous Infusions:    CLINIMIX 5/15 40 mL/hr at 22 035    sodium chloride      dextrose      sodium chloride      amiodarone 0.5 mg/min (22 035)    sodium chloride       PRN Meds:  morphine, sodium chloride, glucose, glucagon (rDNA), dextrose, dextrose bolus (hypoglycemia) **OR** dextrose bolus (hypoglycemia), sodium chloride, sodium chloride flush, sodium chloride, ondansetron **OR** ondansetron, polyethylene glycol, acetaminophen **OR** acetaminophen    Input/Output:       I/O last 3 completed shifts: In: 8867.1 [P.O.:340; I.V.:5826.8; Blood:370.8; NG/GT:90; IV Piggyback:796.9]  Out: 2929 [Urine:1200; Emesis/NG output:2300; Drains:130].       Patient Vitals for the past 96 hrs (Last 3 readings):   Weight   22 0600 137 lb (62.1 kg)   22 0346 137 lb 12.6 oz (62.5 kg)       Vital Signs:   Temperature:  Temp: 97.3 °F (36.3 °C)  TMax:   Temp (24hrs), Av.1 °F (36.7 °C), Min:97.3 °F (36.3 °C), Max:99.2 °F (37.3 °C)    Respirations:  Resp: 20  Pulse:   Pulse: 85  BP:    BP: 117/70  BP Range: Systolic (21CXI), FJ , Min:76 , LEYDA:551       Diastolic (09LHG), HKU:93, Min:50, Max:88      Physical Examination:     General:  Awake, alert  HEENT: NC/AT/ MMM +NG tube  Chest:               Clear B/L no rales  Cardiac:  S1 S2   Abdomen: Abdominal binder  Neuro:  sedated  SKIN:  No rashes,   Extremities:  No edema,     Labs:       Recent Labs     22  0527 22  1047 22  0345   WBC 33.1* 24.9* 24.4*   RBC 2.54* 2.27* 2.71*   HGB 8.6* 7.6* 9.1*   HCT 27.2* 24.2* 27.7*   .1* 106.6* 102.2*   MCH 33.9* 33.5* 33.6*   MCHC 31.6* 31.4* 32.9    197 198   MPV 10.5 10.3 10.8      BMP:   Recent Labs     22  0527 22  1600 22  1047 22  1344 22  0345   *   < > 141 140 136   K 3.6  --  2.9*  --  3.4*     --  96*  --  95*   CO2 33  --  36*  --  30   BUN 42*  --  27*  --  26*   CREATININE 1.6*  --  1.2  --  1.3* GLUCOSE 106  --  119*  --  134*   CALCIUM 8.7  --  7.4*  --  7.8*    < > = values in this interval not displayed. Phosphorus:     Recent Labs     05/01/22  0345   PHOS 1.4*     Magnesium:    Recent Labs     04/28/22  2313 05/01/22  0345   MG 2.0 1.2*     Albumin:    No results for input(s): LABALBU in the last 72 hours. BNP:    No results found for: BNP  KHUSHBOO:    No results found for: KHUSHBOO  SPEP:  Lab Results   Component Value Date    PROT 5.3 04/28/2022     UPEP:   No results found for: LABPE  C3:   No results found for: C3  C4:   No results found for: C4  MPO ANCA:   No results found for: MPO  PR3 ANCA:   No results found for: PR3  Anti-GBM:   No results found for: GBMABIGG  Hep BsAg:       No results found for: HEPBSAG  Hep C AB:        No results found for: HEPCAB    Urinalysis/Chemistries:      Lab Results   Component Value Date    NITRU NEGATIVE 04/25/2022    COLORU YELLOW 04/25/2022    PHUR 5.5 04/25/2022    LABCAST >15 HYALINE 04/25/2022    WBCUA 10-15 04/25/2022    RBCUA 10-15 04/25/2022    MUCUS NONE SEEN 04/25/2022    YEAST NONE SEEN 04/25/2022    BACTERIA FEW 04/25/2022    SPECGRAV 1.015 04/25/2022    LEUKOCYTESUR TRACE 04/25/2022    UROBILINOGEN 0.2 04/25/2022    BILIRUBINUR NEGATIVE 04/25/2022    BLOODU MODERATE 04/25/2022    GLUCOSEU NEGATIVE 06/09/2019    KETUA NEGATIVE 04/25/2022     Urine Sodium:   No results found for: MING  Urine Potassium:  No results found for: KUR  Urine Chloride:  No results found for: CLUR  Urine Osmolarity: No results found for: OSMOU  Urine Protein:   No components found for: TOTALPROTEIN, URINE   Urine Creatinine:   No results found for: LABCREA  Urine Eosinophils:  No components found for: UEOS        Impression and Plan:  1. ROBSON , prerenal due to volume depletion and hypotension from sepsis:impiroving  2. Hypernatremia:resolved. Stop D5W  3. Hypokalemia/hypophosphatemia; replace with 20 mmol Kphos  4. Hypomag: 3 grams Mag sulfate  5. sepsis  6.  S/p ex lap with small bowel resection, drainage of abscess and debridement necrotic fat fascia and hernia sac 4/25. Had wound dehiscence and went back for repair 5/1  5. Leukocytosis  6. Pneumonia  7. Anemia  8. Afib        Please don't hesitate to call with any questions.   Electronically signed by Bharat Porter DO on 5/1/2022 at 8:16 AM

## 2022-05-01 NOTE — PROGRESS NOTES
Called by nurse 3:15 AM.  Patient vomited also having bowel movements vomited forcefully and dehisced his fascial wound with extruding nondilated pink small bowel. Patient agreeable back to the OR take the opportunity to washout his abdomen and close his dehiscence. Patient antibiotic dose being given now.

## 2022-05-01 NOTE — PROGRESS NOTES
0518: Pt arrives to pacu, awakens to verbal stimuli. Pt on 4L NC, respirations easy and unlabored. Pt c/o minimal pain  0534: Pt c/o increased pain, 50mcg of fentanyl administered. VSS  0541: No change in pain, 50mcg of fentanyl administered. 3404: Pt resting off and on with eyes closed, noted some improvement in pain  0547: Provided report to  RN  0601: Transported pt to  in stable condition.

## 2022-05-01 NOTE — PROGRESS NOTES
Patient arrives to the pre-op holding area. Pre-op checklist is performed and questions are addressed.

## 2022-05-01 NOTE — PROGRESS NOTES
Renal Adjustment Follow-up    Recent Labs     04/30/22  1047 05/01/22  0345   BUN 27* 26*   CREATININE 1.2 1.3*     Estimated Creatinine Clearance: 45 mL/min (A) (based on SCr of 1.3 mg/dL (H)).     Plan: Continue zosyn 3.375G q8h, changed pepcid to 20mg IV daily

## 2022-05-01 NOTE — PROGRESS NOTES
PROGRESS NOTE      Patient:  Cedric Suarez Sr. Unit/Bed:3B-26/026-A    YOB: 1950    MRN: [id number]       Acct: [de-identified]     PCP: Junior Wilkerson MD    Date of Admission: 4/25/2022      Assessment/Plan:    Anticipated Discharge in : Pending    Active Hospital Problems    Diagnosis Date Noted    Atrial fibrillation with RVR ([address] [I48.91]      Priority: Medium    Acute respiratory failure with hypoxia ([address] [J96.01] 04/26/2022     Priority: Medium    Septic shock ([address] [A41.9, R65.21] 04/26/2022     Priority: Medium    Incarcerated right inguinal hernia [K40.30] 04/26/2022     Priority: Medium    Small bowel perforation ([address] [K63.1] 04/26/2022     Priority: Medium    Aspiration pneumonia ([address] [J69.0] 04/26/2022     Priority: Medium    ROBSON (acute kidney injury) ([address] [N17.9] 04/26/2022     Priority: Medium    Hypoalbuminemia [E88.09] 04/26/2022     Priority: Medium    Syncope [R55] 04/26/2022     Priority: Medium    Hypokalemia [E87.6] 04/26/2022     Priority: Medium    Anemia, macrocytic [D53.9] 04/26/2022     Priority: Medium    Severe malnutrition ([address] [E43] 04/26/2022     Priority: Medium       Perforated small bowel, soilage of the pelvic cavity, right groin and pelvic abscesses, and necrotic hernial sac  - POD #6 exploratory laparotomy with bowel resection, primary anastomosis, drainage of pelvic abscess as well as right groin exploration, drainage of abscess, and debridement of necrotic subcutaneous fat, fascia and muscle. - On Day 7 of Zosyn for Culture Growing Morganella Morganii and Ecoli. final Susceptibility is pending, however prelim demonstrates sensatitivites to Zosyn.    - Surgery on board and following.   - NG tube to low intermittent suction  - Continue dressing changes and wound care per surgery    Abdominal wound fascial dehiscence  - Returned to OR this am for ex lab, MAICOL, drainage of right groin abscess and repair of fascial wound dehiscence  - Management as above    Sepsis, resolved  - Secondary to intraabomdinal infection and aspiration pneumonia  - Leukocytosis persists, but improving. VSS  - On Day 7 of Zosyn for abdominal fluid culture growing Morganella Morganii and Ecoli. Respiratory culture grew E. coli with H. influ on PCR detected. Aspiration pneumonia  - Imaging findings suggestion of infiltrate w/ gastric contents suctioned from ET tube is consistent with aspiration pneumonia. - Culture growing ecoli with H. influ on PCR detected. - Antibiotic treatment as above     Atrial fibrillation with RVR, resolved  - New onset, noted 4/27/22, refractory to Lopressor and Cardizem  - Converted with Amiodarone  - [id number]. Surgery recommends holding anticoagulation still at this time in setting of bleed risk. - Continue Amiodarone and Lopressor with hold parameter. Will switch to PO when able. - Maintain telemetry  - Keep Mg > 2 and K > 4    ROBSON, improving  - Prerenal due to volume depletion, poor PO intake and hypotension from sepsis, improving  - Baseline creatinine 0.9-1.1, today at 1.3   - Nephrology consulted  - Monitor BMP and urine output    Acute hypoxic respiratory failure  - Likely secondary to aspiration pneumonia vs. Symptomatic anemia  - Extubated 4/27/22, currently on 4L NC  - Wean oxygen as tolerated to maintain SpO2 > 92%    Acute postoperative pulmonary insufficiency, resolved   - Extubated 4/27/22    Macrocytic anemia  - Hemoglobin 9.1 today,s/p 1 unit PRBC 4/30/22  - No signs of bleeding  - Monitor CBC    Elevated Troponin  - Trop 0.047 on admission, down-trending. EKG with  inverted T waves  -  Likely 2/2 demand ischemia 2/2 hypotension.   - Maintain telemetry.  No chest pain.     Mixed Acid-base disorder, resolved   - Mixed Anion gap metabolic acidosis with respiratory compensation likely secondary to sepsis and ROBSON with an additional metabolic alkalosis likely secondary to volume depletion in setting of GI symptoms  - AG closed  - Nephrology on board, appreciate recs. Hypokalemia  - K 3.4 today. Likely due to GI losses. - Replacement protocols in place - may supplement with TPN. - Continue to monitor. Hypophosphatemia  - Phos 1.4 today. Likely due to GI losses. - Replacement protocols in place - may supplement with TPN. - Continue to monitor. Hypomagnesemia  - Mg 1.2 today. Likely due to GI losses. - Replacement protocols in place - may supplement with TPN. - Continue to monitor. Hypocalcemia  - iCal 1.07 today. - Replacement protocols in place - may supplement with TPN. - Continue to monitor. Hypochloremia  - Cl 95. Likely due to GI losses  - May supplement with TPN. - Continue to monitor    Hypernatremia, resolved  - Nephrology on board   - Stop D5W. CAD / History of CVA  - Hold home Lipitor until able to take PO. Hold aspirin for bleeding risk - resume when okay by gen. Surg.  - Maintain telemetry    History of GERD  - Continue Pepcid IV  - Noted incidental finding of thickened esophagus on imaging, recommend outpatient follow-up when stable for endoscopy    ? Institutional Delirium   - Removed central line 4/28/22 PM  - Given medical acuity and advance age, likely sundowning as behavior is overnight. Reorient and monitor.   - May consider Seroqel QHS if needed    Hypertension, controlled  - On Cozaar at home. Initially held due to ROBSON, BP doing okay without  - Continue to monitor    Hyperlipidemia  - Hold Lipitor until able to take PO    BPH  - Hold Flomax until table to take PO    Erectile dysfunction  - Hold Cialis      Chief Complaint:     Hospital Course:    Per H&P:  \"The patient was sedated and intubated and therefore could not give any history.  Therefore, history is primarily taken from the chart. Patient's wife states that the patient fell in his yard approximately 14 days ago and went [facility]. This time, he was discharged with pain medication.   Approximately 7 days ago, he again presented to [facility] with complaints of a right lower abdominal bulge at which point he was diagnosed with inguinal hernia and is scheduled with surgical follow-up. Patient presented to [facility] on 4/25/2022 for syncope and worsening inguinal hernia. Wife states that syncope first occurred in [place] several days ago when the patient had nausea and subjective feelings of excessive heat while sitting.  After this, he went to go outside and then passed out. On the morning of admission, patient's wife was given of the bathtub when he \"slumped over. \"  Wife stated that the patient vomited 3 times in the last 24 hours with brown/yellow vomitus. Wife also reports that he has not been having bowel movements, has had less frequent urination, and that he has been weak and sleeping \"20 hours a day. \"  While in the ED, the patient was found to have profound renal failure with a creatinine of 11.2 with a baseline of approximately 1.3. Patient also noted to have a profound anion gap acidosis which improved with aggressive fluid resuscitation. Nephrology was consulted given profound renal failure. CT of the abdomen and pelvis without contrast was performed which demonstrated mild to moderate patchy consolidation of the bilateral lower lobes, thickening of the distal esophagus and gastroesophageal junction, a right inguinal hernia causing a small bowel obstruction, and multiple gas collections in the right inguinal hernia highly suspicious for ischemic or perforated bowel. On the evening of 4/25/2022 Dr. Malik performed exploratory laparotomy with small bowel resection, single primary anastomosis, drainage of pelvic abscess, exploration and drainage of right groin abscess, and debridement of necrotic subcutaneous fat/fascia/muscle in the right groin along with excision of the necrotic hernia sac in the right groin.   Empiric coverage with vancomycin and Zosyn to necrotic small bowel as well as probable aspiration pneumonia. Family was extensively counseled. \"    Pt had course in ICU from Apr/25-28/2022, Overnight patient went to A. fib with RVR that was refractory to both Lopressor and Cardizem. Patient was given amiodarone which converted him out. Patient continues to have frequent PACs on the monitor. Surgery was consulted regarding anticoagulation however they are recommending holding anticoagulation at this time. We will continue to follow-up with surgery as far as when we can start anticoagulation. Patient overall is awake and alert self, year, but only occasionally to place. Patient denies any pain or acute complaints at this time. Patient is frequently repeating himself however concern for underlying delirium. Subjective:  Patient seen and examined. Patient had emesis and forceful bowel movements overnight, with abdominal fascial wound dehiscence. He was taken back to the OR. He is doing well this am. Reports some abdominal pain, but denies nausea/vomiting. NG to LIS. He denies fevers, chills, chest pain, shortness of breath, leg pain or swelling.      Medications:  Reviewed    Infusion Medications    CLINIMIX 5/15      CLINIMIX 5/15 40 mL/hr at 05/01/22 0354    dextrose      amiodarone 0.5 mg/min (05/01/22 0354)    sodium chloride       Scheduled Medications    magnesium sulfate  3,000 mg IntraVENous Once    calcium gluconate  2,000 mg IntraVENous Once    [START ON 5/2/2022] famotidine (PEPCID) injection  20 mg IntraVENous Daily    piperacillin-tazobactam  3,375 mg IntraVENous Q8H    metoprolol  5 mg IntraVENous Q8H    [Held by provider] metoprolol tartrate  12.5 mg Oral BID    insulin lispro  0-6 Units SubCUTAneous 4 times per day    sodium hypochlorite   Irrigation Daily    vitamin D  50,000 Units Oral Weekly    sodium chloride flush  5-40 mL IntraVENous 2 times per day    [Held by provider] heparin (porcine)  5,000 Units SubCUTAneous TID     PRN Meds: morphine, glucose, glucagon (rDNA), dextrose, dextrose bolus (hypoglycemia) **OR** dextrose bolus (hypoglycemia), sodium chloride flush, sodium chloride, ondansetron **OR** ondansetron, polyethylene glycol, acetaminophen **OR** acetaminophen      Intake/Output Summary (Last 24 hours) at 5/1/2022 1304  Last data filed at 5/1/2022 0650  Gross per 24 hour   Intake 4475.99 ml   Output 600 ml   Net 3875.99 ml       Diet:  Diet NPO Exceptions are: Sips of Water with Meds    Exam:  /75   Pulse 80   Temp 98.5 °F (36.9 °C) (Oral)   Resp 16   Ht 5' 9\" (1.753 m)   Wt 137 lb (62.1 kg)   SpO2 100%   BMI 20.23 kg/m²     Physical Exam  Constitutional:       General: He is not in acute distress. Appearance: He is ill-appearing. He is not toxic-appearing or diaphoretic. Interventions: He is not intubated. Nasal cannula in place. Comments: NG tube to [employer]:      Head: Normocephalic and atraumatic. Nose: Nose normal. No congestion or rhinorrhea. Mouth/Throat:      Mouth: Mucous membranes are dry. Pharynx: Oropharynx is clear. No oropharyngeal exudate or posterior oropharyngeal erythema. Eyes:      General: No scleral icterus. Right eye: No discharge. Left eye: No discharge. Extraocular Movements: Extraocular movements intact. Conjunctiva/sclera: Conjunctivae normal.      Pupils: Pupils are equal, round, and reactive to light. Cardiovascular:      Rate and Rhythm: Normal rate, present. Rhythm irregular. Pulses: Normal pulses. Heart sounds: Normal heart sounds. No murmur heard. No friction rub. No gallop. Pulmonary:      Effort: Pulmonary effort is normal. No accessory muscle usage or respiratory distress. He is not intubated. Breath sounds: Clear to auscultation, diminished in bases. No wheezing, rhonchi or rales. Abdominal:      General: A surgical scar is present. Bowel sounds are decreased. Tenderness: There is abdominal tenderness. There is no guarding. Comments: Abdominal dressing CDI. Abdominal binder in place. Neurological:      Mental Status: He is oriented. Cranial Nerves: No dysarthria or facial asymmetry. Motor: No weakness. Comments: Noted improved mentation, however still speaks very little. Psychiatric:         Behavior: Behavior is cooperative. Labs:   Recent Labs     04/29/22  0527 04/30/22  1047 05/01/22  0345   WBC 33.1* 24.9* 24.4*   HGB 8.6* 7.6* 9.1*   HCT 27.2* 24.2* 27.7*    197 198     Recent Labs     04/29/22  0527 04/29/22  1600 04/30/22  1047 04/30/22  1344 05/01/22  0345   *   < > 141 140 136   K 3.6  --  2.9*  --  3.4*     --  96*  --  95*   CO2 33  --  36*  --  30   BUN 42*  --  27*  --  26*   CREATININE 1.6*  --  1.2  --  1.3*   CALCIUM 8.7  --  7.4*  --  7.8*   PHOS  --   --   --   --  1.4*    < > = values in this interval not displayed. No results for input(s): AST, ALT, BILIDIR, BILITOT, ALKPHOS in the last 72 hours. No results for input(s): INR in the last 72 hours. No results for input(s): Joeline Reeks in the last 72 hours. Urinalysis:      Lab Results   Component Value Date    NITRU NEGATIVE 04/25/2022    WBCUA 10-15 04/25/2022    BACTERIA FEW 04/25/2022    RBCUA 10-15 04/25/2022    BLOODU MODERATE 04/25/2022    SPECGRAV 1.015 04/25/2022    GLUCOSEU NEGATIVE 06/09/2019       Radiology:  XR CHEST PORTABLE   Final Result   A right arm PICC line tip terminates at the cavoatrial projection. No pneumothorax is observed. Small bilateral pleural effusions and bilateral lower lobe airspace opacity, right greater than left, are not significantly changed. **This report has been created using voice recognition software. It may contain minor errors which are inherent in voice recognition technology. **      Final report electronically signed by Dr Dodie Younger on 4/29/2022 4:04 PM      XR ABDOMEN FOR NG/OG/NE TUBE PLACEMENT   Final Result Impression:      NG tube tip proximal stomach. This document has been electronically signed by: Valentine Jaramillo MD on    04/28/2022 11:56 PM      XR CHEST PORTABLE   Final Result   Impression:   1. Satisfactory positioning of the endotracheal tube and right central    line. Probable slightly high positioning of the orogastric tube. Consider    advancing 3-4 cm. 2. Interval development of small bilateral pleural effusions, right    greater than left. 3. Bilateral lower lobe airspace opacities secondary to atelectasis and/or    infiltrates, right greater than left. 4. Bilateral interstitial changes secondary to pneumonitis or edema. This document has been electronically signed by: Brad Rodriguez DO on    04/26/2022 12:01 AM      CT ABDOMEN PELVIS WO CONTRAST Additional Contrast? None   Final Result   Impression:   Right inguinal hernia containing small bowel. This is causing a small    bowel obstruction. There are several gas collections within the right    inguinal hernia. Ischemic or perforated bowel could have this appearance. Mild to moderate patchy consolidation bilateral lower lobes. This could    represent aspiration or pneumonia. Prominent thickening of the distal esophagus and gastroesophageal    junction. Neoplasm not excluded. Recommend direct visualization. This document has been electronically signed by: Valentín Booker MD on    04/25/2022 07:20 PM      All CTs at this facility use dose modulation techniques and iterative    reconstructions, and/or weight-based dosing   when appropriate to reduce radiation to a low as reasonably achievable. US RENAL COMPLETE   Final Result   Impression:   Multifocal simple bilateral renal cyst. Echogenic bilateral renal    parenchyma. This document has been electronically signed by: Valentín Booker MD on    04/25/2022 06:30 PM      CT HEAD WO CONTRAST   Final Result       1.  Stable CT scan of the brain, no interval change since previous MRI scan dated 10 Melody 2019.   2. Mild atrophy and dilatation of the third and lateral ventricles. 3. Probable ischemic changes in the white matter, left basal ganglia and in the bernadette. 4. Calcification the cavernous segments of both internal carotid arteries. 5. Increased density in the external auditory canals which may represent cerumen bilaterally. **This report has been created using voice recognition software. It may contain minor errors which are inherent in voice recognition technology. **      Final report electronically signed by DR Paul Lehman on 4/25/2022 4:01 PM      XR CHEST PORTABLE   Final Result   1. Normal heart size. No effusion. 2. Persistent mild atelectatic/fibrotic stranding retrocardiac region left lung base. **This report has been created using voice recognition software. It may contain minor errors which are inherent in voice recognition technology. **      Final report electronically signed by Dr. Perri Kilpatrick on 4/25/2022 3:05 PM          Diet: Diet NPO Exceptions are: Sips of Water with Meds    DVT prophylaxis: [] Lovenox                                 [x] SCDs                                 [] SQ Heparin                                 [x] Encourage ambulation           [] Already on Anticoagulation     Disposition:    [] Home       [] TCU       [] Rehab       [] Psych       [] SNF       [] [place]       [x] Other- rehab vs. SNF    Code Status: Full Code    PT/OT Eval Status: ordered      Electronically signed by Ann Marie Wilkins MD on 5/1/2022 at 1:04 PM    This note was electronically signed. Parts of this note may have been dictated by use of voice recognition software and electronically transcribed. The note may contain errors not detected in proofreading. Please refer to my supervising physician's documentation if my documentation differs.

## 2022-05-01 NOTE — PLAN OF CARE
Problem: Discharge Planning  Goal: Discharge to home or other facility with appropriate resources  Description: Continue discharge planning. Patient currently with NG tube, PICC line inserted today.  following. Patient able to take few steps at bedside with PT/OT today. 5/1/2022 1229 by Sylwia Herndon RN  Outcome: Progressing  5/1/2022 1229 by Sylwia Herndon RN  Outcome: Progressing     Problem: Pain  Goal: Verbalizes/displays adequate comfort level or baseline comfort level  Description: Patient denies pain today. 5/1/2022 1229 by Sylwia Herndon RN  Outcome: Progressing  5/1/2022 1229 by Sylwia Herndon RN  Outcome: Progressing     Problem: Chronic Conditions and Co-morbidities  Goal: Patient's chronic conditions and co-morbidity symptoms are monitored and maintained or improved  5/1/2022 1229 by Sylwia Herndon RN  Outcome: Progressing  5/1/2022 1229 by Sylwia Herndon RN  Outcome: Progressing     Problem: Safety - Adult  Goal: Free from fall injury  5/1/2022 1229 by Sylwia Herndon RN  Outcome: Progressing  5/1/2022 1229 by Sylwia Herndon RN  Outcome: Progressing     Problem: ABCDS Injury Assessment  Goal: Absence of physical injury  5/1/2022 1229 by Sylwia Herndon RN  Outcome: Progressing  5/1/2022 1229 by Sylwia Herndon RN  Outcome: Progressing     Problem: Skin/Tissue Integrity  Goal: Absence of new skin breakdown  Description: 1. Monitor for areas of redness and/or skin breakdown  2. Assess vascular access sites hourly  3. Every 4-6 hours minimum:  Change oxygen saturation probe site  4. Every 4-6 hours:  If on nasal continuous positive airway pressure, respiratory therapy assess nares and determine need for appliance change or resting period.   5/1/2022 1229 by Sylwia Herndon RN  Outcome: Progressing  5/1/2022 1229 by Sylwia Herndon RN  Outcome: Progressing     Problem: Nutrition Deficit:  Goal: Optimize nutritional status  5/1/2022 1229 by Sylwia Herndon RN  Outcome: Progressing  5/1/2022 1229 by Sylwia Herndon RN  Outcome: Progressing     Problem: Respiratory - Adult  Goal: Achieves optimal ventilation and oxygenation  5/1/2022 1229 by Army Calderon RN  Outcome: Progressing  5/1/2022 1229 by Army Calderon RN  Outcome: Progressing     Problem: Cardiovascular - Adult  Goal: Maintains optimal cardiac output and hemodynamic stability  5/1/2022 1229 by Army Calderon RN  Outcome: Progressing  5/1/2022 1229 by Army Calderon RN  Outcome: Progressing  Goal: Absence of cardiac dysrhythmias or at baseline  5/1/2022 1229 by Army Calderon RN  Outcome: Progressing  5/1/2022 1229 by Army Calderon RN  Outcome: Progressing     Problem: Gastrointestinal - Adult  Goal: Minimal or absence of nausea and vomiting  5/1/2022 1229 by Army Calderon RN  Outcome: Progressing  5/1/2022 1229 by Army Calderon RN  Outcome: Progressing  Goal: Maintains or returns to baseline bowel function  5/1/2022 1229 by Army Calderon RN  Outcome: Progressing  5/1/2022 1229 by Army Calderon RN  Outcome: Progressing     Problem: Infection - Adult  Goal: Absence of infection at discharge  5/1/2022 1229 by Army Calderon RN  Outcome: Progressing  5/1/2022 1229 by Army Calderon RN  Outcome: Progressing  Goal: Absence of infection during hospitalization  5/1/2022 1229 by Army Calderon RN  Outcome: Progressing  5/1/2022 1229 by Army Calderon RN  Outcome: Progressing  Goal: Absence of fever/infection during anticipated neutropenic period  5/1/2022 1229 by Army Calderon RN  Outcome: Progressing  5/1/2022 1229 by Army Calderon RN  Outcome: Progressing  Care plan reviewed with patient. Patient verbalizes understanding of the care plan and contributed to goal setting.

## 2022-05-01 NOTE — ANESTHESIA POSTPROCEDURE EVALUATION
Department of Anesthesiology  Postprocedure Note    Patient: Ray Davis Sr. MRN: [id number]  YOB: 1950  Date of evaluation: 5/1/2022  Time:  5:24 AM     Procedure Summary     Date: 05/01/22 [facility]    Anesthesia Start: 2643 Anesthesia Stop: 0132    Procedure: LAPAROTOMY EXPLORATORY, REPAIR OF WOUND DEHISCENCE (N/A Abdomen) Diagnosis: (Wound Dehiscence)    Surgeons: Alyce Martino MD Responsible Provider: Joan Mills MD    Anesthesia Type: general ASA Status: 4 - Emergent          Anesthesia Type: general    Dianne Phase I:      Dianne Phase II:      Last vitals: Reviewed and per EMR flowsheets.        Anesthesia Post Evaluation    Patient location during evaluation: PACU  Patient participation: complete - patient participated  Level of consciousness: responsive to verbal stimuli  Airway patency: patent  Nausea & Vomiting: no vomiting and no nausea  Complications: no  Cardiovascular status: hemodynamically stable  Respiratory status: acceptable and nasal cannula  Hydration status: stable

## 2022-05-01 NOTE — PROGRESS NOTES
2120 tele-sitter alarmed, pt had thrown up over side of bed, bile/green appearance    0310 pt had explosive diarrhea, c/o abd pain at the time, upon inspection small bowel was found to be protruding from mid line incision, wet dressing placed over bowel and called Dr Chetna Miller with surgery, attempted to call pts wife no answer and unable to leave voicemail, called daughter and voicemail was left, consent verified with 2nd RN CHRISTIANO Sim placed L nare at 73cm per verbal order from Dr Chetna Miller, abd binder placed as well, am labs drawn and sent, pt taken to [email or web address] pt back from OR, denies pain at this time, vss and charted, midline incision dressed and abd binder in place

## 2022-05-01 NOTE — OP NOTE
[address] [address]  Operative Report    PATIENT NAME: Marcela Suarez . MEDICAL RECORD NO. [id number]  SURGEON: Rylee Osman MD   Primary Care Physician: Saqib Bey MD  Date: 5/1/2022, 5:20 AM     PROCEDURE PERFORMED: Exploratory laparotomy lysis of inflammatory adhesions, drainage undrained right groin abscess, repair of abdominal wound fascial dehiscence  PREOPERATIVE DIAGNOSIS: Abdominal wound dehiscence  Active Hospital Problems    Diagnosis Date Noted    Atrial fibrillation with RVR ([address] [I48.91]      Priority: Medium    Acute respiratory failure with hypoxia ([address] [J96.01] 04/26/2022     Priority: Medium    Septic shock ([address] [A41.9, R65.21] 04/26/2022     Priority: Medium    Incarcerated right inguinal hernia [K40.30] 04/26/2022     Priority: Medium    Small bowel perforation ([address] [K63.1] 04/26/2022     Priority: Medium    Aspiration pneumonia ([address] [J69.0] 04/26/2022     Priority: Medium    Acute kidney injury ([address] [N17.9] 04/26/2022     Priority: Medium    Hypoalbuminemia [E88.09] 04/26/2022     Priority: Medium    Syncope [R55] 04/26/2022     Priority: Medium    Hypokalemia [E87.6] 04/26/2022     Priority: Medium    Anemia, macrocytic [D53.9] 04/26/2022     Priority: Medium    Severe malnutrition ([address] [E43] 04/26/2022     Priority: Medium      POSTOPERATIVE DIAGNOSIS: Same  SURGEON:  Rylee Osman MD   ANESTHESIA:  General endotracheal anesthesia   ESTIMATED BLOOD LOSS:  40  ml  SPECIMEN: none  COMPLICATIONS: none  DRAINS: None, all drain removed  DISPOSITION: Recovery Room  CONDITION: stable      Procedure: Patient was brought to the operating suite after called by nursing staff stating the patient had vomited and dehisced his abdominal wound with small bowel extruding. Patient consented to procedure. Nursing staff attempted to notify family. Patient had Zosyn running. He was brought to the operating suite where he was placed supine on the operating table.   He had pneumatic sequential compression devices on the lower extremities. He was administered general anesthetic endotracheal intubation. Timeout was performed. Staples were removed the abdomen and groins were prepped with Betadine and draped. Patient had fascial disruption in the upper abdomen about 4 cm in length. Suture material was removed the abdominal cavity was entered. Small bowel was all viable. The anastomosis looked good. It was patent. Bowel proximal to the anastomosis was still somewhat dilated. There were inflammatory adhesions which were lysed. Small bowel was all brought outside the abdominal cavity. Abdomen was irrigated there is no undrained abdominal abscess identified. Irrigation fluid was suctioned from the abdominal cavity. Small bowel was returned to the abdominal cavity. Fascia was reapproximated with large interrupted #1 Prolene sutures. Subcutaneous tissues were irrigated and skin was loosely closed with staples. The right groin was then irrigated there was a pocket that extended down to the groin of some previous abscess which was irrigated and removed it was irrigated until irrigation was cleared there was no further abscess fluid identified. A few lateral staples were placed in the groin region the rest of it was left open and packed with Kerlix gauze soaked with Irrisept solution. Patient was spontaneously breathing and transported to the recovery area in stable condition. Attempts made to contact family no answer by phone.

## 2022-05-01 NOTE — PROGRESS NOTES
TPN Follow Up Note    Assessment: NPO except small sips    Electrolyte Replacement:   KCL 40 meq, Kphos 20mMol, magnesium 3gm, calcium 2gm    TPN changes for (today) at 1800:   No change - continue clinimix 5/15 @ 40 ml/hr with plan to change to 3 in 1 TPN on 5/2/22    Re-check BMP, Mg, PO4, iCa 5/2/22

## 2022-05-02 ENCOUNTER — APPOINTMENT (OUTPATIENT)
Dept: GENERAL RADIOLOGY | Age: 72
DRG: 853 | End: 2022-05-02
Payer: MEDICARE

## 2022-05-02 LAB
ANION GAP SERPL CALCULATED.3IONS-SCNC: 10 MEQ/L (ref 8–16)
BUN BLDV-MCNC: 32 MG/DL (ref 7–22)
CALCIUM IONIZED: 1.11 MMOL/L (ref 1.12–1.32)
CALCIUM SERPL-MCNC: 7.5 MG/DL (ref 8.5–10.5)
CHLORIDE BLD-SCNC: 95 MEQ/L (ref 98–111)
CO2: 29 MEQ/L (ref 23–33)
CREAT SERPL-MCNC: 1.8 MG/DL (ref 0.4–1.2)
ERYTHROCYTE [DISTWIDTH] IN BLOOD BY AUTOMATED COUNT: 16.7 % (ref 11.5–14.5)
ERYTHROCYTE [DISTWIDTH] IN BLOOD BY AUTOMATED COUNT: 60.8 FL (ref 35–45)
GLUCOSE BLD-MCNC: 101 MG/DL (ref 70–108)
GLUCOSE BLD-MCNC: 135 MG/DL (ref 70–108)
GLUCOSE BLD-MCNC: 137 MG/DL (ref 70–108)
GLUCOSE BLD-MCNC: 142 MG/DL (ref 70–108)
GLUCOSE BLD-MCNC: 251 MG/DL (ref 70–108)
HCT VFR BLD CALC: 27.3 % (ref 42–52)
HEMOGLOBIN: 8.8 GM/DL (ref 14–18)
MAGNESIUM: 1.9 MG/DL (ref 1.6–2.4)
MCH RBC QN AUTO: 33.3 PG (ref 26–33)
MCHC RBC AUTO-ENTMCNC: 32.2 GM/DL (ref 32.2–35.5)
MCV RBC AUTO: 103.4 FL (ref 80–94)
PHOSPHORUS: 2.5 MG/DL (ref 2.4–4.7)
PLATELET # BLD: 208 THOU/MM3 (ref 130–400)
PMV BLD AUTO: 11 FL (ref 9.4–12.4)
POTASSIUM SERPL-SCNC: 3.7 MEQ/L (ref 3.5–5.2)
RBC # BLD: 2.64 MILL/MM3 (ref 4.7–6.1)
SODIUM BLD-SCNC: 134 MEQ/L (ref 135–145)
WBC # BLD: 22.2 THOU/MM3 (ref 4.8–10.8)

## 2022-05-02 PROCEDURE — 2500000003 HC RX 250 WO HCPCS: Performed by: SURGERY

## 2022-05-02 PROCEDURE — 2140000000 HC CCU INTERMEDIATE R&B

## 2022-05-02 PROCEDURE — 6360000002 HC RX W HCPCS: Performed by: SURGERY

## 2022-05-02 PROCEDURE — 99232 SBSQ HOSP IP/OBS MODERATE 35: CPT | Performed by: INTERNAL MEDICINE

## 2022-05-02 PROCEDURE — 99024 POSTOP FOLLOW-UP VISIT: CPT | Performed by: SURGERY

## 2022-05-02 PROCEDURE — 85027 COMPLETE CBC AUTOMATED: CPT

## 2022-05-02 PROCEDURE — 84100 ASSAY OF PHOSPHORUS: CPT

## 2022-05-02 PROCEDURE — 97110 THERAPEUTIC EXERCISES: CPT

## 2022-05-02 PROCEDURE — 82330 ASSAY OF CALCIUM: CPT

## 2022-05-02 PROCEDURE — 80048 BASIC METABOLIC PNL TOTAL CA: CPT

## 2022-05-02 PROCEDURE — 71045 X-RAY EXAM CHEST 1 VIEW: CPT

## 2022-05-02 PROCEDURE — 2500000003 HC RX 250 WO HCPCS: Performed by: PHARMACIST

## 2022-05-02 PROCEDURE — 99233 SBSQ HOSP IP/OBS HIGH 50: CPT | Performed by: FAMILY MEDICINE

## 2022-05-02 PROCEDURE — 82948 REAGENT STRIP/BLOOD GLUCOSE: CPT

## 2022-05-02 PROCEDURE — 6370000000 HC RX 637 (ALT 250 FOR IP): Performed by: SURGERY

## 2022-05-02 PROCEDURE — 97163 PT EVAL HIGH COMPLEX 45 MIN: CPT

## 2022-05-02 PROCEDURE — 51702 INSERT TEMP BLADDER CATH: CPT

## 2022-05-02 PROCEDURE — 83735 ASSAY OF MAGNESIUM: CPT

## 2022-05-02 PROCEDURE — 2580000003 HC RX 258: Performed by: SURGERY

## 2022-05-02 PROCEDURE — 2580000003 HC RX 258: Performed by: INTERNAL MEDICINE

## 2022-05-02 RX ORDER — SODIUM CHLORIDE 9 MG/ML
INJECTION, SOLUTION INTRAVENOUS CONTINUOUS
Status: DISCONTINUED | OUTPATIENT
Start: 2022-05-02 | End: 2022-05-04

## 2022-05-02 RX ORDER — CALCIUM GLUCONATE 10 MG/ML
1000 INJECTION, SOLUTION INTRAVENOUS ONCE
Status: COMPLETED | OUTPATIENT
Start: 2022-05-02 | End: 2022-05-02

## 2022-05-02 RX ADMIN — PIPERACILLIN AND TAZOBACTAM 3375 MG: 3; .375 INJECTION, POWDER, LYOPHILIZED, FOR SOLUTION INTRAVENOUS at 18:01

## 2022-05-02 RX ADMIN — AMIODARONE HYDROCHLORIDE 0.5 MG/MIN: 1.8 INJECTION, SOLUTION INTRAVENOUS at 04:17

## 2022-05-02 RX ADMIN — PIPERACILLIN AND TAZOBACTAM 3375 MG: 3; .375 INJECTION, POWDER, LYOPHILIZED, FOR SOLUTION INTRAVENOUS at 11:22

## 2022-05-02 RX ADMIN — FAMOTIDINE 20 MG: 10 INJECTION, SOLUTION INTRAVENOUS at 08:54

## 2022-05-02 RX ADMIN — CALCIUM GLUCONATE: 98 INJECTION, SOLUTION INTRAVENOUS at 18:08

## 2022-05-02 RX ADMIN — METOPROLOL TARTRATE 5 MG: 5 INJECTION INTRAVENOUS at 11:35

## 2022-05-02 RX ADMIN — INSULIN LISPRO 3 UNITS: 100 INJECTION, SOLUTION INTRAVENOUS; SUBCUTANEOUS at 04:37

## 2022-05-02 RX ADMIN — AMIODARONE HYDROCHLORIDE 0.5 MG/MIN: 1.8 INJECTION, SOLUTION INTRAVENOUS at 17:21

## 2022-05-02 RX ADMIN — PIPERACILLIN AND TAZOBACTAM 3375 MG: 3; .375 INJECTION, POWDER, LYOPHILIZED, FOR SOLUTION INTRAVENOUS at 01:41

## 2022-05-02 RX ADMIN — CALCIUM GLUCONATE 1000 MG: 10 INJECTION, SOLUTION INTRAVENOUS at 11:15

## 2022-05-02 RX ADMIN — METOPROLOL TARTRATE 5 MG: 5 INJECTION INTRAVENOUS at 01:41

## 2022-05-02 RX ADMIN — DAKIN'S SOLUTION 0.125% (QUARTER STRENGTH): 0.12 SOLUTION at 08:30

## 2022-05-02 RX ADMIN — SODIUM CHLORIDE, PRESERVATIVE FREE 10 ML: 5 INJECTION INTRAVENOUS at 20:51

## 2022-05-02 RX ADMIN — SODIUM CHLORIDE, PRESERVATIVE FREE 10 ML: 5 INJECTION INTRAVENOUS at 09:17

## 2022-05-02 RX ADMIN — INSULIN LISPRO 1 UNITS: 100 INJECTION, SOLUTION INTRAVENOUS; SUBCUTANEOUS at 17:56

## 2022-05-02 RX ADMIN — SODIUM CHLORIDE: 9 INJECTION, SOLUTION INTRAVENOUS at 09:13

## 2022-05-02 RX ADMIN — METOPROLOL TARTRATE 5 MG: 5 INJECTION INTRAVENOUS at 17:57

## 2022-05-02 NOTE — PROGRESS NOTES
Checked with Dr. Flores Kelly regarding Simmons catheter and reported residuals from NG tube. Dr. Flores Kelly called back and gave orders to remove the NG tube and continue the Simmons catheter for now-will reassess in the morning and possibly remove simmons tomorrow.

## 2022-05-02 NOTE — PROGRESS NOTES
Jovana Flanagan MD  Postoperative Progress Note  Pt Name: Sunny Colón Record Number: [id number]  Date of Birth 1950   Today's Date: 5/2/2022  ASSESSMENT   1. POD # 6 sepsis secondary to incarcerated right inguinal hernia with perforation and obstruction of small bowel. Necrotic right groin wound status postdebridement drainage of abdominal abscess present at the time of surgery. POD #1 repair of abdominal wound dehiscence lysis of inflammatory adhesions and abdominal and right groin washout  2. Sepsis resolving . 3. Acute renal failure improved  4. Nasal cannula oxygen  5. Persistent leukocytosis slowly improving  6. A. Fib/NS arrhythmia  7. Hypernatremia resolved  8. pneumonia  9. NG tube outputs decreased. Active bowel sounds   has a past medical history of CAD (coronary artery disease), CVA (cerebral vascular accident) ([facility] [address] [facility] Erectile dysfunction, GERD (gastroesophageal reflux disease), Hyperlipidemia, and Hypertension. PLANS   1. Analgesics and antiemetics as needed. 2. IV hydration per medicine service. Normal saline stopped on D5 water. 3. Continue broad-spectrum antibiotic Zosyn. 4.   Amiodarone drip for atrial fibrillation  5. DVT prophylaxis per primary service. 6. change groin wound packing twice daily. Dry gauze. Dakin's solution ordered. Wounds look okay. Midline wound dressing changed. 7.  Continue Zosyn. Intra-abdominal cultures E. coli and Morganella morganii I both sensitive  8. TPN. Trial clamping NG tube. Minimal output. 9.  Monitor white count  10. Therapies for deconditioning  11. Continue abdominal binder  SUBJECTIVE   Eward Old is hemodynamically stable. Wounds look okay.   CURRENT MEDICATIONS   Scheduled Meds:   famotidine (PEPCID) injection  20 mg IntraVENous Daily    piperacillin-tazobactam  3,375 mg IntraVENous Q8H    metoprolol  5 mg IntraVENous Q8H    [Held by provider] metoprolol tartrate  12.5 mg Oral BID    insulin lispro  0-6 Units SubCUTAneous 4 times per day    sodium hypochlorite   Irrigation Daily    vitamin D  50,000 Units Oral Weekly    sodium chloride flush  5-40 mL IntraVENous 2 times per day    [Held by provider] heparin (porcine)  5,000 Units SubCUTAneous TID     Continuous Infusions:   sodium chloride      CLINIMIX 5/15 2,000 mL (22)    dextrose      amiodarone 0.5 mg/min (22)    sodium chloride       PRN Meds:.morphine, glucose, glucagon (rDNA), dextrose, dextrose bolus (hypoglycemia) **OR** dextrose bolus (hypoglycemia), sodium chloride flush, sodium chloride, ondansetron **OR** ondansetron, polyethylene glycol, acetaminophen **OR** acetaminophen  OBJECTIVE   CURRENT VITALS:  height is 5' 9\" (1.753 m) and weight is 135 lb 6.4 oz (61.4 kg). His oral temperature is 98.6 °F (37 °C). His blood pressure is 128/68 and his pulse is 96. His respiration is 20 and oxygen saturation is 93%. Body mass index is 20 kg/m².   Temperature Range (24h):Temp: 98.6 °F (37 °C) Temp  Av.5 °F (36.9 °C)  Min: 98.1 °F (36.7 °C)  Max: 98.9 °F (37.2 °C)  BP Range (49S): Systolic (72WBL), GEJ:448 , Min:102 , OTD:752     Diastolic (35MPT), LMK:23, Min:62, Max:75    Pulse Range (24h): Pulse  Av.8  Min: 79  Max: 96  Respiration Range (24h): Resp  Av.3  Min: 16  Max: 20  Current Pulse Ox (24h):  SpO2: 93 %  Pulse Ox Range (24h):  SpO2  Av.2 %  Min: 93 %  Max: 100 %  Oxygen Amount and Delivery: O2 Flow Rate (L/min): 4 L/min  Incentive Spirometry Tx:            GENERAL: Sedated no acute distress  LUNGS: Clear diminished bases   HEART: Pulse 92  ABDOMEN: Soft midline incision intact minimal drainage  INCISION dressings dry and intact  EXTREMITY: No edema  In: 2015.3 [I.V.:389.7]  Out: 460 [Urine:400]  [REMOVED] Closed/Suction Drain Superior;Midline Abdomen Bulb-Output (ml): 50 ml  Date 22 0000 - 22 235   Shift 3257-5540 0470-9863 1493-6092 24 Hour Total   INTAKE   I.V.(mL/kg) 174.2(2.8)   174.2(2.8)   IV Piggyback(mL/kg) 83.8(1.4)   83.8(1.4)   TPN(mL/kg) 417.3(6.8)   417.3(6.8)   Shift Total(mL/kg) 675.3(11)   675.3(11)   OUTPUT   Urine(mL/kg/hr) 300   300   Emesis/NG output(mL/kg) 30(0.5)   30(0.5)   Shift Total(mL/kg) 330(5.4)   330(5.4)   Weight (kg) 61.4 61.4 61.4 61.4     LABS     Recent Labs     04/30/22  1047 04/30/22  1047 04/30/22  1344 05/01/22  0345 05/02/22  0425   WBC 24.9*  --   --  24.4* 22.2*   HGB 7.6*  --   --  9.1* 8.8*   HCT 24.2*  --   --  27.7* 27.3*     --   --  198 208      < > 140 136 134*   K 2.9*  --   --  3.4* 3.7   CL 96*  --   --  95* 95*   CO2 36*  --   --  30 29   BUN 27*  --   --  26* 32*   CREATININE 1.2  --   --  1.3* 1.8*   MG  --   --   --  1.2* 1.9   PHOS  --   --   --  1.4* 2.5   CALCIUM 7.4*  --   --  7.8* 7.5*    < > = values in this interval not displayed. No results for input(s): PTT, INR in the last 72 hours. Invalid input(s): PT  No results for input(s): AST, ALT, BILITOT, BILIDIR, AMYLASE, LIPASE, LDH, LACTA in the last 72 hours. No results for input(s): TROPONINT in the last 72 hours. RADIOLOGY     XR CHEST PORTABLE   Final Result   A right arm PICC line tip terminates at the cavoatrial projection. No pneumothorax is observed. Small bilateral pleural effusions and bilateral lower lobe airspace opacity, right greater than left, are not significantly changed. **This report has been created using voice recognition software. It may contain minor errors which are inherent in voice recognition technology. **      Final report electronically signed by Dr Shannon Agrawal on 4/29/2022 4:04 PM      XR ABDOMEN FOR NG/OG/NE TUBE PLACEMENT   Final Result   Impression:      NG tube tip proximal stomach. This document has been electronically signed by: Jojo Christopher MD on    04/28/2022 11:56 PM      XR CHEST PORTABLE   Final Result   Impression:   1.  Satisfactory positioning of the endotracheal tube and right central    line. Probable slightly high positioning of the orogastric tube. Consider    advancing 3-4 cm. 2. Interval development of small bilateral pleural effusions, right    greater than left. 3. Bilateral lower lobe airspace opacities secondary to atelectasis and/or    infiltrates, right greater than left. 4. Bilateral interstitial changes secondary to pneumonitis or edema. This document has been electronically signed by: Barrett Castillo. DO Jennifer on    04/26/2022 12:01 AM      CT ABDOMEN PELVIS WO CONTRAST Additional Contrast? None   Final Result   Impression:   Right inguinal hernia containing small bowel. This is causing a small    bowel obstruction. There are several gas collections within the right    inguinal hernia. Ischemic or perforated bowel could have this appearance. Mild to moderate patchy consolidation bilateral lower lobes. This could    represent aspiration or pneumonia. Prominent thickening of the distal esophagus and gastroesophageal    junction. Neoplasm not excluded. Recommend direct visualization. This document has been electronically signed by: Handy Stevenson MD on    04/25/2022 07:20 PM      All CTs at this facility use dose modulation techniques and iterative    reconstructions, and/or weight-based dosing   when appropriate to reduce radiation to a low as reasonably achievable. US RENAL COMPLETE   Final Result   Impression:   Multifocal simple bilateral renal cyst. Echogenic bilateral renal    parenchyma. This document has been electronically signed by: Handy Stevenson MD on    04/25/2022 06:30 PM      CT HEAD WO CONTRAST   Final Result       1. Stable CT scan of the brain, no interval change since previous MRI scan dated 10 Mel[address] 2019.   2. Mild atrophy and dilatation of the third and lateral ventricles. 3. Probable ischemic changes in the white matter, left basal ganglia and in the bernadette.     4. Calcification the cavernous segments of both internal carotid arteries. 5. Increased density in the external auditory canals which may represent cerumen bilaterally. **This report has been created using voice recognition software. It may contain minor errors which are inherent in voice recognition technology. **      Final report electronically signed by DR Maryann Dowell on 4/25/2022 4:01 PM      XR CHEST PORTABLE   Final Result   1. Normal heart size. No effusion. 2. Persistent mild atelectatic/fibrotic stranding retrocardiac region left lung base. **This report has been created using voice recognition software. It may contain minor errors which are inherent in voice recognition technology. **      Final report electronically signed by Dr. Melquiades Self on 4/25/2022 3:05 PM      XR CHEST PORTABLE    (Results Pending)       Electronically signed by Papo Rodriguez MD on 5/2/2022 at 7:51 AM

## 2022-05-02 NOTE — PLAN OF CARE
Problem: Nutrition Deficit:  Goal: Optimize nutritional status  Outcome: Progressing   Nutrition Problem #1: Severe malnutrition,In context of chronic illness  Intervention: Food and/or Nutrient Delivery: Continue NPO: Transition to custom 3-in-1 TPN

## 2022-05-02 NOTE — PROGRESS NOTES
[address] [place] [address]  INPATIENT PHYSICAL THERAPY  EVALUATION  STRZ CCU-STEPDOWN 3B - 3B-26/026-A    Time In: 0256  Time Out: 1155  Timed Code Treatment Minutes: 8 Minutes  Minutes: 19          Date: 2022  Patient Name: Wilmot Brittle.,  Gender:  male        MRN: [id number]  : 1950  (66 y.o.)      Referring Practitioner: Dr. Giles Moulton  Diagnosis: renal failure  Additional Pertinent Hx: a 66 y.o.  male admitted to [address] [place] [address] on 2022 with PMHx of distant CVA, BPH, HTN, GERD, and Glaucoma who presented to the ED after syncopal episodes x 2. Wife zayda provided the history and she stated that approximately 14 days ago he fell in the yard while pulling weeds and broke his ribs. He came to [facility] at that time and was discharged with pain medication. About 7 days ago, he presented again to [facility] with complaints of right lower abdomen bulge. He was diagnosed with inguinal hernia and recommended to follow up OP which was scheduled for tomorrow (22). Wife stated that the hernia has gotten significantly worse over the lprevious 5 days. Syncope occurred first when they were in [facility] several days ago. They had been sitting for a while and he started to feel nauseated and hot so he got up to go outside and passed out (described as legs giving out from him). Denied hitting his head. The morning of admission, wife was getting him out of the bathtub and he just \"slumped over\". Denied hitting his head on that occasion as well. Wife stated that he had vomited 3 times over the previous 24 hours and that it was brown/yellow in color. She stated he had not been having BM's and had been peeing less. Also noted he had been weak and sleeping \"20 hours a day\". On 2022, the patient was taken to the operating room per Ashtyn Babcock MD,  with diagnosis of serrated right inguinal hernia with small bowel obstruction and perforation, and at the time of surgery some feculent soilage of the right groin and pelvis. Procedure included exploratory laparotomy, small bowel resection, single primary anastomosis, and drainage of pelvic abscess and right groin exploration, drainage of abscess, debridement of necrotic subcutaneous fat and fascia and muscle, and excision of necrotic hernia sac. Right groin and pelvic abscess were present at the time of surgery. Postoperatively, she was admitted to the ICU secondary to acute respiratory failure, hypoxia, septic shock, aspiration pneumonia, and status post exploratory lap and drainage as above. Nutrition per TPN. After stabilization and extubation on 4/27/22, patient transferred to  on 4/28/22.  s/pExploratory laparotomy lysis of inflammatory adhesions, drainage undrained right groin abscess, repair of abdominal wound fascial dehiscence on 5/1/22     Restrictions/Precautions:  Restrictions/Precautions: Surgical Protocols,General Precautions,Fall Risk  Required Braces or Orthoses  Other: Abdominal Binder  Position Activity Restriction  Other position/activity restrictions: NG tube, ALFONSO drain on right side of abdomen    Subjective:  Chart Reviewed: Yes  Patient assessed for rehabilitation services?: Yes  Subjective: with min encouragement agreeable for OOB to chair, RN in with pt upon arrival      Pain: abdomen, not rated    Vitals: Vitals not assessed per clinical judgement, see nursing flowsheet    Social/Functional History:    Lives With: Spouse  Type of Home: House  Home Layout: One level  Home Access: Stairs to enter with rails  Entrance Stairs - Number of Steps: 3     Bathroom Shower/Tub: Walk-in shower  Bathroom Toilet: Handicap height  Bathroom Accessibility: Accessible    Receives Help From: Family  ADL Assistance: Independent  Homemaking Assistance: Independent  Ambulation Assistance: Independent  Transfer Assistance: Independent    Active : No  Mode of Transportation: Car  Occupation: Retired  Additional Comments: Per son, pts bathroom is currently in process of being remodeled to be handicap accessible. pt did not use AD for mobility and was indep with his ADL tasks at home. Pt has 4 children that assist    OBJECTIVE:  Range of Motion:  Bilateral Lower Extremity: WFL    Strength:  Bilateral Lower Extremity: >/=3/5, generalized weakness    Balance:  Static Sitting Balance:  Contact Guard Assistance, to SBA  Static Standing Balance: Moderate Assistance, to minAx1, BUE support on walker    Bed Mobility:  Rolling to Right: Minimal Assistance   Supine to Sit: Moderate Assistance  Scooting: Moderate Assistance  HOB up 20 degrees, inc time to complete  Transfers:  Sit to Stand: Moderate Assistance  Stand to Sit:Moderate Assistance  HHA  Ambulation:  Moderate Assistance, X 1  Distance: 3'x1  Surface: Level Tile  Device:UE support  Gait Deviations: Forward Flexed Posture, Slow Bela, Decreased Step Length Bilaterally, Narrow Base of Support, Unsteady Gait and fatigued quickly    Exercise:  Patient was guided in 1 set(s) 5 reps of exercise to both lower extremities. Seated marches, Seated heel/toe raises, Long arc quads and Seated abduction/adduction. Pt required tactile and vcs to complete therex, repeated instructions. Exercises were completed for increased independence with functional mobility. Functional Outcome Measures: Completed  AM-PAC Inpatient Mobility without Stair Climbing Raw Score : 11  AM-PAC Inpatient without Stair Climbing T-Scale Score : 35.66    ASSESSMENT:  Activity Tolerance:  Patient tolerance of  treatment: fair. Treatment Initiated: Treatment and education initiated within context of evaluation. Evaluation time included review of current medical information, gathering information related to past medical, social and functional history, completion of standardized testing, formal and informal observation of tasks, assessment of data and development of plan of care and goals.   Treatment time included skilled education and facilitation of tasks to increase safety and independence with functional mobility for improved independence and quality of life. Assessment: Body Structures, Functions, Activity Limitations Requiring Skilled Therapeutic Intervention: Decreased functional mobility ,Decreased strength,Decreased endurance,Decreased tolerance to work activity,Decreased balance,Increased pain  Assessment: pt with pain in abdomen s/p surgery, abdominal binder, multiple lines, generalized weakness, deconditioning, dec balance, inc assist for safe mobility with use of RW, recommend cont PT to inc pt functional mobility  Therapy Prognosis: Good    Requires PT Follow-Up: Yes    Discharge Recommendations:  Discharge Recommendations: Continue to assess pending progress,Patient would benefit from continued therapy after discharge (pt would benefit from an inpt therapy stay)    Patient Education:   . Patient Education  Education Given To: Patient  Education Provided: Role of Therapy,Plan of Care,Home Exercise Program  Education Method: Demonstration,Verbal  Education Outcome: Continued education needed      Equipment Recommendations: Other: cont to assess needs    Plan:  Specific Instructions for Next Treatment: therex and mobility  Plan:  (5X GM)  Specific Instructions for Next Treatment: therex and mobility    Goals:  Patient goals : feel better  Short Term Goals  Time Frame for Short term goals: by discharge  Short term goal 1: bed mobility with SBA to get in/out of bed  Short term goal 2: transfer with SBA to get in/out of chairs  Short term goal 3: amb >25'x1 with AD and CGA to walk safely in room  Long Term Goals  Time Frame for Long term goals : no LTGs set secondary to short ELOS    Following session, patient left in safe position with all fall risk precautions in place.

## 2022-05-02 NOTE — PROGRESS NOTES
[address] [place] [address]  Occupational Therapy  Daily Note  Time:    Time In: 8450  Time Out: 4698  Timed Code Treatment Minutes: 10 Minutes  Minutes: 10          Date: 2022  Patient Name: Wilmot Brittle.,   Gender: male      Room: -/026-A  MRN: [id number]  : 1950  (66 y.o.)  Referring Practitioner: Dr. Robert Zuniga  Diagnosis: renal failure  Additional Pertinent Hx: a 66 y.o.  male admitted to [facility] on 2022 with PMHx of distant CVA, BPH, HTN, GERD, and Glaucoma who presented to the ED after syncopal episodes x 2. Wife zayda provided the history and she stated that approximately 14 days ago he fell in the yard while pulling weeds and broke his ribs. He came to [facility] at that time and was discharged with pain medication. About 7 days ago, he presented again to [facility] with complaints of right lower abdomen bulge. He was diagnosed with inguinal hernia and recommended to follow up OP which was scheduled for tomorrow (22). Wife stated that the hernia has gotten significantly worse over the lprevious 5 days. Syncope occurred first when they were in [place] several days ago. They had been sitting for a while and he started to feel nauseated and hot so he got up to go outside and passed out (described as legs giving out from him). Denied hitting his head. The morning of admission, wife was getting him out of the bathtub and he just \"slumped over\". Denied hitting his head on that occasion as well. Wife stated that he had vomited 3 times over the previous 24 hours and that it was brown/yellow in color. She stated he had not been having BM's and had been peeing less. Also noted he had been weak and sleeping \"20 hours a day\". On 2022, the patient was taken to the operating room per Ashtyn Babcock MD,  with diagnosis of serrated right inguinal hernia with small bowel obstruction and perforation, and at the time of surgery some feculent soilage of the right groin and pelvis. Procedure included exploratory laparotomy, small bowel resection, single primary anastomosis, and drainage of pelvic abscess and right groin exploration, drainage of abscess, debridement of necrotic subcutaneous fat and fascia and muscle, and excision of necrotic hernia sac. Right groin and pelvic abscess were present at the time of surgery. Postoperatively, she was admitted to the ICU secondary to acute respiratory failure, hypoxia, septic shock, aspiration pneumonia, and status post exploratory lap and drainage as above. Nutrition per TPN. After stabilization and extubation on 4/27/22, patient transferred to  on 4/28/22. late on 4/30/22 pt forcefully vomited and coughed causing his abdominal wond to dehisce. Pt s/p Exploratory laparotomy lysis of inflammatory adhesions, drainage undrained right groin abscess, repair of abdominal wound fascial dehiscence on 5/1/22. Restrictions/Precautions:  Restrictions/Precautions: Surgical Protocols,General Precautions,Fall Risk  Required Braces or Orthoses  Other: Abdominal Binder  Position Activity Restriction  Other position/activity restrictions: NG tube, ALFONSO drain on right side of abdomen      SUBJECTIVE: Heather ZARATE reporting pt was just returned back to bed approx 1 hour ago and is pretty fatigued. RN recommending in bed ex only at this time. Pt awake in bed agreeable to trialing bed ex. Pt stating he plans on taking a nap after. Pt s/p  Exploratory laparotomy lysis of inflammatory adhesions, drainage undrained right groin abscess, repair of abdominal wound fascial dehiscence on 5/1/22 after he forcefully coughed and vomited causing his abdominal wound dehiscence. PAIN: did not state /10: abdomen      Vitals: Nurse checked vitals prior to session  Heather ZARATE present in room completing pt care     COGNITION: Slow Processing Pt seemingly more alert this date verus last date this therapist seen him.  Pt speaking more clearly as well. Pts left eye seems to be more closed than right eye    ADL:   No ADL's completed this session. Cem [place] BALANCE:  not attempted this date at recommendation on RN and with pt stating he was to fatigued to complete    BED MOBILITY:  Rolling to Left: Moderate Assistance to place pillow under right side    TRANSFERS:  Not attempted this date    FUNCTIONAL MOBILITY:  Not attempted this date    ADDITIONAL ACTIVITIES:  Completed bilateral UE AROM ex to increase pts strength and endurance for ADL tasks and transfers. Pt completed 10 reps of all ex in all planes with rest breaks between each ex d/t fatigue. Pts left UE continues to be weaker than right UE requiring min A occasionally for complete movements. ASSESSMENT:     Activity Tolerance:  Patient tolerance of  treatment: fair.         Discharge Recommendations: Continue to assess pending progress  Equipment Recommendations: Equipment Needed: Yes  Mobility Devices: Walker  Plan: Times per Week: 5x  Current Treatment Recommendations: Strengthening,Balance training,Safety education & training,Functional mobility training,Endurance training,Equipment evaluation, education, & procurement,Patient/Caregiver education & training    Patient Education  Patient Education: UE ex    Goals  Short Term Goals  Time Frame for Short term goals: by discharge  Short Term Goal 1: pt to navigate to/from various surfaces including BSC/chair progressing to bathroom and further distances using AD with CGA to icnrease his endurance and indep with ADL tasks  Short Term Goal 2: Pt to demo dynamic standing iwth unilateral UE support and CGA to assist with completion of ADL tasks such as clothing management after dressing and toileting  Short Term Goal 3: pt to complete UB ADL tasks iwth CGA and LB with min A  Short Term Goal 4: Pt to increase endurance to tolerate > 25 min of consistent activity with 3 or less rst breaks to increase ease of ADL asks  Short Term Goal 5: pt to increase bilateral UE strength (especially left) by completing HEP with mod resistance and use of handout with min cues for tech and need for less than 2 rest breaks    Following session, patient left in safe position with all fall risk precautions in place.

## 2022-05-02 NOTE — PROGRESS NOTES
TPN Follow Up Note    Assessment: Remains NPO. Trialing clamping NG. Will convert to 3-in-1 TPN today. Noted that SCr cecelia from 1.3 to 1.8. Was conservative with electrolytes to start due this increase.   OK per Dr Mariah Vasquez to run TPN at 60ml/hr in addition to NS at 40ml/hr    Electrolyte Replacement:   Calcium gluconate 1 g IV x 1    TPN changes for (today) at 1800:   Change from premix to 3-in-1 TPN as below    Macronutrients   DW: 61 kg   AA: 1 g/kg   Total Kcal: 12 Kcal/kg   Lipids: 30 % of Total Kcal   Infusion Rate: 60 mL/hr    Electrolytes (per bag)   Na Acetate:    110 mEq   NaPhos:       9 mmol     KCl:               20 mEq     Calcium Gluc:   4.65 mEq      MV:      10 mL   Trace Elements: 1 mL    Re-check BMP, Mg, PO4, iCa 5/3/22 AM     Jed Spicer, PharmD, BCPS 5/2/2022 12:23 PM

## 2022-05-02 NOTE — PROGRESS NOTES
EN/PN NUTRITION SUPPORT    RECOMMENDATIONS/PLAN:   1. Transition from clinimix 5/15 to 3-in-1 custom TPN: 12 kcals/kg, 1.0 g/kg protein, 30% lipid kcals with dosing weight of 61 kg. Slow increase of macronutrient d/t renal function. 2. Continue to monitor nutrition related lab values and adjust nutrition recommendations appropriately. Renal lab values increased - monitor and adjust protein appropriately. 3.When appropriate to utilize the gut recommend trophic EN vital AF 1.2 @ 10 ml/hr as GI status allows. CURRENT NUTRITION THERAPIES  Diet NPO Exceptions are: Sips of Water with Meds  Current Parenteral Nutrition Orders:  · Type and Formula: Clinimix 5/15  · Lipids: none  · Duration: Continuous  · Current Rate/Volume: 40 ml/hour- Clinimix 5/15  · Current PN Order Provides: Clinimix 5/15 @ 40 ml/hr provides patient with 682 kcals, 48 g protein, 144 g CHO in total volume of 960 ml.    · Goal PN Orders Provides: Transition from clinimix 5/15  to 3-in-1 custom TPN: 12 kcals/kg, 1.0 g/kg, 30% lipid kcals with dosing weight of 61 kg to provide patient with 732 kcals, 61 g protein, 67 g CHO. Next bag consider adjusting TPN to 15 kcals/kg, 1.2 g protein/kg, 30% lipids kcals, dosing weight 61 kg. COMPARATIVE STANDARDS  Energy (kcal):  2340-1437 (25-30/kgm); Weight Used for Energy Requirements:  Current (63kgm)     Protein (g):  107gms (1.7/kgm) IF ROBSON improves with hydration - monitoring; 63gms if no improvement; Weight Used for Protein Requirements:  Current        Fluid (ml/day):  per physician;    NUTRITIONAL SUMMARY/STATUS:   Pt. severely malnourished AEB criteria in 4/27 MNT note.  At risk for further nutritional compromise r/t admit with ROBSON, intubated 4/25 and extubated 4/29, emergent GI surgery 4/25 d/t perforated SB with soilage of pelvic cavity, pelvic abscess and necrotic hernial sac, aspiration pneumonia, sepsis; LOS day 7, continued need for TPN d/t altered GI function, per H&P N/V few days pta and underlying medical condition GERD, HTN, CVA, CAD. NUTRITION RELATED FINDINGS:   Treatments: Patient had emesis and forceful bowel movements overnight of 4/30, with abdominal fascial wound dehiscence. He was taken back to the OR. NG tube was for suction. NG tube over the weekend had majority decreased output and active bowel sounds present. Today plan is to trial clamping NG tube and trialing ice chips. Discussed POC with pharmacy and nursing. Urine output overnight was 400 ml. TPN Status: Clinimix 5/15 running over the weekend @ 40 ml/hr to provide patient with 682 kcals, 48 g protein, 144 g CHO in total volume of 960 ml.   GI Status: Ng tube clamped. Pertinent Labs: Na 134, K 3.7, BUN 32, creatinine 1.8, calcium 7.5, GFR 45, magnesium 1.9, glucose 135, , phos 2.5  Pertinent Meds: pepcid, humalog, lopressor, zosyn, Clinimix 5/15  Current Weight: 135 lb 6.4 oz (61.4 kg) (5/2: no edema reported)  Admission Weight:  138 lb (62.6 kg) (4/26 with no edema)  Wounds: Surgical Incision (4/25 laparotomy for SBO with heria repair; open groin wound)  Malnutrition Status: Severe malnutrition    Please refer to initial nutrition assessment for additional details.    Ayaan Mccall RD:    Contact Number: [phone]

## 2022-05-02 NOTE — PLAN OF CARE
Problem: Discharge Planning  Goal: Discharge to home or other facility with appropriate resources  Description: Continue discharge planning. Patient currently with NG tube, PICC line inserted today.  following. Patient able to take few steps at bedside with PT/OT today. Outcome: Progressing  Flowsheets (Taken 5/2/2022 0815)  Discharge to home or other facility with appropriate resources: Identify barriers to discharge with patient and caregiver  Note: Continue discharge planning.  following case. Assess discharge needs. Problem: Pain  Goal: Verbalizes/displays adequate comfort level or baseline comfort level  Description: Patient denies pain today. Outcome: Progressing  Flowsheets (Taken 5/2/2022 1459)  Verbalizes/displays adequate comfort level or baseline comfort level: Encourage patient to monitor pain and request assistance  Note: Patient denies pain this shift. Encourage patient to report pain/splint abd incision when moving. Problem: Chronic Conditions and Co-morbidities  Goal: Patient's chronic conditions and co-morbidity symptoms are monitored and maintained or improved  Outcome: Progressing  Flowsheets (Taken 5/2/2022 0815)  Care Plan - Patient's Chronic Conditions and Co-Morbidity Symptoms are Monitored and Maintained or Improved: Monitor and assess patient's chronic conditions and comorbid symptoms for stability, deterioration, or improvement  Note: Continue to monitor VS, telemetry. Continue to wean oxygen. Problem: Safety - Adult  Goal: Free from fall injury  Outcome: Progressing  Flowsheets (Taken 5/2/2022 1440)  Free From Fall Injury: Instruct family/caregiver on patient safety  Note: Continue fall precautions, up with assist, continue bed alarm and telesitter. Problem: ABCDS Injury Assessment  Goal: Absence of physical injury  Outcome: Progressing     Problem: Skin/Tissue Integrity  Goal: Absence of new skin breakdown  Description: 1.   Monitor for areas of redness and/or skin breakdown  2. Assess vascular access sites hourly  3. Every 4-6 hours minimum:  Change oxygen saturation probe site  4. Every 4-6 hours:  If on nasal continuous positive airway pressure, respiratory therapy assess nares and determine need for appliance change or resting period. Outcome: Progressing  Note: Turn and reposition every 2 hours while in bed, assess skin. Incontinence skin care. Assess and monitor abd incision, dressing change. Problem: Nutrition Deficit:  Goal: Optimize nutritional status  5/2/2022 1932 by Dao Barrientos RN  Outcome: Progressing  Note: NG tube removed today. Ice chips initiated. Continue TPN per PICC line. Problem: Respiratory - Adult  Goal: Achieves optimal ventilation and oxygenation  Outcome: Progressing  Flowsheets (Taken 5/2/2022 0815)  Achieves optimal ventilation and oxygenation: Assess for changes in respiratory status  Note: Continue to wean oxygen, weaned from 4 to 2 liters nasal canula today. Problem: Cardiovascular - Adult  Goal: Maintains optimal cardiac output and hemodynamic stability  Outcome: Progressing  Flowsheets (Taken 5/2/2022 0815)  Maintains optimal cardiac output and hemodynamic stability: Monitor blood pressure and heart rate     Problem: Cardiovascular - Adult  Goal: Absence of cardiac dysrhythmias or at baseline  Outcome: Progressing  Flowsheets (Taken 5/2/2022 0815)  Absence of cardiac dysrhythmias or at baseline: Monitor cardiac rate and rhythm  Note: Sinus arrhythmia with PACs per telemetry. Continue Amiodarone drip until PO meds can be resumed. Problem: Gastrointestinal - Adult  Goal: Minimal or absence of nausea and vomiting  Outcome: Progressing  Flowsheets (Taken 5/2/2022 0815)  Minimal or absence of nausea and vomiting: Administer IV fluids as ordered to ensure adequate hydration  Note: NG tube removed today. No nausea/vomiting noted post removal of NG tube.      Problem: Gastrointestinal - Adult  Goal: Maintains or returns to baseline bowel function  Outcome: Progressing  Flowsheets (Taken 5/2/2022 0815)  Maintains or returns to baseline bowel function: Assess bowel function  Note: Active bowel sounds noted. Assess for BM. Problem: Infection - Adult  Goal: Absence of infection at discharge  Outcome: Progressing  Flowsheets (Taken 5/2/2022 0815)  Absence of infection at discharge:   Assess and monitor for signs and symptoms of infection   Monitor lab/diagnostic results  Note: Continue to monitor for fever, monitor WBCs per labs. Problem: Infection - Adult  Goal: Absence of infection during hospitalization  Outcome: Progressing     Problem: Infection - Adult  Goal: Absence of fever/infection during anticipated neutropenic period  Outcome: Progressing  Flowsheets (Taken 5/2/2022 0815)  Absence of fever/infection during anticipated neutropenic period: Monitor white blood cell count  Note: Temp 99.2 today. Continue to monitor for fever. Care plan reviewed with patient and family. Patient and family verbalize understanding of the plan of care and contribute to goal setting.

## 2022-05-02 NOTE — PROGRESS NOTES
Renal Progress Note  [facility]    Patient :  Henrique Gilman Sr.; 66 y.o. MRN# [id number]  Location:  3B-26/026-A  Attending:  Isacc Holbrook MD  Admit Date:  4/25/2022   Hospital Day: 7      Subjective:     Nephrology is following the patient for ROBSON. Patient was seen and examined. Had a good night per RN. Receiving TPN. Urine output 400 mL overnight. Outpatient Medications:     Medications Prior to Admission: atorvastatin (LIPITOR) 20 MG tablet, TAKE 1 TABLET BY MOUTH DAILY  COMBIGAN 0.2-0.5 % ophthalmic solution, Place 1 drop into the left eye every 12 hours  famotidine (PEPCID) 20 MG tablet, Take 1 tablet by mouth 2 times daily  losartan (COZAAR) 50 MG tablet, Take 1 tablet by mouth daily  metoprolol succinate (TOPROL XL) 25 MG extended release tablet, TAKE 1 TABLET BY MOUTH DAILY  tamsulosin (FLOMAX) 0.4 MG capsule, Take 1 capsule by mouth daily  lidocaine (LIDODERM) 5 %, Place 1 patch onto the skin daily 12 hours on, 12 hours off.   fluocinonide (LIDEX) 0.05 % cream, APPLY EXTERNALLY TO THE AFFECTED AREA TWICE DAILY  tadalafil (CIALIS) 20 MG tablet, TAKE 1 TABLET BY MOUTH EVERY DAY AS NEEDED  aspirin 81 MG EC tablet, Take 1 tablet by mouth daily  loratadine (CLARITIN) 10 MG tablet, TAKE 1 TABLET BY MOUTH DAILY    Current Medications:     Scheduled Meds:    calcium gluconate  1,000 mg IntraVENous Once    famotidine (PEPCID) injection  20 mg IntraVENous Daily    piperacillin-tazobactam  3,375 mg IntraVENous Q8H    metoprolol  5 mg IntraVENous Q8H    [Held by provider] metoprolol tartrate  12.5 mg Oral BID    insulin lispro  0-6 Units SubCUTAneous 4 times per day    sodium hypochlorite   Irrigation Daily    vitamin D  50,000 Units Oral Weekly    sodium chloride flush  5-40 mL IntraVENous 2 times per day    [Held by provider] heparin (porcine)  5,000 Units SubCUTAneous TID     Continuous Infusions:    sodium chloride      CLINIMIX 5/15 2,000 mL (05/01/22 1817)    dextrose  amiodarone 0.5 mg/min (22 0417)    sodium chloride       PRN Meds:  morphine, glucose, glucagon (rDNA), dextrose, dextrose bolus (hypoglycemia) **OR** dextrose bolus (hypoglycemia), sodium chloride flush, sodium chloride, ondansetron **OR** ondansetron, polyethylene glycol, acetaminophen **OR** acetaminophen    Input/Output:       I/O last 3 completed shifts: In: 6391.3 [P.O.:240; I.V.:2594.4; Blood:370.8; IV Piggyback:1285.3]  Out: 510 [Urine:400; Emesis/NG output:60; Drains:50]. Patient Vitals for the past 96 hrs (Last 3 readings):   Weight   22 0600 135 lb 6.4 oz (61.4 kg)   22 0600 137 lb (62.1 kg)   22 0346 137 lb 12.6 oz (62.5 kg)       Vital Signs:   Temperature:  Temp: 99 °F (37.2 °C)  TMax:   Temp (24hrs), Av.6 °F (37 °C), Min:98.1 °F (36.7 °C), Max:99 °F (37.2 °C)    Respirations:  Resp: 20  Pulse:   Pulse: 92  BP:    BP: 125/67  BP Range: Systolic (48DVX), YDF:810 , Min:102 , BUQ:348       Diastolic (15DHK), X, Min:62, Max:75      Physical Examination:     General:  Awake, alert  HEENT: NC/AT/ MMM +NG tube  Chest:               Clear B/L no rales  Cardiac:  S1 S2   Abdomen: Abdominal binder  Neuro:  sedated  SKIN:  No rashes,   Extremities:  No edema,     Labs:       Recent Labs     22  1047 22  0345 22  0425   WBC 24.9* 24.4* 22.2*   RBC 2.27* 2.71* 2.64*   HGB 7.6* 9.1* 8.8*   HCT 24.2* 27.7* 27.3*   .6* 102.2* 103.4*   MCH 33.5* 33.6* 33.3*   MCHC 31.4* 32.9 32.2    198 208   MPV 10.3 10.8 11.0      BMP:   Recent Labs     22  1047 22  1047 22  1344 22  0345 22  0425      < > 140 136 134*   K 2.9*  --   --  3.4* 3.7   CL 96*  --   --  95* 95*   CO2 36*  --   --  30 29   BUN 27*  --   --  26* 32*   CREATININE 1.2  --   --  1.3* 1.8*   GLUCOSE 119*  --   --  134* 135*   CALCIUM 7.4*  --   --  7.8* 7.5*    < > = values in this interval not displayed.       Phosphorus:     Recent Labs 05/01/22  0345 05/02/22  0425   PHOS 1.4* 2.5     Magnesium:    Recent Labs     05/01/22  0345 05/02/22  0425   MG 1.2* 1.9     Albumin:    No results for input(s): LABALBU in the last 72 hours. BNP:    No results found for: BNP  KHUSHBOO:    No results found for: KHUSHBOO  SPEP:  Lab Results   Component Value Date    PROT 5.3 04/28/2022     UPEP:   No results found for: LABPE  C3:   No results found for: C3  C4:   No results found for: C4  MPO ANCA:   No results found for: MPO  PR3 ANCA:   No results found for: PR3  Anti-GBM:   No results found for: GBMABIGG  Hep BsAg:       No results found for: HEPBSAG  Hep C AB:        No results found for: HEPCAB    Urinalysis/Chemistries:      Lab Results   Component Value Date    NITRU NEGATIVE 04/25/2022    COLORU YELLOW 04/25/2022    PHUR 5.5 04/25/2022    LABCAST >15 HYALINE 04/25/2022    WBCUA 10-15 04/25/2022    RBCUA 10-15 04/25/2022    MUCUS NONE SEEN 04/25/2022    YEAST NONE SEEN 04/25/2022    BACTERIA FEW 04/25/2022    SPECGRAV 1.015 04/25/2022    LEUKOCYTESUR TRACE 04/25/2022    UROBILINOGEN 0.2 04/25/2022    BILIRUBINUR NEGATIVE 04/25/2022    BLOODU MODERATE 04/25/2022    GLUCOSEU NEGATIVE 06/09/2019    KETUA NEGATIVE 04/25/2022     Urine Sodium:   No results found for: MING  Urine Potassium:  No results found for: KUR  Urine Chloride:  No results found for: CLUR  Urine Osmolarity: No results found for: OSMOU  Urine Protein:   No components found for: TOTALPROTEIN, URINE   Urine Creatinine:   No results found for: LABCREA  Urine Eosinophils:  No components found for: UEOS        Impression and Plan:  1. ROBSON , prerenal due to volume depletion and hypotension:overall improved from admission. Creat increased slightly today from yesterday. Restart IV fluids with 0.9 @ 40 ml/hr. Also on TPN @ 40 so total rate of 80 ml/ hour  2. Hypernatremia:resolved. Na now 134  3. Hypokalemia/hypophosphatemia;better  4. Hypomag: better  5.  S/p ex lap with small bowel resection, drainage of abscess and debridement necrotic fat fascia and hernia sac 4/25. Had wound dehiscence and went back for repair 5/1  6. Leukocytosis  7. Pneumonia  8. Anemia  9. Afib        Please don't hesitate to call with any questions.   Electronically signed by Malhia Morfin DO on 5/2/2022 at 8:23 AM

## 2022-05-02 NOTE — PROGRESS NOTES
PROGRESS NOTE      Patient:  Reji Suarez Sr. Unit/Bed:3B-26/026-A    YOB: 1950    MRN: [id number]       Acct: [de-identified]     PCP: Chelsea Latham MD    Date of Admission: 4/25/2022      Assessment/Plan:    Anticipated Discharge in : Pending    Active Hospital Problems    Diagnosis Date Noted    Dehiscence of fascia [T81.30XA]      Priority: Medium    Atrial fibrillation with RVR ([address] [I48.91]      Priority: Medium    Acute respiratory failure with hypoxia ([address] [J96.01] 04/26/2022     Priority: Medium    Septic shock ([address] [A41.9, R65.21] 04/26/2022     Priority: Medium    Incarcerated right inguinal hernia [K40.30] 04/26/2022     Priority: Medium    Small bowel perforation ([address] [K63.1] 04/26/2022     Priority: Medium    Aspiration pneumonia ([address] [J69.0] 04/26/2022     Priority: Medium    Acute kidney injury ([address] [N17.9] 04/26/2022     Priority: Medium    Hypoalbuminemia [E88.09] 04/26/2022     Priority: Medium    Syncope [R55] 04/26/2022     Priority: Medium    Hypokalemia [E87.6] 04/26/2022     Priority: Medium    Anemia, macrocytic [D53.9] 04/26/2022     Priority: Medium    Severe malnutrition ([address] [E43] 04/26/2022     Priority: Medium       Perforated small bowel, soilage of the pelvic cavity, right groin and pelvic abscesses, and necrotic hernial sac with wound dehiscence:   POD #5 exploratory laparotomy with bowel resection, primary anastomosis, drainage of pelvic abscess as well as right groin exploration, drainage of abscess, and debridement of necrotic subcutaneous fat, fascia and muscle. On Day 8 of Zosyn for Culture Growing Morganella Morganii and Ecoli. final Susceptibility is pending, however prelim demonstrates sensatitivites to Zosyn. POD#1 repair of wound dehiscence. Surgery on board and following. If WBC count increases consider additional un drained abscess or fluid collections. Off NGT suction per Surgery as low oPT. Dressing changes and wound care. Continue on SSI, may require TPN supplement in addition if not tolerating PO. Extend Zoysn coverage to 14 dy in setting of recent Sx.  TPN    Aspiration pneumonia: Imaging   findings suggestion of infiltrate w/ gastric contents suctioned from ET tube is consistent with aspiration pneumonia. Culture growing ecoli with H. influ on PCR detected. Patient receiving antibiotics as above. Sepsis secondary to intra-abdominal infection and aspiration pneumonia - improving: On antibiotics as above. Current WBC 24.9 and downtrend. Previous White count significantly elevated to 33.6 up from 21.8 postoperatively, however Patient remains afebrile and O2 support requirements are being weaned, BP grossly stable, monitor    A. fib with RVR: noted Apr/27/2022 New onset A. fib with RVR refractory to Lopressor and Cardizem. Patient converted with amiodarone. K WNL, Mg WNL, [id number] 5. Surgery recommends holding anticoagulation still at this time in setting of bleed risk. Note Pt is Not optimally controlled on amiodrip + lopressor, however improving control with HR 80's despite some intermittent arrhythmia on tele. Continue amio drip   Continue lopressor 5 TID with holding parameters. BP grossly stable at 120's. Transition to PO in a few days as recent Sx, allow GI tract to recover. If persistently remains in intermittent consider Cardiology consult for optimization as Pt will likely be high bleed risk for some time vs [address] [place] [address] Road use. Continue telemetry  Keep Mg > 2, K > 4,       ROBSON on CKD: Likely 2/2 volume depletion, poor oral intake, and hypotension 2/2 sepsis. Pt creatinine improved to 1.3 from 10.4 on Admit  Patient's baseline 1.3. However had Sx as above, and thus current CR 1.8. improved with Hydration. Nephrology on board, appreciate rec. Good UOPT. On NS at 40ml/Hr + TPN at 40.     Mixed Acid-base disorder -resolved:  Mixed Anion gap metabolic acidosis with respiratory compensation likely secondary to sepsis and ROBSON with an additional metabolic alkalosis likely secondary to volume depletion in setting of GI sx. AG improving, Nephrology on board, appreciate recs. Acute hypoxic respiratory failure: likely 2/2 to aspiration pneumonia as above. Extubated Apr/27/2022, currently on 3L NC. Noted hgb 7.6, in setting of recent Sx and respiratory needs, will tx 1 x PRBC. Acute postoperative pulmonary insufficiency - resolved: Extubated Apr/27/2022,     Troponinemia: Mildly elevated troponin on admit of 0.047 downtrending with inverted T waves found on EKG.  Likely 2/2 demand ischemia 2/2 hypotension.  Monitor     Hypokalemia -  Improved. Noted Apr/30/2022, on K protocol. Monitor AM.  Hypernatremia - Resolved. Nephrology on board , on D5W to 50 ml/Hr  Hyperphosphatemia -resolved:  Continue to monitor, PO challenge as above, may supplement with TPN  Hypermagnesemia - resolved:  Continue to monitor, PO challenge as above, may supplement with TPN  Hypocalcemia: Continue to monitor, PO challenge as above, may supplement with TPN    CAD: Hx of,  continue home Lipitor 20 mg,  Holding ASA  For bleed risk. GERD: hx of, home regiment of pepcid 10mg daily, continued for admission IV form. . Noted incidental finding of thickened esophagus on imaging, F/U opt when stable for scope. Delirium? - pulled out central access line Apr/28/2022 PM, improved mentation however remains quite laconic. Given medical acuity and advance age likely sundowning as behavior is overnight. Reorient and monitor. If requires consider [place] [facility]. Chief Complaint:  Pelvic Pain    Hospital Course:    Per H&P:    \" \"The patient was sedated and intubated and therefore could not give any history.  Therefore, history is primarily taken from the chart. Patient's wife states that the patient fell in his yard approximately 14 days ago and [facility]. This time, he was discharged with pain medication.   Approximately 7 days ago, he again presented to [facility] with complaints of a right lower abdominal bulge at which point he was diagnosed with inguinal hernia and is scheduled with surgical follow-up. Patient presented to [facility] on 4/25/2022 for syncope and worsening inguinal hernia. Wife states that syncope first occurred in [facility] several days ago when the patient had nausea and subjective feelings of excessive heat while sitting.  After this, he went to go outside and then passed out. On the morning of admission, patient's wife was given of the bathtub when he \"slumped over. \"  Wife stated that the patient vomited 3 times in the last 24 hours with brown/yellow vomitus. Wife also reports that he has not been having bowel movements, has had less frequent urination, and that he has been weak and sleeping \"20 hours a day. \"  While in the ED, the patient was found to have profound renal failure with a creatinine of 11.2 with a baseline of approximately 1.3. Patient also noted to have a profound anion gap acidosis which improved with aggressive fluid resuscitation. Nephrology was consulted given profound renal failure. CT of the abdomen and pelvis without contrast was performed which demonstrated mild to moderate patchy consolidation of the bilateral lower lobes, thickening of the distal esophagus and gastroesophageal junction, a right inguinal hernia causing a small bowel obstruction, and multiple gas collections in the right inguinal hernia highly suspicious for ischemic or perforated bowel. On the evening of 4/25/2022 Dr. Malik performed exploratory laparotomy with small bowel resection, single primary anastomosis, drainage of pelvic abscess, exploration and drainage of right groin abscess, and debridement of necrotic subcutaneous fat/fascia/muscle in the right groin along with excision of the necrotic hernia sac in the right groin.   Empiric coverage with vancomycin and Zosyn to necrotic small bowel as well as probable aspiration pneumonia. Family was extensively counseled. \"    Pt had course in ICU from Apr/25-28/2022, Overnight patient went to A. fib with RVR that was refractory to both Lopressor and Cardizem. Patient was given amiodarone which converted him out. Patient continues to have frequent PACs on the monitor. Surgery was consulted regarding anticoagulation however they are recommending holding anticoagulation at this time. We will continue to follow-up with surgery as far as when we can start anticoagulation. Patient overall is awake and alert self, year, but only occasionally to place. Patient denies any pain or acute complaints at this time. Patient is frequently repeating himself however concern for underlying delirium. May/01/2022, abdominal wound dehissence, taken back to OR for repair and washout. Subjective:    Pt seen and examined. Afebrile overnight, no behavioral issues, PICC in place, NGT in place to suction min opt.,. Telemetry improved. Max in place. Pt mentation improved however still speaks very little. Review of Systems   Unable to perform ROS: Acuity of condition   Constitutional: Positive for appetite change. Negative for chills, diaphoresis, fatigue and fever. HENT: Positive for dental problem. Respiratory: Negative for cough, shortness of breath and wheezing. Cardiovascular: Negative for chest pain and palpitations. Gastrointestinal: Positive for abdominal pain. Negative for constipation, diarrhea, nausea and vomiting. Genitourinary: Negative for decreased urine volume, difficulty urinating, dysuria, frequency, hematuria and urgency. Neurological: Positive for weakness. Negative for seizures and syncope. Psychiatric/Behavioral: Positive for confusion and decreased concentration.        Medications:  Reviewed    Infusion Medications    sodium chloride 40 mL/hr at 05/02/22 0913    PN-Adult  3 IN 1 Central Line (Custom) 60 mL/hr at 05/02/22 1808    dextrose      amiodarone 0.5 mg/min (05/02/22 1721)    sodium chloride       Scheduled Medications    famotidine (PEPCID) injection  20 mg IntraVENous Daily    piperacillin-tazobactam  3,375 mg IntraVENous Q8H    metoprolol  5 mg IntraVENous Q8H    [Held by provider] metoprolol tartrate  12.5 mg Oral BID    insulin lispro  0-6 Units SubCUTAneous 4 times per day    sodium hypochlorite   Irrigation Daily    vitamin D  50,000 Units Oral Weekly    sodium chloride flush  5-40 mL IntraVENous 2 times per day    [Held by provider] heparin (porcine)  5,000 Units SubCUTAneous TID     PRN Meds: morphine, glucose, glucagon (rDNA), dextrose, dextrose bolus (hypoglycemia) **OR** dextrose bolus (hypoglycemia), sodium chloride flush, sodium chloride, ondansetron **OR** ondansetron, polyethylene glycol, acetaminophen **OR** acetaminophen      Intake/Output Summary (Last 24 hours) at 5/2/2022 1837  Last data filed at 5/2/2022 1513  Gross per 24 hour   Intake 2988.81 ml   Output 1360 ml   Net 1628.81 ml       Diet:  Diet NPO Exceptions are: Sips of Water with Meds  PN-Adult  3 IN 1 Central Line (Custom)    Exam:  BP (!) 121/58   Pulse 83   Temp 98.4 °F (36.9 °C) (Oral)   Resp 20   Ht 5' 9\" (1.753 m)   Wt 135 lb 6.4 oz (61.4 kg)   SpO2 95%   BMI 20.00 kg/m²     Physical Exam  Constitutional:       General: He is not in acute distress. Appearance: He is ill-appearing. He is not toxic-appearing or diaphoretic. Interventions: He is not intubated. Nasal cannula in place. Comments: NG tube on. HENT:      Head: Normocephalic and atraumatic. Nose: Nose normal. No congestion or rhinorrhea. Mouth/Throat:      Mouth: Mucous membranes are dry. Pharynx: Oropharynx is clear. No oropharyngeal exudate or posterior oropharyngeal erythema. Eyes:      General: No scleral icterus. Right eye: No discharge. Left eye: No discharge. Extraocular Movements: Extraocular movements intact. Conjunctiva/sclera: Conjunctivae normal.      Pupils: Pupils are equal, round, and reactive to light. Cardiovascular:      Rate and Rhythm: Normal rate, present. Rhythm irregular. Pulses: Normal pulses. Heart sounds: Normal heart sounds. No murmur heard. No friction rub. No gallop. Pulmonary:      Effort: Pulmonary effort is normal. No accessory muscle usage or respiratory distress. He is not intubated. Breath sounds: Examination of the right-lower field reveals rhonchi. Examination of the left-lower field reveals rhonchi. Rhonchi present. No wheezing or rales. Abdominal:      General: A surgical scar is present. Bowel sounds are increased. Tenderness: There is abdominal tenderness. There is no guarding. Comments: Abdominal Drain in place, Abdominal band in place. Neurological:      Mental Status: He is disoriented. Cranial Nerves: No dysarthria or facial asymmetry. Motor: No weakness. Comments: Noted improved mentation, however still speaks very little. Psychiatric:         Behavior: Behavior is cooperative. Labs:   Recent Labs     04/30/22  1047 05/01/22  0345 05/02/22  0425   WBC 24.9* 24.4* 22.2*   HGB 7.6* 9.1* 8.8*   HCT 24.2* 27.7* 27.3*    198 208     Recent Labs     04/30/22  1047 04/30/22  1047 04/30/22  1344 05/01/22  0345 05/02/22  0425      < > 140 136 134*   K 2.9*  --   --  3.4* 3.7   CL 96*  --   --  95* 95*   CO2 36*  --   --  30 29   BUN 27*  --   --  26* 32*   CREATININE 1.2  --   --  1.3* 1.8*   CALCIUM 7.4*  --   --  7.8* 7.5*   PHOS  --   --   --  1.4* 2.5    < > = values in this interval not displayed. No results for input(s): AST, ALT, BILIDIR, BILITOT, ALKPHOS in the last 72 hours. No results for input(s): INR in the last 72 hours. No results for input(s): Jadene Moh in the last 72 hours.     Urinalysis:      Lab Results   Component Value Date    NITRU NEGATIVE 04/25/2022    WBCUA 10-15 04/25/2022 BACTERIA FEW 04/25/2022    RBCUA 10-15 04/25/2022    BLOODU MODERATE 04/25/2022    SPECGRAV 1.015 04/25/2022    GLUCOSEU NEGATIVE 06/09/2019       Radiology:  XR CHEST PORTABLE   Final Result   1. Persistent bibasilar opacities. Small left-sided pleural effusion. **This report has been created using voice recognition software. It may contain minor errors which are inherent in voice recognition technology. **      Final report electronically signed by Dr. Beth Warren on 5/2/2022 8:13 AM      XR CHEST PORTABLE   Final Result   A right arm PICC line tip terminates at the cavoatrial projection. No pneumothorax is observed. Small bilateral pleural effusions and bilateral lower lobe airspace opacity, right greater than left, are not significantly changed. **This report has been created using voice recognition software. It may contain minor errors which are inherent in voice recognition technology. **      Final report electronically signed by Dr Mik Alvarez on 4/29/2022 4:04 PM      XR ABDOMEN FOR NG/OG/NE TUBE PLACEMENT   Final Result   Impression:      NG tube tip proximal stomach. This document has been electronically signed by: Samanta Boucher MD on    04/28/2022 11:56 PM      XR CHEST PORTABLE   Final Result   Impression:   1. Satisfactory positioning of the endotracheal tube and right central    line. Probable slightly high positioning of the orogastric tube. Consider    advancing 3-4 cm. 2. Interval development of small bilateral pleural effusions, right    greater than left. 3. Bilateral lower lobe airspace opacities secondary to atelectasis and/or    infiltrates, right greater than left. 4. Bilateral interstitial changes secondary to pneumonitis or edema. This document has been electronically signed by: Barrett Castillo.  DO Jennifer on    04/26/2022 12:01 AM      CT ABDOMEN PELVIS WO CONTRAST Additional Contrast? None   Final Result   Impression:   Right inguinal hernia containing small bowel. This is causing a small    bowel obstruction. There are several gas collections within the right    inguinal hernia. Ischemic or perforated bowel could have this appearance. Mild to moderate patchy consolidation bilateral lower lobes. This could    represent aspiration or pneumonia. Prominent thickening of the distal esophagus and gastroesophageal    junction. Neoplasm not excluded. Recommend direct visualization. This document has been electronically signed by: Yue Mays MD on    04/25/2022 07:20 PM      All CTs at this facility use dose modulation techniques and iterative    reconstructions, and/or weight-based dosing   when appropriate to reduce radiation to a low as reasonably achievable. US RENAL COMPLETE   Final Result   Impression:   Multifocal simple bilateral renal cyst. Echogenic bilateral renal    parenchyma. This document has been electronically signed by: Yue Mays MD on    04/25/2022 06:30 PM      CT HEAD WO CONTRAST   Final Result       1. Stable CT scan of the brain, no interval change since previous MRI scan dated 10 Mel[address] 2019.   2. Mild atrophy and dilatation of the third and lateral ventricles. 3. Probable ischemic changes in the white matter, left basal ganglia and in the bernadette. 4. Calcification the cavernous segments of both internal carotid arteries. 5. Increased density in the external auditory canals which may represent cerumen bilaterally. **This report has been created using voice recognition software. It may contain minor errors which are inherent in voice recognition technology. **      Final report electronically signed by DR Jani Osei on 4/25/2022 4:01 PM      XR CHEST PORTABLE   Final Result   1. Normal heart size. No effusion. 2. Persistent mild atelectatic/fibrotic stranding retrocardiac region left lung base. **This report has been created using voice recognition software.   It may contain minor errors which are inherent in voice recognition technology. **      Final report electronically signed by Dr. Arturo Chamberlain on 4/25/2022 3:05 PM          Diet: Diet NPO Exceptions are: Sips of Water with Meds  PN-Adult  3 IN 1 Central Line (Custom)    DVT prophylaxis: [] Lovenox                                 [] SCDs                                 [] SQ Heparin                                 [] Encourage ambulation           [] Already on Anticoagulation     Disposition:    [] Home       [] TCU       [] Rehab       [] Psych       [] SNF       [] [place]       [x] Other-     Code Status: Full Code    PT/OT Eval Status:      Electronically signed by Su Beckett MD on 5/2/2022 at 6:37 PM    This note was electronically signed. Parts of this note may have been dictated by use of voice recognition software and electronically transcribed. The note may contain errors not detected in proofreading. Please refer to my supervising physician's documentation if my documentation differs.

## 2022-05-02 NOTE — PROGRESS NOTES
1946 son in law at bedside, updated on status and questions answered, made aware that family was unable to be contacted this am regarding change in pt status, visitor gave his name and number to contact, states he is a local  and his phone is always on  [employer]  [phone] updated on pt status, questions answered, left her phone number, [phone], she works as an RN here and can also be reached in her office at ext [phone]

## 2022-05-02 NOTE — CARE COORDINATION
5/2/22, 3:07 PM EDT    DISCHARGE ON GOING EVALUATION    [employer] [facility] Hospital day: 7  Location: -/[facility] Reason for admit: Renal failure [N19]  Sepsis associated hypotension ([facility] [address] [A41.9, I95.9]   Procedure:   4/25 CXR: Persistent mild atelectatic/fibrotic stranding retrocardiac region left lung base; no effusion  4/25 CT Head:  Stable CT scan of the brain, no interval change since previous MRI scan dated 10 [address] 2019. Mild atrophy and dilatation of the third and lateral ventricles. Probable ischemic changes in the white matter, left basal ganglia and in the bernadette. Calcification the cavernous segments of both internal carotid arteries. Increased density in the external auditory canals which may represent cerumen bilaterally. 4/25 Renal US: Multifocal simple bilateral renal cyst. Echogenic bilateral renal parenchyma. 4/25 CT Abd/pelvis: Right inguinal hernia containing small bowel. This is causing a small bowel obstruction. There are several gas collections within the right inguinal hernia. Ischemic or perforated bowel could have this appearance. Mild to moderate patchy consolidation bilateral lower lobes. This could represent aspiration or pneumonia. Prominent thickening of the distal esophagus and gastroesophageal junction. Neoplasm not excluded. Recommend direct visualization. 4/25 LAPAROTOMY EXPLORATORY FOR SMALL BOWEL OBSTRUCTION WITH INCARCERATED HERNIA REPAIR.  Debridement necrotic subcutaneous fat fascia and muscle right groin  4/25 Intubated in OR  4/25 CVC Right subclavian   4/27 Extubated. 4/29 PICC line placed. 5/1 Return to OR with Dr. Veronica Lux: Exploratory laparotomy lysis of inflammatory adhesions, drainage undrained right groin abscess, repair of abdominal wound fascial dehiscence. Barriers to Discharge: Hospitalist, Surgery, Nephrology and PMR following. POD #6 and POD #1. Pt was taken back to surgery due to dehiscence of wound (see above) IVF. TPN, NG tube (now clamped), PICC line. Amio gtt, Pepcid iv daily, Zosyn iv q8hr. Trialing clamping of NG today and ice chips. WBC 22.2. Hgb 8.8. PT/OT. PCP: Dora Armando MD  Readmission Risk Score: 21.7 ( )%  Patient Goals/Plan/Treatment Preferences: From home with wife. Planning IP Rehab vs SNF.

## 2022-05-03 LAB
ANION GAP SERPL CALCULATED.3IONS-SCNC: 12 MEQ/L (ref 8–16)
BUN BLDV-MCNC: 35 MG/DL (ref 7–22)
CALCIUM IONIZED: 1.12 MMOL/L (ref 1.12–1.32)
CALCIUM SERPL-MCNC: 7.5 MG/DL (ref 8.5–10.5)
CHLORIDE BLD-SCNC: 100 MEQ/L (ref 98–111)
CO2: 26 MEQ/L (ref 23–33)
CREAT SERPL-MCNC: 1.7 MG/DL (ref 0.4–1.2)
ERYTHROCYTE [DISTWIDTH] IN BLOOD BY AUTOMATED COUNT: 16 % (ref 11.5–14.5)
ERYTHROCYTE [DISTWIDTH] IN BLOOD BY AUTOMATED COUNT: 60 FL (ref 35–45)
GLUCOSE BLD-MCNC: 109 MG/DL (ref 70–108)
GLUCOSE BLD-MCNC: 112 MG/DL (ref 70–108)
GLUCOSE BLD-MCNC: 112 MG/DL (ref 70–108)
GLUCOSE BLD-MCNC: 114 MG/DL (ref 70–108)
GLUCOSE BLD-MCNC: 131 MG/DL (ref 70–108)
GLUCOSE BLD-MCNC: 134 MG/DL (ref 70–108)
HCT VFR BLD CALC: 23.8 % (ref 42–52)
HCT VFR BLD CALC: 24.9 % (ref 42–52)
HEMOGLOBIN: 7.6 GM/DL (ref 14–18)
HEMOGLOBIN: 8 GM/DL (ref 14–18)
MAGNESIUM: 1.8 MG/DL (ref 1.6–2.4)
MCH RBC QN AUTO: 33.2 PG (ref 26–33)
MCHC RBC AUTO-ENTMCNC: 31.9 GM/DL (ref 32.2–35.5)
MCV RBC AUTO: 103.9 FL (ref 80–94)
PHOSPHORUS: 2.1 MG/DL (ref 2.4–4.7)
PLATELET # BLD: 232 THOU/MM3 (ref 130–400)
PMV BLD AUTO: 11.1 FL (ref 9.4–12.4)
POTASSIUM SERPL-SCNC: 3.5 MEQ/L (ref 3.5–5.2)
RBC # BLD: 2.29 MILL/MM3 (ref 4.7–6.1)
SODIUM BLD-SCNC: 138 MEQ/L (ref 135–145)
WBC # BLD: 17.6 THOU/MM3 (ref 4.8–10.8)

## 2022-05-03 PROCEDURE — 2580000003 HC RX 258: Performed by: INTERNAL MEDICINE

## 2022-05-03 PROCEDURE — 97530 THERAPEUTIC ACTIVITIES: CPT

## 2022-05-03 PROCEDURE — 2500000003 HC RX 250 WO HCPCS: Performed by: SURGERY

## 2022-05-03 PROCEDURE — 82948 REAGENT STRIP/BLOOD GLUCOSE: CPT

## 2022-05-03 PROCEDURE — 92526 ORAL FUNCTION THERAPY: CPT

## 2022-05-03 PROCEDURE — 84100 ASSAY OF PHOSPHORUS: CPT

## 2022-05-03 PROCEDURE — 2580000003 HC RX 258

## 2022-05-03 PROCEDURE — 85014 HEMATOCRIT: CPT

## 2022-05-03 PROCEDURE — 97116 GAIT TRAINING THERAPY: CPT

## 2022-05-03 PROCEDURE — 2140000000 HC CCU INTERMEDIATE R&B

## 2022-05-03 PROCEDURE — 6360000002 HC RX W HCPCS: Performed by: SURGERY

## 2022-05-03 PROCEDURE — 2580000003 HC RX 258: Performed by: SURGERY

## 2022-05-03 PROCEDURE — 99024 POSTOP FOLLOW-UP VISIT: CPT | Performed by: SURGERY

## 2022-05-03 PROCEDURE — 6370000000 HC RX 637 (ALT 250 FOR IP): Performed by: SURGERY

## 2022-05-03 PROCEDURE — 82330 ASSAY OF CALCIUM: CPT

## 2022-05-03 PROCEDURE — 83735 ASSAY OF MAGNESIUM: CPT

## 2022-05-03 PROCEDURE — 99232 SBSQ HOSP IP/OBS MODERATE 35: CPT | Performed by: INTERNAL MEDICINE

## 2022-05-03 PROCEDURE — 85027 COMPLETE CBC AUTOMATED: CPT

## 2022-05-03 PROCEDURE — 85018 HEMOGLOBIN: CPT

## 2022-05-03 PROCEDURE — 99233 SBSQ HOSP IP/OBS HIGH 50: CPT | Performed by: FAMILY MEDICINE

## 2022-05-03 PROCEDURE — 6360000002 HC RX W HCPCS

## 2022-05-03 PROCEDURE — 80048 BASIC METABOLIC PNL TOTAL CA: CPT

## 2022-05-03 PROCEDURE — 2580000003 HC RX 258: Performed by: PHARMACIST

## 2022-05-03 PROCEDURE — 2500000003 HC RX 250 WO HCPCS: Performed by: PHARMACIST

## 2022-05-03 PROCEDURE — 97110 THERAPEUTIC EXERCISES: CPT

## 2022-05-03 RX ORDER — DOCUSATE SODIUM 100 MG/1
100 CAPSULE, LIQUID FILLED ORAL DAILY
Status: DISCONTINUED | OUTPATIENT
Start: 2022-05-03 | End: 2022-05-10 | Stop reason: HOSPADM

## 2022-05-03 RX ADMIN — CALCIUM GLUCONATE: 98 INJECTION, SOLUTION INTRAVENOUS at 18:02

## 2022-05-03 RX ADMIN — METOPROLOL TARTRATE 5 MG: 5 INJECTION INTRAVENOUS at 17:47

## 2022-05-03 RX ADMIN — METOPROLOL TARTRATE 5 MG: 5 INJECTION INTRAVENOUS at 01:52

## 2022-05-03 RX ADMIN — METOPROLOL TARTRATE 5 MG: 5 INJECTION INTRAVENOUS at 11:01

## 2022-05-03 RX ADMIN — FAMOTIDINE 20 MG: 10 INJECTION, SOLUTION INTRAVENOUS at 08:15

## 2022-05-03 RX ADMIN — POTASSIUM PHOSPHATE, MONOBASIC AND POTASSIUM PHOSPHATE, DIBASIC 9 MMOL: 224; 236 INJECTION, SOLUTION, CONCENTRATE INTRAVENOUS at 11:09

## 2022-05-03 RX ADMIN — PIPERACILLIN AND TAZOBACTAM 3375 MG: 3; .375 INJECTION, POWDER, LYOPHILIZED, FOR SOLUTION INTRAVENOUS at 17:39

## 2022-05-03 RX ADMIN — SODIUM CHLORIDE, PRESERVATIVE FREE 10 ML: 5 INJECTION INTRAVENOUS at 08:29

## 2022-05-03 RX ADMIN — PIPERACILLIN AND TAZOBACTAM 3375 MG: 3; .375 INJECTION, POWDER, LYOPHILIZED, FOR SOLUTION INTRAVENOUS at 08:28

## 2022-05-03 RX ADMIN — DOCUSATE SODIUM 100 MG: 100 CAPSULE, LIQUID FILLED ORAL at 10:49

## 2022-05-03 RX ADMIN — AMIODARONE HYDROCHLORIDE 0.5 MG/MIN: 1.8 INJECTION, SOLUTION INTRAVENOUS at 19:29

## 2022-05-03 RX ADMIN — SODIUM CHLORIDE, PRESERVATIVE FREE 10 ML: 5 INJECTION INTRAVENOUS at 20:52

## 2022-05-03 RX ADMIN — MORPHINE SULFATE 2 MG: 2 INJECTION, SOLUTION INTRAMUSCULAR; INTRAVENOUS at 08:15

## 2022-05-03 RX ADMIN — SODIUM CHLORIDE: 9 INJECTION, SOLUTION INTRAVENOUS at 09:58

## 2022-05-03 RX ADMIN — DAKIN'S SOLUTION 0.125% (QUARTER STRENGTH): 0.12 SOLUTION at 10:27

## 2022-05-03 RX ADMIN — PIPERACILLIN AND TAZOBACTAM 3375 MG: 3; .375 INJECTION, POWDER, LYOPHILIZED, FOR SOLUTION INTRAVENOUS at 01:44

## 2022-05-03 ASSESSMENT — PAIN DESCRIPTION - LOCATION
LOCATION: ABDOMEN

## 2022-05-03 ASSESSMENT — PAIN DESCRIPTION - ONSET
ONSET: ON-GOING

## 2022-05-03 ASSESSMENT — PAIN DESCRIPTION - FREQUENCY
FREQUENCY: INTERMITTENT

## 2022-05-03 ASSESSMENT — PAIN DESCRIPTION - PAIN TYPE
TYPE: SURGICAL PAIN

## 2022-05-03 ASSESSMENT — PAIN - FUNCTIONAL ASSESSMENT
PAIN_FUNCTIONAL_ASSESSMENT: PREVENTS OR INTERFERES SOME ACTIVE ACTIVITIES AND ADLS

## 2022-05-03 ASSESSMENT — PAIN DESCRIPTION - DESCRIPTORS
DESCRIPTORS: ACHING

## 2022-05-03 ASSESSMENT — PAIN SCALES - GENERAL
PAINLEVEL_OUTOF10: 10
PAINLEVEL_OUTOF10: 10
PAINLEVEL_OUTOF10: 3

## 2022-05-03 ASSESSMENT — PAIN DESCRIPTION - ORIENTATION
ORIENTATION: MID

## 2022-05-03 NOTE — PROGRESS NOTES
[address] [place] [address]  Occupational Therapy  Daily Note  Time:   Time In:   Time Out: 901  Timed Code Treatment Minutes: 26 Minutes  Minutes: 26          Date: 5/3/2022  Patient Name: Ciera Mead,   Gender: male      Room: [facility]/026-A  MRN: [id number]  : 1950  (66 y.o.)  Referring Practitioner: Dr. Moiz Gomez  Diagnosis: renal failure  Additional Pertinent Hx: a 66 y.o.  male admitted to [address] [place] [address] on 2022 with PMHx of distant CVA, BPH, HTN, GERD, and Glaucoma who presented to the ED after syncopal episodes x 2. Wife zayda provided the history and she stated that approximately 14 days ago he fell in the yard while pulling weeds and broke his ribs. He came to [facility] at that time and was discharged with pain medication. About 7 days ago, he presented again to [facility] with complaints of right lower abdomen bulge. He was diagnosed with inguinal hernia and recommended to follow up OP which was scheduled for tomorrow (22). Wife stated that the hernia has gotten significantly worse over the lprevious 5 days. Syncope occurred first when they were in [facility] several days ago. They had been sitting for a while and he started to feel nauseated and hot so he got up to go outside and passed out (described as legs giving out from him). Denied hitting his head. The morning of admission, wife was getting him out of the bathtub and he just \"slumped over\". Denied hitting his head on that occasion as well. Wife stated that he had vomited 3 times over the previous 24 hours and that it was brown/yellow in color. She stated he had not been having BM's and had been peeing less. Also noted he had been weak and sleeping \"20 hours a day\". On 2022, the patient was taken to the operating room per Richi Cardenas MD,  with diagnosis of serrated right inguinal hernia with small bowel obstruction and perforation, and at the time of surgery some feculent soilage of the right groin and pelvis. Procedure included exploratory laparotomy, small bowel resection, single primary anastomosis, and drainage of pelvic abscess and right groin exploration, drainage of abscess, debridement of necrotic subcutaneous fat and fascia and muscle, and excision of necrotic hernia sac. Right groin and pelvic abscess were present at the time of surgery. Postoperatively, she was admitted to the ICU secondary to acute respiratory failure, hypoxia, septic shock, aspiration pneumonia, and status post exploratory lap and drainage as above. Nutrition per TPN. After stabilization and extubation on 4/27/22, patient transferred to  on 4/28/22. late on 4/30/22 pt forcefully vomited and coughed causing his abdominal wond to dehisce. Pt s/p Exploratory laparotomy lysis of inflammatory adhesions, drainage undrained right groin abscess, repair of abdominal wound fascial dehiscence on 5/1/22. Restrictions/Precautions:  Restrictions/Precautions: Surgical Protocols,General Precautions,Fall Risk  Required Braces or Orthoses  Other: Abdominal Binder  Position Activity Restriction  Other position/activity restrictions: NG tube, ALFONSO drain on right side of abdomen     SUBJECTIVE: Patient sleeping in bed upon arrival; agreeable to therapy this date. Patient lethargic today stating \"the nurse just gave me a shot so I feel loopy\". Patient pleasant    PAIN: 0/10:     Vitals: Vitals not assessed per clinical judgement, see nursing flowsheet    COGNITION: Slow Processing, Decreased Insight, Decreased Problem Solving and Decreased Safety Awareness    ADL:   Lower Extremity Dressing: Maximum Assistance. edson B socks, patient attempted however demonstrates decreased coordination/problem solving d/t lethargy. BALANCE:  Sitting Balance:  Stand By Assistance, Contact Guard Assistance, Minimal Assistance, with cues for safety, with verbal cues , with increased time for completion.     Standing Balance: Maximum Assistance, X 1, with cues for safety, RW, posterior lean, unable to self correct . BED MOBILITY:  Supine to Sit: Contact Guard Assistance, Minimal Assistance trunk control    TRANSFERS:  Sit to Stand:  Minimal Assistance. Stand to Sit: Minimal Assistance. verbal cues for hand placement    FUNCTIONAL MOBILITY:  Assistive Device: Rolling Walker  Assist Level:  Maximum Assistance, X 1, with verbal cues  and with increased time for completion. Distance: x2 feet EOB to bedside chair  Max verbal cues for RW management and sequencing gait, posterior lean    ADDITIONAL ACTIVITIES:  Patient completed BUE strengthening exercises with skilled education on HEP: completed x10 reps x1 set with a minimal resistance in all joints and all planes in order to improve UE strength and activity tolerance required for BADL routine and toilet / shower transfers. Patient tolerated good, requiring minimal rest breaks. Patient also required minimal verbal cues for technique. ASSESSMENT:     Activity Tolerance:  Patient tolerance of  treatment: fair.        Discharge Recommendations: Inpatient Rehabilitation  Equipment Recommendations: Equipment Needed: Yes  Mobility Devices: Walker  Plan: Times per Week: 5x  Current Treatment Recommendations: Strengthening,Balance training,Safety education & training,Functional mobility training,Endurance training,Equipment evaluation, education, & procurement,Patient/Caregiver education & training    Patient Education  Patient Education: Role of OT, Plan of Care, ADL's, IADL's, Home Exercise Program, Importance of Increasing Activity and Assistive Device Safety    Goals  Short Term Goals  Time Frame for Short term goals: by discharge  Short Term Goal 1: pt to navigate to/from various surfaces including BSC/chair progressing to bathroom and further distances using AD with CGA to icnrease his endurance and indep with ADL tasks  Short Term Goal 2: Pt to demo dynamic standing iwth unilateral UE support and CGA to assist with completion of ADL tasks such as clothing management after dressing and toileting  Short Term Goal 3: pt to complete UB ADL tasks iwth CGA and LB with min A  Short Term Goal 4: Pt to increase endurance to tolerate > 25 min of consistent activity with 3 or less rst breaks to increase ease of ADL asks  Short Term Goal 5: pt to increase bilateral UE strength (especially left) by completing HEP with mod resistance and use of handout with min cues for tech and need for less than 2 rest breaks    Following session, patient left in safe position with all fall risk precautions in place.

## 2022-05-03 NOTE — PROGRESS NOTES
Renal Progress Note  [facility]    Patient :  Teddy He Sr.; 66 y.o. MRN# [id number]  Location:  3B-26/026-A  Attending:  Roberto Klein MD  Admit Date:  4/25/2022   Hospital Day: 8      Subjective:     Nephrology is following the patient for ROBSON. Patient was seen earlier this morning. Doing ok. Wants to eat. Remains on TPN and 0.9 @ 40 ml/hr. Good urine output. Outpatient Medications:     Medications Prior to Admission: atorvastatin (LIPITOR) 20 MG tablet, TAKE 1 TABLET BY MOUTH DAILY  COMBIGAN 0.2-0.5 % ophthalmic solution, Place 1 drop into the left eye every 12 hours  famotidine (PEPCID) 20 MG tablet, Take 1 tablet by mouth 2 times daily  losartan (COZAAR) 50 MG tablet, Take 1 tablet by mouth daily  metoprolol succinate (TOPROL XL) 25 MG extended release tablet, TAKE 1 TABLET BY MOUTH DAILY  tamsulosin (FLOMAX) 0.4 MG capsule, Take 1 capsule by mouth daily  lidocaine (LIDODERM) 5 %, Place 1 patch onto the skin daily 12 hours on, 12 hours off.   fluocinonide (LIDEX) 0.05 % cream, APPLY EXTERNALLY TO THE AFFECTED AREA TWICE DAILY  tadalafil (CIALIS) 20 MG tablet, TAKE 1 TABLET BY MOUTH EVERY DAY AS NEEDED  aspirin 81 MG EC tablet, Take 1 tablet by mouth daily  loratadine (CLARITIN) 10 MG tablet, TAKE 1 TABLET BY MOUTH DAILY    Current Medications:     Scheduled Meds:    docusate sodium  100 mg Oral Daily    piperacillin-tazobactam  3,375 mg IntraVENous Q8H    famotidine (PEPCID) injection  20 mg IntraVENous Daily    metoprolol  5 mg IntraVENous Q8H    [Held by provider] metoprolol tartrate  12.5 mg Oral BID    insulin lispro  0-6 Units SubCUTAneous 4 times per day    sodium hypochlorite   Irrigation Daily    vitamin D  50,000 Units Oral Weekly    sodium chloride flush  5-40 mL IntraVENous 2 times per day    [Held by provider] heparin (porcine)  5,000 Units SubCUTAneous TID     Continuous Infusions:    PN-Adult  3 IN 1 Central Line (Custom)      sodium chloride 40 mL/hr at 22 0958    PN-Adult  3 IN 1 Central Line (Custom) 60 mL/hr at 22 1808    dextrose      amiodarone 0.5 mg/min (22 1721)    sodium chloride       PRN Meds:  morphine, glucose, glucagon (rDNA), dextrose, dextrose bolus (hypoglycemia) **OR** dextrose bolus (hypoglycemia), sodium chloride flush, sodium chloride, ondansetron **OR** ondansetron, polyethylene glycol, acetaminophen **OR** acetaminophen    Input/Output:       I/O last 3 completed shifts: In: 3626.5 [I.V.:1094.2; NG/GT:90; IV Piggyback:873.3]  Out: 5035 [Urine:1750; Emesis/NG output:60]. Patient Vitals for the past 96 hrs (Last 3 readings):   Weight   22 0600 135 lb 6.4 oz (61.4 kg)   22 0600 137 lb (62.1 kg)       Vital Signs:   Temperature:  Temp: 98.4 °F (36.9 °C)  TMax:   Temp (24hrs), Av.3 °F (36.8 °C), Min:97.9 °F (36.6 °C), Max:98.7 °F (37.1 °C)    Respirations:  Resp: 20  Pulse:   Pulse: 81  BP:    BP: 120/68  BP Range: Systolic (55CCG), JQX:314 , Min:117 , GN       Diastolic (55GLR), YJL:52, Min:61, Max:68      Physical Examination:     General:  Awake, alert  HEENT: NC/AT/ MMM   Chest:               Clear B/L no rales  Cardiac:  S1 S2   Abdomen: Abdominal binder  Neuro:  sedated  SKIN:  No rashes,   Extremities:  No edema,     Labs:       Recent Labs     22  0345 22  0345 22  0425 22  0650 22  1348   WBC 24.4*  --  22.2* 17.6*  --    RBC 2.71*  --  2.64* 2.29*  --    HGB 9.1*   < > 8.8* 7.6* 8.0*   HCT 27.7*   < > 27.3* 23.8* 24.9*   .2*  --  103.4* 103.9*  --    MCH 33.6*  --  33.3* 33.2*  --    MCHC 32.9  --  32.2 31.9*  --      --  208 232  --    MPV 10.8  --  11.0 11.1  --     < > = values in this interval not displayed.       BMP:   Recent Labs     22  0345 22  0425 22  0650    134* 138   K 3.4* 3.7 3.5   CL 95* 95* 100   CO2 30 29 26   BUN 26* 32* 35*   CREATININE 1.3* 1.8* 1.7*   GLUCOSE 134* 135* 114*   CALCIUM 7.8* 7. 5* 7.5*      Phosphorus:     Recent Labs     05/01/22  0345 05/02/22  0425 05/03/22  0650   PHOS 1.4* 2.5 2.1*     Magnesium:    Recent Labs     05/01/22  0345 05/02/22  0425 05/03/22  0650   MG 1.2* 1.9 1.8     Albumin:    No results for input(s): LABALBU in the last 72 hours. BNP:    No results found for: BNP  KHUSHBOO:    No results found for: KHUSHBOO  SPEP:  Lab Results   Component Value Date    PROT 5.3 04/28/2022     UPEP:   No results found for: LABPE  C3:   No results found for: C3  C4:   No results found for: C4  MPO ANCA:   No results found for: MPO  PR3 ANCA:   No results found for: PR3  Anti-GBM:   No results found for: GBMABIGG  Hep BsAg:       No results found for: HEPBSAG  Hep C AB:        No results found for: HEPCAB    Urinalysis/Chemistries:      Lab Results   Component Value Date    NITRU NEGATIVE 04/25/2022    COLORU YELLOW 04/25/2022    PHUR 5.5 04/25/2022    LABCAST >15 HYALINE 04/25/2022    WBCUA 10-15 04/25/2022    RBCUA 10-15 04/25/2022    MUCUS NONE SEEN 04/25/2022    YEAST NONE SEEN 04/25/2022    BACTERIA FEW 04/25/2022    SPECGRAV 1.015 04/25/2022    LEUKOCYTESUR TRACE 04/25/2022    UROBILINOGEN 0.2 04/25/2022    BILIRUBINUR NEGATIVE 04/25/2022    BLOODU MODERATE 04/25/2022    GLUCOSEU NEGATIVE 06/09/2019    KETUA NEGATIVE 04/25/2022     Urine Sodium:   No results found for: MING  Urine Potassium:  No results found for: KUR  Urine Chloride:  No results found for: CLUR  Urine Osmolarity: No results found for: OSMOU  Urine Protein:   No components found for: TOTALPROTEIN, URINE   Urine Creatinine:   No results found for: LABCREA  Urine Eosinophils:  No components found for: UEOS        Impression and Plan:  1. ROBSON , prerenal due to volume depletion and hypotension:overall improved from admission. Ok to continue with IV fluids along with TPN total rate is 100 ml/hr  2. Hypernatremia:resolved. 3. Hypokalemia/hypophosphatemia;better did receive some replacement this morning  4. Hypomag: better  5. S/p ex lap with small bowel resection, drainage of abscess and debridement necrotic fat fascia and hernia sac 4/25. Had wound dehiscence and went back for repair 5/1  6. Leukocytosis  7. Pneumonia  8. Anemia  9. Afib        Please don't hesitate to call with any questions.   Electronically signed by Jerilyn Yu DO on 5/3/2022 at 3:39 PM

## 2022-05-03 NOTE — PROGRESS NOTES
[address]  PHYSICAL THERAPY MISSED TREATMENT NOTE  [facility] CCU-STEPDOWN 3B    Date: 5/3/2022  Patient Name: Teddy He Sr. MRN: [id number]   : 1950  (72 y.o.)  Gender: male   Referring Practitioner: Dr. Macario Ordonez  Diagnosis: renal failure         REASON FOR MISSED TREATMENT:  Missed Treat. Attempt at 1026 and pt just back to bed with RN. Attempt at 1130 and pt c/o fatigue, declined mobility and therex. Will check back as able.

## 2022-05-03 NOTE — PROGRESS NOTES
[address] [place] [address]  INPATIENT PHYSICAL THERAPY  DAILY NOTE  [facility] CCU-STEPDOWN 3B - 3B-26/026-A    Time In: 0224  Time Out: 1238  Timed Code Treatment Minutes: 23 Minutes  Minutes: 23          Date: 5/3/2022  Patient Name: Amberly Callaway,  Gender:  male        MRN: [id number]  : 1950  (66 y.o.)     Referring Practitioner: Dr. Danial Morel  Diagnosis: renal failure  Additional Pertinent Hx: a 66 y.o.  male admitted to [address] [place] [address] on 2022 with PMHx of distant CVA, BPH, HTN, GERD, and Glaucoma who presented to the ED after syncopal episodes x 2. Wife zayda provided the history and she stated that approximately 14 days ago he fell in the yard while pulling weeds and broke his ribs. He came to [facility] at that time and was discharged with pain medication. About 7 days ago, he presented again to [facility] with complaints of right lower abdomen bulge. He was diagnosed with inguinal hernia and recommended to follow up OP which was scheduled for tomorrow (22). Wife stated that the hernia has gotten significantly worse over the lprevious 5 days. Syncope occurred first when they were in [facility] several days ago. They had been sitting for a while and he started to feel nauseated and hot so he got up to go outside and passed out (described as legs giving out from him). Denied hitting his head. The morning of admission, wife was getting him out of the bathtub and he just \"slumped over\". Denied hitting his head on that occasion as well. Wife stated that he had vomited 3 times over the previous 24 hours and that it was brown/yellow in color. She stated he had not been having BM's and had been peeing less. Also noted he had been weak and sleeping \"20 hours a day\". On 2022, the patient was taken to the operating room per Stephanie Gay MD,  with diagnosis of serrated right inguinal hernia with small bowel obstruction and perforation, and at the time of surgery some feculent soilage of the right groin and pelvis. Procedure included exploratory laparotomy, small bowel resection, single primary anastomosis, and drainage of pelvic abscess and right groin exploration, drainage of abscess, debridement of necrotic subcutaneous fat and fascia and muscle, and excision of necrotic hernia sac. Right groin and pelvic abscess were present at the time of surgery. Postoperatively, she was admitted to the ICU secondary to acute respiratory failure, hypoxia, septic shock, aspiration pneumonia, and status post exploratory lap and drainage as above. Nutrition per TPN. After stabilization and extubation on 4/27/22, patient transferred to  on 4/28/22. s/pExploratory laparotomy lysis of inflammatory adhesions, drainage undrained right groin abscess, repair of abdominal wound fascial dehiscence on 5/1/22     Prior Level of Function:  Lives With: Spouse  Type of Home: House  Home Layout: One level  Home Access: Stairs to enter with rails  Entrance Stairs - Number of Steps: 3   Bathroom Shower/Tub: Walk-in shower  Bathroom Toilet: Handicap height  Bathroom Accessibility: Accessible    Receives Help From: Family  ADL Assistance: Independent  Homemaking Assistance: Independent  Ambulation Assistance: Independent  Transfer Assistance: Independent  Active : No  Additional Comments: Per son, pts bathroom is currently in process of being remodeled to be handicap accessible. pt did not use AD for mobility and was indep with his ADL tasks at home.  Pt has 4 children that assist    Restrictions/Precautions:  Restrictions/Precautions: Surgical Protocols,General Precautions,Fall Risk  Required Braces or Orthoses  Other: Abdominal Binder  Position Activity Restriction  Other position/activity restrictions: NG tube, ALFONSO drain on right side of abdomen     SUBJECTIVE: pt's telesitter alarm going off so PT entered room and pt wanting to sit EOB so agreeable for PT    PAIN: no c/o pain    Vitals: Vitals not assessed per clinical judgement, see nursing flowsheet    OBJECTIVE:  Bed Mobility:  Rolling to Right: Minimal Assistance   Supine to Sit: Moderate Assistance  Scooting: Contact Guard Assistance  HOB up 30 degrees, use BR, inc time to complete    Transfers:  Sit to Stand: Minimal Assistance  Stand to [address] for hand placement for safety, cues to turn fully to chair before sitting down for safety  Ambulation:  Minimal Assistance  Distance: 4'x1  Surface: Level Tile  Device:Rolling Walker  Gait Deviations: Forward Flexed Posture, Slow Bela, Decreased Step Length Bilaterally and Unsteady Gait, cues for direction for safety    Balance:  Static Sitting Balance:  Supervision  Dynamic Sitting Balance: Stand By Assistance, reaching shoulder to waist level, no UE support  Static Standing Balance: Minimal Assistance, to CGA with RW, fwd flexed posture    Exercise:  Patient was guided in 1 set(s) 10 reps of exercise to both lower extremities. Seated marches, Seated heel/toe raises and Long arc quads. Exercises were completed for increased independence with functional mobility. Functional Outcome Measures: Completed  AM-PAC Inpatient Mobility without Stair Climbing Raw Score : 13  AM-PAC Inpatient without Stair Climbing T-Scale Score : 38.96    ASSESSMENT:  Assessment: Patient progressing toward established goals. and pt fatigues quickly, encouragement to inc activity and pt verbalized understanding, assist of 1 with use of walker and mutlip  Activity Tolerance:  Patient tolerance of  treatment: fair. Equipment Recommendations: Other: cont to assess needs  Discharge Recommendations: Continue to assess pending progress and Patient would benefit from continued PT at discharge, pt not safe to return home at this time.   Plan: Specific Instructions for Next Treatment: therex and mobility  Plan:  (5X GM)  Specific Instructions for Next Treatment: therex and mobility    Patient Education  Patient Education: Home Exercise Program, Bed Mobility, Transfers, Gait    Goals:  Patient goals : feel better  Short Term Goals  Time Frame for Short term goals: by discharge  Short term goal 1: bed mobility with SBA to get in/out of bed  Short term goal 2: transfer with SBA to get in/out of chairs  Short term goal 3: amb >25'x1 with AD and CGA to walk safely in room  Long Term Goals  Time Frame for Long term goals : no LTGs set secondary to short ELOS    Following session, patient left in safe position with all fall risk precautions in place.

## 2022-05-03 NOTE — CARE COORDINATION
5/3/22, 1:43 PM EDT    DISCHARGE ON GOING EVALUATION    Marychuy Suarez Sr. Hospital day: 8  Location: -26/[facility]-A Reason for admit: Renal failure [N19]  Sepsis associated hypotension ([address] [A41.9, I95.9]   Procedure:   4/25 CXR: Persistent mild atelectatic/fibrotic stranding retrocardiac region left lung base; no effusion  4/25 CT Head:  Stable CT scan of the brain, no interval change since previous MRI scan dated 10 Melody 2019. Mild atrophy and dilatation of the third and lateral ventricles. Probable ischemic changes in the white matter, left basal ganglia and in the bernadette. Calcification the cavernous segments of both internal carotid arteries. Increased density in the external auditory canals which may represent cerumen bilaterally. 4/25 Renal US: Multifocal simple bilateral renal cyst. Echogenic bilateral renal parenchyma. 4/25 CT Abd/pelvis: Right inguinal hernia containing small bowel. This is causing a small bowel obstruction. There are several gas collections within the right inguinal hernia. Ischemic or perforated bowel could have this appearance. Mild to moderate patchy consolidation bilateral lower lobes. This could represent aspiration or pneumonia. Prominent thickening of the distal esophagus and gastroesophageal junction. Neoplasm not excluded. Recommend direct visualization. 4/25 LAPAROTOMY EXPLORATORY FOR SMALL BOWEL OBSTRUCTION WITH INCARCERATED HERNIA REPAIR.  Debridement necrotic subcutaneous fat fascia and muscle right groin  4/25 Intubated in OR  4/25 CVC Right subclavian   4/27 Extubated. 4/29 PICC line placed. 5/1 Return to OR with Dr. Jaylin Booker: Exploratory laparotomy lysis of inflammatory adhesions, drainage undrained right groin abscess, repair of abdominal wound fascial dehiscence    Barriers to Discharge: Hospitalist, Surgery, Nephrology and PMR following. POD #7 and POD #2. WBC 17.6, Hgb 7.6, BUN 35, Creatinine 1.7. PICC line.  IVF, Amiodarone gtt, Pepcid iv daily, Lopressor iv q8hr, Zosyn iv q8hr. Telesitter. NG tube removed yesterday. Max. Clear liquid diet started today. PT/OT. PCP: Veronica Brown MD  Readmission Risk Score: 22.9 ( )%  Patient Goals/Plan/Treatment Preferences: From home with wife. Planning IP Rehab vs SNF.

## 2022-05-03 NOTE — PLAN OF CARE
Nutrition Problem #1: Severe malnutrition,In context of chronic illness  Intervention: Food and/or Nutrient Delivery: Continue Current Diet,Continue Current Parenteral Nutrition  Problem: Nutrition Deficit:  Goal: Optimize nutritional status  Outcome: Progressing

## 2022-05-03 NOTE — PROGRESS NOTES
TPN Follow Up Note    Assessment: NPO    Electrolyte Replacement:   Potassium Phosphate 9 mMol x 1    TPN changes for (today) at 1800:    Increase sodium phosphate to 12 mMol/bag  Increase potassium chloride to 40 meq/bag  Add Magnesium Sulfate 4 meq/bag    Re-check BMP, Mg, PO4, iCa 5/4/22

## 2022-05-03 NOTE — PROGRESS NOTES
EN/PN NUTRITION SUPPORT    RECOMMENDATIONS/PLAN:   -For this evening TPN bag, continue current TPN macronutrients: 12 kcal/kgm, 1 gram protein/kgm, 30% lipid kcals with dose weight 61kgm  -Diet as per Surgery  -Will monitor labs, GI status, ability for diet advancement, po intake and adjust TPN macronutrients as appropriate     CURRENT NUTRITION THERAPIES  PN-Adult  3 IN 1 Central Line (Custom)  ADULT DIET; Clear Liquid  PN-Adult  3 IN 1 Central Line (Custom)  Current Parenteral Nutrition Orders:  · Type and Formula: 3-in-1 Custom (12 kcal/kgm, 1 gram protein/kgm, 30% lipids with dose weight 61kgm)   · Lipids: Daily  · Duration: Continuous  · Rate/Volume: 60ml/hour  · Current PN Order Provides: 732 kcals, 61 grams protein, 79 grams CHO, 22 grams fat/ 24 hours    COMPARATIVE STANDARDS  Energy (kcal):  1920-1822 (25-30/kgm); Weight Used for Energy Requirements:  Current (63kgm)     Protein (g):  107gms (1.7/kgm) IF ROBSON improves with hydration - monitoring; 63gms if no improvement; Weight Used for Protein Requirements:  Current        Fluid (ml/day):  per physician;     NUTRITIONAL SUMMARY/STATUS:   Pt. severely malnourished AEB criteria in 4/27 MNT note. At risk for further nutritional compromise r/t admit with ROBSON, intubated 4/25 and extubated 4/29, emergent GI surgery 4/25 d/t perforated small bowel with soilage of pelvic cavity, pelvic abscess and necrotic hernial sac with wound dehiscence, 5/1 exploratory laparotomy lysis of inflammatory adhesions, drainage undrained right groin abscess, repair of abdominal wound fascial dehiscence; aspiration pneumonia, sepsis; LOS day 8, continued need for TPN d/t altered GI function, per H&P N/V few days pta and underlying medical condition GERD, HTN, CVA, CAD.      NUTRITION RELATED FINDINGS:   Treatments: NG tube removal yesterday, spoke with RN, starting clear liquid diet this morning, patient reports intake of only ice chips so far and tolerating, spoke with Pharmacist regarding continue same macronutrients for this evening bag  TPN Status: TPN infusing at 60ml/ hour  GI Status: BM x 1 on 5/1, patient reports \"belly feeling pretty good\" this morning  Pertinent Labs: Potassium 3.5, BUN 35, Creatinine 1.7, Glucose 114, Phosphorus 2.1  Pertinent Meds: humalog, zosyn, electrolyte replacement   Current Weight: 135 lb 6.4 oz (61.4 kg) (5/2: no edmea documented)  Admission Weight:  138 lb (62.6 kg) (4/26 with no edema)  Wounds: Surgical Incision (4/25 laparotomy for SBO with heria repair; open groin wound, 5/1 abdomen wound dehiscence, repair and wash out)  Malnutrition Status: Severe malnutrition    Please refer to initial nutrition assessment for additional details.    Cesar Carvajal RD, LD:    Contact Number: [phone]

## 2022-05-03 NOTE — PROGRESS NOTES
PROGRESS NOTE      Patient:  Sandra Suarez Sr. Unit/Bed:3B-26/026-A    YOB: 1950    MRN: [id number]       Acct: [de-identified]     PCP: Sharon Arguello MD    Date of Admission: 4/25/2022      Assessment/Plan:    Anticipated Discharge in : Pending    Active Hospital Problems    Diagnosis Date Noted    Dehiscence of fascia [T81.30XA]      Priority: Medium    Atrial fibrillation with RVR ([address] [I48.91]      Priority: Medium    Acute respiratory failure with hypoxia ([address] [J96.01] 04/26/2022     Priority: Medium    Septic shock ([address] [A41.9, R65.21] 04/26/2022     Priority: Medium    Incarcerated right inguinal hernia [K40.30] 04/26/2022     Priority: Medium    Small bowel perforation ([address] [K63.1] 04/26/2022     Priority: Medium    Aspiration pneumonia ([address] [J69.0] 04/26/2022     Priority: Medium    Acute kidney injury ([address] [N17.9] 04/26/2022     Priority: Medium    Hypoalbuminemia [E88.09] 04/26/2022     Priority: Medium    Syncope [R55] 04/26/2022     Priority: Medium    Hypokalemia [E87.6] 04/26/2022     Priority: Medium    Anemia, macrocytic [D53.9] 04/26/2022     Priority: Medium    Severe malnutrition ([address] [E43] 04/26/2022     Priority: Medium       Perforated small bowel, soilage of the pelvic cavity, right groin and pelvic abscesses, and necrotic hernial sac with wound dehiscence:   POD #5 exploratory laparotomy with bowel resection, primary anastomosis, drainage of pelvic abscess as well as right groin exploration, drainage of abscess, and debridement of necrotic subcutaneous fat, fascia and muscle. On Day 8 of Zosyn for Culture Growing Morganella Morganii and Ecoli. final Susceptibility is pending, however prelim demonstrates sensatitivites to Zosyn. POD#1 repair of wound dehiscence. Surgery on board and following. If WBC count increases consider additional un drained abscess or fluid collections. Off NGT suction per Surgery as low oPT. Dressing changes and wound care. Continue on SSI, may require TPN supplement in addition if not tolerating PO. Continue Zoysn coverage to 14 dy in setting of recent Sx. Continue TPN, challenging with Clear Fluids per Sx, SLP consulted to follow. Aspiration pneumonia: Imaging findings suggestion of infiltrate w/ gastric contents suctioned from ET tube is consistent with aspiration pneumonia. Culture growing ecoli with H. influ on PCR detected. Patient receiving antibiotics as above. Sepsis secondary to intra-abdominal infection and aspiration pneumonia - improving: On antibiotics as above. Current WBC 17.6 and downtrend. Previous White count significantly elevated to 33.6 up from 21.8 postoperatively, however Patient remains afebrile and O2 support requirements are being weaned, BP grossly stable, monitor    A. fib with RVR: noted Apr/27/2022 New onset A. fib with RVR refractory to Lopressor and Cardizem. Patient converted with amiodarone. K WNL, Mg WNL, [id number] 5. Surgery recommends holding anticoagulation still at this time in setting of bleed risk. Note Pt is Not optimally controlled on amiodrip + lopressor, however much improved ontrol with HR 80's despite some intermittent arrhythmia on tele. Continue amio drip, if passed oral challenge today, can switch to orals in 1 or 2 days. Continue lopressor 5 TID with holding parameters. BP grossly stable at 120's. Transition to PO in a few days as recent Sx, allow GI tract to recover. If persistently remains in intermittent consider Cardiology consult for optimization as Pt will likely be high bleed risk for some time vs [address] [place] [address] Road use. Continue telemetry  Keep Mg > 2, K > 4,       ROBSON on CKD: Likely 2/2 volume depletion, poor oral intake, and hypotension 2/2 sepsis. Pt creatinine improved to 1.3 from 10.4 on Admit  Patient's baseline 1.3. However had Sx as above, and thus current CR 1.8. improved with Hydration. Nephrology on board, appreciate rec. Good UOPT.    On NS at 40ml/Hr + TPN at 40.    Mixed Acid-base disorder -resolved:  Mixed Anion gap metabolic acidosis with respiratory compensation likely secondary to sepsis and ROBSON with an additional metabolic alkalosis likely secondary to volume depletion in setting of GI sx. AG improving, Nephrology on board, appreciate recs. Acute hypoxic respiratory failure: likely 2/2 to aspiration pneumonia as above. Extubated Apr/27/2022, currently on 3L NC. Noted hgb 7.6, in setting of recent Sx and respiratory needs, will tx 1 x PRBC. Acute postoperative pulmonary insufficiency - resolved: Extubated Apr/27/2022,     Troponinemia: Mildly elevated troponin on admit of 0.047 downtrending with inverted T waves found on EKG.  Likely 2/2 demand ischemia 2/2 hypotension.  Monitor     Hypokalemia -  Resolved. Noted Apr/30/2022, on K protocol. Monitor AM.  Hypernatremia - Resolved. Nephrology on board , on D5W to 50 ml/Hr  Hyperphosphatemia -resolved:  Continue to monitor, PO challenge as above, may supplement with TPN  Hypermagnesemia - resolved:  Continue to monitor, PO challenge as above, may supplement with TPN  Hypocalcemia: Continue to monitor, PO challenge as above, may supplement with TPN    CAD: Hx of,  continue home Lipitor 20 mg,  Holding ASA  For bleed risk. Note Hbg 7.6 today, down from 8.8. Repeat H/H in PM, if remains < 8, Transfuse 1 x PRBC. GERD: hx of, home regiment of pepcid 10mg daily, continued for admission IV form. . Noted incidental finding of thickened esophagus on imaging, F/U opt when stable for scope. Delirium? - pulled out central access line Apr/28/2022 PM, improved mentation however remains quite laconic. Given medical acuity and advance age likely sundowning as behavior is overnight. Reorient and monitor. If requires consider Seroqel QHS.       Chief Complaint:  Pelvic Pain    Hospital Course:    Per H&P:    \" \"The patient was sedated and intubated and therefore could not give any history.  Therefore, history is primarily taken from the chart. Patient's wife states that the patient fell in his yard approximately 14 days ago and went to the [facility]. This time, he was discharged with pain medication. Approximately 7 days ago, he again presented to [facility] with complaints of a right lower abdominal bulge at which point he was diagnosed with inguinal hernia and is scheduled with surgical follow-up. Patient presented to [facility] on 4/25/2022 for syncope and worsening inguinal hernia. Wife states that syncope first occurred in [facility] several days ago when the patient had nausea and subjective feelings of excessive heat while sitting.  After this, he went to go outside and then passed out. On the morning of admission, patient's wife was given of the bathtub when he \"slumped over. \"  Wife stated that the patient vomited 3 times in the last 24 hours with brown/yellow vomitus. Wife also reports that he has not been having bowel movements, has had less frequent urination, and that he has been weak and sleeping \"20 hours a day. \"  While in the ED, the patient was found to have profound renal failure with a creatinine of 11.2 with a baseline of approximately 1.3. Patient also noted to have a profound anion gap acidosis which improved with aggressive fluid resuscitation. Nephrology was consulted given profound renal failure. CT of the abdomen and pelvis without contrast was performed which demonstrated mild to moderate patchy consolidation of the bilateral lower lobes, thickening of the distal esophagus and gastroesophageal junction, a right inguinal hernia causing a small bowel obstruction, and multiple gas collections in the right inguinal hernia highly suspicious for ischemic or perforated bowel.   On the evening of 4/25/2022 Dr. Malik performed exploratory laparotomy with small bowel resection, single primary anastomosis, drainage of pelvic abscess, exploration and drainage of right groin abscess, and debridement of necrotic subcutaneous fat/fascia/muscle in the right groin along with excision of the necrotic hernia sac in the right groin. Empiric coverage with vancomycin and Zosyn to necrotic small bowel as well as probable aspiration pneumonia. Family was extensively counseled. \"    Pt had course in ICU from Apr/25-28/2022, Overnight patient went to A. fib with RVR that was refractory to both Lopressor and Cardizem. Patient was given amiodarone which converted him out. Patient continues to have frequent PACs on the monitor. Surgery was consulted regarding anticoagulation however they are recommending holding anticoagulation at this time. We will continue to follow-up with surgery as far as when we can start anticoagulation. Patient overall is awake and alert self, year, but only occasionally to place. Patient denies any pain or acute complaints at this time. Patient is frequently repeating himself however concern for underlying delirium. May/01/2022, abdominal wound dehissence, taken back to OR for repair and washout. Subjective:    Pt seen and examined. Afebrile overnight, no behavioral issues, PICC in place,  NGT removed. Can remove simmons in PM and do void trial. Telemetry remains improved. Pt mentation improved denies pain, still speaks very little. Oriented to person time place situation. Review of Systems   Unable to perform ROS: Acuity of condition   Constitutional: Positive for appetite change. Negative for chills, diaphoresis, fatigue and fever. HENT: Positive for dental problem. Respiratory: Negative for cough, shortness of breath and wheezing. Cardiovascular: Negative for chest pain and palpitations. Gastrointestinal: Positive for abdominal pain. Negative for constipation, diarrhea, nausea and vomiting. Genitourinary: Negative for decreased urine volume, difficulty urinating, dysuria, frequency, hematuria and urgency. Neurological: Positive for weakness. Negative for seizures and syncope. Psychiatric/Behavioral: Positive for confusion and decreased concentration. Medications:  Reviewed    Infusion Medications    sodium chloride 40 mL/hr at 05/03/22 0958    PN-Adult  3 IN 1 Central Line (Custom) 60 mL/hr at 05/02/22 1808    dextrose      amiodarone 0.5 mg/min (05/02/22 1721)    sodium chloride       Scheduled Medications    potassium phosphate IVPB  9 mmol IntraVENous Once    docusate sodium  100 mg Oral Daily    piperacillin-tazobactam  3,375 mg IntraVENous Q8H    famotidine (PEPCID) injection  20 mg IntraVENous Daily    metoprolol  5 mg IntraVENous Q8H    [Held by provider] metoprolol tartrate  12.5 mg Oral BID    insulin lispro  0-6 Units SubCUTAneous 4 times per day    sodium hypochlorite   Irrigation Daily    vitamin D  50,000 Units Oral Weekly    sodium chloride flush  5-40 mL IntraVENous 2 times per day    [Held by provider] heparin (porcine)  5,000 Units SubCUTAneous TID     PRN Meds: morphine, glucose, glucagon (rDNA), dextrose, dextrose bolus (hypoglycemia) **OR** dextrose bolus (hypoglycemia), sodium chloride flush, sodium chloride, ondansetron **OR** ondansetron, polyethylene glycol, acetaminophen **OR** acetaminophen      Intake/Output Summary (Last 24 hours) at 5/3/2022 1024  Last data filed at 5/3/2022 0751  Gross per 24 hour   Intake 2957.55 ml   Output 1350 ml   Net 1607.55 ml       Diet:  PN-Adult  3 IN 1 Central Line (Custom)  ADULT DIET; Clear Liquid    Exam:  /63   Pulse 88   Temp 98.7 °F (37.1 °C) (Oral)   Resp 20   Ht 5' 9\" (1.753 m)   Wt 135 lb 6.4 oz (61.4 kg)   SpO2 96%   BMI 20.00 kg/m²     Physical Exam  Constitutional:       General: He is not in acute distress. Appearance: He is ill-appearing. He is not toxic-appearing or diaphoretic. Interventions: He is not intubated. Nasal cannula in place. HENT:      Head: Normocephalic and atraumatic.       Nose: Nose normal. No congestion or rhinorrhea. Mouth/Throat:      Mouth: Mucous membranes are dry. Pharynx: Oropharynx is clear. No oropharyngeal exudate or posterior oropharyngeal erythema. Eyes:      General: No scleral icterus. Right eye: No discharge. Left eye: No discharge. Extraocular Movements: Extraocular movements intact. Conjunctiva/sclera: Conjunctivae normal.      Pupils: Pupils are equal, round, and reactive to light. Cardiovascular:      Rate and Rhythm: Normal rate, present. Rhythm irregular. Pulses: Normal pulses. Heart sounds: Normal heart sounds. No murmur heard. No friction rub. No gallop. Pulmonary:      Effort: Pulmonary effort is normal. No accessory muscle usage or respiratory distress. He is not intubated. Breath sounds: Examination of the right-lower field reveals rhonchi. Examination of the left-lower field reveals rhonchi. Rhonchi present. No wheezing or rales. Abdominal:      General: A surgical scar is present. Bowel sounds are increased. Tenderness: There is abdominal tenderness. There is no guarding. Comments: Abdominal Drain in place, Abdominal band in place. Neurological:      Mental Status: He is Oriented to person, place, time, situation. Cranial Nerves: No dysarthria or facial asymmetry. Motor: No weakness. Comments: Still speaks very little. Psychiatric:         Behavior: Behavior is cooperative. Labs:   Recent Labs     05/01/22 0345 05/02/22 0425 05/03/22  0650   WBC 24.4* 22.2* 17.6*   HGB 9.1* 8.8* 7.6*   HCT 27.7* 27.3* 23.8*    208 232     Recent Labs     05/01/22 0345 05/02/22  0425 05/03/22  0650    134* 138   K 3.4* 3.7 3.5   CL 95* 95* 100   CO2 30 29 26   BUN 26* 32* 35*   CREATININE 1.3* 1.8* 1.7*   CALCIUM 7.8* 7.5* 7.5*   PHOS 1.4* 2.5 2.1*     No results for input(s): AST, ALT, BILIDIR, BILITOT, ALKPHOS in the last 72 hours. No results for input(s): INR in the last 72 hours.   No results for input(s): Yoruba Graves in the last 72 hours. Urinalysis:      Lab Results   Component Value Date    NITRU NEGATIVE 04/25/2022    WBCUA 10-15 04/25/2022    BACTERIA FEW 04/25/2022    RBCUA 10-15 04/25/2022    BLOODU MODERATE 04/25/2022    SPECGRAV 1.015 04/25/2022    GLUCOSEU NEGATIVE 06/09/2019       Radiology:  XR CHEST PORTABLE   Final Result   1. Persistent bibasilar opacities. Small left-sided pleural effusion. **This report has been created using voice recognition software. It may contain minor errors which are inherent in voice recognition technology. **      Final report electronically signed by Dr. Leonid Pryor on 5/2/2022 8:13 AM      XR CHEST PORTABLE   Final Result   A right arm PICC line tip terminates at the cavoatrial projection. No pneumothorax is observed. Small bilateral pleural effusions and bilateral lower lobe airspace opacity, right greater than left, are not significantly changed. **This report has been created using voice recognition software. It may contain minor errors which are inherent in voice recognition technology. **      Final report electronically signed by Dr Linda Aj on 4/29/2022 4:04 PM      XR ABDOMEN FOR NG/OG/NE TUBE PLACEMENT   Final Result   Impression:      NG tube tip proximal stomach. This document has been electronically signed by: Leonel Qureshi MD on    04/28/2022 11:56 PM      XR CHEST PORTABLE   Final Result   Impression:   1. Satisfactory positioning of the endotracheal tube and right central    line. Probable slightly high positioning of the orogastric tube. Consider    advancing 3-4 cm. 2. Interval development of small bilateral pleural effusions, right    greater than left. 3. Bilateral lower lobe airspace opacities secondary to atelectasis and/or    infiltrates, right greater than left. 4. Bilateral interstitial changes secondary to pneumonitis or edema.       This document has been electronically signed by: Maria Elena Queens. Jennifer,  on    04/26/2022 12:01 AM      CT ABDOMEN PELVIS WO CONTRAST Additional Contrast? None   Final Result   Impression:   Right inguinal hernia containing small bowel. This is causing a small    bowel obstruction. There are several gas collections within the right    inguinal hernia. Ischemic or perforated bowel could have this appearance. Mild to moderate patchy consolidation bilateral lower lobes. This could    represent aspiration or pneumonia. Prominent thickening of the distal esophagus and gastroesophageal    junction. Neoplasm not excluded. Recommend direct visualization. This document has been electronically signed by: Yue Mays MD on    04/25/2022 07:20 PM      All CTs at this facility use dose modulation techniques and iterative    reconstructions, and/or weight-based dosing   when appropriate to reduce radiation to a low as reasonably achievable. US RENAL COMPLETE   Final Result   Impression:   Multifocal simple bilateral renal cyst. Echogenic bilateral renal    parenchyma. This document has been electronically signed by: Yue Mays MD on    04/25/2022 06:30 PM      CT HEAD WO CONTRAST   Final Result       1. Stable CT scan of the brain, no interval change since previous MRI scan dated 10 Mel[address] 2019.   2. Mild atrophy and dilatation of the third and lateral ventricles. 3. Probable ischemic changes in the white matter, left basal ganglia and in the bernadette. 4. Calcification the cavernous segments of both internal carotid arteries. 5. Increased density in the external auditory canals which may represent cerumen bilaterally. **This report has been created using voice recognition software. It may contain minor errors which are inherent in voice recognition technology. **      Final report electronically signed by DR Jani Osei on 4/25/2022 4:01 PM      XR CHEST PORTABLE   Final Result   1. Normal heart size. No effusion.    2. Persistent mild atelectatic/fibrotic stranding retrocardiac region left lung base. **This report has been created using voice recognition software. It may contain minor errors which are inherent in voice recognition technology. **      Final report electronically signed by Dr. Sherine Russo on 4/25/2022 3:05 PM          Diet: PN-Adult  3 IN 1 Central Line (Custom)  ADULT DIET; Clear Liquid    DVT prophylaxis: [] Lovenox                                 [] SCDs                                 [] SQ Heparin                                 [] Encourage ambulation           [] Already on Anticoagulation     Disposition:    [] Home       [] TCU       [] Rehab       [] Psych       [] SNF       [] [place]       [x] Other-     Code Status: Full Code    PT/OT Eval Status:      Electronically signed by Ruddy Hernandez MD on 5/3/2022 at 10:24 AM    This note was electronically signed. Parts of this note may have been dictated by use of voice recognition software and electronically transcribed. The note may contain errors not detected in proofreading. Please refer to my supervising physician's documentation if my documentation differs.

## 2022-05-03 NOTE — PROGRESS NOTES
[address] [address]  INPATIENT SPEECH THERAPY  [facility]-STEPDOWN 3B  DAILY NOTE    TIME   SLP Individual Minutes  Time In:   Time Out: 98  Minutes: 9  Timed Code Treatment Minutes: 0 Minutes   Dysphagia Tx: 9 minutes    Date: 5/3/2022  Patient Name: Cameron Fajardo.      CSN: [id number]   : 1950  (66 y.o.)  Gender: male   Referring Physician: Ivan Waggoner MD  Diagnosis: Renal failure  Precautions: aspiration precautions   Current Diet: Clear liquids  Swallowing Strategies: Standard Universal Swallow Precautions and oral care completion   Date of Last MBS/FEES: Not Applicable    Pain:  No pain reported. Subjective:  Session approved by RN, Bijan Lara. She stated that the patient's NG tube was removed yesterday. Patient has been allowed a clear liquid diet. Received new order from Gal Aquino MD.  Current plan of care continues to be appropriate. Dysphagia treatment completed rather than a new evaluation. Short-Term Goals:  SHORT TERM GOAL #1:  Goal 1: Patient and caregivers will demonstrate understanding and completion of oral care per oral hygiene protocol to reduce oral bacteria colonization and reduce risk for aspiration pneumonia. INTERVENTIONS: Continue to recommend comprehensive oral care routine  in order to reduce risk of colonization of oral bacteria and minimize pt risks for aspiration pneumonia. SHORT TERM GOAL #2:  Goal 2: Patient will complete trials of ice chips, and thin liquids  with SLP ONLY to determine appropriateness for completion of instrumental swallow assessment (Need clearance for solids due to small bowel obstruction). INTERVENTIONS: GOAL MET. Completed PO trials of thin liquid water and Jello per RN request.  Patient tolerated thin liquid trials by cup and straw without overt s/s of laryngeal penetration/aspiration. Vocal quality remained dry and clear. Patient tolerated one bite of Jello with mildly prolonged oral phase.   No overt s/s of laryngeal penetration/aspiration. Patient declined additional trials. REVISED NEW GOAL:  Patient to safely consume clear liquids and advanced diet texture trials only as cleared by surgery without s/s of laryngeal penetration/aspiration. ST to continue to follow and will trial advanced diet textures when cleared to do so. Will continue to monitor need for possible instrumental swallowing assessment. SHORT TERM GOAL #3:  Goal 3: Complete FEES vs MBS when clinically indicated  INTERVENTIONS: Will continue to monitor need. SHORT TERM GOAL #4:  Goal 4: Continue to monitor cognitive function and evaluation as clinically indicated. INTERVENTIONS: Will continue to monitor need. Long-Term Goals:    No established LTG's given short ELOS     EDUCATION:  Learner: Patient and RN Heather  Education:  Reviewed diet and strategies and Reviewed ST goals and Plan of Care  Evaluation of Education: Needs further instruction, No evidence of learning and Family not present    ASSESSMENT/PLAN:  Activity Tolerance:  Patient tolerance of  treatment: good. Assessment/Plan: Patient progressing toward established goals. Continues to require skilled care of licensed speech pathologist to progress toward achievement of established goals and plan of care. .     Plan for Next Session: PO trials as clinically indicated, monitor need for MBS      Jerry Mercado M.A.  CCC-SLP    5/3/2022

## 2022-05-03 NOTE — PROGRESS NOTES
Jovana Flanagan MD  Postoperative Progress Note  Pt Name: Ambar Patel Record Number: [id number]  Date of Birth 1950   Today's Date: 5/3/2022  ASSESSMENT   1. POD # 7 sepsis secondary to incarcerated right inguinal hernia with perforation and obstruction of small bowel. Necrotic right groin wound status postdebridement drainage of abdominal abscess present at the time of surgery. POD #2 repair of abdominal wound dehiscence lysis of inflammatory adhesions and abdominal and right groin washout  2. Sepsis resolved. White count continues to to decline  3. Acute renal failure improved  4. Nasal cannula oxygen  5. Leukocytosis resolving  6. A. Fib/NS arrhythmia. Internal medicine plans to transition to oral with improved oral intake  7. Hypernatremia resolved  8. pneumonia  9. Tolerated NG tube out. Retry clear liquid diet. 10. Anemia no signs of active bleeding   has a past medical history of CAD (coronary artery disease), CVA (cerebral vascular accident) ([address] Erectile dysfunction, GERD (gastroesophageal reflux disease), Hyperlipidemia, and Hypertension. PLANS   1. Analgesics and antiemetics as needed. 2. IV hydration per medicine service. Normal saline stopped on D5 water. 3. Continue broad-spectrum antibiotic Zosyn. Medicine plans to continue a few more days. 4.   Amiodarone drip for atrial fibrillation. Transition to oral per primary service when tolerating oral intake  5. DVT prophylaxis per primary service. 6. change groin wound packing 1-2 times daily. Dry gauze. Dakin's solution ordered. Wounds look okay. Midline wound dressing changed. 7.  Continue Zosyn. Intra-abdominal cultures E. coli and Morganella morganii I both sensitive. White count continues to trend down may discontinue in 2 to 3 days. 8.  TPN. Continue until tolerating oral intake  9. Monitor white count  10. Therapies for deconditioning  11.   Continue abdominal loc  12. Okay to remove Max from a surgical standpoint. Discussed with Dr. Guillermo Ruff as well as bedside nurse. Cecilio Negro is hemodynamically stable. Wounds look look good. Bowel sounds are present. Denies nausea or emesis. CURRENT MEDICATIONS   Scheduled Meds:   potassium phosphate IVPB  9 mmol IntraVENous Once    docusate sodium  100 mg Oral Daily    piperacillin-tazobactam  3,375 mg IntraVENous Q8H    famotidine (PEPCID) injection  20 mg IntraVENous Daily    metoprolol  5 mg IntraVENous Q8H    [Held by provider] metoprolol tartrate  12.5 mg Oral BID    insulin lispro  0-6 Units SubCUTAneous 4 times per day    sodium hypochlorite   Irrigation Daily    vitamin D  50,000 Units Oral Weekly    sodium chloride flush  5-40 mL IntraVENous 2 times per day    [Held by provider] heparin (porcine)  5,000 Units SubCUTAneous TID     Continuous Infusions:   sodium chloride 40 mL/hr at 22 0913    PN-Adult  3 IN 1 Central Line (Custom) 60 mL/hr at 22 1808    dextrose      amiodarone 0.5 mg/min (22 1721)    sodium chloride       PRN Meds:.morphine, glucose, glucagon (rDNA), dextrose, dextrose bolus (hypoglycemia) **OR** dextrose bolus (hypoglycemia), sodium chloride flush, sodium chloride, ondansetron **OR** ondansetron, polyethylene glycol, acetaminophen **OR** acetaminophen  OBJECTIVE   CURRENT VITALS:  height is 5' 9\" (1.753 m) and weight is 135 lb 6.4 oz (61.4 kg). His oral temperature is 98.7 °F (37.1 °C). His blood pressure is 125/63 and his pulse is 88. His respiration is 20 and oxygen saturation is 96%. Body mass index is 20 kg/m².   Temperature Range (24h):Temp: 98.7 °F (37.1 °C) Temp  Av.4 °F (36.9 °C)  Min: 97.9 °F (36.6 °C)  Max: 99.2 °F (37.3 °C)  BP Range (54K): Systolic (15ASI), OCH:427 , Min:115 , QEX:525     Diastolic (07MOA), LZO:01, Min:58, Max:65    Pulse Range (24h): Pulse  Av.2  Min: 76  Max: 89  Respiration Range (24h): Resp  Av.2  Min: 16 Max: 20  Current Pulse Ox (24h):  SpO2: 96 %  Pulse Ox Range (24h):  SpO2  Av %  Min: 94 %  Max: 99 %  Oxygen Amount and Delivery: O2 Flow Rate (L/min): 2 L/min  Incentive Spirometry Tx:            GENERAL: Sedated no acute distress  LUNGS: Clear diminished bases   HEART: Pulse 88 looks sinus on monitor today  ABDOMEN: Soft midline incision intact mild serous drainage. INCISION midline wound intact. Right groin wound clean  EXTREMITY: No edema  In: 2987.6 [I.V.:1334; NG/GT:90]  Out: 1350 [Urine:1350]  [REMOVED] Closed/Suction Drain Superior;Midline Abdomen Bulb-Output (ml): 50 ml  Date 22 0000 - 22 2359   Shift 3335-4376 5029-6573 5173-2673 24 Hour Total   INTAKE   I.V.(mL/kg) 629. 4(10.2)   629. 4(10.2)   IV Piggyback(mL/kg) 81(1.3)   81(1.3)   TPN(mL/kg) 691(64.0)   578(28.6)   Shift Total(mL/kg) 7756.5(49.3)   5970.2(29.4)   OUTPUT   Shift Total(mL/kg)       Weight (kg) 61.4 61.4 61.4 61.4     LABS     Recent Labs     22  0345 22  0425 22  0650   WBC 24.4* 22.2* 17.6*   HGB 9.1* 8.8* 7.6*   HCT 27.7* 27.3* 23.8*    208 232    134* 138   K 3.4* 3.7 3.5   CL 95* 95* 100   CO2 30 29 26   BUN 26* 32* 35*   CREATININE 1.3* 1.8* 1.7*   MG 1.2* 1.9 1.8   PHOS 1.4* 2.5 2.1*   CALCIUM 7.8* 7.5* 7.5*      No results for input(s): PTT, INR in the last 72 hours. Invalid input(s): PT  No results for input(s): AST, ALT, BILITOT, BILIDIR, AMYLASE, LIPASE, LDH, LACTA in the last 72 hours. No results for input(s): TROPONINT in the last 72 hours. RADIOLOGY     XR CHEST PORTABLE   Final Result   1. Persistent bibasilar opacities. Small left-sided pleural effusion. **This report has been created using voice recognition software. It may contain minor errors which are inherent in voice recognition technology. **      Final report electronically signed by Dr. Negrito Fuller on 2022 8:13 AM      XR CHEST PORTABLE   Final Result   A right arm PICC line tip terminates at the cavoatrial projection. No pneumothorax is observed. Small bilateral pleural effusions and bilateral lower lobe airspace opacity, right greater than left, are not significantly changed. **This report has been created using voice recognition software. It may contain minor errors which are inherent in voice recognition technology. **      Final report electronically signed by Dr Taylor Greene on 4/29/2022 4:04 PM      XR ABDOMEN FOR NG/OG/NE TUBE PLACEMENT   Final Result   Impression:      NG tube tip proximal stomach. This document has been electronically signed by: Lawrence Dobbins MD on    04/28/2022 11:56 PM      XR CHEST PORTABLE   Final Result   Impression:   1. Satisfactory positioning of the endotracheal tube and right central    line. Probable slightly high positioning of the orogastric tube. Consider    advancing 3-4 cm. 2. Interval development of small bilateral pleural effusions, right    greater than left. 3. Bilateral lower lobe airspace opacities secondary to atelectasis and/or    infiltrates, right greater than left. 4. Bilateral interstitial changes secondary to pneumonitis or edema. This document has been electronically signed by: Ramon Hebert. DO Jennifer on    04/26/2022 12:01 AM      CT ABDOMEN PELVIS WO CONTRAST Additional Contrast? None   Final Result   Impression:   Right inguinal hernia containing small bowel. This is causing a small    bowel obstruction. There are several gas collections within the right    inguinal hernia. Ischemic or perforated bowel could have this appearance. Mild to moderate patchy consolidation bilateral lower lobes. This could    represent aspiration or pneumonia. Prominent thickening of the distal esophagus and gastroesophageal    junction. Neoplasm not excluded. Recommend direct visualization.       This document has been electronically signed by: Gabbi Will MD on    04/25/2022 07:20 PM      All CTs at this facility use dose modulation techniques and iterative    reconstructions, and/or weight-based dosing   when appropriate to reduce radiation to a low as reasonably achievable. US RENAL COMPLETE   Final Result   Impression:   Multifocal simple bilateral renal cyst. Echogenic bilateral renal    parenchyma. This document has been electronically signed by: Ann Singleton MD on    04/25/2022 06:30 PM      CT HEAD WO CONTRAST   Final Result       1. Stable CT scan of the brain, no interval change since previous MRI scan dated 10 Melody 2019.   2. Mild atrophy and dilatation of the third and lateral ventricles. 3. Probable ischemic changes in the white matter, left basal ganglia and in the bernadette. 4. Calcification the cavernous segments of both internal carotid arteries. 5. Increased density in the external auditory canals which may represent cerumen bilaterally. **This report has been created using voice recognition software. It may contain minor errors which are inherent in voice recognition technology. **      Final report electronically signed by DR Elmira Cooney on 4/25/2022 4:01 PM      XR CHEST PORTABLE   Final Result   1. Normal heart size. No effusion. 2. Persistent mild atelectatic/fibrotic stranding retrocardiac region left lung base. **This report has been created using voice recognition software. It may contain minor errors which are inherent in voice recognition technology. **      Final report electronically signed by Dr. Bhargavi Barbosa on 4/25/2022 3:05 PM          Electronically signed by Elvira Em MD on 5/3/2022 at 9:51 AM

## 2022-05-03 NOTE — PLAN OF CARE
Problem: Discharge Planning  Goal: Discharge to home or other facility with appropriate resources  Description: Continue discharge planning. Patient currently with NG tube, PICC line inserted today.  following. Patient able to take few steps at bedside with PT/OT today. Outcome: Progressing  Flowsheets (Taken 5/3/2022 0805)  Discharge to home or other facility with appropriate resources: Identify barriers to discharge with patient and caregiver  Note: Continue discharge planning. Possible rehab needed at discharge. Patient working with PT/OT. 2 assist to chair. Continue to assess discharge needs. Clear liquids started today. Problem: Pain  Goal: Verbalizes/displays adequate comfort level or baseline comfort level  Description: Patient denies pain today. Outcome: Progressing  Flowsheets (Taken 5/3/2022 0130 by Rosa Waddell RN)  Verbalizes/displays adequate comfort level or baseline comfort level:   Encourage patient to monitor pain and request assistance   Assess pain using appropriate pain scale  Note: Patient states surgical pain this morning, rated 10/10 to abdomen. Dose of IV morphine given, pain improved to 3/10 at pain goal, then patient late denies pain rest of shift. Rest and repositioning for pain management. Problem: Chronic Conditions and Co-morbidities  Goal: Patient's chronic conditions and co-morbidity symptoms are monitored and maintained or improved  Outcome: Progressing  Flowsheets (Taken 5/3/2022 0805)  Care Plan - Patient's Chronic Conditions and Co-Morbidity Symptoms are Monitored and Maintained or Improved: Monitor and assess patient's chronic conditions and comorbid symptoms for stability, deterioration, or improvement  Note: Continue to monitor telemetry for sinus arrhythmia. Continue accuchecks ACHS. VS stable this shift.       Problem: Safety - Adult  Goal: Free from fall injury  Outcome: Progressing  Flowsheets (Taken 5/3/2022 1539)  Free From Fall Injury: Instruct family/caregiver on patient safety  Note: Continue fall precautions, continue telesitter for confusion, poor safety awareness, patient tries to get up out of chair/bed at times without calling nurse. Continue bed/chair alarms. Problem: ABCDS Injury Assessment  Goal: Absence of physical injury  Outcome: Progressing     Problem: Skin/Tissue Integrity  Goal: Absence of new skin breakdown  Description: 1. Monitor for areas of redness and/or skin breakdown  2. Assess vascular access sites hourly  3. Every 4-6 hours minimum:  Change oxygen saturation probe site  4. Every 4-6 hours:  If on nasal continuous positive airway pressure, respiratory therapy assess nares and determine need for appliance change or resting period. Outcome: Progressing  Note: Continue skin assessment. Turn and reposition every 2 hours while in bed. Daily dressing change to abdomen-inspect incision for signs of infection. Problem: Nutrition Deficit:  Goal: Optimize nutritional status  5/3/2022 1845 by Kristin Gottron, RN  Outcome: Progressing  Note: Clear liquids started today. NG out yesterday. Patient tolerating few sips liquids today, denies nausea or vomiting. Problem: Respiratory - Adult  Goal: Achieves optimal ventilation and oxygenation  Outcome: Progressing  Flowsheets (Taken 5/2/2022 2045 by Obi Cornejo RN)  Achieves optimal ventilation and oxygenation:   Assess for changes in respiratory status   Assess for changes in mentation and behavior  Note: Patient continues on 2L nasal canula-wean oxygen at tolerated. Lungs clear/diminished t/o. Continue lung assessment. Encourage incentive spirometry.       Problem: Cardiovascular - Adult  Goal: Maintains optimal cardiac output and hemodynamic stability  Outcome: Progressing  Flowsheets (Taken 5/3/2022 1845)  Maintains optimal cardiac output and hemodynamic stability: Monitor blood pressure and heart rate     Problem: Cardiovascular - Adult  Goal: Absence of cardiac dysrhythmias or at baseline  Outcome: Progressing  Flowsheets (Taken 5/2/2022 2045 by Rae David RN)  Absence of cardiac dysrhythmias or at baseline: Monitor cardiac rate and rhythm  Note: Sinus arrhythmia per telemetry with PACs, continue to monitor telemetry. Problem: Gastrointestinal - Adult  Goal: Minimal or absence of nausea and vomiting  Outcome: Progressing  Flowsheets (Taken 5/2/2022 2045 by Rae David RN)  Minimal or absence of nausea and vomiting:   Administer IV fluids as ordered to ensure adequate hydration   Provide nonpharmacologic comfort measures as appropriate  Note: Clear liquid diet started today. Patient tolerating few sips. Continue TPN for nutrition. Problem: Gastrointestinal - Adult  Goal: Maintains or returns to baseline bowel function  Outcome: Progressing  Flowsheets (Taken 5/2/2022 2045 by Rae David RN)  Maintains or returns to baseline bowel function:   Assess bowel function   Administer ordered medications as needed   Administer IV fluids as ordered to ensure adequate hydration   Encourage mobilization and activity  Note: Active bowel sounds noted. Problem: Infection - Adult  Goal: Absence of infection at discharge  Outcome: Progressing  Flowsheets (Taken 5/3/2022 0805)  Absence of infection at discharge:   Assess and monitor for signs and symptoms of infection   Monitor lab/diagnostic results  Note: Afebrile this shift. Continue to monitor for fever or signs of infection. Monitor incision.       Problem: Infection - Adult  Goal: Absence of infection during hospitalization  Outcome: Progressing  Flowsheets (Taken 5/3/2022 0805)  Absence of infection during hospitalization: Assess and monitor for signs and symptoms of infection     Problem: Infection - Adult  Goal: Absence of fever/infection during anticipated neutropenic period  Outcome: Progressing  Flowsheets (Taken 5/3/2022 0805)  Absence of fever/infection during anticipated neutropenic period: Monitor white blood cell count   Care plan reviewed with patient. Patient  verbalize understanding of the plan of care and contribute to goal setting.

## 2022-05-04 LAB
ANION GAP SERPL CALCULATED.3IONS-SCNC: 12 MEQ/L (ref 8–16)
BUN BLDV-MCNC: 28 MG/DL (ref 7–22)
CALCIUM SERPL-MCNC: 7.6 MG/DL (ref 8.5–10.5)
CHLORIDE BLD-SCNC: 102 MEQ/L (ref 98–111)
CO2: 25 MEQ/L (ref 23–33)
CREAT SERPL-MCNC: 1.3 MG/DL (ref 0.4–1.2)
ERYTHROCYTE [DISTWIDTH] IN BLOOD BY AUTOMATED COUNT: 15.8 % (ref 11.5–14.5)
ERYTHROCYTE [DISTWIDTH] IN BLOOD BY AUTOMATED COUNT: 59.1 FL (ref 35–45)
GLUCOSE BLD-MCNC: 104 MG/DL (ref 70–108)
GLUCOSE BLD-MCNC: 108 MG/DL (ref 70–108)
GLUCOSE BLD-MCNC: 113 MG/DL (ref 70–108)
GLUCOSE BLD-MCNC: 116 MG/DL (ref 70–108)
GLUCOSE BLD-MCNC: 121 MG/DL (ref 70–108)
GLUCOSE BLD-MCNC: 129 MG/DL (ref 70–108)
HCT VFR BLD CALC: 26.6 % (ref 42–52)
HEMOGLOBIN: 8.4 GM/DL (ref 14–18)
MCH RBC QN AUTO: 32.8 PG (ref 26–33)
MCHC RBC AUTO-ENTMCNC: 31.6 GM/DL (ref 32.2–35.5)
MCV RBC AUTO: 103.9 FL (ref 80–94)
PHOSPHORUS: 1.8 MG/DL (ref 2.4–4.7)
PLATELET # BLD: 274 THOU/MM3 (ref 130–400)
PMV BLD AUTO: 11 FL (ref 9.4–12.4)
POTASSIUM SERPL-SCNC: 3.8 MEQ/L (ref 3.5–5.2)
RBC # BLD: 2.56 MILL/MM3 (ref 4.7–6.1)
SODIUM BLD-SCNC: 139 MEQ/L (ref 135–145)
WBC # BLD: 16.5 THOU/MM3 (ref 4.8–10.8)

## 2022-05-04 PROCEDURE — 84100 ASSAY OF PHOSPHORUS: CPT

## 2022-05-04 PROCEDURE — 97535 SELF CARE MNGMENT TRAINING: CPT

## 2022-05-04 PROCEDURE — 99024 POSTOP FOLLOW-UP VISIT: CPT | Performed by: SURGERY

## 2022-05-04 PROCEDURE — 82948 REAGENT STRIP/BLOOD GLUCOSE: CPT

## 2022-05-04 PROCEDURE — 97116 GAIT TRAINING THERAPY: CPT

## 2022-05-04 PROCEDURE — 2580000003 HC RX 258

## 2022-05-04 PROCEDURE — 2580000003 HC RX 258: Performed by: SURGERY

## 2022-05-04 PROCEDURE — 2500000003 HC RX 250 WO HCPCS: Performed by: SURGERY

## 2022-05-04 PROCEDURE — 2500000003 HC RX 250 WO HCPCS: Performed by: INTERNAL MEDICINE

## 2022-05-04 PROCEDURE — 2700000000 HC OXYGEN THERAPY PER DAY

## 2022-05-04 PROCEDURE — 97530 THERAPEUTIC ACTIVITIES: CPT

## 2022-05-04 PROCEDURE — 97110 THERAPEUTIC EXERCISES: CPT

## 2022-05-04 PROCEDURE — 80048 BASIC METABOLIC PNL TOTAL CA: CPT

## 2022-05-04 PROCEDURE — 6370000000 HC RX 637 (ALT 250 FOR IP)

## 2022-05-04 PROCEDURE — 99233 SBSQ HOSP IP/OBS HIGH 50: CPT | Performed by: INTERNAL MEDICINE

## 2022-05-04 PROCEDURE — 2580000003 HC RX 258: Performed by: INTERNAL MEDICINE

## 2022-05-04 PROCEDURE — 2500000003 HC RX 250 WO HCPCS

## 2022-05-04 PROCEDURE — 6370000000 HC RX 637 (ALT 250 FOR IP): Performed by: SURGERY

## 2022-05-04 PROCEDURE — 94761 N-INVAS EAR/PLS OXIMETRY MLT: CPT

## 2022-05-04 PROCEDURE — 85027 COMPLETE CBC AUTOMATED: CPT

## 2022-05-04 PROCEDURE — 2140000000 HC CCU INTERMEDIATE R&B

## 2022-05-04 PROCEDURE — 6360000002 HC RX W HCPCS

## 2022-05-04 PROCEDURE — 99232 SBSQ HOSP IP/OBS MODERATE 35: CPT | Performed by: INTERNAL MEDICINE

## 2022-05-04 RX ORDER — AMIODARONE HYDROCHLORIDE 200 MG/1
200 TABLET ORAL 2 TIMES DAILY
Status: COMPLETED | OUTPATIENT
Start: 2022-05-04 | End: 2022-05-04

## 2022-05-04 RX ORDER — AMIODARONE HYDROCHLORIDE 200 MG/1
200 TABLET ORAL DAILY
Status: DISCONTINUED | OUTPATIENT
Start: 2022-05-05 | End: 2022-05-10 | Stop reason: HOSPADM

## 2022-05-04 RX ADMIN — METOPROLOL TARTRATE 25 MG: 25 TABLET, FILM COATED ORAL at 20:03

## 2022-05-04 RX ADMIN — FAMOTIDINE 20 MG: 10 INJECTION, SOLUTION INTRAVENOUS at 08:51

## 2022-05-04 RX ADMIN — AMIODARONE HYDROCHLORIDE 200 MG: 200 TABLET ORAL at 10:22

## 2022-05-04 RX ADMIN — AMIODARONE HYDROCHLORIDE 0.5 MG/MIN: 1.8 INJECTION, SOLUTION INTRAVENOUS at 07:26

## 2022-05-04 RX ADMIN — METOPROLOL TARTRATE 25 MG: 25 TABLET, FILM COATED ORAL at 10:22

## 2022-05-04 RX ADMIN — CALCIUM GLUCONATE: 98 INJECTION, SOLUTION INTRAVENOUS at 17:35

## 2022-05-04 RX ADMIN — PIPERACILLIN AND TAZOBACTAM 3375 MG: 3; .375 INJECTION, POWDER, LYOPHILIZED, FOR SOLUTION INTRAVENOUS at 16:26

## 2022-05-04 RX ADMIN — ERGOCALCIFEROL 50000 UNITS: 1.25 CAPSULE ORAL at 10:25

## 2022-05-04 RX ADMIN — METOPROLOL TARTRATE 5 MG: 5 INJECTION INTRAVENOUS at 02:43

## 2022-05-04 RX ADMIN — PIPERACILLIN AND TAZOBACTAM 3375 MG: 3; .375 INJECTION, POWDER, LYOPHILIZED, FOR SOLUTION INTRAVENOUS at 07:43

## 2022-05-04 RX ADMIN — SODIUM CHLORIDE, PRESERVATIVE FREE 10 ML: 5 INJECTION INTRAVENOUS at 08:51

## 2022-05-04 RX ADMIN — POTASSIUM PHOSPHATE, MONOBASIC AND POTASSIUM PHOSPHATE, DIBASIC 15 MMOL: 224; 236 INJECTION, SOLUTION, CONCENTRATE INTRAVENOUS at 08:59

## 2022-05-04 RX ADMIN — DAKIN'S SOLUTION 0.125% (QUARTER STRENGTH): 0.12 SOLUTION at 11:45

## 2022-05-04 RX ADMIN — AMIODARONE HYDROCHLORIDE 200 MG: 200 TABLET ORAL at 20:03

## 2022-05-04 RX ADMIN — PIPERACILLIN AND TAZOBACTAM 3375 MG: 3; .375 INJECTION, POWDER, LYOPHILIZED, FOR SOLUTION INTRAVENOUS at 00:36

## 2022-05-04 NOTE — PROGRESS NOTES
PROGRESS NOTE      Patient:  Gilda Suarez Sr. Unit/Bed:3B-26/026-A    YOB: 1950    MRN: [id number]       Acct: [de-identified]     PCP: Anant Rendon MD    Date of Admission: 4/25/2022      Assessment/Plan:    Anticipated Discharge in : Pending    Active Hospital Problems    Diagnosis Date Noted    Dehiscence of fascia [T81.30XA]      Priority: Medium    Atrial fibrillation with RVR ([address] [I48.91]      Priority: Medium    Acute respiratory failure with hypoxia ([address] [J96.01] 04/26/2022     Priority: Medium    Septic shock ([address] [A41.9, R65.21] 04/26/2022     Priority: Medium    Incarcerated right inguinal hernia [K40.30] 04/26/2022     Priority: Medium    Small bowel perforation ([address] [K63.1] 04/26/2022     Priority: Medium    Aspiration pneumonia ([address] [J69.0] 04/26/2022     Priority: Medium    Acute kidney injury ([address] [N17.9] 04/26/2022     Priority: Medium    Hypoalbuminemia [E88.09] 04/26/2022     Priority: Medium    Syncope [R55] 04/26/2022     Priority: Medium    Hypokalemia [E87.6] 04/26/2022     Priority: Medium    Anemia, macrocytic [D53.9] 04/26/2022     Priority: Medium    Severe malnutrition ([address] [E43] 04/26/2022     Priority: Medium       Perforated small bowel, soilage of the pelvic cavity, right groin and pelvic abscesses, and necrotic hernial sac with wound dehiscence:   POD #5 exploratory laparotomy with bowel resection, primary anastomosis, drainage of pelvic abscess as well as right groin exploration, drainage of abscess, and debridement of necrotic subcutaneous fat, fascia and muscle. On Day 8 of Zosyn for Culture Growing Morganella Morganii and Ecoli. final Susceptibility is pending, however prelim demonstrates sensatitivites to Zosyn. POD#1 repair of wound dehiscence. Surgery on board and following. If WBC count increases consider additional un drained abscess or fluid collections. Off NGT suction per Surgery as low oPT. Dressing changes and wound care. Continue on SSI, may require TPN supplement in addition if not tolerating PO. Continue Zoysn coverage to 10/14 dy in setting of recent Sx. Continue TPN supplement, challenging with PO diet per Sx, SLP consulted to follow. Question if fluid aspirated with PO may need MBS. Aspiration pneumonia: Imaging findings suggestion of infiltrate w/ gastric contents suctioned from ET tube is consistent with aspiration pneumonia. Culture growing ecoli with H. influ on PCR detected. Patient receiving antibiotics as above. Sepsis secondary to intra-abdominal infection and aspiration pneumonia - improving: On antibiotics as above. Current WBC 17.6 and downtrend. Previous White count significantly elevated to 33.6 up from 21.8 postoperatively, however Patient remains afebrile and O2 support requirements are being weaned, BP grossly stable, monitor    A. fib with RVR: noted Apr/27/2022 New onset A. fib with RVR refractory to Lopressor and Cardizem. Patient converted with amiodarone. K WNL, Mg WNL, [id number] 5. Surgery recommends holding anticoagulation still at this time in setting of bleed risk. Note Pt is Not optimally controlled on amiodrip + lopressor, however much improved ontrol with HR 80's despite some intermittent arrhythmia on tele. Transition to oral Amio with 200 BID load, then 200 QD, plan to uptitrate as nessary. Continue amio drip until 4 PM,  Switch to Lopressor 25 BID PO  with holding parameters. BP grossly stable at 120's. If persistently remains in intermittent consider Cardiology consult for optimization as Pt will likely be high bleed risk for some time vs [address] [place] [address] Road use. Continue telemetry  Keep Mg > 2, K > 4,       ROBSON on CKD - resolving: Likely 2/2 volume depletion, poor oral intake, and hypotension 2/2 sepsis. Pt creatinine improved to 1.3 from 10.4 on Admit  Patient's baseline 1.3. improved with Hydration. Nephrology on board, appreciate rec. Good UOPT. On TPN at 60ml/Hr.     Mixed Acid-base disorder -resolved:  Mixed Anion gap metabolic acidosis with respiratory compensation likely secondary to sepsis and ROBSON with an additional metabolic alkalosis likely secondary to volume depletion in setting of GI sx. AG improving, Nephrology on board, appreciate recs. Acute hypoxic respiratory failure: likely 2/2 to aspiration pneumonia as above. Extubated Apr/27/2022, currently on 2-4L NC. Noted Desat today, but improved with encouraging deep nasal breathing as Pt has been mouth breathing. Wean as tolerated. If continues to increase O2 requirements, will order CXR question if re-aspirated with PO may need MBS. Acute postoperative pulmonary insufficiency - resolved: Extubated Apr/27/2022,     Troponinemia: Mildly elevated troponin on admit of 0.047 downtrending with inverted T waves found on EKG.  Likely 2/2 demand ischemia 2/2 hypotension.  Monitor    Hypophosphatemia: Noted May/04/2022  Continue to monitor, PO challenge as above, may supplement with TPN   Hypokalemia -  Resolved. Noted Apr/30/2022, on K protocol. Monitor AM.  Hypernatremia - Resolved. Nephrology on board, monitor. Hyperphosphatemia -resolved:  Continue to monitor, PO challenge as above, may supplement with TPN  Hypermagnesemia - resolved:  Continue to monitor, PO challenge as above, may supplement with TPN  Hypocalcemia: Continue to monitor, PO challenge as above, may supplement with TPN    CAD: Hx of,  continue home Lipitor 20 mg,  Holding ASA  For bleed risk. Hgb > 8.0    GERD: hx of, home regiment of pepcid 10mg daily, continued for admission IV form. . Noted incidental finding of thickened esophagus on imaging, F/U opt when stable for scope. Delirium? - resolved - pulled out central access line Apr/28/2022 PM, improved mentation however remains quite laconic. Given medical acuity and advance age likely sundowning as behavior is overnight. Reorient and monitor. If requires consider Serobarbra QHS.       Chief Complaint:  Pelvic Pain    Hospital Course:    Per H&P:    \" \"The patient was sedated and intubated and therefore could not give any history.  Therefore, history is primarily taken from the chart. Patient's wife states that the patient fell in his yard approximately 14 days ago and went to the [facility] ED. This time, he was discharged with pain medication. Approximately 7 days ago, he again presented to [facility] with complaints of a right lower abdominal bulge at which point he was diagnosed with inguinal hernia and is scheduled with surgical follow-up. Patient presented to [facility] on 4/25/2022 for syncope and worsening inguinal hernia. Wife states that syncope first occurred in [facility] several days ago when the patient had nausea and subjective feelings of excessive heat while sitting.  After this, he went to go outside and then passed out. On the morning of admission, patient's wife was given of the bathtub when he \"slumped over. \"  Wife stated that the patient vomited 3 times in the last 24 hours with brown/yellow vomitus. Wife also reports that he has not been having bowel movements, has had less frequent urination, and that he has been weak and sleeping \"20 hours a day. \"  While in the ED, the patient was found to have profound renal failure with a creatinine of 11.2 with a baseline of approximately 1.3. Patient also noted to have a profound anion gap acidosis which improved with aggressive fluid resuscitation. Nephrology was consulted given profound renal failure. CT of the abdomen and pelvis without contrast was performed which demonstrated mild to moderate patchy consolidation of the bilateral lower lobes, thickening of the distal esophagus and gastroesophageal junction, a right inguinal hernia causing a small bowel obstruction, and multiple gas collections in the right inguinal hernia highly suspicious for ischemic or perforated bowel.   On the evening of 4/25/2022 Dr. Malik performed exploratory laparotomy with small bowel resection, single primary anastomosis, drainage of pelvic abscess, exploration and drainage of right groin abscess, and debridement of necrotic subcutaneous fat/fascia/muscle in the right groin along with excision of the necrotic hernia sac in the right groin. Empiric coverage with vancomycin and Zosyn to necrotic small bowel as well as probable aspiration pneumonia. Family was extensively counseled. \"    Pt had course in ICU from Apr/25-28/2022, Overnight patient went to A. fib with RVR that was refractory to both Lopressor and Cardizem. Patient was given amiodarone which converted him out. Patient continues to have frequent PACs on the monitor. Surgery was consulted regarding anticoagulation however they are recommending holding anticoagulation at this time. We will continue to follow-up with surgery as far as when we can start anticoagulation. Patient overall is awake and alert self, year, but only occasionally to place. Patient denies any pain or acute complaints at this time. Patient is frequently repeating himself however concern for underlying delirium. May/01/2022, abdominal wound dehissence, taken back to OR for repair and washout. Subjective:    Pt seen and examined. Afebrile overnight, no behavioral issues, PICC in place,  Telemetry remains improved. Pt still denies pain, still speaks very little. Was on clear liquid yesterday, tolerated PO med trial. Noted increase O2 req, question if re-aspirated with PO may need MBS, SLP On board. Review of Systems   Unable to perform ROS: Acuity of condition   Constitutional: Positive for appetite change. Negative for chills, diaphoresis, fatigue and fever. HENT: Positive for dental problem. Respiratory: Negative for cough, shortness of breath and wheezing. Cardiovascular: Negative for chest pain and palpitations. Gastrointestinal: Positive for abdominal pain.  Negative for constipation, diarrhea, nausea and vomiting. Genitourinary: Negative for decreased urine volume, difficulty urinating, dysuria, frequency, hematuria and urgency. Neurological: Positive for weakness. Negative for seizures and syncope. Psychiatric/Behavioral: Negative for confusion, agitation,       Medications:  Reviewed    Infusion Medications    PN-Adult  3 IN 1 Central Line (Custom) 60 mL/hr at 05/03/22 1802    dextrose      amiodarone 0.5 mg/min (05/04/22 0726)    sodium chloride       Scheduled Medications    docusate sodium  100 mg Oral Daily    piperacillin-tazobactam  3,375 mg IntraVENous Q8H    famotidine (PEPCID) injection  20 mg IntraVENous Daily    metoprolol  5 mg IntraVENous Q8H    [Held by provider] metoprolol tartrate  12.5 mg Oral BID    insulin lispro  0-6 Units SubCUTAneous 4 times per day    sodium hypochlorite   Irrigation Daily    vitamin D  50,000 Units Oral Weekly    sodium chloride flush  5-40 mL IntraVENous 2 times per day    [Held by provider] heparin (porcine)  5,000 Units SubCUTAneous TID     PRN Meds: morphine, glucose, glucagon (rDNA), dextrose, dextrose bolus (hypoglycemia) **OR** dextrose bolus (hypoglycemia), sodium chloride flush, sodium chloride, ondansetron **OR** ondansetron, polyethylene glycol, acetaminophen **OR** acetaminophen      Intake/Output Summary (Last 24 hours) at 5/4/2022 0741  Last data filed at [address]  Gross per 24 hour   Intake 3970.12 ml   Output 1750 ml   Net 2220.12 ml       Diet:  ADULT DIET; Clear Liquid  PN-Adult  3 IN 1 Central Line (Custom)    Exam:  /66   Pulse 83   Temp 98.4 °F (36.9 °C) (Oral)   Resp 14   Ht 5' 9\" (1.753 m)   Wt 135 lb 6.4 oz (61.4 kg)   SpO2 90%   BMI 20.00 kg/m²     Physical Exam  Constitutional:       General: He is not in acute distress. Appearance: He is ill-appearing. He is not toxic-appearing or diaphoretic. Interventions: He is not intubated. Nasal cannula in place.     HENT: Head: Normocephalic and atraumatic. Nose: Nose normal. No congestion or rhinorrhea. Mouth/Throat:      Mouth: Mucous membranes are moist      Pharynx: Oropharynx is clear. No oropharyngeal exudate or posterior oropharyngeal erythema. Eyes:      General: No scleral icterus. Right eye: No discharge. Left eye: No discharge. Extraocular Movements: Extraocular movements intact. Conjunctiva/sclera: Conjunctivae normal.      Pupils: Pupils are equal, round, and reactive to light. Cardiovascular:      Rate and Rhythm: Normal rate, present. Rhythm irregular. Pulses: Normal pulses. Heart sounds: Normal heart sounds. No murmur heard. No friction rub. No gallop. Pulmonary:      Effort: Pulmonary effort is normal. No accessory muscle usage or respiratory distress. He is not intubated. Breath sounds: Examination of the right-lower field reveals rhonchi. Examination of the left-lower field reveals rhonchi. Rhonchi present. No wheezing or rales. Abdominal:      General: A surgical scar is present. Bowel sounds are increased. Tenderness: There is abdominal tenderness. There is no guarding. Comments: Abdominal Drain in place, Abdominal band in place. Neurological:      Mental Status: He is Oriented to person, place, time, situation. Cranial Nerves: No dysarthria or facial asymmetry. Motor: No weakness. Comments: Still speaks very little. Psychiatric:         Behavior: Behavior is cooperative. Labs:   Recent Labs     05/02/22 0425 05/02/22 0425 05/03/22  0650 05/03/22  1348 05/04/22  0410   WBC 22.2*  --  17.6*  --  16.5*   HGB 8.8*   < > 7.6* 8.0* 8.4*   HCT 27.3*   < > 23.8* 24.9* 26.6*     --  232  --  274    < > = values in this interval not displayed.      Recent Labs     05/02/22 0425 05/03/22  0650 05/04/22  0410   * 138 139   K 3.7 3.5 3.8   CL 95* 100 102   CO2 29 26 25   BUN 32* 35* 28*   CREATININE 1.8* 1.7* 1.3* CALCIUM 7.5* 7.5* 7.6*   PHOS 2.5 2.1* 1.8*     No results for input(s): AST, ALT, BILIDIR, BILITOT, ALKPHOS in the last 72 hours. No results for input(s): INR in the last 72 hours. No results for input(s): Sriram Oiler in the last 72 hours. Urinalysis:      Lab Results   Component Value Date    NITRU NEGATIVE 04/25/2022    WBCUA 10-15 04/25/2022    BACTERIA FEW 04/25/2022    RBCUA 10-15 04/25/2022    BLOODU MODERATE 04/25/2022    SPECGRAV 1.015 04/25/2022    GLUCOSEU NEGATIVE 06/09/2019       Radiology:  XR CHEST PORTABLE   Final Result   1. Persistent bibasilar opacities. Small left-sided pleural effusion. **This report has been created using voice recognition software. It may contain minor errors which are inherent in voice recognition technology. **      Final report electronically signed by Dr. Estella Erazo on 5/2/2022 8:13 AM      XR CHEST PORTABLE   Final Result   A right arm PICC line tip terminates at the cavoatrial projection. No pneumothorax is observed. Small bilateral pleural effusions and bilateral lower lobe airspace opacity, right greater than left, are not significantly changed. **This report has been created using voice recognition software. It may contain minor errors which are inherent in voice recognition technology. **      Final report electronically signed by Dr Shayla Lambert on 4/29/2022 4:04 PM      XR ABDOMEN FOR NG/OG/NE TUBE PLACEMENT   Final Result   Impression:      NG tube tip proximal stomach. This document has been electronically signed by: Casper Fox MD on    04/28/2022 11:56 PM      XR CHEST PORTABLE   Final Result   Impression:   1. Satisfactory positioning of the endotracheal tube and right central    line. Probable slightly high positioning of the orogastric tube. Consider    advancing 3-4 cm. 2. Interval development of small bilateral pleural effusions, right    greater than left.    3. Bilateral lower lobe airspace opacities secondary to atelectasis and/or    infiltrates, right greater than left. 4. Bilateral interstitial changes secondary to pneumonitis or edema. This document has been electronically signed by: Dangelo Franco. DO Jennifer on    04/26/2022 12:01 AM      CT ABDOMEN PELVIS WO CONTRAST Additional Contrast? None   Final Result   Impression:   Right inguinal hernia containing small bowel. This is causing a small    bowel obstruction. There are several gas collections within the right    inguinal hernia. Ischemic or perforated bowel could have this appearance. Mild to moderate patchy consolidation bilateral lower lobes. This could    represent aspiration or pneumonia. Prominent thickening of the distal esophagus and gastroesophageal    junction. Neoplasm not excluded. Recommend direct visualization. This document has been electronically signed by: Renee Bynum MD on    04/25/2022 07:20 PM      All CTs at this facility use dose modulation techniques and iterative    reconstructions, and/or weight-based dosing   when appropriate to reduce radiation to a low as reasonably achievable. US RENAL COMPLETE   Final Result   Impression:   Multifocal simple bilateral renal cyst. Echogenic bilateral renal    parenchyma. This document has been electronically signed by: Reene Bynum MD on    04/25/2022 06:30 PM      CT HEAD WO CONTRAST   Final Result       1. Stable CT scan of the brain, no interval change since previous MRI scan dated 10 Mel[address] 2019.   2. Mild atrophy and dilatation of the third and lateral ventricles. 3. Probable ischemic changes in the white matter, left basal ganglia and in the bernadette. 4. Calcification the cavernous segments of both internal carotid arteries. 5. Increased density in the external auditory canals which may represent cerumen bilaterally. **This report has been created using voice recognition software.  It may contain minor errors which are inherent in voice recognition technology. **      Final report electronically signed by DR Srikanth Severino on 4/25/2022 4:01 PM      XR CHEST PORTABLE   Final Result   1. Normal heart size. No effusion. 2. Persistent mild atelectatic/fibrotic stranding retrocardiac region left lung base. **This report has been created using voice recognition software. It may contain minor errors which are inherent in voice recognition technology. **      Final report electronically signed by Dr. Sanjuanita Reynolds on 4/25/2022 3:05 PM          Diet: ADULT DIET; Clear Liquid  PN-Adult  3 IN 1 Central Line (Custom)    DVT prophylaxis: [] Lovenox                                 [] SCDs                                 [] SQ Heparin                                 [] Encourage ambulation           [] Already on Anticoagulation     Disposition:    [] Home       [] TCU       [] Rehab       [] Psych       [] SNF       [] [place]       [x] Other-     Code Status: Full Code    PT/OT Eval Status:      Electronically signed by Ashley Pack MD on 5/4/2022 at 7:41 AM    This note was electronically signed. Parts of this note may have been dictated by use of voice recognition software and electronically transcribed. The note may contain errors not detected in proofreading. Please refer to my supervising physician's documentation if my documentation differs.

## 2022-05-04 NOTE — PROGRESS NOTES
Jovana Flanagan MD  Postoperative Progress Note  Pt Name: Jovanny Greene Record Number: [id number]  Date of Birth 1950   Today's Date: 5/4/2022  ASSESSMENT   1. POD # 8 sepsis secondary to incarcerated right inguinal hernia with perforation and obstruction of small bowel. Necrotic right groin wound status postdebridement drainage of abdominal abscess present at the time of surgery. POD #3 repair of abdominal wound dehiscence lysis of inflammatory adhesions and abdominal and right groin washout  2. Sepsis resolved. White count continues to to decline  3. Acute renal failure resolved  4. Nasal cannula oxygen increased oxygen requirements overnight. 5. Leukocytosis resolving  6. A. Fib/NS arrhythmia. Internal medicine plans to transition to oral with improved oral intake  7. Hypernatremia resolved  8. pneumonia  9. Tolerating clear liquids  10. Anemia no signs of active bleeding   has a past medical history of CAD (coronary artery disease), CVA (cerebral vascular accident) ([address] Erectile dysfunction, GERD (gastroesophageal reflux disease), Hyperlipidemia, and Hypertension. PLANS   1. Analgesics and antiemetics as needed. 2. IV hydration per medicine service. Normal saline stopped on D5 water. 3. Continue broad-spectrum antibiotic Zosyn. Medicine plans to continue a few more days. 4.   Amiodarone drip for atrial fibrillation. Transition to oral per primary service when tolerating oral intake  5. DVT prophylaxis per primary service. 6. change groin wound packing 1-2 times daily. Dry gauze. Dakin's solution ordered. Wounds look okay. Midline wound dressing changed. 7.  Continue Zosyn. Intra-abdominal cultures E. coli and Morganella morganii I both sensitive. White count continues to trend down may discontinue in 2 to 3 days. 8.  TPN. Continue until tolerating oral intake  9. Monitor white count  10. Therapies for deconditioning  11.   Continue abdominal binder  12. Max removed  13. Full liquid diet  SUBJECTIVE   Yonis Deal is hemodynamically stable. Wounds look look good. Bowel sounds are present. Denies nausea or emesis. He had no complaints. Discussed with Dr. Gilberto Pierre oxygen requirements increased overnight. Patient in no distress. CURRENT MEDICATIONS   Scheduled Meds:   amiodarone  200 mg Oral BID    [START ON 2022] amiodarone  200 mg Oral Daily    metoprolol tartrate  25 mg Oral BID    docusate sodium  100 mg Oral Daily    piperacillin-tazobactam  3,375 mg IntraVENous Q8H    famotidine (PEPCID) injection  20 mg IntraVENous Daily    insulin lispro  0-6 Units SubCUTAneous 4 times per day    sodium hypochlorite   Irrigation Daily    vitamin D  50,000 Units Oral Weekly    sodium chloride flush  5-40 mL IntraVENous 2 times per day    [Held by provider] heparin (porcine)  5,000 Units SubCUTAneous TID     Continuous Infusions:   PN-Adult  3 IN 1 Central Line (Custom)      PN-Adult  3 IN 1 Central Line (Custom) 60 mL/hr at 22 1802    dextrose      amiodarone 0.5 mg/min (22 0726)    sodium chloride       PRN Meds:.morphine, glucose, glucagon (rDNA), dextrose, dextrose bolus (hypoglycemia) **OR** dextrose bolus (hypoglycemia), sodium chloride flush, sodium chloride, ondansetron **OR** ondansetron, polyethylene glycol, acetaminophen **OR** acetaminophen  OBJECTIVE   CURRENT VITALS:  height is 5' 9\" (1.753 m) and weight is 135 lb 6.4 oz (61.4 kg). His oral temperature is 98 °F (36.7 °C). His blood pressure is 116/55 (abnormal) and his pulse is 72. His respiration is 18 and oxygen saturation is 100%. Body mass index is 20 kg/m².   Temperature Range (24h):Temp: 98 °F (36.7 °C) Temp  Av.5 °F (36.9 °C)  Min: 98 °F (36.7 °C)  Max: 99.1 °F (37.3 °C)  BP Range (31T): Systolic (98BGK), YPK:955 , Min:116 , TZK:154     Diastolic (14OAH), XOK:87, Min:55, Max:68    Pulse Range (24h): Pulse  Av.7  Min: 72  Max: 88  Respiration Range (24h): Resp  Av  Min: 14  Max: 18  Current Pulse Ox (24h):  SpO2: 100 %  Pulse Ox Range (24h):  SpO2  Av.3 %  Min: 85 %  Max: 100 %  Oxygen Amount and Delivery: O2 Flow Rate (L/min): 3 L/min  Incentive Spirometry Tx: Achieved Volume (mL): 250 mL    GENERAL: Sedated no acute distress  LUNGS: Clear diminished bases   HEART: Pulse 88 looks sinus on monitor today  ABDOMEN: Soft midline incision intact mild serous drainage. INCISION midline wound intact. Right groin wound clean  EXTREMITY: No edema  In: 3970.1 [P.O.:320; I.V.:1554.5]  Out: 2800 [Urine:2800]  [REMOVED] Closed/Suction Drain Superior;Midline Abdomen Bulb-Output (ml): 50 ml  Date 22 0000 - 22 235   Shift 0178-4083 2692-4925 9449-0333 24 Hour Total   INTAKE   I.V.(mL/kg) 925.2(15.1)   925.2(15.1)   IV Piggyback(mL/kg) 369.6(6)   369.6(6)   TPN(mL/kg) 979(15.9)   979(15.9)   Shift Total(mL/kg) 4731.7(70)   5040.3(54)   OUTPUT   Urine(mL/kg/hr)  1050  1050   Shift Total(mL/kg)  1050(17.1)  1050(17.1)   Weight (kg) 61.4 61.4 61.4 61.4     LABS     Recent Labs     22  0425 22  0425 22  0650 22  1348 22  0410   WBC 22.2*  --  17.6*  --  16.5*   HGB 8.8*   < > 7.6* 8.0* 8.4*   HCT 27.3*   < > 23.8* 24.9* 26.6*     --  232  --  274   *  --  138  --  139   K 3.7  --  3.5  --  3.8   CL 95*  --  100  --  102   CO2 29  --  26  --  25   BUN 32*  --  35*  --  28*   CREATININE 1.8*  --  1.7*  --  1.3*   MG 1.9  --  1.8  --   --    PHOS 2.5  --  2.1*  --  1.8*   CALCIUM 7.5*  --  7.5*  --  7.6*    < > = values in this interval not displayed. No results for input(s): PTT, INR in the last 72 hours. Invalid input(s): PT  No results for input(s): AST, ALT, BILITOT, BILIDIR, AMYLASE, LIPASE, LDH, LACTA in the last 72 hours. No results for input(s): TROPONINT in the last 72 hours. RADIOLOGY     XR CHEST PORTABLE   Final Result   1. Persistent bibasilar opacities.  Small left-sided pleural effusion. **This report has been created using voice recognition software. It may contain minor errors which are inherent in voice recognition technology. **      Final report electronically signed by Dr. Beth Warren on 5/2/2022 8:13 AM      XR CHEST PORTABLE   Final Result   A right arm PICC line tip terminates at the cavoatrial projection. No pneumothorax is observed. Small bilateral pleural effusions and bilateral lower lobe airspace opacity, right greater than left, are not significantly changed. **This report has been created using voice recognition software. It may contain minor errors which are inherent in voice recognition technology. **      Final report electronically signed by Dr Mik Alvarez on 4/29/2022 4:04 PM      XR ABDOMEN FOR NG/OG/NE TUBE PLACEMENT   Final Result   Impression:      NG tube tip proximal stomach. This document has been electronically signed by: Samanta Boucher MD on    04/28/2022 11:56 PM      XR CHEST PORTABLE   Final Result   Impression:   1. Satisfactory positioning of the endotracheal tube and right central    line. Probable slightly high positioning of the orogastric tube. Consider    advancing 3-4 cm. 2. Interval development of small bilateral pleural effusions, right    greater than left. 3. Bilateral lower lobe airspace opacities secondary to atelectasis and/or    infiltrates, right greater than left. 4. Bilateral interstitial changes secondary to pneumonitis or edema. This document has been electronically signed by: Barrett Castillo. DO Jennifer on    04/26/2022 12:01 AM      CT ABDOMEN PELVIS WO CONTRAST Additional Contrast? None   Final Result   Impression:   Right inguinal hernia containing small bowel. This is causing a small    bowel obstruction. There are several gas collections within the right    inguinal hernia. Ischemic or perforated bowel could have this appearance.       Mild to moderate patchy consolidation bilateral lower lobes. This could    represent aspiration or pneumonia. Prominent thickening of the distal esophagus and gastroesophageal    junction. Neoplasm not excluded. Recommend direct visualization. This document has been electronically signed by: Gray Plaza MD on    04/25/2022 07:20 PM      All CTs at this facility use dose modulation techniques and iterative    reconstructions, and/or weight-based dosing   when appropriate to reduce radiation to a low as reasonably achievable. US RENAL COMPLETE   Final Result   Impression:   Multifocal simple bilateral renal cyst. Echogenic bilateral renal    parenchyma. This document has been electronically signed by: Gray Plaza MD on    04/25/2022 06:30 PM      CT HEAD WO CONTRAST   Final Result       1. Stable CT scan of the brain, no interval change since previous MRI scan dated 10 Melody 2019.   2. Mild atrophy and dilatation of the third and lateral ventricles. 3. Probable ischemic changes in the white matter, left basal ganglia and in the bernadette. 4. Calcification the cavernous segments of both internal carotid arteries. 5. Increased density in the external auditory canals which may represent cerumen bilaterally. **This report has been created using voice recognition software. It may contain minor errors which are inherent in voice recognition technology. **      Final report electronically signed by DR Magdalena Sánchez on 4/25/2022 4:01 PM      XR CHEST PORTABLE   Final Result   1. Normal heart size. No effusion. 2. Persistent mild atelectatic/fibrotic stranding retrocardiac region left lung base. **This report has been created using voice recognition software. It may contain minor errors which are inherent in voice recognition technology. **      Final report electronically signed by Dr. Sydnee Maldonado on 4/25/2022 3:05 PM          Electronically signed by Noris Clemente MD on 5/4/2022 at 1:40 PM

## 2022-05-04 NOTE — PROGRESS NOTES
[address] [place] [address]  Occupational Therapy  Daily Note  Time:   Time In:   Time Out: 7592  Timed Code Treatment Minutes: 45 Minutes  Minutes: 38          Date: 2022  Patient Name: So Granger,   Gender: male      Room: -/026-A  MRN: [id number]  : 1950  (66 y.o.)  Referring Practitioner: Dr. Vikas Duron  Diagnosis: renal failure  Additional Pertinent Hx: a 66 y.o.  male admitted to [address] [place] [address] on 2022 with PMHx of distant CVA, BPH, HTN, GERD, and Glaucoma who presented to the ED after syncopal episodes x 2. Wife zayda provided the history and she stated that approximately 14 days ago he fell in the yard while pulling weeds and broke his ribs. He came to [facility] at that time and was discharged with pain medication. About 7 days ago, he presented again to [facility] with complaints of right lower abdomen bulge. He was diagnosed with inguinal hernia and recommended to follow up OP which was scheduled for tomorrow (22). Wife stated that the hernia has gotten significantly worse over the lprevious 5 days. Syncope occurred first when they were in [place] several days ago. They had been sitting for a while and he started to feel nauseated and hot so he got up to go outside and passed out (described as legs giving out from him). Denied hitting his head. The morning of admission, wife was getting him out of the bathtub and he just \"slumped over\". Denied hitting his head on that occasion as well. Wife stated that he had vomited 3 times over the previous 24 hours and that it was brown/yellow in color. She stated he had not been having BM's and had been peeing less. Also noted he had been weak and sleeping \"20 hours a day\". On 2022, the patient was taken to the operating room per Maribeth Bledsoe MD,  with diagnosis of serrated right inguinal hernia with small bowel obstruction and perforation, and at the time of surgery some feculent soilage of the right groin and pelvis. Procedure included exploratory laparotomy, small bowel resection, single primary anastomosis, and drainage of pelvic abscess and right groin exploration, drainage of abscess, debridement of necrotic subcutaneous fat and fascia and muscle, and excision of necrotic hernia sac. Right groin and pelvic abscess were present at the time of surgery. Postoperatively, she was admitted to the ICU secondary to acute respiratory failure, hypoxia, septic shock, aspiration pneumonia, and status post exploratory lap and drainage as above. Nutrition per TPN. After stabilization and extubation on 4/27/22, patient transferred to  on 4/28/22. late on 4/30/22 pt forcefully vomited and coughed causing his abdominal wond to dehisce. Pt s/p Exploratory laparotomy lysis of inflammatory adhesions, drainage undrained right groin abscess, repair of abdominal wound fascial dehiscence on 5/1/22. Restrictions/Precautions:  Restrictions/Precautions: Surgical Protocols,General Precautions,Fall Risk  Required Braces or Orthoses  Other: Abdominal Binder  Position Activity Restriction  Other position/activity restrictions: . SUBJECTIVE: Pleasant and cooperative  Pt had incontinence of bowel. PAIN: Pt reported tenderness in his bottom. He agreed. Vitals: Vitals not assessed per clinical judgement, see nursing flowsheet    COGNITION: Slow Processing    ADL:   Grooming: Stand By Assistance. wiping his hands with a washcloth  Upper Extremity Dressing: Minimal Assistance.  don/doffing a hospital gown around his IV lines  Toileting: Maximum Assistance. help needed for wiping his bottom after having been incontinent of bowel. BALANCE:  Sitting Balance:  Stand By Assistance. scooting at the edge of bed  Standing Balance: Contact Guard Assistance.  preparing to walk; having help with wiping his bottom    BED MOBILITY:  Rolling to Right: Minimal Assistance cues for helping to reach across the bed for the rail  Supine to Sit: Minimal Assistance using the bedrail for sidelying to sit  Scooting: Stand By Assistance, with rail      TRANSFERS:  Sit to Stand:  Contact Guard Assistance. from the edge of bed or the chair  Stand to Sit: [address] to the recliner chair    FUNCTIONAL MOBILITY:  Assistive Device: Rolling Walker  Assist Level:  Contact Guard Assistance. Distance: 3 ft  to the chair  Walked with a slow pace. No LOB noted. ADDITIONAL ACTIVITIES:  Pt had eaten breakfast while sitting in the chair and he agreed to sit up in the chair for his lunch. ASSESSMENT:     Activity Tolerance:  Patient tolerance of  treatment: fair. Pt stood for 1.5 minute duration while having help with his toileting.       Discharge Recommendations: Inpatient Rehabilitation  Equipment Recommendations: Equipment Needed: Yes  Mobility Devices: Walker  Plan: Times per Week: 5x  Current Treatment Recommendations: Strengthening,Balance training,Safety education & training,Functional mobility training,Endurance training,Equipment evaluation, education, & procurement,Patient/Caregiver education & training    Patient Education  Patient Education: Plan of Care and Importance of Increasing Activity    Goals  Short Term Goals  Time Frame for Short term goals: by discharge  Short Term Goal 1: pt to navigate to/from various surfaces including BSC/chair progressing to bathroom and further distances using AD with CGA to icnrease his endurance and indep with ADL tasks  Short Term Goal 2: Pt to demo dynamic standing iwth unilateral UE support and CGA to assist with completion of ADL tasks such as clothing management after dressing and toileting  Short Term Goal 3: pt to complete UB ADL tasks iwth CGA and LB with min A  Short Term Goal 4: Pt to increase endurance to tolerate > 25 min of consistent activity with 3 or less rst breaks to increase ease of ADL asks  Short Term Goal 5: pt to increase bilateral UE strength (especially left) by completing HEP with mod resistance and use of handout with min cues for tech and need for less than 2 rest breaks    Following session, patient left in safe position with all fall risk precautions in place.

## 2022-05-04 NOTE — PROGRESS NOTES
Continue to follow patient for possible admission to [facility]. Patient must be able to take a diet as [facility] can not accommodate the need for TPN.

## 2022-05-04 NOTE — CARE COORDINATION
5/4/22, 2:47 PM EDT    DISCHARGE ON GOING EVALUATION    [facility] Hospital day: 9  Location: -/[facility] Reason for admit: Renal failure [N19]  Sepsis associated hypotension ([address] [A41.9, I95.9]   Procedure:   4/25 CXR: Persistent mild atelectatic/fibrotic stranding retrocardiac region left lung base; no effusion  4/25 CT Head:  Stable CT scan of the brain, no interval change since previous MRI scan dated 10 [address] 2019. Mild atrophy and dilatation of the third and lateral ventricles. Probable ischemic changes in the white matter, left basal ganglia and in the bernadette. Calcification the cavernous segments of both internal carotid arteries. Increased density in the external auditory canals which may represent cerumen bilaterally. 4/25 Renal US: Multifocal simple bilateral renal cyst. Echogenic bilateral renal parenchyma. 4/25 CT Abd/pelvis: Right inguinal hernia containing small bowel. This is causing a small bowel obstruction. There are several gas collections within the right inguinal hernia. Ischemic or perforated bowel could have this appearance. Mild to moderate patchy consolidation bilateral lower lobes. This could represent aspiration or pneumonia. Prominent thickening of the distal esophagus and gastroesophageal junction. Neoplasm not excluded. Recommend direct visualization. 4/25 LAPAROTOMY EXPLORATORY FOR SMALL BOWEL OBSTRUCTION WITH INCARCERATED HERNIA REPAIR.  Debridement necrotic subcutaneous fat fascia and muscle right groin  4/25 Intubated in OR  4/25 CVC Right subclavian   4/27 Extubated. 4/29 PICC line placed. 5/1 Return to OR with Dr. Kimo Yepez laparotomy lysis of inflammatory adhesions, drainage undrained right groin abscess, repair of abdominal wound fascial dehiscence. Barriers to Discharge: Hospitalist, Surgery, Nephrology and PMR following. POD #8 and POD #3. Amio gtt stopping today, started oral doses. Continues with Pepcid iv daily, Lopressor iv q8hr, Zosyn iv q8hr. TPN. Diet increased to full liquids. PT/OT/SLP. O2 increased to 4L/nc overnight. Sats 91%. Pt has started to pass some BM. Dietitian following. PCP: Nancy Yu MD  Readmission Risk Score: 21.8 ( )%  Patient Goals/Plan/Treatment Preferences: From home with wife. Planning [facility] vs SNF. SW following.

## 2022-05-04 NOTE — PLAN OF CARE
Problem: Discharge Planning  Goal: Discharge to home or other facility with appropriate resources  Description: Continue discharge planning. Patient currently with NG tube, PICC line inserted today.  following. Patient able to take few steps at bedside with PT/OT today. Outcome: Progressing  Flowsheets  Taken 5/4/2022 1150  Discharge to home or other facility with appropriate resources: Identify barriers to discharge with patient and caregiver  Taken 5/4/2022 0800  Discharge to home or other facility with appropriate resources: Identify barriers to discharge with patient and caregiver  Note: Continue discharge planning. Inpatient rehab vs ECF for rehab.  following. Patient working with PT/OT, diet increased today to full liquids. Problem: Pain  Goal: Verbalizes/displays adequate comfort level or baseline comfort level  Description: Patient denies pain today. Outcome: Progressing  Flowsheets (Taken 5/4/2022 0410 by Juan Jose Ge RN)  Verbalizes/displays adequate comfort level or baseline comfort level:   Encourage patient to monitor pain and request assistance   Assess pain using appropriate pain scale  Note: Patient denies pain this shift, able to sleep between activity. Problem: Chronic Conditions and Co-morbidities  Goal: Patient's chronic conditions and co-morbidity symptoms are monitored and maintained or improved  Outcome: Progressing  Flowsheets  Taken 5/4/2022 [place] - Patient's Chronic Conditions and Co-Morbidity Symptoms are Monitored and Maintained or Improved: Monitor and assess patient's chronic conditions and comorbid symptoms for stability, deterioration, or improvement  Taken 5/4/2022 0800  Care Plan - Patient's Chronic Conditions and Co-Morbidity Symptoms are Monitored and Maintained or Improved: Monitor and assess patient's chronic conditions and comorbid symptoms for stability, deterioration, or improvement  Note: Continue to wean oxygen. Johanna PHILLIP. Problem: Safety - Adult  Goal: Free from fall injury  Outcome: Progressing  Flowsheets  Taken 5/4/2022 1308 by Mary Mota RN  Free From Fall Injury: Instruct family/caregiver on patient safety  Taken 5/4/2022 9283 by Stephanie Walters RN  Free From Fall Injury:   Based on caregiver fall risk screen, instruct family/caregiver to ask for assistance with transferring infant if caregiver noted to have fall risk factors   Instruct family/caregiver on patient safety  Note: Continue fall precautions, up with assist, continue bed alarm/chair alarm. Problem: ABCDS Injury Assessment  Goal: Absence of physical injury  Outcome: Progressing     Problem: Skin/Tissue Integrity  Goal: Absence of new skin breakdown  Description: 1. Monitor for areas of redness and/or skin breakdown  2. Assess vascular access sites hourly  3. Every 4-6 hours minimum:  Change oxygen saturation probe site  4. Every 4-6 hours:  If on nasal continuous positive airway pressure, respiratory therapy assess nares and determine need for appliance change or resting period. Outcome: Progressing  Note: Continue dressing changes to abdominal incision, packing to open incision. Monitor surgical incision for signs of infection. Problem: Nutrition Deficit:  Goal: Optimize nutritional status  5/4/2022 1834 by Mary Mota RN  Outcome: Progressing  Note: Diet advanced today to full liquids, patient tolerating sips of clear and full liquids-denies nausea. Continue TPN until diet progressing. Problem: Respiratory - Adult  Goal: Achieves optimal ventilation and oxygenation  Outcome: Progressing  Flowsheets (Taken 5/4/2022 1834)  Achieves optimal ventilation and oxygenation:   Assess for changes in respiratory status   Oxygen supplementation based on oxygen saturation or arterial blood gases  Note: Continue oxygen nasal canula 4L, continue to wean as tolerated.  Oxygen saturation 85% on 2L nasal canula this morning, oxygen increased to 5L to keep pulse ox >90%, then weaned back to 4L. Problem: Cardiovascular - Adult  Goal: Maintains optimal cardiac output and hemodynamic stability  Outcome: Progressing  Flowsheets (Taken 5/4/2022 1834)  Maintains optimal cardiac output and hemodynamic stability: Monitor blood pressure and heart rate     Problem: Cardiovascular - Adult  Goal: Absence of cardiac dysrhythmias or at baseline  Outcome: Progressing  Flowsheets (Taken 5/2/2022 2045 by Norman Alcazar RN)  Absence of cardiac dysrhythmias or at baseline: Monitor cardiac rate and rhythm  Note: Telemetry continues, VS stable. Patient remains in sinus rhythm. Problem: Gastrointestinal - Adult  Goal: Minimal or absence of nausea and vomiting  Outcome: Progressing  Flowsheets (Taken 5/4/2022 1834)  Minimal or absence of nausea and vomiting:   Administer IV fluids as ordered to ensure adequate hydration   Advance diet as tolerated, if ordered  Note: Diet advanced to full liquids today, patient denies nausea. Problem: Gastrointestinal - Adult  Goal: Maintains or returns to baseline bowel function  Outcome: Progressing  Flowsheets (Taken 5/4/2022 1834)  Maintains or returns to baseline bowel function:   Assess bowel function   Encourage mobilization and activity  Note: Patient having several liquid BM today, active bowel sounds.       Problem: Infection - Adult  Goal: Absence of infection at discharge  Outcome: Progressing  Flowsheets  Taken 5/4/2022 1150  Absence of infection at discharge: Assess and monitor for signs and symptoms of infection  Taken 5/4/2022 0800  Absence of infection at discharge: Assess and monitor for signs and symptoms of infection     Problem: Infection - Adult  Goal: Absence of infection during hospitalization  Outcome: Progressing  Flowsheets  Taken 5/4/2022 1834  Absence of infection during hospitalization:   Monitor lab/diagnostic results   Assess and monitor for signs and symptoms of infection  Taken 5/4/2022 1150  Absence of infection during hospitalization: Assess and monitor for signs and symptoms of infection  Taken 5/4/2022 0800  Absence of infection during hospitalization: Assess and monitor for signs and symptoms of infection  Note: Afebrile this shift. Continue to assess labs, assess incision for signs of infection. Problem: Infection - Adult  Goal: Absence of fever/infection during anticipated neutropenic period  Outcome: Progressing  Flowsheets  Taken 5/4/2022 1150  Absence of fever/infection during anticipated neutropenic period: Monitor white blood cell count  Taken 5/4/2022 0800  Absence of fever/infection during anticipated neutropenic period: Monitor white blood cell count     Problem: Respiratory - Adult  Goal: Achieves optimal ventilation and oxygenation  Recent Flowsheet Documentation  Taken 5/4/2022 1834 by Leisa Leonardo RN  Achieves optimal ventilation and oxygenation:   Assess for changes in respiratory status   Oxygen supplementation based on oxygen saturation or arterial blood gases   Care plan reviewed with patient. Patient  verbalize understanding of the plan of care and contribute to goal setting.

## 2022-05-04 NOTE — PROGRESS NOTES
TPN Follow Up Note    Assessment: diet advanced to full liquid - not eating too much at present. RN said there have been a couple BM's in the last couple days. Renal stopped maintenance IV this morning. No changes in macronutrients per dietary.     Electrolyte Replacement:   KPhos 15mmol (per Dr Eliecer Leonard)    TPN changes for (today) at 1800:   Decrease Na Acetate to 100mEq  Increase NaPhos to 15mmol    Re-check Mg, PO4, iCa 5/5/22 - BMP already ordered

## 2022-05-04 NOTE — CARE COORDINATION
5/4/22, 12:04 PM EDT    DISCHARGE PLANNING EVALUATION    Met with patient this morning. Made him aware IPR is following him for potential admission. Made him aware we may need to come up with a back up option. He asked that DRISS call his spouse. DRISS called his spouses number. She did not answer and there was not a voicemail. Will try to call again later. Update 1:31pm: Obi's spouse Guillermo Shen called DRISS back. Made her aware patient may not qualify for IPR. She asked if she could take him home with visiting nurses. Made her aware that therapy is recommending he not return home. Discussed skilled nursing home as a back up option. She is going to talk with her family and call DRISS back.

## 2022-05-04 NOTE — PROGRESS NOTES
[facility]  INPATIENT PHYSICAL THERAPY  DAILY NOTE  [facility] CCU-STEPDOWN 3B - 3B-26/026-A      Time In: 0935  Time Out: 1000  Timed Code Treatment Minutes: 25 Minutes  Minutes: 25          Date: 2022  Patient Name: Jacinda Faustin.,  Gender:  male        MRN: [id number]  : 1950  (66 y.o.)     Referring Practitioner: Dr. Cherelle Sue  Diagnosis: renal failure  Additional Pertinent Hx: a 66 y.o.  male admitted to [facility] on 2022 with PMHx of distant CVA, BPH, HTN, GERD, and Glaucoma who presented to the ED after syncopal episodes x 2. Wife zayda provided the history and she stated that approximately 14 days ago he fell in the yard while pulling weeds and broke his ribs. He came to [facility] at that time and was discharged with pain medication. About 7 days ago, he presented again to [facility] with complaints of right lower abdomen bulge. He was diagnosed with inguinal hernia and recommended to follow up OP which was scheduled for tomorrow (22). Wife stated that the hernia has gotten significantly worse over the lprevious 5 days. Syncope occurred first when they were in [place] several days ago. They had been sitting for a while and he started to feel nauseated and hot so he got up to go outside and passed out (described as legs giving out from him). Denied hitting his head. The morning of admission, wife was getting him out of the bathtub and he just \"slumped over\". Denied hitting his head on that occasion as well. Wife stated that he had vomited 3 times over the previous 24 hours and that it was brown/yellow in color. She stated he had not been having BM's and had been peeing less. Also noted he had been weak and sleeping \"20 hours a day\". On 2022, the patient was taken to the operating room per Thomas Piedra MD,  with diagnosis of serrated right inguinal hernia with small bowel obstruction and perforation, and at the time of surgery some feculent soilage of the right groin and pelvis. Procedure included exploratory laparotomy, small bowel resection, single primary anastomosis, and drainage of pelvic abscess and right groin exploration, drainage of abscess, debridement of necrotic subcutaneous fat and fascia and muscle, and excision of necrotic hernia sac. Right groin and pelvic abscess were present at the time of surgery. Postoperatively, she was admitted to the ICU secondary to acute respiratory failure, hypoxia, septic shock, aspiration pneumonia, and status post exploratory lap and drainage as above. Nutrition per TPN. After stabilization and extubation on 4/27/22, patient transferred to  on 4/28/22. s/pExploratory laparotomy lysis of inflammatory adhesions, drainage undrained right groin abscess, repair of abdominal wound fascial dehiscence on 5/1/22     Prior Level of Function:  Lives With: Spouse  Type of Home: House  Home Layout: One level  Home Access: Stairs to enter with rails  Entrance Stairs - Number of Steps: 3   Bathroom Shower/Tub: Walk-in shower  Bathroom Toilet: Handicap height  Bathroom Accessibility: Accessible    Receives Help From: Family  ADL Assistance: Independent  Homemaking Assistance: Independent  Ambulation Assistance: Independent  Transfer Assistance: Independent  Active : No  Additional Comments: Per son, pts bathroom is currently in process of being remodeled to be handicap accessible. pt did not use AD for mobility and was indep with his ADL tasks at home. Pt has 4 children that assist    Restrictions/Precautions:  Restrictions/Precautions: Surgical Protocols,General Precautions,Fall Risk  Required Braces or Orthoses  Other: Abdominal Binder  Position Activity Restriction  Other position/activity restrictions: .      SUBJECTIVE: nursing ok'd therapy she reported that OT had just gotten him up and that pt didn't want to stay up in the chair for very long- pts call lt was on at arrival and pt wanting to return to bed and c/o of fatigue     PAIN: abd pain however no number given     Vitals: Oxygen: pt on 3L O2 however took extended time to get a reading     OBJECTIVE:  Bed Mobility:  Sit to Supine: Moderate Assistance, pt needed assist at phoebe LEs and once in bed he needed max assist to reposition hips and shoulders- rolling to the left with rail and HOB flat wtih CGA      Transfers:  Sit to Stand: Minimal Assistance  Stand to Sit:Moderate Assistance, due to poor safety awareness, pt kept reaching for bed before he got backed all the way up and nearly missed the bed     Ambulation:  Minimal Assistance, initially which progressed to mod assist as pt trying to sit down too quickly   Distance: 4 feet   Surface: Level Tile  Device:Rolling Walker  Gait Deviations:  Flexed posture at hips and knees he was lacking TKE, noted decreased step length and heel strike at Phoebe LEs, pt fatigued and tried to sit before backed up to chair needed much assist to keep erect and to step all the way to the bed     Balance:  sitting edge of bed with CGA to min assist - pt impulsive and trying to lay down encouraged him to stay sitting edge of bed while I manage his lines and for a few exercises     Exercise:  Patient was guided in 1 set(s) 10 reps of exercise to both lower extremities. Seated marches, Seated heel/toe raises, Long arc quads and then completed supine heelslides, short arc quads x 8 reps with encouragement to complete all ex pt c/o of fatigue . Exercises were completed for increased independence with functional mobility. Functional Outcome Measures: Completed  AM-PAC Inpatient Mobility without Stair Climbing Raw Score : 12  AM-PAC Inpatient without Stair Climbing T-Scale Score : 37.26    ASSESSMENT:  Assessment: Pt demonstrated generalized weakness in phoebe LEs along with decreased balance, endurance and safety awareness.  Pt would greatly benefit from cont skilled therapy prior to discharge home to improve functional independence and safety with functional mobility. Pt currently unsafe to return home yet, and demonstrated decreased endurance and feel he is unable to tolerate 3 hours of therapy yet     Activity Tolerance:  Patient tolerance of  treatment: fair. Pt fatigued from sitting up in chair for < 1 hour      Equipment Recommendations: Other: cont to assess needs  Discharge Recommendations: Continue to assess pending progress  Plan: Specific Instructions for Next Treatment: therex and mobility  Plan:  (5X GM)  Specific Instructions for Next Treatment: therex and mobility    Patient Education  Patient Education: Plan of Care    Goals:  Patient goals : feel better  Short Term Goals  Time Frame for Short term goals: by discharge  Short term goal 1: bed mobility with SBA to get in/out of bed  Short term goal 2: transfer with SBA to get in/out of chairs  Short term goal 3: amb >25'x1 with AD and CGA to walk safely in room  Long Term Goals  Time Frame for Long term goals : no LTGs set secondary to short ELOS    Following session, patient left in safe position with all fall risk precautions in place.

## 2022-05-04 NOTE — PROGRESS NOTES
EN/PN NUTRITION SUPPORT    RECOMMENDATIONS/PLAN:   -For this evening TPN bag, continue current TPN macronutrients: 12 kcal/kgm, 1 gram protein/kgm, 30% lipid kcals with dose weight 61kgm  -Diet as per Surgery  -Will monitor labs, GI status, ability for diet advancement, po intake and adjust TPN macronutrients as appropriate     CURRENT NUTRITION THERAPIES  PN-Adult  3 IN 1 Central Line (Custom)  ADULT DIET; Full Liquid  PN-Adult  3 IN 1 Central Line (Custom)  Current Parenteral Nutrition Orders:  · Type and Formula: 3-in-1 Custom (12 kcal/kgm, 1 gram protein/kgm, 30% lipids with dose weight 61kgm)   · Lipids: Daily  · Duration: Continuous  · Rate/Volume: 60ml/hour  · Current PN Order Provides: 732 kcals, 61 grams protein, 79 grams CHO, 22 grams fat/ 24 hours      COMPARATIVE STANDARDS  Energy (kcal):  7843-1043 (25-30/kgm); Weight Used for Energy Requirements:  Current (63kgm)     Protein (g):  107gms (1.7/kgm) IF ROBSON improves with hydration - monitoring; 63gms if no improvement; Weight Used for Protein Requirements:  Current        Fluid (ml/day):  per physician;     NUTRITIONAL SUMMARY/STATUS:   Pt. severely malnourished AEB criteria in 4/27 MNT note.  At risk for further nutritional compromise r/t admit with ROBSON, intubated 4/25 and extubated 4/29, emergent GI surgery 4/25 d/t perforated small bowel with soilage of pelvic cavity, pelvic abscess and necrotic hernial sac with wound dehiscence, 5/1 exploratory laparotomy lysis of inflammatory adhesions, drainage undrained right groin abscess, repair of abdominal wound fascial dehiscence; aspiration pneumonia, sepsis; LOS day 9, continued need for TPN d/t altered GI function, per H&P N/V few days pta and underlying medical condition GERD, HTN, CVA, CAD.     NUTRITION RELATED FINDINGS:   Treatments: Patient reports intake of only sips of liquids this morning, declines trialing ONS for now, spoke with Pharmacist regarding continue same TPN macronutrients for this evening due to low phosphorus  TPN status: TPN infusing at 60ml/ hour   GI Status: BM x 2 today, patient reports \"belly feels so so\"  Pertinent Labs: Potassium 3.8, Phosphorus 1.8, BUN 28, Creatinine 1.3, Glucose 116  Pertinent Meds: colace, humalog, zosyn, potassium phosphate, vitamin D  Current Weight: 135 lb 6.4 oz (61.4 kg) (5/2: no edmea documented)  Admission Weight:  138 lb (62.6 kg) (4/26 with no edema)  Wounds: Surgical Incision (4/25 laparotomy for SBO with heria repair; open groin wound, 5/1 abdomen wound dehiscence, repair and wash out)  Malnutrition Status: Severe malnutrition    Please refer to initial nutrition assessment for additional details.    Jo Watters RD, LD:    Contact Number: [phone]

## 2022-05-04 NOTE — PROGRESS NOTES
Renal Progress Note  [facility]    Patient :  Austin Romeo Sr.; 66 y.o. MRN# [id number]  Location:  3B-26/026-A  Attending:  Lesia Vasquez DO  Admit Date:  4/25/2022   Hospital Day: 9      Subjective:     Nephrology is following the patient for ROBSON. Patient was seen earlier this morning. Doing ok. Had episode of desaturation and placed on 4 L NC. Outpatient Medications:     Medications Prior to Admission: atorvastatin (LIPITOR) 20 MG tablet, TAKE 1 TABLET BY MOUTH DAILY  COMBIGAN 0.2-0.5 % ophthalmic solution, Place 1 drop into the left eye every 12 hours  famotidine (PEPCID) 20 MG tablet, Take 1 tablet by mouth 2 times daily  losartan (COZAAR) 50 MG tablet, Take 1 tablet by mouth daily  metoprolol succinate (TOPROL XL) 25 MG extended release tablet, TAKE 1 TABLET BY MOUTH DAILY  tamsulosin (FLOMAX) 0.4 MG capsule, Take 1 capsule by mouth daily  lidocaine (LIDODERM) 5 %, Place 1 patch onto the skin daily 12 hours on, 12 hours off.   fluocinonide (LIDEX) 0.05 % cream, APPLY EXTERNALLY TO THE AFFECTED AREA TWICE DAILY  tadalafil (CIALIS) 20 MG tablet, TAKE 1 TABLET BY MOUTH EVERY DAY AS NEEDED  aspirin 81 MG EC tablet, Take 1 tablet by mouth daily  loratadine (CLARITIN) 10 MG tablet, TAKE 1 TABLET BY MOUTH DAILY    Current Medications:     Scheduled Meds:    potassium phosphate IVPB  15 mmol IntraVENous Once    amiodarone  200 mg Oral BID    [START ON 5/5/2022] amiodarone  200 mg Oral Daily    metoprolol tartrate  25 mg Oral BID    docusate sodium  100 mg Oral Daily    piperacillin-tazobactam  3,375 mg IntraVENous Q8H    famotidine (PEPCID) injection  20 mg IntraVENous Daily    insulin lispro  0-6 Units SubCUTAneous 4 times per day    sodium hypochlorite   Irrigation Daily    vitamin D  50,000 Units Oral Weekly    sodium chloride flush  5-40 mL IntraVENous 2 times per day    [Held by provider] heparin (porcine)  5,000 Units SubCUTAneous TID     Continuous Infusions:    PN-Adult  3 IN 1 Central Line (Custom) 60 mL/hr at 22 1802    dextrose      amiodarone 0.5 mg/min (22 0726)    sodium chloride       PRN Meds:  morphine, glucose, glucagon (rDNA), dextrose, dextrose bolus (hypoglycemia) **OR** dextrose bolus (hypoglycemia), sodium chloride flush, sodium chloride, ondansetron **OR** ondansetron, polyethylene glycol, acetaminophen **OR** acetaminophen    Input/Output:       I/O last 3 completed shifts: In: 4607.8 [P.O.:320; I.V.:1877.7; IV Piggyback:484.8]  Out: 2200 [Urine:2200]. Patient Vitals for the past 96 hrs (Last 3 readings):   Weight   22 0600 135 lb 6.4 oz (61.4 kg)       Vital Signs:   Temperature:  Temp: 98 °F (36.7 °C)  TMax:   Temp (24hrs), Av.5 °F (36.9 °C), Min:98 °F (36.7 °C), Max:99.1 °F (37.3 °C)    Respirations:  Resp: 16  Pulse:   Pulse: 85  BP:    BP: 139/68  BP Range: Systolic (00WHB), NBR:403 , Min:117 , GDV:471       Diastolic (12DZN), KCP:63, Min:61, Max:68      Physical Examination:     General:  Awake, alert  HEENT: NC/AT/ MMM   Chest:               Clear B/L no rales  Cardiac:  S1 S2   Abdomen: Abdominal binder  Neuro:  sedated  SKIN:  No rashes,   Extremities:  No edema,     Labs:       Recent Labs     22  0425 22  0425 22  0650 22  1348 22  0410   WBC 22.2*  --  17.6*  --  16.5*   RBC 2.64*  --  2.29*  --  2.56*   HGB 8.8*   < > 7.6* 8.0* 8.4*   HCT 27.3*   < > 23.8* 24.9* 26.6*   .4*  --  103.9*  --  103.9*   MCH 33.3*  --  33.2*  --  32.8   MCHC 32.2  --  31.9*  --  31.6*     --  232  --  274   MPV 11.0  --  11.1  --  11.0    < > = values in this interval not displayed.       BMP:   Recent Labs     225 22  0650 22  0410   * 138 139   K 3.7 3.5 3.8   CL 95* 100 102   CO2 29 26 25   BUN 32* 35* 28*   CREATININE 1.8* 1.7* 1.3*   GLUCOSE 135* 114* 104   CALCIUM 7.5* 7.5* 7.6*      Phosphorus:     Recent Labs     22  2790 05/04/22  0410   PHOS 2.5 2.1* 1.8*     Magnesium:    Recent Labs     05/02/22  0425 05/03/22  0650   MG 1.9 1.8     Albumin:    No results for input(s): LABALBU in the last 72 hours. BNP:    No results found for: BNP  KHUSHBOO:    No results found for: KHUSHBOO  SPEP:  Lab Results   Component Value Date    PROT 5.3 04/28/2022     UPEP:   No results found for: LABPE  C3:   No results found for: C3  C4:   No results found for: C4  MPO ANCA:   No results found for: MPO  PR3 ANCA:   No results found for: PR3  Anti-GBM:   No results found for: GBMABIGG  Hep BsAg:       No results found for: HEPBSAG  Hep C AB:        No results found for: HEPCAB    Urinalysis/Chemistries:      Lab Results   Component Value Date    NITRU NEGATIVE 04/25/2022    COLORU YELLOW 04/25/2022    PHUR 5.5 04/25/2022    LABCAST >15 HYALINE 04/25/2022    WBCUA 10-15 04/25/2022    RBCUA 10-15 04/25/2022    MUCUS NONE SEEN 04/25/2022    YEAST NONE SEEN 04/25/2022    BACTERIA FEW 04/25/2022    SPECGRAV 1.015 04/25/2022    LEUKOCYTESUR TRACE 04/25/2022    UROBILINOGEN 0.2 04/25/2022    BILIRUBINUR NEGATIVE 04/25/2022    BLOODU MODERATE 04/25/2022    GLUCOSEU NEGATIVE 06/09/2019    KETUA NEGATIVE 04/25/2022     Urine Sodium:   No results found for: MING  Urine Potassium:  No results found for: KUR  Urine Chloride:  No results found for: CLUR  Urine Osmolarity: No results found for: OSMOU  Urine Protein:   No components found for: TOTALPROTEIN, URINE   Urine Creatinine:   No results found for: LABCREA  Urine Eosinophils:  No components found for: UEOS        Impression and Plan:  1. ROBSON , prerenal due to volume depletion and hypotension: improved. Stop normal saline  2. Lytes: hypophosphatemia, add Kphos 15 mmol, recheck in AM  3. S/p ex lap with small bowel resection, drainage of abscess and debridement necrotic fat fascia and hernia sac 4/25. Had wound dehiscence and went back for repair 5/1  4. Leukocytosis  5. Pneumonia  6. Anemia  7. Afib  8. Aspiration        Please don't hesitate to call with any questions.   Electronically signed by Jeronimo Diggs DO on 5/4/2022 at 9:45 AM

## 2022-05-05 LAB
ANION GAP SERPL CALCULATED.3IONS-SCNC: 10 MEQ/L (ref 8–16)
BUN BLDV-MCNC: 22 MG/DL (ref 7–22)
CALCIUM IONIZED: 1.08 MMOL/L (ref 1.12–1.32)
CALCIUM SERPL-MCNC: 7.5 MG/DL (ref 8.5–10.5)
CHLORIDE BLD-SCNC: 105 MEQ/L (ref 98–111)
CO2: 23 MEQ/L (ref 23–33)
CREAT SERPL-MCNC: 1.1 MG/DL (ref 0.4–1.2)
ERYTHROCYTE [DISTWIDTH] IN BLOOD BY AUTOMATED COUNT: 15.6 % (ref 11.5–14.5)
ERYTHROCYTE [DISTWIDTH] IN BLOOD BY AUTOMATED COUNT: 59.5 FL (ref 35–45)
GLUCOSE BLD-MCNC: 110 MG/DL (ref 70–108)
GLUCOSE BLD-MCNC: 117 MG/DL (ref 70–108)
GLUCOSE BLD-MCNC: 118 MG/DL (ref 70–108)
GLUCOSE BLD-MCNC: 124 MG/DL (ref 70–108)
GLUCOSE BLD-MCNC: 99 MG/DL (ref 70–108)
HCT VFR BLD CALC: 23.9 % (ref 42–52)
HEMOGLOBIN: 7.5 GM/DL (ref 14–18)
MAGNESIUM: 1.7 MG/DL (ref 1.6–2.4)
MCH RBC QN AUTO: 33.3 PG (ref 26–33)
MCHC RBC AUTO-ENTMCNC: 31.4 GM/DL (ref 32.2–35.5)
MCV RBC AUTO: 106.2 FL (ref 80–94)
PHOSPHORUS: 1.9 MG/DL (ref 2.4–4.7)
PLATELET # BLD: 308 THOU/MM3 (ref 130–400)
PMV BLD AUTO: 10.4 FL (ref 9.4–12.4)
POTASSIUM SERPL-SCNC: 4 MEQ/L (ref 3.5–5.2)
RBC # BLD: 2.25 MILL/MM3 (ref 4.7–6.1)
SODIUM BLD-SCNC: 138 MEQ/L (ref 135–145)
WBC # BLD: 14 THOU/MM3 (ref 4.8–10.8)

## 2022-05-05 PROCEDURE — 99024 POSTOP FOLLOW-UP VISIT: CPT | Performed by: SURGERY

## 2022-05-05 PROCEDURE — 2580000003 HC RX 258

## 2022-05-05 PROCEDURE — 80048 BASIC METABOLIC PNL TOTAL CA: CPT

## 2022-05-05 PROCEDURE — 2500000003 HC RX 250 WO HCPCS: Performed by: SURGERY

## 2022-05-05 PROCEDURE — 84100 ASSAY OF PHOSPHORUS: CPT

## 2022-05-05 PROCEDURE — 82330 ASSAY OF CALCIUM: CPT

## 2022-05-05 PROCEDURE — 83735 ASSAY OF MAGNESIUM: CPT

## 2022-05-05 PROCEDURE — 85027 COMPLETE CBC AUTOMATED: CPT

## 2022-05-05 PROCEDURE — 6370000000 HC RX 637 (ALT 250 FOR IP)

## 2022-05-05 PROCEDURE — 99232 SBSQ HOSP IP/OBS MODERATE 35: CPT | Performed by: INTERNAL MEDICINE

## 2022-05-05 PROCEDURE — 6360000002 HC RX W HCPCS

## 2022-05-05 PROCEDURE — 2140000000 HC CCU INTERMEDIATE R&B

## 2022-05-05 PROCEDURE — 82948 REAGENT STRIP/BLOOD GLUCOSE: CPT

## 2022-05-05 PROCEDURE — 99233 SBSQ HOSP IP/OBS HIGH 50: CPT | Performed by: INTERNAL MEDICINE

## 2022-05-05 RX ADMIN — PIPERACILLIN AND TAZOBACTAM 3375 MG: 3; .375 INJECTION, POWDER, LYOPHILIZED, FOR SOLUTION INTRAVENOUS at 08:27

## 2022-05-05 RX ADMIN — PIPERACILLIN AND TAZOBACTAM 3375 MG: 3; .375 INJECTION, POWDER, LYOPHILIZED, FOR SOLUTION INTRAVENOUS at 17:02

## 2022-05-05 RX ADMIN — METOPROLOL TARTRATE 25 MG: 25 TABLET, FILM COATED ORAL at 08:19

## 2022-05-05 RX ADMIN — METOPROLOL TARTRATE 25 MG: 25 TABLET, FILM COATED ORAL at 20:46

## 2022-05-05 RX ADMIN — AMIODARONE HYDROCHLORIDE 200 MG: 200 TABLET ORAL at 08:19

## 2022-05-05 RX ADMIN — PIPERACILLIN AND TAZOBACTAM 3375 MG: 3; .375 INJECTION, POWDER, LYOPHILIZED, FOR SOLUTION INTRAVENOUS at 01:50

## 2022-05-05 RX ADMIN — FAMOTIDINE 20 MG: 10 INJECTION, SOLUTION INTRAVENOUS at 08:20

## 2022-05-05 RX ADMIN — DAKIN'S SOLUTION 0.125% (QUARTER STRENGTH): 0.12 SOLUTION at 16:03

## 2022-05-05 ASSESSMENT — PAIN SCALES - GENERAL: PAINLEVEL_OUTOF10: 0

## 2022-05-05 NOTE — CARE COORDINATION
5/5/22, 2:13 PM EDT    DISCHARGE ON GOING EVALUATION    Odilia Suarez Sr. Hospital day: 10  Location: -/[facility]A Reason for admit: Renal failure [N19]  Sepsis associated hypotension ([address] [A41.9, I95.9]   Procedure:   4/25 CXR: Persistent mild atelectatic/fibrotic stranding retrocardiac region left lung base; no effusion  4/25 CT Head:  Stable CT scan of the brain, no interval change since previous MRI scan dated 10 Melody 2019. Mild atrophy and dilatation of the third and lateral ventricles. Probable ischemic changes in the white matter, left basal ganglia and in the bernadette. Calcification the cavernous segments of both internal carotid arteries. Increased density in the external auditory canals which may represent cerumen bilaterally. 4/25 Renal US: Multifocal simple bilateral renal cyst. Echogenic bilateral renal parenchyma. 4/25 CT Abd/pelvis: Right inguinal hernia containing small bowel. This is causing a small bowel obstruction. There are several gas collections within the right inguinal hernia. Ischemic or perforated bowel could have this appearance. Mild to moderate patchy consolidation bilateral lower lobes. This could represent aspiration or pneumonia. Prominent thickening of the distal esophagus and gastroesophageal junction. Neoplasm not excluded. Recommend direct visualization. 4/25 LAPAROTOMY EXPLORATORY FOR SMALL BOWEL OBSTRUCTION WITH INCARCERATED HERNIA REPAIR.  Debridement necrotic subcutaneous fat fascia and muscle right groin  4/25 Intubated in OR  4/25 CVC Right subclavian   4/27 Extubated. 4/29 PICC line placed. 5/1 Return to OR with Dr. Skinner Fears laparotomy lysis of inflammatory adhesions, drainage undrained right groin abscess, repair of abdominal wound fascial dehiscence. Barriers to Discharge: Hospitalist, Surgery, Nephrology and PMR following. POD #9 and POD #4. I-Mg 1.08. WBC 14.0, Hgb 7.5. Amiodarone po daily, Pepcid iv daily, Lopressor po bid, Zosyn iv q8hr.  TPN to stop tonight. Diet increased to easy to chew/soft diet with supplements. PT/OT/SLP. O2 decreased to 3L/nc overnight. Sats 95%. Max. Passing stools. Dietitian following. PCP: Arnulfo Hagan MD  Readmission Risk Score: 21.7 ( )%  Patient Goals/Plan/Treatment Preferences: From home with wife. Planning IP Rehab at discharge.  Spoke with [facility], they will start precert when pt is off of TPN, planning tomorrow am.

## 2022-05-05 NOTE — PROGRESS NOTES
Comprehensive Nutrition Assessment    Type and Reason for Visit:  Reassess    Nutrition Recommendations/Plan:   1. Recommend no TPN macronutrient changes as anticipate TPN to be stopped tonight. 2. Recommend diet as per SLP. 3. Will send Ensure Enlive TID. 4. Encouraged po, ONS at best efforts. 5. Recommend MVI. Malnutrition Assessment:  Malnutrition Status:  Severe malnutrition (04/26/22 1443)    Context:  Chronic Illness     Findings of the 6 clinical characteristics of malnutrition:  Energy Intake:  Unable to assess  Weight Loss:  5% over 1 month (7% in 3 weeks)     Body Fat Loss:  Severe body fat loss Orbital,Triceps,Fat Overlying Ribs   Muscle Mass Loss:  Severe muscle mass loss Temples (temporalis),Clavicles (pectoralis & deltoids),Calf (gastrocnemius)  Fluid Accumulation:  No significant fluid accumulation     Strength:  Not Performed    Nutrition Assessment:     Pt. Improving from a nutritional standpoint AEB tolerating diet advancement, plan to stop PN. At risk for further nutritional compromise r/t severe malnutrition, admit with ROBSON, intubated 4/25 and extubated 4/29, emergent GI surgery 4/25 d/t perforated small bowel with soilage of pelvic cavity, pelvic abscess and necrotic hernial sac with wound dehiscence, 5/1 exploratory laparotomy lysis of inflammatory adhesions, drainage undrained right groin abscess, repair of abdominal wound fascial dehiscence; aspiration pneumonia, sepsis; per H&P N/V few days pta and underlying medical condition GERD, HTN, CVA, CAD.     Nutrition Related Findings:      Wound Type: Surgical Incision (4/25 laparotomy for SBO with heria repair; open groin wound, 5/1 abdomen wound dehiscence, repair and wash out)     Pt.  Report/Treatments/Miscellaneous: pt. Seen after eating breakfast; reports tolerating diet; consumed 25-50% of meal; appears slightly confused  GI Status: 3 BMs noted past 24 hours  Pertinent Labs: 5/5: Glucose 99; 5/4: Glucose 104, BUN 28, Cr 1.3, Phosphorus 1.8  Pertinent Meds: Vitamin D, ATB, Glycolax, Zofran      Current Nutrition Intake & Therapies:    Average Meal Intake: 1-25%,26-50%  Average Supplements Intake:  (declines ONS start)  PN-Adult  3 IN 1 Central Line (Custom)  ADULT DIET; Easy to Chew  ADULT ORAL NUTRITION SUPPLEMENT; Breakfast, Lunch, Dinner; Standard High Calorie/High Protein Oral Supplement  Current Parenteral Nutrition Orders:  · Type and Formula: 3-in-1 Custom (12 kcal/kgm, 1 gram protein/kgm, 30% lipids with dose weight 61kgm)   · Lipids: Daily  · Duration: Continuous  · Rate/Volume: 60ml/hour  · Current PN Order Provides: 732 kcals, 61 grams protein, 79 grams CHO, 22 grams fat/ 24 hours      Anthropometric Measures:  Height: 5' 9\" (175.3 cm)  Ideal Body Weight (IBW): 160 lbs (73 kg)    Admission Body Weight: 138 lb (62.6 kg) (4/26 with no edema)  Current Body Weight: 135 lb 6.4 oz (61.4 kg) (5/2: no edmea documented),   IBW.  Weight Source: Bed Scale  Current BMI (kg/m2): 20  Usual Body Weight: 148 lb (67.1 kg) (4/10/22; 143# 1/27/22)                    BMI Categories: Underweight (BMI less than 22) age over 72    Estimated Daily Nutrient Needs:  Energy Requirements Based On: Kcal/kg  Weight Used for Energy Requirements: Current (63kgm)  Energy (kcal/day): 6632-9951 (25-30/kgm)  Weight Used for Protein Requirements: Other (Comment) (63 kgm)  Protein (g/day): 95+ grams (as renal function allows)       Nutrition Diagnosis:   · Severe malnutrition,In context of chronic illness related to inadequate protein-energy intake as evidenced by Criteria as identified in malnutrition assessment      Nutrition Interventions:   Food and/or Nutrient Delivery: Continue Current Diet,Start Oral Nutrition Supplement,Continue Current Parenteral Nutrition  Nutrition Education/Counseling: Education initiated (5/5 Encouraged po, ONS at best efforts.)  Coordination of Nutrition Care: Continue to monitor while inpatient  Plan of Care discussed with: doesn't flow to note    Goals:  Previous Goal Met: Progressing toward Goal(s)  Goals: PO intake 75% or greater,by next RD assessment       Nutrition Monitoring and Evaluation:   Behavioral-Environmental Outcomes: None Identified  Food/Nutrient Intake Outcomes: Diet Advancement/Tolerance,Food and Nutrient Intake,Supplement Intake,Parenteral Nutrition Intake/Tolerance  Physical Signs/Symptoms Outcomes: Biochemical Data,GI Status,Weight,Nutrition Focused Physical Findings,Skin,Fluid Status or Edema    Discharge Planning:     Too soon to determine     Suzanna Wilkinson RD, LD  Contact: [phone]

## 2022-05-05 NOTE — PROGRESS NOTES
Jovana Flanagan MD  Postoperative Progress Note  Pt Name: Gideon Ann Record Number: [id number]  Date of Birth 1950   Today's Date: 5/5/2022  ASSESSMENT   1. POD # 9 sepsis secondary to incarcerated right inguinal hernia with perforation and obstruction of small bowel. Necrotic right groin wound status postdebridement drainage of abdominal abscess present at the time of surgery. POD #4 repair of abdominal wound dehiscence lysis of inflammatory adhesions and abdominal and right groin washout  2. Sepsis resolved. White count continues to to decline  3. Acute renal failure resolved  4. Nasal cannula oxygen increased oxygen requirements overnight. 5. Leukocytosis resolving  6. A. Fib/NS arrhythmia. Internal medicine plans to transition to oral with improved oral intake  7. Hypernatremia resolved  8. pneumonia  9. Tolerating clear liquids  10. Anemia no signs of active bleeding   has a past medical history of CAD (coronary artery disease), CVA (cerebral vascular accident) ([address] Erectile dysfunction, GERD (gastroesophageal reflux disease), Hyperlipidemia, and Hypertension. PLANS   1. Analgesics and antiemetics as needed. 2. IV hydration per medicine service. Normal saline stopped on D5 water. 3. Continue broad-spectrum antibiotic Zosyn. Medicine plans to continue a few more days. 4.   Amiodarone converted to oral  5. DVT prophylaxis per primary service. 6. change groin wound packing 1-2 times daily. Dry gauze. Dakin's solution ordered. Wounds look okay. Midline wound dressing changed. 7.  Continue Zosyn. Intra-abdominal cultures E. coli and Morganella morganii I both sensitive. White count continues to trend down may discontinue in 2 to 3 days. 8.  TPN. If tolerating oral diet and adequate intake discontinue  9. Monitor white count intermittently  10. Therapies for deconditioning  11. Continue abdominal binder  12. Max removed  13. Advance to soft diet  SUBJECTIVE   Tarri Decant is hemodynamically stable. Wounds look look good. Bowel sounds are present. Denies nausea or emesis. Had several BM  CURRENT MEDICATIONS   Scheduled Meds:   amiodarone  200 mg Oral Daily    metoprolol tartrate  25 mg Oral BID    docusate sodium  100 mg Oral Daily    piperacillin-tazobactam  3,375 mg IntraVENous Q8H    famotidine (PEPCID) injection  20 mg IntraVENous Daily    insulin lispro  0-6 Units SubCUTAneous 4 times per day    sodium hypochlorite   Irrigation Daily    vitamin D  50,000 Units Oral Weekly    sodium chloride flush  5-40 mL IntraVENous 2 times per day    [Held by provider] heparin (porcine)  5,000 Units SubCUTAneous TID     Continuous Infusions:   PN-Adult  3 IN 1 Central Line (Custom) 60 mL/hr at 22 2309    dextrose      sodium chloride       PRN Meds:.morphine, glucose, glucagon (rDNA), dextrose, dextrose bolus (hypoglycemia) **OR** dextrose bolus (hypoglycemia), sodium chloride flush, sodium chloride, ondansetron **OR** ondansetron, polyethylene glycol, acetaminophen **OR** acetaminophen  OBJECTIVE   CURRENT VITALS:  height is 5' 9\" (1.753 m) and weight is 135 lb 6.4 oz (61.4 kg). His oral temperature is 98.4 °F (36.9 °C). His blood pressure is 140/70 (abnormal) and his pulse is 81. His respiration is 18 and oxygen saturation is 98%. Body mass index is 20 kg/m². Temperature Range (24h):Temp: 98.4 °F (36.9 °C) Temp  Av.1 °F (36.7 °C)  Min: 97.5 °F (36.4 °C)  Max: 98.4 °F (36.9 °C)  BP Range (95Y): Systolic (68YPS), VQJ:500 , Min:110 , UPT:539     Diastolic (52QOG), KPP:93, Min:55, Max:73    Pulse Range (24h): Pulse  Av.8  Min: 72  Max: 85  Respiration Range (24h): Resp  Av.7  Min: 16  Max: 18  Current Pulse Ox (24h):  SpO2: 98 %  Pulse Ox Range (24h):  SpO2  Av.1 %  Min: 85 %  Max: 100 %  Oxygen Amount and Delivery: O2 Flow Rate (L/min): 3 L/min  Incentive Spirometry Tx:        Achieved Volume (mL): 250 mL    GENERAL: Sedated no acute distress  LUNGS: Clear diminished bases   HEART: Pulse 81 looks sinus on monitor today  ABDOMEN:soft dressing intact  INCISION midline wound intact. EXTREMITY: No edema  In: 4568 [P.O.:120; I.V.:1544.9]  Out: 3725 [Urine:3725]  [REMOVED] Closed/Suction Drain Superior;Midline Abdomen Bulb-Output (ml): 50 ml  Date 05/05/22 0000 - 05/05/22 2359   Shift 5755-1382 0358-0272 3157-8900 24 Hour Total   INTAKE   Shift Total(mL/kg)       OUTPUT   Urine(mL/kg/hr) 550   550   Shift Total(mL/kg) 550(9)   550(9)   Weight (kg) 61.4 61.4 61.4 61.4     LABS     Recent Labs     05/03/22  0650 05/03/22  1348 05/04/22  0410   WBC 17.6*  --  16.5*   HGB 7.6* 8.0* 8.4*   HCT 23.8* 24.9* 26.6*     --  274     --  139   K 3.5  --  3.8     --  102   CO2 26  --  25   BUN 35*  --  28*   CREATININE 1.7*  --  1.3*   MG 1.8  --   --    PHOS 2.1*  --  1.8*   CALCIUM 7.5*  --  7.6*      No results for input(s): PTT, INR in the last 72 hours. Invalid input(s): PT  No results for input(s): AST, ALT, BILITOT, BILIDIR, AMYLASE, LIPASE, LDH, LACTA in the last 72 hours. No results for input(s): TROPONINT in the last 72 hours. RADIOLOGY     XR CHEST PORTABLE   Final Result   1. Persistent bibasilar opacities. Small left-sided pleural effusion. **This report has been created using voice recognition software. It may contain minor errors which are inherent in voice recognition technology. **      Final report electronically signed by Dr. Lynn Shaffer on 5/2/2022 8:13 AM      XR CHEST PORTABLE   Final Result   A right arm PICC line tip terminates at the cavoatrial projection. No pneumothorax is observed. Small bilateral pleural effusions and bilateral lower lobe airspace opacity, right greater than left, are not significantly changed. **This report has been created using voice recognition software.   It may contain minor errors which are inherent in voice recognition technology. **      Final report electronically signed by Dr Ridge Childs on 4/29/2022 4:04 PM      XR ABDOMEN FOR NG/OG/NE TUBE PLACEMENT   Final Result   Impression:      NG tube tip proximal stomach. This document has been electronically signed by: Srinath Gavin MD on    04/28/2022 11:56 PM      XR CHEST PORTABLE   Final Result   Impression:   1. Satisfactory positioning of the endotracheal tube and right central    line. Probable slightly high positioning of the orogastric tube. Consider    advancing 3-4 cm. 2. Interval development of small bilateral pleural effusions, right    greater than left. 3. Bilateral lower lobe airspace opacities secondary to atelectasis and/or    infiltrates, right greater than left. 4. Bilateral interstitial changes secondary to pneumonitis or edema. This document has been electronically signed by: Roberto Garrison. DO Jennifer on    04/26/2022 12:01 AM      CT ABDOMEN PELVIS WO CONTRAST Additional Contrast? None   Final Result   Impression:   Right inguinal hernia containing small bowel. This is causing a small    bowel obstruction. There are several gas collections within the right    inguinal hernia. Ischemic or perforated bowel could have this appearance. Mild to moderate patchy consolidation bilateral lower lobes. This could    represent aspiration or pneumonia. Prominent thickening of the distal esophagus and gastroesophageal    junction. Neoplasm not excluded. Recommend direct visualization. This document has been electronically signed by: Sapphire Kraus MD on    04/25/2022 07:20 PM      All CTs at this facility use dose modulation techniques and iterative    reconstructions, and/or weight-based dosing   when appropriate to reduce radiation to a low as reasonably achievable. US RENAL COMPLETE   Final Result   Impression:   Multifocal simple bilateral renal cyst. Echogenic bilateral renal    parenchyma.       This document has been electronically signed by: Kash Colmenares MD on    04/25/2022 06:30 PM      CT HEAD WO CONTRAST   Final Result       1. Stable CT scan of the brain, no interval change since previous MRI scan dated 10 Melody 2019.   2. Mild atrophy and dilatation of the third and lateral ventricles. 3. Probable ischemic changes in the white matter, left basal ganglia and in the bernadette. 4. Calcification the cavernous segments of both internal carotid arteries. 5. Increased density in the external auditory canals which may represent cerumen bilaterally. **This report has been created using voice recognition software. It may contain minor errors which are inherent in voice recognition technology. **      Final report electronically signed by DR Sulaiman Burr on 4/25/2022 4:01 PM      XR CHEST PORTABLE   Final Result   1. Normal heart size. No effusion. 2. Persistent mild atelectatic/fibrotic stranding retrocardiac region left lung base. **This report has been created using voice recognition software. It may contain minor errors which are inherent in voice recognition technology. **      Final report electronically signed by Dr. Nayan Peace on 4/25/2022 3:05 PM          Electronically signed by Llana Schilder, MD on 5/5/2022 at 6:28 AM

## 2022-05-05 NOTE — PROGRESS NOTES
Renal Progress Note  [facility]    Patient :  Tonny Stout Sr.; 66 y.o. MRN# [id number]  Location:  3B-26/026-A  Attending:  Kesha Gonzalez DO  Admit Date:  4/25/2022   Hospital Day: 10      Subjective:     Nephrology is following the patient for ROBSON. Patient was seen earlier this morning. Wife at bedside. Has TPN running. Was started on soft diet. Outpatient Medications:     Medications Prior to Admission: atorvastatin (LIPITOR) 20 MG tablet, TAKE 1 TABLET BY MOUTH DAILY  COMBIGAN 0.2-0.5 % ophthalmic solution, Place 1 drop into the left eye every 12 hours  famotidine (PEPCID) 20 MG tablet, Take 1 tablet by mouth 2 times daily  losartan (COZAAR) 50 MG tablet, Take 1 tablet by mouth daily  metoprolol succinate (TOPROL XL) 25 MG extended release tablet, TAKE 1 TABLET BY MOUTH DAILY  tamsulosin (FLOMAX) 0.4 MG capsule, Take 1 capsule by mouth daily  lidocaine (LIDODERM) 5 %, Place 1 patch onto the skin daily 12 hours on, 12 hours off.   fluocinonide (LIDEX) 0.05 % cream, APPLY EXTERNALLY TO THE AFFECTED AREA TWICE DAILY  tadalafil (CIALIS) 20 MG tablet, TAKE 1 TABLET BY MOUTH EVERY DAY AS NEEDED  aspirin 81 MG EC tablet, Take 1 tablet by mouth daily  loratadine (CLARITIN) 10 MG tablet, TAKE 1 TABLET BY MOUTH DAILY    Current Medications:     Scheduled Meds:    amiodarone  200 mg Oral Daily    metoprolol tartrate  25 mg Oral BID    docusate sodium  100 mg Oral Daily    piperacillin-tazobactam  3,375 mg IntraVENous Q8H    famotidine (PEPCID) injection  20 mg IntraVENous Daily    insulin lispro  0-6 Units SubCUTAneous 4 times per day    sodium hypochlorite   Irrigation Daily    vitamin D  50,000 Units Oral Weekly    sodium chloride flush  5-40 mL IntraVENous 2 times per day    [Held by provider] heparin (porcine)  5,000 Units SubCUTAneous TID     Continuous Infusions:    PN-Adult  3 IN 1 Central Line (Custom) 60 mL/hr at 05/04/22 2309    dextrose      sodium chloride       PRN Meds:  morphine, glucose, glucagon (rDNA), dextrose, dextrose bolus (hypoglycemia) **OR** dextrose bolus (hypoglycemia), sodium chloride flush, sodium chloride, ondansetron **OR** ondansetron, polyethylene glycol, acetaminophen **OR** acetaminophen    Input/Output:       I/O last 3 completed shifts: In: 5994 [P.O.:120; I.V.:1544.9; IV Piggyback:775.2]  Out: 3725 [Urine:3725]. Patient Vitals for the past 96 hrs (Last 3 readings):   Weight   22 0600 135 lb 6.4 oz (61.4 kg)       Vital Signs:   Temperature:  Temp: 97.9 °F (36.6 °C)  TMax:   Temp (24hrs), Av.1 °F (36.7 °C), Min:97.5 °F (36.4 °C), Max:98.4 °F (36.9 °C)    Respirations:  Resp: 18  Pulse:   Pulse: 84  BP:    BP: (!) 146/73  BP Range: Systolic (48JYN), HQS:380 , Min:110 , KBT:701       Diastolic (39JND), OAJ:10, Min:55, Max:73      Physical Examination:     General:  Awake, alert  HEENT: NC/AT/ MMM   Chest:               Clear B/L no rales  Cardiac:  S1 S2   Abdomen: Abdominal binder  Neuro:  sedated  SKIN:  No rashes,   Extremities:  No edema,     Labs:       Recent Labs     22  0650 22  1348 22  0410   WBC 17.6*  --  16.5*   RBC 2.29*  --  2.56*   HGB 7.6* 8.0* 8.4*   HCT 23.8* 24.9* 26.6*   .9*  --  103.9*   MCH 33.2*  --  32.8   MCHC 31.9*  --  31.6*     --  274   MPV 11.1  --  11.0      BMP:   Recent Labs     22  0650 22  0410    139   K 3.5 3.8    102   CO2 26 25   BUN 35* 28*   CREATININE 1.7* 1.3*   GLUCOSE 114* 104   CALCIUM 7.5* 7.6*      Phosphorus:     Recent Labs     22  0650 22  0410   PHOS 2.1* 1.8*     Magnesium:    Recent Labs     22  0650   MG 1.8     Albumin:    No results for input(s): LABALBU in the last 72 hours.   BNP:    No results found for: BNP  KHUSHBOO:    No results found for: KHUSHBOO  SPEP:  Lab Results   Component Value Date    PROT 5.3 2022     UPEP:   No results found for: LABPE  C3:   No results found for: C3  C4:   No results found for: C4  MPO ANCA:   No results found for: MPO  PR3 ANCA:   No results found for: PR3  Anti-GBM:   No results found for: GBMABIGG  Hep BsAg:       No results found for: HEPBSAG  Hep C AB:        No results found for: HEPCAB    Urinalysis/Chemistries:      Lab Results   Component Value Date    NITRU NEGATIVE 04/25/2022    COLORU YELLOW 04/25/2022    PHUR 5.5 04/25/2022    LABCAST >15 HYALINE 04/25/2022    WBCUA 10-15 04/25/2022    RBCUA 10-15 04/25/2022    MUCUS NONE SEEN 04/25/2022    YEAST NONE SEEN 04/25/2022    BACTERIA FEW 04/25/2022    SPECGRAV 1.015 04/25/2022    LEUKOCYTESUR TRACE 04/25/2022    UROBILINOGEN 0.2 04/25/2022    BILIRUBINUR NEGATIVE 04/25/2022    BLOODU MODERATE 04/25/2022    GLUCOSEU NEGATIVE 06/09/2019    KETUA NEGATIVE 04/25/2022     Urine Sodium:   No results found for: MING  Urine Potassium:  No results found for: KUR  Urine Chloride:  No results found for: CLUR  Urine Osmolarity: No results found for: OSMOU  Urine Protein:   No components found for: TOTALPROTEIN, URINE   Urine Creatinine:   No results found for: LABCREA  Urine Eosinophils:  No components found for: UEOS        Impression and Plan:  1. ROBSON , prerenal due to volume depletion and hypotension: improved. labs pending today  2. Lytes:pending. Has been hypophosphatemic  3. S/p ex lap with small bowel resection, drainage of abscess and debridement necrotic fat fascia and hernia sac 4/25. Had wound dehiscence and went back for repair 5/1  4. Leukocytosis  5. Pneumonia  6. Anemia  7. Afib  8. Aspiration        Please don't hesitate to call with any questions.   Electronically signed by Samantha Duke DO on 5/5/2022 at 10:49 AM

## 2022-05-05 NOTE — CARE COORDINATION
5/5/22, 1:32 PM EDT    DISCHARGE PLANNING EVALUATION    Spoke with patients spouse Adriana Mary. Made her aware Favio Fierro has been accepted to [facility], but the insurance needs to approve. Asked if she had discussed a back up plan with her family. She told SW that they are working on options to take him home. SW tried to make sure she understood that her insurance would only cover a minimum amount of home care. She told SW that she knows it will pay for 4 hours of nursing a day. SW told her that Medicare plans do not cover that much nursing. She told SW that her daughter looked it up and she is going to choose to believe they would have that much help. SW offered a private duty list and ECF list.  Will bring it up to the patients room for her to get tonight.

## 2022-05-05 NOTE — PROGRESS NOTES
PROGRESS NOTE      Patient:  Hal Suarez Sr. Unit/Bed:3B-26/026-A    YOB: 1950    MRN: [id number]       Acct: [de-identified]     PCP: Janet Jordan MD    Date of Admission: 4/25/2022      Assessment/Plan:    Anticipated Discharge in : Pending    Active Hospital Problems    Diagnosis Date Noted    Dehiscence of fascia [T81.30XA]      Priority: Medium    Atrial fibrillation with RVR ([address] [I48.91]      Priority: Medium    Acute respiratory failure with hypoxia ([address] [J96.01] 04/26/2022     Priority: Medium    Septic shock ([address] [A41.9, R65.21] 04/26/2022     Priority: Medium    Incarcerated right inguinal hernia [K40.30] 04/26/2022     Priority: Medium    Small bowel perforation ([address] [K63.1] 04/26/2022     Priority: Medium    Aspiration pneumonia ([address] [J69.0] 04/26/2022     Priority: Medium    Acute kidney injury ([address] [N17.9] 04/26/2022     Priority: Medium    Hypoalbuminemia [E88.09] 04/26/2022     Priority: Medium    Syncope [R55] 04/26/2022     Priority: Medium    Hypokalemia [E87.6] 04/26/2022     Priority: Medium    Anemia, macrocytic [D53.9] 04/26/2022     Priority: Medium    Severe malnutrition ([address] [E43] 04/26/2022     Priority: Medium       Perforated small bowel, soilage of the pelvic cavity, right groin and pelvic abscesses, and necrotic hernial sac with wound dehiscence:   POD #9 exploratory laparotomy with bowel resection, primary anastomosis, drainage of pelvic abscess as well as right groin exploration, drainage of abscess, and debridement of necrotic subcutaneous fat, fascia and muscle. On Day 11 of Zosyn for Culture Growing Morganella Morganii and Ecoli. final Susceptibility is pending, however prelim demonstrates sensatitivites to Zosyn. POD#4 repair of wound dehiscence. Surgery on board and following. If WBC count increases consider additional undrained abscess or fluid collections. Off NGT suction per Surgery as low oPT. Dressing changes and wound care. Continue on SSI, may require TPN supplement in addition if not tolerating PO. Continue Zoysn coverage to 11/14 dy in setting of recent Sx. Continue TPN supplement stop Tonight for Precert for IPR. Continue with PO diet per Sx, SLP following, dietician on board. Aspiration pneumonia: Imaging findings suggestion of infiltrate w/ gastric contents suctioned from ET tube is consistent with aspiration pneumonia. Culture growing ecoli with H. influ on PCR detected. Patient receiving antibiotics as above. Sepsis secondary to intra-abdominal infection and aspiration pneumonia - improving: On antibiotics as above. Current WBC 17.6 and downtrend. Previous White count significantly elevated to 33.6 up from 21.8 postoperatively, however Patient remains afebrile and O2 support requirements are being weaned, BP grossly stable, monitor    A. fib with RVR: noted Apr/27/2022 New onset A. fib with RVR refractory to Lopressor and Cardizem. Patient converted with amiodarone. K WNL, Mg WNL, [id number] 5. Surgery recommends holding anticoagulation still at this time in setting of bleed risk. Note Pt is Not optimally controlled on amiodrip + lopressor, however much improved ontrol with HR 80's despite some intermittent arrhythmia on tele. Successful transition to PO meds  Continue Amio  BID as Pt is tolerating well. Continue Lopressor 25 BID PO  with holding parameters. BP grossly stable at 120's. If persistently remains in intermittent consider Cardiology consult for optimization as Pt will likely be high bleed risk for some time vs [facility] use. Continue telemetry  Keep Mg > 2, K > 4,       ROBSON on CKD - resolving: Likely 2/2 volume depletion, poor oral intake, and hypotension 2/2 sepsis. Pt creatinine improved to 1.3 from 10.4 on Admit  Patient's baseline 1.3. improved with Hydration. Nephrology on board, appreciate rec. Good UOPT.      Mixed Acid-base disorder -resolved:  Mixed Anion gap metabolic acidosis with respiratory compensation likely secondary to sepsis and ROBSON with an additional metabolic alkalosis likely secondary to volume depletion in setting of GI sx. AG improving, Nephrology on board, appreciate recs. Acute hypoxic respiratory failure: likely 2/2 to aspiration pneumonia as above. Extubated Apr/27/2022, currently on 3L NC. Sating well Wean as tolerated. Acute postoperative pulmonary insufficiency - resolved: Extubated Apr/27/2022,     Troponinemia: Mildly elevated troponin on admit of 0.047 downtrending with inverted T waves found on EKG.  Likely 2/2 demand ischemia 2/2 hypotension.  Monitor    Hypophosphatemia: Noted May/04/2022  Pending AM labs, Continue to monitor, PO challenge as above, may supplement with TPN   Hypokalemia -  Resolved. Noted Apr/30/2022, on K protocol. Monitor AM.  Hypernatremia - Resolved. Nephrology on board, monitor. Hyperphosphatemia -resolved:  Continue to monitor, PO challenge as above, may supplement with TPN  Hypermagnesemia - resolved:  Continue to monitor, PO challenge as above, may supplement with TPN  Hypocalcemia: Continue to monitor, PO challenge as above, may supplement with TPN    CAD: Hx of,  continue home Lipitor 20 mg,  Holding ASA  For bleed risk. Hgb > 8.0    GERD: hx of, home regiment of pepcid 10mg daily, continued for admission IV form. . Noted incidental finding of thickened esophagus on imaging, F/U opt when stable for scope. Delirium? - resolved - pulled out central access line Apr/28/2022 PM, improved mentation however remains quite laconic. Given medical acuity and advance age likely sundowning as behavior is overnight. Reorient and monitor. If requires consider Serobarbra QHS. Chief Complaint:  Pelvic Pain    Hospital Course:    Per H&P:    \" \"The patient was sedated and intubated and therefore could not give any history.  Therefore, history is primarily taken from the chart.   Patient's wife states that the patient fell in his yard approximately 14 days ago and went to the [facility] ED. This time, he was discharged with pain medication. Approximately 7 days ago, he again presented to [facility] with complaints of a right lower abdominal bulge at which point he was diagnosed with inguinal hernia and is scheduled with surgical follow-up. Patient presented to [facility] on 4/25/2022 for syncope and worsening inguinal hernia. Wife states that syncope first occurred in [facility] several days ago when the patient had nausea and subjective feelings of excessive heat while sitting.  After this, he went to go outside and then passed out. On the morning of admission, patient's wife was given of the bathtub when he \"slumped over. \"  Wife stated that the patient vomited 3 times in the last 24 hours with brown/yellow vomitus. Wife also reports that he has not been having bowel movements, has had less frequent urination, and that he has been weak and sleeping \"20 hours a day. \"  While in the ED, the patient was found to have profound renal failure with a creatinine of 11.2 with a baseline of approximately 1.3. Patient also noted to have a profound anion gap acidosis which improved with aggressive fluid resuscitation. Nephrology was consulted given profound renal failure. CT of the abdomen and pelvis without contrast was performed which demonstrated mild to moderate patchy consolidation of the bilateral lower lobes, thickening of the distal esophagus and gastroesophageal junction, a right inguinal hernia causing a small bowel obstruction, and multiple gas collections in the right inguinal hernia highly suspicious for ischemic or perforated bowel.   On the evening of 4/25/2022 Dr. Malik performed exploratory laparotomy with small bowel resection, single primary anastomosis, drainage of pelvic abscess, exploration and drainage of right groin abscess, and debridement of necrotic subcutaneous fat/fascia/muscle in the right groin along with excision of the necrotic hernia sac in the right groin. Empiric coverage with vancomycin and Zosyn to necrotic small bowel as well as probable aspiration pneumonia. Family was extensively counseled. \"    Pt had course in ICU from Apr/25-28/2022, Overnight patient went to A. fib with RVR that was refractory to both Lopressor and Cardizem. Patient was given amiodarone which converted him out. Patient continues to have frequent PACs on the monitor. Surgery was consulted regarding anticoagulation however they are recommending holding anticoagulation at this time. We will continue to follow-up with surgery as far as when we can start anticoagulation. Patient overall is awake and alert self, year, but only occasionally to place. Patient denies any pain or acute complaints at this time. Patient is frequently repeating himself however concern for underlying delirium. May/01/2022, abdominal wound dehissence, taken back to OR for repair and washout. Subjective:    Pt seen and examined. Afebrile overnight, no behavioral issues, PICC in place,  Telemetry remains improved on PO control. . Pt still denies pain, still speaks very little. SLP and dietary on board, plan to stop TPN tonight and start precert for IPR tomorrw. Review of Systems   Unable to perform ROS: Acuity of condition   Constitutional: Positive for appetite change. Negative for chills, diaphoresis, fatigue and fever. HENT: Positive for dental problem. Respiratory: Negative for cough, shortness of breath and wheezing. Cardiovascular: Negative for chest pain and palpitations. Gastrointestinal: Positive for abdominal pain. Negative for constipation, diarrhea, nausea and vomiting. Genitourinary: Negative for decreased urine volume, difficulty urinating, dysuria, frequency, hematuria and urgency. Neurological: Positive for weakness. Negative for seizures and syncope.    Psychiatric/Behavioral: Negative for confusion, agitation, Medications:  Reviewed    Infusion Medications    PN-Adult  3 IN 1 Central Line (Custom) 60 mL/hr at 05/04/22 2309    dextrose      sodium chloride       Scheduled Medications    amiodarone  200 mg Oral Daily    metoprolol tartrate  25 mg Oral BID    docusate sodium  100 mg Oral Daily    piperacillin-tazobactam  3,375 mg IntraVENous Q8H    famotidine (PEPCID) injection  20 mg IntraVENous Daily    insulin lispro  0-6 Units SubCUTAneous 4 times per day    sodium hypochlorite   Irrigation Daily    vitamin D  50,000 Units Oral Weekly    sodium chloride flush  5-40 mL IntraVENous 2 times per day    [Held by provider] heparin (porcine)  5,000 Units SubCUTAneous TID     PRN Meds: morphine, glucose, glucagon (rDNA), dextrose, dextrose bolus (hypoglycemia) **OR** dextrose bolus (hypoglycemia), sodium chloride flush, sodium chloride, ondansetron **OR** ondansetron, polyethylene glycol, acetaminophen **OR** acetaminophen      Intake/Output Summary (Last 24 hours) at 5/5/2022 1137  Last data filed at 5/5/2022 0605  Gross per 24 hour   Intake 2174.26 ml   Output 1425 ml   Net 749.26 ml       Diet:  PN-Adult  3 IN 1 Central Line (Custom)  ADULT DIET; Easy to Chew  ADULT ORAL NUTRITION SUPPLEMENT; Breakfast, Lunch, Dinner; Standard High Calorie/High Protein Oral Supplement    Exam:  BP (!) 147/85   Pulse 89   Temp 98.3 °F (36.8 °C) (Oral)   Resp 18   Ht 5' 9\" (1.753 m)   Wt 135 lb 6.4 oz (61.4 kg)   SpO2 95%   BMI 20.00 kg/m²     Physical Exam  Constitutional:       General: He is not in acute distress. Appearance: He is ill-appearing. He is not toxic-appearing or diaphoretic. Interventions: He is not intubated. Nasal cannula in place on 3L. HENT:      Head: Normocephalic and atraumatic. Nose: Nose normal. No congestion or rhinorrhea. Mouth/Throat:      Mouth: Mucous membranes are moist      Pharynx: Oropharynx is clear. No oropharyngeal exudate or posterior oropharyngeal erythema. Eyes:      General: No scleral icterus. Right eye: No discharge. Left eye: No discharge. Extraocular Movements: Extraocular movements intact. Conjunctiva/sclera: Conjunctivae normal.      Pupils: Pupils are equal, round, and reactive to light. Cardiovascular:      Rate and Rhythm: Normal rate, present. Rhythm irregular. Pulses: Normal pulses. Heart sounds: Normal heart sounds. No murmur heard. No friction rub. No gallop. Pulmonary:      Effort: Pulmonary effort is normal. No accessory muscle usage or respiratory distress. He is not intubated. Breath sounds: Examination of the right-lower field reveals rhonchi. Examination of the left-lower field reveals rhonchi. Rhonchi present. No wheezing or rales. Abdominal:      General: A surgical scar is present. Bowel sounds are increased. Tenderness: There is abdominal tenderness. There is no guarding. Comments: Abdominal Drain in place, Abdominal band in place. Neurological:      Mental Status: He is Oriented to person, place, time, situation. Cranial Nerves: No dysarthria or facial asymmetry. Motor: No weakness. Comments: Still speaks very little. Psychiatric:         Behavior: Behavior is cooperative. Labs:   Recent Labs     05/03/22  0650 05/03/22  1348 05/04/22  0410   WBC 17.6*  --  16.5*   HGB 7.6* 8.0* 8.4*   HCT 23.8* 24.9* 26.6*     --  274     Recent Labs     05/03/22  0650 05/04/22  0410    139   K 3.5 3.8    102   CO2 26 25   BUN 35* 28*   CREATININE 1.7* 1.3*   CALCIUM 7.5* 7.6*   PHOS 2.1* 1.8*     No results for input(s): AST, ALT, BILIDIR, BILITOT, ALKPHOS in the last 72 hours. No results for input(s): INR in the last 72 hours. No results for input(s): Blane Beery in the last 72 hours.     Urinalysis:      Lab Results   Component Value Date    NITRU NEGATIVE 04/25/2022    WBCUA 10-15 04/25/2022    BACTERIA FEW 04/25/2022    RBCUA 10-15 04/25/2022 BLOODU MODERATE 04/25/2022    SPECGRAV 1.015 04/25/2022    GLUCOSEU NEGATIVE 06/09/2019       Radiology:  XR CHEST PORTABLE   Final Result   1. Persistent bibasilar opacities. Small left-sided pleural effusion. **This report has been created using voice recognition software. It may contain minor errors which are inherent in voice recognition technology. **      Final report electronically signed by Dr. Daquan Lea on 5/2/2022 8:13 AM      XR CHEST PORTABLE   Final Result   A right arm PICC line tip terminates at the cavoatrial projection. No pneumothorax is observed. Small bilateral pleural effusions and bilateral lower lobe airspace opacity, right greater than left, are not significantly changed. **This report has been created using voice recognition software. It may contain minor errors which are inherent in voice recognition technology. **      Final report electronically signed by Dr Ernestina Diggs on 4/29/2022 4:04 PM      XR ABDOMEN FOR NG/OG/NE TUBE PLACEMENT   Final Result   Impression:      NG tube tip proximal stomach. This document has been electronically signed by: Lauryn Denton MD on    04/28/2022 11:56 PM      XR CHEST PORTABLE   Final Result   Impression:   1. Satisfactory positioning of the endotracheal tube and right central    line. Probable slightly high positioning of the orogastric tube. Consider    advancing 3-4 cm. 2. Interval development of small bilateral pleural effusions, right    greater than left. 3. Bilateral lower lobe airspace opacities secondary to atelectasis and/or    infiltrates, right greater than left. 4. Bilateral interstitial changes secondary to pneumonitis or edema. This document has been electronically signed by: Karma Rodriguez DO on    04/26/2022 12:01 AM      CT ABDOMEN PELVIS WO CONTRAST Additional Contrast? None   Final Result   Impression:   Right inguinal hernia containing small bowel.  This is causing a small bowel obstruction. There are several gas collections within the right    inguinal hernia. Ischemic or perforated bowel could have this appearance. Mild to moderate patchy consolidation bilateral lower lobes. This could    represent aspiration or pneumonia. Prominent thickening of the distal esophagus and gastroesophageal    junction. Neoplasm not excluded. Recommend direct visualization. This document has been electronically signed by: Quincy Veliz MD on    04/25/2022 07:20 PM      All CTs at this facility use dose modulation techniques and iterative    reconstructions, and/or weight-based dosing   when appropriate to reduce radiation to a low as reasonably achievable. US RENAL COMPLETE   Final Result   Impression:   Multifocal simple bilateral renal cyst. Echogenic bilateral renal    parenchyma. This document has been electronically signed by: Quincy Veliz MD on    04/25/2022 06:30 PM      CT HEAD WO CONTRAST   Final Result       1. Stable CT scan of the brain, no interval change since previous MRI scan dated 10 Melody 2019.   2. Mild atrophy and dilatation of the third and lateral ventricles. 3. Probable ischemic changes in the white matter, left basal ganglia and in the bernadette. 4. Calcification the cavernous segments of both internal carotid arteries. 5. Increased density in the external auditory canals which may represent cerumen bilaterally. **This report has been created using voice recognition software. It may contain minor errors which are inherent in voice recognition technology. **      Final report electronically signed by DR Daron White on 4/25/2022 4:01 PM      XR CHEST PORTABLE   Final Result   1. Normal heart size. No effusion. 2. Persistent mild atelectatic/fibrotic stranding retrocardiac region left lung base. **This report has been created using voice recognition software.   It may contain minor errors which are inherent in voice recognition technology. **      Final report electronically signed by Dr. Reed Carvajal on 4/25/2022 3:05 PM          Diet: PN-Adult  3 IN 1 Central Line (Custom)  ADULT DIET; Easy to [address]; Breakfast, Lunch, Dinner; Standard High Calorie/High Protein Oral Supplement    DVT prophylaxis: [] Lovenox                                 [] SCDs                                 [] SQ Heparin                                 [] Encourage ambulation           [] Already on Anticoagulation     Disposition:    [] Home       [] TCU       [] Rehab       [] Psych       [] SNF       [] [place]       [x] Other-     Code Status: Full Code    PT/OT Eval Status:      Electronically signed by Zena Paniagua MD on 5/5/2022 at 11:37 AM    This note was electronically signed. Parts of this note may have been dictated by use of voice recognition software and electronically transcribed. The note may contain errors not detected in proofreading. Please refer to my supervising physician's documentation if my documentation differs.

## 2022-05-05 NOTE — PROGRESS NOTES
Spoke with primary RN, the patient and KARRIE Naidu, patient is a precert for admission to IPR TPN infusing as well as patient taking a diet. According to Primary RN TPN to be discontinued this evening. Explained to this group that precert will be initiated tomorrow afternoon. Explained to patient that I will return tomorrow afternoon at 3 p.m. to talk again with him and his wife.

## 2022-05-06 LAB
ABO: NORMAL
ANION GAP SERPL CALCULATED.3IONS-SCNC: 5 MEQ/L (ref 8–16)
ANTIBODY SCREEN: NORMAL
BUN BLDV-MCNC: 20 MG/DL (ref 7–22)
CALCIUM SERPL-MCNC: 7.6 MG/DL (ref 8.5–10.5)
CHLORIDE BLD-SCNC: 107 MEQ/L (ref 98–111)
CO2: 24 MEQ/L (ref 23–33)
CREAT SERPL-MCNC: 1.2 MG/DL (ref 0.4–1.2)
ERYTHROCYTE [DISTWIDTH] IN BLOOD BY AUTOMATED COUNT: 15.3 % (ref 11.5–14.5)
ERYTHROCYTE [DISTWIDTH] IN BLOOD BY AUTOMATED COUNT: 60.4 FL (ref 35–45)
FOLATE: 14.2 NG/ML (ref 4.8–24.2)
GLUCOSE BLD-MCNC: 118 MG/DL (ref 70–108)
GLUCOSE BLD-MCNC: 125 MG/DL (ref 70–108)
GLUCOSE BLD-MCNC: 80 MG/DL (ref 70–108)
GLUCOSE BLD-MCNC: 81 MG/DL (ref 70–108)
GLUCOSE BLD-MCNC: 92 MG/DL (ref 70–108)
GLUCOSE BLD-MCNC: 98 MG/DL (ref 70–108)
HCT VFR BLD CALC: 22.1 % (ref 42–52)
HCT VFR BLD CALC: 29.7 % (ref 42–52)
HEMOGLOBIN: 6.8 GM/DL (ref 14–18)
HEMOGLOBIN: 9.2 GM/DL (ref 14–18)
IRON SATURATION: 46 % (ref 20–50)
IRON: 89 UG/DL (ref 65–195)
MCH RBC QN AUTO: 33.5 PG (ref 26–33)
MCHC RBC AUTO-ENTMCNC: 30.8 GM/DL (ref 32.2–35.5)
MCV RBC AUTO: 108.9 FL (ref 80–94)
PATHOLOGIST REVIEW: ABNORMAL
PHOSPHORUS: 1.8 MG/DL (ref 2.4–4.7)
PLATELET # BLD: 320 THOU/MM3 (ref 130–400)
PMV BLD AUTO: 10.4 FL (ref 9.4–12.4)
POTASSIUM SERPL-SCNC: 4.2 MEQ/L (ref 3.5–5.2)
RBC # BLD: 2.03 MILL/MM3 (ref 4.7–6.1)
RH FACTOR: NORMAL
SCAN OF BLOOD SMEAR: NORMAL
SODIUM BLD-SCNC: 136 MEQ/L (ref 135–145)
TOTAL IRON BINDING CAPACITY: 195 UG/DL (ref 171–450)
VITAMIN B-12: 1368 PG/ML (ref 211–911)
WBC # BLD: 14.8 THOU/MM3 (ref 4.8–10.8)

## 2022-05-06 PROCEDURE — 85027 COMPLETE CBC AUTOMATED: CPT

## 2022-05-06 PROCEDURE — 86923 COMPATIBILITY TEST ELECTRIC: CPT

## 2022-05-06 PROCEDURE — 85018 HEMOGLOBIN: CPT

## 2022-05-06 PROCEDURE — 86901 BLOOD TYPING SEROLOGIC RH(D): CPT

## 2022-05-06 PROCEDURE — 99024 POSTOP FOLLOW-UP VISIT: CPT | Performed by: SURGERY

## 2022-05-06 PROCEDURE — 2580000003 HC RX 258: Performed by: INTERNAL MEDICINE

## 2022-05-06 PROCEDURE — 85014 HEMATOCRIT: CPT

## 2022-05-06 PROCEDURE — 97530 THERAPEUTIC ACTIVITIES: CPT

## 2022-05-06 PROCEDURE — 6370000000 HC RX 637 (ALT 250 FOR IP): Performed by: SURGERY

## 2022-05-06 PROCEDURE — 99232 SBSQ HOSP IP/OBS MODERATE 35: CPT | Performed by: INTERNAL MEDICINE

## 2022-05-06 PROCEDURE — 83010 ASSAY OF HAPTOGLOBIN QUANT: CPT

## 2022-05-06 PROCEDURE — 2580000003 HC RX 258

## 2022-05-06 PROCEDURE — 6370000000 HC RX 637 (ALT 250 FOR IP)

## 2022-05-06 PROCEDURE — 6360000002 HC RX W HCPCS

## 2022-05-06 PROCEDURE — 86850 RBC ANTIBODY SCREEN: CPT

## 2022-05-06 PROCEDURE — 82948 REAGENT STRIP/BLOOD GLUCOSE: CPT

## 2022-05-06 PROCEDURE — 84100 ASSAY OF PHOSPHORUS: CPT

## 2022-05-06 PROCEDURE — 97535 SELF CARE MNGMENT TRAINING: CPT

## 2022-05-06 PROCEDURE — 36415 COLL VENOUS BLD VENIPUNCTURE: CPT

## 2022-05-06 PROCEDURE — 80048 BASIC METABOLIC PNL TOTAL CA: CPT

## 2022-05-06 PROCEDURE — 2580000003 HC RX 258: Performed by: SURGERY

## 2022-05-06 PROCEDURE — 83540 ASSAY OF IRON: CPT

## 2022-05-06 PROCEDURE — 97116 GAIT TRAINING THERAPY: CPT

## 2022-05-06 PROCEDURE — 6370000000 HC RX 637 (ALT 250 FOR IP): Performed by: INTERNAL MEDICINE

## 2022-05-06 PROCEDURE — 36430 TRANSFUSION BLD/BLD COMPNT: CPT

## 2022-05-06 PROCEDURE — 82746 ASSAY OF FOLIC ACID SERUM: CPT

## 2022-05-06 PROCEDURE — 82607 VITAMIN B-12: CPT

## 2022-05-06 PROCEDURE — 83550 IRON BINDING TEST: CPT

## 2022-05-06 PROCEDURE — 99233 SBSQ HOSP IP/OBS HIGH 50: CPT | Performed by: INTERNAL MEDICINE

## 2022-05-06 PROCEDURE — 1200000003 HC TELEMETRY R&B

## 2022-05-06 PROCEDURE — 86900 BLOOD TYPING SEROLOGIC ABO: CPT

## 2022-05-06 PROCEDURE — 97110 THERAPEUTIC EXERCISES: CPT

## 2022-05-06 PROCEDURE — 2500000003 HC RX 250 WO HCPCS: Performed by: SURGERY

## 2022-05-06 PROCEDURE — P9016 RBC LEUKOCYTES REDUCED: HCPCS

## 2022-05-06 PROCEDURE — 2500000003 HC RX 250 WO HCPCS: Performed by: INTERNAL MEDICINE

## 2022-05-06 RX ORDER — SODIUM CHLORIDE 9 MG/ML
INJECTION, SOLUTION INTRAVENOUS PRN
Status: DISCONTINUED | OUTPATIENT
Start: 2022-05-06 | End: 2022-05-10 | Stop reason: HOSPADM

## 2022-05-06 RX ADMIN — METOPROLOL TARTRATE 25 MG: 25 TABLET, FILM COATED ORAL at 22:27

## 2022-05-06 RX ADMIN — SODIUM CHLORIDE, PRESERVATIVE FREE 10 ML: 5 INJECTION INTRAVENOUS at 09:34

## 2022-05-06 RX ADMIN — PIPERACILLIN AND TAZOBACTAM 3375 MG: 3; .375 INJECTION, POWDER, LYOPHILIZED, FOR SOLUTION INTRAVENOUS at 09:38

## 2022-05-06 RX ADMIN — POTASSIUM & SODIUM PHOSPHATES POWDER PACK 280-160-250 MG 250 MG: 280-160-250 PACK at 20:47

## 2022-05-06 RX ADMIN — FAMOTIDINE 20 MG: 10 INJECTION, SOLUTION INTRAVENOUS at 09:33

## 2022-05-06 RX ADMIN — SODIUM PHOSPHATE, MONOBASIC, MONOHYDRATE AND SODIUM PHOSPHATE, DIBASIC, ANHYDROUS 10 MMOL: 276; 142 INJECTION, SOLUTION INTRAVENOUS at 15:01

## 2022-05-06 RX ADMIN — DOCUSATE SODIUM 100 MG: 100 CAPSULE, LIQUID FILLED ORAL at 09:33

## 2022-05-06 RX ADMIN — AMIODARONE HYDROCHLORIDE 200 MG: 200 TABLET ORAL at 09:33

## 2022-05-06 RX ADMIN — DAKIN'S SOLUTION 0.125% (QUARTER STRENGTH): 0.12 SOLUTION at 09:33

## 2022-05-06 RX ADMIN — METOPROLOL TARTRATE 25 MG: 25 TABLET, FILM COATED ORAL at 09:33

## 2022-05-06 RX ADMIN — PIPERACILLIN AND TAZOBACTAM 3375 MG: 3; .375 INJECTION, POWDER, LYOPHILIZED, FOR SOLUTION INTRAVENOUS at 20:56

## 2022-05-06 RX ADMIN — PIPERACILLIN AND TAZOBACTAM 3375 MG: 3; .375 INJECTION, POWDER, LYOPHILIZED, FOR SOLUTION INTRAVENOUS at 01:55

## 2022-05-06 RX ADMIN — POTASSIUM & SODIUM PHOSPHATES POWDER PACK 280-160-250 MG 250 MG: 280-160-250 PACK at 14:58

## 2022-05-06 NOTE — PROGRESS NOTES
PROGRESS NOTE      Patient:  Elian Suarez Sr. Unit/Bed:3B-26/026-A    YOB: 1950    MRN: [id number]       Acct: [de-identified]     PCP: Delroy Menendez MD    Date of Admission: 4/25/2022      Assessment/Plan:    Anticipated Discharge in : Pending    Active Hospital Problems    Diagnosis Date Noted    Dehiscence of fascia [T81.30XA]      Priority: Medium    Atrial fibrillation with RVR ([address] [I48.91]      Priority: Medium    Acute respiratory failure with hypoxia ([address] [J96.01] 04/26/2022     Priority: Medium    Septic shock ([address] [A41.9, R65.21] 04/26/2022     Priority: Medium    Incarcerated right inguinal hernia [K40.30] 04/26/2022     Priority: Medium    Small bowel perforation ([address] [K63.1] 04/26/2022     Priority: Medium    Aspiration pneumonia ([address] [J69.0] 04/26/2022     Priority: Medium    Acute kidney injury ([address] [N17.9] 04/26/2022     Priority: Medium    Hypoalbuminemia [E88.09] 04/26/2022     Priority: Medium    Syncope [R55] 04/26/2022     Priority: Medium    Hypokalemia [E87.6] 04/26/2022     Priority: Medium    Anemia, macrocytic [D53.9] 04/26/2022     Priority: Medium    Severe malnutrition ([address] [E43] 04/26/2022     Priority: Medium       Perforated small bowel, soilage of the pelvic cavity, right groin and pelvic abscesses, and necrotic hernial sac with wound dehiscence:   POD #10 exploratory laparotomy with bowel resection, primary anastomosis, drainage of pelvic abscess as well as right groin exploration, drainage of abscess, and debridement of necrotic subcutaneous fat, fascia and muscle. On Day 12 of Zosyn for Culture Growing Morganella Jacqueline Cowper., enterococcus casseliflavus, sensatitivites to Zosyn,  POD#5 repair of wound dehiscence. Surgery on board and following. If WBC count increases consider additional undrained abscess or fluid collections. Off NGT suction per Surgery as low oPT. Dressing changes and wound care.  Continue on SSI, may require TPN supplement in addition if not tolerating PO. Continue Zoysn coverage to 12/14 dy in setting of recent Sx. WBC continues downtrending. Continue TPN supplement stop Tonight for Precert for IPR. Continue with PO diet per Sx, SLP following, dietician on board. Transfer to [facility] with telemetry. Aspiration pneumonia: Imaging findings suggestion of infiltrate w/ gastric contents suctioned from ET tube is consistent with aspiration pneumonia. Culture growing ecoli with H. influ on PCR detected. Patient receiving antibiotics as above. Sepsis secondary to intra-abdominal infection and aspiration pneumonia - improving: On antibiotics as above. Current WBC 14.8 and downtrend. Previous White count significantly elevated to 33.6 up from 21.8 postoperatively, however Patient remains afebrile and O2 support requirements are being weaned, BP grossly stable, monitor    A. fib with RVR: noted Apr/27/2022 New onset A. fib with RVR refractory to Lopressor and Cardizem. Patient converted with amiodarone. K WNL, Mg WNL, [id number] 5. Surgery recommends holding anticoagulation still at this time in setting of bleed risk. Note Pt is Not optimally controlled on amiodrip + lopressor, however much improved ontrol with HR 80's despite some intermittent arrhythmia on tele. Successful transition to PO meds  Continue Amio  BID as Pt is tolerating well. Continue Lopressor 25 BID PO  with holding parameters. BP grossly stable at 120's. If persistently remains in intermittent consider Cardiology consult for optimization as Pt will likely be high bleed risk for some time vs [address] [place] [address] Road use. Continue telemetry  Keep Mg > 2, K > 4,     Acute macrocytic anemia - May/06/2022, Noted mild Hgb drop from 7.5 to 6.8, Transfuse 1 Unit PRBC. H/H after Tranfusion, Monitor. ROBSON on CKD - resolving: Likely 2/2 volume depletion, poor oral intake, and hypotension 2/2 sepsis. Pt creatinine improved to 1.3 from 10.4 on Admit  Patient's baseline 1.3. improved with Hydration. Nephrology on board, appreciate rec. Good UOPT. Mixed Acid-base disorder -resolved:  Mixed Anion gap metabolic acidosis with respiratory compensation likely secondary to sepsis and ROBSON with an additional metabolic alkalosis likely secondary to volume depletion in setting of GI sx. AG improving, Nephrology on board, appreciate recs. Acute hypoxic respiratory failure: likely 2/2 to aspiration pneumonia as above. Extubated Apr/27/2022, currently on 3L NC. Sating well Wean as tolerated. Acute postoperative pulmonary insufficiency - resolved: Extubated Apr/27/2022,     Troponinemia: Mildly elevated troponin on admit of 0.047 downtrending with inverted T waves found on EKG.  Likely 2/2 demand ischemia 2/2 hypotension.  Monitor    Hypophosphatemia: Noted May/04/2022  Continue to monitor,  Nephrolgoy on board. Hypokalemia -  Resolved. Noted Apr/30/2022, on K protocol. Monitor AM.  Hypernatremia - Resolved. Nephrology on board, monitor. Hyperphosphatemia -resolved:  Continue to monitor,  Hypermagnesemia - resolved:  Continue to monitor,  supplement with TPN  Hypocalcemia: Continue to monitor, PO challenge as above, may supplement with TPN    Aortic Valve regurg - mild moderate 2/2 thickened with calficied AV. Cardiology consulted for TVAR workup    CAD:  Continue home Lipitor 20 mg,  Holding ASA  For bleed risk. Hgb drop today ntoed, see Acute macrocytic anemia above. GERD: Home regiment of pepcid 10mg daily, continued for admission IV form. . Noted incidental finding of thickened esophagus on imaging, F/U opt when stable for scope. Delirium? - resolved - pulled out central access line Apr/28/2022 PM, improved mentation however remains quite laconic. Given medical acuity and advance age likely sundowning as behavior is overnight. Reorient and monitor. If requires consider Seroqel QHS.       Chief Complaint:  Pelvic Pain    Hospital Course:    Per H&P:    \" \"The patient was sedated and intubated and therefore could not give any history.  Therefore, history is primarily taken from the chart. Patient's wife states that the patient fell in his yard approximately 14 days ago and went to the [facility] ED. This time, he was discharged with pain medication. Approximately 7 days ago, he again presented to [facility] with complaints of a right lower abdominal bulge at which point he was diagnosed with inguinal hernia and is scheduled with surgical follow-up. Patient presented to [facility] on 4/25/2022 for syncope and worsening inguinal hernia. Wife states that syncope first occurred in [place] several days ago when the patient had nausea and subjective feelings of excessive heat while sitting.  After this, he went to go outside and then passed out. On the morning of admission, patient's wife was given of the bathtub when he \"slumped over. \"  Wife stated that the patient vomited 3 times in the last 24 hours with brown/yellow vomitus. Wife also reports that he has not been having bowel movements, has had less frequent urination, and that he has been weak and sleeping \"20 hours a day. \"  While in the ED, the patient was found to have profound renal failure with a creatinine of 11.2 with a baseline of approximately 1.3. Patient also noted to have a profound anion gap acidosis which improved with aggressive fluid resuscitation. Nephrology was consulted given profound renal failure. CT of the abdomen and pelvis without contrast was performed which demonstrated mild to moderate patchy consolidation of the bilateral lower lobes, thickening of the distal esophagus and gastroesophageal junction, a right inguinal hernia causing a small bowel obstruction, and multiple gas collections in the right inguinal hernia highly suspicious for ischemic or perforated bowel.   On the evening of 4/25/2022 Dr. Malik performed exploratory laparotomy with small bowel resection, single primary anastomosis, drainage of pelvic abscess, exploration and drainage of right groin abscess, and debridement of necrotic subcutaneous fat/fascia/muscle in the right groin along with excision of the necrotic hernia sac in the right groin. Empiric coverage with vancomycin and Zosyn to necrotic small bowel as well as probable aspiration pneumonia. Family was extensively counseled. \"    Pt had course in ICU from Apr/25-28/2022, Overnight patient went to A. fib with RVR that was refractory to both Lopressor and Cardizem. Patient was given amiodarone which converted him out. Patient continues to have frequent PACs on the monitor. Surgery was consulted regarding anticoagulation however they are recommending holding anticoagulation at this time. We will continue to follow-up with surgery as far as when we can start anticoagulation. Patient overall is awake and alert self, year, but only occasionally to place. Patient denies any pain or acute complaints at this time. Patient is frequently repeating himself however concern for underlying delirium. May/01/2022, abdominal wound dehissence, taken back to OR for repair and washout. Subjective:    Pt seen and examined. Afebrile overnight, no behavioral issues, PICC in place, OFF TPN, Telemetry remains improved on PO control. Note Hbg drop from 7.6 to 6.8 this AM, will transfuse 1 unit PRBC, no gross bleeding noted. Pt still denies pain, still speaks very little. SLP and dietary on board, precert for IPR. Review of Systems   Unable to perform ROS: Acuity of condition   Constitutional: Positive for appetite change. Negative for chills, diaphoresis, fatigue and fever. HENT: Positive for dental problem. Respiratory: Negative for cough, shortness of breath and wheezing. Cardiovascular: Negative for chest pain and palpitations. Gastrointestinal: Positive for abdominal pain. Negative for constipation, diarrhea, nausea and vomiting.    Genitourinary: Negative for decreased urine volume, difficulty urinating, dysuria, frequency, hematuria and urgency. Neurological: Positive for weakness. Negative for seizures and syncope. Psychiatric/Behavioral: Negative for confusion, agitation,       Medications:  Reviewed    Infusion Medications    sodium chloride      dextrose      sodium chloride       Scheduled Medications    amiodarone  200 mg Oral Daily    metoprolol tartrate  25 mg Oral BID    docusate sodium  100 mg Oral Daily    piperacillin-tazobactam  3,375 mg IntraVENous Q8H    famotidine (PEPCID) injection  20 mg IntraVENous Daily    insulin lispro  0-6 Units SubCUTAneous 4 times per day    sodium hypochlorite   Irrigation Daily    vitamin D  50,000 Units Oral Weekly    sodium chloride flush  5-40 mL IntraVENous 2 times per day    [Held by provider] heparin (porcine)  5,000 Units SubCUTAneous TID     PRN Meds: sodium chloride, morphine, glucose, glucagon (rDNA), dextrose, dextrose bolus **OR** dextrose bolus, sodium chloride flush, sodium chloride, ondansetron **OR** ondansetron, polyethylene glycol, acetaminophen **OR** acetaminophen      Intake/Output Summary (Last 24 hours) at 5/6/2022 0821  Last data filed at 5/6/2022 0636  Gross per 24 hour   Intake --   Output 1725 ml   Net -1725 ml       Diet:  ADULT DIET; Easy to Chew  ADULT ORAL NUTRITION SUPPLEMENT; Breakfast, Lunch, Dinner; Standard High Calorie/High Protein Oral Supplement    Exam:  /61   Pulse 78   Temp 98 °F (36.7 °C) (Oral)   Resp 16   Ht 5' 9\" (1.753 m)   Wt 135 lb 6.4 oz (61.4 kg)   SpO2 99%   BMI 20.00 kg/m²     Physical Exam  Constitutional:       General: He is not in acute distress. Appearance: He is ill-appearing. He is not toxic-appearing or diaphoretic. Interventions: He is not intubated. Nasal cannula in place on 3L. HENT:      Head: Normocephalic and atraumatic. Nose: Nose normal. No congestion or rhinorrhea.       Mouth/Throat:      Mouth: Mucous membranes are moist      Pharynx: Oropharynx is clear. No oropharyngeal exudate or posterior oropharyngeal erythema. Eyes:      General: No scleral icterus. Right eye: No discharge. Left eye: No discharge. Extraocular Movements: Extraocular movements intact. Conjunctiva/sclera: Conjunctivae normal.      Pupils: Pupils are equal, round, and reactive to light. Cardiovascular:      Rate and Rhythm: Normal rate, present. Rhythm irregular. Pulses: Normal pulses. Heart sounds: Normal heart sounds. No murmur heard. No friction rub. No gallop. Pulmonary:      Effort: Pulmonary effort is normal. No accessory muscle usage or respiratory distress. He is not intubated. Breath sounds: Examination of the right-lower field reveals rhonchi. Examination of the left-lower field reveals rhonchi. Rhonchi present. No wheezing or rales. Abdominal:      General: A surgical scar is present. Bowel sounds are increased. Tenderness: There is abdominal tenderness. There is no guarding. Comments: Abdominal Drain in place, Abdominal band in place. Neurological:      Mental Status: He is Oriented to person, place, time, situation. Cranial Nerves: No dysarthria or facial asymmetry. Motor: No weakness. Comments: Still speaks very little. Psychiatric:         Behavior: Behavior is cooperative. Labs:   Recent Labs     05/04/22  0410 05/05/22  1226 05/06/22  0446   WBC 16.5* 14.0* 14.8*   HGB 8.4* 7.5* 6.8*   HCT 26.6* 23.9* 22.1*    308 320     Recent Labs     05/04/22  0410 05/05/22  1226 05/06/22  0446    138 136   K 3.8 4.0 4.2    105 107   CO2 25 23 24   BUN 28* 22 20   CREATININE 1.3* 1.1 1.2   CALCIUM 7.6* 7.5* 7.6*   PHOS 1.8* 1.9* 1.8*     No results for input(s): AST, ALT, BILIDIR, BILITOT, ALKPHOS in the last 72 hours. No results for input(s): INR in the last 72 hours.   No results for input(s): Kaila Graves in the last 72 hours.    Urinalysis:      Lab Results   Component Value Date    NITRU NEGATIVE 04/25/2022    WBCUA 10-15 04/25/2022    BACTERIA FEW 04/25/2022    RBCUA 10-15 04/25/2022    BLOODU MODERATE 04/25/2022    SPECGRAV 1.015 04/25/2022    GLUCOSEU NEGATIVE 06/09/2019       Radiology:  XR CHEST PORTABLE   Final Result   1. Persistent bibasilar opacities. Small left-sided pleural effusion. **This report has been created using voice recognition software. It may contain minor errors which are inherent in voice recognition technology. **      Final report electronically signed by Dr. Leonid Pryor on 5/2/2022 8:13 AM      XR CHEST PORTABLE   Final Result   A right arm PICC line tip terminates at the cavoatrial projection. No pneumothorax is observed. Small bilateral pleural effusions and bilateral lower lobe airspace opacity, right greater than left, are not significantly changed. **This report has been created using voice recognition software. It may contain minor errors which are inherent in voice recognition technology. **      Final report electronically signed by Dr Linda Aj on 4/29/2022 4:04 PM      XR ABDOMEN FOR NG/OG/NE TUBE PLACEMENT   Final Result   Impression:      NG tube tip proximal stomach. This document has been electronically signed by: Leonel Qureshi MD on    04/28/2022 11:56 PM      XR CHEST PORTABLE   Final Result   Impression:   1. Satisfactory positioning of the endotracheal tube and right central    line. Probable slightly high positioning of the orogastric tube. Consider    advancing 3-4 cm. 2. Interval development of small bilateral pleural effusions, right    greater than left. 3. Bilateral lower lobe airspace opacities secondary to atelectasis and/or    infiltrates, right greater than left. 4. Bilateral interstitial changes secondary to pneumonitis or edema. This document has been electronically signed by: Jovanni Gonzalez.  DO Jennifer on    04/26/2022 12:01 AM      CT ABDOMEN PELVIS WO CONTRAST Additional Contrast? None   Final Result   Impression:   Right inguinal hernia containing small bowel. This is causing a small    bowel obstruction. There are several gas collections within the right    inguinal hernia. Ischemic or perforated bowel could have this appearance. Mild to moderate patchy consolidation bilateral lower lobes. This could    represent aspiration or pneumonia. Prominent thickening of the distal esophagus and gastroesophageal    junction. Neoplasm not excluded. Recommend direct visualization. This document has been electronically signed by: Julienne Guzman MD on    04/25/2022 07:20 PM      All CTs at this facility use dose modulation techniques and iterative    reconstructions, and/or weight-based dosing   when appropriate to reduce radiation to a low as reasonably achievable. US RENAL COMPLETE   Final Result   Impression:   Multifocal simple bilateral renal cyst. Echogenic bilateral renal    parenchyma. This document has been electronically signed by: Julienne Guzman MD on    04/25/2022 06:30 PM      CT HEAD WO CONTRAST   Final Result       1. Stable CT scan of the brain, no interval change since previous MRI scan dated 10 Mel[address] 2019.   2. Mild atrophy and dilatation of the third and lateral ventricles. 3. Probable ischemic changes in the white matter, left basal ganglia and in the bernadette. 4. Calcification the cavernous segments of both internal carotid arteries. 5. Increased density in the external auditory canals which may represent cerumen bilaterally. **This report has been created using voice recognition software. It may contain minor errors which are inherent in voice recognition technology. **      Final report electronically signed by DR Michi Norris on 4/25/2022 4:01 PM      XR CHEST PORTABLE   Final Result   1. Normal heart size. No effusion.    2. Persistent mild atelectatic/fibrotic stranding retrocardiac region left lung base. **This report has been created using voice recognition software. It may contain minor errors which are inherent in voice recognition technology. **      Final report electronically signed by Dr. Bear Mantilla on 4/25/2022 3:05 PM          Diet: ADULT DIET; Easy to [address]; Breakfast, Lunch, Dinner; Standard High Calorie/High Protein Oral Supplement    DVT prophylaxis: [] Lovenox                                 [] SCDs                                 [] SQ Heparin                                 [] Encourage ambulation           [] Already on Anticoagulation     Disposition:    [] Home       [] TCU       [] Rehab       [] Psych       [] SNF       [] [place]       [x] Other-     Code Status: Full Code    PT/OT Eval Status:      Electronically signed by Butch Torres MD on 5/6/2022 at 8:47 AM    This note was electronically signed. Parts of this note may have been dictated by use of voice recognition software and electronically transcribed. The note may contain errors not detected in proofreading. Please refer to my supervising physician's documentation if my documentation differs.

## 2022-05-06 NOTE — PROGRESS NOTES
Pt transferred to 8B-27, pt updated on plan of care and pt agreeable and verbalizes understanding. Jani RN called and report given, all questions and concerns addressed. This RN attempted to call wife, Alverto Rivas with no answer; daughter, Nestor Smith called and no answer; then called and spoke to pt's Son in [facility]. Knox updated on pt's status and plan of care. Questions addressed and answered and Knox is aware of pt's new room 8B-27.

## 2022-05-06 NOTE — PROGRESS NOTES
[facility]  Acute Inpatient Rehab Preadmission Assessment    Patient Name: Rei Cifuentes.        Ethnicity:Not of , [address], or [place] origin  Race:Black or   MRN: [id number]    : 1950  (72 y.o.)  Gender: male     Admitted from:[address]  Initial Assessment    Date of admission to the hospital: 2022  2:08 PM  Date patient eligible for admission:2022    Primary Diagnosis:  Debility      Did patient have surgery? yes - Right inguinal hernia, incarcerated, perforated small bowel, soilage of the pelvic cavity, right groin and pelvic abscesses, and necrotic hernial sac; laparotomy per     Physicians: Katy Lindo DO, Dr. Chetna Miller, Dr. Barbara Roth, Dr. Hallie Gorman, Dr. Tianna King for clinical complications/co-morbidities:   Past Medical History:   Diagnosis Date    CAD (coronary artery disease)     [facility]re[facility] accident) [facility])     Erectile dysfunction     GERD (gastroesophageal reflux disease) 2012    Gastro ulcer    Hyperlipidemia     Hypertension        Financial Information  Primary insurance: [facility] Medicare    Secondary Insurance:  None    Has the patient had two or more falls in the past year or any fall with injury in the past year? no    Did the patient have major surgery during the 100 days prior to admission?   yes    Precautions:     Restrictions/Precautions: Surgical Protocols,General Precautions,Fall Risk  Other position/activity restrictions: .  Required Braces or Orthoses  Other: Abdominal Binder      Isolation Precautions: None       Physiatrist: Dr. Barbara Roth     Patients Occupation: Retired    Reviewed Lab and Diagnostic reports from Current Admission: Yes    Patients Prior Functional  Level: Prior Function  Receives Help From: Family  ADL Assistance: Independent  Homemaking Assistance: Independent  Ambulation Assistance: Independent  Transfer Assistance: Independent  Additional Comments: Per son, pts bathroom is currently in process of being remodeled to be handicap accessible. pt did not use AD for mobility and was indep with his ADL tasks at home. Pt has 4 children that assist    Current functional status for upper extremity ADLs: Minimal assistance    Current functional status for lower extremity ADLs: Minimal assistance    Current functional status for bed, chair, wheelchair transfers: Minimal assistance    Current functional status for toilet transfers: Minimal assistance    Current functional status for locomotion: Contact guard assistance    Current functional status for bladder management: Maximum assistance    Current functional status for bowel management:Moderate assistance    Current functional status for comprehension: Moderate assistance    Current functional status for expression: Moderate assistance    Current functional status for social interaction: Moderate assistance    Current functional status for problem solving: Moderate assistance    Current functional status for memory: Moderate assistance    Expected level of Improvement in Self-Care:  Modified independence    Expected level of Improvement in Sphincter Control:  Modified independence    Expected level of Improvement in Transfers: Modified independence    Expected level of Improvement in Locomotion:  Modified independence    Expected level of Improvement in Communication and Social Cognition: Modified independence    Expected length of time to achieve that level of improvement: 2 weeks    Current rehab issues: ADL dysfunction,bladder management, bowel management,carry over of therapy techniques, discharge planning, disease and co-morbidity management, gait/mobility dysfunction, medication management, nutrition and hydration management, Ongoing assessment of safety, Pain management, Patient and family education, Prevention of secondary complications, Skin Integrity, cognitive impairment, communication impairment.     Required therapy: Physical Therapy, Occupational Therapy and Speech Therapy 3 hours per day, 5-6 days per week. Recreational Therapy 1 hour per week. Expected Discharge Destination: Home    Expected Post Discharge Treatments: Home Care    Other information relevant to the care needs:   Lives With: Spouse  Type of Home: House  Home Layout: One level  Home Access: Stairs to enter with rails  Entrance Stairs - Number of Steps: 3  Bathroom Shower/Tub: Walk-in shower  Bathroom Toilet: Handicap height  Bathroom Accessibility: Accessible  Receives Help From: Family  ADL Assistance: Independent  Homemaking Assistance: Independent  Ambulation Assistance: Independent  Transfer Assistance: Independent  Active : No  Patient's  Info: Spouse Marsha drives  Mode of Transportation: Car  Occupation: Retired  Additional Comments: Per son, pts bathroom is currently in process of being remodeled to be handicap accessible. pt did not use AD for mobility and was indep with his ADL tasks at home. Pt has 4 children that assist    Acute Inpatient Rehabilitation Disclosure Statement provided to patient. Patient verbalized understanding. I have reviewed and concur with the findings and results of the pre-admission screening assessment completed by the Inpatient Rehabilitation Admissions Coordinator.

## 2022-05-06 NOTE — PROGRESS NOTES
[facility]  INPATIENT PHYSICAL THERAPY  DAILY NOTE  [facility] CCU-STEPDOWN 3B - 3B-26/026-A      Time In: 0804  Time Out: 6849  Timed Code Treatment Minutes: 44 Minutes  Minutes: 39          Date: 2022  Patient Name: Serge Narvaez,  Gender:  male        MRN: [id number]  : 1950  (66 y.o.)     Referring Practitioner: Dr. Sharlene Ray  Diagnosis: renal failure  Additional Pertinent Hx: a 66 y.o.  male admitted to [facility] on 2022 with PMHx of distant CVA, BPH, HTN, GERD, and Glaucoma who presented to the ED after syncopal episodes x 2. Wife zayda provided the history and she stated that approximately 14 days ago he fell in the yard while pulling weeds and broke his ribs. He came to [facility] at that time and was discharged with pain medication. About 7 days ago, he presented again to [facility] with complaints of right lower abdomen bulge. He was diagnosed with inguinal hernia and recommended to follow up OP which was scheduled for tomorrow (22). Wife stated that the hernia has gotten significantly worse over the lprevious 5 days. Syncope occurred first when they were in [address] several days ago. They had been sitting for a while and he started to feel nauseated and hot so he got up to go outside and passed out (described as legs giving out from him). Denied hitting his head. The morning of admission, wife was getting him out of the bathtub and he just \"slumped over\". Denied hitting his head on that occasion as well. Wife stated that he had vomited 3 times over the previous 24 hours and that it was brown/yellow in color. She stated he had not been having BM's and had been peeing less. Also noted he had been weak and sleeping \"20 hours a day\". On 2022, the patient was taken to the operating room per Jessica Germain MD,  with diagnosis of serrated right inguinal hernia with small bowel obstruction and perforation, and at the time of surgery some feculent soilage of the right groin and pelvis. Procedure included exploratory laparotomy, small bowel resection, single primary anastomosis, and drainage of pelvic abscess and right groin exploration, drainage of abscess, debridement of necrotic subcutaneous fat and fascia and muscle, and excision of necrotic hernia sac. Right groin and pelvic abscess were present at the time of surgery. Postoperatively, she was admitted to the ICU secondary to acute respiratory failure, hypoxia, septic shock, aspiration pneumonia, and status post exploratory lap and drainage as above. Nutrition per TPN. After stabilization and extubation on 4/27/22, patient transferred to  on 4/28/22. s/pExploratory laparotomy lysis of inflammatory adhesions, drainage undrained right groin abscess, repair of abdominal wound fascial dehiscence on 5/1/22     Prior Level of Function:  Lives With: Spouse  Type of Home: House  Home Layout: One level  Home Access: Stairs to enter with rails  Entrance Stairs - Number of Steps: 3   Bathroom Shower/Tub: Walk-in shower  Bathroom Toilet: Handicap height  Bathroom Accessibility: Accessible    Receives Help From: Family  ADL Assistance: Independent  Homemaking Assistance: Independent  Ambulation Assistance: Independent  Transfer Assistance: Independent  Active : No  Additional Comments: Per son, pts bathroom is currently in process of being remodeled to be handicap accessible. pt did not use AD for mobility and was indep with his ADL tasks at home.  Pt has 4 children that assist    Restrictions/Precautions:  Restrictions/Precautions: Surgical Protocols,General Precautions,Fall Risk  Required Braces or Orthoses  Other: Abdominal Binder  Position Activity Restriction  Other position/activity restrictions: . monitor O2     SUBJECTIVE: pt in bed on arrival and agreeable for therapy- pt more motivated this date however he did fatigue easy he would ask for rest breaks but would say \"just give me a minute then I will try it\"     PAIN: 0/10:       Vitals: Oxygen: pt on 2L O2 on arrival and sats 90% he dropped to 89% upon sitting and unable to get it above 92% so did increase to 3L O2 iwth activity and tritiated down to 2 L at end of session     OBJECTIVE:  Bed Mobility:  Rolling to Right: Minimal Assistance, with rail, with increased time for completion   Supine to Sit: Minimal Assistance, with head of bed flat, with rail, with increased time for completion  Scooting: Contact Guard Assistance    Transfers:  Sit to Stand: Minimal Assistance  Stand to Sit:Minimal Assistance  From bed, toilet and bedside chair- he needed cues for hand placement > 50% of time and max cues to back all the way upto surface     Ambulation:  Contact Guard Assistance for balance and assist to manage O2 line   Distance: 20x1, 10x2  Surface: Level Tile  Device:Rolling Walker  Gait Deviations:  Slow annette with decreased step length and heel strike, pt lacking TKE and flexed at hips but would correct posture for a few steps, pt needed cues for walker management dajuan in narrow spaces and needed assist to manage O2 line as he demonstrated decreased awareness of O2 line     Balance:  static standing at walker wtih UE at support pt required assist to complete stefani care he was able to stand for nearly 2 min before needng a rest break with CGA for balance  - completed reaching task while one UE at support with CGA for balance pt only releasing UE from support for a short time and limited out of base reaching     Exercise:  Patient was guided in 1 set(s) 10 reps of exercise to both lower extremities. Ankle pumps, Glut sets, Quad sets, Heelslides, Short arc quads, Hip abduction/adduction, Seated marches and Long arc quads. With several rest breaks Exercises were completed for increased independence with functional mobility.     Functional Outcome Measures: Completed  AM-PAC Inpatient Mobility without Stair Climbing Raw Score : 14  AM-PAC Inpatient without Stair Climbing T-Scale Score : 40.85    ASSESSMENT:  Assessment: Patient progressing toward established goals. Pt demonstrated generalized weakness in radha LEs along with decreased balance, endurance and safety awareness. Pt would greatly benefit from cont skilled therapy prior to discharge home to improve functional independence and safety with functional mobility. Activity Tolerance:  Patient tolerance of  treatment: fair. He did require rest breaks      Equipment Recommendations: Other: cont to assess needs  Discharge Recommendations: Inpatient Therapy Stay  Plan: Specific Instructions for Next Treatment: therex and mobility  Plan:  (5X GM)  Specific Instructions for Next Treatment: therex and mobility    Patient Education  Patient Education: Plan of Care, Transfers, Gait, we discussed the need for cont therapy at discharge     Goals:  Patient goals : feel better  Short Term Goals  Time Frame for Short term goals: by discharge  Short term goal 1: bed mobility with SBA to get in/out of bed  Short term goal 2: transfer with SBA to get in/out of chairs  Short term goal 3: amb >25'x1 with AD and CGA to walk safely in room  Long Term Goals  Time Frame for Long term goals : no LTGs set secondary to short ELOS    Following session, patient left in safe position with all fall risk precautions in place.

## 2022-05-06 NOTE — PROGRESS NOTES
Renal Progress Note  [facility]    Patient :  John Rai Sr.; 66 y.o. MRN# [id number]  Location:  8B-27/027-A  Attending:  Vasile Elias DO  Admit Date:  4/25/2022   Hospital Day: 11      Subjective:     Nephrology is following the patient for ROBSON. Patient was seen earlier this morning. Receiving blood transfusion. No overt bleeding noted. TPN stopped. Tolerating diet. Outpatient Medications:     Medications Prior to Admission: atorvastatin (LIPITOR) 20 MG tablet, TAKE 1 TABLET BY MOUTH DAILY  COMBIGAN 0.2-0.5 % ophthalmic solution, Place 1 drop into the left eye every 12 hours  famotidine (PEPCID) 20 MG tablet, Take 1 tablet by mouth 2 times daily  losartan (COZAAR) 50 MG tablet, Take 1 tablet by mouth daily  metoprolol succinate (TOPROL XL) 25 MG extended release tablet, TAKE 1 TABLET BY MOUTH DAILY  tamsulosin (FLOMAX) 0.4 MG capsule, Take 1 capsule by mouth daily  lidocaine (LIDODERM) 5 %, Place 1 patch onto the skin daily 12 hours on, 12 hours off.   fluocinonide (LIDEX) 0.05 % cream, APPLY EXTERNALLY TO THE AFFECTED AREA TWICE DAILY  tadalafil (CIALIS) 20 MG tablet, TAKE 1 TABLET BY MOUTH EVERY DAY AS NEEDED  aspirin 81 MG EC tablet, Take 1 tablet by mouth daily  loratadine (CLARITIN) 10 MG tablet, TAKE 1 TABLET BY MOUTH DAILY    Current Medications:     Scheduled Meds:    piperacillin-tazobactam  3,375 mg IntraVENous Q8H    potassium & sodium phosphates  1 packet Oral BID    sodium phosphate IVPB  10 mmol IntraVENous Once    amiodarone  200 mg Oral Daily    metoprolol tartrate  25 mg Oral BID    docusate sodium  100 mg Oral Daily    famotidine (PEPCID) injection  20 mg IntraVENous Daily    insulin lispro  0-6 Units SubCUTAneous 4 times per day    sodium hypochlorite   Irrigation Daily    vitamin D  50,000 Units Oral Weekly    sodium chloride flush  5-40 mL IntraVENous 2 times per day    [Held by provider] heparin (porcine)  5,000 Units SubCUTAneous TID Continuous Infusions:    sodium chloride      dextrose      sodium chloride       PRN Meds:  sodium chloride, morphine, glucose, glucagon (rDNA), dextrose, dextrose bolus **OR** dextrose bolus, sodium chloride flush, sodium chloride, ondansetron **OR** ondansetron, polyethylene glycol, acetaminophen **OR** acetaminophen    Input/Output:       I/O last 3 completed shifts: In: 2174.3 [I.V.:619.8; IV Piggyback:405.5]  Out: 2275 [Urine:2275]. Patient Vitals for the past 96 hrs (Last 3 readings):   Weight   22 0600 135 lb (61.2 kg)       Vital Signs:   Temperature:  Temp: 98.1 °F (36.7 °C)  TMax:   Temp (24hrs), Av.3 °F (36.8 °C), Min:98 °F (36.7 °C), Max:98.6 °F (37 °C)    Respirations:  Resp: 18  Pulse:   Pulse: 78  BP:    BP: 121/61  BP Range: Systolic (54BNC), QMV:180 , Min:113 , ZFU:934       Diastolic (29ILR), MUF:01, Min:60, Max:70      Physical Examination:     General:  Awake, alert  HEENT: NC/AT/ MMM   Chest:               Clear B/L no rales  Cardiac:  S1 S2   Abdomen: Abdominal binder  Neuro:  sedated  SKIN:  No rashes,   Extremities:  No edema,     Labs:       Recent Labs     220 22  1226 22  0446   WBC 16.5* 14.0* 14.8*   RBC 2.56* 2.25* 2.03*   HGB 8.4* 7.5* 6.8*   HCT 26.6* 23.9* 22.1*   .9* 106.2* 108.9*   MCH 32.8 33.3* 33.5*   MCHC 31.6* 31.4* 30.8*    308 320   MPV 11.0 10.4 10.4      BMP:   Recent Labs     22  0410 22  1226 22  0446    138 136   K 3.8 4.0 4.2    105 107   CO2 25 23 24   BUN 28* 22 20   CREATININE 1.3* 1.1 1.2   GLUCOSE 104 118* 81   CALCIUM 7.6* 7.5* 7.6*      Phosphorus:     Recent Labs     22  0410 22  1226 22  0446   PHOS 1.8* 1.9* 1.8*     Magnesium:    Recent Labs     22  1226   MG 1.7     Albumin:    No results for input(s): LABALBU in the last 72 hours.   BNP:    No results found for: BNP  KHUSHBOO:    No results found for: KHUSHBOO  SPEP:  Lab Results   Component Value Date PROT 5.3 04/28/2022     UPEP:   No results found for: LABPE  C3:   No results found for: C3  C4:   No results found for: C4  MPO ANCA:   No results found for: MPO  PR3 ANCA:   No results found for: PR3  Anti-GBM:   No results found for: GBMABIGG  Hep BsAg:       No results found for: HEPBSAG  Hep C AB:        No results found for: HEPCAB    Urinalysis/Chemistries:      Lab Results   Component Value Date    NITRU NEGATIVE 04/25/2022    COLORU YELLOW 04/25/2022    PHUR 5.5 04/25/2022    LABCAST >15 HYALINE 04/25/2022    WBCUA 10-15 04/25/2022    RBCUA 10-15 04/25/2022    MUCUS NONE SEEN 04/25/2022    YEAST NONE SEEN 04/25/2022    BACTERIA FEW 04/25/2022    SPECGRAV 1.015 04/25/2022    LEUKOCYTESUR TRACE 04/25/2022    UROBILINOGEN 0.2 04/25/2022    BILIRUBINUR NEGATIVE 04/25/2022    BLOODU MODERATE 04/25/2022    GLUCOSEU NEGATIVE 06/09/2019    KETUA NEGATIVE 04/25/2022     Urine Sodium:   No results found for: MING  Urine Potassium:  No results found for: KUR  Urine Chloride:  No results found for: CLUR  Urine Osmolarity: No results found for: OSMOU  Urine Protein:   No components found for: TOTALPROTEIN, URINE   Urine Creatinine:   No results found for: LABCREA  Urine Eosinophils:  No components found for: UEOS        Impression and Plan:  1. ROBSON , prerenal due to volume depletion and hypotension: much improved  2. Hypophosphatemia from poor intake. Will replace IV and now that tolerating po intake start oral phos replacement  3. S/p ex lap with small bowel resection, drainage of abscess and debridement necrotic fat fascia and hernia sac 4/25. Had wound dehiscence and went back for repair 5/1  4. Leukocytosis  5. Pneumonia  6. Anemia, s/p blood transfusion  7. Afib          Please don't hesitate to call with any questions.   Electronically signed by Dane Lopes DO on 5/6/2022 at 12:35 PM

## 2022-05-06 NOTE — PROGRESS NOTES
Jovana Flanagan MD  Postoperative Progress Note  Pt Name: Dangelo Salazar Record Number: [id number]  Date of Birth 1950   Today's Date: 5/6/2022  ASSESSMENT   1. POD # 10 sepsis secondary to incarcerated right inguinal hernia with perforation and obstruction of small bowel. Necrotic right groin wound status postdebridement drainage of abdominal abscess present at the time of surgery. POD #5 repair of abdominal wound dehiscence lysis of inflammatory adhesions and abdominal and right groin washout  2. Sepsis resolved. White count continues to to decline  3. Acute renal failure resolved  4. Nasal cannula oxygen increased oxygen requirements overnight. 5. Leukocytosis trending down  6. A. Fib/NS arrhythmia. Internal medicine plans to transition to oral with improved oral intake  7. Hypernatremia resolved  8. pneumonia  9. Tolerating clear liquids  10. Anemia no signs of active bleeding. Patient getting daily lab draws hemoglobin this morning 6.8.   has a past medical history of CAD (coronary artery disease), CVA (cerebral vascular accident) ([address] Erectile dysfunction, GERD (gastroesophageal reflux disease), Hyperlipidemia, and Hypertension. PLANS   1. Analgesics and antiemetics as needed. 2. IV hydration per medicine service. Normal saline stopped on D5 water. 3. Continue broad-spectrum antibiotic Zosyn. Medicine plans to continue a few more days. 4.   Amiodarone converted to oral  5. DVT prophylaxis per primary service. 6. change groin wound packing 2 times daily. Dry gauze. Dakin's solution ordered. Wounds look okay. Midline wound dressing changed. Clean wound twice daily recommended. Leave staples in situ. 7.  Continue Zosyn. Intra-abdominal cultures E. coli and Morganella morganii I both sensitive. White count continues to trend down may discontinue in 2 to 3 days. 8.  Soft diet  9. Monitor white count intermittently  10.   Therapies for deconditioning  11. Continue abdominal binder  12. Max removed  13. Advance to soft diet  SUBJECTIVE   Charly Dalton is hemodynamically stable. Having bowel movement tolerating oral intake. Activity is increasing working with therapies. CURRENT MEDICATIONS   Scheduled Meds:   piperacillin-tazobactam  3,375 mg IntraVENous Q8H    potassium & sodium phosphates  1 packet Oral BID    sodium phosphate IVPB  10 mmol IntraVENous Once    amiodarone  200 mg Oral Daily    metoprolol tartrate  25 mg Oral BID    docusate sodium  100 mg Oral Daily    famotidine (PEPCID) injection  20 mg IntraVENous Daily    insulin lispro  0-6 Units SubCUTAneous 4 times per day    sodium hypochlorite   Irrigation Daily    vitamin D  50,000 Units Oral Weekly    sodium chloride flush  5-40 mL IntraVENous 2 times per day    [Held by provider] heparin (porcine)  5,000 Units SubCUTAneous TID     Continuous Infusions:   sodium chloride      dextrose      sodium chloride       PRN Meds:.sodium chloride, morphine, glucose, glucagon (rDNA), dextrose, dextrose bolus **OR** dextrose bolus, sodium chloride flush, sodium chloride, ondansetron **OR** ondansetron, polyethylene glycol, acetaminophen **OR** acetaminophen  OBJECTIVE   CURRENT VITALS:  height is 5' 9\" (1.753 m) and weight is 135 lb (61.2 kg). His temperature is 98.1 °F (36.7 °C). His blood pressure is 121/61 and his pulse is 78. His respiration is 18 and oxygen saturation is 95%. Body mass index is 19.94 kg/m².   Temperature Range (24h):Temp: 98.1 °F (36.7 °C) Temp  Av.3 °F (36.8 °C)  Min: 98 °F (36.7 °C)  Max: 98.6 °F (37 °C)  BP Range (72W): Systolic (53ABM), NQW:484 , Min:113 , EQK:892     Diastolic (09ORJ), FXU:06, Min:60, Max:70    Pulse Range (24h): Pulse  Av.1  Min: 77  Max: 86  Respiration Range (24h): Resp  Av.7  Min: 16  Max: 18  Current Pulse Ox (24h):  SpO2: 95 %  Pulse Ox Range (24h):  SpO2  Av.1 %  Min: 95 %  Max: 99 %  Oxygen Amount and Delivery: O2 Flow Rate (L/min): 2 L/min  Incentive Spirometry Tx: Achieved Volume (mL): 250 mL    GENERAL: Alert awake sitting in chair  LUNGS: Clear diminished bases   HEART: Normal rate  ABDOMEN: Soft. A little bit of drainage from wound. INCISION midline wound intact. EXTREMITY: No edema  In: 90 [P.O.:90]  Out: 1725 [Urine:1725]  [REMOVED] Closed/Suction Drain Superior;Midline Abdomen Bulb-Output (ml): 50 ml  Date 05/06/22 0000 - 05/06/22 2359   Shift 7413-0122 2987-3198 9776-5020 24 Hour Total   INTAKE   P.O.(mL/kg/hr)  90  90   Shift Total(mL/kg)  90(1.5)  90(1.5)   OUTPUT   Urine(mL/kg/hr) 600(1.2)   600   Shift Total(mL/kg) 600(9.8)   600(9.8)   Weight (kg) 61.2 61.2 61.2 61.2     LABS     Recent Labs     05/04/22  0410 05/05/22  1226 05/06/22  0446   WBC 16.5* 14.0* 14.8*   HGB 8.4* 7.5* 6.8*   HCT 26.6* 23.9* 22.1*    308 320    138 136   K 3.8 4.0 4.2    105 107   CO2 25 23 24   BUN 28* 22 20   CREATININE 1.3* 1.1 1.2   MG  --  1.7  --    PHOS 1.8* 1.9* 1.8*   CALCIUM 7.6* 7.5* 7.6*      No results for input(s): PTT, INR in the last 72 hours. Invalid input(s): PT  No results for input(s): AST, ALT, BILITOT, BILIDIR, AMYLASE, LIPASE, LDH, LACTA in the last 72 hours. No results for input(s): TROPONINT in the last 72 hours. RADIOLOGY     XR CHEST PORTABLE   Final Result   1. Persistent bibasilar opacities. Small left-sided pleural effusion. **This report has been created using voice recognition software. It may contain minor errors which are inherent in voice recognition technology. **      Final report electronically signed by Dr. Brinda Teixeira on 5/2/2022 8:13 AM      XR CHEST PORTABLE   Final Result   A right arm PICC line tip terminates at the cavoatrial projection. No pneumothorax is observed. Small bilateral pleural effusions and bilateral lower lobe airspace opacity, right greater than left, are not significantly changed.             **This report has been created using voice recognition software. It may contain minor errors which are inherent in voice recognition technology. **      Final report electronically signed by Dr Tsering Parra on 4/29/2022 4:04 PM      XR ABDOMEN FOR NG/OG/NE TUBE PLACEMENT   Final Result   Impression:      NG tube tip proximal stomach. This document has been electronically signed by: Giovanna Watson MD on    04/28/2022 11:56 PM      XR CHEST PORTABLE   Final Result   Impression:   1. Satisfactory positioning of the endotracheal tube and right central    line. Probable slightly high positioning of the orogastric tube. Consider    advancing 3-4 cm. 2. Interval development of small bilateral pleural effusions, right    greater than left. 3. Bilateral lower lobe airspace opacities secondary to atelectasis and/or    infiltrates, right greater than left. 4. Bilateral interstitial changes secondary to pneumonitis or edema. This document has been electronically signed by: Yesenia Berrios. DO Jennifer on    04/26/2022 12:01 AM      CT ABDOMEN PELVIS WO CONTRAST Additional Contrast? None   Final Result   Impression:   Right inguinal hernia containing small bowel. This is causing a small    bowel obstruction. There are several gas collections within the right    inguinal hernia. Ischemic or perforated bowel could have this appearance. Mild to moderate patchy consolidation bilateral lower lobes. This could    represent aspiration or pneumonia. Prominent thickening of the distal esophagus and gastroesophageal    junction. Neoplasm not excluded. Recommend direct visualization. This document has been electronically signed by: Adry Holbrook MD on    04/25/2022 07:20 PM      All CTs at this facility use dose modulation techniques and iterative    reconstructions, and/or weight-based dosing   when appropriate to reduce radiation to a low as reasonably achievable.       US RENAL COMPLETE   Final Result   Impression:   Multifocal simple bilateral renal cyst. Echogenic bilateral renal    parenchyma. This document has been electronically signed by: Yue Mays MD on    04/25/2022 06:30 PM      CT HEAD WO CONTRAST   Final Result       1. Stable CT scan of the brain, no interval change since previous MRI scan dated 10 Melody 2019.   2. Mild atrophy and dilatation of the third and lateral ventricles. 3. Probable ischemic changes in the white matter, left basal ganglia and in the bernadette. 4. Calcification the cavernous segments of both internal carotid arteries. 5. Increased density in the external auditory canals which may represent cerumen bilaterally. **This report has been created using voice recognition software. It may contain minor errors which are inherent in voice recognition technology. **      Final report electronically signed by DR Jani Osei on 4/25/2022 4:01 PM      XR CHEST PORTABLE   Final Result   1. Normal heart size. No effusion. 2. Persistent mild atelectatic/fibrotic stranding retrocardiac region left lung base. **This report has been created using voice recognition software. It may contain minor errors which are inherent in voice recognition technology. **      Final report electronically signed by Dr. Aleksandr Talley on 4/25/2022 3:05 PM          Electronically signed by Rush Garcia MD on 5/6/2022 at 2:16 PM

## 2022-05-06 NOTE — CARE COORDINATION
5/6/22, 12:24 PM EDT    DISCHARGE ON GOING EVALUATION    Margaux Suarez . Hospital day: 11  Location: -27/027-A Reason for admit: Renal failure [N19]  Sepsis associated hypotension ([facility] [address] [A41.9, I95.9]   Procedure:   4/25 CXR: Persistent mild atelectatic/fibrotic stranding retrocardiac region left lung base; no effusion  4/25 CT Head:  Stable CT scan of the brain, no interval change since previous MRI scan dated 10 Mel[address] 2019. Mild atrophy and dilatation of the third and lateral ventricles. Probable ischemic changes in the white matter, left basal ganglia and in the bernadette. Calcification the cavernous segments of both internal carotid arteries. Increased density in the external auditory canals which may represent cerumen bilaterally. 4/25 Renal US: Multifocal simple bilateral renal cyst. Echogenic bilateral renal parenchyma. 4/25 CT Abd/pelvis: Right inguinal hernia containing small bowel. This is causing a small bowel obstruction. There are several gas collections within the right inguinal hernia. Ischemic or perforated bowel could have this appearance. Mild to moderate patchy consolidation bilateral lower lobes. This could represent aspiration or pneumonia. Prominent thickening of the distal esophagus and gastroesophageal junction. Neoplasm not excluded. Recommend direct visualization. 4/25 LAPAROTOMY EXPLORATORY FOR SMALL BOWEL OBSTRUCTION WITH INCARCERATED HERNIA REPAIR.  Debridement necrotic subcutaneous fat fascia and muscle right groin  4/25 Intubated in OR  4/25 CVC Right subclavian   4/27 Extubated. 4/29 PICC line placed. 5/1 Return to OR with Dr. Gallo Hdez laparotomy lysis of inflammatory adhesions, drainage undrained right groin abscess, repair of abdominal wound fascial dehiscence. Barriers to Discharge: Hospitalist, Surgery, Nephrology and PMR following. POD #10 and POD #5. Hgb dropped to 6.8, plan to transfuse a unit of blood today.  Amiodarone po daily, Pepcid iv daily, Zosyn iv q8hr. PT/OT/SLP. External Max. O2 down to 2L/nc, sats 95-99%. Passing stools. Dietitian following. PCP: Leighann Hodgson MD  Readmission Risk Score: 21.7 ( )%  Patient Goals/Plan/Treatment Preferences: From home with wife. IP Rehab precert to be started today. Pt transferred to [phone], handoff report given to ROCHELLE Mcgovern CM.

## 2022-05-06 NOTE — PLAN OF CARE
Problem: Discharge Planning  Goal: Discharge to home or other facility with appropriate resources  Description: Continue discharge planning. Patient currently with NG tube, PICC line inserted today.  following. Patient able to take few steps at bedside with PT/OT today. Outcome: Progressing     Problem: Pain  Goal: Verbalizes/displays adequate comfort level or baseline comfort level  Description: Patient denies pain today.    Outcome: Progressing     Problem: Chronic Conditions and Co-morbidities  Goal: Patient's chronic conditions and co-morbidity symptoms are monitored and maintained or improved  Outcome: Progressing     Problem: Safety - Adult  Goal: Free from fall injury  Outcome: Progressing

## 2022-05-06 NOTE — PROGRESS NOTES
Comprehensive Nutrition Assessment    Type and Reason for Visit:  Reassess (nutrition recommendations)    Nutrition Recommendations/Plan:   1. Continue current and diet encourage PO intake at best efforts. 2. Continue nutrition supplements TID as patient appears to have good acceptance of these. 3. Monitor weights and overall PO intake. Malnutrition Assessment:  Malnutrition Status:  Severe malnutrition (04/26/22 1443)    Context:  Chronic Illness     Findings of the 6 clinical characteristics of malnutrition:  Energy Intake:  Unable to assess  Weight Loss:  5% over 1 month (7% in 3 weeks)     Body Fat Loss:  Severe body fat loss Orbital,Triceps,Fat Overlying Ribs   Muscle Mass Loss:  Severe muscle mass loss Temples (temporalis),Clavicles (pectoralis & deltoids),Calf (gastrocnemius)  Fluid Accumulation:  No significant fluid accumulation     Strength:  Not Performed    Nutrition Assessment:      Pt. Improving from a nutritional standpoint AEB tolerating diet advancement and off TPN. At risk for further nutritional compromise r/t severe malnutrition, admit with [facility], intubated 4/25 and extubated 4/29, emergent GI surgery 4/25 d/t perforated small bowel with soilage of pelvic cavity, pelvic abscess and necrotic hernial sac with wound dehiscence, 5/1 exploratory laparotomy lysis of inflammatory adhesions, drainage undrained right groin abscess, repair of abdominal wound fascial dehiscence; aspiration pneumonia, sepsis; per H&P N/V few days pta and underlying medical condition GERD, HTN, CVA, CAD.      Nutrition Related Findings:      Wound Type: Surgical Incision (4/25 laparotomy for SBO with heria repair; open groin wound, 5/1 abdomen wound dehiscence, repair and wash out)     Pt. Report/Treatments/Miscellaneous: Pt was transferring from 3B to 8B at attempted assessment. Was able to evaluate breakfast meal tray: 100% of ONS and ~25% of meal. Pt received blood transfusion.  Plan is to admit to inpatient rehab.   GI Status: last BM 5/4/22 per EMR  Pertinent Labs: Na 136, K 4.2, BUN 20, creatinine 1.2, glucose 81, phos 1.8, hgb 6.8  Pertinent Meds: colace, pepcid, humalog, lopressor, metoprolol, zosyn    Current Nutrition Intake & Therapies:    Average Meal Intake: 26-50%  Average Supplements Intake: %  ADULT DIET; Easy to Chew  ADULT ORAL NUTRITION SUPPLEMENT; Breakfast, Lunch, Dinner; Standard High Calorie/High Protein Oral Supplement    Anthropometric Measures:  Height: 5' 9\" (175.3 cm)  Ideal Body Weight (IBW): 160 lbs (73 kg)    Admission Body Weight: 138 lb (62.6 kg) (4/26 with no edema)  Current Body Weight: 135 lb 6.4 oz (61.4 kg) (5/2: no edmea documented),   IBW.  Weight Source: Bed Scale  Current BMI (kg/m2): 20  Usual Body Weight: 148 lb (67.1 kg) (4/10/22; 143# 1/27/22)  % Weight Change (Calculated): -6.8                    BMI Categories: Underweight (BMI less than 22) age over 72    Estimated Daily Nutrient Needs:  Energy Requirements Based On: Kcal/kg  Weight Used for Energy Requirements: Current (63kgm)  Energy (kcal/day): 7392-2647 (25-30/kgm)  Weight Used for Protein Requirements: Other (Comment) (63 kgm)  Protein (g/day): 95+ grams (as renal function allows)  Method Used for Fluid Requirements: 1 ml/kcal  Fluid (ml/day): 4505-6265 ml/day    Nutrition Diagnosis:   · Severe malnutrition,In context of chronic illness related to inadequate protein-energy intake as evidenced by Criteria as identified in malnutrition assessment      Nutrition Interventions:   Food and/or Nutrient Delivery: Continue Current Diet,Continue Oral Nutrition Supplement  Nutrition Education/Counseling: No recommendation at this time  Coordination of Nutrition Care: Continue to monitor while inpatient  Plan of Care discussed with: doesn't flow to note    Goals:  Previous Goal Met: Progressing toward Goal(s)  Goals: PO intake 75% or greater,by next RD assessment       Nutrition Monitoring and Evaluation: Behavioral-Environmental Outcomes: None Identified  Food/Nutrient Intake Outcomes: Diet Advancement/Tolerance,Food and Nutrient Intake,Supplement Intake  Physical Signs/Symptoms Outcomes: Biochemical Data,GI Status,Weight,Nutrition Focused Physical Findings,Skin    Discharge Planning:     Too soon to determine     Alfredo Bauer RD  Contact: [phone]

## 2022-05-06 NOTE — PROGRESS NOTES
[address] [place] [address]  Occupational Therapy  Daily Note  Time:   Time In: 9485  Time Out: 1004  Timed Code Treatment Minutes: 20 Minutes  Minutes: 20          Date: 2022  Patient Name: Alana France.,   Gender: male      Room: [facility]26/026-A  MRN: [id number]  : 1950  (66 y.o.)  Referring Practitioner: Dr. Vanessa Boyle  Diagnosis: renal failure  Additional Pertinent Hx: a 66 y.o.  male admitted to [facility] on 2022 with PMHx of distant CVA, BPH, HTN, GERD, and Glaucoma who presented to the ED after syncopal episodes x 2. Wife zayda provided the history and she stated that approximately 14 days ago he fell in the yard while pulling weeds and broke his ribs. He came to [facility] at that time and was discharged with pain medication. About 7 days ago, he presented again to [facility] with complaints of right lower abdomen bulge. He was diagnosed with inguinal hernia and recommended to follow up OP which was scheduled for tomorrow (22). Wife stated that the hernia has gotten significantly worse over the lprevious 5 days. Syncope occurred first when they were in [place] several days ago. They had been sitting for a while and he started to feel nauseated and hot so he got up to go outside and passed out (described as legs giving out from him). Denied hitting his head. The morning of admission, wife was getting him out of the bathtub and he just \"slumped over\". Denied hitting his head on that occasion as well. Wife stated that he had vomited 3 times over the previous 24 hours and that it was brown/yellow in color. She stated he had not been having BM's and had been peeing less. Also noted he had been weak and sleeping \"20 hours a day\". On 2022, the patient was taken to the operating room per Lena Lozano MD,  with diagnosis of serrated right inguinal hernia with small bowel obstruction and perforation, and at the time of surgery some feculent soilage of the right groin and pelvis. Procedure included exploratory laparotomy, small bowel resection, single primary anastomosis, and drainage of pelvic abscess and right groin exploration, drainage of abscess, debridement of necrotic subcutaneous fat and fascia and muscle, and excision of necrotic hernia sac. Right groin and pelvic abscess were present at the time of surgery. Postoperatively, she was admitted to the ICU secondary to acute respiratory failure, hypoxia, septic shock, aspiration pneumonia, and status post exploratory lap and drainage as above. Nutrition per TPN. After stabilization and extubation on 4/27/22, patient transferred to  on 4/28/22. late on 4/30/22 pt forcefully vomited and coughed causing his abdominal wond to dehisce. Pt s/p Exploratory laparotomy lysis of inflammatory adhesions, drainage undrained right groin abscess, repair of abdominal wound fascial dehiscence on 5/1/22. Restrictions/Precautions:  Restrictions/Precautions: Surgical Protocols,General Precautions,Fall Risk  Required Braces or Orthoses  Other: Abdominal Binder  Position Activity Restriction  Other position/activity restrictions: . SUBJECTIVE: Patient sitting in recliner upon OT arrival and reporting he had to go to the bathroom. Tech asked for assist with t/f and was told he was MAX x2. PAIN: No pain reported. Vitals: Vitals not assessed per clinical judgement, see nursing flowsheet    COGNITION: WFL    ADL:   Grooming: Minimal Assistance. patient stood at sink to wash hands, he tended to anteriorly lean and use sink for support and required assist with washing hands as he was unable to reach for soap and reaching towels. He required verbal cues for sequencing and planning. Toileting: Stand By Assistance. verbal cues for toilet hygiene. Toilet Transfer: Moderate Assistance. to stand up from toilet. MIN A to lower self onto toilet.  verbal cues for hand placement and use of grab sung. Britton Oh BALANCE:  Sitting Balance:  Stand By Assistance. while seated eob. Standing Balance: Contact Guard Assistance. with use of 2w/w. BED MOBILITY:  Sit to Supine: Minimal Assistance verbal cues for side laying and assist to raise LE onto bed. TRANSFERS:  Sit to Stand:  Minimal Assistance. to stand from recliner. Stand to Sit: Contact Guard Assistance. to sit EOB. patient had difficulty slowing descending self onto bed. verbal cues given for hand placement. FUNCTIONAL MOBILITY:  Assistive Device: Walker  Assist Level:  Contact Guard Assistance. Distance: To and from bathroom  Patient demo narrow DORINA and tended to shuffle during ambulation, verbal cues given to support improved gait. He had SOB during ambulation. ASSESSMENT:  Assessment: Patient progressing well towards established OT goals. He demo SOB and decreased endurance impacting his ability to complete ADLs. Activity Tolerance:  Patient tolerance of  treatment: fair. Patient was cooperative. He demo some SOB during activity and no pain.        Discharge Recommendations: Continue to assess pending progress, Subacute/skilled nursing facility, ECF with OT and Patient would benefit from continued OT at discharge  Equipment Recommendations: Equipment Needed: Yes  Mobility Devices: Walker  Plan: Times per Week: 5x  Current Treatment Recommendations: Strengthening,Balance training,Safety education & training,Functional mobility training,Endurance training,Equipment evaluation, education, & procurement,Patient/Caregiver education & training    Patient Education  Patient Education: Role of OT, Plan of Care, ADL's, Energy Conservation, Precautions and Assistive Device Safety    Goals  Short Term Goals  Time Frame for Short term goals: by discharge  Short Term Goal 1: pt to navigate to/from various surfaces including BSC/chair progressing to bathroom and further distances using AD with CGA to icnrease his endurance and indep with ADL tasks  Short Term Goal 2: Pt to demo dynamic standing iw unilateral UE support and CGA to assist with completion of ADL tasks such as clothing management after dressing and toileting  Short Term Goal 3: pt to complete UB ADL tasks iw CGA and LB with min A  Short Term Goal 4: Pt to increase endurance to tolerate > 25 min of consistent activity with 3 or less rst breaks to increase ease of ADL asks  Short Term Goal 5: pt to increase bilateral UE strength (especially left) by completing HEP with mod resistance and use of handout with min cues for tech and need for less than 2 rest breaks    Following session, patient left in safe position with all fall risk precautions in place.

## 2022-05-07 LAB
ANION GAP SERPL CALCULATED.3IONS-SCNC: 8 MEQ/L (ref 8–16)
BUN BLDV-MCNC: 18 MG/DL (ref 7–22)
CALCIUM SERPL-MCNC: 8.1 MG/DL (ref 8.5–10.5)
CHLORIDE BLD-SCNC: 105 MEQ/L (ref 98–111)
CO2: 23 MEQ/L (ref 23–33)
CREAT SERPL-MCNC: 1.1 MG/DL (ref 0.4–1.2)
ERYTHROCYTE [DISTWIDTH] IN BLOOD BY AUTOMATED COUNT: 21.7 % (ref 11.5–14.5)
ERYTHROCYTE [DISTWIDTH] IN BLOOD BY AUTOMATED COUNT: 78.7 FL (ref 35–45)
GLUCOSE BLD-MCNC: 100 MG/DL (ref 70–108)
GLUCOSE BLD-MCNC: 103 MG/DL (ref 70–108)
GLUCOSE BLD-MCNC: 108 MG/DL (ref 70–108)
GLUCOSE BLD-MCNC: 125 MG/DL (ref 70–108)
GLUCOSE BLD-MCNC: 133 MG/DL (ref 70–108)
GLUCOSE BLD-MCNC: 83 MG/DL (ref 70–108)
GLUCOSE BLD-MCNC: 91 MG/DL (ref 70–108)
HCT VFR BLD CALC: 29 % (ref 42–52)
HEMOGLOBIN: 9.1 GM/DL (ref 14–18)
MCH RBC QN AUTO: 31.2 PG (ref 26–33)
MCHC RBC AUTO-ENTMCNC: 31.4 GM/DL (ref 32.2–35.5)
MCV RBC AUTO: 99.3 FL (ref 80–94)
PHOSPHORUS: 2 MG/DL (ref 2.4–4.7)
PLATELET # BLD: 405 THOU/MM3 (ref 130–400)
PMV BLD AUTO: 10.5 FL (ref 9.4–12.4)
POTASSIUM SERPL-SCNC: 4.5 MEQ/L (ref 3.5–5.2)
RBC # BLD: 2.92 MILL/MM3 (ref 4.7–6.1)
SODIUM BLD-SCNC: 136 MEQ/L (ref 135–145)
WBC # BLD: 16 THOU/MM3 (ref 4.8–10.8)

## 2022-05-07 PROCEDURE — 6370000000 HC RX 637 (ALT 250 FOR IP): Performed by: INTERNAL MEDICINE

## 2022-05-07 PROCEDURE — 85027 COMPLETE CBC AUTOMATED: CPT

## 2022-05-07 PROCEDURE — 2580000003 HC RX 258: Performed by: SURGERY

## 2022-05-07 PROCEDURE — 84100 ASSAY OF PHOSPHORUS: CPT

## 2022-05-07 PROCEDURE — 6370000000 HC RX 637 (ALT 250 FOR IP): Performed by: SURGERY

## 2022-05-07 PROCEDURE — 6370000000 HC RX 637 (ALT 250 FOR IP)

## 2022-05-07 PROCEDURE — 99233 SBSQ HOSP IP/OBS HIGH 50: CPT | Performed by: INTERNAL MEDICINE

## 2022-05-07 PROCEDURE — 1200000003 HC TELEMETRY R&B

## 2022-05-07 PROCEDURE — 99232 SBSQ HOSP IP/OBS MODERATE 35: CPT | Performed by: INTERNAL MEDICINE

## 2022-05-07 PROCEDURE — 99223 1ST HOSP IP/OBS HIGH 75: CPT | Performed by: INTERNAL MEDICINE

## 2022-05-07 PROCEDURE — 2500000003 HC RX 250 WO HCPCS: Performed by: SURGERY

## 2022-05-07 PROCEDURE — 94761 N-INVAS EAR/PLS OXIMETRY MLT: CPT

## 2022-05-07 PROCEDURE — APPSS60 APP SPLIT SHARED TIME 46-60 MINUTES: Performed by: NURSE PRACTITIONER

## 2022-05-07 PROCEDURE — 2580000003 HC RX 258

## 2022-05-07 PROCEDURE — 82948 REAGENT STRIP/BLOOD GLUCOSE: CPT

## 2022-05-07 PROCEDURE — 6360000002 HC RX W HCPCS

## 2022-05-07 PROCEDURE — 80048 BASIC METABOLIC PNL TOTAL CA: CPT

## 2022-05-07 RX ADMIN — FAMOTIDINE 20 MG: 10 INJECTION, SOLUTION INTRAVENOUS at 08:32

## 2022-05-07 RX ADMIN — METOPROLOL TARTRATE 25 MG: 25 TABLET, FILM COATED ORAL at 08:32

## 2022-05-07 RX ADMIN — DAKIN'S SOLUTION 0.125% (QUARTER STRENGTH): 0.12 SOLUTION at 10:46

## 2022-05-07 RX ADMIN — POTASSIUM & SODIUM PHOSPHATES POWDER PACK 280-160-250 MG 250 MG: 280-160-250 PACK at 13:36

## 2022-05-07 RX ADMIN — PIPERACILLIN AND TAZOBACTAM 3375 MG: 3; .375 INJECTION, POWDER, LYOPHILIZED, FOR SOLUTION INTRAVENOUS at 10:45

## 2022-05-07 RX ADMIN — PIPERACILLIN AND TAZOBACTAM 3375 MG: 3; .375 INJECTION, POWDER, LYOPHILIZED, FOR SOLUTION INTRAVENOUS at 02:47

## 2022-05-07 RX ADMIN — DOCUSATE SODIUM 100 MG: 100 CAPSULE, LIQUID FILLED ORAL at 08:31

## 2022-05-07 RX ADMIN — AMIODARONE HYDROCHLORIDE 200 MG: 200 TABLET ORAL at 08:32

## 2022-05-07 RX ADMIN — POTASSIUM & SODIUM PHOSPHATES POWDER PACK 280-160-250 MG 250 MG: 280-160-250 PACK at 20:19

## 2022-05-07 RX ADMIN — SODIUM CHLORIDE, PRESERVATIVE FREE 10 ML: 5 INJECTION INTRAVENOUS at 08:32

## 2022-05-07 RX ADMIN — PIPERACILLIN AND TAZOBACTAM 3375 MG: 3; .375 INJECTION, POWDER, LYOPHILIZED, FOR SOLUTION INTRAVENOUS at 17:51

## 2022-05-07 RX ADMIN — POTASSIUM & SODIUM PHOSPHATES POWDER PACK 280-160-250 MG 250 MG: 280-160-250 PACK at 17:51

## 2022-05-07 RX ADMIN — METOPROLOL TARTRATE 25 MG: 25 TABLET, FILM COATED ORAL at 20:19

## 2022-05-07 RX ADMIN — POTASSIUM & SODIUM PHOSPHATES POWDER PACK 280-160-250 MG 250 MG: 280-160-250 PACK at 08:32

## 2022-05-07 RX ADMIN — SODIUM CHLORIDE, PRESERVATIVE FREE 10 ML: 5 INJECTION INTRAVENOUS at 20:20

## 2022-05-07 ASSESSMENT — PAIN SCALES - GENERAL: PAINLEVEL_OUTOF10: 0

## 2022-05-07 NOTE — PROGRESS NOTES
PROGRESS NOTE      Patient:  Sarah Suarez Sr. Unit/Bed:8B27/027-A    YOB: 1950    MRN: [id number]       Acct: [de-identified]     PCP: Alexsandra Billings MD    Date of Admission: 4/25/2022      Assessment/Plan:    Anticipated Discharge in : Pending    Active Hospital Problems    Diagnosis Date Noted    Dehiscence of fascia [T81.30XA]      Priority: Medium    Atrial fibrillation with RVR ([address] [I48.91]      Priority: Medium    Acute respiratory failure with hypoxia ([address] [J96.01] 04/26/2022     Priority: Medium    Septic shock ([address] [A41.9, R65.21] 04/26/2022     Priority: Medium    Incarcerated right inguinal hernia [K40.30] 04/26/2022     Priority: Medium    Small bowel perforation ([address] [K63.1] 04/26/2022     Priority: Medium    Aspiration pneumonia ([address] [J69.0] 04/26/2022     Priority: Medium    Acute kidney injury ([address] [N17.9] 04/26/2022     Priority: Medium    Hypoalbuminemia [E88.09] 04/26/2022     Priority: Medium    Syncope [R55] 04/26/2022     Priority: Medium    Hypokalemia [E87.6] 04/26/2022     Priority: Medium    Anemia, macrocytic [D53.9] 04/26/2022     Priority: Medium    Severe malnutrition ([address] [E43] 04/26/2022     Priority: Medium       Perforated small bowel, soilage of the pelvic cavity, right groin and pelvic abscesses, and necrotic hernial sac with wound dehiscence:   POD #10 exploratory laparotomy with bowel resection, primary anastomosis, drainage of pelvic abscess as well as right groin exploration, drainage of abscess, and debridement of necrotic subcutaneous fat, fascia and muscle. On Day 13 of Zosyn for Culture Growing Morganella Freida Ouch., enterococcus casseliflavus, sensatitivites to Zosyn,  POD#5 repair of wound dehiscence. Surgery on board and following. WBC count stable at 14-16. If WBC count continues increases consider additional undrained abscess or fluid collections. Off NGT suction per Surgery as low OPT.  Dressing changes and wound care. Continue on SSI,   may require TPN supplement in addition if not tolerating PO. Continue Zoysn coverage to 13/14 dy in setting of recent Sx. Off TPN. Continue with PO diet per Sx, SLP following, dietician on board. Aspiration pneumonia: Imaging findings suggestion of infiltrate w/ gastric contents suctioned from ET tube is consistent with aspiration pneumonia. Culture growing ecoli with H. influ on PCR detected. Patient receiving antibiotics as above. Sepsis secondary to intra-abdominal infection and aspiration pneumonia - improving: On antibiotics as above. Current WBC 16.0 and downtrend. Previous White count significantly elevated to 33.6 up from 21.8 postoperatively, however Patient remains afebrile and O2 support requirements are being weaned, BP grossly stable, monitor    A. fib with RVR: noted Apr/27/2022 New onset A. fib with RVR refractory to Lopressor and Cardizem. Patient converted with amiodarone. K WNL, Mg WNL, [id number] 5. Surgery recommends holding anticoagulation still at this time in setting of bleed risk. Note Pt is Not optimally controlled on amiodrip + lopressor, however much improved ontrol with HR 80's despite some intermittent arrhythmia on tele. P  Continue Amio  BID as Pt is tolerating well. Continue Lopressor 25 BID PO  with holding parameters. BP grossly stable at 120's. Possible Watchman F/U vs [address] [place] [address]. Will consider [address] [place] [address] once hgb more stable. Continue telemetry  Keep Mg > 2, K > 4,     Acute macrocytic anemia - May/06/2022, Noted mild Hgb drop from 7.5 to 6.8, H/H s/p 1 x PRBC Transfusion 9.2, currently 9.1, Monitor. ROBSON on CKD - resolving: Likely 2/2 volume depletion, poor oral intake, and hypotension 2/2 sepsis. Pt creatinine improved to 1.1 from 10.4 on Admit  Patient's baseline 1.3. improved with Hydration. Nephrology on board, appreciate rec. Good UOPT.      Mixed Acid-base disorder -resolved:  Mixed Anion gap metabolic acidosis with respiratory compensation likely secondary to sepsis and ROBSON with an additional metabolic alkalosis likely secondary to volume depletion in setting of GI sx. AG improving, Nephrology on board, appreciate recs. Acute hypoxic respiratory failure: likely 2/2 to aspiration pneumonia as above. Extubated Apr/27/2022, currently on 3L NC. Sating well Wean as tolerated. Acute postoperative pulmonary insufficiency - resolved: Extubated Apr/27/2022,     Troponinemia: Mildly elevated troponin on admit of 0.047 downtrending with inverted T waves found on EKG.  Likely 2/2 demand ischemia 2/2 hypotension.  Monitor    Hypophosphatemia: Noted May/04/2022  Continue to monitor,  Nephrolgoy on board and managing Lytes. .  Hypokalemia -  Resolved. Noted Apr/30/2022, on K protocol. Monitor AM.  Hypernatremia - Resolved. Nephrology on board, monitor. Hyperphosphatemia -resolved:  Continue to monitor,  Hypermagnesemia - resolved:  Continue to monitor,  supplement with TPN  Hypocalcemia: Continue to monitor, PO challenge as above, may supplement with TPN    Aortic Valve regurg - mild moderate 2/2 thickened with calficied AV on echo  Cardiology consulted for TVAR workup however at this time defer TVAR as more likely volume resuscitation and should improve. CAD:  Continue home Lipitor 20 mg,  Holding ASA  For bleed risk. Hgb drop today ntoed, see Acute macrocytic anemia above. GERD: Home regiment of pepcid 10mg daily, continued for admission IV form. . Noted incidental finding of thickened esophagus on imaging, F/U opt when stable for scope. Delirium? - resolved - pulled out central access line Apr/28/2022 PM, improved mentation however remains quite laconic. Given medical acuity and advance age likely sundowning as behavior is overnight. Reorient and monitor. If requires consider Seroqel QHS.       Chief Complaint:  Pelvic Pain    Hospital Course:    Per H&P:    \" \"The patient was sedated and intubated and therefore could not give any history.  Therefore, history is primarily taken from the chart. Patient's wife states that the patient fell in his yard approximately 14 days ago and went to the [facility] ED. This time, he was discharged with pain medication. Approximately 7 days ago, he again presented to [facility] with complaints of a right lower abdominal bulge at which point he was diagnosed with inguinal hernia and is scheduled with surgical follow-up. Patient presented to [facility] on 4/25/2022 for syncope and worsening inguinal hernia. Wife states that syncope first occurred in [place] several days ago when the patient had nausea and subjective feelings of excessive heat while sitting.  After this, he went to go outside and then passed out. On the morning of admission, patient's wife was given of the bathtub when he \"slumped over. \"  Wife stated that the patient vomited 3 times in the last 24 hours with brown/yellow vomitus. Wife also reports that he has not been having bowel movements, has had less frequent urination, and that he has been weak and sleeping \"20 hours a day. \"  While in the ED, the patient was found to have profound renal failure with a creatinine of 11.2 with a baseline of approximately 1.3. Patient also noted to have a profound anion gap acidosis which improved with aggressive fluid resuscitation. Nephrology was consulted given profound renal failure. CT of the abdomen and pelvis without contrast was performed which demonstrated mild to moderate patchy consolidation of the bilateral lower lobes, thickening of the distal esophagus and gastroesophageal junction, a right inguinal hernia causing a small bowel obstruction, and multiple gas collections in the right inguinal hernia highly suspicious for ischemic or perforated bowel.   On the evening of 4/25/2022 Dr. Malik performed exploratory laparotomy with small bowel resection, single primary anastomosis, drainage of pelvic abscess, exploration and drainage of right groin abscess, and debridement of necrotic subcutaneous fat/fascia/muscle in the right groin along with excision of the necrotic hernia sac in the right groin. Empiric coverage with vancomycin and Zosyn to necrotic small bowel as well as probable aspiration pneumonia. Family was extensively counseled. \"    Pt had course in ICU from Apr/25-28/2022, Overnight patient went to A. fib with RVR that was refractory to both Lopressor and Cardizem. Patient was given amiodarone which converted him out. Patient continues to have frequent PACs on the monitor. Surgery was consulted regarding anticoagulation however they are recommending holding anticoagulation at this time. We will continue to follow-up with surgery as far as when we can start anticoagulation. Patient overall is awake and alert self, year, but only occasionally to place. Patient denies any pain or acute complaints at this time. Patient is frequently repeating himself however concern for underlying delirium. May/01/2022, abdominal wound dehissence, taken back to OR for repair and washout. Subjective:    Pt seen and examined. Afebrile overnight, no behavioral issues, S/P 1 x PRBC, hgb improved by 2pts of note, no gross bleeding noted. Pt still denies pain, still speaks very little. SLP and dietary on board, precert for IPR. Cardiology defer TAVR at this time, possible Watchman's candidate given bleed risk. Review of Systems   Unable to perform ROS: Acuity of condition   Constitutional: Positive for appetite change. Negative for chills, diaphoresis, fatigue and fever. HENT: Positive for dental problem. Respiratory: Negative for cough, shortness of breath and wheezing. Cardiovascular: Negative for chest pain and palpitations. Gastrointestinal: Positive for abdominal pain. Negative for constipation, diarrhea, nausea and vomiting.    Genitourinary: Negative for decreased urine volume, difficulty urinating, dysuria, frequency, hematuria and urgency. Neurological: Positive for weakness. Negative for seizures and syncope. Psychiatric/Behavioral: Negative for confusion, agitation,       Medications:  Reviewed    Infusion Medications    sodium chloride      dextrose      sodium chloride       Scheduled Medications    potassium & sodium phosphates  1 packet Oral 4x Daily    piperacillin-tazobactam  3,375 mg IntraVENous Q8H    amiodarone  200 mg Oral Daily    metoprolol tartrate  25 mg Oral BID    docusate sodium  100 mg Oral Daily    famotidine (PEPCID) injection  20 mg IntraVENous Daily    insulin lispro  0-6 Units SubCUTAneous 4 times per day    sodium hypochlorite   Irrigation Daily    vitamin D  50,000 Units Oral Weekly    sodium chloride flush  5-40 mL IntraVENous 2 times per day    [Held by provider] heparin (porcine)  5,000 Units SubCUTAneous TID     PRN Meds: sodium chloride, morphine, glucose, glucagon (rDNA), dextrose, dextrose bolus **OR** dextrose bolus, sodium chloride flush, sodium chloride, ondansetron **OR** ondansetron, polyethylene glycol, acetaminophen **OR** acetaminophen      Intake/Output Summary (Last 24 hours) at 5/7/2022 1321  Last data filed at 5/7/2022 1217  Gross per 24 hour   Intake 935.49 ml   Output --   Net 935.49 ml       Diet:  ADULT DIET; Easy to Chew  ADULT ORAL NUTRITION SUPPLEMENT; Breakfast, Lunch, Dinner; Standard High Calorie/High Protein Oral Supplement    Exam:  /73   Pulse 74   Temp 98.1 °F (36.7 °C) (Rectal)   Resp 18   Ht 5' 9\" (1.753 m)   Wt 139 lb 9.6 oz (63.3 kg)   SpO2 98%   BMI 20.62 kg/m²     Physical Exam  Constitutional:       General: He is not in acute distress. Appearance: He is ill-appearing. He is not toxic-appearing or diaphoretic. Interventions: He is not intubated. Nasal cannula in place on 3L. HENT:      Head: Normocephalic and atraumatic. Nose: Nose normal. No congestion or rhinorrhea.       Mouth/Throat: Mouth: Mucous membranes are moist      Pharynx: Oropharynx is clear. No oropharyngeal exudate or posterior oropharyngeal erythema. Eyes:      General: No scleral icterus. Right eye: No discharge. Left eye: No discharge. Extraocular Movements: Extraocular movements intact. Conjunctiva/sclera: Conjunctivae normal.      Pupils: Pupils are equal, round, and reactive to light. Cardiovascular:      Rate and Rhythm: Normal rate, present. Rhythm irregular. Pulses: Normal pulses. Heart sounds: Normal heart sounds. No murmur heard. No friction rub. No gallop. Pulmonary:      Effort: Pulmonary effort is normal. No accessory muscle usage or respiratory distress. He is not intubated. Breath sounds: Examination of the right-lower field reveals rhonchi. Examination of the left-lower field reveals rhonchi. Rhonchi present. No wheezing or rales. Abdominal:      General: A surgical scar is present. Bowel sounds are increased. Tenderness: There is abdominal tenderness. There is no guarding. Comments: Abdominal Drain in place, Abdominal band in place. Neurological:      Mental Status: He is Oriented to person, place, time, situation. Cranial Nerves: No dysarthria or facial asymmetry. Motor: No weakness. Comments: Still speaks very little. Psychiatric:         Behavior: Behavior is cooperative. Labs:   Recent Labs     05/05/22  1226 05/05/22  1226 05/06/22  0446 05/06/22  2230 05/07/22  0450   WBC 14.0*  --  14.8*  --  16.0*   HGB 7.5*   < > 6.8* 9.2* 9.1*   HCT 23.9*   < > 22.1* 29.7* 29.0*     --  320  --  405*    < > = values in this interval not displayed.      Recent Labs     05/05/22 1226 05/06/22  0446 05/07/22  0450    136 136   K 4.0 4.2 4.5    107 105   CO2 23 24 23   BUN 22 20 18   CREATININE 1.1 1.2 1.1   CALCIUM 7.5* 7.6* 8.1*   PHOS 1.9* 1.8* 2.0*     No results for input(s): AST, ALT, BILIDIR, BILITOT, ALKPHOS in the last 72 hours. No results for input(s): INR in the last 72 hours. No results for input(s): Mckenna Racquel in the last 72 hours. Urinalysis:      Lab Results   Component Value Date    NITRU NEGATIVE 04/25/2022    WBCUA 10-15 04/25/2022    BACTERIA FEW 04/25/2022    RBCUA 10-15 04/25/2022    BLOODU MODERATE 04/25/2022    SPECGRAV 1.015 04/25/2022    GLUCOSEU NEGATIVE 06/09/2019       Radiology:  XR CHEST PORTABLE   Final Result   1. Persistent bibasilar opacities. Small left-sided pleural effusion. **This report has been created using voice recognition software. It may contain minor errors which are inherent in voice recognition technology. **      Final report electronically signed by Dr. Ning Fenton on 5/2/2022 8:13 AM      XR CHEST PORTABLE   Final Result   A right arm PICC line tip terminates at the cavoatrial projection. No pneumothorax is observed. Small bilateral pleural effusions and bilateral lower lobe airspace opacity, right greater than left, are not significantly changed. **This report has been created using voice recognition software. It may contain minor errors which are inherent in voice recognition technology. **      Final report electronically signed by Dr Anthony Rowan on 4/29/2022 4:04 PM      XR ABDOMEN FOR NG/OG/NE TUBE PLACEMENT   Final Result   Impression:      NG tube tip proximal stomach. This document has been electronically signed by: Tika Sanchez MD on    04/28/2022 11:56 PM      XR CHEST PORTABLE   Final Result   Impression:   1. Satisfactory positioning of the endotracheal tube and right central    line. Probable slightly high positioning of the orogastric tube. Consider    advancing 3-4 cm. 2. Interval development of small bilateral pleural effusions, right    greater than left. 3. Bilateral lower lobe airspace opacities secondary to atelectasis and/or    infiltrates, right greater than left.    4. Bilateral interstitial changes secondary to pneumonitis or edema. This document has been electronically signed by: Karma Rodriguez DO on    04/26/2022 12:01 AM      CT ABDOMEN PELVIS WO CONTRAST Additional Contrast? None   Final Result   Impression:   Right inguinal hernia containing small bowel. This is causing a small    bowel obstruction. There are several gas collections within the right    inguinal hernia. Ischemic or perforated bowel could have this appearance. Mild to moderate patchy consolidation bilateral lower lobes. This could    represent aspiration or pneumonia. Prominent thickening of the distal esophagus and gastroesophageal    junction. Neoplasm not excluded. Recommend direct visualization. This document has been electronically signed by: Danial Ying MD on    04/25/2022 07:20 PM      All CTs at this facility use dose modulation techniques and iterative    reconstructions, and/or weight-based dosing   when appropriate to reduce radiation to a low as reasonably achievable. US RENAL COMPLETE   Final Result   Impression:   Multifocal simple bilateral renal cyst. Echogenic bilateral renal    parenchyma. This document has been electronically signed by: Danial Ying MD on    04/25/2022 06:30 PM      CT HEAD WO CONTRAST   Final Result       1. Stable CT scan of the brain, no interval change since previous MRI scan dated 10 [address] 2019.   2. Mild atrophy and dilatation of the third and lateral ventricles. 3. Probable ischemic changes in the white matter, left basal ganglia and in the bernadette. 4. Calcification the cavernous segments of both internal carotid arteries. 5. Increased density in the external auditory canals which may represent cerumen bilaterally. **This report has been created using voice recognition software. It may contain minor errors which are inherent in voice recognition technology. **      Final report electronically signed by DR Hiro Austin on 4/25/2022 4:01 PM XR CHEST PORTABLE   Final Result   1. Normal heart size. No effusion. 2. Persistent mild atelectatic/fibrotic stranding retrocardiac region left lung base. **This report has been created using voice recognition software. It may contain minor errors which are inherent in voice recognition technology. **      Final report electronically signed by Dr. Bhargavi Barbosa on 4/25/2022 3:05 PM          Diet: ADULT DIET; Easy to [address]; Breakfast, Lunch, Dinner; Standard High Calorie/High Protein Oral Supplement    DVT prophylaxis: [] Lovenox                                 [] SCDs                                 [] SQ Heparin                                 [] Encourage ambulation           [] Already on Anticoagulation     Disposition:    [] Home       [] TCU       [] Rehab       [] Psych       [] SNF       [] [place]       [x] Other-     Code Status: Full Code    PT/OT Eval Status:      Electronically signed by Diaz Dawson MD on 5/7/2022 at 1:21 PM    This note was electronically signed. Parts of this note may have been dictated by use of voice recognition software and electronically transcribed. The note may contain errors not detected in proofreading. Please refer to my supervising physician's documentation if my documentation differs.

## 2022-05-07 NOTE — PLAN OF CARE
Problem: Discharge Planning  Goal: Discharge to home or other facility with appropriate resources  Description: Continue discharge planning. Patient currently with NG tube, PICC line inserted today.  following. Patient able to take few steps at bedside with PT/OT today. Outcome: Progressing  Flowsheets (Taken 5/7/2022 0830)  Discharge to home or other facility with appropriate resources:   Identify barriers to discharge with patient and caregiver   Arrange for needed discharge resources and transportation as appropriate   Identify discharge learning needs (meds, wound care, etc)     Problem: Pain  Goal: Verbalizes/displays adequate comfort level or baseline comfort level  Description: Patient denies pain today.    Outcome: Progressing  Flowsheets (Taken 5/7/2022 0826)  Verbalizes/displays adequate comfort level or baseline comfort level:   Encourage patient to monitor pain and request assistance   Assess pain using appropriate pain scale     Problem: Chronic Conditions and Co-morbidities  Goal: Patient's chronic conditions and co-morbidity symptoms are monitored and maintained or improved  Outcome: Progressing  Flowsheets (Taken 5/7/2022 0830)  Care Plan - Patient's Chronic Conditions and Co-Morbidity Symptoms are Monitored and Maintained or Improved:   Monitor and assess patient's chronic conditions and comorbid symptoms for stability, deterioration, or improvement   Collaborate with multidisciplinary team to address chronic and comorbid conditions and prevent exacerbation or deterioration   Update acute care plan with appropriate goals if chronic or comorbid symptoms are exacerbated and prevent overall improvement and discharge     Problem: Safety - Adult  Goal: Free from fall injury  Outcome: Progressing  Flowsheets (Taken 5/7/2022 0900)  Free From Fall Injury:   Instruct family/caregiver on patient safety   Based on caregiver fall risk screen, instruct family/caregiver to ask for assistance with transferring infant if caregiver noted to have fall risk factors     Problem: ABCDS Injury Assessment  Goal: Absence of physical injury  Outcome: Progressing     Problem: Skin/Tissue Integrity  Goal: Absence of new skin breakdown  Description: 1. Monitor for areas of redness and/or skin breakdown  2. Assess vascular access sites hourly  3. Every 4-6 hours minimum:  Change oxygen saturation probe site  4. Every 4-6 hours:  If on nasal continuous positive airway pressure, respiratory therapy assess nares and determine need for appliance change or resting period.   Outcome: Progressing     Problem: Nutrition Deficit:  Goal: Optimize nutritional status  Outcome: Progressing     Problem: Respiratory - Adult  Goal: Achieves optimal ventilation and oxygenation  Outcome: Progressing  Flowsheets (Taken 5/7/2022 0830)  Achieves optimal ventilation and oxygenation:   Assess for changes in respiratory status   Assess for changes in mentation and behavior   Position to facilitate oxygenation and minimize respiratory effort   Oxygen supplementation based on oxygen saturation or arterial blood gases     Problem: Cardiovascular - Adult  Goal: Maintains optimal cardiac output and hemodynamic stability  Outcome: Progressing  Flowsheets (Taken 5/7/2022 0830)  Maintains optimal cardiac output and hemodynamic stability:   Monitor blood pressure and heart rate   Monitor urine output and notify Licensed Independent Practitioner for values outside of normal range   Assess for signs of decreased cardiac output  Goal: Absence of cardiac dysrhythmias or at baseline  Outcome: Progressing  Flowsheets (Taken 5/7/2022 0830)  Absence of cardiac dysrhythmias or at baseline:   Monitor cardiac rate and rhythm   Assess for signs of decreased cardiac output     Problem: Gastrointestinal - Adult  Goal: Minimal or absence of nausea and vomiting  Outcome: Progressing  Flowsheets (Taken 5/7/2022 0830)  Minimal or absence of nausea and vomiting: Administer IV fluids as ordered to ensure adequate hydration   Advance diet as tolerated, if ordered  Goal: Maintains or returns to baseline bowel function  Outcome: Progressing  Flowsheets (Taken 5/7/2022 0830)  Maintains or returns to baseline bowel function:   Assess bowel function   Encourage oral fluids to ensure adequate hydration   Administer IV fluids as ordered to ensure adequate hydration     Problem: Infection - Adult  Goal: Absence of infection at discharge  Outcome: Progressing  Flowsheets (Taken 5/7/2022 0830)  Absence of infection at discharge:   Assess and monitor for signs and symptoms of infection   Monitor lab/diagnostic results  Goal: Absence of infection during hospitalization  Outcome: Progressing  Flowsheets (Taken 5/7/2022 0830)  Absence of infection during hospitalization:   Assess and monitor for signs and symptoms of infection   Monitor lab/diagnostic results   Monitor all insertion sites i.e., indwelling lines, tubes and drains  Goal: Absence of fever/infection during anticipated neutropenic period  Outcome: Progressing  Flowsheets (Taken 5/7/2022 0830)  Absence of fever/infection during anticipated neutropenic period: Monitor white blood cell count

## 2022-05-07 NOTE — PROGRESS NOTES
Tee Navin 9881, APRN - CNP  Postoperative Progress Note  Pt Name: Rehana Underwood Record Number: [id number]  Date of Birth 1950   Today's Date: 5/7/2022  ASSESSMENT   1. POD # 11 sepsis secondary to incarcerated right inguinal hernia with perforation and obstruction of small bowel. Necrotic right groin wound status postdebridement drainage of abdominal abscess present at the time of surgery. POD #5 repair of abdominal wound dehiscence lysis of inflammatory adhesions and abdominal and right groin washout  2. Sepsis resolved. White count continues to to decline  3. Acute renal failure resolved  4. Nasal cannula oxygen increased oxygen requirements overnight. 5. Leukocytosis trending down  6. A. Fib/NS arrhythmia. Internal medicine plans to transition to oral with improved oral intake  7. Hypernatremia resolved  8. pneumonia  9. Tolerating easy to chew diet  10. Anemia no signs of active bleeding. Patient getting daily lab draws hemoglobin this morning 9.1.   has a past medical history of CAD (coronary artery disease), CVA (cerebral vascular accident) ([address] Erectile dysfunction, GERD (gastroesophageal reflux disease), Hyperlipidemia, and Hypertension. PLANS   1. Analgesics and antiemetics as needed. 2. IV hydration per medicine service. Normal saline stopped on D5 water. 3. Continue broad-spectrum antibiotic Zosyn. Medicine plans to continue a few more days. 4.   Amiodarone converted to oral  5. DVT prophylaxis per primary service. 6. change groin wound packing 2 times daily. Dry gauze. Dakin's solution ordered. Wounds look okay. Midline wound dressing changed. Clean wound twice daily recommended. Leave staples in situ. 7.  Continue Zosyn. Intra-abdominal cultures E. coli and Morganella morganii I both sensitive. White count continues to trend down may discontinue in 2 to 3 days. 8.  Soft diet  9. Monitor white count intermittently  10. Therapies for deconditioning  11. Continue abdominal binder  12. Max removed  Yoel Moura is hemodynamically stable. Having bowel movement tolerating oral intake. Activity is increasing working with therapies. CURRENT MEDICATIONS   Scheduled Meds:   potassium & sodium phosphates  1 packet Oral 4x Daily    piperacillin-tazobactam  3,375 mg IntraVENous Q8H    amiodarone  200 mg Oral Daily    metoprolol tartrate  25 mg Oral BID    docusate sodium  100 mg Oral Daily    famotidine (PEPCID) injection  20 mg IntraVENous Daily    insulin lispro  0-6 Units SubCUTAneous 4 times per day    sodium hypochlorite   Irrigation Daily    vitamin D  50,000 Units Oral Weekly    sodium chloride flush  5-40 mL IntraVENous 2 times per day    [Held by provider] heparin (porcine)  5,000 Units SubCUTAneous TID     Continuous Infusions:   sodium chloride      dextrose      sodium chloride       PRN Meds:.sodium chloride, morphine, glucose, glucagon (rDNA), dextrose, dextrose bolus **OR** dextrose bolus, sodium chloride flush, sodium chloride, ondansetron **OR** ondansetron, polyethylene glycol, acetaminophen **OR** acetaminophen  OBJECTIVE   CURRENT VITALS:  height is 5' 9\" (1.753 m) and weight is 139 lb 9.6 oz (63.3 kg). His oral temperature is 97.5 °F (36.4 °C). His blood pressure is 117/69 and his pulse is 76. His respiration is 20 and oxygen saturation is 96%. Body mass index is 20.62 kg/m².   Temperature Range (24h):Temp: 97.5 °F (36.4 °C) Temp  Av °F (36.7 °C)  Min: 97.5 °F (36.4 °C)  Max: 98.6 °F (37 °C)  BP Range (25X): Systolic (20PRD), PLW:502 , Min:117 , MID:427     Diastolic (72OHS), HWZ:19, Min:69, Max:82    Pulse Range (24h): Pulse  Av.5  Min: 73  Max: 83  Respiration Range (24h): Resp  Av.3  Min: 16  Max: 20  Current Pulse Ox (24h):  SpO2: 96 %  Pulse Ox Range (24h):  SpO2  Av.6 %  Min: 90 %  Max: 98 %  Oxygen Amount and Delivery: O2 Flow Rate (L/min): 2 L/min  Incentive Spirometry Tx: Achieved Volume (mL): 250 mL    GENERAL: Alert awake sitting in chair  LUNGS: Clear diminished bases   HEART: Normal rate  ABDOMEN: Soft. A little bit of drainage from wound. INCISION midline wound intact. EXTREMITY: No edema  In: 1025.5 [P.O.:690]  Out: -   [REMOVED] Closed/Suction Drain Superior;Midline Abdomen Bulb-Output (ml): 50 ml  Date 05/07/22 0000 - 05/07/22 2359   Shift 3569-4579 7201-2589 3419-6270 24 Hour Total   INTAKE   P.O.  120  120   IV Piggyback 335.5   335.5   Shift Total(mL/kg) 335. 5(5.3) 120(1.9)  455.5(7.2)   OUTPUT   Shift Total(mL/kg)       Weight (kg) 63.3 63.3 63.3 63.3     LABS     Recent Labs     05/05/22  1226 05/05/22  1226 05/06/22  0446 05/06/22  2230 05/07/22  0450   WBC 14.0*  --  14.8*  --  16.0*   HGB 7.5*   < > 6.8* 9.2* 9.1*   HCT 23.9*   < > 22.1* 29.7* 29.0*     --  320  --  405*     --  136  --  136   K 4.0  --  4.2  --  4.5     --  107  --  105   CO2 23  --  24  --  23   BUN 22  --  20  --  18   CREATININE 1.1  --  1.2  --  1.1   MG 1.7  --   --   --   --    PHOS 1.9*  --  1.8*  --  2.0*   CALCIUM 7.5*  --  7.6*  --  8.1*    < > = values in this interval not displayed. No results for input(s): PTT, INR in the last 72 hours. Invalid input(s): PT  No results for input(s): AST, ALT, BILITOT, BILIDIR, AMYLASE, LIPASE, LDH, LACTA in the last 72 hours. No results for input(s): TROPONINT in the last 72 hours. RADIOLOGY     XR CHEST PORTABLE   Final Result   1. Persistent bibasilar opacities. Small left-sided pleural effusion. **This report has been created using voice recognition software. It may contain minor errors which are inherent in voice recognition technology. **      Final report electronically signed by Dr. Jose Juan Walker on 5/2/2022 8:13 AM      XR CHEST PORTABLE   Final Result   A right arm PICC line tip terminates at the cavoatrial projection. No pneumothorax is observed.  Small bilateral pleural effusions and bilateral lower lobe airspace opacity, right greater than left, are not significantly changed. **This report has been created using voice recognition software. It may contain minor errors which are inherent in voice recognition technology. **      Final report electronically signed by Dr Ernestina Diggs on 4/29/2022 4:04 PM      XR ABDOMEN FOR NG/OG/NE TUBE PLACEMENT   Final Result   Impression:      NG tube tip proximal stomach. This document has been electronically signed by: Lauryn Denton MD on    04/28/2022 11:56 PM      XR CHEST PORTABLE   Final Result   Impression:   1. Satisfactory positioning of the endotracheal tube and right central    line. Probable slightly high positioning of the orogastric tube. Consider    advancing 3-4 cm. 2. Interval development of small bilateral pleural effusions, right    greater than left. 3. Bilateral lower lobe airspace opacities secondary to atelectasis and/or    infiltrates, right greater than left. 4. Bilateral interstitial changes secondary to pneumonitis or edema. This document has been electronically signed by: Karma Rodriguez DO on    04/26/2022 12:01 AM      CT ABDOMEN PELVIS WO CONTRAST Additional Contrast? None   Final Result   Impression:   Right inguinal hernia containing small bowel. This is causing a small    bowel obstruction. There are several gas collections within the right    inguinal hernia. Ischemic or perforated bowel could have this appearance. Mild to moderate patchy consolidation bilateral lower lobes. This could    represent aspiration or pneumonia. Prominent thickening of the distal esophagus and gastroesophageal    junction. Neoplasm not excluded. Recommend direct visualization.       This document has been electronically signed by: Danial Ying MD on    04/25/2022 07:20 PM      All CTs at this facility use dose modulation techniques and iterative    reconstructions, and/or weight-based dosing   when appropriate to reduce radiation to a low as reasonably achievable. US RENAL COMPLETE   Final Result   Impression:   Multifocal simple bilateral renal cyst. Echogenic bilateral renal    parenchyma. This document has been electronically signed by: Merritt Caldwell MD on    04/25/2022 06:30 PM      CT HEAD WO CONTRAST   Final Result       1. Stable CT scan of the brain, no interval change since previous MRI scan dated 10 Melody 2019.   2. Mild atrophy and dilatation of the third and lateral ventricles. 3. Probable ischemic changes in the white matter, left basal ganglia and in the bernadette. 4. Calcification the cavernous segments of both internal carotid arteries. 5. Increased density in the external auditory canals which may represent cerumen bilaterally. **This report has been created using voice recognition software. It may contain minor errors which are inherent in voice recognition technology. **      Final report electronically signed by DR Ebony Mosher on 4/25/2022 4:01 PM      XR CHEST PORTABLE   Final Result   1. Normal heart size. No effusion. 2. Persistent mild atelectatic/fibrotic stranding retrocardiac region left lung base. **This report has been created using voice recognition software. It may contain minor errors which are inherent in voice recognition technology. **      Final report electronically signed by Dr. Reed Carvajal on 4/25/2022 3:05 PM          Electronically signed by ELAN Bajwa CNP on 5/7/2022 at 6:08 PM Patient seen and examined independently by me. Above discussed and I agree with CNP. Labs, cultures, and radiographs where available were reviewed. See orders for the updated patient care plan.     Wiley Lambert MD MD, patient seen for  chart reviewed patient appears to be resting comfortably abdomen is soft wounds are healing well patient is tolerating a diet although appetite depressed continue current treatment  5/8/2022   12:43 AM

## 2022-05-07 NOTE — CONSULTS
[facility]  Consult    Patient's Name/Date of Birth: Heidi Beckwith SrRicky / 1950 (66 y.o.)    Date: May 7, 2022     Referring Provider: Compa Grace DO    CHIEF COMPLAINT: AI and MR      HPI: This is a pleasant 66 y.o. male with recent hx of perf SB, pelvic abscesses, aspiration PNA, sepsis, Afib RVR, CKD, GERD who we are consulted for regarding AI and MR. TTE shows preserved EF and all valves are regurgitating. Patient appears fatigued, exhausted, and is sob. He was just recently extubated. He is POD #11. His anemia has stabilized. ECG shows NSR. Cr improved to 1.1.  Hgb 9.2. Continues to have elevated WBC. Does not endorse chest pain. No significant LE edema. He has been dealing with sepsis, but no vegetations seen on the valves. He is working on PT/OT. No significant a/t/d. He was sleeping today. He has not had a issue with valvular heart disease in the past.  He was on TPN, but now on PO. Echo: Results for orders placed during the hospital encounter of 04/25/22    ECHO Complete 2D W Doppler W Color    Narrative  Transthoracic Echocardiography Report (TTE)    Demographics    Patient Name   Derek PATE    Gender              Male  SR.    MR #           [id number]       Race                Black    Ethnicity    Account #      [de-identified]       Room Number         0010    Accession      [id number]      Date of Study       04/26/2022  Number    Date of Birth  1950      Referring Physician Benny Cordova MD    Age            66 year(s)      [address] [place] [address],  T    Interpreting        Echo reader of the week  Physician           Hyacinth Kothari MD    Procedure    Type of Study    TTE procedure:ECHOCARDIOGRAM COMPLETE 2D W DOPPLER W COLOR.     Procedure Date  Date: 04/26/2022 Start: 08:47 AM    Study Location: Bedside  Technical Quality: Adequate visualization    Indications:Evaluate right ventricular systolic pressures (RVSP). Additional Medical History:smoker, acute respiratory failure, septic shock,  acute kidney injury, syncope, anemia, coronary artery disease, hypertension,  hyperlipidemia, stroke, GERD    Patient Status: Routine    Height: 69 inches Weight: 150 pounds BSA: 1.83 m^2 BMI: 22.15 kg/m^2    BP: 105/53 mmHg    Conclusions    Summary  Ejection fraction is visually estimated at 50%. Overall left ventricular function is normal.  Aortic valve appears tricuspid. Aortic valve leaflets are somewhat thickened. Aortic valve leaflets are Mildly calcified. Mild-to-moderate aortic regurgitation is noted. Moderate mitral regurgitation is present. Signature    ----------------------------------------------------------------  Electronically signed by Earnestine Newman MD (Interpreting  physician) on 04/26/2022 at 06:50 PM  ----------------------------------------------------------------    Findings    Mitral Valve  Moderate mitral regurgitation is present. Aortic Valve  Aortic valve appears tricuspid. Aortic valve leaflets are somewhat thickened. Aortic valve leaflets are Mildly calcified. Mild-to-moderate aortic regurgitation is noted. Tricuspid Valve  Trivial tricuspid regurgitation visualized. Pulmonic Valve  The pulmonic valve leaflets exhibited normal thickness, no calcification,  and normal cuspal separation. DOPPLER: The transpulmonic velocity was  within the normal range with no evidence for regurgitation. Left Atrium  Moderately dilated left atrium. Left Ventricle  Ejection fraction is visually estimated at 50%. Overall left ventricular function is normal.    Right Atrium  Right atrial size was normal.    Right Ventricle  The right ventricular size was normal with normal systolic function and  wall thickness. Pericardial Effusion  The pericardium was normal in appearance with no evidence of a pericardial  effusion. Pleural Effusion  No evidence of pleural effusion.     Aorta / Great Vessels  -Aortic root dimension within normal limits.  -The Pulmonary artery is within normal limits. -IVC size is within normal limits with normal respiratory phasic changes.     M-Mode/2D Measurements & Calculations    LV Diastolic   LV Systolic Dimension:    AV Cusp Separation: 2.3 cmLA  Dimension: 4.3 3.2 cm                    Dimension: 3.8 cmAO Root  cm             LV Volume Diastolic: 64.7 Dimension: 3.1 cmLA Area: 14.6  LV FS:25.6 %   ml                        cm^2  LV PW          LV Volume Systolic: 00.1  Diastolic: 1   ml  cm             LV EDV/LV EDV Index: 71.7  Septum         ml/39 m^2LV ESV/LV ESV    RV Diastolic Dimension: 3.3 cm  Diastolic: 1   Index: 68.2 ml/24 m^2  cm             EF Calculated: 39.2 %     LA/Aorta: 1.23    LV Length: 7.5 cm         LA volume/Index: 40.9 ml /22m^2    LV Area  Diastolic:  43.7 cm^2  LV Area  Systolic: 76.5  cm^2    Doppler Measurements & Calculations    MV Peak E-Wave: 45.4 cm/s         AV Peak Velocity:  LVOT Peak Velocity:  MV Peak A-Wave: 72.8 cm/s         116 cm/s           106 cm/s  MV E/A Ratio: 0.62                AV Peak Gradient:  LVOT Peak Gradient: 4  MV Peak Gradient: 0.82 mmHg       5.38 mmHg          mmHg    MV Deceleration Time: 219 msec                       TV Peak E-Wave: 39.8  MV P1/2t: 64 msec                                    cm/s  MVA by PHT:3.44 cm^2                                 TV Peak A-Wave: 39.8  MV Area (PISA): 0.29 cm^2         AV P1/2t: 622 msec cm/s  MV E' Septal Velocity: 7.2 cm/s   IVRT: 90 msec  MV A' Septal Velocity: 7.2 cm/s                      TV Peak Gradient:  MV E' Lateral Velocity: 9.8 cm/s                     0.63 mmHg  MV A' Lateral Velocity: 16.1 cm/s AV DVI (Vmax):0.91 TR Velocity:337 cm/s  E/E' septal: 6.31                                    TR Gradient:45.43  E/E' lateral: 4.63                                   mmHg  MR Velocity: 543 cm/s                                PV Peak Velocity: 87  MV RENEE PISA: 0.43 cm^2                               cm/s  MR VTI: 156 cm                                       PV Peak Gradient:  Alias Velocity: 30.8 cm/sPISA                        3.03 mmHg  Radius: 1.1 cm  MV ERO Volumetric: 0.29 cm^2  PISA area: 7.6 cm^2MR flow rate:  234.08 ml/sMR volume:67.08 ml    [id number]  [id number]       All labs, EKG's, diagnostic testing and images as well as cardiac cath, stress testing were reviewed during this encounter    Past Medical History:   Diagnosis Date    CAD (coronary artery disease)     CVA (cerebral vascular accident) ([address]     Erectile dysfunction     GERD (gastroesophageal reflux disease) 07/2012    Gastro ulcer    Hyperlipidemia     Hypertension      Past Surgical History:   Procedure Laterality Date    CARDIAC CATHETERIZATION      COLONOSCOPY      ENDOSCOPY, COLON, DIAGNOSTIC      EYE SURGERY Left 2004    cataract    EYE SURGERY      INGUINAL HERNIA REPAIR Right 5/27/15    Dr. Janina Denise N/A 4/25/2022    LAPAROTOMY EXPLORATORY FOR SMALL BOWEL OBSTRUCTION WITH INCARCERATED HERNIA REPAIR performed by Fernando Baig MD at [address] [place] [address] 5/1/2022    LAPAROTOMY EXPLORATORY, REPAIR OF WOUND DEHISCENCE performed by Fernando Baig MD at [address] [place] [address] Left 2011    Dr. Orvil Severance Left 2003    Dr. Gina Cedillo  5/27/15    Dr. Freddy Buenrostro     Current Facility-Administered Medications   Medication Dose Route Frequency Provider Last Rate Last Admin    potassium & sodium phosphates (PHOS-NAK) 280-160-250 MG packet 250 mg  1 packet Oral 4x Daily Aruna Morales MD        0.9 % sodium chloride infusion   IntraVENous PRN ELAN Mclaughlin - CNP        piperacillin-tazobactam (ZOSYN) 3,375 mg in dextrose 5 % 50 mL IVPB extended infusion (mini-bag)  3,375 mg IntraVENous Q8H Amanda Jernigan Clement Espinal MD 12.5 mL/hr at 05/07/22 1045 3,375 mg at 05/07/22 1045    amiodarone (CORDARONE) tablet 200 mg  200 mg Oral Daily Zena Paniagua MD   200 mg at 05/07/22 1847    metoprolol tartrate (LOPRESSOR) tablet 25 mg  25 mg Oral BID Zena Paniagua MD   25 mg at 05/07/22 0180    docusate sodium (COLACE) capsule 100 mg  100 mg Oral Daily Barbara Sarah MD   100 mg at 05/07/22 0831    morphine (PF) injection 2 mg  2 mg IntraVENous Q2H PRN Barbara Sarah MD   2 mg at 05/03/22 0815    famotidine (PEPCID) injection 20 mg  20 mg IntraVENous Daily Barbara Sarah MD   20 mg at 05/07/22 0437    insulin lispro (HUMALOG) injection vial 0-6 Units  0-6 Units SubCUTAneous 4 times per day Barbara Sarah MD   1 Units at 05/02/22 1756    glucose (GLUTOSE) 40 % oral gel 15 g  15 g Oral PRN Barbara Sarah MD        glucagon (rDNA) injection 1 mg  1 mg IntraMUSCular PRN Barbara Sarah MD        dextrose 5 % solution  100 mL/hr IntraVENous PRN Barbara Sarah MD        dextrose bolus (hypoglycemia) 10% 125 mL  125 mL IntraVENous PRN Barbara Sarah MD        Or    dextrose bolus (hypoglycemia) 10% 250 mL  250 mL IntraVENous PRN Barbara Sarah MD        sodium hypochlorite (DAKINS) 0.125 % external solution   Irrigation Daily Barbara Sarah MD   Given at 05/07/22 1046    vitamin D (ERGOCALCIFEROL) capsule 50,000 Units  50,000 Units Oral Weekly Barbara Sarah MD   50,000 Units at 05/04/22 1025    sodium chloride flush 0.9 % injection 5-40 mL  5-40 mL IntraVENous 2 times per day Barbara Sarah MD   10 mL at 05/07/22 0224    sodium chloride flush 0.9 % injection 5-40 mL  5-40 mL IntraVENous PRN Barbara Sarah MD   10 mL at 04/29/22 0303    0.9 % sodium chloride infusion   IntraVENous PRN Barbara Sarah MD       [facility] [place] by provider] heparin (porcine) injection 5,000 Units  5,000 Units SubCUTAneous TID Barbara Sarah MD        ondansetron (ZOFRAN-ODT) disintegrating tablet 4 mg  4 mg Oral Q8H PRN Barbara Sarah MD   4 mg at 05/01/22 0658    Or    ondansetron (ZOFRAN) injection 4 mg  4 mg IntraVENous Q6H PRN Rosana Velez MD   4 mg at 04/29/22 0303    polyethylene glycol (GLYCOLAX) packet 17 g  17 g Oral Daily PRN Rosana Velez MD        acetaminophen (TYLENOL) tablet 650 mg  650 mg Oral Q6H PRN Rosana Velez MD        Or   Katelynn Kellerey acetaminophen (TYLENOL) suppository 650 mg  650 mg Rectal Q6H PRN Rosana Velez MD         Prior to Admission medications    Medication Sig Start Date End Date Taking?  Authorizing Provider   atorvastatin (LIPITOR) 20 MG tablet TAKE 1 TABLET BY MOUTH DAILY 3/23/22   Libby Keita MD   COMBIGAN 0.2-0.5 % ophthalmic solution Place 1 drop into the left eye every 12 hours 3/23/22   Libby Keita MD   famotidine (PEPCID) 20 MG tablet Take 1 tablet by mouth 2 times daily 3/23/22   Libby Keita MD   losartan (COZAAR) 50 MG tablet Take 1 tablet by mouth daily 3/23/22   Libby Keita MD   metoprolol succinate (TOPROL XL) 25 MG extended release tablet TAKE 1 TABLET BY MOUTH DAILY 3/23/22   Libby Keita MD   tamsulosin [facility]) 0.4 MG capsule Take 1 capsule by mouth daily 3/23/22   Libby Keita MD   lidocaine (LIDODERM) 5 % Place 1 patch onto the skin daily 12 hours on, 12 hours off. 4/10/22   ELAN Buchanan CNP   fluocinonide (LIDEX) 0.05 % cream APPLY EXTERNALLY TO THE AFFECTED AREA TWICE DAILY 1/27/22   Libby Keita MD   tadalafil (CIALIS) 20 MG tablet TAKE 1 TABLET BY MOUTH EVERY DAY AS NEEDED 10/25/21   Libby Keita MD   aspirin 81 MG EC tablet Take 1 tablet by mouth daily 10/15/21   Libby Keita MD   loratadine (CLARITIN) 10 MG tablet TAKE 1 TABLET BY MOUTH DAILY 5/25/21   Libby Keita MD   Scheduled Meds:   potassium & sodium phosphates  1 packet Oral 4x Daily    piperacillin-tazobactam  3,375 mg IntraVENous Q8H    amiodarone  200 mg Oral Daily    metoprolol tartrate  25 mg Oral BID    docusate sodium  100 mg Oral Daily    famotidine (PEPCID) injection  20 mg IntraVENous Daily    insulin lispro  0-6 Units SubCUTAneous 4 times per day    sodium hypochlorite   Irrigation Daily    vitamin D  50,000 Units Oral Weekly    sodium chloride flush  5-40 mL IntraVENous 2 times per day    [Held by provider] heparin (porcine)  5,000 Units SubCUTAneous TID     Continuous Infusions:   sodium chloride      dextrose      sodium chloride       PRN Meds:.sodium chloride, morphine, glucose, glucagon (rDNA), dextrose, dextrose bolus **OR** dextrose bolus, sodium chloride flush, sodium chloride, ondansetron **OR** ondansetron, polyethylene glycol, acetaminophen **OR** acetaminophen    No Known Allergies  Family History   Problem Relation Age of Onset    Heart Disease Mother      Social History     Socioeconomic History    Marital status:      Spouse name: Mario Barlow Number of children: 3    Years of education: Not on file    Highest education level: Not on file   Occupational History    Occupation: disability-[employer]   Tobacco Use    Smoking status: Current Every Day Smoker     Packs/day: 1.00     Years: 40.00     Pack years: 40.00     Types: Cigarettes    Smokeless tobacco: Never Used    Tobacco comment: printed to avs. 3/4 ppd since 2019   Vaping Use    Vaping Use: Never used   Substance and Sexual Activity    Alcohol use: Yes    Drug use: Yes     Frequency: 7.0 times per week     Types: Marijuana Curlie Opal)    Sexual activity: Never   Other Topics Concern    Not on file   Social History Narrative    Not on file     Social Determinants of Health     Financial Resource Strain: Low Risk     Difficulty of Paying Living Expenses: Not hard at all   Food Insecurity: No Food Insecurity    Worried About [employer] in the Last Year: Never true    [facility] in the Last Year: Never true   Transportation Needs:     Lack of Transportation (Medical): Not on file    Lack of Transportation (Non-Medical):  Not on file   Physical Activity:     Days of Exercise per Week: Not on file    Minutes of Exercise per Session: Not on file   Stress:     Feeling of Stress : Not on file   Social Connections:     Frequency of Communication with Friends and Family: Not on file    Frequency of Social Gatherings with Friends and Family: Not on file    Attends [employer] Services: Not on file    Active Member of Clubs or Organizations: Not on file    Attends Club or Organization Meetings: Not on file    Marital Status: Not on file   Intimate Partner Violence:     Fear of Current or Ex-Partner: Not on file    Emotionally Abused: Not on file    Physically Abused: Not on file    Sexually Abused: Not on file   Housing Stability:     Unable to Pay for Housing in the Last Year: Not on file    Number of [place] in the Last Year: Not on file    Unstable Housing in the Last Year: Not on file     ROS:   Constitutional: Denies any recent wt change. Eyes:  Denies any blurring or double vision, no glaucoma  Ears/Nose/Mouth/Throat:  Denies any chronic sinus/rhinitis, bleeding gums  Cardiovascular:  As described above. Respiratory:  Denies any frequent cough, wheezing or coughing up blood  Genitourinary:  Denies difficulty with urination and kidney stones  Gastrointestinal:  + abd pain  Musculoskeletal:  Denies any joint pain, back pain, or difficulty walking  Integumentary:  Denies any rash  Neurological:  No numbness or tingling  Endocrine:  Denies any polydipsia. Hematologic/Lymphatic:  Denies any hemorrhage or lymphatic drainage problems. Labs:  CBC:   Recent Labs     05/05/22  1226 05/05/22  1226 05/06/22 0446 05/06/22  2230 05/07/22  0450   WBC 14.0*  --  14.8*  --  16.0*   HGB 7.5*   < > 6.8* 9.2* 9.1*   HCT 23.9*   < > 22.1* 29.7* 29.0*   .2*  --  108.9*  --  99.3*     --  320  --  405*    < > = values in this interval not displayed.      BMP:   Recent Labs     05/05/22 1226 05/06/22 0446 05/07/22  0450    136 136   K 4.0 4.2 4.5    107 105   CO2 23 24 23   PHOS 1.9* 1.8* 2.0*   BUN 22 20 18 CREATININE 1.1 1.2 1.1   MG 1.7  --   --      Accucheck Glucoses:   Recent Labs     05/06/22  1229 05/06/22  1715 05/06/22  1958 05/07/22  0029 05/07/22  0830   POCGLU 92 125* 118* 133* 83     Cardiac Enzymes: No results for input(s): CKTOTAL, CKMB, CKMBINDEX, TROPONINI in the last 72 hours. PT/INR: No results for input(s): PROTIME, INR in the last 72 hours. APTT: No results for input(s): APTT in the last 72 hours.   Liver Profile:  Lab Results   Component Value Date    AST 62 04/28/2022    ALT 33 04/28/2022    BILIDIR 1.4 04/28/2022    BILITOT 2.9 04/28/2022    ALKPHOS 80 04/28/2022     Lab Results   Component Value Date    CHOL 165 02/01/2022    HDL 64 02/01/2022    HDL 59 10/11/2010    TRIG 108 02/01/2022     TSH:   Lab Results   Component Value Date    TSH 0.647 04/27/2022     UA:   Lab Results   Component Value Date    COLORU YELLOW 04/25/2022    PHUR 5.5 04/25/2022    LABCAST >15 HYALINE 04/25/2022    WBCUA 10-15 04/25/2022    RBCUA 10-15 04/25/2022    MUCUS NONE SEEN 04/25/2022    YEAST NONE SEEN 04/25/2022    BACTERIA FEW 04/25/2022    SPECGRAV 1.015 04/25/2022    LEUKOCYTESUR TRACE 04/25/2022    UROBILINOGEN 0.2 04/25/2022    BILIRUBINUR NEGATIVE 04/25/2022    BLOODU MODERATE 04/25/2022    GLUCOSEU NEGATIVE 06/09/2019         Physical Exam:  Vitals:    05/07/22 1158   BP: 117/73   Pulse: 74   Resp: 18   Temp: 98.1 °F (36.7 °C)   SpO2: 98%        Intake/Output Summary (Last 24 hours) at 5/7/2022 1253  Last data filed at 5/7/2022 1217  Gross per 24 hour   Intake 935.49 ml   Output --   Net 935.49 ml      General:  No acute distress  Neck: Supple, no JVD, no carotid bruits  Heart: Regular rhythm normal S1 and S2, no rubs, 2/6 HSM  Lungs: clear to ascultation no rales, wheezes, or rhonchi  Abdomen: + surgical wounds, abd binder  Extremities:no clubbing, cyanosis or edema  Neurologic: alert and oriented x 3, cranial nerves 2-12 grossly intact, motor and sensory intact, moving all extremities  Skin: No rashes  Psych: AO x 3, no depression/toño, no pressured speech, normal affect  Lymph: No obvious LAD      Assessment:  Moderate AI  Mild-moderate MR  Preserved EF  PAF, [id number] = 5  Recent SB perf s/p surgical repair 4/25, then 5/1 needed wound dehiscence repair  Asp PNA  Sepsis  ROBSON on CKD - resolved      Plan:  1. No obvious vegetations, likely this regurg is related to volume resuscitation, should improve  2. If does not improve or hemodynamic issues, would proceed BEATRIZ  3. TAVR is not approved for AI currently  4. Afib will require [address], could do limited [address] and then follow-up for WATCHMAN due to all of his comorbidities, risk of bleeding, etc. -- must be clear of infection prior to any device insertion  5. Consider low dose Lasix 40 mg q day once he has started taking reasonable PO intake and renal function is stabilized  6. Continue Amiodarone PO q day  7. Continue BB  8. Further recommendations based on results and clinical course      Thank you for allowing us to participate in the care of this patient. Please do not hesitate to call us with questions.     Electronically signed by Kristine Chow MD on 5/7/2022 at 12:53 PM    Interventional Cardiology - [facility]

## 2022-05-07 NOTE — PROGRESS NOTES
[facility]         Renal Inpatient Follow-Up note         5/7/2022 9:40 AM    Pt Name:   Alvaro Leyden:   1950  Attending:   [facility]     Chief Complaint : Maximo Cook Sr. is a 66 y.o. male being followed by nephrology for acute kidney injury now hypophosphatemia    Interval History :   Patient seen and examined by me. No distress  Awake and alert no distress  Denies any chest pain or shortness of breath     Scheduled Medications :    piperacillin-tazobactam  3,375 mg IntraVENous Q8H    potassium & sodium phosphates  1 packet Oral BID    amiodarone  200 mg Oral Daily    metoprolol tartrate  25 mg Oral BID    docusate sodium  100 mg Oral Daily    famotidine (PEPCID) injection  20 mg IntraVENous Daily    insulin lispro  0-6 Units SubCUTAneous 4 times per day    sodium hypochlorite   Irrigation Daily    vitamin D  50,000 Units Oral Weekly    sodium chloride flush  5-40 mL IntraVENous 2 times per day    [Held by provider] heparin (porcine)  5,000 Units SubCUTAneous TID      sodium chloride      dextrose      sodium chloride         Vitals :  BP (!) 150/78   Pulse 83   Temp 97.9 °F (36.6 °C) (Oral)   Resp 16   Ht 5' 9\" (1.753 m)   Wt 139 lb 9.6 oz (63.3 kg)   SpO2 90%   BMI 20.62 kg/m²     24HR INTAKE/OUTPUT:      Intake/Output Summary (Last 24 hours) at 5/7/2022 0940  Last data filed at 5/7/2022 0721  Gross per 24 hour   Intake 905.49 ml   Output --   Net 905.49 ml     Last 3 weights  Wt Readings from Last 3 Encounters:   05/07/22 139 lb 9.6 oz (63.3 kg)   04/17/22 150 lb (68 kg)   04/10/22 148 lb (67.1 kg)           Physical Exam :  General Appearance:  Well developed.  No distress  Mouth/Throat:  Oral mucosa moist  Neck:  Supple, no JVD  Lungs:  Breath sounds: clear  Heart[de-identified]  S1,S2 heard  Abdomen:  Soft, non - tender  Musculoskeletal:  Edema - no edema noted         Last 3 CBC   Recent Labs     05/05/22  1226 05/05/22  1226 05/06/22  0446 05/06/22  2230 05/07/22  0450   WBC 14.0*  --  14.8*  --  16.0*   RBC 2.25*  --  2.03*  --  2.92*   HGB 7.5*   < > 6.8* 9.2* 9.1*   HCT 23.9*   < > 22.1* 29.7* 29.0*     --  320  --  405*    < > = values in this interval not displayed. Last 3 CMP  Recent Labs     05/05/22  1226 05/06/22  0446 05/07/22  0450    136 136   K 4.0 4.2 4.5    107 105   CO2 23 24 23   BUN 22 20 18   CREATININE 1.1 1.2 1.1   CALCIUM 7.5* 7.6* 8.1*             ASSESSMENT / Plan   1 Renal -acute kidney injury secondary to volume depletion and hypotension  ? Improved and currently stable around 1.1    2 Electrolytes -appear to be within normal limits  3 Anemia status post blood transfusion  4 Status post ex lap with small bowel resection  5 Hx of atrial fibrillation  6 Hypophosphatemia still appears to be running slightly lower. Increase Neutra-Phos to 1 pack 4 times a day  7 Meds reviewed and discussed with patient    Dr. Sylwia Alvarado MD, M,D.  [facility].

## 2022-05-08 DIAGNOSIS — I10 PRIMARY HYPERTENSION: ICD-10-CM

## 2022-05-08 LAB
ANION GAP SERPL CALCULATED.3IONS-SCNC: 9 MEQ/L (ref 8–16)
BUN BLDV-MCNC: 15 MG/DL (ref 7–22)
CALCIUM SERPL-MCNC: 8.1 MG/DL (ref 8.5–10.5)
CHLORIDE BLD-SCNC: 107 MEQ/L (ref 98–111)
CO2: 20 MEQ/L (ref 23–33)
CREAT SERPL-MCNC: 1.1 MG/DL (ref 0.4–1.2)
ERYTHROCYTE [DISTWIDTH] IN BLOOD BY AUTOMATED COUNT: 20.9 % (ref 11.5–14.5)
ERYTHROCYTE [DISTWIDTH] IN BLOOD BY AUTOMATED COUNT: 76.4 FL (ref 35–45)
GLUCOSE BLD-MCNC: 105 MG/DL (ref 70–108)
GLUCOSE BLD-MCNC: 129 MG/DL (ref 70–108)
GLUCOSE BLD-MCNC: 78 MG/DL (ref 70–108)
GLUCOSE BLD-MCNC: 83 MG/DL (ref 70–108)
GLUCOSE BLD-MCNC: 86 MG/DL (ref 70–108)
GLUCOSE BLD-MCNC: 96 MG/DL (ref 70–108)
HCT VFR BLD CALC: 29.8 % (ref 42–52)
HEMOGLOBIN: 9.3 GM/DL (ref 14–18)
MCH RBC QN AUTO: 31.2 PG (ref 26–33)
MCHC RBC AUTO-ENTMCNC: 31.2 GM/DL (ref 32.2–35.5)
MCV RBC AUTO: 100 FL (ref 80–94)
PHOSPHORUS: 2.3 MG/DL (ref 2.4–4.7)
PLATELET # BLD: 407 THOU/MM3 (ref 130–400)
PMV BLD AUTO: 10.2 FL (ref 9.4–12.4)
POTASSIUM SERPL-SCNC: 4.6 MEQ/L (ref 3.5–5.2)
RBC # BLD: 2.98 MILL/MM3 (ref 4.7–6.1)
SODIUM BLD-SCNC: 136 MEQ/L (ref 135–145)
WBC # BLD: 11.9 THOU/MM3 (ref 4.8–10.8)

## 2022-05-08 PROCEDURE — 6360000002 HC RX W HCPCS

## 2022-05-08 PROCEDURE — 2500000003 HC RX 250 WO HCPCS: Performed by: SURGERY

## 2022-05-08 PROCEDURE — 99232 SBSQ HOSP IP/OBS MODERATE 35: CPT | Performed by: INTERNAL MEDICINE

## 2022-05-08 PROCEDURE — 84100 ASSAY OF PHOSPHORUS: CPT

## 2022-05-08 PROCEDURE — 85027 COMPLETE CBC AUTOMATED: CPT

## 2022-05-08 PROCEDURE — 2580000003 HC RX 258

## 2022-05-08 PROCEDURE — 99233 SBSQ HOSP IP/OBS HIGH 50: CPT | Performed by: INTERNAL MEDICINE

## 2022-05-08 PROCEDURE — APPSS30 APP SPLIT SHARED TIME 16-30 MINUTES: Performed by: NURSE PRACTITIONER

## 2022-05-08 PROCEDURE — 6370000000 HC RX 637 (ALT 250 FOR IP): Performed by: INTERNAL MEDICINE

## 2022-05-08 PROCEDURE — 36415 COLL VENOUS BLD VENIPUNCTURE: CPT

## 2022-05-08 PROCEDURE — 6370000000 HC RX 637 (ALT 250 FOR IP)

## 2022-05-08 PROCEDURE — 82948 REAGENT STRIP/BLOOD GLUCOSE: CPT

## 2022-05-08 PROCEDURE — 2580000003 HC RX 258: Performed by: SURGERY

## 2022-05-08 PROCEDURE — 80048 BASIC METABOLIC PNL TOTAL CA: CPT

## 2022-05-08 PROCEDURE — 1200000003 HC TELEMETRY R&B

## 2022-05-08 RX ORDER — INSULIN LISPRO 100 [IU]/ML
0-6 INJECTION, SOLUTION INTRAVENOUS; SUBCUTANEOUS
Status: DISCONTINUED | OUTPATIENT
Start: 2022-05-08 | End: 2022-05-10 | Stop reason: HOSPADM

## 2022-05-08 RX ADMIN — SODIUM CHLORIDE, PRESERVATIVE FREE 10 ML: 5 INJECTION INTRAVENOUS at 22:26

## 2022-05-08 RX ADMIN — PIPERACILLIN AND TAZOBACTAM 3375 MG: 3; .375 INJECTION, POWDER, LYOPHILIZED, FOR SOLUTION INTRAVENOUS at 10:20

## 2022-05-08 RX ADMIN — AMIODARONE HYDROCHLORIDE 200 MG: 200 TABLET ORAL at 08:38

## 2022-05-08 RX ADMIN — PIPERACILLIN AND TAZOBACTAM 3375 MG: 3; .375 INJECTION, POWDER, LYOPHILIZED, FOR SOLUTION INTRAVENOUS at 18:07

## 2022-05-08 RX ADMIN — POTASSIUM & SODIUM PHOSPHATES POWDER PACK 280-160-250 MG 250 MG: 280-160-250 PACK at 18:08

## 2022-05-08 RX ADMIN — POTASSIUM & SODIUM PHOSPHATES POWDER PACK 280-160-250 MG 250 MG: 280-160-250 PACK at 12:22

## 2022-05-08 RX ADMIN — POTASSIUM & SODIUM PHOSPHATES POWDER PACK 280-160-250 MG 250 MG: 280-160-250 PACK at 22:29

## 2022-05-08 RX ADMIN — POTASSIUM & SODIUM PHOSPHATES POWDER PACK 280-160-250 MG 250 MG: 280-160-250 PACK at 08:38

## 2022-05-08 RX ADMIN — METOPROLOL TARTRATE 25 MG: 25 TABLET, FILM COATED ORAL at 22:29

## 2022-05-08 RX ADMIN — PIPERACILLIN AND TAZOBACTAM 3375 MG: 3; .375 INJECTION, POWDER, LYOPHILIZED, FOR SOLUTION INTRAVENOUS at 01:43

## 2022-05-08 RX ADMIN — METOPROLOL TARTRATE 25 MG: 25 TABLET, FILM COATED ORAL at 08:38

## 2022-05-08 RX ADMIN — FAMOTIDINE 20 MG: 10 INJECTION, SOLUTION INTRAVENOUS at 08:38

## 2022-05-08 RX ADMIN — DAKIN'S SOLUTION 0.125% (QUARTER STRENGTH): 0.12 SOLUTION at 08:42

## 2022-05-08 ASSESSMENT — PAIN SCALES - GENERAL
PAINLEVEL_OUTOF10: 0
PAINLEVEL_OUTOF10: 0

## 2022-05-08 NOTE — PROGRESS NOTES
Chad Potts Meter covering for Dr. Marquis Sawyer  Postoperative Progress Note    Pt Name: Kathleen Ashraf Record Number: [id number]  Date of Birth 1950   Today's Date: 5/8/2022  ASSESSMENT   1. POD # 12 sepsis secondary to incarcerated right inguinal hernia with perforation and obstruction of small bowel. Necrotic right groin wound status postdebridement drainage of abdominal abscess present at the time of surgery. POD #6 repair of abdominal wound dehiscence lysis of inflammatory adhesions and abdominal and right groin washout  2. Sepsis resolved. White count continues to to decline. 3. Acute renal failure resolved  4. Nasal cannula oxygen increased oxygen requirements overnight. 5. A. Fib/NS arrhythmia. Internal medicine plans to transition to oral with improved oral intake  6. Anemia no signs of active bleeding. has a past medical history of CAD (coronary artery disease), CVA (cerebral vascular accident) ([address] Erectile dysfunction, GERD (gastroesophageal reflux disease), Hyperlipidemia, and Hypertension. PLANS   1. Diet as tolerated  2. Pain control  3. IV hydration per medicine service. Normal saline stopped on D5 water. 4. Continue Zosyn. 5.   Amiodarone converted to oral. Cardiology following. 6.  DVT prophylaxis per primary service. 7. Change groin wound packing 2 times daily. Dry gauze. Dakin's solution ordered. Wounds look okay. Midline wound dressing changed. Clean wound twice daily recommended. Leave staples in situ  8. PT/OT  9. Labs reviewed. WBC better today. Manuel Howard is hemodynamically stable. Having bowel movement tolerating oral intake. Activity is increasing working with therapies. Still drainage from midline incision but staples well approximated. Right groin open wound re-packed. No odor. Still has some purulent drainage. Tenderness.    CURRENT MEDICATIONS   Scheduled Meds:   potassium & sodium phosphates  1 packet Oral 4x Daily    piperacillin-tazobactam  3,375 mg IntraVENous Q8H    amiodarone  200 mg Oral Daily    metoprolol tartrate  25 mg Oral BID    docusate sodium  100 mg Oral Daily    famotidine (PEPCID) injection  20 mg IntraVENous Daily    insulin lispro  0-6 Units SubCUTAneous 4 times per day    sodium hypochlorite   Irrigation Daily    vitamin D  50,000 Units Oral Weekly    sodium chloride flush  5-40 mL IntraVENous 2 times per day    [Held by provider] heparin (porcine)  5,000 Units SubCUTAneous TID     Continuous Infusions:   sodium chloride      dextrose      sodium chloride       PRN Meds:.sodium chloride, morphine, glucose, glucagon (rDNA), dextrose, dextrose bolus **OR** dextrose bolus, sodium chloride flush, sodium chloride, ondansetron **OR** ondansetron, polyethylene glycol, acetaminophen **OR** acetaminophen  OBJECTIVE   CURRENT VITALS:  height is 5' 9\" (1.753 m) and weight is 139 lb 9.6 oz (63.3 kg). His oral temperature is 98.2 °F (36.8 °C). His blood pressure is 145/77 (abnormal) and his pulse is 89. His respiration is 16 and oxygen saturation is 93%. Body mass index is 20.62 kg/m². Temperature Range (24h):Temp: 98.2 °F (36.8 °C) Temp  Av.8 °F (36.6 °C)  Min: 97.5 °F (36.4 °C)  Max: 98.2 °F (36.8 °C)  BP Range (64N): Systolic (57KKJ), YXJ:173 , Min:117 , HHI:522     Diastolic (50AJR), XTC:60, Min:69, Max:78    Pulse Range (24h): Pulse  Av  Min: 74  Max: 89  Respiration Range (24h): Resp  Av.1  Min: 16  Max: 20  Current Pulse Ox (24h):  SpO2: 93 %  Pulse Ox Range (24h):  SpO2  Av %  Min: 90 %  Max: 98 %  Oxygen Amount and Delivery: O2 Flow Rate (L/min): 2 L/min  Incentive Spirometry Tx: Achieved Volume (mL): 250 mL    GENERAL: Alert awake in bed  LUNGS: Clear diminished bases   HEART: Normal rate  ABDOMEN: Soft. Small/moderate drainage from wound. INCISION midline wound intact. Right open groin wound with packing. No bleeding.   EXTREMITY: No edema  In: 455.5 [P.O.:120]  Out: -   [REMOVED] Closed/Suction Drain Superior;Midline Abdomen Bulb-Output (ml): 50 ml    LABS     Recent Labs     05/05/22  1226 05/05/22  1226 05/06/22  0446 05/06/22  2230 05/07/22  0450   WBC 14.0*  --  14.8*  --  16.0*   HGB 7.5*   < > 6.8* 9.2* 9.1*   HCT 23.9*   < > 22.1* 29.7* 29.0*     --  320  --  405*     --  136  --  136   K 4.0  --  4.2  --  4.5     --  107  --  105   CO2 23  --  24  --  23   BUN 22  --  20  --  18   CREATININE 1.1  --  1.2  --  1.1   MG 1.7  --   --   --   --    PHOS 1.9*  --  1.8*  --  2.0*   CALCIUM 7.5*  --  7.6*  --  8.1*    < > = values in this interval not displayed. No results for input(s): PTT, INR in the last 72 hours. Invalid input(s): PT  No results for input(s): AST, ALT, BILITOT, BILIDIR, AMYLASE, LIPASE, LDH, LACTA in the last 72 hours. No results for input(s): TROPONINT in the last 72 hours. RADIOLOGY     XR CHEST PORTABLE   Final Result   1. Persistent bibasilar opacities. Small left-sided pleural effusion. **This report has been created using voice recognition software. It may contain minor errors which are inherent in voice recognition technology. **      Final report electronically signed by Dr. Blu Pickard on 5/2/2022 8:13 AM      XR CHEST PORTABLE   Final Result   A right arm PICC line tip terminates at the cavoatrial projection. No pneumothorax is observed. Small bilateral pleural effusions and bilateral lower lobe airspace opacity, right greater than left, are not significantly changed. **This report has been created using voice recognition software. It may contain minor errors which are inherent in voice recognition technology. **      Final report electronically signed by Dr Radha Garcia on 4/29/2022 4:04 PM      XR ABDOMEN FOR NG/OG/NE TUBE PLACEMENT   Final Result   Impression:      NG tube tip proximal stomach.       This document has been electronically signed by: Waleska Castillo Teodora Hayes MD on    04/28/2022 11:56 PM      XR CHEST PORTABLE   Final Result   Impression:   1. Satisfactory positioning of the endotracheal tube and right central    line. Probable slightly high positioning of the orogastric tube. Consider    advancing 3-4 cm. 2. Interval development of small bilateral pleural effusions, right    greater than left. 3. Bilateral lower lobe airspace opacities secondary to atelectasis and/or    infiltrates, right greater than left. 4. Bilateral interstitial changes secondary to pneumonitis or edema. This document has been electronically signed by: Krystal Rodriguez DO on    04/26/2022 12:01 AM      CT ABDOMEN PELVIS WO CONTRAST Additional Contrast? None   Final Result   Impression:   Right inguinal hernia containing small bowel. This is causing a small    bowel obstruction. There are several gas collections within the right    inguinal hernia. Ischemic or perforated bowel could have this appearance. Mild to moderate patchy consolidation bilateral lower lobes. This could    represent aspiration or pneumonia. Prominent thickening of the distal esophagus and gastroesophageal    junction. Neoplasm not excluded. Recommend direct visualization. This document has been electronically signed by: Damon Reed MD on    04/25/2022 07:20 PM      All CTs at this facility use dose modulation techniques and iterative    reconstructions, and/or weight-based dosing   when appropriate to reduce radiation to a low as reasonably achievable. US RENAL COMPLETE   Final Result   Impression:   Multifocal simple bilateral renal cyst. Echogenic bilateral renal    parenchyma. This document has been electronically signed by: Damon Reed MD on    04/25/2022 06:30 PM      CT HEAD WO CONTRAST   Final Result       1.  Stable CT scan of the brain, no interval change since previous MRI scan dated 10 Mel[address] 2019.   2. Mild atrophy and dilatation of the third and lateral ventricles. 3. Probable ischemic changes in the white matter, left basal ganglia and in the bernadette. 4. Calcification the cavernous segments of both internal carotid arteries. 5. Increased density in the external auditory canals which may represent cerumen bilaterally. **This report has been created using voice recognition software. It may contain minor errors which are inherent in voice recognition technology. **      Final report electronically signed by DR Melissa Price on 4/25/2022 4:01 PM      XR CHEST PORTABLE   Final Result   1. Normal heart size. No effusion. 2. Persistent mild atelectatic/fibrotic stranding retrocardiac region left lung base. **This report has been created using voice recognition software. It may contain minor errors which are inherent in voice recognition technology. **      Final report electronically signed by Dr. Janice Covarrubias on 4/25/2022 3:05 PM        Electronically signed by ELAN Adrian CNP on 5/8/2022 at 8:16 AM     Above discussed and I agree with Paula Ware CNP. See my additional comments below for updated orders and plan. Labs, cultures, and radiographs where available were reviewed. I discussed patient concerns with Loni Mcdonald and instructions were given. Please see our orders for the updated patient care plan. -Pain control. Diet as tolerated. Antibiotics. Incisional/wound care. DVT prophylaxis. PT/OT. Leukocytosis improving. Wound care and packing changes.     Electronically signed by Krista Pike MD on 5/8/22 at 10:24 AM EDT

## 2022-05-08 NOTE — PROGRESS NOTES
PROGRESS NOTE      Patient:  Jass Suarez Sr. Unit/Bed:8B-27/027-A    YOB: 1950    MRN: [id number]       Acct: [de-identified]     PCP: Soraida Frederick MD    Date of Admission: 4/25/2022      Assessment/Plan:    Anticipated Discharge in : Pending    Active Hospital Problems    Diagnosis Date Noted    Dehiscence of fascia [T81.30XA]      Priority: Medium    Atrial fibrillation with RVR ([address] [I48.91]      Priority: Medium    Acute respiratory failure with hypoxia ([address] [J96.01] 04/26/2022     Priority: Medium    Septic shock ([address] [A41.9, R65.21] 04/26/2022     Priority: Medium    Incarcerated right inguinal hernia [K40.30] 04/26/2022     Priority: Medium    Small bowel perforation ([address] [K63.1] 04/26/2022     Priority: Medium    Aspiration pneumonia ([address] [J69.0] 04/26/2022     Priority: Medium    Acute kidney injury ([address] [N17.9] 04/26/2022     Priority: Medium    Hypoalbuminemia [E88.09] 04/26/2022     Priority: Medium    Syncope [R55] 04/26/2022     Priority: Medium    Hypokalemia [E87.6] 04/26/2022     Priority: Medium    Anemia, macrocytic [D53.9] 04/26/2022     Priority: Medium    Severe malnutrition ([address] [E43] 04/26/2022     Priority: Medium       Perforated small bowel, soilage of the pelvic cavity, right groin and pelvic abscesses, and necrotic hernial sac with wound dehiscence:   POD #10 exploratory laparotomy with bowel resection, primary anastomosis, drainage of pelvic abscess as well as right groin exploration, drainage of abscess, and debridement of necrotic subcutaneous fat, fascia and muscle. On Day 13 of Zosyn for Culture Growing Morganella  Mediate., enterococcus casseliflavus, sensatitivites to Zosyn,  POD#5 repair of wound dehiscence. Surgery on board and following. Dressing changes and wound care. Continue on SSI,  continue with PO diet per Sx, SLP following, dietician on board.     Aspiration pneumonia: Imaging findings suggestion of infiltrate w/ gastric contents suctioned from ET tube is consistent with aspiration pneumonia. Culture growing ecoli with H. influ on PCR detected. Patient receiving antibiotics as above. Sepsis secondary to intra-abdominal infection and aspiration pneumonia - improving: On antibiotics as above. Patient remains afebrile and O2 support requirements are being weaned, BP grossly stable, monitor    A. fib with RVR: noted Apr/27/2022 New onset A. fib with RVR refractory to Lopressor and Cardizem. Patient converted with amiodarone. K WNL, Mg WNL, [id number] 5. Surgery recommends holding anticoagulation still at this time in setting of bleed risk. however much improved control with HR 80's despite some intermittent arrhythmia on tele. P  Continue Amio  BID as Pt is tolerating well. Continue Lopressor 25 BID PO  with holding parameters. BP grossly stable at 120's. Possible Watchman F/U vs [email or web address]. Will consider [address] once hgb more stable. Continue telemetry  Keep Mg > 2, K > 4,     Acute macrocytic anemia -  Monitor. ROBSON on CKD - resolving: Likely 2/2 volume depletion, poor oral intake, and hypotension 2/2 sepsis. Pt creatinine improved to 1.1 from 10.4 on Admit  Patient's baseline 1.3. improved with Hydration. Nephrology on board, appreciate rec. Good UOPT. Mixed Acid-base disorder -resolved:  Mixed Anion gap metabolic acidosis with respiratory compensation likely secondary to sepsis and ROBSON with an additional metabolic alkalosis likely secondary to volume depletion in setting of GI sx. AG improving, Nephrology on board, appreciate recs. Acute hypoxic respiratory failure: likely 2/2 to aspiration pneumonia as above. Extubated Apr/27/2022, currently on 3L NC. Sating well Wean as tolerated.     Acute postoperative pulmonary insufficiency - resolved: Extubated Apr/27/2022,     Troponinemia: Mildly elevated troponin on admit of 0.047 downtrending with inverted T waves found on EKG.  Likely 2/2 demand ischemia 2/2 hypotension.  Monitor    Hypophosphatemia: Noted May/04/2022  Continue to monitor,  Nephrolgoy on board and managing Lytes. .  Hypokalemia -  Resolved. Noted Apr/30/2022, on K protocol. Monitor AM.  Hypernatremia - Resolved. Nephrology on board, monitor. Hyperphosphatemia -resolved:  Continue to monitor,  Hypermagnesemia - resolved:  Continue to monitor,  supplement with TPN  Hypocalcemia: Continue to monitor, PO challenge as above, may supplement with TPN    Aortic Valve regurg - mild moderate 2/2 thickened with calficied AV on echo  Cardiology consulted for TVAR workup however at this time defer TVAR as more likely volume resuscitation and should improve. CAD:  Continue home Lipitor 20 mg,  Holding ASA  For bleed risk. Hgb drop today ntoed, see Acute macrocytic anemia above. GERD: Home regiment of pepcid 10mg daily, continued for admission IV form. . Noted incidental finding of thickened esophagus on imaging, F/U opt when stable for scope. Delirium? - resolved - pulled out central access line Apr/28/2022 PM, improved mentation however remains quite laconic. Given medical acuity and advance age likely sundowning as behavior is overnight. Reorient and monitor. If requires consider Seroqel QHS. Chief Complaint:  Pelvic Pain    Hospital Course:    Per H&P:    \" \"The patient was sedated and intubated and therefore could not give any history.  Therefore, history is primarily taken from the chart. Patient's wife states that the patient fell in his yard approximately 14 days ago and went to the [facility] ED. This time, he was discharged with pain medication. Approximately 7 days ago, he again presented to [facility] with complaints of a right lower abdominal bulge at which point he was diagnosed with inguinal hernia and is scheduled with surgical follow-up. Patient presented to [facility] on 4/25/2022 for syncope and worsening inguinal hernia.   Wife states that syncope first occurred in [place] several days ago when the patient had nausea and subjective feelings of excessive heat while sitting.  After this, he went to go outside and then passed out. On the morning of admission, patient's wife was given of the bathtub when he \"slumped over. \"  Wife stated that the patient vomited 3 times in the last 24 hours with brown/yellow vomitus. Wife also reports that he has not been having bowel movements, has had less frequent urination, and that he has been weak and sleeping \"20 hours a day. \"  While in the ED, the patient was found to have profound renal failure with a creatinine of 11.2 with a baseline of approximately 1.3. Patient also noted to have a profound anion gap acidosis which improved with aggressive fluid resuscitation. Nephrology was consulted given profound renal failure. CT of the abdomen and pelvis without contrast was performed which demonstrated mild to moderate patchy consolidation of the bilateral lower lobes, thickening of the distal esophagus and gastroesophageal junction, a right inguinal hernia causing a small bowel obstruction, and multiple gas collections in the right inguinal hernia highly suspicious for ischemic or perforated bowel. On the evening of 4/25/2022 Dr. Malik performed exploratory laparotomy with small bowel resection, single primary anastomosis, drainage of pelvic abscess, exploration and drainage of right groin abscess, and debridement of necrotic subcutaneous fat/fascia/muscle in the right groin along with excision of the necrotic hernia sac in the right groin. Empiric coverage with vancomycin and Zosyn to necrotic small bowel as well as probable aspiration pneumonia. Family was extensively counseled. \"    Pt had course in ICU from Apr/25-28/2022, Overnight patient went to [facility] with RVR that was refractory to both Lopressor and Cardizem. Patient was given amiodarone which converted him out.   Patient continues to have frequent PACs on the monitor. Surgery was consulted regarding anticoagulation however they are recommending holding anticoagulation at this time. We will continue to follow-up with surgery as far as when we can start anticoagulation. Patient overall is awake and alert self, year, but only occasionally to place. Patient denies any pain or acute complaints at this time. Patient is frequently repeating himself however concern for underlying delirium. May/01/2022, abdominal wound dehissence, taken back to OR for repair and washout. Subjective:    Pt seen and examined. Afebrile overnight, no behavioral issues, S/P 1 x PRBC, hgb improved by 2pts of note, no gross bleeding noted. Pt still denies pain, still speaks very little. SLP and dietary on board, precert for IPR. Cardiology defer TAVR at this time, possible Watchman's candidate given bleed risk. Review of Systems   Unable to perform ROS: Acuity of condition   Constitutional: Positive for appetite change. Negative for chills, diaphoresis, fatigue and fever. HENT: Positive for dental problem. Respiratory: Negative for cough, shortness of breath and wheezing. Cardiovascular: Negative for chest pain and palpitations. Gastrointestinal: Positive for abdominal pain. Negative for constipation, diarrhea, nausea and vomiting. Genitourinary: Negative for decreased urine volume, difficulty urinating, dysuria, frequency, hematuria and urgency. Neurological: Positive for weakness. Negative for seizures and syncope.    Psychiatric/Behavioral: Negative for confusion, agitation,       Medications:  Reviewed    Infusion Medications    sodium chloride      dextrose      sodium chloride       Scheduled Medications    potassium & sodium phosphates  1 packet Oral 4x Daily    piperacillin-tazobactam  3,375 mg IntraVENous Q8H    amiodarone  200 mg Oral Daily    metoprolol tartrate  25 mg Oral BID    docusate sodium  100 mg Oral Daily    famotidine (PEPCID) injection 20 mg IntraVENous Daily    insulin lispro  0-6 Units SubCUTAneous 4 times per day    sodium hypochlorite   Irrigation Daily    vitamin D  50,000 Units Oral Weekly    sodium chloride flush  5-40 mL IntraVENous 2 times per day    [Held by provider] heparin (porcine)  5,000 Units SubCUTAneous TID     PRN Meds: sodium chloride, morphine, glucose, glucagon (rDNA), dextrose, dextrose bolus **OR** dextrose bolus, sodium chloride flush, sodium chloride, ondansetron **OR** ondansetron, polyethylene glycol, acetaminophen **OR** acetaminophen      Intake/Output Summary (Last 24 hours) at 5/8/2022 1006  Last data filed at 5/7/2022 1217  Gross per 24 hour   Intake 120 ml   Output --   Net 120 ml       Diet:  ADULT DIET; Easy to Chew  ADULT ORAL NUTRITION SUPPLEMENT; Breakfast, Lunch, Dinner; Standard High Calorie/High Protein Oral Supplement    Exam:  /70   Pulse 82   Temp 98.3 °F (36.8 °C) (Oral)   Resp 12   Ht 5' 9\" (1.753 m)   Wt 139 lb 9.6 oz (63.3 kg)   SpO2 93%   BMI 20.62 kg/m²     Physical Exam  Constitutional:       General: He is not in acute distress. Appearance: He is ill-appearing. He is not toxic-appearing or diaphoretic. Interventions: He is not intubated. Nasal cannula in place on 3L. HENT:      Head: Normocephalic and atraumatic. Nose: Nose normal. No congestion or rhinorrhea. Mouth/Throat:      Mouth: Mucous membranes are moist      Pharynx: Oropharynx is clear. No oropharyngeal exudate or posterior oropharyngeal erythema. Eyes:      General: No scleral icterus. Right eye: No discharge. Left eye: No discharge. Extraocular Movements: Extraocular movements intact. Conjunctiva/sclera: Conjunctivae normal.      Pupils: Pupils are equal, round, and reactive to light. Cardiovascular:      Rate and Rhythm: Normal rate, present. Rhythm irregular. Pulses: Normal pulses. Heart sounds: Normal heart sounds. No murmur heard. No friction rub.  No gallop. Pulmonary:      Effort: Pulmonary effort is normal. No accessory muscle usage or respiratory distress. He is not intubated. Breath sounds: Examination of the right-lower field reveals rhonchi. Examination of the left-lower field reveals rhonchi. Rhonchi present. No wheezing or rales. Abdominal:      General: A surgical scar is present. Bowel sounds are increased. Tenderness: There is abdominal tenderness. There is no guarding. Comments: Abdominal Drain in place, Abdominal band in place. Neurological:      Mental Status: He is Oriented to person, place, time, situation. Cranial Nerves: No dysarthria or facial asymmetry. Motor: No weakness. Comments: Still speaks very little. Psychiatric:         Behavior: Behavior is cooperative. Labs:   Recent Labs     05/06/22 0446 05/06/22 0446 05/06/22  2230 05/07/22  0450 05/08/22  0908   WBC 14.8*  --   --  16.0* 11.9*   HGB 6.8*   < > 9.2* 9.1* 9.3*   HCT 22.1*   < > 29.7* 29.0* 29.8*     --   --  405* 407*    < > = values in this interval not displayed. Recent Labs     05/06/22 0446 05/07/22  0450 05/08/22  0908    136 136   K 4.2 4.5 4.6    105 107   CO2 24 23 20*   BUN 20 18 15   CREATININE 1.2 1.1 1.1   CALCIUM 7.6* 8.1* 8.1*   PHOS 1.8* 2.0* 2.3*     No results for input(s): AST, ALT, BILIDIR, BILITOT, ALKPHOS in the last 72 hours. No results for input(s): INR in the last 72 hours. No results for input(s): Kaia Beat in the last 72 hours. Urinalysis:      Lab Results   Component Value Date    NITRU NEGATIVE 04/25/2022    WBCUA 10-15 04/25/2022    BACTERIA FEW 04/25/2022    RBCUA 10-15 04/25/2022    BLOODU MODERATE 04/25/2022    SPECGRAV 1.015 04/25/2022    GLUCOSEU NEGATIVE 06/09/2019       Radiology:  XR CHEST PORTABLE   Final Result   1. Persistent bibasilar opacities. Small left-sided pleural effusion.                **This report has been created using voice recognition software. It may contain minor errors which are inherent in voice recognition technology. **      Final report electronically signed by Dr. Selvin Devine on 5/2/2022 8:13 AM      XR CHEST PORTABLE   Final Result   A right arm PICC line tip terminates at the cavoatrial projection. No pneumothorax is observed. Small bilateral pleural effusions and bilateral lower lobe airspace opacity, right greater than left, are not significantly changed. **This report has been created using voice recognition software. It may contain minor errors which are inherent in voice recognition technology. **      Final report electronically signed by Dr Nessa You on 4/29/2022 4:04 PM      XR ABDOMEN FOR NG/OG/NE TUBE PLACEMENT   Final Result   Impression:      NG tube tip proximal stomach. This document has been electronically signed by: Dina Mata MD on    04/28/2022 11:56 PM      XR CHEST PORTABLE   Final Result   Impression:   1. Satisfactory positioning of the endotracheal tube and right central    line. Probable slightly high positioning of the orogastric tube. Consider    advancing 3-4 cm. 2. Interval development of small bilateral pleural effusions, right    greater than left. 3. Bilateral lower lobe airspace opacities secondary to atelectasis and/or    infiltrates, right greater than left. 4. Bilateral interstitial changes secondary to pneumonitis or edema. This document has been electronically signed by: Delon Rodriguez DO on    04/26/2022 12:01 AM      CT ABDOMEN PELVIS WO CONTRAST Additional Contrast? None   Final Result   Impression:   Right inguinal hernia containing small bowel. This is causing a small    bowel obstruction. There are several gas collections within the right    inguinal hernia. Ischemic or perforated bowel could have this appearance. Mild to moderate patchy consolidation bilateral lower lobes. This could    represent aspiration or pneumonia.       Prominent thickening of the distal esophagus and gastroesophageal    junction. Neoplasm not excluded. Recommend direct visualization. This document has been electronically signed by: Valentín Booker MD on    04/25/2022 07:20 PM      All CTs at this facility use dose modulation techniques and iterative    reconstructions, and/or weight-based dosing   when appropriate to reduce radiation to a low as reasonably achievable. US RENAL COMPLETE   Final Result   Impression:   Multifocal simple bilateral renal cyst. Echogenic bilateral renal    parenchyma. This document has been electronically signed by: Valentín Booker MD on    04/25/2022 06:30 PM      CT HEAD WO CONTRAST   Final Result       1. Stable CT scan of the brain, no interval change since previous MRI scan dated 10 Mel[address] 2019.   2. Mild atrophy and dilatation of the third and lateral ventricles. 3. Probable ischemic changes in the white matter, left basal ganglia and in the bernadette. 4. Calcification the cavernous segments of both internal carotid arteries. 5. Increased density in the external auditory canals which may represent cerumen bilaterally. **This report has been created using voice recognition software. It may contain minor errors which are inherent in voice recognition technology. **      Final report electronically signed by DR Crystal Caicedo on 4/25/2022 4:01 PM      XR CHEST PORTABLE   Final Result   1. Normal heart size. No effusion. 2. Persistent mild atelectatic/fibrotic stranding retrocardiac region left lung base. **This report has been created using voice recognition software. It may contain minor errors which are inherent in voice recognition technology. **      Final report electronically signed by Dr. Leanne Tomlin on 4/25/2022 3:05 PM          Diet: ADULT DIET; Easy to Chew  ADULT ORAL NUTRITION SUPPLEMENT; Breakfast, Lunch, Dinner; Standard High Calorie/High Protein Oral Supplement    DVT prophylaxis: [] Lovenox                                 [] SCDs                                 [] SQ Heparin                                 [] Encourage ambulation           [] Already on Anticoagulation     Disposition:    [] Home       [] TCU       [] Rehab       [] Psych       [] SNF       [] [place]       [x] Other-     Code Status: Full Code    PT/OT Eval Status:      Electronically signed by Edelmira Carroll DO on 5/8/2022 at 10:06 AM

## 2022-05-08 NOTE — PLAN OF CARE
Problem: Discharge Planning  Goal: Discharge to home or other facility with appropriate resources  Description: Continue discharge planning. Patient currently with NG tube, PICC line inserted today.  following. Patient able to take few steps at bedside with PT/OT today. Flowsheets (Taken 5/8/2022 5647)  Discharge to home or other facility with appropriate resources:   Identify barriers to discharge with patient and caregiver   Arrange for needed discharge resources and transportation as appropriate   Identify discharge learning needs (meds, wound care, etc)     Problem: Pain  Goal: Verbalizes/displays adequate comfort level or baseline comfort level  Description: Patient denies pain today.    Flowsheets (Taken 5/7/2022 9920)  Verbalizes/displays adequate comfort level or baseline comfort level:   Encourage patient to monitor pain and request assistance   Assess pain using appropriate pain scale     Problem: Respiratory - Adult  Goal: Achieves optimal ventilation and oxygenation  Recent Flowsheet Documentation  Taken 5/8/2022 4846 by Carley Rosen RN  Achieves optimal ventilation and oxygenation:   Assess for changes in respiratory status   Position to facilitate oxygenation and minimize respiratory effort   Assess for changes in mentation and behavior   Oxygen supplementation based on oxygen saturation or arterial blood gases     Problem: Chronic Conditions and Co-morbidities  Goal: Patient's chronic conditions and co-morbidity symptoms are monitored and maintained or improved  Flowsheets (Taken 5/8/2022 0229)  Care Plan - Patient's Chronic Conditions and Co-Morbidity Symptoms are Monitored and Maintained or Improved:   Monitor and assess patient's chronic conditions and comorbid symptoms for stability, deterioration, or improvement   Collaborate with multidisciplinary team to address chronic and comorbid conditions and prevent exacerbation or deterioration   Update acute care plan with appropriate goals if chronic or comorbid symptoms are exacerbated and prevent overall improvement and discharge     Problem: Safety - Adult  Goal: Free from fall injury  Flowsheets (Taken 5/8/2022 0823)  Free From Fall Injury:   Instruct family/caregiver on patient safety   Based on caregiver fall risk screen, instruct family/caregiver to ask for assistance with transferring infant if caregiver noted to have fall risk factors     Problem: ABCDS Injury Assessment  Goal: Absence of physical injury  Note: Hourly rounds     Problem: Skin/Tissue Integrity  Goal: Absence of new skin breakdown  Description: 1. Monitor for areas of redness and/or skin breakdown  2. Assess vascular access sites hourly  3. Every 4-6 hours minimum:  Change oxygen saturation probe site  4. Every 4-6 hours:  If on nasal continuous positive airway pressure, respiratory therapy assess nares and determine need for appliance change or resting period.   Note: Ammon gomez     Problem: Respiratory - Adult  Goal: Achieves optimal ventilation and oxygenation  Flowsheets (Taken 5/8/2022 0823)  Achieves optimal ventilation and oxygenation:   Assess for changes in respiratory status   Position to facilitate oxygenation and minimize respiratory effort   Assess for changes in mentation and behavior   Oxygen supplementation based on oxygen saturation or arterial blood gases     Problem: Cardiovascular - Adult  Goal: Maintains optimal cardiac output and hemodynamic stability  Flowsheets (Taken 5/8/2022 0823)  Maintains optimal cardiac output and hemodynamic stability:   Monitor urine output and notify Licensed Independent Practitioner for values outside of normal range   Monitor blood pressure and heart rate   Assess for signs of decreased cardiac output  Goal: Absence of cardiac dysrhythmias or at baseline  Flowsheets (Taken 5/8/2022 0823)  Absence of cardiac dysrhythmias or at baseline:   Monitor cardiac rate and rhythm   Assess for signs of decreased cardiac output     Problem: Gastrointestinal - Adult  Goal: Minimal or absence of nausea and vomiting  Flowsheets (Taken 5/7/2022 0830)  Minimal or absence of nausea and vomiting:   Administer IV fluids as ordered to ensure adequate hydration   Advance diet as tolerated, if ordered  Goal: Maintains or returns to baseline bowel function  Flowsheets (Taken 5/7/2022 0830)  Maintains or returns to baseline bowel function:   Assess bowel function   Encourage oral fluids to ensure adequate hydration   Administer IV fluids as ordered to ensure adequate hydration     Problem: Infection - Adult  Goal: Absence of infection at discharge  Flowsheets (Taken 5/8/2022 5649)  Absence of infection at discharge:   Assess and monitor for signs and symptoms of infection   Monitor lab/diagnostic results   Monitor all insertion sites i.e., indwelling lines, tubes and drains   Administer medications as ordered  Goal: Absence of infection during hospitalization  Flowsheets (Taken 5/8/2022 0964)  Absence of infection during hospitalization:   Assess and monitor for signs and symptoms of infection   Monitor lab/diagnostic results   Monitor all insertion sites i.e., indwelling lines, tubes and drains  Goal: Absence of fever/infection during anticipated neutropenic period  Flowsheets (Taken 5/8/2022 8563)  Absence of fever/infection during anticipated neutropenic period: Monitor white blood cell count

## 2022-05-08 NOTE — PROGRESS NOTES
[facility]         Renal Inpatient Follow-Up note         5/8/2022 11:39 AM    Pt Name:   Robert Mendez:   1950  Attending:   Felicity Crocker DO    Chief Complaint : Mary Lou Vasquez Sr. is a 66 y.o. male being followed by nephrology for acute kidney injury now hypophosphatemia    Interval History :   Patient seen and examined by me. No distress  Awake and alert no distress  Denies any chest pain or shortness of breath     Scheduled Medications :    potassium & sodium phosphates  1 packet Oral 4x Daily    piperacillin-tazobactam  3,375 mg IntraVENous Q8H    amiodarone  200 mg Oral Daily    metoprolol tartrate  25 mg Oral BID    docusate sodium  100 mg Oral Daily    famotidine (PEPCID) injection  20 mg IntraVENous Daily    insulin lispro  0-6 Units SubCUTAneous 4 times per day    sodium hypochlorite   Irrigation Daily    vitamin D  50,000 Units Oral Weekly    sodium chloride flush  5-40 mL IntraVENous 2 times per day    [Held by provider] heparin (porcine)  5,000 Units SubCUTAneous TID      sodium chloride      dextrose      sodium chloride         Vitals :  /70   Pulse 82   Temp 98.3 °F (36.8 °C) (Oral)   Resp 12   Ht 5' 9\" (1.753 m)   Wt 139 lb 9.6 oz (63.3 kg)   SpO2 93%   BMI 20.62 kg/m²     24HR INTAKE/OUTPUT:      Intake/Output Summary (Last 24 hours) at 5/8/2022 1139  Last data filed at 5/7/2022 1217  Gross per 24 hour   Intake 120 ml   Output --   Net 120 ml     Last 3 weights  Wt Readings from Last 3 Encounters:   05/07/22 139 lb 9.6 oz (63.3 kg)   04/17/22 150 lb (68 kg)   04/10/22 148 lb (67.1 kg)           Physical Exam :  General Appearance:  Well developed.  No distress  Mouth/Throat:  Oral mucosa moist  Neck:  Supple, no JVD  Lungs:  Breath sounds: clear  Heart[de-identified]  S1,S2 heard  Abdomen:  Soft, non - tender  Musculoskeletal:  Edema - no edema noted         Last 3 CBC   Recent Labs     05/06/22  0446 05/06/22  0446 05/06/22  2230 05/07/22  0450 05/08/22  0908   WBC 14.8*  --   --  16.0* 11.9*   RBC 2.03*  --   --  2.92* 2.98*   HGB 6.8*   < > 9.2* 9.1* 9.3*   HCT 22.1*   < > 29.7* 29.0* 29.8*     --   --  405* 407*    < > = values in this interval not displayed. Last 3 CMP  Recent Labs     05/06/22  0446 05/07/22  0450 05/08/22  0908    136 136   K 4.2 4.5 4.6    105 107   CO2 24 23 20*   BUN 20 18 15   CREATININE 1.2 1.1 1.1   CALCIUM 7.6* 8.1* 8.1*             ASSESSMENT / Plan   1 Renal -acute kidney injury secondary to volume depletion and hypotension  ? Improved and currently stable around 1.1  ? This is mostly his baseline    2 Electrolytes -appear to be within normal limits  3 Anemia status post blood transfusion  4 Status post ex lap with small bowel resection  5 Hx of atrial fibrillation  6 Hypophosphatemia getting better currently at 2.3 continue Neutra-Phos 1 packet 4 times a day  7 Meds reviewed and discussed with patient nursing staff  8 Awaiting placement    Dr. Meli Godfrey MD, M,D.  [facility].

## 2022-05-09 LAB
ANION GAP SERPL CALCULATED.3IONS-SCNC: 10 MEQ/L (ref 8–16)
BUN BLDV-MCNC: 15 MG/DL (ref 7–22)
CALCIUM SERPL-MCNC: 8.1 MG/DL (ref 8.5–10.5)
CHLORIDE BLD-SCNC: 106 MEQ/L (ref 98–111)
CO2: 20 MEQ/L (ref 23–33)
CREAT SERPL-MCNC: 1.2 MG/DL (ref 0.4–1.2)
ERYTHROCYTE [DISTWIDTH] IN BLOOD BY AUTOMATED COUNT: 20.7 % (ref 11.5–14.5)
ERYTHROCYTE [DISTWIDTH] IN BLOOD BY AUTOMATED COUNT: 78.1 FL (ref 35–45)
GLUCOSE BLD-MCNC: 107 MG/DL (ref 70–108)
GLUCOSE BLD-MCNC: 109 MG/DL (ref 70–108)
GLUCOSE BLD-MCNC: 142 MG/DL (ref 70–108)
GLUCOSE BLD-MCNC: 77 MG/DL (ref 70–108)
GLUCOSE BLD-MCNC: 81 MG/DL (ref 70–108)
GLUCOSE BLD-MCNC: 99 MG/DL (ref 70–108)
HCT VFR BLD CALC: 27.8 % (ref 42–52)
HEMOGLOBIN: 8.6 GM/DL (ref 14–18)
MCH RBC QN AUTO: 31.4 PG (ref 26–33)
MCHC RBC AUTO-ENTMCNC: 30.9 GM/DL (ref 32.2–35.5)
MCV RBC AUTO: 101.5 FL (ref 80–94)
PHOSPHORUS: 2.4 MG/DL (ref 2.4–4.7)
PLATELET # BLD: 397 THOU/MM3 (ref 130–400)
PMV BLD AUTO: 9.6 FL (ref 9.4–12.4)
POTASSIUM SERPL-SCNC: 4.5 MEQ/L (ref 3.5–5.2)
RBC # BLD: 2.74 MILL/MM3 (ref 4.7–6.1)
SODIUM BLD-SCNC: 136 MEQ/L (ref 135–145)
WBC # BLD: 9.8 THOU/MM3 (ref 4.8–10.8)

## 2022-05-09 PROCEDURE — 36415 COLL VENOUS BLD VENIPUNCTURE: CPT

## 2022-05-09 PROCEDURE — 84100 ASSAY OF PHOSPHORUS: CPT

## 2022-05-09 PROCEDURE — 2580000003 HC RX 258

## 2022-05-09 PROCEDURE — 99233 SBSQ HOSP IP/OBS HIGH 50: CPT | Performed by: INTERNAL MEDICINE

## 2022-05-09 PROCEDURE — 6370000000 HC RX 637 (ALT 250 FOR IP): Performed by: INTERNAL MEDICINE

## 2022-05-09 PROCEDURE — 82948 REAGENT STRIP/BLOOD GLUCOSE: CPT

## 2022-05-09 PROCEDURE — 99232 SBSQ HOSP IP/OBS MODERATE 35: CPT | Performed by: INTERNAL MEDICINE

## 2022-05-09 PROCEDURE — 6360000002 HC RX W HCPCS

## 2022-05-09 PROCEDURE — 97116 GAIT TRAINING THERAPY: CPT

## 2022-05-09 PROCEDURE — 97530 THERAPEUTIC ACTIVITIES: CPT

## 2022-05-09 PROCEDURE — 2580000003 HC RX 258: Performed by: SURGERY

## 2022-05-09 PROCEDURE — 97110 THERAPEUTIC EXERCISES: CPT

## 2022-05-09 PROCEDURE — 6370000000 HC RX 637 (ALT 250 FOR IP)

## 2022-05-09 PROCEDURE — 99024 POSTOP FOLLOW-UP VISIT: CPT | Performed by: SURGERY

## 2022-05-09 PROCEDURE — 80048 BASIC METABOLIC PNL TOTAL CA: CPT

## 2022-05-09 PROCEDURE — 2500000003 HC RX 250 WO HCPCS: Performed by: SURGERY

## 2022-05-09 PROCEDURE — 1200000003 HC TELEMETRY R&B

## 2022-05-09 PROCEDURE — 85027 COMPLETE CBC AUTOMATED: CPT

## 2022-05-09 RX ORDER — METOPROLOL SUCCINATE 25 MG/1
TABLET, EXTENDED RELEASE ORAL
Qty: 30 TABLET | OUTPATIENT
Start: 2022-05-09

## 2022-05-09 RX ADMIN — POTASSIUM & SODIUM PHOSPHATES POWDER PACK 280-160-250 MG 250 MG: 280-160-250 PACK at 21:32

## 2022-05-09 RX ADMIN — METOPROLOL TARTRATE 25 MG: 25 TABLET, FILM COATED ORAL at 21:32

## 2022-05-09 RX ADMIN — AMIODARONE HYDROCHLORIDE 200 MG: 200 TABLET ORAL at 08:30

## 2022-05-09 RX ADMIN — PIPERACILLIN AND TAZOBACTAM 3375 MG: 3; .375 INJECTION, POWDER, LYOPHILIZED, FOR SOLUTION INTRAVENOUS at 03:26

## 2022-05-09 RX ADMIN — POTASSIUM & SODIUM PHOSPHATES POWDER PACK 280-160-250 MG 250 MG: 280-160-250 PACK at 08:30

## 2022-05-09 RX ADMIN — POTASSIUM & SODIUM PHOSPHATES POWDER PACK 280-160-250 MG 250 MG: 280-160-250 PACK at 17:58

## 2022-05-09 RX ADMIN — FAMOTIDINE 20 MG: 10 INJECTION, SOLUTION INTRAVENOUS at 08:57

## 2022-05-09 RX ADMIN — POTASSIUM & SODIUM PHOSPHATES POWDER PACK 280-160-250 MG 250 MG: 280-160-250 PACK at 14:03

## 2022-05-09 RX ADMIN — DAKIN'S SOLUTION 0.125% (QUARTER STRENGTH): 0.12 SOLUTION at 08:31

## 2022-05-09 RX ADMIN — METOPROLOL TARTRATE 25 MG: 25 TABLET, FILM COATED ORAL at 08:30

## 2022-05-09 RX ADMIN — SODIUM CHLORIDE, PRESERVATIVE FREE 10 ML: 5 INJECTION INTRAVENOUS at 21:32

## 2022-05-09 ASSESSMENT — PAIN SCALES - GENERAL
PAINLEVEL_OUTOF10: 0
PAINLEVEL_OUTOF10: 0

## 2022-05-09 NOTE — PLAN OF CARE
Problem: Discharge Planning  Goal: Discharge to home or other facility with appropriate resources  Description: Continue discharge planning. Patient currently with NG tube, PICC line inserted today.  following. Patient able to take few steps at bedside with PT/OT today. Outcome: Progressing  Flowsheets (Taken 5/9/2022 8306)  Discharge to home or other facility with appropriate resources:   Identify barriers to discharge with patient and caregiver   Arrange for needed discharge resources and transportation as appropriate   Identify discharge learning needs (meds, wound care, etc)     Problem: Pain  Goal: Verbalizes/displays adequate comfort level or baseline comfort level  Description: Patient denies pain today.    Outcome: Progressing  Flowsheets (Taken 5/9/2022 0826)  Verbalizes/displays adequate comfort level or baseline comfort level:   Encourage patient to monitor pain and request assistance   Assess pain using appropriate pain scale   Administer analgesics based on type and severity of pain and evaluate response     Problem: Chronic Conditions and Co-morbidities  Goal: Patient's chronic conditions and co-morbidity symptoms are monitored and maintained or improved  Outcome: Progressing  Flowsheets (Taken 5/9/2022 0833)  Care Plan - Patient's Chronic Conditions and Co-Morbidity Symptoms are Monitored and Maintained or Improved:   Monitor and assess patient's chronic conditions and comorbid symptoms for stability, deterioration, or improvement   Collaborate with multidisciplinary team to address chronic and comorbid conditions and prevent exacerbation or deterioration     Problem: Safety - Adult  Goal: Free from fall injury  Outcome: Progressing  Flowsheets (Taken 5/9/2022 0840)  Free From Fall Injury:   Instruct family/caregiver on patient safety   Based on caregiver fall risk screen, instruct family/caregiver to ask for assistance with transferring infant if caregiver noted to have fall risk factors     Problem: ABCDS Injury Assessment  Goal: Absence of physical injury  Outcome: Progressing  Note: Patient free from injuries. Problem: Skin/Tissue Integrity  Goal: Absence of new skin breakdown  Description: 1. Monitor for areas of redness and/or skin breakdown  2. Assess vascular access sites hourly  3. Every 4-6 hours minimum:  Change oxygen saturation probe site  4. Every 4-6 hours:  If on nasal continuous positive airway pressure, respiratory therapy assess nares and determine need for appliance change or resting period.   Outcome: Progressing     Problem: Nutrition Deficit:  Goal: Optimize nutritional status  Outcome: Progressing     Problem: Respiratory - Adult  Goal: Achieves optimal ventilation and oxygenation  Outcome: Progressing  Flowsheets (Taken 5/9/2022 0833)  Achieves optimal ventilation and oxygenation:   Assess for changes in respiratory status   Assess for changes in mentation and behavior     Problem: Cardiovascular - Adult  Goal: Maintains optimal cardiac output and hemodynamic stability  Outcome: Progressing  Flowsheets (Taken 5/9/2022 0833)  Maintains optimal cardiac output and hemodynamic stability:   Monitor blood pressure and heart rate   Assess for signs of decreased cardiac output  Goal: Absence of cardiac dysrhythmias or at baseline  Outcome: Progressing  Flowsheets (Taken 5/9/2022 3441)  Absence of cardiac dysrhythmias or at baseline:   Monitor cardiac rate and rhythm   Assess for signs of decreased cardiac output     Problem: Gastrointestinal - Adult  Goal: Minimal or absence of nausea and vomiting  Outcome: Progressing  Flowsheets (Taken 5/9/2022 0833)  Minimal or absence of nausea and vomiting:   Administer IV fluids as ordered to ensure adequate hydration   Administer ordered antiemetic medications as needed  Goal: Maintains or returns to baseline bowel function  Outcome: Progressing  Flowsheets (Taken 5/9/2022 0833)  Maintains or returns to baseline bowel function: Assess bowel function     Problem: Infection - Adult  Goal: Absence of infection at discharge  Outcome: Progressing  Flowsheets (Taken 5/9/2022 5643)  Absence of infection at discharge:   Assess and monitor for signs and symptoms of infection   Monitor lab/diagnostic results  Goal: Absence of infection during hospitalization  Outcome: Progressing  Flowsheets (Taken 5/9/2022 0833)  Absence of infection during hospitalization:   Assess and monitor for signs and symptoms of infection   Monitor lab/diagnostic results  Goal: Absence of fever/infection during anticipated neutropenic period  Outcome: Progressing  Flowsheets (Taken 5/9/2022 0833)  Absence of fever/infection during anticipated neutropenic period: Monitor white blood cell count

## 2022-05-09 NOTE — PROGRESS NOTES
Precert denied for IPR recommending SNF. [employer] representative did state that if a SNF precert was filed it would be approved. KARRIE Mcgovern updated.

## 2022-05-09 NOTE — PROGRESS NOTES
[facility]  General Surgery -   Postoperative Progress Note    Pt Name: Jasmeet Arellano Record Number: [id number]  Date of Birth 1950   Today's Date: 5/9/2022  ASSESSMENT   1. POD # 13 sepsis secondary to incarcerated right inguinal hernia with perforation and obstruction of small bowel. Necrotic right groin wound status postdebridement drainage of abdominal abscess present at the time of surgery. POD #7 repair of abdominal wound dehiscence lysis of inflammatory adhesions and abdominal and right groin washout  2. Sepsis resolved. White count continues to to decline. 3. Acute renal failure resolved  4. On room air  5. A. Fib/NS arrhythmia. Internal medicine plans to transition to oral with improved oral intake  6. Anemia no signs of active bleeding. It is post transfusions. has a past medical history of CAD (coronary artery disease), CVA (cerebral vascular accident) ([address] Erectile dysfunction, GERD (gastroesophageal reflux disease), Hyperlipidemia, and Hypertension. PLANS   1. Diet as tolerated  2. Pain control  3. IV hydration per medicine service. Normal saline stopped on D5 water. 4. Completed Zosyn therapy  5. Amiodarone converted to oral. Cardiology following. 6.  DVT prophylaxis per primary service. 7. Change groin wound packing 2 times daily. Dry gauze. Dakin's solution ordered. Wounds look okay. Midline wound dressing changed. Clean wound twice daily recommended. Leave staples in situ. 8. PT/OT  9. Okay for discharge from a surgical standpoint. Recommend visiting nurses for daily dressing changes. 10. Follow-up surgical office 7 to 10 days post discharge  Shravan Fletcher is hemodynamically stable. Having bowel movement tolerating oral intake. Activity is increasing working with therapies. Wounds clean and stable.   He is asking when he can go home  CURRENT MEDICATIONS   Scheduled Meds:   insulin lispro  0-6 Units SubCUTAneous 4x Daily AC & HS    potassium & sodium phosphates  1 packet Oral 4x Daily    amiodarone  200 mg Oral Daily    metoprolol tartrate  25 mg Oral BID    docusate sodium  100 mg Oral Daily    famotidine (PEPCID) injection  20 mg IntraVENous Daily    sodium hypochlorite   Irrigation Daily    vitamin D  50,000 Units Oral Weekly    sodium chloride flush  5-40 mL IntraVENous 2 times per day    [Held by provider] heparin (porcine)  5,000 Units SubCUTAneous TID     Continuous Infusions:   sodium chloride      dextrose      sodium chloride       PRN Meds:.sodium chloride, morphine, glucose, glucagon (rDNA), dextrose, dextrose bolus **OR** dextrose bolus, sodium chloride flush, sodium chloride, ondansetron **OR** ondansetron, polyethylene glycol, acetaminophen **OR** acetaminophen  OBJECTIVE   CURRENT VITALS:  height is 5' 9\" (1.753 m) and weight is 139 lb 9.6 oz (63.3 kg). His oral temperature is 98.3 °F (36.8 °C). His blood pressure is 121/67 and his pulse is 77. His respiration is 16 and oxygen saturation is 97%. Body mass index is 20.62 kg/m². Temperature Range (24h):Temp: 98.3 °F (36.8 °C) Temp  Av.1 °F (36.7 °C)  Min: 97.3 °F (36.3 °C)  Max: 98.5 °F (36.9 °C)  BP Range (92D): Systolic (14SKQ), :814 , Min:111 , BHI:169     Diastolic (07BGO), PKA:32, Min:67, Max:71    Pulse Range (24h): Pulse  Av  Min: 77  Max: 88  Respiration Range (24h): Resp  Avg: 15.2  Min: 12  Max: 16  Current Pulse Ox (24h):  SpO2: 97 %  Pulse Ox Range (24h):  SpO2  Av.2 %  Min: 93 %  Max: 97 %  Oxygen Amount and Delivery: O2 Flow Rate (L/min): 2 L/min  Incentive Spirometry Tx: Achieved Volume (mL): 250 mL    GENERAL: Alert awake in bed  LUNGS: Clear diminished bases   HEART: Normal rate  ABDOMEN: Soft. Small/moderate drainage from wound. INCISION midline wound intact. Right open groin wound with packing. No bleeding. EXTREMITY: No edema  No intake/output data recorded.   [REMOVED] Closed/Suction Drain Superior;Midline Abdomen Bulb-Output (ml): 50 ml    LABS     Recent Labs     05/06/22  2230 05/07/22  0450 05/08/22  0908   WBC  --  16.0* 11.9*   HGB 9.2* 9.1* 9.3*   HCT 29.7* 29.0* 29.8*   PLT  --  405* 407*   NA  --  136 136   K  --  4.5 4.6   CL  --  105 107   CO2  --  23 20*   BUN  --  18 15   CREATININE  --  1.1 1.1   PHOS  --  2.0* 2.3*   CALCIUM  --  8.1* 8.1*      No results for input(s): PTT, INR in the last 72 hours. Invalid input(s): PT  No results for input(s): AST, ALT, BILITOT, BILIDIR, AMYLASE, LIPASE, LDH, LACTA in the last 72 hours. No results for input(s): TROPONINT in the last 72 hours. RADIOLOGY     XR CHEST PORTABLE   Final Result   1. Persistent bibasilar opacities. Small left-sided pleural effusion. **This report has been created using voice recognition software. It may contain minor errors which are inherent in voice recognition technology. **      Final report electronically signed by Dr. Kristina Rebolledo on 5/2/2022 8:13 AM      XR CHEST PORTABLE   Final Result   A right arm PICC line tip terminates at the cavoatrial projection. No pneumothorax is observed. Small bilateral pleural effusions and bilateral lower lobe airspace opacity, right greater than left, are not significantly changed. **This report has been created using voice recognition software. It may contain minor errors which are inherent in voice recognition technology. **      Final report electronically signed by Dr Sky Titus on 4/29/2022 4:04 PM      XR ABDOMEN FOR NG/OG/NE TUBE PLACEMENT   Final Result   Impression:      NG tube tip proximal stomach. This document has been electronically signed by: Bernadette Kuo MD on    04/28/2022 11:56 PM      XR CHEST PORTABLE   Final Result   Impression:   1. Satisfactory positioning of the endotracheal tube and right central    line. Probable slightly high positioning of the orogastric tube. Consider    advancing 3-4 cm.    2. Interval development of small bilateral pleural effusions, right    greater than left. 3. Bilateral lower lobe airspace opacities secondary to atelectasis and/or    infiltrates, right greater than left. 4. Bilateral interstitial changes secondary to pneumonitis or edema. This document has been electronically signed by: Ramon Hebert. DO Jennifer on    04/26/2022 12:01 AM      CT ABDOMEN PELVIS WO CONTRAST Additional Contrast? None   Final Result   Impression:   Right inguinal hernia containing small bowel. This is causing a small    bowel obstruction. There are several gas collections within the right    inguinal hernia. Ischemic or perforated bowel could have this appearance. Mild to moderate patchy consolidation bilateral lower lobes. This could    represent aspiration or pneumonia. Prominent thickening of the distal esophagus and gastroesophageal    junction. Neoplasm not excluded. Recommend direct visualization. This document has been electronically signed by: Gabbi Will MD on    04/25/2022 07:20 PM      All CTs at this facility use dose modulation techniques and iterative    reconstructions, and/or weight-based dosing   when appropriate to reduce radiation to a low as reasonably achievable. US RENAL COMPLETE   Final Result   Impression:   Multifocal simple bilateral renal cyst. Echogenic bilateral renal    parenchyma. This document has been electronically signed by: Gabbi Will MD on    04/25/2022 06:30 PM      CT HEAD WO CONTRAST   Final Result       1. Stable CT scan of the brain, no interval change since previous MRI scan dated 10 Melody 2019.   2. Mild atrophy and dilatation of the third and lateral ventricles. 3. Probable ischemic changes in the white matter, left basal ganglia and in the bernadette. 4. Calcification the cavernous segments of both internal carotid arteries. 5. Increased density in the external auditory canals which may represent cerumen bilaterally.                **This report has been created using voice recognition software. It may contain minor errors which are inherent in voice recognition technology. **      Final report electronically signed by DR Daphne Ly on 4/25/2022 4:01 PM      XR CHEST PORTABLE   Final Result   1. Normal heart size. No effusion. 2. Persistent mild atelectatic/fibrotic stranding retrocardiac region left lung base. **This report has been created using voice recognition software. It may contain minor errors which are inherent in voice recognition technology. **      Final report electronically signed by Dr. Renetta Cleveland on 4/25/2022 3:05 PM        Electronically signed by Ayla Ruano MD on 5/9/2022 at 7:37 AM

## 2022-05-09 NOTE — PROGRESS NOTES
Renal Progress Note  [facility]    Patient :  Harini Grant Sr.; 66 y.o. MRN# [id number]  Location:  8B-27/027-A  Attending:  Rochelle Rosenbaum DO  Admit Date:  4/25/2022   Hospital Day: 14      Subjective:     Nephrology is following the patient for ROBSON. Patient was seen earlier this morning. No overnight events. Pt denies complaints. Outpatient Medications:     Medications Prior to Admission: atorvastatin (LIPITOR) 20 MG tablet, TAKE 1 TABLET BY MOUTH DAILY  COMBIGAN 0.2-0.5 % ophthalmic solution, Place 1 drop into the left eye every 12 hours  famotidine (PEPCID) 20 MG tablet, Take 1 tablet by mouth 2 times daily  losartan (COZAAR) 50 MG tablet, Take 1 tablet by mouth daily  metoprolol succinate (TOPROL XL) 25 MG extended release tablet, TAKE 1 TABLET BY MOUTH DAILY  tamsulosin (FLOMAX) 0.4 MG capsule, Take 1 capsule by mouth daily  lidocaine (LIDODERM) 5 %, Place 1 patch onto the skin daily 12 hours on, 12 hours off.   fluocinonide (LIDEX) 0.05 % cream, APPLY EXTERNALLY TO THE AFFECTED AREA TWICE DAILY  tadalafil (CIALIS) 20 MG tablet, TAKE 1 TABLET BY MOUTH EVERY DAY AS NEEDED  aspirin 81 MG EC tablet, Take 1 tablet by mouth daily  loratadine (CLARITIN) 10 MG tablet, TAKE 1 TABLET BY MOUTH DAILY    Current Medications:     Scheduled Meds:    insulin lispro  0-6 Units SubCUTAneous 4x Daily AC & HS    potassium & sodium phosphates  1 packet Oral 4x Daily    amiodarone  200 mg Oral Daily    metoprolol tartrate  25 mg Oral BID    docusate sodium  100 mg Oral Daily    famotidine (PEPCID) injection  20 mg IntraVENous Daily    sodium hypochlorite   Irrigation Daily    vitamin D  50,000 Units Oral Weekly    sodium chloride flush  5-40 mL IntraVENous 2 times per day    [Held by provider] heparin (porcine)  5,000 Units SubCUTAneous TID     Continuous Infusions:    sodium chloride      dextrose      sodium chloride       PRN Meds:  sodium chloride, morphine, glucose, glucagon (rDNA), dextrose, dextrose bolus **OR** dextrose bolus, sodium chloride flush, sodium chloride, ondansetron **OR** ondansetron, polyethylene glycol, acetaminophen **OR** acetaminophen    Input/Output:       No intake/output data recorded. .      Patient Vitals for the past 96 hrs (Last 3 readings):   Weight   22 0835 134 lb 11.2 oz (61.1 kg)   22 0445 139 lb 9.6 oz (63.3 kg)   22 0600 135 lb (61.2 kg)       Vital Signs:   Temperature:  Temp: 97.8 °F (36.6 °C)  TMax:   Temp (24hrs), Av °F (36.7 °C), Min:97.3 °F (36.3 °C), Max:98.5 °F (36.9 °C)    Respirations:  Resp: 20  Pulse:   Pulse: 81  BP:    BP: 122/64  BP Range: Systolic (73RRB), AYM:329 , Min:111 , UWU:138       Diastolic (27HGH), KAYLEN, Min:64, Max:71      Physical Examination:     General:  Awake, alert  HEENT: NC/AT/ MMM   Chest:               Clear B/L no rales  Cardiac:  S1 S2   Abdomen: soft  Neuro:  Awake and alert  SKIN:  No rashes,   Extremities:  No edema,     Labs:       Recent Labs     22  0908 22  0731   WBC 16.0* 11.9* 9.8   RBC 2.92* 2.98* 2.74*   HGB 9.1* 9.3* 8.6*   HCT 29.0* 29.8* 27.8*   MCV 99.3* 100.0* 101.5*   MCH 31.2 31.2 31.4   MCHC 31.4* 31.2* 30.9*   * 407* 397   MPV 10.5 10.2 9.6      BMP:   Recent Labs     22  0908 22  0731    136 136   K 4.5 4.6 4.5    107 106   CO2 23 20* 20*   BUN 18 15 15   CREATININE 1.1 1.1 1.2   GLUCOSE 91 78 81   CALCIUM 8.1* 8.1* 8.1*      Phosphorus:     Recent Labs     22  0450 22  0908 22  0731   PHOS 2.0* 2.3* 2.4     Magnesium:    No results for input(s): MG in the last 72 hours. Albumin:    No results for input(s): LABALBU in the last 72 hours.   BNP:    No results found for: BNP  KHUSHBOO:    No results found for: KHUSHBOO  SPEP:  Lab Results   Component Value Date    PROT 5.3 2022     UPEP:   No results found for: LABPE  C3:   No results found for: C3  C4:   No results found for: C4  MPO ANCA:   No results found for: MPO  PR3 ANCA:   No results found for: PR3  Anti-GBM:   No results found for: GBMABIGG  Hep BsAg:       No results found for: HEPBSAG  Hep C AB:        No results found for: HEPCAB    Urinalysis/Chemistries:      Lab Results   Component Value Date    NITRU NEGATIVE 04/25/2022    COLORU YELLOW 04/25/2022    PHUR 5.5 04/25/2022    LABCAST >15 HYALINE 04/25/2022    WBCUA 10-15 04/25/2022    RBCUA 10-15 04/25/2022    MUCUS NONE SEEN 04/25/2022    YEAST NONE SEEN 04/25/2022    BACTERIA FEW 04/25/2022    SPECGRAV 1.015 04/25/2022    LEUKOCYTESUR TRACE 04/25/2022    UROBILINOGEN 0.2 04/25/2022    BILIRUBINUR NEGATIVE 04/25/2022    BLOODU MODERATE 04/25/2022    GLUCOSEU NEGATIVE 06/09/2019    KETUA NEGATIVE 04/25/2022     Urine Sodium:   No results found for: MING  Urine Potassium:  No results found for: KUR  Urine Chloride:  No results found for: CLUR  Urine Osmolarity: No results found for: OSMOU  Urine Protein:   No components found for: TOTALPROTEIN, URINE   Urine Creatinine:   No results found for: LABCREA  Urine Eosinophils:  No components found for: UEOS        Impression and Plan:  1. ROBSON , prerenal due to volume depletion and hypotension:resolved and near basleine  2. Hypophosphatemia improving on neutra phos 1 packet QID  3. S/p ex lap with small bowel resection, drainage of abscess and debridement necrotic fat fascia and hernia sac 4/25. Had wound dehiscence and went back for repair 5/1    4. Pneumonia  5. Anemia, s/p blood transfusion  6. Afib    Stable from renal standpoint          Please don't hesitate to call with any questions.   Electronically signed by Dane Lopes DO on 5/9/2022 at 9:20 AM

## 2022-05-09 NOTE — FLOWSHEET NOTE
Pt is a 71y. o. male, propped up in bed eating dinner, in 8B-27. Rachid Phillips shared that he is doing well, but lamenting current situation with insurance, mentioning that God will take care of it and that ours is to follow him and do what he says. He says he is blessed and knows it, in the face of bad news.  provided active listening, brief conversation, nurtured hope and prayer for him; Rachid Phillips expressed gratitude for the visit and prayer. 05/09/22 1943   Encounter Summary   Encounter Overview/Reason  Spiritual/Emotional Needs   Service Provided For: Patient   Referral/Consult From: [facility] Family members   Last Encounter  05/09/22   Complexity of Encounter Low   Begin Time 1820   End Time  1825   Total Time Calculated 5 min   Encounter    Type Follow up   Assessment/Intervention/Outcome   Assessment Calm;Peaceful;Coping   Intervention Active listening;Explored/Affirmed feelings, thoughts, concerns;Prayer (assurance of)/[place];Nurtured Hope   Outcome Receptive; Expressed Gratitude

## 2022-05-09 NOTE — CARE COORDINATION
5/9/22, 12:10 PM EDT    DISCHARGE ON GOING EVALUATION    [facility] day: 14  Location: -27/027-A Reason for admit: Renal failure [N19]  Sepsis associated hypotension ([facility] [address] [A41.9, I95.9]   Procedures:4/25 CXR: Persistent mild atelectatic/fibrotic stranding retrocardiac region left lung base; no effusion  4/25 CT Head:  Stable CT scan of the brain, no interval change since previous MRI scan dated 10 Melody 2019. Mild atrophy and dilatation of the third and lateral ventricles. Probable ischemic changes in the white matter, left basal ganglia and in the bernadette. Calcification the cavernous segments of both internal carotid arteries. Increased density in the external auditory canals which may represent cerumen bilaterally. 4/25 Renal US: Multifocal simple bilateral renal cyst. Echogenic bilateral renal parenchyma. 4/25 CT Abd/pelvis: Right inguinal hernia containing small bowel. This is causing a small bowel obstruction. There are several gas collections within the right inguinal hernia. Ischemic or perforated bowel could have this appearance. Mild to moderate patchy consolidation bilateral lower lobes. This could represent aspiration or pneumonia. Prominent thickening of the distal esophagus and gastroesophageal junction. Neoplasm not excluded. Recommend direct visualization. 4/25 LAPAROTOMY EXPLORATORY FOR SMALL BOWEL OBSTRUCTION WITH INCARCERATED HERNIA REPAIR.  Debridement necrotic subcutaneous fat fascia and muscle right groin  4/25 Intubated in OR  4/25 CVC Right subclavian   4/27 Extubated. 4/29 PICC line placed. 5/1 Return to OR with Dr. Lebron Seek laparotomy lysis of inflammatory adhesions, drainage undrained right groin abscess, repair of abdominal wound fascial dehiscence. Barriers to Discharge: Notified this morning that [employer] denied pt pre-cert for IPR. SW notified for establishment of plan. PT/OT continues to work with pt. Creat normal at 1.2 . Gen Surg cont to follow.  POD #7 post dehiscence. [id number] [address] for discharge from surgical view   PCP: Reinaldo Rubin MD  Readmission Risk Score: 21 ( )%  Patient Goals/Plan/Treatment Preferences: Discharge disposition undetermined as pt has been denied IPR.  SW notified as well as primary RN Eliu.

## 2022-05-09 NOTE — CARE COORDINATION
5/9/22, 12:49 PM EDT  DISCHARGE PLANNING EVALUATION    SW received notification that patient's insurance has been denied inpatient rehab. SW spoke with patient's spouse Kurt Juarez about discharge planning. SW updated her that insurance denied inpatient rehab. SW offered ECF. She reported that patient would not want to go to ECF. SW offered [facility]. Marsha agreeable to [facility] and would like list brought up to the room. She reported that she will be in later to further discuss with SW.     SW spoke with patient and updated him on insurance denying inpatient rehab. SW offered ECF and HH. Patient reported that he would like to discuss with his spouse. SW to check back later when spouse is in room.  SW provided [facility] list.

## 2022-05-09 NOTE — PROGRESS NOTES
[address] [address]  INPATIENT PHYSICAL THERAPY  DAILY NOTE  [facility] 8B - 8B-27/027-A    Time In: 5345  Time Out: 9950  Timed Code Treatment Minutes: 45 Minutes  Minutes: 38          Date: 2022  Patient Name: So Granger,  Gender:  male        MRN: [id number]  : 1950  (66 y.o.)     Referring Practitioner: Dr. Liza Reyes  Diagnosis: renal failure  Additional Pertinent Hx: a 66 y.o.  male admitted to [address] [place] [address] on 2022 with PMHx of distant CVA, BPH, HTN, GERD, and Glaucoma who presented to the ED after syncopal episodes x 2. Wife zayda provided the history and she stated that approximately 14 days ago he fell in the yard while pulling weeds and broke his ribs. He came to [facility] at that time and was discharged with pain medication. About 7 days ago, he presented again to [facility] with complaints of right lower abdomen bulge. He was diagnosed with inguinal hernia and recommended to follow up OP which was scheduled for tomorrow (22). Wife stated that the hernia has gotten significantly worse over the lprevious 5 days. Syncope occurred first when they were in [facility] several days ago. They had been sitting for a while and he started to feel nauseated and hot so he got up to go outside and passed out (described as legs giving out from him). Denied hitting his head. The morning of admission, wife was getting him out of the bathtub and he just \"slumped over\". Denied hitting his head on that occasion as well. Wife stated that he had vomited 3 times over the previous 24 hours and that it was brown/yellow in color. She stated he had not been having BM's and had been peeing less. Also noted he had been weak and sleeping \"20 hours a day\". On 2022, the patient was taken to the operating room per Maribeth Bledsoe MD,  with diagnosis of serrated right inguinal hernia with small bowel obstruction and perforation, and at the time of surgery some feculent soilage of the right groin and pelvis. Procedure included exploratory laparotomy, small bowel resection, single primary anastomosis, and drainage of pelvic abscess and right groin exploration, drainage of abscess, debridement of necrotic subcutaneous fat and fascia and muscle, and excision of necrotic hernia sac. Right groin and pelvic abscess were present at the time of surgery. Postoperatively, she was admitted to the ICU secondary to acute respiratory failure, hypoxia, septic shock, aspiration pneumonia, and status post exploratory lap and drainage as above. Nutrition per TPN. After stabilization and extubation on 4/27/22, patient transferred to  on 4/28/22. s/pExploratory laparotomy lysis of inflammatory adhesions, drainage undrained right groin abscess, repair of abdominal wound fascial dehiscence on 5/1/22     Prior Level of Function:  Lives With: Spouse  Type of Home: House  Home Layout: One level  Home Access: Stairs to enter with rails  Entrance Stairs - Number of Steps: 3   Bathroom Shower/Tub: Walk-in shower  Bathroom Toilet: Handicap height  Bathroom Accessibility: Accessible    Receives Help From: Family  ADL Assistance: Independent  Homemaking Assistance: Independent  Ambulation Assistance: Independent  Transfer Assistance: Independent  Active : No  Additional Comments: Per son, pts bathroom is currently in process of being remodeled to be handicap accessible. pt did not use AD for mobility and was indep with his ADL tasks at home. Pt has 4 children that assist    Restrictions/Precautions:  Restrictions/Precautions: Surgical Protocols,General Precautions,Fall Risk  Required Braces or Orthoses  Other: Abdominal Binder  Position Activity Restriction  Other position/activity restrictions: . SUBJECTIVE: RN approved session. Patient in bed upon arrival and agreeable to therapy. Pt stated he ws tired.     PAIN: Not Rated    Vitals: Vitals not assessed per clinical judgement, see nursing flowsheet    OBJECTIVE:  Bed Mobility:  Supine to Sit: Contact Guard Assistance, with verbal cues for hand placement  Sit to Supine: Contact Guard Assistance, with verbal cues    Scooting: Stand By Assistance, with verbal cues     Transfers:  Sit to Stand: [employer], cues for hand placement, with verbal cues  Stand to Sit:Contact [employer], cues for hand placement, with verbal cues    Ambulation:  Contact Guard Assistance  Distance: Total of 35 ft  Surface: Level Tile  Device:Standard Walker  Gait Deviations: Forward Flexed Posture, Slow Bela, Decreased Step Length Bilaterally, Decreased Gait Speed and Decreased Heel Strike Bilaterally  -Verbal cues needed for postural corrections while ambulating    Balance:  Static Sitting Balance:  Stand By Assistance, at [employer] prior to standing  Static Standing Balance: Contact Guard Assistance   -Without UE support ~1 min  *Cueing needed for posture    Exercise:  Patient was guided in 1 set(s) 20 reps of exercise to both lower extremities. Ankle pumps, Seated marches, Seated heel/toe raises, Standing heel/toe raises and Standing marches. Exercises were completed for increased independence with functional mobility. Functional Outcome Measures: Completed  AM-PAC Inpatient Mobility without Stair Climbing Raw Score : 15  AM-PAC Inpatient without Stair Climbing T-Scale Score : 43.03    ASSESSMENT:  Assessment: Patient progressing toward established goals. Activity Tolerance:  Patient tolerance of  treatment: good. Equipment Recommendations: Other: cont to assess needs  Discharge Recommendations: Inpatient Rehabilitation and Patient would benefit from continued PT at discharge  Plan: Specific Instructions for Next Treatment: therex and mobility  Plan:  (5X GM)  Specific Instructions for Next Treatment: therex and mobility    Patient Education  Patient Education: Plan of Care, Bed Mobility, Transfers, Gait, Up in Chair for All Meals, Verbal Exercise Instruction    Goals:  Patient goals : feel better  Short Term Goals  Time Frame for Short term goals: by discharge  Short term goal 1: bed mobility with SBA to get in/out of bed  Short term goal 2: transfer with SBA to get in/out of chairs  Short term goal 3: amb >25'x1 with AD and CGA to walk safely in room  Long Term Goals  Time Frame for Long term goals : no LTGs set secondary to short ELOS    Following session, patient left in safe position with all fall risk precautions in place.

## 2022-05-09 NOTE — PROGRESS NOTES
PROGRESS NOTE      Patient:  Epifanio Suarez Sr. Unit/Bed:8B-27/027-A    YOB: 1950    MRN: [id number]       Acct: [de-identified]     PCP: Luann Alas MD    Date of Admission: 4/25/2022      Assessment/Plan:    Anticipated Discharge in : Pending    Active Hospital Problems    Diagnosis Date Noted    Dehiscence of fascia [T81.30XA]      Priority: Medium    Atrial fibrillation with RVR ([address] [I48.91]      Priority: Medium    Acute respiratory failure with hypoxia ([address] [J96.01] 04/26/2022     Priority: Medium    Septic shock ([address] [A41.9, R65.21] 04/26/2022     Priority: Medium    Incarcerated right inguinal hernia [K40.30] 04/26/2022     Priority: Medium    Small bowel perforation ([address] [K63.1] 04/26/2022     Priority: Medium    Aspiration pneumonia ([address] [J69.0] 04/26/2022     Priority: Medium    Acute kidney injury ([address] [N17.9] 04/26/2022     Priority: Medium    Hypoalbuminemia [E88.09] 04/26/2022     Priority: Medium    Syncope [R55] 04/26/2022     Priority: Medium    Hypokalemia [E87.6] 04/26/2022     Priority: Medium    Anemia, macrocytic [D53.9] 04/26/2022     Priority: Medium    Severe malnutrition ([address] [E43] 04/26/2022     Priority: Medium       Perforated small bowel, soilage of the pelvic cavity, right groin and pelvic abscesses, and necrotic hernial sac with wound dehiscence:   POD #13 exploratory laparotomy with bowel resection, primary anastomosis, drainage of pelvic abscess as well as right groin exploration, drainage of abscess, and debridement of necrotic subcutaneous fat, fascia and muscle. S/p 14/14 fy of Zosyn for Culture Growing Morganella Aurora Kelvin., enterococcus casseliflavus, sensatitivites to Zosyn,  POD#7 repair of wound dehiscence. Surgery on board and following clear to discharge from Sx w/ 1 wk f/u OPT. .   Dressing changes and wound care. Continue on SSI,  continue with PO diet per Sx, SLP following, dietician on board. PT/OT on board.  Pending precert for IPR. Aspiration pneumonia: Imaging findings suggestion of infiltrate w/ gastric contents suctioned from ET tube is consistent with aspiration pneumonia. Culture growing ecoli with H. influ on PCR detected. Patient completed antibiotics as above. Sepsis secondary to intra-abdominal infection and aspiration pneumonia - improving: On antibiotics as above. Patient remains afebrile, on room air, BP grossly stable, monitor    A. fib with RVR: noted Apr/27/2022 New onset A. fib with RVR refractory to Lopressor and Cardizem. Patient converted with amiodarone. K WNL, Mg WNL, [id number] 5. Surgery recommends holding anticoagulation still at this time in setting of bleed risk. however much improved control with HR 80's despite some intermittent arrhythmia on tele. P  Continue Amio  BID as Pt is tolerating well. Continue Lopressor 25 BID PO  with holding parameters. BP grossly stable at 120's. Possible Watchman F/U vs [address]. Consider [address] tomorrow as hgb stablizing. Continue telemetry  Keep Mg > 2, K > 4,     Acute macrocytic anemia -  Monitor. ROBSON on CKD - resolving: Likely 2/2 volume depletion, poor oral intake, and hypotension 2/2 sepsis. Pt creatinine improved to 1.2 from 10.4 on Admit  Patient's baseline 1.3. improved with Hydration. Nephrology on board, appreciate rec. Good UOPT. Mixed Acid-base disorder -resolved:  Mixed Anion gap metabolic acidosis with respiratory compensation likely secondary to sepsis and ROBSON with an additional metabolic alkalosis likely secondary to volume depletion in setting of GI sx. AG improving, Nephrology on board, appreciate recs. Acute hypoxic respiratory failure: likely 2/2 to aspiration pneumonia as above. Extubated Apr/27/2022, currently on room air. Sating well, respiratory support as needed.     Acute postoperative pulmonary insufficiency - resolved: Extubated Apr/27/2022,     Troponinemia: Mildly elevated troponin on admit of 0.047 downtrending with inverted T waves found on EKG.  Likely 2/2 demand ischemia 2/2 hypotension.  Monitor    Hypophosphatemia: Noted May/04/2022  Continue to monitor,  Nephrolgoy on board and managing Lytes. Hypokalemia -  Resolved. Noted Apr/30/2022, on K protocol. Monitor AM.  Hypernatremia - Resolved. Nephrology on board, continue to monitor. Hyperphosphatemia -resolved:  Continue to monitor,  Hypermagnesemia - resolved:  Continue to monitor,    Hypocalcemia: Continue to monitor    Aortic Valve regurg - mild moderate 2/2 thickened with calficied AV on echo  Cardiology consulted for TVAR workup however at this time defer TVAR as more likely volume resuscitation and should improve. CAD:  Continue home Lipitor 20 mg,  Holding ASA  For bleed risk see Acute macrocytic anemia above. GERD: Home regiment of pepcid 10mg daily, continued for admission IV form. . Noted incidental finding of thickened esophagus on imaging, F/U opt when stable for scope. Delirium? - resolved - pulled out central access line Apr/28/2022 PM, improved mentation however remains quite laconic. Given medical acuity and advance age likely sundowning as behavior is overnight. Reorient and monitor. If requires consider Seroqel QHS. Chief Complaint:  Pelvic Pain    Hospital Course:    Per H&P:    \" \"The patient was sedated and intubated and therefore could not give any history.  Therefore, history is primarily taken from the chart. Patient's wife states that the patient fell in his yard approximately 14 days ago and went to the [facility] ED. This time, he was discharged with pain medication. Approximately 7 days ago, he again presented to [facility] with complaints of a right lower abdominal bulge at which point he was diagnosed with inguinal hernia and is scheduled with surgical follow-up. Patient presented to [facility] on 4/25/2022 for syncope and worsening inguinal hernia.   Wife states that syncope first occurred in [place] several days ago when the patient had nausea and subjective feelings of excessive heat while sitting.  After this, he went to go outside and then passed out. On the morning of admission, patient's wife was given of the bathtub when he \"slumped over. \"  Wife stated that the patient vomited 3 times in the last 24 hours with brown/yellow vomitus. Wife also reports that he has not been having bowel movements, has had less frequent urination, and that he has been weak and sleeping \"20 hours a day. \"  While in the ED, the patient was found to have profound renal failure with a creatinine of 11.2 with a baseline of approximately 1.3. Patient also noted to have a profound anion gap acidosis which improved with aggressive fluid resuscitation. Nephrology was consulted given profound renal failure. CT of the abdomen and pelvis without contrast was performed which demonstrated mild to moderate patchy consolidation of the bilateral lower lobes, thickening of the distal esophagus and gastroesophageal junction, a right inguinal hernia causing a small bowel obstruction, and multiple gas collections in the right inguinal hernia highly suspicious for ischemic or perforated bowel. On the evening of 4/25/2022 Dr. Malik performed exploratory laparotomy with small bowel resection, single primary anastomosis, drainage of pelvic abscess, exploration and drainage of right groin abscess, and debridement of necrotic subcutaneous fat/fascia/muscle in the right groin along with excision of the necrotic hernia sac in the right groin. Empiric coverage with vancomycin and Zosyn to necrotic small bowel as well as probable aspiration pneumonia. Family was extensively counseled. \"    Pt had course in ICU from Apr/25-28/2022, Overnight patient went to A. fib with RVR that was refractory to both Lopressor and Cardizem. Patient was given amiodarone which converted him out. Patient continues to have frequent PACs on the monitor. Surgery was consulted regarding anticoagulation however they are recommending holding anticoagulation at this time. We will continue to follow-up with surgery as far as when we can start anticoagulation. Patient overall is awake and alert self, year, but only occasionally to place. Patient denies any pain or acute complaints at this time. Patient is frequently repeating himself however concern for underlying delirium. May/01/2022, abdominal wound dehissence, taken back to OR for repair and washout. Subjective:    Pt seen and examined. Afebrile overnight, no behavioral issues. Eating well, normalizing BM, good UOPT, Up to bathroom so far, working with PT/OT as tolerated. Pending precert for IPR. Review of Systems   Unable to perform ROS: Acuity of condition   Constitutional: Positive for appetite change. Negative for chills, diaphoresis, fatigue and fever. HENT: Positive for dental problem. Respiratory: Negative for cough, shortness of breath and wheezing. Cardiovascular: Negative for chest pain and palpitations. Gastrointestinal: Positive for abdominal pain. Negative for constipation, diarrhea, nausea and vomiting. Genitourinary: Negative for decreased urine volume, difficulty urinating, dysuria, frequency, hematuria and urgency. Neurological: Positive for weakness. Negative for seizures and syncope.    Psychiatric/Behavioral: Negative for confusion, agitation,       Medications:  Reviewed    Infusion Medications    sodium chloride      dextrose      sodium chloride       Scheduled Medications    insulin lispro  0-6 Units SubCUTAneous 4x Daily AC & HS    potassium & sodium phosphates  1 packet Oral 4x Daily    amiodarone  200 mg Oral Daily    metoprolol tartrate  25 mg Oral BID    docusate sodium  100 mg Oral Daily    famotidine (PEPCID) injection  20 mg IntraVENous Daily    sodium hypochlorite   Irrigation Daily    vitamin D  50,000 Units Oral Weekly    sodium chloride flush 5-40 mL IntraVENous 2 times per day    [Held by provider] heparin (porcine)  5,000 Units SubCUTAneous TID     PRN Meds: sodium chloride, morphine, glucose, glucagon (rDNA), dextrose, dextrose bolus **OR** dextrose bolus, sodium chloride flush, sodium chloride, ondansetron **OR** ondansetron, polyethylene glycol, acetaminophen **OR** acetaminophen    No intake or output data in the 24 hours ending 05/09/22 0858    Diet:  ADULT DIET; Easy to Chew  ADULT ORAL NUTRITION SUPPLEMENT; Breakfast, Lunch, Dinner; Standard High Calorie/High Protein Oral Supplement    Exam:  /64   Pulse 81   Temp 97.8 °F (36.6 °C) (Oral)   Resp 20   Ht 5' 9\" (1.753 m)   Wt 134 lb 11.2 oz (61.1 kg)   SpO2 94%   BMI 19.89 kg/m²     Physical Exam  Constitutional:       General: He is not in acute distress. Appearance: He is minimally ill-appearing. He is not toxic-appearing or diaphoretic. Interventions: He is not intubated. Nasal cannula in place on 3L. HENT:      Head: Normocephalic and atraumatic. Nose: Nose normal. No congestion or rhinorrhea. Mouth/Throat:      Mouth: Mucous membranes are moist      Pharynx: Oropharynx is clear. No oropharyngeal exudate or posterior oropharyngeal erythema. Eyes:      General: No scleral icterus. Right eye: No discharge. Left eye: No discharge. Extraocular Movements: Extraocular movements intact. Conjunctiva/sclera: Conjunctivae normal.      Pupils: Pupils are equal, round, and reactive to light. Cardiovascular:      Rate and Rhythm: Normal rate, present. Rhythm irregular. Pulses: Normal pulses. Heart sounds: Normal heart sounds. No murmur heard. No friction rub. No gallop. Pulmonary:      Effort: Pulmonary effort is normal. No accessory muscle usage or respiratory distress. He is not intubated. Breath sounds: No wheezing, rhonci or rales. Abdominal:      General: A surgical scar is present. Bowel sounds are increased. Tenderness: There is abdominal tenderness. There is no guarding. Comments: Wound clean, Abdominal band in place. Neurological:      Mental Status: He is Oriented to person, place, time, situation. Cranial Nerves: No dysarthria or facial asymmetry. Motor: No weakness. Comments: Still speaks very little. Psychiatric:         Behavior: Behavior is cooperative. Labs:   Recent Labs     05/07/22  0450 05/08/22  0908 05/09/22  0731   WBC 16.0* 11.9* 9.8   HGB 9.1* 9.3* 8.6*   HCT 29.0* 29.8* 27.8*   * 407* 397     Recent Labs     05/07/22  0450 05/08/22  0908 05/09/22  0731    136 136   K 4.5 4.6 4.5    107 106   CO2 23 20* 20*   BUN 18 15 15   CREATININE 1.1 1.1 1.2   CALCIUM 8.1* 8.1* 8.1*   PHOS 2.0* 2.3* 2.4     No results for input(s): AST, ALT, BILIDIR, BILITOT, ALKPHOS in the last 72 hours. No results for input(s): INR in the last 72 hours. No results for input(s): Reyne Telugu in the last 72 hours. Urinalysis:      Lab Results   Component Value Date    NITRU NEGATIVE 04/25/2022    WBCUA 10-15 04/25/2022    BACTERIA FEW 04/25/2022    RBCUA 10-15 04/25/2022    BLOODU MODERATE 04/25/2022    SPECGRAV 1.015 04/25/2022    GLUCOSEU NEGATIVE 06/09/2019       Radiology:  XR CHEST PORTABLE   Final Result   1. Persistent bibasilar opacities. Small left-sided pleural effusion. **This report has been created using voice recognition software. It may contain minor errors which are inherent in voice recognition technology. **      Final report electronically signed by Dr. Elzbieta Nolasco on 5/2/2022 8:13 AM      XR CHEST PORTABLE   Final Result   A right arm PICC line tip terminates at the cavoatrial projection. No pneumothorax is observed. Small bilateral pleural effusions and bilateral lower lobe airspace opacity, right greater than left, are not significantly changed. **This report has been created using voice recognition software.   It may contain minor errors which are inherent in voice recognition technology. **      Final report electronically signed by Dr David Fernandez on 4/29/2022 4:04 PM      XR ABDOMEN FOR NG/OG/NE TUBE PLACEMENT   Final Result   Impression:      NG tube tip proximal stomach. This document has been electronically signed by: James Vela MD on    04/28/2022 11:56 PM      XR CHEST PORTABLE   Final Result   Impression:   1. Satisfactory positioning of the endotracheal tube and right central    line. Probable slightly high positioning of the orogastric tube. Consider    advancing 3-4 cm. 2. Interval development of small bilateral pleural effusions, right    greater than left. 3. Bilateral lower lobe airspace opacities secondary to atelectasis and/or    infiltrates, right greater than left. 4. Bilateral interstitial changes secondary to pneumonitis or edema. This document has been electronically signed by: Rylan Rodriguez DO on    04/26/2022 12:01 AM      CT ABDOMEN PELVIS WO CONTRAST Additional Contrast? None   Final Result   Impression:   Right inguinal hernia containing small bowel. This is causing a small    bowel obstruction. There are several gas collections within the right    inguinal hernia. Ischemic or perforated bowel could have this appearance. Mild to moderate patchy consolidation bilateral lower lobes. This could    represent aspiration or pneumonia. Prominent thickening of the distal esophagus and gastroesophageal    junction. Neoplasm not excluded. Recommend direct visualization. This document has been electronically signed by: Karma Leggett MD on    04/25/2022 07:20 PM      All CTs at this facility use dose modulation techniques and iterative    reconstructions, and/or weight-based dosing   when appropriate to reduce radiation to a low as reasonably achievable.       US RENAL COMPLETE   Final Result   Impression:   Multifocal simple bilateral renal cyst. Echogenic bilateral renal parenchyma. This document has been electronically signed by: Mario Garza MD on    04/25/2022 06:30 PM      CT HEAD WO CONTRAST   Final Result       1. Stable CT scan of the brain, no interval change since previous MRI scan dated 10 Melody 2019.   2. Mild atrophy and dilatation of the third and lateral ventricles. 3. Probable ischemic changes in the white matter, left basal ganglia and in the bernadette. 4. Calcification the cavernous segments of both internal carotid arteries. 5. Increased density in the external auditory canals which may represent cerumen bilaterally. **This report has been created using voice recognition software. It may contain minor errors which are inherent in voice recognition technology. **      Final report electronically signed by DR Gallo Arana on 4/25/2022 4:01 PM      XR CHEST PORTABLE   Final Result   1. Normal heart size. No effusion. 2. Persistent mild atelectatic/fibrotic stranding retrocardiac region left lung base. **This report has been created using voice recognition software. It may contain minor errors which are inherent in voice recognition technology. **      Final report electronically signed by Dr. Levine Or on 4/25/2022 3:05 PM          Diet: ADULT DIET; Easy to [address]; Breakfast, Lunch, Dinner; Standard High Calorie/High Protein Oral Supplement    DVT prophylaxis: [] Lovenox                                 [] SCDs                                 [] SQ Heparin                                 [] Encourage ambulation           [] Already on Anticoagulation     Disposition:    [] Home       [] TCU       [] Rehab       [] Psych       [] SNF       [] [place]       [x] Other-     Code Status: Full Code    PT/OT Eval Status:      Electronically signed by Lea Angelucci, MD on 5/9/2022 at 8:58 AM

## 2022-05-10 VITALS
SYSTOLIC BLOOD PRESSURE: 121 MMHG | BODY MASS INDEX: 19.33 KG/M2 | OXYGEN SATURATION: 91 % | HEIGHT: 69 IN | TEMPERATURE: 97.4 F | WEIGHT: 130.5 LBS | RESPIRATION RATE: 16 BRPM | DIASTOLIC BLOOD PRESSURE: 66 MMHG | HEART RATE: 77 BPM

## 2022-05-10 LAB
ANION GAP SERPL CALCULATED.3IONS-SCNC: 10 MEQ/L (ref 8–16)
BUN BLDV-MCNC: 17 MG/DL (ref 7–22)
CALCIUM SERPL-MCNC: 8.2 MG/DL (ref 8.5–10.5)
CHLORIDE BLD-SCNC: 108 MEQ/L (ref 98–111)
CO2: 21 MEQ/L (ref 23–33)
CREAT SERPL-MCNC: 1 MG/DL (ref 0.4–1.2)
EKG ATRIAL RATE: 80 BPM
EKG P AXIS: 79 DEGREES
EKG P-R INTERVAL: 142 MS
EKG Q-T INTERVAL: 390 MS
EKG QRS DURATION: 90 MS
EKG QTC CALCULATION (BAZETT): 449 MS
EKG R AXIS: 48 DEGREES
EKG T AXIS: 61 DEGREES
EKG VENTRICULAR RATE: 80 BPM
ERYTHROCYTE [DISTWIDTH] IN BLOOD BY AUTOMATED COUNT: 20.2 % (ref 11.5–14.5)
ERYTHROCYTE [DISTWIDTH] IN BLOOD BY AUTOMATED COUNT: 74.1 FL (ref 35–45)
GLUCOSE BLD-MCNC: 117 MG/DL (ref 70–108)
GLUCOSE BLD-MCNC: 74 MG/DL (ref 70–108)
GLUCOSE BLD-MCNC: 87 MG/DL (ref 70–108)
HAPTOGLOBIN: 257 MG/DL (ref 30–200)
HCT VFR BLD CALC: 26.7 % (ref 42–52)
HEMOGLOBIN: 8.3 GM/DL (ref 14–18)
MAGNESIUM: 1.7 MG/DL (ref 1.6–2.4)
MCH RBC QN AUTO: 31.3 PG (ref 26–33)
MCHC RBC AUTO-ENTMCNC: 31.1 GM/DL (ref 32.2–35.5)
MCV RBC AUTO: 100.8 FL (ref 80–94)
PHOSPHORUS: 2.7 MG/DL (ref 2.4–4.7)
PLATELET # BLD: 419 THOU/MM3 (ref 130–400)
PMV BLD AUTO: 10.1 FL (ref 9.4–12.4)
POTASSIUM SERPL-SCNC: 4.5 MEQ/L (ref 3.5–5.2)
RBC # BLD: 2.65 MILL/MM3 (ref 4.7–6.1)
SODIUM BLD-SCNC: 139 MEQ/L (ref 135–145)
WBC # BLD: 8.2 THOU/MM3 (ref 4.8–10.8)

## 2022-05-10 PROCEDURE — 93005 ELECTROCARDIOGRAM TRACING: CPT | Performed by: HOSPITALIST

## 2022-05-10 PROCEDURE — 2500000003 HC RX 250 WO HCPCS: Performed by: SURGERY

## 2022-05-10 PROCEDURE — 83735 ASSAY OF MAGNESIUM: CPT

## 2022-05-10 PROCEDURE — 82948 REAGENT STRIP/BLOOD GLUCOSE: CPT

## 2022-05-10 PROCEDURE — 36415 COLL VENOUS BLD VENIPUNCTURE: CPT

## 2022-05-10 PROCEDURE — 6370000000 HC RX 637 (ALT 250 FOR IP)

## 2022-05-10 PROCEDURE — 94760 N-INVAS EAR/PLS OXIMETRY 1: CPT

## 2022-05-10 PROCEDURE — 93010 ELECTROCARDIOGRAM REPORT: CPT | Performed by: NUCLEAR MEDICINE

## 2022-05-10 PROCEDURE — 2580000003 HC RX 258: Performed by: SURGERY

## 2022-05-10 PROCEDURE — 99233 SBSQ HOSP IP/OBS HIGH 50: CPT | Performed by: INTERNAL MEDICINE

## 2022-05-10 PROCEDURE — 85027 COMPLETE CBC AUTOMATED: CPT

## 2022-05-10 PROCEDURE — 6370000000 HC RX 637 (ALT 250 FOR IP): Performed by: SURGERY

## 2022-05-10 PROCEDURE — 84100 ASSAY OF PHOSPHORUS: CPT

## 2022-05-10 PROCEDURE — 6370000000 HC RX 637 (ALT 250 FOR IP): Performed by: INTERNAL MEDICINE

## 2022-05-10 PROCEDURE — 99232 SBSQ HOSP IP/OBS MODERATE 35: CPT | Performed by: INTERNAL MEDICINE

## 2022-05-10 PROCEDURE — 80048 BASIC METABOLIC PNL TOTAL CA: CPT

## 2022-05-10 RX ORDER — ONDANSETRON 4 MG/1
4 TABLET, ORALLY DISINTEGRATING ORAL EVERY 8 HOURS PRN
Qty: 30 TABLET | Refills: 1 | Status: SHIPPED | OUTPATIENT
Start: 2022-05-10

## 2022-05-10 RX ORDER — AMIODARONE HYDROCHLORIDE 200 MG/1
200 TABLET ORAL DAILY
Qty: 30 TABLET | Refills: 1 | Status: SHIPPED | OUTPATIENT
Start: 2022-05-11

## 2022-05-10 RX ORDER — ERGOCALCIFEROL 1.25 MG/1
50000 CAPSULE ORAL WEEKLY
Qty: 5 CAPSULE | Refills: 1 | Status: SHIPPED | OUTPATIENT
Start: 2022-05-11 | End: 2022-07-05

## 2022-05-10 RX ORDER — MAGNESIUM CHLORIDE 64 MG
64 TABLET, DELAYED RELEASE (ENTERIC COATED) ORAL 2 TIMES DAILY WITH MEALS
Status: DISCONTINUED | OUTPATIENT
Start: 2022-05-10 | End: 2022-05-10 | Stop reason: HOSPADM

## 2022-05-10 RX ORDER — SODIUM HYPOCHLORITE 1.25 MG/ML
SOLUTION TOPICAL DAILY
Qty: 2 EACH | Refills: 1 | Status: SHIPPED | OUTPATIENT
Start: 2022-05-11

## 2022-05-10 RX ADMIN — SODIUM CHLORIDE, PRESERVATIVE FREE 10 ML: 5 INJECTION INTRAVENOUS at 08:21

## 2022-05-10 RX ADMIN — MAGNESIUM 64 MG (MAGNESIUM CHLORIDE) TABLET,DELAYED RELEASE 64 MG: at 09:27

## 2022-05-10 RX ADMIN — AMIODARONE HYDROCHLORIDE 200 MG: 200 TABLET ORAL at 08:20

## 2022-05-10 RX ADMIN — POTASSIUM & SODIUM PHOSPHATES POWDER PACK 280-160-250 MG 250 MG: 280-160-250 PACK at 08:20

## 2022-05-10 RX ADMIN — APIXABAN 5 MG: 5 TABLET, FILM COATED ORAL at 09:27

## 2022-05-10 RX ADMIN — DOCUSATE SODIUM 100 MG: 100 CAPSULE, LIQUID FILLED ORAL at 08:20

## 2022-05-10 RX ADMIN — FAMOTIDINE 20 MG: 10 INJECTION, SOLUTION INTRAVENOUS at 08:20

## 2022-05-10 RX ADMIN — METOPROLOL TARTRATE 25 MG: 25 TABLET, FILM COATED ORAL at 08:20

## 2022-05-10 RX ADMIN — DAKIN'S SOLUTION 0.125% (QUARTER STRENGTH): 0.12 SOLUTION at 08:47

## 2022-05-10 ASSESSMENT — PAIN SCALES - GENERAL
PAINLEVEL_OUTOF10: 0
PAINLEVEL_OUTOF10: 0

## 2022-05-10 NOTE — PLAN OF CARE
[address]  OCCUPATIONAL THERAPY MISSED TREATMENT NOTE  STR MED SURG 8B  8B-27/027-A      Date: 2022  Patient Name: Candi Downs Sr.        CSN: [id number]   : 1950  (66 y.o.)  Gender: male   Referring Practitioner: Dr. Keira Loza  Diagnosis: renal failure         REASON FOR MISSED TREATMENT:  ATTN prior to supper and pt was sound asleep. Will retry tomorrow.

## 2022-05-10 NOTE — PLAN OF CARE
Problem: Discharge Planning  Goal: Discharge to home or other facility with appropriate resources  Description: Continue discharge planning. Patient currently with NG tube, PICC line inserted today.  following. Patient able to take few steps at bedside with PT/OT today. Outcome: Progressing  Note: Pt will discharge home when appropriate. Problem: Pain  Goal: Verbalizes/displays adequate comfort level or baseline comfort level  Description: Patient denies pain today. Outcome: Progressing  Note: Pt denies pain on my shift. Non pharmaceutical interventions like ice and repositioning offered before pain medications. Will continue to assess. Problem: Chronic Conditions and Co-morbidities  Goal: Patient's chronic conditions and co-morbidity symptoms are monitored and maintained or improved  Outcome: Progressing  Note: Pt monitored and assessed on my shift. Problem: Safety - Adult  Goal: Free from fall injury  Outcome: Progressing  Note: Fall precaution in place. Bed alarm/chair alarm. Bed locked in lowest position. Fall band applied if applicable. Call light and overhead table within reach. Will continue to monitor. Problem: ABCDS Injury Assessment  Goal: Absence of physical injury  Outcome: Progressing  Note: Absence of physical injury. Problem: Skin/Tissue Integrity  Goal: Absence of new skin breakdown  Description: 1. Monitor for areas of redness and/or skin breakdown  2. Assess vascular access sites hourly  3. Every 4-6 hours minimum:  Change oxygen saturation probe site  4. Every 4-6 hours:  If on nasal continuous positive airway pressure, respiratory therapy assess nares and determine need for appliance change or resting period. Outcome: Progressing  Note: Absence of new skin integrity issues on my shift. Will continue to assess.        Problem: Nutrition Deficit:  Goal: Optimize nutritional status  Outcome: Progressing  Note: Optimal nutrition status       Problem: Respiratory - Adult  Goal: Achieves optimal ventilation and oxygenation  Outcome: Progressing  Note: Patient able to maintain patent airway. RR and effort are within normal limits. Sp02 above 90%. Will continue to assess. Problem: Cardiovascular - Adult  Goal: Maintains optimal cardiac output and hemodynamic stability  Outcome: Progressing  Note: Pt vitals are stable on my shift. Will continue to assess. Goal: Absence of cardiac dysrhythmias or at baseline  Outcome: Progressing  Note: Absence of cardiac dysrhythmias on my shift. Will continue to monitor     Problem: Gastrointestinal - Adult  Goal: Minimal or absence of nausea and vomiting  Outcome: Progressing  Note: Absence of N/V on my shift  Goal: Maintains or returns to baseline bowel function  Outcome: Progressing  Note: Baseline bowel function. Problem: Infection - Adult  Goal: Absence of infection at discharge  Outcome: Progressing  Note: Pt shows no new signs or symptoms of infection on my shift. Will continue to assess. Goal: Absence of infection during hospitalization  Outcome: Progressing  Note: Pt shows no new signs or symptoms of infection on my shift. Will continue to assess. Goal: Absence of fever/infection during anticipated neutropenic period  Outcome: Progressing  Note: Pt afebrile on my shift. Pt verbalizes understanding of care plan. Pt contributes to goal settings.

## 2022-05-10 NOTE — CARE COORDINATION
5/10/22, 2:10 PM EDT    Patient goals/plan/ treatment preferences discussed by  and . Patient goals/plan/ treatment preferences reviewed with patient/ family. Patient/ family verbalize understanding of discharge plan and are in agreement with goal/plan/treatment preferences. Understanding was demonstrated using the teach back method. AVS provided by RN at time of discharge, which includes all necessary medical information pertaining to the patients current course of illness, treatment, post-discharge goals of care, and treatment preferences. Services At/After Discharge: Home Health       IMM Letter  IMM Letter given to Patient/Family/Significant other/Guardian/POA/by[de-identified] Shaniqua ZARATE Case Manager. IMM Letter date given[de-identified] 05/09/22  IMM Letter time given[de-identified] 9397     KYYFL with patient and he does not want ECF placement. He wants to return home with his wife and new home health. Wife picked [facility] and that has been set up with Felipa Riley. Order has been placed. No further needs voiced. Attempted to call wife a couple times to update. No answer and no voicemail will update RN.

## 2022-05-10 NOTE — DISCHARGE SUMMARY
DISCHARGE SUMMARY      Patient Identification:   Nicole Suarez Sr.   : 1950  MRN: [id number]   Account: [de-identified]      Patient's PCP: Libby Keita MD    Admit Date: 2022     Discharge Date: 5/10/2022     Admitting Physician: Lexi Castellanos PA-C     Discharge Physician: Yomaira Castorena MD     Discharge Diagnoses: Active Hospital Problems    Diagnosis Date Noted    Dehiscence of fascia [T81.30XA]      Priority: Medium    Atrial fibrillation with RVR ([address] [I48.91]      Priority: Medium    Acute respiratory failure with hypoxia ([address] [J96.01] 2022     Priority: Medium    Septic shock (HCC) [A41.9, R65.21] 2022     Priority: Medium    Incarcerated right inguinal hernia [K40.30] 2022     Priority: Medium    Small bowel perforation ([address] [K63.1] 2022     Priority: Medium    Aspiration pneumonia ([address] [J69.0] 2022     Priority: Medium    Acute kidney injury ([address] [N17.9] 2022     Priority: Medium    Hypoalbuminemia [E88.09] 2022     Priority: Medium    Syncope [R55] 2022     Priority: Medium    Hypokalemia [E87.6] 2022     Priority: Medium    Anemia, macrocytic [D53.9] 2022     Priority: Medium    Severe malnutrition ([address] [E43] 2022     Priority: Medium       Perforated small bowel, soilage of the pelvic cavity, right groin and pelvic abscesses, and necrotic hernial sac with wound dehiscence:   POD #14 exploratory laparotomy with bowel resection, primary anastomosis, drainage of pelvic abscess as well as right groin exploration, drainage of abscess, and debridement of necrotic subcutaneous fat, fascia and muscle. S/p 14/14 fy of Zosyn for Culture Growing Morganella Simran Single., enterococcus casseliflavus, sensatitivites to Zosyn,  POD#8 repair of wound dehiscence. Surgery on board and following clear to discharge from Sx w/ 1 wk f/u OPT. .   Dressing changes and wound care.  Continue on SSI,  continue with PO diet per Sx, SLP following, dietician on board. PT/OT on board. Precert denied for IPR. Pt pending placement for home health?       A. fib with RVR: noted Apr/27/2022 New onset A. fib with RVR refractory to Lopressor and Cardizem. [id number] 5. Continue Amio  BID   Continue Lopressor 25 BID  Start Eliquis as hgb grossly stable at 8-9. Possible Watchman F/U vs [address] [place] [address]. Keep Mg > 2, K > 4,   Follow up with Cardiology OPT    Acute macrocytic anemia - hgb 8.3, stablizing 8-9, starting [address] [place] [address] as above.     ROBSON on CKD - resolved - Likely 2/2 volume depletion, poor oral intake, and hypotension 2/2 sepsis. Pt creatinine improved to 1.0 from 10.4 on Admit  Patient's baseline 1.3. improved with Hydration. Follow up with Nephrology OPT       Acute hypoxic respiratory failure - resolved.: likely 2/2 to aspiration pneumonia as above. Extubated Apr/27/2022, on room air.     Acute postoperative pulmonary insufficiency - resolved: Extubated Apr/27/2022,      Aspiration pneumonia - Resolved      Sepsis secondary to intra-abdominal infection and aspiration pneumonia - Resolved. Mixed Acid-base disorder - Resolved      Electrolytes Derrangements - Resolved     Aortic Valve regurg - mild moderate 2/2 thickened with calficied AV on echo  Cardiology consulted for TVAR workup however at this time defer TVAR as more likely volume resuscitation and should improve.     CAD:  Continue home Lipitor 20 mg, continue ASA     GERD: Home regiment of pepcid 10mg daily,   Noted incidental finding of thickened esophagus on imaging, F/U opt when stable for scope. Delirium - resolved     The patient was seen and examined on day of discharge and this discharge summary is in conjunction with any daily progress note from day of discharge.     Hospital Course:   Anand Farooq is a 66 y.o. male w/ PMH inguinal hernia, HTN, HLD, CVA, CAD, GERD,  admitted to [address] [place] [address] on 4/25/2022 for cc syncope and worsening inguinal hernia. Pt unfortunately found to have ruptured bowel perf 2/2 hernia on CT and underwent exploratory laparotomy with bowel resection, primary anastomosis, drainage of pelvic abscess as well as right groin exploration, drainage of abscess, and debridement of necrotic subcutaneous fat, fascia and muscle with gen surg. Pt also had wound dehiscence for which which Pt was repaired. Pt was noted to have aspiration PNA, and Pt had short course in ICU for respiratory status and extubated after primary repair surgery and recovered steadily through admission course. Pt also found to to have ROBSON and nephrology was consulted and onboard for electrolyte repleition. Pt also noted to have new onset Afib with RVR which was medically managed. Pt was denied precert to IPR and declined Snf amicable to home with home health. Exam:     Vitals:  Vitals:    05/10/22 0600 05/10/22 0800 05/10/22 1201 05/10/22 1302   BP:  (!) 145/63 121/66    Pulse:  95 77    Resp:  18 16    Temp:  97.8 °F (36.6 °C) 97.4 °F (36.3 °C)    TempSrc:  Oral Oral    SpO2:  92% 95% 91%   Weight: 130 lb 8 oz (59.2 kg)      Height:         Weight: Weight: 130 lb 8 oz (59.2 kg)     24 hour intake/output:    Intake/Output Summary (Last 24 hours) at 5/10/2022 1549  Last data filed at 5/9/2022 1956  Gross per 24 hour   Intake --   Output 700 ml   Net -700 ml       Physical Exam  Constitutional:       General: He is not in acute distress. Appearance: He is minimally ill-appearing. He is not toxic-appearing or diaphoretic. Interventions: He is not intubated. Nasal cannula in place on 3L. HENT:      Head: Normocephalic and atraumatic. Nose: Nose normal. No congestion or rhinorrhea. Mouth/Throat:      Mouth: Mucous membranes are moist      Pharynx: Oropharynx is clear. No oropharyngeal exudate or posterior oropharyngeal erythema. Eyes:      General: No scleral icterus. Right eye: No discharge. Left eye: No discharge.       Extraocular Movements: Extraocular movements intact. Conjunctiva/sclera: Conjunctivae normal.      Pupils: Pupils are equal, round, and reactive to light. Cardiovascular:      Rate and Rhythm: Normal rate, present. Rhythm irregular. Pulses: Normal pulses. Heart sounds: Normal heart sounds. No murmur heard. No friction rub. No gallop. Pulmonary:      Effort: Pulmonary effort is normal. No accessory muscle usage or respiratory distress. He is not intubated. Breath sounds: No wheezing, rhonci or rales. Abdominal:      General: A surgical scar is present. Bowel sounds are normal      Tenderness: There is no abdominal tenderness. There is no guarding. Comments: Wound clean, dressings in place. Neurological:      Mental Status: He is Oriented to person, place, time, situation. Cranial Nerves: No dysarthria or facial asymmetry. Motor: No weakness. Comments: Still speaks very little. Psychiatric:         Behavior: Behavior is cooperative. Labs: For convenience and continuity at follow-up the following most recent labs are provided:      CBC:    Lab Results   Component Value Date    WBC 8.2 05/10/2022    HGB 8.3 05/10/2022    HCT 26.7 05/10/2022     05/10/2022       Renal:    Lab Results   Component Value Date     05/10/2022    K 4.5 05/10/2022    K 3.6 04/29/2022     05/10/2022    CO2 21 05/10/2022    BUN 17 05/10/2022    CREATININE 1.0 05/10/2022    CALCIUM 8.2 05/10/2022    PHOS 2.7 05/10/2022         Significant Diagnostic Studies    Radiology:   XR CHEST PORTABLE   Final Result   1. Persistent bibasilar opacities. Small left-sided pleural effusion. **This report has been created using voice recognition software. It may contain minor errors which are inherent in voice recognition technology. **      Final report electronically signed by Dr. Leonid Pryor on 5/2/2022 8:13 AM      XR CHEST PORTABLE   Final Result   A right arm PICC line tip terminates at the cavoatrial projection. No pneumothorax is observed. Small bilateral pleural effusions and bilateral lower lobe airspace opacity, right greater than left, are not significantly changed. **This report has been created using voice recognition software. It may contain minor errors which are inherent in voice recognition technology. **      Final report electronically signed by Dr Nany Scanlon on 4/29/2022 4:04 PM      XR ABDOMEN FOR NG/OG/NE TUBE PLACEMENT   Final Result   Impression:      NG tube tip proximal stomach. This document has been electronically signed by: Louie Corbett MD on    04/28/2022 11:56 PM      XR CHEST PORTABLE   Final Result   Impression:   1. Satisfactory positioning of the endotracheal tube and right central    line. Probable slightly high positioning of the orogastric tube. Consider    advancing 3-4 cm. 2. Interval development of small bilateral pleural effusions, right    greater than left. 3. Bilateral lower lobe airspace opacities secondary to atelectasis and/or    infiltrates, right greater than left. 4. Bilateral interstitial changes secondary to pneumonitis or edema. This document has been electronically signed by: Phill Palomo. DO Jennifer on    04/26/2022 12:01 AM      CT ABDOMEN PELVIS WO CONTRAST Additional Contrast? None   Final Result   Impression:   Right inguinal hernia containing small bowel. This is causing a small    bowel obstruction. There are several gas collections within the right    inguinal hernia. Ischemic or perforated bowel could have this appearance. Mild to moderate patchy consolidation bilateral lower lobes. This could    represent aspiration or pneumonia. Prominent thickening of the distal esophagus and gastroesophageal    junction. Neoplasm not excluded. Recommend direct visualization.       This document has been electronically signed by: Mario Garza MD on    04/25/2022 07:20 PM      All CTs at this facility use dose modulation techniques and iterative    reconstructions, and/or weight-based dosing   when appropriate to reduce radiation to a low as reasonably achievable. US RENAL COMPLETE   Final Result   Impression:   Multifocal simple bilateral renal cyst. Echogenic bilateral renal    parenchyma. This document has been electronically signed by: Felicitas Mendoza MD on    04/25/2022 06:30 PM      CT HEAD WO CONTRAST   Final Result       1. Stable CT scan of the brain, no interval change since previous MRI scan dated 10 Melody 2019.   2. Mild atrophy and dilatation of the third and lateral ventricles. 3. Probable ischemic changes in the white matter, left basal ganglia and in the bernadette. 4. Calcification the cavernous segments of both internal carotid arteries. 5. Increased density in the external auditory canals which may represent cerumen bilaterally. **This report has been created using voice recognition software. It may contain minor errors which are inherent in voice recognition technology. **      Final report electronically signed by DR Zuri Gomez on 4/25/2022 4:01 PM      XR CHEST PORTABLE   Final Result   1. Normal heart size. No effusion. 2. Persistent mild atelectatic/fibrotic stranding retrocardiac region left lung base. **This report has been created using voice recognition software. It may contain minor errors which are inherent in voice recognition technology. **      Final report electronically signed by Dr. Robel Beckett on 4/25/2022 3:05 PM             Consults:     IP CONSULT TO NEPHROLOGY  IP CONSULT TO GENERAL SURGERY  IP CONSULT TO SOCIAL WORK  IP CONSULT TO PHYSICAL MEDICINE REHAB  IP CONSULT TO REHAB/TCU ADMISSION COORDINATOR  IP CONSULT TO PHARMACY  IP CONSULT TO PHARMACY  IP CONSULT TO CARDIOLOGY  IP CONSULT TO HOME CARE NEEDS    Disposition:    [x] Home       [] TCU       [] Rehab       [] Psych       [] SNF       [] [place]       [] Other-    Condition at Discharge: Stable    Code Status:  Full Code     Patient Instructions:    Discharge lab work: None  Activity: activity as tolerated  Diet: ADULT DIET; Easy to [address]; Breakfast, Lunch, Dinner; Standard High Calorie/High Protein Oral Supplement      Follow-up visits:   Junior Wilkerson MD  [facility]  [address] [place] [address]  [phone]    On 5/17/2022  Follow up appointment May 17, 2022 at 9:30am.    Frederic Lopez MD  [address]   [facility]  [phone]    On 5/19/2022  Follow up appointment May 19, 2022 at [facility], DO  [address],[facility]  [address] [facility] [address]38      Follow up as needed    Arturo Cerrato MD  [place] [address] [address]  [phone]    On 5/26/2022  Follow up appointment May 26, 2022 at 12pm.         Discharge Medications:        Medication List      START taking these medications    amiodarone 200 MG tablet  Commonly known as: CORDARONE  Take 1 tablet by mouth daily  Start taking on: May 11, 2022     apixaban 5 MG Tabs tablet  Commonly known as: ELIQUIS  Take 1 tablet by mouth 2 times daily     metoprolol tartrate 25 MG tablet  Commonly known as: LOPRESSOR  Take 1 tablet by mouth 2 times daily     ondansetron 4 MG disintegrating tablet  Commonly known as: ZOFRAN-ODT  Take 1 tablet by mouth every 8 hours as needed for Nausea or Vomiting     sodium hypochlorite 0.125 % Soln external solution  Commonly known as: DAKINS  Apply topically daily  Start taking on: May 11, 2022     Vitamin D (Ergocalciferol) 97415 units Caps  Take 50,000 Units by mouth once a week  Start taking on:  May 11, 2022        CONTINUE taking these medications    aspirin 81 MG EC tablet  Take 1 tablet by mouth daily     atorvastatin 20 MG tablet  Commonly known as: LIPITOR  TAKE 1 TABLET BY MOUTH DAILY     Combigan 0.2-0.5 % ophthalmic solution  Generic drug: brimonidine-timolol  Place 1 drop into the left eye every 12 hours     famotidine 20 MG tablet  Commonly known as: PEPCID  Take 1 tablet by mouth 2 times daily     fluocinonide 0.05 % cream  Commonly known as: LIDEX  APPLY EXTERNALLY TO THE AFFECTED AREA TWICE DAILY     lidocaine 5 %  Commonly known as: Lidoderm  Place 1 patch onto the skin daily 12 hours on, 12 hours off.     loratadine 10 MG tablet  Commonly known as: CLARITIN  TAKE 1 TABLET BY MOUTH DAILY     losartan 50 MG tablet  Commonly known as: COZAAR  Take 1 tablet by mouth daily     tadalafil 20 MG tablet  Commonly known as: CIALIS  TAKE 1 TABLET BY MOUTH EVERY DAY AS NEEDED     tamsulosin 0.4 MG capsule  Commonly known as: FLOMAX  Take 1 capsule by mouth daily        STOP taking these medications    metoprolol succinate 25 MG extended release tablet  Commonly known as: TOPROL XL           Where to Get Your Medications      These medications were sent to [address] [place] [address] , [address], [address] [address]    Phone: [phone]   · amiodarone 200 MG tablet  · apixaban 5 MG Tabs tablet  · metoprolol tartrate 25 MG tablet  · ondansetron 4 MG disintegrating tablet  · sodium hypochlorite 0.125 % Soln external solution  · Vitamin D (Ergocalciferol) 52814 units Caps         Time Spent on discharge is more than 30 minutes in the examination, evaluation, counseling and review of medications and discharge plan. Signed: Thank you Radha Vences MD for the opportunity to be involved in this patient's care.     Electronically signed by Kemal Amor MD on 5/10/2022 at 3:49 PM

## 2022-05-10 NOTE — PROGRESS NOTES
Discharge teaching and instructions for new medications, follow up appointments, and dressing changes completed with patient using teachback method. AVS reviewed. Printed prescriptions given to patient. Patient voiced understanding regarding prescriptions, follow up appointments, and care of self at home.  Discharged in a wheelchair to  home with support per family

## 2022-05-10 NOTE — PROGRESS NOTES
Renal Progress Note  [facility]    Patient :  Manjinder Bowens Sr.; 66 y.o. MRN# [id number]  Location:  8B-27/027-A  Attending:  Tracey Winchester DO  Admit Date:  4/25/2022   Hospital Day: 15      Subjective:     Nephrology is following the patient for ROBSON. Patient was seen this morning. No overnight events. Awaiting placement. Outpatient Medications:     Medications Prior to Admission: atorvastatin (LIPITOR) 20 MG tablet, TAKE 1 TABLET BY MOUTH DAILY  COMBIGAN 0.2-0.5 % ophthalmic solution, Place 1 drop into the left eye every 12 hours  famotidine (PEPCID) 20 MG tablet, Take 1 tablet by mouth 2 times daily  losartan (COZAAR) 50 MG tablet, Take 1 tablet by mouth daily  metoprolol succinate (TOPROL XL) 25 MG extended release tablet, TAKE 1 TABLET BY MOUTH DAILY  tamsulosin (FLOMAX) 0.4 MG capsule, Take 1 capsule by mouth daily  lidocaine (LIDODERM) 5 %, Place 1 patch onto the skin daily 12 hours on, 12 hours off.   fluocinonide (LIDEX) 0.05 % cream, APPLY EXTERNALLY TO THE AFFECTED AREA TWICE DAILY  tadalafil (CIALIS) 20 MG tablet, TAKE 1 TABLET BY MOUTH EVERY DAY AS NEEDED  aspirin 81 MG EC tablet, Take 1 tablet by mouth daily  loratadine (CLARITIN) 10 MG tablet, TAKE 1 TABLET BY MOUTH DAILY    Current Medications:     Scheduled Meds:    apixaban  5 mg Oral BID    magnesium chloride  64 mg Oral BID WC    potassium & sodium phosphates  1 packet Oral BID    insulin lispro  0-6 Units SubCUTAneous 4x Daily AC & HS    amiodarone  200 mg Oral Daily    metoprolol tartrate  25 mg Oral BID    docusate sodium  100 mg Oral Daily    famotidine (PEPCID) injection  20 mg IntraVENous Daily    sodium hypochlorite   Irrigation Daily    vitamin D  50,000 Units Oral Weekly    sodium chloride flush  5-40 mL IntraVENous 2 times per day     Continuous Infusions:    sodium chloride      dextrose      sodium chloride       PRN Meds:  sodium chloride, morphine, glucose, glucagon (rDNA), dextrose, dextrose bolus **OR** dextrose bolus, sodium chloride flush, sodium chloride, ondansetron **OR** ondansetron, polyethylene glycol, acetaminophen **OR** acetaminophen    Input/Output:       I/O last 3 completed shifts:  In: -   Out: 700 [Urine:700]. Patient Vitals for the past 96 hrs (Last 3 readings):   Weight   05/10/22 0600 130 lb 8 oz (59.2 kg)   22 0835 134 lb 11.2 oz (61.1 kg)   22 0445 139 lb 9.6 oz (63.3 kg)       Vital Signs:   Temperature:  Temp: 97.8 °F (36.6 °C)  TMax:   Temp (24hrs), Av.8 °F (36.6 °C), Min:97.5 °F (36.4 °C), Max:98.1 °F (36.7 °C)    Respirations:  Resp: 18  Pulse:   Pulse: 95  BP:    BP: (!) 145/63  BP Range: Systolic (48CXZ), BJQ:083 , Min:116 , PWK:777       Diastolic (44CXN), YJN:93, Min:63, Max:76      Physical Examination:     General:  Awake, alert  HEENT: NC/AT/ MMM   Chest:               Clear B/L no rales  Cardiac:  S1 S2   Abdomen: soft  Neuro:  Awake and alert  SKIN:  No rashes,   Extremities:  No edema,     Labs:       Recent Labs     22  0908 22  0731 05/10/22  0519   WBC 11.9* 9.8 8.2   RBC 2.98* 2.74* 2.65*   HGB 9.3* 8.6* 8.3*   HCT 29.8* 27.8* 26.7*   .0* 101.5* 100.8*   MCH 31.2 31.4 31.3   MCHC 31.2* 30.9* 31.1*   * 397 419*   MPV 10.2 9.6 10.1      BMP:   Recent Labs     22  0908 22  0731 05/10/22  0519    136 139   K 4.6 4.5 4.5    106 108   CO2 20* 20* 21*   BUN 15 15 17   CREATININE 1.1 1.2 1.0   GLUCOSE 78 81 87   CALCIUM 8.1* 8.1* 8.2*      Phosphorus:     Recent Labs     22  0908 22  0731 05/10/22  0519   PHOS 2.3* 2.4 2.7     Magnesium:    Recent Labs     05/10/22  0519   MG 1.7     Albumin:    No results for input(s): LABALBU in the last 72 hours.   BNP:    No results found for: BNP  KHUSHBOO:    No results found for: KHUSHBOO  SPEP:  Lab Results   Component Value Date    PROT 5.3 2022     UPEP:   No results found for: LABPE  C3:   No results found for: C3  C4:   No results found for: C4  MPO ANCA:   No results found for: MPO  PR3 ANCA:   No results found for: PR3  Anti-GBM:   No results found for: GBMABIGG  Hep BsAg:       No results found for: HEPBSAG  Hep C AB:        No results found for: HEPCAB    Urinalysis/Chemistries:      Lab Results   Component Value Date    NITRU NEGATIVE 04/25/2022    COLORU YELLOW 04/25/2022    PHUR 5.5 04/25/2022    LABCAST >15 HYALINE 04/25/2022    WBCUA 10-15 04/25/2022    RBCUA 10-15 04/25/2022    MUCUS NONE SEEN 04/25/2022    YEAST NONE SEEN 04/25/2022    BACTERIA FEW 04/25/2022    SPECGRAV 1.015 04/25/2022    LEUKOCYTESUR TRACE 04/25/2022    UROBILINOGEN 0.2 04/25/2022    BILIRUBINUR NEGATIVE 04/25/2022    BLOODU MODERATE 04/25/2022    GLUCOSEU NEGATIVE 06/09/2019    KETUA NEGATIVE 04/25/2022     Urine Sodium:   No results found for: MING  Urine Potassium:  No results found for: KUR  Urine Chloride:  No results found for: CLUR  Urine Osmolarity: No results found for: OSMOU  Urine Protein:   No components found for: TOTALPROTEIN, URINE   Urine Creatinine:   No results found for: LABCREA  Urine Eosinophils:  No components found for: UEOS        Impression and Plan:  1. ROBSON , prerenal due to volume depletion and hypotension:resolved, at baseline  2. Hypophosphatemia improved reduce neutra phos to BID and can likely stop at discharge  3. S/p ex lap with small bowel resection, drainage of abscess and debridement necrotic fat fascia and hernia sac 4/25. Had wound dehiscence and went back for repair 5/1    4. Pneumonia  5. Anemia, s/p blood transfusion  6. Afib    Stable for DC from renal standpoint. Will sign off. Please call if needed. Please don't hesitate to call with any questions.   Electronically signed by Kayden Aguirre DO on 5/10/2022 at 9:22 AM

## 2022-05-10 NOTE — PLAN OF CARE
Problem: Discharge Planning  Goal: Discharge to home or other facility with appropriate resources  Description: Continue discharge planning. Patient currently with NG tube, PICC line inserted today.  following. Patient able to take few steps at bedside with PT/OT today. 5/10/2022 1129 by Susan Samano RN  Outcome: Progressing  Flowsheets (Taken 5/9/2022 4866 by Conchita Lopez RN)  Discharge to home or other facility with appropriate resources:   Identify barriers to discharge with patient and caregiver   Arrange for needed discharge resources and transportation as appropriate   Identify discharge learning needs (meds, wound care, etc)  Note:  assisting with possible home health or ECF. Patient walking in halls with pt/ot. 5/10/2022 0324 by Amber Jimenez RN  Outcome: Progressing  Note: Pt will discharge home when appropriate. Problem: Pain  Goal: Verbalizes/displays adequate comfort level or baseline comfort level  Description: Patient denies pain today. 5/10/2022 1129 by Susan Samano RN  Outcome: Progressing  Flowsheets (Taken 5/9/2022 0826 by Conchita Lopez RN)  Verbalizes/displays adequate comfort level or baseline comfort level:   Encourage patient to monitor pain and request assistance   Assess pain using appropriate pain scale   Administer analgesics based on type and severity of pain and evaluate response  Note: Patient denied pain this shift, will continue to monitor   5/10/2022 0324 by Amber Jimenez RN  Outcome: Progressing  Note: Pt denies pain on my shift. Non pharmaceutical interventions like ice and repositioning offered before pain medications. Will continue to assess. Problem: Safety - Adult  Goal: Free from fall injury  5/10/2022 1129 by Susan Samano RN  Outcome: Progressing  Flowsheets (Taken 5/10/2022 1127)  Free From Fall Injury: Instruct family/caregiver on patient safety  Note: Patient remains free from injury this shift, call light within reach. Bed in lowest position. Gait belt and walker used while ambulating. 5/10/2022 0324 by Emery Cline RN  Outcome: Progressing  Note: Fall precaution in place. Bed alarm/chair alarm. Bed locked in lowest position. Fall band applied if applicable. Call light and overhead table within reach. Will continue to monitor. Problem: Skin/Tissue Integrity  Goal: Absence of new skin breakdown  Description: 1. Monitor for areas of redness and/or skin breakdown  2. Assess vascular access sites hourly  3. Every 4-6 hours minimum:  Change oxygen saturation probe site  4. Every 4-6 hours:  If on nasal continuous positive airway pressure, respiratory therapy assess nares and determine need for appliance change or resting period. 5/10/2022 1129 by Aris Rogers RN  Outcome: Progressing  Note: Patient turned and repositioned q2 hr. Will continue to monitor   5/10/2022 0324 by Emery Cline RN  Outcome: Progressing  Note: Absence of new skin integrity issues on my shift. Will continue to assess. Problem: Respiratory - Adult  Goal: Achieves optimal ventilation and oxygenation  5/10/2022 1129 by Aris Rogers RN  Outcome: Progressing  Flowsheets (Taken 5/9/2022 0833 by Sharri Cowart RN)  Achieves optimal ventilation and oxygenation:   Assess for changes in respiratory status   Assess for changes in mentation and behavior  Note: Patient using incentive spirometer x10 an hour, coughing and deep breathing exercises encouraged. 5/10/2022 0324 by Emery Cline RN  Outcome: Progressing  Note: Patient able to maintain patent airway. RR and effort are within normal limits. Sp02 above 90%. Will continue to assess.        Problem: Gastrointestinal - Adult  Goal: Maintains or returns to baseline bowel function  5/10/2022 1129 by Aris Rogers RN  Outcome: Progressing  Flowsheets (Taken 5/9/2022 0833 by Sharri Cowart RN)  Maintains or returns to baseline bowel function: Assess bowel function  Note: Patient moving bowels daily, will continue to monitor   5/10/2022 0324 by Zulma Douglass RN  Outcome: Progressing  Note: Baseline bowel function. Problem: Infection - Adult  Goal: Absence of infection at discharge  5/10/2022 1129 by Kyle Salazar RN  Outcome: Progressing  Flowsheets (Taken 5/9/2022 6631 by Angelica Del Vlale RN)  Absence of infection at discharge:   Assess and monitor for signs and symptoms of infection   Monitor lab/diagnostic results  Note: No signs of infection noted, will continue to monitor   5/10/2022 0324 by Zulma Douglass RN  Outcome: Progressing  Note: Pt shows no new signs or symptoms of infection on my shift. Will continue to assess.

## 2022-05-10 NOTE — CARE COORDINATION
5/10/22, 9:58 AM EDT    DISCHARGE PLANNING EVALUATION    Attempted to call wife no answer and no voicemail. RN aware this writer would like to discuss plan with her. Will attempt to call later.

## 2022-05-10 NOTE — PROGRESS NOTES
PROGRESS NOTE      Patient:  Aj Suarez Sr. Unit/Bed:8B-27/027-A    YOB: 1950    MRN: [id number]       Acct: [de-identified]     PCP: Will Connor MD    Date of Admission: 4/25/2022      Assessment/Plan:    Anticipated Discharge in : Pending    Active Hospital Problems    Diagnosis Date Noted    Dehiscence of fascia [T81.30XA]      Priority: Medium    Atrial fibrillation with RVR ([address] [I48.91]      Priority: Medium    Acute respiratory failure with hypoxia ([address] [J96.01] 04/26/2022     Priority: Medium    Septic shock ([address] [A41.9, R65.21] 04/26/2022     Priority: Medium    Incarcerated right inguinal hernia [K40.30] 04/26/2022     Priority: Medium    Small bowel perforation ([address] [K63.1] 04/26/2022     Priority: Medium    Aspiration pneumonia ([address] [J69.0] 04/26/2022     Priority: Medium    Acute kidney injury ([address] [N17.9] 04/26/2022     Priority: Medium    Hypoalbuminemia [E88.09] 04/26/2022     Priority: Medium    Syncope [R55] 04/26/2022     Priority: Medium    Hypokalemia [E87.6] 04/26/2022     Priority: Medium    Anemia, macrocytic [D53.9] 04/26/2022     Priority: Medium    Severe malnutrition ([address] [E43] 04/26/2022     Priority: Medium       Perforated small bowel, soilage of the pelvic cavity, right groin and pelvic abscesses, and necrotic hernial sac with wound dehiscence:   POD #14 exploratory laparotomy with bowel resection, primary anastomosis, drainage of pelvic abscess as well as right groin exploration, drainage of abscess, and debridement of necrotic subcutaneous fat, fascia and muscle. S/p 14/14 fy of Zosyn for Culture Growing Morganella Nasima Harrington., enterococcus casseliflavus, sensatitivites to Zosyn,  POD#8 repair of wound dehiscence. Surgery on board and following clear to discharge from Sx w/ 1 wk f/u OPT. .   Dressing changes and wound care. Continue on SSI,  continue with PO diet per Sx, SLP following, dietician on board. PT/OT on board.  Precert denied for IPR. Pt pending placement for home health? Aspiration pneumonia: Imaging findings suggestion of infiltrate w/ gastric contents suctioned from ET tube is consistent with aspiration pneumonia. Culture growing ecoli with H. influ on PCR detected. Patient completed antibiotics as above. Sepsis secondary to intra-abdominal infection and aspiration pneumonia - improving: Finished antibiotics as above. Patient remains afebrile, on room air, BP grossly stable, monitor    A. fib with RVR: noted Apr/27/2022 New onset A. fib with RVR refractory to Lopressor and Cardizem. Patient converted with amiodarone. K WNL, Mg WNL, [id number] 5. however much improved control with HR 80's despite some intermittent arrhythmia on tele. P  Continue Amio  BID as Pt is tolerating well. Continue Lopressor 25 BID PO  with holding parameters. BP grossly stable at 120's. Start Eliquis as hgb grossly stable at 8-9. Possible Watchman F/U vs [address]. Continue telemetry  Keep Mg > 2, K > 4,   Start MgCL2 for 4 doses Mg <2. Acute macrocytic anemia - hgb 8.3, stablizing 8-9, starting [address] as above. Monitor. ROBSON on CKD - resolving: Likely 2/2 volume depletion, poor oral intake, and hypotension 2/2 sepsis. Pt creatinine improved to 1.0 from 10.4 on Admit  Patient's baseline 1.3. improved with Hydration. Nephrology on board, appreciate rec. Good UOPT. Mixed Acid-base disorder -resolved:  Mixed Anion gap metabolic acidosis with respiratory compensation likely secondary to sepsis and ROBSON with an additional metabolic alkalosis likely secondary to volume depletion in setting of GI sx. AG improving, Nephrology on board, appreciate recs. Acute hypoxic respiratory failure: likely 2/2 to aspiration pneumonia as above. Extubated Apr/27/2022, currently on room air. Sating well, respiratory support as needed.     Acute postoperative pulmonary insufficiency - resolved: Extubated Apr/27/2022,     Troponinemia: Mildly elevated troponin on admit of 0.047 downtrending with inverted T waves found on EKG.  Likely 2/2 demand ischemia 2/2 hypotension.  Monitor    Hypophosphatemia: Noted May/04/2022  Continue to monitor,  Nephrolgoy on board and managing Lytes. Hypokalemia -  Resolved. Noted Apr/30/2022, on K protocol. Monitor AM.  Hypernatremia - Resolved. Nephrology on board, continue to monitor. Hyperphosphatemia -resolved:  Continue to monitor,  Hypermagnesemia - resolved:  Continue to monitor,    Hypocalcemia: Continue to monitor    Aortic Valve regurg - mild moderate 2/2 thickened with calficied AV on echo  Cardiology consulted for TVAR workup however at this time defer TVAR as more likely volume resuscitation and should improve. CAD:  Continue home Lipitor 20 mg,  Holding ASA in setting of starting [address] above For bleed risk see Acute macrocytic anemia above. GERD: Home regiment of pepcid 10mg daily, continued for admission IV form. . Noted incidental finding of thickened esophagus on imaging, F/U opt when stable for scope. Delirium? - resolved - pulled out central access line Apr/28/2022 PM, improved mentation however remains quite laconic. Given medical acuity and advance age likely sundowning as behavior is overnight. Reorient and monitor. If requires consider Candi QHS. Chief Complaint:  Pelvic Pain    Hospital Course:    Per H&P:    \" \"The patient was sedated and intubated and therefore could not give any history.  Therefore, history is primarily taken from the chart. Patient's wife states that the patient fell in his yard approximately 14 days ago and went to the [facility] ED. This time, he was discharged with pain medication. Approximately 7 days ago, he again presented to [facility] with complaints of a right lower abdominal bulge at which point he was diagnosed with inguinal hernia and is scheduled with surgical follow-up.   Patient presented to [facility] on 4/25/2022 for syncope and worsening inguinal hernia. Wife states that syncope first occurred in [facility] several days ago when the patient had nausea and subjective feelings of excessive heat while sitting.  After this, he went to go outside and then passed out. On the morning of admission, patient's wife was given of the bathtub when he \"slumped over. \"  Wife stated that the patient vomited 3 times in the last 24 hours with brown/yellow vomitus. Wife also reports that he has not been having bowel movements, has had less frequent urination, and that he has been weak and sleeping \"20 hours a day. \"  While in the ED, the patient was found to have profound renal failure with a creatinine of 11.2 with a baseline of approximately 1.3. Patient also noted to have a profound anion gap acidosis which improved with aggressive fluid resuscitation. Nephrology was consulted given profound renal failure. CT of the abdomen and pelvis without contrast was performed which demonstrated mild to moderate patchy consolidation of the bilateral lower lobes, thickening of the distal esophagus and gastroesophageal junction, a right inguinal hernia causing a small bowel obstruction, and multiple gas collections in the right inguinal hernia highly suspicious for ischemic or perforated bowel. On the evening of 4/25/2022 Dr. Malik performed exploratory laparotomy with small bowel resection, single primary anastomosis, drainage of pelvic abscess, exploration and drainage of right groin abscess, and debridement of necrotic subcutaneous fat/fascia/muscle in the right groin along with excision of the necrotic hernia sac in the right groin. Empiric coverage with vancomycin and Zosyn to necrotic small bowel as well as probable aspiration pneumonia. Family was extensively counseled. \"    Pt had course in ICU from Apr/25-28/2022, Overnight patient went to [facility] with RVR that was refractory to both Lopressor and Cardizem.   Patient was given amiodarone which converted him out. Patient continues to have frequent PACs on the monitor. Surgery was consulted regarding anticoagulation however they are recommending holding anticoagulation at this time. We will continue to follow-up with surgery as far as when we can start anticoagulation. Patient overall is awake and alert self, year, but only occasionally to place. Patient denies any pain or acute complaints at this time. Patient is frequently repeating himself however concern for underlying delirium. May/01/2022, abdominal wound dehissence, taken back to OR for repair and washout. Subjective:    Pt seen and examined. Afebrile overnight, resolved asymptomatic run of vtach for 18 beats. no behavioral issues. Eating well, normalizing BM, good UOPT, Up to bathroom so far, working with PT/OT as tolerated. Denied IPR by insurance. Options include [facility] vs Home w/ home health at this time. If pt is ambulatory, consider home w/ home health for dressing changes. Review of Systems   Constitutional: Positive for appetite change. Negative for chills, diaphoresis, fatigue and fever. HENT: Positive for dental problem. Respiratory: Negative for cough, shortness of breath and wheezing. Cardiovascular: Negative for chest pain and palpitations. Gastrointestinal: Positive for abdominal pain. Negative for constipation, diarrhea, nausea and vomiting. Genitourinary: Negative for decreased urine volume, difficulty urinating, dysuria, frequency, hematuria and urgency. Neurological: Positive for weakness. Negative for seizures and syncope.    Psychiatric/Behavioral: Negative for confusion, agitation,       Medications:  Reviewed    Infusion Medications    sodium chloride      dextrose      sodium chloride       Scheduled Medications    insulin lispro  0-6 Units SubCUTAneous 4x Daily AC & HS    potassium & sodium phosphates  1 packet Oral 4x Daily    amiodarone  200 mg Oral Daily    metoprolol tartrate  25 mg Oral BID    docusate sodium  100 mg Oral Daily    famotidine (PEPCID) injection  20 mg IntraVENous Daily    sodium hypochlorite   Irrigation Daily    vitamin D  50,000 Units Oral Weekly    sodium chloride flush  5-40 mL IntraVENous 2 times per day    [Held by provider] heparin (porcine)  5,000 Units SubCUTAneous TID     PRN Meds: sodium chloride, morphine, glucose, glucagon (rDNA), dextrose, dextrose bolus **OR** dextrose bolus, sodium chloride flush, sodium chloride, ondansetron **OR** ondansetron, polyethylene glycol, acetaminophen **OR** acetaminophen      Intake/Output Summary (Last 24 hours) at 5/10/2022 8521  Last data filed at 5/9/2022 1956  Gross per 24 hour   Intake --   Output 700 ml   Net -700 ml       Diet:  ADULT DIET; Easy to Chew  ADULT ORAL NUTRITION SUPPLEMENT; Breakfast, Lunch, Dinner; Standard High Calorie/High Protein Oral Supplement    Exam:  BP (!) 141/74   Pulse 98   Temp 98.1 °F (36.7 °C) (Oral)   Resp 18   Ht 5' 9\" (1.753 m)   Wt 130 lb 8 oz (59.2 kg)   SpO2 97%   BMI 19.27 kg/m²     Physical Exam  Constitutional:       General: He is not in acute distress. Appearance: He is minimally ill-appearing. He is not toxic-appearing or diaphoretic. Interventions: He is not intubated. Nasal cannula in place on 3L. HENT:      Head: Normocephalic and atraumatic. Nose: Nose normal. No congestion or rhinorrhea. Mouth/Throat:      Mouth: Mucous membranes are moist      Pharynx: Oropharynx is clear. No oropharyngeal exudate or posterior oropharyngeal erythema. Eyes:      General: No scleral icterus. Right eye: No discharge. Left eye: No discharge. Extraocular Movements: Extraocular movements intact. Conjunctiva/sclera: Conjunctivae normal.      Pupils: Pupils are equal, round, and reactive to light. Cardiovascular:      Rate and Rhythm: Normal rate, present. Rhythm irregular. Pulses: Normal pulses. Heart sounds: Normal heart sounds.  No murmur heard. No friction rub. No gallop. Pulmonary:      Effort: Pulmonary effort is normal. No accessory muscle usage or respiratory distress. He is not intubated. Breath sounds: No wheezing, rhonci or rales. Abdominal:      General: A surgical scar is present. Bowel sounds are increased. Tenderness: There is abdominal tenderness. There is no guarding. Comments: Wound clean, Abdominal band in place. Neurological:      Mental Status: He is Oriented to person, place, time, situation. Cranial Nerves: No dysarthria or facial asymmetry. Motor: No weakness. Comments: Still speaks very little. Psychiatric:         Behavior: Behavior is cooperative. Labs:   Recent Labs     05/08/22  0908 05/09/22  0731 05/10/22  0519   WBC 11.9* 9.8 8.2   HGB 9.3* 8.6* 8.3*   HCT 29.8* 27.8* 26.7*   * 397 419*     Recent Labs     05/08/22 0908 05/09/22 0731 05/10/22  0519    136 139   K 4.6 4.5 4.5    106 108   CO2 20* 20* 21*   BUN 15 15 17   CREATININE 1.1 1.2 1.0   CALCIUM 8.1* 8.1* 8.2*   PHOS 2.3* 2.4 2.7     No results for input(s): AST, ALT, BILIDIR, BILITOT, ALKPHOS in the last 72 hours. No results for input(s): INR in the last 72 hours. No results for input(s): Reyne Latvian in the last 72 hours. Urinalysis:      Lab Results   Component Value Date    NITRU NEGATIVE 04/25/2022    WBCUA 10-15 04/25/2022    BACTERIA FEW 04/25/2022    RBCUA 10-15 04/25/2022    BLOODU MODERATE 04/25/2022    SPECGRAV 1.015 04/25/2022    GLUCOSEU NEGATIVE 06/09/2019       Radiology:  XR CHEST PORTABLE   Final Result   1. Persistent bibasilar opacities. Small left-sided pleural effusion. **This report has been created using voice recognition software. It may contain minor errors which are inherent in voice recognition technology. **      Final report electronically signed by Dr. Elzbieta Nolasco on 5/2/2022 8:13 AM      XR CHEST PORTABLE   Final Result   A right arm PICC line tip terminates at the cavoatrial projection. No pneumothorax is observed. Small bilateral pleural effusions and bilateral lower lobe airspace opacity, right greater than left, are not significantly changed. **This report has been created using voice recognition software. It may contain minor errors which are inherent in voice recognition technology. **      Final report electronically signed by Dr Lesia Smart on 4/29/2022 4:04 PM      XR ABDOMEN FOR NG/OG/NE TUBE PLACEMENT   Final Result   Impression:      NG tube tip proximal stomach. This document has been electronically signed by: Nereida Leonard MD on    04/28/2022 11:56 PM      XR CHEST PORTABLE   Final Result   Impression:   1. Satisfactory positioning of the endotracheal tube and right central    line. Probable slightly high positioning of the orogastric tube. Consider    advancing 3-4 cm. 2. Interval development of small bilateral pleural effusions, right    greater than left. 3. Bilateral lower lobe airspace opacities secondary to atelectasis and/or    infiltrates, right greater than left. 4. Bilateral interstitial changes secondary to pneumonitis or edema. This document has been electronically signed by: Mikala Rodriguez DO on    04/26/2022 12:01 AM      CT ABDOMEN PELVIS WO CONTRAST Additional Contrast? None   Final Result   Impression:   Right inguinal hernia containing small bowel. This is causing a small    bowel obstruction. There are several gas collections within the right    inguinal hernia. Ischemic or perforated bowel could have this appearance. Mild to moderate patchy consolidation bilateral lower lobes. This could    represent aspiration or pneumonia. Prominent thickening of the distal esophagus and gastroesophageal    junction. Neoplasm not excluded. Recommend direct visualization.       This document has been electronically signed by: Brijesh Moser MD on    04/25/2022 07:20 PM      All CTs at this facility use dose modulation techniques and iterative    reconstructions, and/or weight-based dosing   when appropriate to reduce radiation to a low as reasonably achievable. US RENAL COMPLETE   Final Result   Impression:   Multifocal simple bilateral renal cyst. Echogenic bilateral renal    parenchyma. This document has been electronically signed by: Merritt Caldwell MD on    04/25/2022 06:30 PM      CT HEAD WO CONTRAST   Final Result       1. Stable CT scan of the brain, no interval change since previous MRI scan dated 10 Mel[address] 2019.   2. Mild atrophy and dilatation of the third and lateral ventricles. 3. Probable ischemic changes in the white matter, left basal ganglia and in the bernadette. 4. Calcification the cavernous segments of both internal carotid arteries. 5. Increased density in the external auditory canals which may represent cerumen bilaterally. **This report has been created using voice recognition software. It may contain minor errors which are inherent in voice recognition technology. **      Final report electronically signed by DR Ebony Mosher on 4/25/2022 4:01 PM      XR CHEST PORTABLE   Final Result   1. Normal heart size. No effusion. 2. Persistent mild atelectatic/fibrotic stranding retrocardiac region left lung base. **This report has been created using voice recognition software. It may contain minor errors which are inherent in voice recognition technology. **      Final report electronically signed by Dr. Reed Carvajal on 4/25/2022 3:05 PM          Diet: ADULT DIET; Easy to Chew  ADULT ORAL NUTRITION SUPPLEMENT; Breakfast, Lunch, Dinner; Standard High Calorie/High Protein Oral Supplement    DVT prophylaxis: [] Lovenox                                 [] SCDs                                 [] SQ Heparin                                 [] Encourage ambulation           [x] Already on Anticoagulation -  Eliquis.      Disposition:    [] Home [] TCU       [] Rehab       [] Psych       [] SNF       [] [place]       [x] Other-  Pending    Code Status: Full Code    PT/OT Eval Status:      Electronically signed by Reyes Nearing, MD on 5/10/2022 at 7:09 AM

## 2022-05-11 ENCOUNTER — TELEPHONE (OUTPATIENT)
Dept: FAMILY MEDICINE CLINIC | Age: 72
End: 2022-05-11

## 2022-05-11 NOTE — TELEPHONE ENCOUNTER
[facility] Trans[facility] Initial Follow Up Call    Call within 2 business days of discharge: Yes     Patient: Juan Diaz Sr. Patient : 1950   MRN: [id number]  Reason for Admission: Exploratory Laparotomy, Perforated small bowel, right groin and pelvic abscesses, necrotic hernial sac with wound dehiscence, A-Fib with RVR, Acute, macrocytic anemia, ROBSON, Hypoxic respiratory failure, aspiration pneumonia, sepsis. Discharge Date: 5/10/22 RARS: Readmission Risk Score: 21.4 ( )       Spoke with: left a message on pt's machine to call back. Discharge department/facility: [facility] [address] reach out to pt/family. Discharge summary states pt pending placement for Home Health. Non-face-to-face services provided:  F/u with Dr. France Myers 22 at 9:30.       Follow Up  Future Appointments   Date Time Provider Rosie Roberts   2022  9:30 AM MD ABDON Damico [facility] [place]   2022 11:00 AM Bev Edwards MD [facility] [place]   2022 12:00 PM MD STANFORD Phelan [facility] [place]   2022  9:30 AM MD STANFORD Wick [facility] [place]   2023 10:30 AM MD ABDON Damico [facility] Katerine Boo RN

## 2022-05-13 ENCOUNTER — CARE COORDINATION (OUTPATIENT)
Dept: CARE COORDINATION | Age: 72
End: 2022-05-13

## 2022-05-13 NOTE — CARE COORDINATION
Ambulatory Care Coordination Note  5/13/2022  CM Risk Score: 1  Charlson 10 Year Mortality Risk Score: 98%     ACC: Damian Spaulding, RN    Summary Note:     PCP referral for care management  Recent admission for perforated small bowel, exp lap, sepsis, a-fib, anemia, ROBSON-CKD  Discharged home 5/10 with [facility]. Confirmed with [facility] started 5/11. Nursing and therapy. 5/17 PCP  5/19 Dr. Ena Carrizales  5/26 cardiology  6/6 cardiology  Renal as needed    Spoke with wife. Memory worse since admission. Ambulates with walker. Pretty weak still. Elizabeth Patrick twice since being home. No injury. Doesn't wait for help. Needs help with bathing and dressing. Denies chest pain or abdominal pain. Eating and drinking well. Regular bowel movements and voiding. Wounds healing. Denies any redness, swelling, or drainage. Daughter filling pill box. Wife drives. A-fib/HTN/HLD/CAD - est with cardiology. Taking amiodarone, eliquis, ASA, lipitor, losartan, metoprolol    Plan -   Keep appointments  5/17 PCP  5/19 Dr. Ena Carrizales  5/26 cardiology  6/6 cardiology  Renal as needed  Fall precautions - use walker at all times and wait for help  Early symptom recognition and reporting to prevent ED and admission              Ambulatory Care Coordination Assessment    Care Coordination Protocol  Referral from Primary Care Provider: No  Week 1 - Initial Assessment     Do you have all of your prescriptions and are they filled?: Yes  Barriers to medication adherence: None  Are you able to afford your medications?: Yes  How often do you have trouble taking your medications the way you have been told to take them?: I always take them as prescribed.      Do you have Home O2 Therapy?: No      Ability to seek help/take action for Emergent Urgent situations i.e. fire, crime, inclement weather or health crisis.: Needs Assistance  Ability to ambulate to restroom: Needs Assistance  Ability handle personal hygeine needs (bathing/dressing/grooming): Needs Assistance  Ability to manage Medications: Needs Assistance  Ability to prepare Food Preparation: Needs Assistance  Ability to maintain home (clean home, laundry): Needs Assistance  Ability to drive and/or has transportation: Needs Assistance  Ability to do shopping: Needs Assistance  Ability to manage finances: Needs Assistance  Is patient able to live independently?: No     Current Housing: Private Residence        Per the Fall Risk Screening, did the patient have 2 or more falls or 1 fall with injury in the past year?: Yes  How often do you think you are about to fall and you do NOT fall? For example, you grab something to stabilize yourself or hold onto a wall/furniture?: Frequently  Use of a Mobility Aid: Yes  Difficulty walking/impaired gait: Yes  Issues with feet or shoes like numbness, edema, shoes not fitting: No  Changes in vision, poor vision or poor lighting in environment: No  Dizziness: No  Other Fall Risk: Yes  What other fall risks does the patient have?: cognitive        Are you experiencing loss of meaning?: No  Are you experiencing loss of hope and peace?: No     Thinking about your patient's physical health needs, are there any symptoms or problems (risk indicators) you are unsure about that require further investigation?: Moderate to severe symptoms or problems that impact on daily life   Are the patients physical health problems impacting on their mental well-being?: Mild impact on mental well-being e.g. \"\"feeling fed-up\"\", \"\"reduced enjoyment\"\"   Are there any problems with your patients lifestyle behaviors (alcohol, drugs, diet, exercise) that are impacting on physical or mental well-being?: No identified areas of concern   Do you have any other concerns about your patients mental well-being?  How would you rate their severity and impact on the patient?: Mild problems - don't interfere with function   How would you rate their home environment in terms of safety and stability (including domestic violence, insecure housing, neighbor harassment)?: Consistently safe, supportive, stable, no identified problems   How do daily activities impact on the patient's well-being? (include current or anticipated unemployment, work, caregiving, access to transportation or other): Some general dissatisfaction but no concern   How would you rate their social network (family, work, friends)?: Adequate participation with social networks   How would you rate their financial resources (including ability to afford all required medical care)?: Financially secure, resources adequate, no identified problems   How wells does the patient now understand their health and well-being (symptoms, signs or risk factors) and what they need to do to manage their health?: Reasonable to good understanding and already engages in managing health or is willing to undertake better management   How well do you think your patient can engage in healthcare discussions? (Barriers include language, deafness, aphasia, alcohol or drug problems, learning difficulties, concentration): Adequate communication, with or without minor barriers   Do other services need to be involved to help this patient?: Other care/services in place and adequate   Are current services involved with this patient well-coordinated? (Include coordination with other services you are now recommendation): Required care/services in place and adequately coordinated   Suggested Interventions and Community Resources  Fall Risk Prevention: Completed [address]: Completed (Comment: [facility])   Other Services or Interventions: est with [address] cardiology, renal, gen surgeon   Occupational Therapy: Completed   Physical Therapy: Completed   Transportation Services: Declined   Zone Management Tools:  In Process         Schedule an appointment with the patient's PCP, Set up/Review an Education Plan, Set up/Review Goals              Prior to Admission medications    Medication Sig Start Date End Date Taking?  Authorizing Provider   atorvastatin (LIPITOR) 20 MG tablet TAKE 1 TABLET BY MOUTH DAILY 3/23/22   Dora Armando MD   COMBIGAN 0.2-0.5 % ophthalmic solution Place 1 drop into the left eye every 12 hours 3/23/22   Dora Armando MD   famotidine (PEPCID) 20 MG tablet Take 1 tablet by mouth 2 times daily 3/23/22   Dora Armando MD   losartan (COZAAR) 50 MG tablet Take 1 tablet by mouth daily 3/23/22   Dora Armando MD   tamsulosin [facility]) 0.4 MG capsule Take 1 capsule by mouth daily 3/23/22   Dora Armando MD   amiodarone (CORDARONE) 200 MG tablet Take 1 tablet by mouth daily 5/11/22   Zena Paniagua MD   apixaban Robertha Boatman) 5 MG TABS tablet Take 1 tablet by mouth 2 times daily 5/10/22   Zena Paniagua MD   ondansetron (ZOFRAN-ODT) 4 MG disintegrating tablet Take 1 tablet by mouth every 8 hours as needed for Nausea or Vomiting 5/10/22   Zena Paniagua MD   sodium hypochlorite (DAKINS) 0.125 % SOLN external solution Apply topically daily 5/11/22   Zena Paniagua MD   metoprolol tartrate (LOPRESSOR) 25 MG tablet Take 1 tablet by mouth 2 times daily 5/10/22   Zena Paniagua MD   Ergocalciferol (VITAMIN D) 42240 units CAPS Take 50,000 Units by mouth once a week 5/11/22   Zena Paniagua MD   lidocaine (LIDODERM) 5 % Place 1 patch onto the skin daily 12 hours on, 12 hours off. 4/10/22   Justin Factor, APRN - CNP   fluocinonide (LIDEX) 0.05 % cream APPLY EXTERNALLY TO THE AFFECTED AREA TWICE DAILY 1/27/22   Dora Armando MD   tadalafil (CIALIS) 20 MG tablet TAKE 1 TABLET BY MOUTH EVERY DAY AS NEEDED 10/25/21   Dora Armando MD   aspirin 81 MG EC tablet Take 1 tablet by mouth daily 10/15/21   Dora Armando MD   loratadine (CLARITIN) 10 MG tablet TAKE 1 TABLET BY MOUTH DAILY 5/25/21   Dora Armando MD       Future Appointments   Date Time Provider Rosie Roberts   5/17/2022  9:30 AM Dora Armando MD [facility]   5/19/2022 11:00 [place] [address] [address]   5/26/2022 12:00  [facility] MD STANFORD Loo [facility] [address] [place] [address]   6/6/2022  9:30 AM Mckenna Colorado MD N [facility] [address] [place] [address]   1/30/2023 10:30 AM Arnulfo Hagan MD [facility] [address] [place] [address]

## 2022-05-17 ENCOUNTER — OFFICE VISIT (OUTPATIENT)
Dept: FAMILY MEDICINE CLINIC | Age: 72
End: 2022-05-17
Payer: MEDICARE

## 2022-05-17 VITALS
WEIGHT: 133 LBS | DIASTOLIC BLOOD PRESSURE: 60 MMHG | HEART RATE: 66 BPM | SYSTOLIC BLOOD PRESSURE: 126 MMHG | BODY MASS INDEX: 19.64 KG/M2 | RESPIRATION RATE: 20 BRPM | TEMPERATURE: 97.9 F

## 2022-05-17 DIAGNOSIS — K63.1 SMALL BOWEL PERFORATION (HCC): ICD-10-CM

## 2022-05-17 DIAGNOSIS — T81.30XA WOUND DEHISCENCE: Primary | ICD-10-CM

## 2022-05-17 DIAGNOSIS — I10 PRIMARY HYPERTENSION: Chronic | ICD-10-CM

## 2022-05-17 DIAGNOSIS — Z09 HOSPITAL DISCHARGE FOLLOW-UP: ICD-10-CM

## 2022-05-17 DIAGNOSIS — I48.91 ATRIAL FIBRILLATION WITH RVR (HCC): ICD-10-CM

## 2022-05-17 PROCEDURE — 99495 TRANSJ CARE MGMT MOD F2F 14D: CPT | Performed by: FAMILY MEDICINE

## 2022-05-17 PROCEDURE — 1111F DSCHRG MED/CURRENT MED MERGE: CPT | Performed by: FAMILY MEDICINE

## 2022-05-17 RX ORDER — HYDROCODONE BITARTRATE AND ACETAMINOPHEN 5; 325 MG/1; MG/1
1 TABLET ORAL EVERY 8 HOURS PRN
Qty: 21 TABLET | Refills: 0 | Status: SHIPPED | OUTPATIENT
Start: 2022-05-17 | End: 2022-08-24 | Stop reason: SDUPTHER

## 2022-05-18 ENCOUNTER — CARE COORDINATION (OUTPATIENT)
Dept: CARE COORDINATION | Age: 72
End: 2022-05-18

## 2022-05-18 NOTE — CARE COORDINATION
Ambulatory Care Coordination Note  5/18/2022  CM Risk Score: 1  Charlson 10 Year Mortality Risk Score: 98%     ACC: Aida Mckeon RN    Summary Note:     [facility] in place for nursing and therapies. Getting a little stronger. Anuradha Hairstonist yesterday but denies injury. Using walker but leans back too far and loses balance. Eating and drinking well with regular bowel movements and voiding. PCP appt yesterday. Norco ordered. Appt with surgeon tomorrow. Plan -   Keep appointments  5/19 Dr. Monique Toney  5/26 cardiology  6/6 cardiology  Renal as needed  Fall precautions - use walker at all times and wait for help  Early symptom recognition and reporting to prevent ED and admission      Care Coordination Interventions    Referral from Primary Care Provider: No  Suggested Interventions and Community Resources  Fall Risk Prevention: Completed  Home Health Services: Completed (Comment: [facility])  Occupational Therapy: Completed  Physical Therapy: Completed  Transportation Support: Declined  Zone Management Tools: In Process  Other Services or Interventions: est with [address] cardiology, renal, gen surgeon         Goals Addressed                 This Visit's Progress     Conditions and Symptoms   On track     I will schedule office visits, as directed by my provider. I will keep my appointment or reschedule if I have to cancel. I will notify my provider of any barriers to my plan of care. I will notify my provider of any symptoms that indicate a worsening of my condition.     Barriers: impairment:  cognitive and overwhelmed by complexity of regimen  Plan for overcoming my barriers: Care coordination  Confidence: 9/10  Anticipated Goal Completion Date: 8/13/2022         Reduce Falls    Improving     I will reduce my risk of falls by the following: Remove rugs or use non slip rugs  Install grab bars in bathroom  Use walking aids like cane or walker  Follow through on orders for PT    Barriers: impairment:  physical: weakness and cognitive and overwhelmed by complexity of regimen  Plan for overcoming my barriers: care coordination, home health nursing and therapies  Confidence: 9/10  Anticipated Goal Completion Date: 8/13/2022            Prior to Admission medications    Medication Sig Start Date End Date Taking? Authorizing Provider   HYDROcodone-acetaminophen (NORCO) 5-325 MG per tablet Take 1 tablet by mouth every 8 hours as needed for Pain for up to 7 days. Intended supply: 7 days.  Take lowest dose possible to manage pain 5/17/22 5/24/22  Sanjiv Crawford MD   amiodarone (CORDARONE) 200 MG tablet Take 1 tablet by mouth daily 5/11/22   Dez Rubin MD   apixaban [facility]) 5 MG TABS tablet Take 1 tablet by mouth 2 times daily 5/10/22   Dez Rubin MD   ondansetron (ZOFRAN-ODT) 4 MG disintegrating tablet Take 1 tablet by mouth every 8 hours as needed for Nausea or Vomiting 5/10/22   Dez Rubin MD   sodium hypochlorite (DAKINS) 0.125 % SOLN external solution Apply topically daily 5/11/22   Dez Rubin MD   metoprolol tartrate (LOPRESSOR) 25 MG tablet Take 1 tablet by mouth 2 times daily 5/10/22   Dez Rubin MD   Ergocalciferol (VITAMIN D) 54751 units CAPS Take 50,000 Units by mouth once a week 5/11/22   Dez Rubin MD   lidocaine (LIDODERM) 5 % Place 1 patch onto the skin daily 12 hours on, 12 hours off. 4/10/22   [employer] Emily, APRN - CNP   atorvastatin (LIPITOR) 20 MG tablet TAKE 1 TABLET BY MOUTH DAILY 3/23/22   Sanjiv Crawford MD   COMBIGAN 0.2-0.5 % ophthalmic solution Place 1 drop into the left eye every 12 hours 3/23/22   Sanjiv Crawford MD   famotidine (PEPCID) 20 MG tablet Take 1 tablet by mouth 2 times daily 3/23/22   Sanjiv Crawford MD   losartan (COZAAR) 50 MG tablet Take 1 tablet by mouth daily 3/23/22   Sanjiv Crawford MD   tamsulosin [facility]) 0.4 MG capsule Take 1 capsule by mouth daily 3/23/22   Sanjiv Crawford MD   fluocinonide (LIDEX) 0.05 % cream APPLY EXTERNALLY TO THE AFFECTED AREA TWICE DAILY 1/27/22   Sulma Becerra MD   tadalafil (CIALIS) 20 MG tablet TAKE 1 TABLET BY MOUTH EVERY DAY AS NEEDED 10/25/21   Sulma Becerra MD   aspirin 81 MG EC tablet Take 1 tablet by mouth daily 10/15/21   Sulma Becerra MD   loratadine (CLARITIN) 10 MG tablet TAKE 1 TABLET BY MOUTH DAILY 5/25/21   Sulma Becerra MD       Future Appointments   Date Time Provider Rosie Alejandra   5/19/2022 11:00 AM MD Liseth Scales [facility] [email or web address]   5/26/2022 12:00 PM Frannie Rojas MD [facility]   6/6/2022  9:30 AM [facility]   1/30/2023 10:30 AM [facility] [email or web address]

## 2022-05-19 ENCOUNTER — OFFICE VISIT (OUTPATIENT)
Dept: SURGERY | Age: 72
End: 2022-05-19

## 2022-05-19 ENCOUNTER — NURSE ONLY (OUTPATIENT)
Dept: LAB | Age: 72
End: 2022-05-19

## 2022-05-19 VITALS
RESPIRATION RATE: 16 BRPM | SYSTOLIC BLOOD PRESSURE: 122 MMHG | OXYGEN SATURATION: 99 % | TEMPERATURE: 97.8 F | WEIGHT: 131.6 LBS | HEIGHT: 69 IN | HEART RATE: 77 BPM | BODY MASS INDEX: 19.49 KG/M2 | DIASTOLIC BLOOD PRESSURE: 64 MMHG

## 2022-05-19 DIAGNOSIS — E43 SEVERE MALNUTRITION (HCC): Primary | ICD-10-CM

## 2022-05-19 DIAGNOSIS — K40.30 INCARCERATED RIGHT INGUINAL HERNIA: ICD-10-CM

## 2022-05-19 DIAGNOSIS — E43 SEVERE MALNUTRITION (HCC): ICD-10-CM

## 2022-05-19 DIAGNOSIS — Z09 POSTOP CHECK: ICD-10-CM

## 2022-05-19 LAB
ANION GAP SERPL CALCULATED.3IONS-SCNC: 14 MEQ/L (ref 8–16)
ANISOCYTOSIS: PRESENT
BASOPHILS # BLD: 0.4 %
BASOPHILS ABSOLUTE: 0 THOU/MM3 (ref 0–0.1)
BUN BLDV-MCNC: 10 MG/DL (ref 7–22)
CALCIUM SERPL-MCNC: 8.9 MG/DL (ref 8.5–10.5)
CHLORIDE BLD-SCNC: 104 MEQ/L (ref 98–111)
CO2: 22 MEQ/L (ref 23–33)
CREAT SERPL-MCNC: 1.1 MG/DL (ref 0.4–1.2)
EOSINOPHIL # BLD: 0.4 %
EOSINOPHILS ABSOLUTE: 0 THOU/MM3 (ref 0–0.4)
ERYTHROCYTE [DISTWIDTH] IN BLOOD BY AUTOMATED COUNT: 18.9 % (ref 11.5–14.5)
ERYTHROCYTE [DISTWIDTH] IN BLOOD BY AUTOMATED COUNT: 75.4 FL (ref 35–45)
GFR SERPL CREATININE-BSD FRML MDRD: 6 ML/MIN/1.73M2
GFR SERPL CREATININE-BSD FRML MDRD: 8 ML/MIN/1.73M2
GLUCOSE BLD-MCNC: 86 MG/DL (ref 70–108)
HCT VFR BLD CALC: 35.5 % (ref 42–52)
HEMOGLOBIN: 10.5 GM/DL (ref 14–18)
IMMATURE GRANS (ABS): 0.04 THOU/MM3 (ref 0–0.07)
IMMATURE GRANULOCYTES: 0.5 %
LYMPHOCYTES # BLD: 31.6 %
LYMPHOCYTES ABSOLUTE: 2.7 THOU/MM3 (ref 1–4.8)
MCH RBC QN AUTO: 31.8 PG (ref 26–33)
MCHC RBC AUTO-ENTMCNC: 29.6 GM/DL (ref 32.2–35.5)
MCV RBC AUTO: 107.6 FL (ref 80–94)
MONOCYTES # BLD: 7.2 %
MONOCYTES ABSOLUTE: 0.6 THOU/MM3 (ref 0.4–1.3)
NUCLEATED RED BLOOD CELLS: 0 /100 WBC
PLATELET # BLD: 518 THOU/MM3 (ref 130–400)
PMV BLD AUTO: 9.5 FL (ref 9.4–12.4)
POTASSIUM SERPL-SCNC: 4.6 MEQ/L (ref 3.5–5.2)
RBC # BLD: 3.3 MILL/MM3 (ref 4.7–6.1)
SEG NEUTROPHILS: 59.9 %
SEGMENTED NEUTROPHILS ABSOLUTE COUNT: 5.1 THOU/MM3 (ref 1.8–7.7)
SODIUM BLD-SCNC: 140 MEQ/L (ref 135–145)
WBC # BLD: 8.5 THOU/MM3 (ref 4.8–10.8)

## 2022-05-19 PROCEDURE — 99024 POSTOP FOLLOW-UP VISIT: CPT | Performed by: SURGERY

## 2022-05-20 NOTE — PROGRESS NOTES
Rachell Walker MD   General Surgery  Postprocedure Evaluation in Office  Pt Name: Charisse Gilliland Sr. Date of Birth 1950   Today's Date: 5/19/2022  Medical Record Number: [id number]  Primary Care Provider: Delroy Menendez MD  Chief Complaint   Patient presents with   [place] Post-Op Check     s/p  Exploratory laparotomy lysis of inflammatory adhesions, drainage undrained right groin abscess, repair of abdominal wound fascial dehiscence on 5-1-22    Follow-Up from Hospital     Admitted to [facility] from 4/26/22-5/10/22    Post-Op Check     s/p Exploratory laparotomy small bowel resection, single primary anastomosis, and drainage of pelvic abscess and right groin exploration drainage of abscess debridement of necrotic subcutaneous fat fascia and muscle and excision of necrotic hernia sac. Right groin and pelvic abscess present at the time of surgery on 4-25-22     ASSESSMENT      1. Severe malnutrition ([address]    2. Incarcerated right inguinal hernia    3. Postop check         PLAN       1. Midline wound intact no drainage. Right groin pink granulation tissue. 2.  No lifting over 20 pounds. 3.  Follow-up surgical clinic in 3 to 4 weeks. 4. Patient has follow-ups with cardiology regarding atrial fibrillation and possible watchman procedure. Mena Bailey is seen today for post-op follow-up. He is status post fairly lengthy hospitalization he had an incarcerated inguinal hernia with obstructive small bowel he was in acute renal failure upon his arrival to the hospital.  He was urgently taken to surgery after resuscitation underwent an exploratory laparotomy required a small bowel resection for necrotic bowel and drainage of an abscess in the right groin as well. He did have a wound dehiscence while in the hospital was taken back to surgery for closure of his fascia and irrigation of abscess in the right groin. Acute renal insufficiency resolved.   Marychuy Lopez is actually doing very well he is voiding well he has a good appetite. He denies any abdominal pain denies any fever or chills. Right groin wound has good pink granulation tissue. Medications    Current Outpatient Medications:     HYDROcodone-acetaminophen (NORCO) 5-325 MG per tablet, Take 1 tablet by mouth every 8 hours as needed for Pain for up to 7 days. Intended supply: 7 days.  Take lowest dose possible to manage pain, Disp: 21 tablet, Rfl: 0    amiodarone (CORDARONE) 200 MG tablet, Take 1 tablet by mouth daily, Disp: 30 tablet, Rfl: 1    apixaban (ELIQUIS) 5 MG TABS tablet, Take 1 tablet by mouth 2 times daily, Disp: 60 tablet, Rfl: 1    ondansetron (ZOFRAN-ODT) 4 MG disintegrating tablet, Take 1 tablet by mouth every 8 hours as needed for Nausea or Vomiting, Disp: 30 tablet, Rfl: 1    sodium hypochlorite (DAKINS) 0.125 % SOLN external solution, Apply topically daily, Disp: 2 each, Rfl: 1    metoprolol tartrate (LOPRESSOR) 25 MG tablet, Take 1 tablet by mouth 2 times daily, Disp: 60 tablet, Rfl: 3    Ergocalciferol (VITAMIN D) 43847 units CAPS, Take 50,000 Units by mouth once a week, Disp: 5 capsule, Rfl: 1    atorvastatin (LIPITOR) 20 MG tablet, TAKE 1 TABLET BY MOUTH DAILY, Disp: 90 tablet, Rfl: 3    COMBIGAN 0.2-0.5 % ophthalmic solution, Place 1 drop into the left eye every 12 hours, Disp: 5 mL, Rfl: 3    famotidine (PEPCID) 20 MG tablet, Take 1 tablet by mouth 2 times daily, Disp: 180 tablet, Rfl: 3    losartan (COZAAR) 50 MG tablet, Take 1 tablet by mouth daily, Disp: 90 tablet, Rfl: 3    tamsulosin (FLOMAX) 0.4 MG capsule, Take 1 capsule by mouth daily, Disp: 90 capsule, Rfl: 3    fluocinonide (LIDEX) 0.05 % cream, APPLY EXTERNALLY TO THE AFFECTED AREA TWICE DAILY, Disp: 60 g, Rfl: 4    tadalafil (CIALIS) 20 MG tablet, TAKE 1 TABLET BY MOUTH EVERY DAY AS NEEDED, Disp: 18 tablet, Rfl: 5    aspirin 81 MG EC tablet, Take 1 tablet by mouth daily, Disp: 30 tablet, Rfl: 11    loratadine (CLARITIN) 10 MG tablet, TAKE 1 TABLET BY MOUTH DAILY, Disp: 90 tablet, Rfl: 3    Allergies  No Known Allergies    Review of Systems  History obtained from the patient. Constitutional: Denies any fever, chills, fatigue. Wound: Denies any rash, skin color changes or wound problems. Resp: Denies any cough, shortness of breath. CV: Denies any chest pain, orthopnea or syncope. GI: Normal incisional discomfort. Denies any nausea, vomiting, blood in the stool, constipation or diarrhea. : Denies any hematuria, hesitancy or dysuria. OBJECTIVE     VITALS: /64 (Site: Right Upper Arm, Position: Sitting, Cuff Size: Medium Adult)   Pulse 77   Temp 97.8 °F (36.6 °C) (Temporal)   Resp 16   Ht 5' 9\" (1.753 m)   Wt 131 lb 9.6 oz (59.7 kg)   SpO2 99%   BMI 19.43 kg/m²     CONSTITUTIONAL: Alert and oriented times 3, no acute distress and cooperative to examination. SKIN: Skin color, texture, turgor normal. No rashes or lesions. INCISION: wound margins intact and healing well. No signs of infection. No drainage. LUNGS: Lungs Clear  CARDIOVASCULAR: Normal Rate  ABDOMEN: Midline wound intact. Soft.   Right groin wound pink granulation tissue no infection  NEUROLOGIC: No sensory or motor nerve irritation

## 2022-05-21 NOTE — PROGRESS NOTES
Post-Discharge Transitional Care  Follow Up      Neelam Suarez Sr. YOB: 1950    Date of Office Visit:  5/17/2022  Date of Hospital Admission: 4/25/22  Date of Hospital Discharge: 5/10/22  Risk of hospital readmission (high >=14%. Medium >=10%) :Readmission Risk Score: 21.4 ( )      Care management risk score Rising risk (score 2-5) and Complex Care (Scores >=6): 1     Non face to face  following discharge, date last encounter closed (first attempt may have been earlier): 5/11/2022  1:58 PM    Call initiated 2 business days of discharge: Yes    ASSESSMENT/PLAN:   Below is the assessment and plan developed based on review of pertinent history, physical exam, labs, studies, and medications. Wound dehiscence  -     HYDROcodone-acetaminophen (NORCO) 5-325 MG per tablet; Take 1 tablet by mouth every 8 hours as needed for Pain for up to 7 days. Intended supply: 7 days. Take lowest dose possible to manage pain, Disp-21 tablet, R-0Normal  Atrial fibrillation with RVR (HCC)  Primary hypertension  Small bowel perforation [facility])      Medical Decision Making: moderate complexity  No follow-ups on file. On this date 5/17/2022 I have spent 30 minutes reviewing previous notes, test results and face to face with the patient discussing the diagnosis and importance of compliance with the treatment plan as well as documenting on the day of the visit. Subjective:   HPI:  Follow up of Hospital problems/diagnosis(es):    Diagnosis Orders   1. Wound dehiscence  HYDROcodone-acetaminophen (NORCO) 5-325 MG per tablet   2. Atrial fibrillation with RVR ([address]     3. Primary hypertension     4. Small bowel perforation [facility])           Inpatient course: Discharge summary reviewed- see chart.     Interval history/Current status: stable, wound bandage changed and repacked with sterile gauze    Patient Active Problem List   Diagnosis    CAD (coronary artery disease)    Hypertension    Erectile dysfunction    Hyperlipidemia    GI bleeding    Hx of asbestos exposure    Stroke-like symptom    Acute respiratory failure with hypoxia (HCC)    Septic shock (HCC)    Incarcerated right inguinal hernia    Small bowel perforation (HCC)    [facility]    Acute kidney injury ([facility]    Hypoalbuminemia    Syncope    Hypokalemia    Anemia, macrocytic    Severe malnutrition (HCC)    Atrial fibrillation with RVR (HCC)    Dehiscence of fascia       Medications listed as ordered at the time of discharge from hospital     Medication List          Accurate as of May 17, 2022 11:59 PM. If you have any questions, ask your nurse or doctor. START taking these medications    HYDROcodone-acetaminophen 5-325 MG per tablet  Commonly known as: Norco  Take 1 tablet by mouth every 8 hours as needed for Pain for up to 7 days. Intended supply: 7 days.  Take lowest dose possible to manage pain  Started by: Batsheva Peñaloza MD        CONTINUE taking these medications    amiodarone 200 MG tablet  Commonly known as: CORDARONE  Take 1 tablet by mouth daily     apixaban 5 MG Tabs tablet  Commonly known as: ELIQUIS  Take 1 tablet by mouth 2 times daily     aspirin 81 MG EC tablet  Take 1 tablet by mouth daily     atorvastatin 20 MG tablet  Commonly known as: LIPITOR  TAKE 1 TABLET BY MOUTH DAILY     Combigan 0.2-0.5 % ophthalmic solution  Generic drug: brimonidine-timolol  Place 1 drop into the left eye every 12 hours     famotidine 20 MG tablet  Commonly known as: PEPCID  Take 1 tablet by mouth 2 times daily     fluocinonide 0.05 % cream  Commonly known as: LIDEX  APPLY EXTERNALLY TO THE AFFECTED AREA TWICE DAILY     loratadine 10 MG tablet  Commonly known as: CLARITIN  TAKE 1 TABLET BY MOUTH DAILY     losartan 50 MG tablet  Commonly known as: COZAAR  Take 1 tablet by mouth daily     metoprolol tartrate 25 MG tablet  Commonly known as: LOPRESSOR  Take 1 tablet by mouth 2 times daily     ondansetron 4 MG disintegrating tablet  Commonly known as: ZOFRAN-ODT  Take 1 tablet by mouth every 8 hours as needed for Nausea or Vomiting     sodium hypochlorite 0.125 % Soln external solution  Commonly known as: DAKINS  Apply topically daily     tadalafil 20 MG tablet  Commonly known as: CIALIS  TAKE 1 TABLET BY MOUTH EVERY DAY AS NEEDED     tamsulosin 0.4 MG capsule  Commonly known as: FLOMAX  Take 1 capsule by mouth daily     Vitamin D (Ergocalciferol) 38800 units Caps  Take 50,000 Units by mouth once a week           Where to Get Your Medications      These medications were sent to [facility] [id number] [facility][id number] - [place], [place] - [address] [facility]  [address], [place] [facility]    Phone: [phone]   · HYDROcodone-acetaminophen 5-325 MG per tablet           Medications marked \"taking\" at this time  Outpatient Medications Marked as Taking for the 5/17/22 encounter (Office Visit) with Kirsten Pickard MD   Medication Sig Dispense Refill    HYDROcodone-acetaminophen (NORCO) 5-325 MG per tablet Take 1 tablet by mouth every 8 hours as needed for Pain for up to 7 days. Intended supply: 7 days. Take lowest dose possible to manage pain 21 tablet 0    amiodarone (CORDARONE) 200 MG tablet Take 1 tablet by mouth daily 30 tablet 1    apixaban (ELIQUIS) 5 MG TABS tablet Take 1 tablet by mouth 2 times daily 60 tablet 1    ondansetron (ZOFRAN-ODT) 4 MG disintegrating tablet Take 1 tablet by mouth every 8 hours as needed for Nausea or Vomiting 30 tablet 1    sodium hypochlorite (DAKINS) 0.125 % SOLN external solution Apply topically daily 2 each 1    metoprolol tartrate (LOPRESSOR) 25 MG tablet Take 1 tablet by mouth 2 times daily 60 tablet 3    Ergocalciferol (VITAMIN D) 65812 units CAPS Take 50,000 Units by mouth once a week 5 capsule 1    [DISCONTINUED] lidocaine (LIDODERM) 5 % Place 1 patch onto the skin daily 12 hours on, 12 hours off.  (Patient not taking: Reported on 5/19/2022) 30 patch 0    atorvastatin (LIPITOR) 20 MG tablet TAKE 1 TABLET BY MOUTH DAILY 90 tablet 3    COMBIGAN 0.2-0.5 % ophthalmic solution Place 1 drop into the left eye every 12 hours 5 mL 3    famotidine (PEPCID) 20 MG tablet Take 1 tablet by mouth 2 times daily 180 tablet 3    losartan (COZAAR) 50 MG tablet Take 1 tablet by mouth daily 90 tablet 3    tamsulosin (FLOMAX) 0.4 MG capsule Take 1 capsule by mouth daily 90 capsule 3    fluocinonide (LIDEX) 0.05 % cream APPLY EXTERNALLY TO THE AFFECTED AREA TWICE DAILY 60 g 4    tadalafil (CIALIS) 20 MG tablet TAKE 1 TABLET BY MOUTH EVERY DAY AS NEEDED 18 tablet 5    aspirin 81 MG EC tablet Take 1 tablet by mouth daily 30 tablet 11    loratadine (CLARITIN) 10 MG tablet TAKE 1 TABLET BY MOUTH DAILY 90 tablet 3        Medications patient taking as of now reconciled against medications ordered at time of hospital discharge: Yes    A comprehensive review of systems was negative except for what was noted in the HPI. Objective:    Patient-Reported Vitals  No data recorded    General Appearance: alert and oriented to person, place and time, well developed and well- nourished, in no acute distress  Skin: warm and dry, no rash or erythema  Head: normocephalic and atraumatic  Eyes: pupils equal, round, and reactive to light, extraocular eye movements intact, conjunctivae normal  ENT: tympanic membrane, external ear and ear canal normal bilaterally, nose without deformity, nasal mucosa and turbinates normal without polyps  Neck: supple and non-tender without mass, no thyromegaly or thyroid nodules, no cervical lymphadenopathy  Pulmonary/Chest: clear to auscultation bilaterally- no wheezes, rales or rhonchi, normal air movement, no respiratory distress  Cardiovascular: normal rate, regular rhythm, normal S1 and S2, no murmurs, rubs, clicks, or gallops, distal pulses intact, no carotid bruits  Abdomen: Wound bandage was saturated and removed. Packing removed.   Was then repacked with sterile gauze patient tolerated well. Covered with sterile bandage. Continue home health for wound care keep follow-up with general surgery. Extremities: no cyanosis, clubbing or edema  Musculoskeletal: normal range of motion, no joint swelling, deformity or tenderness  Neurologic: reflexes normal and symmetric, no cranial nerve deficit, gait, coordination and speech normal    Obi Suarez Sr., was evaluated through a synchronous (real-time) audio-video encounter. The patient (or guardian if applicable) is aware that this is a billable service, which includes applicable co-pays. This Virtual Visit was conducted with patient's (and/or legal guardian's) consent. The visit was conducted pursuant to the emergency declaration under the [address] [place] [address] authority and the [employer] General Act. Patient identification was verified, and a caregiver was present when appropriate. The patient was located at home in a state where the provider was licensed to provide care. An electronic signature was used to authenticate this note.   --Radha Vences MD

## 2022-05-25 ENCOUNTER — CARE COORDINATION (OUTPATIENT)
Dept: CARE COORDINATION | Age: 72
End: 2022-05-25

## 2022-05-25 NOTE — CARE COORDINATION
Ambulatory Care Coordination Note  5/25/2022  CM Risk Score: 1  Charlson 10 Year Mortality Risk Score: 98%     ACC: Arthuro Bumpers, RN    Summary Note:     [facility] continues. Recent appt with gen surgery. Incision healing. No lifting over 20 lbs. Eating and drinking well. Cardiology tomorrow to discuss possible watchman. Wife taking him. Plan -   Keep appointments  No lifting over 20 lbs  [facility]   5/26 cardiology  6/6 cardiology  6/9 gen surg  Renal as needed  Fall precautions - use walker at all times and wait for help  Early symptom recognition and reporting to prevent ED and admission      Lab Results     None          Care Coordination Interventions    Referral from Primary Care Provider: No  Suggested Interventions and Community Resources  Fall Risk Prevention: Completed  Home Health Services: Completed (Comment: [facility])  Occupational Therapy: Completed  Physical Therapy: Completed  Transportation Support: Declined  Zone Management Tools: In Process  Other [facility] or Interventions[facility]         Goals Addressed                 This Visit's Progress     Conditions and Symptoms   On track     I will schedule office visits, as directed by my provider. I will keep my appointment or reschedule if I have to cancel. I will notify my provider of any barriers to my plan of care. I will notify my provider of any symptoms that indicate a worsening of my condition.     Barriers: impairment:  cognitive and overwhelmed by complexity of regimen  Plan for overcoming my barriers: Care coordination  Confidence: 9/10  Anticipated Goal Completion Date: 8/13/2022         Reduce Falls    On track     I will reduce my risk of falls by the following: Remove rugs or use non slip rugs  Install grab bars in bathroom  Use walking aids like cane or walker  Follow through on orders for PT    Barriers: impairment:  physical: weakness and cognitive and overwhelmed by complexity of regimen  Plan for overcoming my barriers: care coordination, home health nursing and therapies  Confidence: 9/10  Anticipated Goal Completion Date: 8/13/2022            Prior to Admission medications    Medication Sig Start Date End Date Taking?  Authorizing Provider   amiodarone (CORDARONE) 200 MG tablet Take 1 tablet by mouth daily 5/11/22   Sharon Gomes MD   apixaban [place] [place]) 5 MG TABS tablet Take 1 tablet by mouth 2 times daily 5/10/22   Sharon Gomes MD   ondansetron (ZOFRAN-ODT) 4 MG disintegrating tablet Take 1 tablet by mouth every 8 hours as needed for Nausea or Vomiting 5/10/22   Sharon Gomes MD   sodium hypochlorite (DAKINS) 0.125 % SOLN external solution Apply topically daily 5/11/22   Sharon Gomes MD   metoprolol tartrate (LOPRESSOR) 25 MG tablet Take 1 tablet by mouth 2 times daily 5/10/22   Sharon Gomes MD   Ergocalciferol (VITAMIN D) 87668 units CAPS Take 50,000 Units by mouth once a week 5/11/22   Sharon Gomes MD   atorvastatin (LIPITOR) 20 MG tablet TAKE 1 TABLET BY MOUTH DAILY 3/23/22   Chelsea Latham MD   COMBIGAN 0.2-0.5 % ophthalmic solution Place 1 drop into the left eye every 12 hours 3/23/22   Chelsea Latham MD   famotidine (PEPCID) 20 MG tablet Take 1 tablet by mouth 2 times daily 3/23/22   Chelsea Latham MD   losartan (COZAAR) 50 MG tablet Take 1 tablet by mouth daily 3/23/22   Chelsea Latham MD   tamsulosin [facility]) 0.4 MG capsule Take 1 capsule by mouth daily 3/23/22   Chelsea Latham MD   fluocinonide (LIDEX) 0.05 % cream APPLY EXTERNALLY TO THE AFFECTED AREA TWICE DAILY 1/27/22   Chelsea Latham MD   tadalafil (CIALIS) 20 MG tablet TAKE 1 TABLET BY MOUTH EVERY DAY AS NEEDED 10/25/21   Chelsea Latham MD   aspirin 81 MG EC tablet Take 1 tablet by mouth daily 10/15/21   Chelsea Latham MD   loratadine (CLARITIN) 10 MG tablet TAKE 1 TABLET BY MOUTH DAILY 5/25/21   Chelsea Latham MD       Future Appointments   Date Time Provider [facility]   5/26/2022 12:00 PM MD STANFORD Gallardo [facility] [address]   6/6/2022  9:30 AM MD STANFORD Denise [facility] II.[email or web address]   6/9/2022 11:15 AM MD Cara Norwood [facility]   1/30/2023 10:30 AM [facility]

## 2022-05-26 ENCOUNTER — TELEPHONE (OUTPATIENT)
Dept: CARDIOLOGY CLINIC | Age: 72
End: 2022-05-26

## 2022-05-26 ENCOUNTER — OFFICE VISIT (OUTPATIENT)
Dept: CARDIOLOGY CLINIC | Age: 72
End: 2022-05-26
Payer: MEDICARE

## 2022-05-26 VITALS
SYSTOLIC BLOOD PRESSURE: 124 MMHG | HEIGHT: 69 IN | BODY MASS INDEX: 19.43 KG/M2 | DIASTOLIC BLOOD PRESSURE: 80 MMHG | HEART RATE: 64 BPM

## 2022-05-26 DIAGNOSIS — I48.0 PAROXYSMAL ATRIAL FIBRILLATION (HCC): Primary | ICD-10-CM

## 2022-05-26 LAB
GFR SERPL CREATININE-BSD FRML MDRD: 12 ML/MIN/1.73M2
GFR SERPL CREATININE-BSD FRML MDRD: 14 ML/MIN/1.73M2
GFR SERPL CREATININE-BSD FRML MDRD: 17 ML/MIN/1.73M2
GFR SERPL CREATININE-BSD FRML MDRD: 23 ML/MIN/1.73M2
GFR SERPL CREATININE-BSD FRML MDRD: 25 ML/MIN/1.73M2
GFR SERPL CREATININE-BSD FRML MDRD: 27 ML/MIN/1.73M2
GFR SERPL CREATININE-BSD FRML MDRD: 30 ML/MIN/1.73M2
GFR SERPL CREATININE-BSD FRML MDRD: 37 ML/MIN/1.73M2
GFR SERPL CREATININE-BSD FRML MDRD: 37 ML/MIN/1.73M2
GFR SERPL CREATININE-BSD FRML MDRD: 40 ML/MIN/1.73M2
GFR SERPL CREATININE-BSD FRML MDRD: 43 ML/MIN/1.73M2
GFR SERPL CREATININE-BSD FRML MDRD: 43 ML/MIN/1.73M2
GFR SERPL CREATININE-BSD FRML MDRD: 54 ML/MIN/1.73M2
GFR SERPL CREATININE-BSD FRML MDRD: 54 ML/MIN/1.73M2
GFR SERPL CREATININE-BSD FRML MDRD: 59 ML/MIN/1.73M2
GFR SERPL CREATININE-BSD FRML MDRD: 66 ML/MIN/1.73M2
GFR SERPL CREATININE-BSD FRML MDRD: 7 ML/MIN/1.73M2
GFR SERPL CREATININE-BSD FRML MDRD: 73 ML/MIN/1.73M2
GFR SERPL CREATININE-BSD FRML MDRD: 9 ML/MIN/1.73M2

## 2022-05-26 PROCEDURE — 1123F ACP DISCUSS/DSCN MKR DOCD: CPT | Performed by: INTERNAL MEDICINE

## 2022-05-26 PROCEDURE — 99213 OFFICE O/P EST LOW 20 MIN: CPT | Performed by: INTERNAL MEDICINE

## 2022-05-26 NOTE — PROGRESS NOTES
[address] [facility] [place] [address] [place] [address]  Dept: [phone]  Dept Fax: [phone]  Loc: [phone]    Visit Date: 5/26/2022    Mr. Suarez is a 66 y.o. male  who presented for:  Chief Complaint   Patient presents with    Follow-up       HPI:   HPI   65 yo M hx of CKD, SB perf s/p surgery, Afib on Eliquis who presents to discuss WATCHMAN. He has chronic anemia. He has a hx of AI as well. No chest pain, angina, DÍAZ, orthopnea, PND, sob at rest, palpitations, LE edema, or syncope. He is using a wheelchair, he falls recurrently. He has a hx of CVA/TIA, HTN, HLD. No hematochezia, hemoptysis, or melena. No nose bleeds. Ef 50%, 4/2022.         Current Outpatient Medications:     amiodarone (CORDARONE) 200 MG tablet, Take 1 tablet by mouth daily, Disp: 30 tablet, Rfl: 1    apixaban (ELIQUIS) 5 MG TABS tablet, Take 1 tablet by mouth 2 times daily, Disp: 60 tablet, Rfl: 1    ondansetron (ZOFRAN-ODT) 4 MG disintegrating tablet, Take 1 tablet by mouth every 8 hours as needed for Nausea or Vomiting, Disp: 30 tablet, Rfl: 1    sodium hypochlorite (DAKINS) 0.125 % SOLN external solution, Apply topically daily, Disp: 2 each, Rfl: 1    metoprolol tartrate (LOPRESSOR) 25 MG tablet, Take 1 tablet by mouth 2 times daily, Disp: 60 tablet, Rfl: 3    Ergocalciferol (VITAMIN D) 17872 units CAPS, Take 50,000 Units by mouth once a week, Disp: 5 capsule, Rfl: 1    atorvastatin (LIPITOR) 20 MG tablet, TAKE 1 TABLET BY MOUTH DAILY, Disp: 90 tablet, Rfl: 3    COMBIGAN 0.2-0.5 % ophthalmic solution, Place 1 drop into the left eye every 12 hours, Disp: 5 mL, Rfl: 3    famotidine (PEPCID) 20 MG tablet, Take 1 tablet by mouth 2 times daily, Disp: 180 tablet, Rfl: 3    losartan (COZAAR) 50 MG tablet, Take 1 tablet by mouth daily, Disp: 90 tablet, Rfl: 3    tamsulosin (FLOMAX) 0.4 MG capsule, Take 1 capsule by mouth daily, Disp: 90 capsule, Rfl: 3    fluocinonide (LIDEX) 0.05 % cream, APPLY EXTERNALLY TO THE AFFECTED AREA TWICE DAILY, Disp: 60 g, Rfl: 4    tadalafil (CIALIS) 20 MG tablet, TAKE 1 TABLET BY MOUTH EVERY DAY AS NEEDED, Disp: 18 tablet, Rfl: 5    aspirin 81 MG EC tablet, Take 1 tablet by mouth daily, Disp: 30 tablet, Rfl: 11    loratadine (CLARITIN) 10 MG tablet, TAKE 1 TABLET BY MOUTH DAILY, Disp: 90 tablet, Rfl: 3    Past Medical History  Marcella Mac  has a past medical history of CAD (coronary artery disease), CVA (cerebral vascular accident) ([facility] [address] Erectile dysfunction, GERD (gastroesophageal reflux disease), Hyperlipidemia, and Hypertension. Social History  Marcella Mac  reports that he has been smoking cigarettes. He has a 40.00 pack-year smoking history. He has never used smokeless tobacco. He reports current alcohol use. He reports current drug use. Frequency: 7.00 times per week. Drug: Marijuana Jernafisa November). Family History  Marcella Mac family history includes Heart Disease in his mother. There is no family history of bicuspid aortic valve, aneurysms, heart transplant, pacemakers, defibrillators, or sudden cardiac death.       Past Surgical History   Past Surgical History:   Procedure Laterality Date    CARDIAC CATHETERIZATION  11/2007    COLONOSCOPY  2020    Dr. Fe Christianson, COLON, DIAGNOSTIC      EYE SURGERY Left 2004    cataract    EYE SURGERY      INGUINAL HERNIA REPAIR Right 05/27/2015    Dr. Ambrosio Tomas N/A 04/25/2022    LAPAROTOMY EXPLORATORY FOR SMALL BOWEL OBSTRUCTION WITH INCARCERATED HERNIA REPAIR performed by Noris Clemente MD at [address] [place] [address] 05/01/2022    LAPAROTOMY EXPLORATORY, REPAIR OF WOUND DEHISCENCE performed by Noris Clemente MD at [facility] Left 2011    Dr. Nellie Charles Left 2003    Dr. Debra Paz  05/27/2015    Dr. Jeanette Duong       Review of Systems   Constitutional: Negative for chills and fever  HENT: Negative for congestion, sinus pressure, sneezing and sore throat. Eyes: Negative for pain, discharge, redness and itching. Respiratory: Negative for apnea, cough  Gastrointestinal: Negative for blood in stool, constipation, diarrhea   Endocrine: Negative for cold intolerance, heat intolerance, polydipsia. Genitourinary: Negative for dysuria, enuresis, flank pain and hematuria. Musculoskeletal: Negative for arthralgias, joint swelling and neck pain. Neurological: Negative for numbness and headaches. Psychiatric/Behavioral: Negative for agitation, confusion, decreased concentration and dysphoric mood. Objective:     /80   Pulse 64   Ht 5' 9\" (1.753 m)   BMI 19.43 kg/m²     Wt Readings from Last 3 Encounters:   05/19/22 131 lb 9.6 oz (59.7 kg)   05/17/22 133 lb (60.3 kg)   05/10/22 130 lb 8 oz (59.2 kg)     BP Readings from Last 3 Encounters:   05/26/22 124/80   05/19/22 122/64   05/17/22 126/60       Nursing note and vitals reviewed. Physical Exam   Constitutional: Oriented to person, place, and time. Appears well-developed and well-nourished. HENT:   Head: Normocephalic and atraumatic. Eyes: EOM are normal. Pupils are equal, round, and reactive to light. Neck: Normal range of motion. Neck supple. No JVD present. Cardiovascular: Normal rate, regular rhythm, normal heart sounds and intact distal pulses. No murmur heard. Pulmonary/Chest: Effort normal and breath sounds normal. No respiratory distress. No wheezes. No rales. Abdominal: Soft. Bowel sounds are normal. No distension. There is no tenderness. Musculoskeletal: Normal range of motion. No edema. Neurological: Alert and oriented to person, place, and time. No cranial nerve deficit. Coordination normal.   Skin: Skin is warm and dry. Psychiatric: Normal mood and affect.        No results found for: CKTOTAL, CKMB, CKMBINDEX    Lab Results   Component Value Date    WBC 8.5 05/19/2022    RBC 3.30 05/19/2022    RBC 3.91 05/24/2016    HGB 10.5 05/19/2022    HCT 35.5 05/19/2022    .6 05/19/2022    MCH 31.8 05/19/2022    MCHC 29.6 05/19/2022    RDW 11.9 05/24/2016     05/19/2022    MPV 9.5 05/19/2022       Lab Results   Component Value Date     05/19/2022    K 4.6 05/19/2022    K 3.6 04/29/2022     05/19/2022    CO2 22 05/19/2022    BUN 10 05/19/2022    LABALBU 2.5 04/28/2022    LABALBU 4.6 11/10/2011    CREATININE 1.1 05/19/2022    CALCIUM 8.9 05/19/2022    LABGLOM 66 05/19/2022    GLUCOSE 86 05/19/2022    GLUCOSE 78 05/24/2016       Lab Results   Component Value Date    ALKPHOS 80 04/28/2022    ALT 33 04/28/2022    AST 62 04/28/2022    PROT 5.3 04/28/2022    BILITOT 2.9 04/28/2022    BILIDIR 1.4 04/28/2022    LABALBU 2.5 04/28/2022    LABALBU 4.6 11/10/2011       Lab Results   Component Value Date    MG 1.7 05/10/2022       Lab Results   Component Value Date    INR 0.95 06/09/2019    INR 0.97 07/26/2012         No results found for: LABA1C    Lab Results   Component Value Date    TRIG 108 02/01/2022    HDL 64 02/01/2022    HDL 59 10/11/2010    LDLCALC 79 02/01/2022    LABVLDL 22 05/24/2016       Lab Results   Component Value Date    TSH 0.647 04/27/2022         Testing Reviewed:      I have individually reviewed the cardiac test below:    ECHO: Results for orders placed during the hospital encounter of 04/25/22    ECHO Complete 2D W Doppler W Color    Narrative  Transthoracic Echocardiography Report (TTE)    Demographics    Patient Name   Derek PATE    Gender              Male  SR.    MR #           [id number]       Race                Black    Ethnicity    Account #      [de-identified]       Room Number         0010    Accession      [id number]      Date of Study       04/26/2022  Number    Date of Birth  1950      Referring Physician Cristina Sheriff MD    Age            66 year(s)      Sonographer         Lonia Heimlich, CAITLYNCS,  RVT    Interpreting        Echo reader of the week  Physician Michael Guevara MD    Procedure    Type of Study    TTE procedure:ECHOCARDIOGRAM COMPLETE 2D W DOPPLER W COLOR. Procedure Date  Date: 04/26/2022 Start: 08:47 AM    Study Location: Bedside  Technical Quality: Adequate visualization    Indications:Evaluate right ventricular systolic pressures (RVSP). Additional Medical History:smoker, acute respiratory failure, septic shock,  acute kidney injury, syncope, anemia, coronary artery disease, hypertension,  hyperlipidemia, stroke, GERD    Patient Status: Routine    Height: 69 inches Weight: 150 pounds BSA: 1.83 m^2 BMI: 22.15 kg/m^2    BP: 105/53 mmHg    Conclusions    Summary  Ejection fraction is visually estimated at 50%. Overall left ventricular function is normal.  Aortic valve appears tricuspid. Aortic valve leaflets are somewhat thickened. Aortic valve leaflets are Mildly calcified. Mild-to-moderate aortic regurgitation is noted. Moderate mitral regurgitation is present. Signature    ----------------------------------------------------------------  Electronically signed by Michael Guevara MD (Interpreting  physician) on 04/26/2022 at 06:50 PM  ----------------------------------------------------------------    Findings    Mitral Valve  Moderate mitral regurgitation is present. Aortic Valve  Aortic valve appears tricuspid. Aortic valve leaflets are somewhat thickened. Aortic valve leaflets are Mildly calcified. Mild-to-moderate aortic regurgitation is noted. Tricuspid Valve  Trivial tricuspid regurgitation visualized. Pulmonic Valve  The pulmonic valve leaflets exhibited normal thickness, no calcification,  and normal cuspal separation. DOPPLER: The transpulmonic velocity was  within the normal range with no evidence for regurgitation. Left Atrium  Moderately dilated left atrium. Left Ventricle  Ejection fraction is visually estimated at 50%.   Overall left ventricular function is normal.    Right Atrium  Right atrial size was normal.    Right Ventricle  The right ventricular size was normal with normal systolic function and  wall thickness. Pericardial Effusion  The pericardium was normal in appearance with no evidence of a pericardial  effusion. Pleural Effusion  No evidence of pleural effusion. Aorta / Great Vessels  -Aortic root dimension within normal limits.  -The Pulmonary artery is within normal limits. -IVC size is within normal limits with normal respiratory phasic changes.     M-Mode/2D Measurements & Calculations    LV Diastolic   LV Systolic Dimension:    AV Cusp Separation: 2.3 cmLA  Dimension: 4.3 3.2 cm                    Dimension: 3.8 cmAO Root  cm             LV Volume Diastolic: 91.4 Dimension: 3.1 cmLA Area: 14.6  LV FS:25.6 %   ml                        cm^2  LV PW          LV Volume Systolic: 16.6  Diastolic: 1   ml  cm             LV EDV/LV EDV Index: 71.7  Septum         ml/39 m^2LV ESV/LV ESV    RV Diastolic Dimension: 3.3 cm  Diastolic: 1   Index: 02.4 ml/24 m^2  cm             EF Calculated: 39.2 %     LA/Aorta: 1.23    LV Length: 7.5 cm         LA volume/Index: 40.9 ml /22m^2    LV Area  Diastolic:  53.9 cm^2  LV Area  Systolic: 25.8  cm^2    Doppler Measurements & Calculations    MV Peak E-Wave: 45.4 cm/s         AV Peak Velocity:  LVOT Peak Velocity:  MV Peak A-Wave: 72.8 cm/s         116 cm/s           106 cm/s  MV E/A Ratio: 0.62                AV Peak Gradient:  LVOT Peak Gradient: 4  MV Peak Gradient: 0.82 mmHg       5.38 mmHg          mmHg    MV Deceleration Time: 219 msec                       TV Peak E-Wave: 39.8  MV P1/2t: 64 msec                                    cm/s  MVA by PHT:3.44 cm^2                                 TV Peak A-Wave: 39.8  MV Area (PISA): 0.29 cm^2         AV P1/2t: 622 msec cm/s  MV E' Septal Velocity: 7.2 cm/s   IVRT: 90 msec  MV A' Septal Velocity: 7.2 cm/s                      TV Peak Gradient:  MV E' Lateral Velocity: 9.8 cm/s                     0.63 mmHg  MV A' Lateral Velocity: 16.1 cm/s AV DVI (Vmax):0.91 TR Velocity:337 cm/s  E/E' septal: 6.31                                    TR Gradient:45.43  E/E' lateral: 4.63                                   mmHg  MR Velocity: 543 cm/s                                PV Peak Velocity: 87  MV RENEE PISA: 0.43 cm^2                               cm/s  MR VTI: 156 cm                                       PV Peak Gradient:  Alias Velocity: 30.8 cm/sPISA                        3.03 mmHg  Radius: 1.1 cm  MV ERO Volumetric: 0.29 cm^2  PISA area: 7.6 cm^2MR flow rate:  234.08 ml/sMR volume:67.08 ml    [email or web address]? [id number]       Assessment/Plan   Post-op AFib - new   EF 50%, NYHA II  Hx of SB perf repair  HTN  HLD  CVA/TIA  Unclear if Afib is new or chronic, had CVA/TIA in early 2000s. Check 30 day event monitor. If significant burden, may benefit from long term [address] vs WATCHMAN and favor WATCHMAN due to falls, frailty, anemia. If no Afib, then can consider ASA only given the risk factors for long term [address]. Will await evaluation then make decisions after patient and family are informed. Continue eliquis and Amiodarone for now. He is also on BB. Discussed diet/exercise/BP/weight loss/health lifestyle choices/lipids; the patient understands the goals and will try to comply.     Disposition:  30 day event monitor         Electronically signed by Ziyad Amaya MD   5/26/2022 at 12:58 PM EDT

## 2022-05-26 NOTE — TELEPHONE ENCOUNTER
Jassi-please see Dr. Aries Mahoney note from office appointment on 5/26/2022.   Event monitor scheduled for 6/16/2022

## 2022-06-01 ENCOUNTER — CARE COORDINATION (OUTPATIENT)
Dept: CARE COORDINATION | Age: 72
End: 2022-06-01

## 2022-06-01 NOTE — CARE COORDINATION
Ambulatory Care Coordination Note  6/1/2022  CM Risk Score: 1  Charlson 10 Year Mortality Risk Score: 98%     ACC: Manuel Rosario RN    Summary Note:     Cardiology appt - 30 day cardiac monitor scheduled    Abdominal wounds healing. No s/s infection. Follow up with surgeon next week    No recent falls. Home health continues. Plan -   Keep appointments  No lifting over 20 lbs  [facility] nursing and therapy  6/9 gen surg  6/16 event monitor  Renal as needed  Fall precautions - use walker at all times and wait for help  Early symptom recognition and reporting to prevent ED and admission      Lab Results     None          Care Coordination Interventions    Referral from Primary Care Provider: No  Suggested Interventions and Community Resources  Fall Risk Prevention: Completed  Home Health Services: Completed (Comment: [facility])  Occupational Therapy: Completed  Physical Therapy: Completed  Transportation Support: Declined  Zone Management Tools: In Process  Other Services or Interventions: est with [address] cardiology, renal, gen surgeon         Goals Addressed                 This Visit's Progress     Conditions and Symptoms   On track     I will schedule office visits, as directed by my provider. I will keep my appointment or reschedule if I have to cancel. I will notify my provider of any barriers to my plan of care. I will notify my provider of any symptoms that indicate a worsening of my condition.     Barriers: impairment:  cognitive and overwhelmed by complexity of regimen  Plan for overcoming my barriers: Care coordination  Confidence: 9/10  Anticipated Goal Completion Date: 8/13/2022         Reduce Falls    On track     I will reduce my risk of falls by the following: Remove rugs or use non slip rugs  Install grab bars in bathroom  Use walking aids like cane or walker  Follow through on orders for PT    Barriers: impairment:  physical: weakness and cognitive and overwhelmed by complexity of regimen  Plan for overcoming my barriers: care coordination, home health nursing and therapies  Confidence: 9/10  Anticipated Goal Completion Date: 8/13/2022            Prior to Admission medications    Medication Sig Start Date End Date Taking?  Authorizing Provider   amiodarone (CORDARONE) 200 MG tablet Take 1 tablet by mouth daily 5/11/22   Sharon Gomes MD   apixaban Анна Peak) 5 MG TABS tablet Take 1 tablet by mouth 2 times daily 5/10/22   Sharon Gomes MD   ondansetron (ZOFRAN-ODT) 4 MG disintegrating tablet Take 1 tablet by mouth every 8 hours as needed for Nausea or Vomiting 5/10/22   Sharon Gomes MD   sodium hypochlorite (DAKINS) 0.125 % SOLN external solution Apply topically daily 5/11/22   Sharon Gomes MD   metoprolol tartrate (LOPRESSOR) 25 MG tablet Take 1 tablet by mouth 2 times daily 5/10/22   Sharon Gomes MD   Ergocalciferol (VITAMIN D) 82285 units CAPS Take 50,000 Units by mouth once a week 5/11/22   Sharon Gomes MD   atorvastatin (LIPITOR) 20 MG tablet TAKE 1 TABLET BY MOUTH DAILY 3/23/22   Chelsea Latham MD   COMBIGAN 0.2-0.5 % ophthalmic solution Place 1 drop into the left eye every 12 hours 3/23/22   Chelsea Latham MD   famotidine (PEPCID) 20 MG tablet Take 1 tablet by mouth 2 times daily 3/23/22   Chelsea Latham MD   losartan (COZAAR) 50 MG tablet Take 1 tablet by mouth daily 3/23/22   Chelsea Latham MD   tamsulosin [facility]) 0.4 MG capsule Take 1 capsule by mouth daily 3/23/22   Chelsea Latham MD   fluocinonide (LIDEX) 0.05 % cream APPLY EXTERNALLY TO THE AFFECTED AREA TWICE DAILY 1/27/22   Chelsea Latham MD   tadalafil (CIALIS) 20 MG tablet TAKE 1 TABLET BY MOUTH EVERY DAY AS NEEDED 10/25/21   Chelsea Latham MD   aspirin 81 MG EC tablet Take 1 tablet by mouth daily 10/15/21   Chelsea Latham MD   loratadine (CLARITIN) 10 MG tablet TAKE 1 TABLET BY MOUTH DAILY 5/25/21   Chelsea Latham MD       Future Appointments   Date Time Provider Rosie Roberts 6/9/2022 11:15 AM Evelyn Valenzuela MD N Adv Surg [facility] [address] [address]   6/16/2022  1:00 PM STR HM EXAM RM 01 STRZ EKG [place] HOD   11/30/2022  1:30 PM Dionisio Chavarria MD N SRPX Heart [facility] [place]   1/30/2023 10:30 AM Chelsea Latham MD SRPX JONES [facility] [place] [address]

## 2022-06-02 ENCOUNTER — TELEPHONE (OUTPATIENT)
Dept: FAMILY MEDICINE CLINIC | Age: 72
End: 2022-06-02

## 2022-06-02 NOTE — TELEPHONE ENCOUNTER
Shruthi pharmacist from [address] [place] [address] called to report a medication error. What was written on label was Combigan 0.2-0.5% ophthalmic solution 1 drop each eye q 12 hrs. Rx was suppose to say 1 drop left eye q 12 hrs. She has been unable to contact him for past 24 hrs. Left a message on pt's machine to call back to be informed.

## 2022-06-15 ENCOUNTER — CARE COORDINATION (OUTPATIENT)
Dept: CARE COORDINATION | Age: 72
End: 2022-06-15

## 2022-06-15 NOTE — CARE COORDINATION
Attempted care management follow up. No answer and no VM.    Plan -   Keep appointments  No lifting over 20 lbs  [facility] nursing and therapy  6/16 event monitor  Renal as needed  Fall precautions - use walker at all times and wait for help  Early symptom recognition and reporting to prevent ED and admission

## 2022-06-16 ENCOUNTER — HOSPITAL ENCOUNTER (OUTPATIENT)
Dept: NON INVASIVE DIAGNOSTICS | Age: 72
Discharge: HOME | End: 2022-06-16
Payer: MEDICARE

## 2022-06-16 DIAGNOSIS — I48.0 PAROXYSMAL ATRIAL FIBRILLATION (HCC): ICD-10-CM

## 2022-06-16 LAB — GFR SERPL CREATININE-BSD FRML MDRD: 80 ML/MIN/1.73M2

## 2022-06-16 PROCEDURE — 93270 REMOTE 30 DAY ECG REV/REPORT: CPT

## 2022-06-16 NOTE — PROCEDURES
The skin was prepped and a 30 day cardiac event monitor was applied. The patient was instructed on the documentation of symptoms and the purpose of the monitor as well as the things to avoid while wearing the monitor. The patient was instructed to remove and return the monitor on 07/16/2022.   The serial number of the monitor that was applied is [id number]

## 2022-06-17 ENCOUNTER — TELEPHONE (OUTPATIENT)
Dept: CARDIOLOGY CLINIC | Age: 72
End: 2022-06-17

## 2022-06-17 NOTE — LETTER
[facility]  [place] [address] [place] [address]  Phone: [phone]  Fax: [phone]    Laveda Olszewski, MD        June 23, 2022    [address] [place] [address] [address] [address]      Dear Austen Chakraborty:    Our office has been unable to reach you at [phone]. Please call our office at your earliest convenience at [phone] opt [phone] for the nurse line.        Sincerely,        [facility]

## 2022-06-21 ENCOUNTER — TELEPHONE (OUTPATIENT)
Dept: FAMILY MEDICINE CLINIC | Age: 72
End: 2022-06-21

## 2022-06-21 RX ORDER — TADALAFIL 20 MG/1
TABLET ORAL
Qty: 18 TABLET | Refills: 5 | Status: SHIPPED | OUTPATIENT
Start: 2022-06-21

## 2022-06-21 NOTE — TELEPHONE ENCOUNTER
Attempted to call patient. No answer. No VM box set up     Refill request already forwarded to physician .

## 2022-06-21 NOTE — TELEPHONE ENCOUNTER
----- Message from Dk Andersoncarolina sent at 6/21/2022  3:08 PM EDT -----  Subject: Message to Provider    QUESTIONS  Information for Provider? Patient is requesting an approval for Cialis   medication  ---------------------------------------------------------------------------  --------------  CALL BACK INFO  What is the best way for the office to contact you? OK to leave message on   voicemail  Preferred Call Back Phone Number? [phone]  ---------------------------------------------------------------------------  --------------  SCRIPT ANSWERS  Relationship to Patient?  Self

## 2022-06-22 NOTE — TELEPHONE ENCOUNTER
His Afib is happening frequently - needs to come in to discuss ANGIE vs [facility] [address]

## 2022-06-22 NOTE — TELEPHONE ENCOUNTER
Call to patient, unable to leave message. Call to wife Joleen Austin, just rings and rings. Call to daughter, Shayla Adrian. Mailbox full.

## 2022-06-23 ENCOUNTER — CARE COORDINATION (OUTPATIENT)
Dept: CARE COORDINATION | Age: 72
End: 2022-06-23

## 2022-06-23 NOTE — CARE COORDINATION
Ambulatory Care Coordination Note  6/23/2022  CM Risk Score: 1  Charlson 10 Year Mortality Risk Score: 98%     ACC: TESSA AYALA    Summary Note:     Doing well. Therapy discharged. Nursing continues. Getting stronger. No recent falls. Managing well at home per wife. Plan -   Keep appointments  No lifting over 20 lbs  [facility] nursing  Renal as needed  Fall precautions - use walker at all times and wait for help  Early symptom recognition and reporting to prevent ED and admission      Lab Results     None          Care Coordination Interventions    Referral from Primary Care Provider: No  Suggested Interventions and Community Resources  Fall Risk Prevention: Completed  Home Health Services: Completed (Comment: [facility])  Occupational Therapy: Completed  Physical Therapy: Completed  Transportation Support: Declined  Zone Management Tools: In Process  Other Services or Interventions: est with [facility] cardiology, renal, gen surgeon         Goals Addressed    None         Prior to Admission medications    Medication Sig Start Date End Date Taking?  Authorizing Provider   tadalafil (CIALIS) 20 MG tablet TAKE 1 TABLET BY MOUTH EVERY DAY AS NEEDED 6/21/22   Radha Vences MD   amiodarone (CORDARONE) 200 MG tablet Take 1 tablet by mouth daily 5/11/22   Enrigue Sample, MD   apixaban [place] Jessica) 5 MG TABS tablet Take 1 tablet by mouth 2 times daily 5/10/22   Dariue Sample, MD   ondansetron (ZOFRAN-ODT) 4 MG disintegrating tablet Take 1 tablet by mouth every 8 hours as needed for Nausea or Vomiting 5/10/22   Kemal Sample, MD   sodium hypochlorite (DAKINS) 0.125 % SOLN external solution Apply topically daily 5/11/22   Kemal Sample, MD   metoprolol tartrate (LOPRESSOR) 25 MG tablet Take 1 tablet by mouth 2 times daily 5/10/22   Dariue Sample, MD   Ergocalciferol (VITAMIN D) 15993 units CAPS Take 50,000 Units by mouth once a week 5/11/22   Kemal Amor, MD   atorvastatin (LIPITOR) 20 MG tablet TAKE 1 TABLET BY MOUTH DAILY 3/23/22   Radha Vences MD   COMBIGAN 0.2-0.5 % ophthalmic solution Place 1 drop into the left eye every 12 hours 3/23/22   Radha Vences MD   famotidine (PEPCID) 20 MG tablet Take 1 tablet by mouth 2 times daily 3/23/22   Radha Vences MD   losartan (COZAAR) 50 MG tablet Take 1 tablet by mouth daily 3/23/22   Radha Vences MD   tamsulosin [facility]) 0.4 MG capsule Take 1 capsule by mouth daily 3/23/22   Radha Vences MD   fluocinonide (LIDEX) 0.05 % cream APPLY EXTERNALLY TO THE AFFECTED AREA TWICE DAILY 1/27/22   Radha Vences MD   aspirin 81 MG EC tablet Take 1 tablet by mouth daily 10/15/21   Radha Vences MD   loratadine (CLARITIN) 10 MG tablet TAKE 1 TABLET BY MOUTH DAILY 5/25/21   Radha Vences MD       Future Appointments   Date Time Provider Rosie Roberts   11/30/2022  1:30 PM [facility]   1/30/2023 10:30 AM MD ABDON Farr JONES [facility] [place]

## 2022-06-23 NOTE — TELEPHONE ENCOUNTER
Call to patient, unable to leave message. Call to wife Mary Alice Cardoza, just rings and rings. Call to daughter, Marty Pacheco. Mailbox full. Letter sent.

## 2022-06-28 RX ORDER — LORATADINE 10 MG/1
TABLET ORAL
Qty: 90 TABLET | Refills: 3 | Status: SHIPPED | OUTPATIENT
Start: 2022-06-28

## 2022-06-28 NOTE — TELEPHONE ENCOUNTER
Please approve or deny     Last Visit Date:  5/17/2022       Next Visit Date:    Visit date not found

## 2022-07-05 ENCOUNTER — TELEPHONE (OUTPATIENT)
Dept: FAMILY MEDICINE CLINIC | Age: 72
End: 2022-07-05

## 2022-07-05 ENCOUNTER — OFFICE VISIT (OUTPATIENT)
Dept: FAMILY MEDICINE CLINIC | Age: 72
End: 2022-07-05
Payer: MEDICARE

## 2022-07-05 VITALS
BODY MASS INDEX: 18.9 KG/M2 | TEMPERATURE: 98.3 F | DIASTOLIC BLOOD PRESSURE: 70 MMHG | HEART RATE: 62 BPM | WEIGHT: 128 LBS | RESPIRATION RATE: 16 BRPM | OXYGEN SATURATION: 94 % | SYSTOLIC BLOOD PRESSURE: 130 MMHG

## 2022-07-05 DIAGNOSIS — I25.10 CORONARY ARTERY DISEASE INVOLVING NATIVE HEART WITHOUT ANGINA PECTORIS, UNSPECIFIED VESSEL OR LESION TYPE: Chronic | ICD-10-CM

## 2022-07-05 DIAGNOSIS — I48.91 ATRIAL FIBRILLATION WITH RVR (HCC): ICD-10-CM

## 2022-07-05 DIAGNOSIS — Z74.09 IMMOBILITY: Primary | ICD-10-CM

## 2022-07-05 PROCEDURE — 1123F ACP DISCUSS/DSCN MKR DOCD: CPT | Performed by: FAMILY MEDICINE

## 2022-07-05 PROCEDURE — 99214 OFFICE O/P EST MOD 30 MIN: CPT | Performed by: FAMILY MEDICINE

## 2022-07-05 RX ORDER — ASPIRIN 81 MG/1
81 TABLET ORAL DAILY
Qty: 30 TABLET | Refills: 11 | Status: SHIPPED | OUTPATIENT
Start: 2022-07-05

## 2022-07-05 ASSESSMENT — ENCOUNTER SYMPTOMS
EYE PAIN: 0
ABDOMINAL PAIN: 0
SINUS PRESSURE: 0
ABDOMINAL DISTENTION: 0
RHINORRHEA: 0
SORE THROAT: 0
DIARRHEA: 0
BACK PAIN: 1
COUGH: 0
SHORTNESS OF BREATH: 0
CONSTIPATION: 0
NAUSEA: 0

## 2022-07-05 NOTE — PROGRESS NOTES
[address] Jenn Granado [facility] [address]  Dept: [phone]  Dept Fax: [phone]  Loc: [phone]  PROGRESS NOTE      VisitDate: 7/5/2022    Lance Beckwith is a 66 y.o. male who presents today for:     Chief Complaint   Patient presents with    Follow-up     would like an electric wheel chair- [employer] home medical Equipment          Subjective:  HPI  Comes in for chief complaint of immobility. He has a history of congestive heart failure as well as diffuse osteoarthritis, and strokelike symptoms in the past.  No documented stroke. He has been having increasing falls and problems with his balance. He is unable to walk safely with a cane or walker. He is unable to use a manual wheelchair because lacking upper extremity strength and stamina related to his cardiovascular issues. He has had several falls recently because of his balance issues. He needs the electric wheelchair to move around within his home. This will aid him in completion of his activities of daily living such as taking medications, meal preparation and eating, grooming, toileting, bathing, and dressing. Review of Systems   Constitutional: Negative for appetite change and fever. HENT: Negative for congestion, ear pain, postnasal drip, rhinorrhea, sinus pressure and sore throat. Eyes: Negative for pain and visual disturbance. Respiratory: Negative for cough and shortness of breath. Cardiovascular: Negative for chest pain. Gastrointestinal: Negative for abdominal distention, abdominal pain, constipation, diarrhea and nausea. Genitourinary: Negative for dysuria, frequency and urgency. Musculoskeletal: Positive for arthralgias, back pain and gait problem. Skin: Negative for rash. Neurological: Positive for weakness. Negative for dizziness.      Past Medical History:   Diagnosis Date    CAD (coronary artery disease)     CVA [facility]cerebral vascular accident) [facility])     Erectile dysfunction     GERD (gastroesophageal reflux disease) 07/2012    Gastro ulcer    Hyperlipidemia     Hypertension       Past Surgical History:   Procedure Laterality Date    CARDIAC CATHETERIZATION  11/2007    COLONOSCOPY  2020    Dr. Francisco Arriaga, COLON, DIAGNOSTIC      EYE SURGERY Left 2004    cataract    EYE SURGERY      INGUINAL HERNIA REPAIR Right 05/27/2015    Dr. Luh Fisher N/A 04/25/2022    LAPAROTOMY EXPLORATORY FOR SMALL BOWEL OBSTRUCTION WITH INCARCERATED HERNIA REPAIR performed by Caitie Whitley MD at [address] [place] [address] N/A 05/01/2022    LAPAROTOMY EXPLORATORY, REPAIR OF WOUND DEHISCENCE performed by Caitie Whitley MD at [facility] Left 2011    Dr. Tamera Palencia Left 2003    Dr. Kaci Suggs  05/27/2015    Dr. Jeffery Stringer     Family History   Problem Relation Age of Onset    Heart Disease Mother      Social History     Tobacco Use    Smoking status: Current Every Day Smoker     Packs/day: 1.00     Years: 40.00     Pack years: 40.00     Types: Cigarettes    Smokeless tobacco: Never Used    Tobacco comment: printed to avs. 3/4 ppd since 2019   Substance Use Topics    Alcohol use: Yes      Current Outpatient Medications   Medication Sig Dispense Refill    aspirin 81 MG EC tablet Take 1 tablet by mouth daily 30 tablet 11    loratadine (CLARITIN) 10 MG tablet TAKE 1 TABLET BY MOUTH DAILY 90 tablet 3    tadalafil (CIALIS) 20 MG tablet TAKE 1 TABLET BY MOUTH EVERY DAY AS NEEDED 18 tablet 5    amiodarone (CORDARONE) 200 MG tablet Take 1 tablet by mouth daily 30 tablet 1    apixaban (ELIQUIS) 5 MG TABS tablet Take 1 tablet by mouth 2 times daily 60 tablet 1    ondansetron (ZOFRAN-ODT) 4 MG disintegrating tablet Take 1 tablet by mouth every 8 hours as needed for Nausea or Vomiting 30 tablet 1    sodium hypochlorite (DAKINS) 0.125 % SOLN external solution Apply topically daily 2 each 1    metoprolol tartrate (LOPRESSOR) 25 MG tablet Take 1 tablet by mouth 2 times daily 60 tablet 3    atorvastatin (LIPITOR) 20 MG tablet TAKE 1 TABLET BY MOUTH DAILY 90 tablet 3    COMBIGAN 0.2-0.5 % ophthalmic solution Place 1 drop into the left eye every 12 hours 5 mL 3    famotidine (PEPCID) 20 MG tablet Take 1 tablet by mouth 2 times daily 180 tablet 3    losartan (COZAAR) 50 MG tablet Take 1 tablet by mouth daily 90 tablet 3    tamsulosin (FLOMAX) 0.4 MG capsule Take 1 capsule by mouth daily 90 capsule 3    fluocinonide (LIDEX) 0.05 % cream APPLY EXTERNALLY TO THE AFFECTED AREA TWICE DAILY 60 g 4     No current facility-administered medications for this visit. No Known Allergies  Health Maintenance   Topic Date Due    DTaP/Tdap/Td vaccine (1 - Tdap) Never done    Shingles vaccine (1 of 2) Never done    Low dose CT lung screening  04/21/2022    Flu vaccine (1) 09/01/2022    Depression Screen  01/27/2023    Annual Wellness Visit (AWV)  01/28/2023    Lipids  02/01/2023    Colorectal Cancer Screen  02/04/2029    Pneumococcal 65+ years Vaccine  Completed    COVID-19 Vaccine  Completed    AAA screen  Completed    Hepatitis C screen  Completed    Hepatitis A vaccine  Aged Out    Hepatitis B vaccine  Aged Out    Hib vaccine  Aged Out    Meningococcal (ACWY) vaccine  Aged Out         Objective:     Physical Exam  Constitutional:       General: He is not in acute distress. Appearance: He is well-developed. He is not diaphoretic. HENT:      Head: Normocephalic and atraumatic. Right Ear: External ear normal.      Left Ear: External ear normal.   Eyes:      Conjunctiva/sclera: Conjunctivae normal.   Neck:      Vascular: No JVD. Cardiovascular:      Rate and Rhythm: Normal rate and regular rhythm. Heart sounds: Normal heart sounds. Pulmonary:      Effort: Pulmonary effort is normal.      Breath sounds: Normal breath sounds. No wheezing or rales.    Musculoskeletal:         General: No tenderness. Skin:     General: Skin is warm and dry. Coloration: Skin is not pale. Neurological:      Mental Status: He is alert and oriented to person, place, and time. Motor: Weakness present. Coordination: Coordination abnormal.      Deep Tendon Reflexes: Reflexes abnormal.      Comments: Gait with cane assistance is slow and unsteady. Motor power in the upper extremities is 3-4/5 at the shoulder elbow and wrist.  Motor power lower extremities is 3-4/5 with flexion extension at the knee and ankle   Psychiatric:         Mood and Affect: Mood normal.       /70   Pulse 62   Temp 98.3 °F (36.8 °C) (Oral)   Resp 16   Wt 128 lb (58.1 kg)   SpO2 94%   BMI 18.90 kg/m²       Impression/Plan:  1. Immobility    2. Atrial fibrillation with RVR ([facility] [address]    3. Coronary artery disease involving native heart without angina pectoris, unspecified vessel or lesion type      Requested Prescriptions     Signed Prescriptions Disp Refills    aspirin 81 MG EC tablet 30 tablet 11     Sig: Take 1 tablet by mouth daily     No orders of the defined types were placed in this encounter. Is willing and able to operate a power wheelchair. He is motivated to do so and requires 1 for safe movement within his home and for completion of his activities of daily living. Patient giveneducational materials - see patient instructions. Discussed use, benefit, and side effects of prescribed medications. All patient questions answered. Pt voiced understanding. Reviewed health maintenance. Patient agreedwith treatment plan. Follow up as directed. **This report has been created using voice recognition software. It may contain minor errorswhich are inherent in voice recognition technology. **       Electronically signed by Evelyn Diaz MD on 7/5/2022 at 7:14 PM

## 2022-07-05 NOTE — LETTER
[address]  [address]  [facility] [address]  Phone: [phone]  Fax: [phone]    Pam Munoz MD         July 5, 2022     Patient: Marilyn Dennis . YOB: 1950   Date of Visit: 7/5/2022       To Whom It May Concern: It is my medical opinion that Soto Nava requires a disability parking placard for the following reasons:  He cannot walk 200 feet without stopping to rest.  Duration of need: 2 years    If you have any questions or concerns, please don't hesitate to call.     Sincerely,        Pam Munoz MD

## 2022-07-11 ENCOUNTER — OFFICE VISIT (OUTPATIENT)
Dept: CARDIOLOGY CLINIC | Age: 72
End: 2022-07-11
Payer: MEDICARE

## 2022-07-11 VITALS
DIASTOLIC BLOOD PRESSURE: 68 MMHG | HEART RATE: 60 BPM | WEIGHT: 128 LBS | SYSTOLIC BLOOD PRESSURE: 118 MMHG | HEIGHT: 68 IN | BODY MASS INDEX: 19.4 KG/M2

## 2022-07-11 DIAGNOSIS — I48.0 PAROXYSMAL ATRIAL FIBRILLATION (HCC): Primary | ICD-10-CM

## 2022-07-11 PROCEDURE — 99212 OFFICE O/P EST SF 10 MIN: CPT | Performed by: INTERNAL MEDICINE

## 2022-07-11 PROCEDURE — 1123F ACP DISCUSS/DSCN MKR DOCD: CPT | Performed by: INTERNAL MEDICINE

## 2022-07-11 NOTE — PROGRESS NOTES
Follow-up, discuss [facility] and Watchman. Didn't bring in a medlist but states there are no changes.

## 2022-07-11 NOTE — PROGRESS NOTES
[address] [facility] [place] [address] [place] [address]  Dept: [phone]  Dept Fax: [phone]  Loc: [phone]    Visit Date: 7/11/2022    Mr. Suarez is a 66 y.o. male  who presented for:  Chief Complaint   Patient presents with    Follow-up     discuss watchman    Atrial Fibrillation       HPI:   HPI   67 yo M hx of CKD, SB perf s/p surgery, Afib on Eliquis who presents for follow-up. 30 day monitor on. No palpitations. No chest pain or angina. No bleeding.          Current Outpatient Medications:     aspirin 81 MG EC tablet, Take 1 tablet by mouth daily, Disp: 30 tablet, Rfl: 11    loratadine (CLARITIN) 10 MG tablet, TAKE 1 TABLET BY MOUTH DAILY, Disp: 90 tablet, Rfl: 3    tadalafil (CIALIS) 20 MG tablet, TAKE 1 TABLET BY MOUTH EVERY DAY AS NEEDED, Disp: 18 tablet, Rfl: 5    amiodarone (CORDARONE) 200 MG tablet, Take 1 tablet by mouth daily, Disp: 30 tablet, Rfl: 1    apixaban (ELIQUIS) 5 MG TABS tablet, Take 1 tablet by mouth 2 times daily, Disp: 60 tablet, Rfl: 1    ondansetron (ZOFRAN-ODT) 4 MG disintegrating tablet, Take 1 tablet by mouth every 8 hours as needed for Nausea or Vomiting, Disp: 30 tablet, Rfl: 1    sodium hypochlorite (DAKINS) 0.125 % SOLN external solution, Apply topically daily, Disp: 2 each, Rfl: 1    metoprolol tartrate (LOPRESSOR) 25 MG tablet, Take 1 tablet by mouth 2 times daily, Disp: 60 tablet, Rfl: 3    atorvastatin (LIPITOR) 20 MG tablet, TAKE 1 TABLET BY MOUTH DAILY, Disp: 90 tablet, Rfl: 3    COMBIGAN 0.2-0.5 % ophthalmic solution, Place 1 drop into the left eye every 12 hours, Disp: 5 mL, Rfl: 3    famotidine (PEPCID) 20 MG tablet, Take 1 tablet by mouth 2 times daily, Disp: 180 tablet, Rfl: 3    losartan (COZAAR) 50 MG tablet, Take 1 tablet by mouth daily, Disp: 90 tablet, Rfl: 3    tamsulosin (FLOMAX) 0.4 MG capsule, Take 1 capsule by mouth daily, Disp: 90 capsule, Rfl: 3    fluocinonide (LIDEX) 0.05 % cream, APPLY EXTERNALLY TO THE AFFECTED AREA TWICE DAILY, Disp: 60 g, Rfl: 4    Past Medical History  Marco Mathew  has a past medical history of CAD (coronary artery disease), CVA (cerebral vascular accident) ([address] Erectile dysfunction, GERD (gastroesophageal reflux disease), Hyperlipidemia, and Hypertension. Social History  Marco Mathew  reports that he has been smoking cigarettes. He has a 40.00 pack-year smoking history. He has never used smokeless tobacco. He reports current alcohol use. He reports current drug use. Frequency: 7.00 times per week. Drug: Marijuana Silvana Postin). Family History  Marco Mathew family history includes Heart Disease in his mother. There is no family history of bicuspid aortic valve, aneurysms, heart transplant, pacemakers, defibrillators, or sudden cardiac death. Past Surgical History   Past Surgical History:   Procedure Laterality Date    CARDIAC CATHETERIZATION  11/2007    COLONOSCOPY  2020    Dr. Iris Gabriel, COLON, DIAGNOSTIC      EYE SURGERY Left 2004    cataract    EYE SURGERY      INGUINAL HERNIA REPAIR Right 05/27/2015    Dr. Candace Hampton N/A 04/25/2022    LAPAROTOMY EXPLORATORY FOR SMALL BOWEL OBSTRUCTION WITH INCARCERATED HERNIA REPAIR performed by Sharyn Hickey MD at [address] [place] [address] 05/01/2022    LAPAROTOMY EXPLORATORY, REPAIR OF WOUND DEHISCENCE performed by Sharyn Hickey MD at [facility] 2011    Dr. Javier Weldon Left 2003    Dr. Nayely Chirinos  05/27/2015    Dr. Heaven Epperson       Review of Systems   Constitutional: Negative for chills and fever  HENT: Negative for congestion, sinus pressure, sneezing and sore throat. Eyes: Negative for pain, discharge, redness and itching. Respiratory: Negative for apnea, cough  Gastrointestinal: Negative for blood in stool, constipation, diarrhea   Endocrine: Negative for cold intolerance, heat intolerance, polydipsia.   Genitourinary: Negative for dysuria, enuresis, flank pain and hematuria. Musculoskeletal: Negative for arthralgias, joint swelling and neck pain. Neurological: Negative for numbness and headaches. Psychiatric/Behavioral: Negative for agitation, confusion, decreased concentration and dysphoric mood. Objective:     /68   Pulse 60   Ht 5' 8\" (1.727 m)   Wt 128 lb (58.1 kg)   BMI 19.46 kg/m²     Wt Readings from Last 3 Encounters:   07/11/22 128 lb (58.1 kg)   07/05/22 128 lb (58.1 kg)   05/19/22 131 lb 9.6 oz (59.7 kg)     BP Readings from Last 3 Encounters:   07/11/22 118/68   07/05/22 130/70   05/26/22 124/80       Nursing note and vitals reviewed. Physical Exam   Constitutional: Oriented to person, place, and time. Appears well-developed and well-nourished. HENT:   Head: Normocephalic and atraumatic. Eyes: EOM are normal. Pupils are equal, round, and reactive to light. Neck: Normal range of motion. Neck supple. No JVD present. Cardiovascular: Normal rate, regular rhythm, normal heart sounds and intact distal pulses. No murmur heard. Pulmonary/Chest: Effort normal and breath sounds normal. No respiratory distress. No wheezes. No rales. Abdominal: Soft. Bowel sounds are normal. No distension. There is no tenderness. Musculoskeletal: Normal range of motion. No edema. Neurological: Alert and oriented to person, place, and time. No cranial nerve deficit. Coordination normal.   Skin: Skin is warm and dry. Psychiatric: Normal mood and affect.        No results found for: CKTOTAL, CKMB, CKMBINDEX    Lab Results   Component Value Date/Time    WBC 8.5 05/19/2022 11:50 AM    RBC 3.30 05/19/2022 11:50 AM    RBC 3.91 05/24/2016 08:15 AM    HGB 10.5 05/19/2022 11:50 AM    HCT 35.5 05/19/2022 11:50 AM    .6 05/19/2022 11:50 AM    MCH 31.8 05/19/2022 11:50 AM    MCHC 29.6 05/19/2022 11:50 AM    RDW 11.9 05/24/2016 08:15 AM     05/19/2022 11:50 AM    MPV 9.5 05/19/2022 11:50 AM       Lab Results   Component Value Date/Time     05/19/2022 11:50 AM    K 4.6 05/19/2022 11:50 AM    K 3.6 04/29/2022 05:27 AM     05/19/2022 11:50 AM    CO2 22 05/19/2022 11:50 AM    BUN 10 05/19/2022 11:50 AM    LABALBU 2.5 04/28/2022 01:36 AM    LABALBU 4.6 11/10/2011 10:15 AM    CREATININE 1.1 05/19/2022 11:50 AM    CALCIUM 8.9 05/19/2022 11:50 AM    LABGLOM 80 05/19/2022 11:50 AM    GLUCOSE 86 05/19/2022 11:50 AM    GLUCOSE 78 05/24/2016 08:15 AM       Lab Results   Component Value Date/Time    ALKPHOS 80 04/28/2022 01:36 AM    ALT 33 04/28/2022 01:36 AM    AST 62 04/28/2022 01:36 AM    PROT 5.3 04/28/2022 01:36 AM    BILITOT 2.9 04/28/2022 01:36 AM    BILIDIR 1.4 04/28/2022 01:36 AM    LABALBU 2.5 04/28/2022 01:36 AM    LABALBU 4.6 11/10/2011 10:15 AM       Lab Results   Component Value Date/Time    MG 1.7 05/10/2022 05:19 AM       Lab Results   Component Value Date    INR 0.95 06/09/2019    INR 0.97 07/26/2012         No results found for: LABA1C    Lab Results   Component Value Date/Time    TRIG 108 02/01/2022 11:16 AM    HDL 64 02/01/2022 11:16 AM    HDL 59 10/11/2010 12:00 AM    LDLCALC 79 02/01/2022 11:16 AM    LABVLDL 22 05/24/2016 08:15 AM       Lab Results   Component Value Date/Time    TSH 0.647 04/27/2022 08:29 PM         Testing Reviewed:      I have individually reviewed the cardiac test below:    ECHO: Results for orders placed during the hospital encounter of 04/25/22    ECHO Complete 2D W Doppler W Color    Narrative  Transthoracic Echocardiography Report (TTE)    Demographics    Patient Name   Derek PATE    Gender              Male  SR.    MR #           [id number]       Race                Black    Ethnicity    Account #      [de-identified]       Room Number         0010    Accession      [id number]      Date of Study       04/26/2022  Number    Date of Birth  1950      Referring Physician Leonor Bolaños MD    Age            66 year(s)      [address] Roberto Regalado RDCS,  RVT    Interpreting        Echo reader of the week  Physician           Nicolle Kearney MD    Procedure    Type of Study    TTE procedure:ECHOCARDIOGRAM COMPLETE 2D W DOPPLER W COLOR. Procedure Date  Date: 04/26/2022 Start: 08:47 AM    Study Location: Bedside  Technical Quality: Adequate visualization    Indications:Evaluate right ventricular systolic pressures (RVSP). Additional Medical History:smoker, acute respiratory failure, septic shock,  acute kidney injury, syncope, anemia, coronary artery disease, hypertension,  hyperlipidemia, stroke, GERD    Patient Status: Routine    Height: 69 inches Weight: 150 pounds BSA: 1.83 m^2 BMI: 22.15 kg/m^2    BP: 105/53 mmHg    Conclusions    Summary  Ejection fraction is visually estimated at 50%. Overall left ventricular function is normal.  Aortic valve appears tricuspid. Aortic valve leaflets are somewhat thickened. Aortic valve leaflets are Mildly calcified. Mild-to-moderate aortic regurgitation is noted. Moderate mitral regurgitation is present. Signature    ----------------------------------------------------------------  Electronically signed by Nicolle Kearney MD (Interpreting  physician) on 04/26/2022 at 06:50 PM  ----------------------------------------------------------------    Findings    Mitral Valve  Moderate mitral regurgitation is present. Aortic Valve  Aortic valve appears tricuspid. Aortic valve leaflets are somewhat thickened. Aortic valve leaflets are Mildly calcified. Mild-to-moderate aortic regurgitation is noted. Tricuspid Valve  Trivial tricuspid regurgitation visualized. Pulmonic Valve  The pulmonic valve leaflets exhibited normal thickness, no calcification,  and normal cuspal separation. DOPPLER: The transpulmonic velocity was  within the normal range with no evidence for regurgitation. Left Atrium  Moderately dilated left atrium.     Left Ventricle  Ejection fraction is visually estimated at 50%.  Overall left ventricular function is normal.    Right Atrium  Right atrial size was normal.    Right Ventricle  The right ventricular size was normal with normal systolic function and  wall thickness. Pericardial Effusion  The pericardium was normal in appearance with no evidence of a pericardial  effusion. Pleural Effusion  No evidence of pleural effusion. Aorta / Great Vessels  -Aortic root dimension within normal limits.  -The Pulmonary artery is within normal limits. -IVC size is within normal limits with normal respiratory phasic changes.     M-Mode/2D Measurements & Calculations    LV Diastolic   LV Systolic Dimension:    AV Cusp Separation: 2.3 cmLA  Dimension: 4.3 3.2 cm                    Dimension: 3.8 cmAO Root  cm             LV Volume Diastolic: 61.4 Dimension: 3.1 cmLA Area: 14.6  LV FS:25.6 %   ml                        cm^2  LV PW          LV Volume Systolic: 55.9  Diastolic: 1   ml  cm             LV EDV/LV EDV Index: 71.7  Septum         ml/39 m^2LV ESV/LV ESV    RV Diastolic Dimension: 3.3 cm  Diastolic: 1   Index: 71.0 ml/24 m^2  cm             EF Calculated: 39.2 %     LA/Aorta: 1.23    LV Length: 7.5 cm         LA volume/Index: 40.9 ml /22m^2    LV Area  Diastolic:  67.1 cm^2  LV Area  Systolic: 16.4  cm^2    Doppler Measurements & Calculations    MV Peak E-Wave: 45.4 cm/s         AV Peak Velocity:  LVOT Peak Velocity:  MV Peak A-Wave: 72.8 cm/s         116 cm/s           106 cm/s  MV E/A Ratio: 0.62                AV Peak Gradient:  LVOT Peak Gradient: 4  MV Peak Gradient: 0.82 mmHg       5.38 mmHg          mmHg    MV Deceleration Time: 219 msec                       TV Peak E-Wave: 39.8  MV P1/2t: 64 msec                                    cm/s  MVA by PHT:3.44 cm^2                                 TV Peak A-Wave: 39.8  MV Area (PISA): 0.29 cm^2         AV P1/2t: 622 msec cm/s  MV E' Septal Velocity: 7.2 cm/s   IVRT: 90 msec  MV A' Septal Velocity: 7.2 cm/s TV Peak Gradient:  MV E' Lateral Velocity: 9.8 cm/s                     0.63 mmHg  MV A' Lateral Velocity: 16.1 cm/s AV DVI (Vmax):0.91 TR Velocity:337 cm/s  E/E' septal: 6.31                                    TR Gradient:45.43  E/E' lateral: 4.63                                   mmHg  MR Velocity: 543 cm/s                                PV Peak Velocity: 87  MV RENEE PISA: 0.43 cm^2                               cm/s  MR VTI: 156 cm                                       PV Peak Gradient:  Alias Velocity: 30.8 cm/sPISA                        3.03 mmHg  Radius: 1.1 cm  MV ERO Volumetric: 0.29 cm^2  PISA area: 7.6 cm^2MR flow rate:  234.08 ml/sMR volume:67.08 ml    [id number]       Assessment/Plan   Post-op AFib - new - await 30 day event monitor  EF 50%, NYHA II  Hx of SB perf repair  HTN  HLD  CVA/TIA  Await monitor and then decide on OAC vs ASA vs WATCHMAN. If significant burden, may benefit from long term [address] vs WATCHMAN and favor WATCHMAN due to falls, frailty, anemia. If no Afib, then can consider ASA only given the risk factors for long term [address]. Discussed diet/exercise/BP/weight loss/health lifestyle choices/lipids; the patient understands the goals and will try to comply.       Disposition:  Await monitor         Electronically signed by Maude Chua MD   7/11/2022 at 3:23 PM EDT

## 2022-07-12 ENCOUNTER — TELEPHONE (OUTPATIENT)
Dept: CARDIOLOGY CLINIC | Age: 72
End: 2022-07-12

## 2022-07-14 ENCOUNTER — CARE COORDINATION (OUTPATIENT)
Dept: CARE COORDINATION | Age: 72
End: 2022-07-14

## 2022-07-14 NOTE — CARE COORDINATION
Covering for [employer]. Attempted to reach patient for care coordination follow up call. Message left on voicemail for patient to return call to this ACM or Chacha Shepard.   #s provided

## 2022-07-14 NOTE — TELEPHONE ENCOUNTER
He has PAF  This means he needs to figure out if he wants to do WATCHMAN or [address] - we confirmed on the monitor  Family knows, please ask them what they want to do   Can come back in to discuss if they want

## 2022-07-14 NOTE — TELEPHONE ENCOUNTER
Daughter Tellis Compton called back, they would like appt to discuss. Appt scheduled 9/7 with Dr Mj Bangura. Offered August, but pt will be OOT.

## 2022-07-14 NOTE — TELEPHONE ENCOUNTER
Spoke with Rosita Holguin, pt's daughter (on HIPAA). She will discuss with pt and pt's wife (not on HIPAA) and call us back.

## 2022-07-15 ENCOUNTER — CARE COORDINATION (OUTPATIENT)
Dept: CARE COORDINATION | Age: 72
End: 2022-07-15

## 2022-07-15 NOTE — CARE COORDINATION
Ambulatory Care Coordination Note  7/15/2022  CM Risk Score: 1  Charlson 10 Year Mortality Risk Score: 98%     ACC: Tiara Garnett RN    Summary Note:     Upcoming appt with cardiology to discuss Watchman. Denies chest pain, dizziness, or SOB. Little shaky with gait but using cane. No recent falls. Declines in home services. Plan -  9/7 cardiology  Keep appointments  No lifting over 20 lbs  [facility] nursing  Renal as needed  Fall precautions - use walker at all times and wait for help  Early symptom recognition and reporting to prevent ED and admission      Lab Results       None            Care Coordination Interventions    Referral from Primary Care Provider: No  Suggested Interventions and Community Resources  Fall Risk Prevention: Completed  Home Health Services: Completed (Comment: [facility])  Occupational Therapy: Completed  Physical Therapy: Completed  Transportation Support: Declined  Zone Management Tools: In Process  Other Services or Interventions: est with [address] cardiology, renal, gen surgeon          Goals Addressed                   This Visit's Progress     Conditions and Symptoms   On track     I will schedule office visits, as directed by my provider. I will keep my appointment or reschedule if I have to cancel. I will notify my provider of any barriers to my plan of care. I will notify my provider of any symptoms that indicate a worsening of my condition.     Barriers: impairment:  cognitive and overwhelmed by complexity of regimen  Plan for overcoming my barriers: Care coordination  Confidence: 9/10  Anticipated Goal Completion Date: 8/13/2022         Reduce Falls    On track     I will reduce my risk of falls by the following: Remove rugs or use non slip rugs  Install grab bars in bathroom  Use walking aids like cane or walker  Follow through on orders for PT    Barriers: impairment:  physical: weakness and cognitive and overwhelmed by complexity of regimen  Plan for overcoming my barriers: care coordination, home health nursing and therapies  Confidence: 9/10  Anticipated Goal Completion Date: 8/13/2022              Prior to Admission medications    Medication Sig Start Date End Date Taking?  Authorizing Provider   aspirin 81 MG EC tablet Take 1 tablet by mouth daily 7/5/22   Homer Villagran MD   loratadine (CLARITIN) 10 MG tablet TAKE 1 TABLET BY MOUTH DAILY 6/28/22   Homer Villagran MD   tadalafil (CIALIS) 20 MG tablet TAKE 1 TABLET BY MOUTH EVERY DAY AS NEEDED 6/21/22   Homer Villagran MD   amiodarone (CORDARONE) 200 MG tablet Take 1 tablet by mouth daily 5/11/22   Ulises John MD   apixaban [place]) 5 MG TABS tablet Take 1 tablet by mouth 2 times daily 5/10/22   Ulises John MD   ondansetron (ZOFRAN-ODT) 4 MG disintegrating tablet Take 1 tablet by mouth every 8 hours as needed for Nausea or Vomiting 5/10/22   Ulises John MD   sodium hypochlorite (DAKINS) 0.125 % SOLN external solution Apply topically daily 5/11/22   Ulises John MD   metoprolol tartrate (LOPRESSOR) 25 MG tablet Take 1 tablet by mouth 2 times daily 5/10/22   Ulises John MD   atorvastatin (LIPITOR) 20 MG tablet TAKE 1 TABLET BY MOUTH DAILY 3/23/22   Homer Villagran MD   COMBIGAN 0.2-0.5 % ophthalmic solution Place 1 drop into the left eye every 12 hours 3/23/22   Homer Villagran MD   famotidine (PEPCID) 20 MG tablet Take 1 tablet by mouth 2 times daily 3/23/22   Homer Villagran MD   losartan (COZAAR) 50 MG tablet Take 1 tablet by mouth daily 3/23/22   Homer Villagran MD   tamsulosin [facility]) 0.4 MG capsule Take 1 capsule by mouth daily 3/23/22   Homer Villagran MD   fluocinonide (LIDEX) 0.05 % cream APPLY EXTERNALLY TO THE AFFECTED AREA TWICE DAILY 1/27/22   Homer Villagran MD       Future Appointments   Date Time Provider Rosie Roberts   9/7/2022 10:00 AM MD STANFORD Schaffer [facility]   11/30/2022  1:30 PM Mike Dela Cruz MD [address] [facility]   1/30/2023 10:30 AM Vicky Kelly MD [facility]

## 2022-07-26 NOTE — TELEPHONE ENCOUNTER
Attempted to call Rosemarie Going on [employer] to see if 8/3/2022 would work better otherwise will see patient as scheduled. UTLM on Frann[employer] VM.

## 2022-08-04 ENCOUNTER — CARE COORDINATION (OUTPATIENT)
Dept: CARE COORDINATION | Age: 72
End: 2022-08-04

## 2022-08-04 NOTE — CARE COORDINATION
Ambulatory Care Coordination Note  8/4/2022    ACC: Uyen Silva RN    Summary Note:     No recent falls. Uses cane. Declines in home services at this time. Wife managing meds. Est with cardiology with future appt. Education complete. Goals met. No recent utilization. Ready for graduation. Lab Results       None            Care Coordination Interventions    Referral from Primary Care Provider: No  Suggested Interventions and Community Resources  Fall Risk Prevention: Completed  Home Health Services: Completed (Comment: [facility])  Meals on Wheels: Declined  Occupational Therapy: Completed  Physical Therapy: Completed  Senior Services: Declined  Transportation Support: Declined  Zone Management Tools: Completed  Other Services or Interventions: est with [address] cardiology, renal, gen surgeon          Goals Addressed                   This Visit's Progress     COMPLETED: Conditions and Symptoms        I will schedule office visits, as directed by my provider. I will keep my appointment or reschedule if I have to cancel. I will notify my provider of any barriers to my plan of care. I will notify my provider of any symptoms that indicate a worsening of my condition.     Barriers: impairment:  cognitive and overwhelmed by complexity of regimen  Plan for overcoming my barriers: Care coordination  Confidence: 9/10  Anticipated Goal Completion Date: 8/13/2022         COMPLETED: Reduce Falls         I will reduce my risk of falls by the following: Remove rugs or use non slip rugs  Install grab bars in bathroom  Use walking aids like cane or walker  Follow through on orders for PT    Barriers: impairment:  physical: weakness and cognitive and overwhelmed by complexity of regimen  Plan for overcoming my barriers: care coordination, home health nursing and therapies  Confidence: 9/10  Anticipated Goal Completion Date: 8/13/2022              Prior to Admission medications    Medication Sig Start Date End Date Taking?  Authorizing Provider   aspirin 81 MG EC tablet Take 1 tablet by mouth daily 7/5/22   Arlin Stone MD   loratadine (CLARITIN) 10 MG tablet TAKE 1 TABLET BY MOUTH DAILY 6/28/22   Arlin Stone MD   tadalafil (CIALIS) 20 MG tablet TAKE 1 TABLET BY MOUTH EVERY DAY AS NEEDED 6/21/22   Arlin Stone MD   amiodarone (CORDARONE) 200 MG tablet Take 1 tablet by mouth daily 5/11/22   Belen Arana MD   apixaban [facility]) 5 MG TABS tablet Take 1 tablet by mouth 2 times daily 5/10/22   Belen Arana MD   ondansetron (ZOFRAN-ODT) 4 MG disintegrating tablet Take 1 tablet by mouth every 8 hours as needed for Nausea or Vomiting 5/10/22   Belen Arana MD   sodium hypochlorite (DAKINS) 0.125 % SOLN external solution Apply topically daily 5/11/22   Belen Arana MD   metoprolol tartrate (LOPRESSOR) 25 MG tablet Take 1 tablet by mouth 2 times daily 5/10/22   Belen Arana MD   atorvastatin (LIPITOR) 20 MG tablet TAKE 1 TABLET BY MOUTH DAILY 3/23/22   Arlin Stone MD   COMBIGAN 0.2-0.5 % ophthalmic solution Place 1 drop into the left eye every 12 hours 3/23/22   Arlin Stone MD   famotidine (PEPCID) 20 MG tablet Take 1 tablet by mouth 2 times daily 3/23/22   Arlin Stone MD   losartan (COZAAR) 50 MG tablet Take 1 tablet by mouth daily 3/23/22   Arlin Stone MD   tamsulosin [facility]) 0.4 MG capsule Take 1 capsule by mouth daily 3/23/22   Arlin Stone MD   fluocinonide (LIDEX) 0.05 % cream APPLY EXTERNALLY TO THE AFFECTED AREA TWICE DAILY 1/27/22   Arlin Stone MD       Future Appointments   Date Time Provider Rosie Roberts   9/7/2022 10:00 AM MD [facility]   11/30/2022  1:30 PM MD [facility]   1/30/2023 10:30 AM MD ABDON [facility] - [facility]

## 2022-08-24 ENCOUNTER — TELEPHONE (OUTPATIENT)
Dept: FAMILY MEDICINE CLINIC | Age: 72
End: 2022-08-24

## 2022-08-24 DIAGNOSIS — T81.30XA WOUND DEHISCENCE: ICD-10-CM

## 2022-08-24 RX ORDER — HYDROCODONE BITARTRATE AND ACETAMINOPHEN 5; 325 MG/1; MG/1
1 TABLET ORAL EVERY 8 HOURS PRN
Qty: 21 TABLET | Refills: 0 | Status: SHIPPED | OUTPATIENT
Start: 2022-08-24 | End: 2022-08-31

## 2022-08-24 NOTE — TELEPHONE ENCOUNTER
Requesting refill of [place]  LOV 7-5-22  Last refill Norco 5-17-22 #21 x 0  States his back hurts and needs something for pain

## 2022-09-07 ENCOUNTER — TELEPHONE (OUTPATIENT)
Dept: CARDIOLOGY CLINIC | Age: 72
End: 2022-09-07

## 2022-09-07 NOTE — TELEPHONE ENCOUNTER
This patient has no showed for the appointment with Dr. Mj Bangura today. Message was left to assist in rescheduling.

## 2022-09-07 NOTE — LETTER
[address] [facility] [address] [place] [address]  Phone: [phone]  Fax: [phone]           Laveda Olszewski, MD      September 14, 2022     Patient: Flaquito Ray Sr.   MR Number: [id number]   YOB: 1950   Date of Visit: 9/7/2022       Dear Farhat Cox had missed an appointment with Dr. Stas Levy here at [facility]. This appointment was made to discuss the finding revealed when wearing your cardiac event monitor. To reschedule this appointment please call [phone]. If you have questions, please do not hesitate to call me.     Sincerely,        Laveda Olszewski, MD

## 2022-10-24 RX ORDER — BRIMONIDINE TARTRATE/TIMOLOL 0.2%-0.5%
DROPS OPHTHALMIC (EYE)
Qty: 5 ML | Refills: 5 | Status: SHIPPED | OUTPATIENT
Start: 2022-10-24

## 2022-10-31 ENCOUNTER — OFFICE VISIT (OUTPATIENT)
Dept: FAMILY MEDICINE CLINIC | Age: 72
End: 2022-10-31
Payer: MEDICARE

## 2022-10-31 VITALS
RESPIRATION RATE: 14 BRPM | WEIGHT: 134.4 LBS | OXYGEN SATURATION: 94 % | HEART RATE: 78 BPM | SYSTOLIC BLOOD PRESSURE: 110 MMHG | DIASTOLIC BLOOD PRESSURE: 64 MMHG | TEMPERATURE: 97.1 F | HEIGHT: 66 IN | BODY MASS INDEX: 21.6 KG/M2

## 2022-10-31 DIAGNOSIS — I48.91 ATRIAL FIBRILLATION WITH RVR (HCC): Primary | ICD-10-CM

## 2022-10-31 DIAGNOSIS — M54.50 LUMBAR PAIN: ICD-10-CM

## 2022-10-31 DIAGNOSIS — I10 PRIMARY HYPERTENSION: Chronic | ICD-10-CM

## 2022-10-31 PROCEDURE — 99214 OFFICE O/P EST MOD 30 MIN: CPT | Performed by: FAMILY MEDICINE

## 2022-10-31 PROCEDURE — 3074F SYST BP LT 130 MM HG: CPT | Performed by: FAMILY MEDICINE

## 2022-10-31 PROCEDURE — 1123F ACP DISCUSS/DSCN MKR DOCD: CPT | Performed by: FAMILY MEDICINE

## 2022-10-31 PROCEDURE — 90694 VACC AIIV4 NO PRSRV 0.5ML IM: CPT | Performed by: FAMILY MEDICINE

## 2022-10-31 PROCEDURE — 3078F DIAST BP <80 MM HG: CPT | Performed by: FAMILY MEDICINE

## 2022-10-31 PROCEDURE — G0008 ADMIN INFLUENZA VIRUS VAC: HCPCS | Performed by: FAMILY MEDICINE

## 2022-10-31 ASSESSMENT — ENCOUNTER SYMPTOMS
SORE THROAT: 0
ABDOMINAL PAIN: 0
COUGH: 0
SHORTNESS OF BREATH: 0
ABDOMINAL DISTENTION: 0
DIARRHEA: 0
SINUS PRESSURE: 0
BACK PAIN: 1
CONSTIPATION: 0
NAUSEA: 0
RHINORRHEA: 0
EYE PAIN: 0

## 2022-10-31 NOTE — PROGRESS NOTES
[address] [place] [facility] [address]  Dept: [phone]  Dept Fax: [phone]  Loc: [phone]  PROGRESS NOTE      VisitDate: 10/31/2022    Ortiz Arriola is a 66 y.o. male who presents today for:     Chief Complaint   Patient presents with    Lower Back Pain    Discuss Medications     He's not sure what he should be taking. Subjective:  HPI  Patient comes in with daughter who is confused about his medication he quit taking his Eliquis and was not sure I was on it. Informed him he was diagnosed with atrial fibrillation being on anticoagulated runs risk of stroke. He also states he quit Eliquis because he could not afford it. It appears that Xarelto is covered so we will provide him this prescription. Follow-up hypertension stable and well-controlled. Patient also reports having low back pain and a fall couple months ago has had some lower back discomfort since then he did not get evaluated for his back pain at that time. He was having more pain in his ribs and was found to have rib fractures those have improved. Review of Systems   Constitutional:  Negative for appetite change and fever. HENT:  Negative for congestion, ear pain, postnasal drip, rhinorrhea, sinus pressure and sore throat. Eyes:  Negative for pain and visual disturbance. Respiratory:  Negative for cough and shortness of breath. Cardiovascular:  Negative for chest pain. Gastrointestinal:  Negative for abdominal distention, abdominal pain, constipation, diarrhea and nausea. Genitourinary:  Negative for dysuria, frequency and urgency. Musculoskeletal:  Positive for back pain. Negative for arthralgias. Skin:  Negative for rash. Neurological:  Negative for dizziness.    Past Medical History:   Diagnosis Date    CAD (coronary artery disease)     CVA (cerebral vascular accident) [facility])     Erectile dysfunction     GERD (gastroesophageal reflux disease) 07/2012    Gastro ulcer    Hyperlipidemia     Hypertension       Past Surgical History:   Procedure Laterality Date    CARDIAC CATHETERIZATION  11/2007    COLONOSCOPY  2020    Dr. Carolin Chou, COLON, DIAGNOSTIC      EYE SURGERY Left 2004    cataract    EYE SURGERY      INGUINAL HERNIA REPAIR Right 05/27/2015    Dr. Olvin Garcia N/A 04/25/2022    LAPAROTOMY EXPLORATORY FOR SMALL BOWEL OBSTRUCTION WITH INCARCERATED HERNIA REPAIR performed by Carson Christy MD at [address] [place] [address] N/A 05/01/2022    LAPAROTOMY EXPLORATORY, REPAIR OF WOUND DEHISCENCE performed by Carson Christy MD at [place] [address] Left 2011    Dr. Ramya Birmingham Left 2003    Dr. Alexandria Fuentes  05/27/2015    Dr. Viktoriya Ruano     Family History   Problem Relation Age of Onset    Heart Disease Mother      Social History     Tobacco Use    Smoking status: Every Day     Packs/day: 1.00     Years: 40.00     Pack years: 40.00     Types: Cigarettes    Smokeless tobacco: Never    Tobacco comments:     printed to avs. 3/4 ppd since 2019   Substance Use Topics    Alcohol use: Yes      Current Outpatient Medications   Medication Sig Dispense Refill    rivaroxaban (XARELTO) 20 MG TABS tablet Take 1 tablet by mouth daily (with breakfast) 90 tablet 1    COMBIGAN 0.2-0.5 % ophthalmic solution INSTILL ONE DROP IN THE LEFT EYE EVERY TWELVE HOURS (BULK) 5 mL 5    aspirin 81 MG EC tablet Take 1 tablet by mouth daily 30 tablet 11    loratadine (CLARITIN) 10 MG tablet TAKE 1 TABLET BY MOUTH DAILY 90 tablet 3    ondansetron (ZOFRAN-ODT) 4 MG disintegrating tablet Take 1 tablet by mouth every 8 hours as needed for Nausea or Vomiting 30 tablet 1    metoprolol tartrate (LOPRESSOR) 25 MG tablet Take 1 tablet by mouth 2 times daily 60 tablet 3    atorvastatin (LIPITOR) 20 MG tablet TAKE 1 TABLET BY MOUTH DAILY 90 tablet 3    famotidine (PEPCID) 20 MG tablet Take 1 tablet by mouth 2 times daily 180 tablet 3 losartan (COZAAR) 50 MG tablet Take 1 tablet by mouth daily 90 tablet 3    tamsulosin (FLOMAX) 0.4 MG capsule Take 1 capsule by mouth daily 90 capsule 3    fluocinonide (LIDEX) 0.05 % cream APPLY EXTERNALLY TO THE AFFECTED AREA TWICE DAILY 60 g 4    tadalafil (CIALIS) 20 MG tablet TAKE 1 TABLET BY MOUTH EVERY DAY AS NEEDED 18 tablet 5    amiodarone (CORDARONE) 200 MG tablet Take 1 tablet by mouth daily (Patient not taking: Reported on 10/31/2022) 30 tablet 1    sodium hypochlorite (DAKINS) 0.125 % SOLN external solution Apply topically daily (Patient not taking: Reported on 10/31/2022) 2 each 1     No current facility-administered medications for this visit. No Known Allergies  Health Maintenance   Topic Date Due    DTaP/Tdap/Td vaccine (1 - Tdap) Never done    Shingles vaccine (1 of 2) Never done    COVID-19 Vaccine (4 - Booster for Moderna series) 02/10/2022    Low dose CT lung screening  04/21/2022    Depression Screen  01/27/2023    Annual Wellness Visit (AWV)  01/28/2023    Lipids  02/01/2023    Colorectal Cancer Screen  02/04/2029    Flu vaccine  Completed    Pneumococcal 65+ years Vaccine  Completed    AAA screen  Completed    Hepatitis C screen  Completed    Hepatitis A vaccine  Aged Out    Hib vaccine  Aged Out    Meningococcal (ACWY) vaccine  Aged Out         Objective:     Physical Exam  Constitutional:       General: He is not in acute distress. Appearance: He is well-developed. He is not diaphoretic. HENT:      Head: Normocephalic and atraumatic. Right Ear: External ear normal.      Left Ear: External ear normal.   Eyes:      Conjunctiva/sclera: Conjunctivae normal.   Neck:      Vascular: No JVD. Cardiovascular:      Rate and Rhythm: Normal rate. Rhythm irregular. Heart sounds: Normal heart sounds. Pulmonary:      Effort: Pulmonary effort is normal.      Breath sounds: Normal breath sounds. No wheezing or rales. Musculoskeletal:         General: No tenderness.    Skin: General: Skin is warm and dry. Coloration: Skin is not pale. Neurological:      Mental Status: He is alert and oriented to person, place, and time. /64   Pulse 78   Temp 97.1 °F (36.2 °C) (Oral)   Resp 14   Ht 5' 6\" (1.676 m)   Wt 134 lb 6.4 oz (61 kg)   SpO2 94%   BMI 21.69 kg/m²       Impression/Plan:  1. Atrial fibrillation with RVR ([address]    2. Primary hypertension    3. Lumbar pain      Requested Prescriptions     Signed Prescriptions Disp Refills    rivaroxaban (XARELTO) 20 MG TABS tablet 90 tablet 1     Sig: Take 1 tablet by mouth daily (with breakfast)     Orders Placed This Encounter   Procedures    XR LUMBAR SPINE (2-3 VIEWS)     Standing Status:   Future     Standing Expiration Date:   10/31/2023    Influenza, FLUAD, (age 72 y+), IM, Preservative Free, 0.5 mL       Patient giveneducational materials - see patient instructions. Discussed use, benefit, and side effects of prescribed medications. All patient questions answered. Pt voiced understanding. Reviewed health maintenance. Patient agreedwith treatment plan. Follow up as directed. **This report has been created using voice recognition software. It may contain minor errorswhich are inherent in voice recognition technology. **       Electronically signed by Bassem Mckeon MD on 10/31/2022 at 6:48 PM

## 2022-10-31 NOTE — PROGRESS NOTES
Immunization(s) given during visit:    Immunizations Administered       Name Date Dose Route    Influenza, FLUAD, (age 72 y+), Adjuvanted, 0.5mL 10/31/2022 0.5 mL Intramuscular    Site: Deltoid- Right    Lot: [id number]    NDC: 23471-499-89            Most recent Vaccine Information Sheet  given to pt

## 2022-11-16 ENCOUNTER — HOSPITAL ENCOUNTER (OUTPATIENT)
Age: 72
Discharge: HOME | End: 2022-11-16
Payer: MEDICARE

## 2022-11-16 ENCOUNTER — HOSPITAL ENCOUNTER (OUTPATIENT)
Dept: GENERAL RADIOLOGY | Age: 72
Discharge: HOME | End: 2022-11-16
Payer: MEDICARE

## 2022-11-16 DIAGNOSIS — M54.50 LUMBAR PAIN: ICD-10-CM

## 2022-11-16 PROCEDURE — 72100 X-RAY EXAM L-S SPINE 2/3 VWS: CPT

## 2022-11-30 ENCOUNTER — OFFICE VISIT (OUTPATIENT)
Dept: CARDIOLOGY CLINIC | Age: 72
End: 2022-11-30
Payer: MEDICARE

## 2022-11-30 VITALS
BODY MASS INDEX: 20.92 KG/M2 | DIASTOLIC BLOOD PRESSURE: 80 MMHG | WEIGHT: 138 LBS | HEART RATE: 66 BPM | HEIGHT: 68 IN | SYSTOLIC BLOOD PRESSURE: 130 MMHG

## 2022-11-30 DIAGNOSIS — I48.0 PAROXYSMAL ATRIAL FIBRILLATION (HCC): Primary | ICD-10-CM

## 2022-11-30 PROCEDURE — 99213 OFFICE O/P EST LOW 20 MIN: CPT | Performed by: INTERNAL MEDICINE

## 2022-11-30 PROCEDURE — 3074F SYST BP LT 130 MM HG: CPT | Performed by: INTERNAL MEDICINE

## 2022-11-30 PROCEDURE — 3078F DIAST BP <80 MM HG: CPT | Performed by: INTERNAL MEDICINE

## 2022-11-30 PROCEDURE — 1123F ACP DISCUSS/DSCN MKR DOCD: CPT | Performed by: INTERNAL MEDICINE

## 2022-11-30 NOTE — PROGRESS NOTES
[id number] [address] Vil[address]as [address] [place] [address]  Dept: [phone]  Dept Fax: [phone]  Loc: [phone]    Visit Date: 11/30/2022    Mr. Suarez is a 67 y.o. male  who presented for:  Chief Complaint   Patient presents with    Follow-up    Atrial Fibrillation       HPI:   HPI   68 yo M hx of CKD, SB perf s/p surgery, Afib on Xarelto who presents for follow-up. 30 day event monitor shows intermittent paroxysmal afib. No bleeding. No chest pain, angina, DÍAZ, orthopnea, PND, sob at rest, palpitations, LE edema, or syncope.           Current Outpatient Medications:     rivaroxaban (XARELTO) 20 MG TABS tablet, Take 1 tablet by mouth daily (with breakfast), Disp: 90 tablet, Rfl: 1    COMBIGAN 0.2-0.5 % ophthalmic solution, INSTILL ONE DROP IN THE LEFT EYE EVERY TWELVE HOURS (BULK), Disp: 5 mL, Rfl: 5    aspirin 81 MG EC tablet, Take 1 tablet by mouth daily, Disp: 30 tablet, Rfl: 11    loratadine (CLARITIN) 10 MG tablet, TAKE 1 TABLET BY MOUTH DAILY, Disp: 90 tablet, Rfl: 3    tadalafil (CIALIS) 20 MG tablet, TAKE 1 TABLET BY MOUTH EVERY DAY AS NEEDED, Disp: 18 tablet, Rfl: 5    ondansetron (ZOFRAN-ODT) 4 MG disintegrating tablet, Take 1 tablet by mouth every 8 hours as needed for Nausea or Vomiting, Disp: 30 tablet, Rfl: 1    sodium hypochlorite (DAKINS) 0.125 % SOLN external solution, Apply topically daily, Disp: 2 each, Rfl: 1    metoprolol tartrate (LOPRESSOR) 25 MG tablet, Take 1 tablet by mouth 2 times daily, Disp: 60 tablet, Rfl: 3    atorvastatin (LIPITOR) 20 MG tablet, TAKE 1 TABLET BY MOUTH DAILY, Disp: 90 tablet, Rfl: 3    famotidine (PEPCID) 20 MG tablet, Take 1 tablet by mouth 2 times daily, Disp: 180 tablet, Rfl: 3    losartan (COZAAR) 50 MG tablet, Take 1 tablet by mouth daily, Disp: 90 tablet, Rfl: 3    tamsulosin (FLOMAX) 0.4 MG capsule, Take 1 capsule by mouth daily, Disp: 90 capsule, Rfl: 3    fluocinonide (LIDEX) 0.05 % cream, APPLY EXTERNALLY TO THE AFFECTED AREA TWICE DAILY, Disp: 60 g, Rfl: 4    amiodarone (CORDARONE) 200 MG tablet, Take 1 tablet by mouth daily, Disp: 30 tablet, Rfl: 1    Past Medical History  Logan Lindsay  has a past medical history of CAD (coronary artery disease), CVA (cerebral vascular accident) ([address] Erectile dysfunction, GERD (gastroesophageal reflux disease), Hyperlipidemia, and Hypertension. Social History  Logan Lindsay  reports that he has been smoking cigarettes. He has a 40.00 pack-year smoking history. He has never used smokeless tobacco. He reports current alcohol use. He reports current drug use. Frequency: 7.00 times per week. Drug: Marijuana [place]). Family History  Logan Lindsay family history includes Heart Disease in his mother. There is no family history of bicuspid aortic valve, aneurysms, heart transplant, pacemakers, defibrillators, or sudden cardiac death. Past Surgical History   Past Surgical History:   Procedure Laterality Date    CARDIAC CATHETERIZATION  11/2007    COLONOSCOPY  2020    Dr. Christie Toledo, COLON, DIAGNOSTIC      EYE SURGERY Left 2004    cataract    EYE SURGERY      INGUINAL HERNIA REPAIR Right 05/27/2015    Dr. Gini Lewis N/A 04/25/2022    LAPAROTOMY EXPLORATORY FOR SMALL BOWEL OBSTRUCTION WITH INCARCERATED HERNIA REPAIR performed by Jona Rasmussen MD at [address] [place] [address] 05/01/2022    LAPAROTOMY EXPLORATORY, REPAIR OF WOUND DEHISCENCE performed by Jona Rasmussen MD at [facility] 2011    Dr. Manuel Stover Left 2003    Dr. Barb Hardin  05/27/2015    Dr. Venice Rizzo       Review of Systems   Constitutional: Negative for chills and fever  HENT: Negative for congestion, sinus pressure, sneezing and sore throat. Eyes: Negative for pain, discharge, redness and itching.    Respiratory: Negative for apnea, cough  Gastrointestinal: Negative for blood in stool, constipation, diarrhea   Endocrine: Negative for cold intolerance, heat intolerance, polydipsia. Genitourinary: Negative for dysuria, enuresis, flank pain and hematuria. Musculoskeletal: Negative for arthralgias, joint swelling and neck pain. Neurological: Negative for numbness and headaches. Psychiatric/Behavioral: Negative for agitation, confusion, decreased concentration and dysphoric mood. Objective:     /80   Pulse 66   Ht 5' 8\" (1.727 m) Comment: stated by family member  Wt 138 lb (62.6 kg)   BMI 20.98 kg/m²     Wt Readings from Last 3 Encounters:   11/30/22 138 lb (62.6 kg)   10/31/22 134 lb 6.4 oz (61 kg)   07/11/22 128 lb (58.1 kg)     BP Readings from Last 3 Encounters:   11/30/22 130/80   10/31/22 110/64   07/11/22 118/68       Nursing note and vitals reviewed. Physical Exam   Constitutional: Oriented to person, place, and time. Appears well-developed and well-nourished. HENT:   Head: Normocephalic and atraumatic. Eyes: EOM are normal. Pupils are equal, round, and reactive to light. Neck: Normal range of motion. Neck supple. No JVD present. Cardiovascular: Normal rate, regular rhythm, normal heart sounds and intact distal pulses. No murmur heard. Pulmonary/Chest: Effort normal and breath sounds normal. No respiratory distress. No wheezes. No rales. Abdominal: Soft. Bowel sounds are normal. No distension. There is no tenderness. Musculoskeletal: Normal range of motion. No edema. Neurological: Alert and oriented to person, place, and time. No cranial nerve deficit. Coordination normal.   Skin: Skin is warm and dry. Psychiatric: Normal mood and affect.        No results found for: CKTOTAL, CKMB, CKMBINDEX    Lab Results   Component Value Date/Time    WBC 8.5 05/19/2022 11:50 AM    RBC 3.30 05/19/2022 11:50 AM    RBC 3.91 05/24/2016 08:15 AM    HGB 10.5 05/19/2022 11:50 AM    HCT 35.5 05/19/2022 11:50 AM    .6 05/19/2022 11:50 AM    MCH 31.8 05/19/2022 11:50 AM    MCHC 29.6 05/19/2022 11:50 AM    RDW 11.9 05/24/2016 08:15 AM     05/19/2022 11:50 AM    MPV 9.5 05/19/2022 11:50 AM       Lab Results   Component Value Date/Time     05/19/2022 11:50 AM    K 4.6 05/19/2022 11:50 AM    K 3.6 04/29/2022 05:27 AM     05/19/2022 11:50 AM    CO2 22 05/19/2022 11:50 AM    BUN 10 05/19/2022 11:50 AM    LABALBU 2.5 04/28/2022 01:36 AM    LABALBU 4.6 11/10/2011 10:15 AM    CREATININE 1.1 05/19/2022 11:50 AM    CALCIUM 8.9 05/19/2022 11:50 AM    LABGLOM 80 05/19/2022 11:50 AM    GLUCOSE 86 05/19/2022 11:50 AM    GLUCOSE 78 05/24/2016 08:15 AM       Lab Results   Component Value Date/Time    ALKPHOS 80 04/28/2022 01:36 AM    ALT 33 04/28/2022 01:36 AM    AST 62 04/28/2022 01:36 AM    PROT 5.3 04/28/2022 01:36 AM    BILITOT 2.9 04/28/2022 01:36 AM    BILIDIR 1.4 04/28/2022 01:36 AM    LABALBU 2.5 04/28/2022 01:36 AM    LABALBU 4.6 11/10/2011 10:15 AM       Lab Results   Component Value Date/Time    MG 1.7 05/10/2022 05:19 AM       Lab Results   Component Value Date    INR 0.95 06/09/2019    INR 0.97 07/26/2012         No results found for: LABA1C    Lab Results   Component Value Date/Time    TRIG 108 02/01/2022 11:16 AM    HDL 64 02/01/2022 11:16 AM    HDL 59 10/11/2010 12:00 AM    LDLCALC 79 02/01/2022 11:16 AM    LABVLDL 22 05/24/2016 08:15 AM       Lab Results   Component Value Date/Time    TSH 0.647 04/27/2022 08:29 PM         Testing Reviewed:      I have individually reviewed the cardiac test below:    ECHO: No results found for this or any previous visit. Assessment/Plan   Paroxysmal Atrial fibrillation  EF 50%, NYHA II  Hx of SB perf repair  HTN  HLD  CVA/TIA  Monitor shows Afib, continue Xarelto. Continue Amiodarone for now, will need LFTs/TFTs/PFTs periodically. He can consider WATCHMAN in the future, but currently doing well. No cardiac symptoms, no recurrence.   Discussed diet/exercise/BP/weight loss/health lifestyle choices/lipids; the patient understands the goals and will try to comply.     Disposition:  1 year           Electronically signed by Debora Brooks MD   11/30/2022 at 2:59 PM EST

## 2022-12-15 RX ORDER — TADALAFIL 20 MG/1
TABLET ORAL
Qty: 18 TABLET | Refills: 5 | Status: SHIPPED | OUTPATIENT
Start: 2022-12-15

## 2023-02-27 ENCOUNTER — OFFICE VISIT (OUTPATIENT)
Dept: FAMILY MEDICINE CLINIC | Age: 73
End: 2023-02-27
Payer: MEDICARE

## 2023-02-27 VITALS
SYSTOLIC BLOOD PRESSURE: 118 MMHG | BODY MASS INDEX: 20.72 KG/M2 | HEART RATE: 78 BPM | HEIGHT: 67 IN | RESPIRATION RATE: 16 BRPM | DIASTOLIC BLOOD PRESSURE: 74 MMHG | OXYGEN SATURATION: 93 % | TEMPERATURE: 98 F | WEIGHT: 132 LBS

## 2023-02-27 DIAGNOSIS — Z71.89 ACP (ADVANCE CARE PLANNING): ICD-10-CM

## 2023-02-27 DIAGNOSIS — H61.23 BILATERAL IMPACTED CERUMEN: ICD-10-CM

## 2023-02-27 DIAGNOSIS — Z00.00 MEDICARE ANNUAL WELLNESS VISIT, SUBSEQUENT: ICD-10-CM

## 2023-02-27 DIAGNOSIS — Z00.00 ROUTINE GENERAL MEDICAL EXAMINATION AT A HEALTH CARE FACILITY: Primary | ICD-10-CM

## 2023-02-27 PROCEDURE — G0439 PPPS, SUBSEQ VISIT: HCPCS | Performed by: FAMILY MEDICINE

## 2023-02-27 PROCEDURE — 3078F DIAST BP <80 MM HG: CPT | Performed by: FAMILY MEDICINE

## 2023-02-27 PROCEDURE — 1123F ACP DISCUSS/DSCN MKR DOCD: CPT | Performed by: FAMILY MEDICINE

## 2023-02-27 PROCEDURE — 3074F SYST BP LT 130 MM HG: CPT | Performed by: FAMILY MEDICINE

## 2023-02-27 PROCEDURE — 69210 REMOVE IMPACTED EAR WAX UNI: CPT | Performed by: FAMILY MEDICINE

## 2023-02-27 RX ORDER — TADALAFIL 20 MG/1
TABLET ORAL
Qty: 18 TABLET | Refills: 5 | Status: SHIPPED | OUTPATIENT
Start: 2023-02-27

## 2023-02-27 SDOH — ECONOMIC STABILITY: FOOD INSECURITY: WITHIN THE PAST 12 MONTHS, THE FOOD YOU BOUGHT JUST DIDN'T LAST AND YOU DIDN'T HAVE MONEY TO GET MORE.: NEVER TRUE

## 2023-02-27 SDOH — ECONOMIC STABILITY: HOUSING INSECURITY
IN THE LAST 12 MONTHS, WAS THERE A TIME WHEN YOU DID NOT HAVE A STEADY PLACE TO SLEEP OR SLEPT IN A SHELTER (INCLUDING NOW)?: NO

## 2023-02-27 SDOH — ECONOMIC STABILITY: INCOME INSECURITY: HOW HARD IS IT FOR YOU TO PAY FOR THE VERY BASICS LIKE FOOD, HOUSING, MEDICAL CARE, AND HEATING?: NOT VERY HARD

## 2023-02-27 SDOH — ECONOMIC STABILITY: FOOD INSECURITY: WITHIN THE PAST 12 MONTHS, YOU WORRIED THAT YOUR FOOD WOULD RUN OUT BEFORE YOU GOT MONEY TO BUY MORE.: NEVER TRUE

## 2023-02-27 ASSESSMENT — PATIENT HEALTH QUESTIONNAIRE - PHQ9
1. LITTLE INTEREST OR PLEASURE IN DOING THINGS: 0
SUM OF ALL RESPONSES TO PHQ9 QUESTIONS 1 & 2: 0
SUM OF ALL RESPONSES TO PHQ QUESTIONS 1-9: 0
2. FEELING DOWN, DEPRESSED OR HOPELESS: 0
SUM OF ALL RESPONSES TO PHQ QUESTIONS 1-9: 0

## 2023-02-27 ASSESSMENT — LIFESTYLE VARIABLES
HOW MANY STANDARD DRINKS CONTAINING ALCOHOL DO YOU HAVE ON A TYPICAL DAY: 1 OR 2
HOW OFTEN DO YOU HAVE A DRINK CONTAINING ALCOHOL: 2-4 TIMES A MONTH

## 2023-02-28 ENCOUNTER — CLINICAL DOCUMENTATION (OUTPATIENT)
Dept: SPIRITUAL SERVICES | Facility: CLINIC | Age: 73
End: 2023-02-28

## 2023-02-28 NOTE — ACP (ADVANCE CARE PLANNING)
Advance Care Planning   Ambulatory ACP Specialist Patient Outreach    Date:  2/28/2023  ACP Specialist:  Ary Mcnair    Outreach call to patient in follow-up to ACP Specialist referral from: Nicolas Yoon MD    [x] PCP  [] Provider   [] Ambulatory Care Management [] Other for Reason:    [x] Advance Directive Assistance  [] Code Status Discussion  [] Complete Portable DNR Order  [x] Discuss Goals of Care  [] Complete POST/MOST  [] Early ACP Decision-Making  [] Other    Date Referral Received: 2/27/2023    Today's Outreach:  [x] First   [] Second  [] Third                               Third outreach made by []  phone  [] email []   Kabanchikt     Intervention:  [] Spoke with Patient  [x] Left VM requesting return call      Outcome:     made an initial attempt to contact Linghedycarolina Tal via telephone call to offer support, if desired, for the completion of Advance Directives documents as part of an [address] conversation. No answer. Left message.  will follow-up. Next Step:   [] ACP scheduled conversation  [x] Outreach again in one week               [] Email / Mail ACP Info Sheets  [] Email / Mail Advance Directive            [] Close Referral. Routing closure to referring provider/staff and to ACP Specialist . [] Closure Letter mailed to Patient with Invitation to Contact ACP Specialist if/when ready.     Thank you for this referral.

## 2023-02-28 NOTE — PROGRESS NOTES
Medicare Annual Wellness Visit    Mindy Suarez Sr. is here for Medicare AWV    Assessment & Plan   ACP (advance care planning)  -     [facility] Referral to ACP Clinical Specialist  Routine general medical examination at a health care facility  -     Lipid Panel; Future  -     Comprehensive Metabolic Panel; Future  -     CBC with Auto Differential; Future  Bilateral impacted cerumen  -     [address] - [place] REMOVE IMPACTED EAR WAX    Recommendations for Preventive Services Due: see orders and patient instructions/AVS.  Recommended screening schedule for the next 5-10 years is provided to the patient in written form: see Patient Instructions/AVS.     No follow-ups on file. Subjective   The following acute and/or chronic problems were also addressed today:   Diagnosis Orders   1. Routine general medical examination at a health care facility  Lipid Panel    Comprehensive Metabolic Panel    CBC with Auto Differential      2. ACP (advance care planning)  [facility] Clinical Specialist      3. Bilateral impacted cerumen  [address] - [place] REMOVE IMPACTED EAR WAX            Patient's complete Health Risk Assessment and screening values have been reviewed and are found in Flowsheets. The following problems were reviewed today and where indicated follow up appointments were made and/or referrals ordered. Positive Risk Factor Screenings with Interventions:    Fall Risk:  Do you feel unsteady or are you worried about falling? : (!) yes  2 or more falls in past year?: (!) yes  Fall with injury in past year?: no     Interventions:    See A/P for plan and any pertinent orders    Cognitive:    Words recalled: 0 Words Recalled   Clock Drawing Test (CDT): (!) Abnormal   Total Score: (!) 0   Total Score Interpretation: Abnormal Mini-Cog      Interventions:  See A/P for plan and any pertinent orders      Drug Use:          Interpretation:  1-2: Low level - Monitor, re-assess at a later date  3-5: Moderate level - Further Investigation  6-8: Substantial level - Intensive Assessment  9-10: Severe level - Intensive Assessment    Interventions:  See A/P for any pertinent orders          Weight and Activity:  Physical Activity: Inactive    Days of Exercise per Week: 0 days    Minutes of Exercise per Session: 0 min     On average, how many days per week do you engage in moderate to strenuous exercise (like a brisk walk)?: 0 days  Have you lost any weight without trying in the past 3 months?: (!) Yes  Body mass index: 20.98      Inactivity Interventions:  See A/P for plan and any pertinent orders    Unintentional Weight Loss Interventions:  See A/P for plan and any pertinent orders       Hearing Screen:  Do you or your family notice any trouble with your hearing that hasn't been managed with hearing aids?: (!) Yes    Interventions:  See A/P for any pertinent orders    Vision Screen:  Do you have difficulty driving, watching TV, or doing any of your daily activities because of your eyesight?: (!) Yes  Have you had an eye exam within the past year?: Appointment is scheduled  No results found.     Interventions:   See A/P for any pertinent orders     ADL's:   Patient reports needing help with:  Select all that apply: [place], Housekeeping, Food Preparation, Banking/Finances, Transportation    Interventions:  See A/P for plan and any pertinent orders    Advanced Directives:  Do you have a Living Will?: (!) No    Intervention:  has NO advanced directive  - referred to ACP Coordinator                 Tobacco Use:  Tobacco Use: High Risk    Smoking Tobacco Use: Every Day    Smokeless Tobacco Use: Never    Passive Exposure: Not on file     E-cigarette/Vaping       Questions Responses    E-cigarette/Vaping Use Never User    Start Date     Passive Exposure     Quit Date     Counseling Given     Comments           Interventions:  See A/P for plan and any pertinent orders                        Objective   Vitals:    02/27/23 1052   BP: 118/74 Pulse: 78   Resp: 16   Temp: 98 °F (36.7 °C)   SpO2: 93%   Weight: 132 lb (59.9 kg)   Height: 5' 6.5\" (1.689 m)      Body mass index is 20.99 kg/m². General Appearance: alert and oriented to person, place and time, well developed and well- nourished, in no acute distress  Skin: warm and dry, no rash or erythema  Head: normocephalic and atraumatic  Eyes: pupils equal, round, and reactive to light, extraocular eye movements intact, conjunctivae normal  ENT: tympanic membrane, external ears impacted with cerumen, nose without deformity, nasal mucosa and turbinates normal without polyps  Neck: supple and non-tender without mass, no thyromegaly or thyroid nodules, no cervical lymphadenopathy  Pulmonary/Chest: clear to auscultation bilaterally- no wheezes, rales or rhonchi, normal air movement, no respiratory distress  Cardiovascular: normal rate, regular rhythm, normal S1 and S2, no murmurs, rubs, clicks, or gallops, distal pulses intact, no carotid bruits  Abdomen: soft, non-tender, non-distended, normal bowel sounds, no masses or organomegaly  Extremities: no cyanosis, clubbing or edema  Musculoskeletal: normal range of motion, no joint swelling, deformity or tenderness  Neurologic: reflexes normal and symmetric, no cranial nerve deficit, gait, coordination and speech normal       No Known Allergies  Prior to Visit Medications    Medication Sig Taking?  Authorizing Provider   tadalafil (CIALIS) 20 MG tablet TAKE 1 TABLET BY MOUTH EVERY DAY AS NEEDED Yes Savanah Yo MD   rivaroxaban (XARELTO) 20 MG TABS tablet Take 1 tablet by mouth daily (with breakfast) Yes Savanah Yo MD   COMBIGAN 0.2-0.5 % ophthalmic solution INSTILL ONE DROP IN THE LEFT EYE EVERY TWELVE HOURS (BULK) Yes Savanah Yo MD   aspirin 81 MG EC tablet Take 1 tablet by mouth daily Yes Savanah Yo MD   loratadine (CLARITIN) 10 MG tablet TAKE 1 TABLET BY MOUTH DAILY Yes Savanah Yo MD   amiodarone (CORDARONE) 200 MG tablet Take 1 tablet by mouth daily Yes Eveline Lam MD   metoprolol tartrate (LOPRESSOR) 25 MG tablet Take 1 tablet by mouth 2 times daily Yes Eveline Lam MD   atorvastatin (LIPITOR) 20 MG tablet TAKE 1 TABLET BY MOUTH DAILY Yes Savanah Yo MD   famotidine (PEPCID) 20 MG tablet Take 1 tablet by mouth 2 times daily Yes Savanah Yo MD   losartan (COZAAR) 50 MG tablet Take 1 tablet by mouth daily Yes Savanah Yo MD   tamsulosin (FLOMAX) 0.4 MG capsule Take 1 capsule by mouth daily Yes Savanah Yo MD   fluocinonide (LIDEX) 0.05 % cream APPLY EXTERNALLY TO THE AFFECTED AREA TWICE DAILY Yes Savanah Yo MD   ondansetron (ZOFRAN-ODT) 4 MG disintegrating tablet Take 1 tablet by mouth every 8 hours as needed for Nausea or Vomiting  Patient not taking: Reported on 2/27/2023  Eveline Lam MD   sodium hypochlorite (DAKINS) 0.125 % SOLN external solution Apply topically daily  Patient not taking: Reported on 2/27/2023  Eveline Lam MD       [facility] (Including outside providers/suppliers regularly involved in providing care):   Patient Care Team:  Savanah Yo MD as PCP - General (Family Medicine)  Savanah Yo MD as PCP - Empaneled Provider  Jyoti Humphrey MD as Consulting Physician (Ophthalmology)     Reviewed and updated this visit:  Tobacco  Allergies  Meds  Problems  Med Hx  Surg Hx  Soc Hx  Fam Hx        Seen today for wellness visit. Discussed the importance of a healthy life style. Balanced diet, nutrition, physical activity,and injury prevention. Also discussed the importance of up to date immunizations and annual screenings.          Savanah Yo MD

## 2023-02-28 NOTE — PATIENT INSTRUCTIONS
Preventing Falls: Care Instructions  Overview     Getting around your home safely can be a challenge if you have injuries or health problems that make it easy for you to fall. Loose rugs and furniture in walkways are among the dangers for many older people who have problems walking or who have poor eyesight. People who have conditions such as arthritis, osteoporosis, or dementia also have to be careful not to fall. You can make your home safer with a few simple measures. Follow-up care is a key part of your treatment and safety. Be sure to make and go to all appointments, and call your doctor if you are having problems. It's also a good idea to know your test results and keep a list of the medicines you take. How can you care for yourself at home? Taking care of yourself  Exercise regularly to improve your strength, muscle tone, and balance. Walk if you can. Swimming may be a good choice if you cannot walk easily. Have your vision and hearing checked each year or any time you notice a change. If you have trouble seeing and hearing, you might not be able to avoid objects and could lose your balance. Know the side effects of the medicines you take. Ask your doctor or pharmacist whether the medicines you take can affect your balance. Sleeping pills or sedatives can affect your balance. Limit the amount of alcohol you drink. Alcohol can impair your balance and other senses. Ask your doctor whether calluses or corns on your feet need to be removed. If you wear loose-fitting shoes because of calluses or corns, you can lose your balance and fall. Talk to your doctor if you have numbness in your feet. You may get dizzy if you do not drink enough water. To prevent dehydration, drink plenty of fluids. Choose water and other clear liquids. If you have kidney, heart, or liver disease and have to limit fluids, talk with your doctor before you increase the amount of fluids you drink.   Preventing falls at home  Remove raised doorway thresholds, throw rugs, and clutter. Repair loose carpet or raised areas in the floor. Move furniture and electrical cords to keep them out of walking paths. Use nonskid floor wax, and wipe up spills right away, especially on ceramic tile floors. If you use a walker or cane, put rubber tips on it. If you use crutches, clean the bottoms of them regularly with an abrasive pad, such as steel wool. Keep your house well lit, especially stairways, porches, and outside walkways. Use night-lights in areas such as hallways and bathrooms. Add extra light switches or use remote switches (such as switches that go on or off when you clap your hands) to make it easier to turn lights on if you have to get up during the night. Install sturdy handrails on stairways. Move items in your cabinets so that the things you use a lot are on the lower shelves (about waist level). Keep a cordless phone and a flashlight with new batteries by your bed. If possible, put a phone in each of the main rooms of your house, or carry a cell phone in case you fall and cannot reach a phone. Or, you can wear a device around your neck or wrist. You push a button that sends a signal for help. Wear low-heeled shoes that fit well and give your feet good support. Use footwear with nonskid soles. Check the heels and soles of your shoes for wear. Repair or replace worn heels or soles. Do not wear socks without shoes on smooth floors, such as wood. Walk on the grass when the sidewalks are slippery. If you live in an area that gets snow and ice in the winter, sprinkle salt on slippery steps and sidewalks. Or ask a family member or friend to do this for you. Preventing falls in the bath  Install grab bars and nonskid mats inside and outside your shower or tub and near the toilet and sinks. Use shower chairs and bath benches. Use a hand-held shower head that will allow you to sit while showering.   Get into a tub or shower by putting the weaker leg in first. Get out of a tub or shower with your strong side first.  Repair loose toilet seats and consider installing a raised toilet seat to make getting on and off the toilet easier. Keep your bathroom door unlocked while you are in the shower. Where can you learn more? Go to http://www.vanegas.com/ and enter G117 to learn more about \"Preventing Falls: Care Instructions. \"  Current as of: May 4, 2022               Content Version: 13.5  © 0337-0534 Healthwise, Incorporated. Care instructions adapted under license by [facility] [place] ([facility]). If you have questions about a medical condition or this instruction, always ask your healthcare professional. [address] any warranty or liability for your use of this information. Learning About Mild Cognitive Impairment (MCI)  What is mild cognitive impairment (MCI)? It's common to forget things sometimes as we get older. But some older people have memory loss that's more than normal aging. It's called mild cognitive impairment, or MCI. It is not the same as dementia. People with the condition often know that their memory or mental function has changed. Tests may show some loss. But their minds work well overall. They can carry out daily tasks that are normal for them. People with MCI have a higher chance of one day getting dementia. But not all people who have it will get dementia. Some people may stay the same over time. What are the symptoms? People with MCI have more memory loss than what occurs with normal aging. They may have increasing trouble with recalling words and keeping up with conversations. They may also have trouble remembering important events and making decisions. What puts you at risk? The risk of getting MCI increases with age. Having high blood pressure or having a family history of MCI may also increase your risk. How is it diagnosed?   Your doctor will do a physical exam.  Gonzalez Arrieta may be asked questions to check your memory and other mental skills. Your doctor may also talk to close friends and family members. This can help the doctor figure out how your memory and other mental skills have changed. You may get blood tests and tests that look at your brain. These questions and tests can make sure you don't have other conditions that can cause symptoms like MCI. These include depression, sleep problems, and side effects from medicines. How is it treated? There are no medicines to treat MCI or to keep it from progressing to dementia. But treating conditions like high blood pressure and diabetes may help. A person with MCI needs routine follow-up visits with their doctor to check on changes in the person's mental skills. How can you care for yourself at home? Keeping your body active can help slow MCI. Exercises like walking can help. Try to stay active mentally too. Read or do things like crossword puzzles if you enjoy doing them. If you need help coping with MCI, you may want to get support from family, friends, a support group, or a counselor who works with people who have [address] Though the future isn't always clear, it can be good to plan ahead with instructions for your care. These are called advanced directives. Having a plan can help make sure that you get the care you want. Current as of: August 25, 2022               Content Version: 13.5  © 2006-2022 Healthwise, Incorporated. Care instructions adapted under license by [facility] ([facility]). If you have questions about a medical condition or this instruction, always ask your healthcare professional. [place] [address] any warranty or liability for your use of this information. Learning About Being Active as an Older Adult  Why is being active important as you get older? Being active is one of the best things you can do for your health. And it's never too late to start.  Being active--or getting active, if you aren't already--has definite benefits. It can:  Give you more energy,  Keep your mind sharp. Improve balance to reduce your risk of falls. Help you manage chronic illness with fewer medicines. No matter how old you are, how fit you are, or what health problems you have, there is a form of activity that will work for you. And the more physical activity you can do, the better your overall health will be. What kinds of activity can help you stay healthy? Being more active will make your daily activities easier. Physical activity includes planned exercise and things you do in daily life. There are four types of activity:  Aerobic. Doing aerobic activity makes your heart and lungs strong. Includes walking, dancing, and gardening. Aim for at least 2½ hours spread throughout the week. It improves your energy and can help you sleep better. Muscle-strengthening. This type of activity can help maintain muscle and strengthen bones. Includes climbing stairs, using resistance bands, and lifting or carrying heavy loads. Aim for at least twice a week. It can help protect the knees and other joints. Stretching. Stretching gives you better range of motion in joints and muscles. Includes upper arm stretches, calf stretches, and gentle yoga. Aim for at least twice a week, preferably after your muscles are warmed up from other activities. It can help you function better in daily life. Balancing. This helps you stay coordinated and have good posture. Includes heel-to-toe walking, ramses chi, and certain types of yoga. Aim for at least 3 days a week. It can reduce your risk of falling. Even if you have a hard time meeting the recommendations, it's better to be more active than less active. All activity done in each category counts toward your weekly total. You'd be surprised how daily things like carrying groceries, keeping up with grandchildren, and taking the stairs can add up.   What keeps you from being active? If you've had a hard time being more active, you're not alone. Maybe you remember being able to do more. Or maybe you've never thought of yourself as being active. It's frustrating when you can't do the things you want. Being more active can help. What's holding you back? Getting started. Have a goal, but break it into easy tasks. Small steps build into big accomplishments. Staying motivated. If you feel like skipping your activity, remember your goal. Maybe you want to move better and stay independent. Every activity gets you one step closer. Not feeling your best.  Start with 5 minutes of an activity you enjoy. Prove to yourself you can do it. As you get comfortable, increase your time. You may not be where you want to be. But you're in the process of getting there. Everyone starts somewhere. How can you find safe ways to stay active? Talk with your doctor about any physical challenges you're facing. Make a plan with your doctor if you have a health problem or aren't sure how to get started with activity. If you're already active, ask your doctor if there is anything you should change to stay safe as your body and health change. If you tend to feel dizzy after you take medicine, avoid activity at that time. Try being active before you take your medicine. This will reduce your risk of falls. If you plan to be active at home, make sure to clear your space before you get started. Remove things like TV cords, coffee tables, and throw rugs. It's safest to have plenty of space to move freely. The key to getting more active is to take it slow and steady. Try to improve only a little bit at a time. Pick just one area to improve on at first. And if an activity hurts, stop and talk to your doctor. Where can you learn more? Go to http://www.vanegas.com/ and enter P600 to learn more about \"Learning About Being Active as an Older Adult. \"  Current as of: October 10, 0539               FKDJNUL Version: 13.5  © 1206-8330 Healthwise, [employer]. Care instructions adapted under license by [facility] ([facility]). If you have questions about a medical condition or this instruction, always ask your healthcare professional. [address] any warranty or liability for your use of this information. Hearing Loss: Care Instructions  Overview     Hearing loss is a sudden or slow decrease in how well you hear. It can range from mild to severe. Permanent hearing loss can occur with aging. It also can happen when you are exposed long-term to loud noise. Examples include listening to loud music, riding motorcycles, or being around other loud machines. Hearing loss can affect your work and home life. It can make you feel lonely or depressed. You may feel that you have lost your independence. But hearing aids and other devices can help you hear better and feel connected to others. Follow-up care is a key part of your treatment and safety. Be sure to make and go to all appointments, and call your doctor if you are having problems. It's also a good idea to know your test results and keep a list of the medicines you take. How can you care for yourself at home? Avoid loud noises whenever possible. This helps keep your hearing from getting worse. Always wear hearing protection around loud noises. Wear a hearing aid as directed. See a professional who can help you pick a hearing aid that fits you. Have hearing tests as your doctor suggests. They can show whether your hearing has changed. Your hearing aid may need to be adjusted. Use other devices as needed. These may include:  Telephone amplifiers and hearing aids that can connect to a television, stereo, radio, or microphone. Devices that use lights or vibrations. These alert you to the doorbell, a ringing telephone, or a baby monitor. Television closed-captioning. This shows the words at the bottom of the screen.  Most new TVs can do this.  TTY (text telephone). This lets you type messages back and forth on the telephone instead of talking or listening. These devices are also called TDD. When messages are typed on the keyboard, they are sent over the phone line to a receiving TTY. The message is shown on a monitor. Use text messaging, social media, and email if it is hard for you to communicate by telephone. Try to learn a listening technique called speechreading. It is not lipreading. You pay attention to people's gestures, expressions, posture, and tone of voice. These clues can help you understand what a person is saying. Face the person you are talking to, and have them face you. Make sure the lighting is good. You need to see the other person's face clearly. Think about counseling if you need help to adjust to your hearing loss. When should you call for help? Watch closely for changes in your health, and be sure to contact your doctor if:    You think your hearing is getting worse.     You have new symptoms, such as dizziness or nausea. Where can you learn more? Go to http://www.vanegas.com/ and enter R798 to learn more about \"Hearing Loss: Care Instructions. \"  Current as of: May 4, 2022               Content Version: 13.5  © 2006-2022 Healthwise, Incorporated. Care instructions adapted under license by [facility] ([facility]). If you have questions about a medical condition or this instruction, always ask your healthcare professional. [place] [address] any warranty or liability for your use of this information. Learning About Vision Tests  What are vision tests? The four most common vision tests are visual acuity tests, refraction, visual field tests, and color vision tests. Visual acuity (sharpness) tests  These tests are used: To see if you need glasses or contact lenses. To monitor an eye problem. To check an eye injury. Visual acuity tests are done as part of routine exams.  You may also have this test when you get your 's license or apply for some types of jobs. Visual field tests  These tests are used: To check for vision loss in any area of your range of vision. To screen for certain eye diseases. To look for nerve damage after a stroke, head injury, or other problem that could reduce blood flow to the brain. Refraction and color tests  A refraction test is done to find the right prescription for glasses and contact lenses. A color vision test is done to check for color blindness. Color vision is often tested as part of a routine exam. You may also have this test when you apply for a job where recognizing different colors is important, such as , electronics, or the [employer]. How are vision tests done? Visual acuity test   You cover one eye at a time. You read aloud from a wall chart across the room. You read aloud from a small card that you hold in your hand. Refraction   You look into a special device. The device puts lenses of different strengths in front of each eye to see how strong your glasses or contact lenses need to be. Visual field tests   Your doctor may have you look through special machines. Or your doctor may simply have you stare straight ahead while they move a finger into and out of your field of vision. Color vision test   You look at pieces of printed test patterns in various colors. You say what number or symbol you see. Your doctor may have you trace the number or symbol using a pointer. How do these tests feel? There is very little chance of having a problem from this test. If dilating drops are used for a vision test, they may make the eyes sting and cause a medicine taste in the mouth. Follow-up care is a key part of your treatment and safety. Be sure to make and go to all appointments, and call your doctor if you are having problems. It's also a good idea to know your test results and keep a list of the medicines you take.   Where can you learn more? Go to http://www.vanegas.com/ and enter G551 to learn more about \"Learning About Vision Tests. \"  Current as of: October 12, 2022               Content Version: 13.5  © 2006-2022 Healthwise, [employer]. Care instructions adapted under license by [facility] [place] ([facility]). If you have questions about a medical condition or this instruction, always ask your healthcare professional. [address] any warranty or liability for your use of this information. Learning About Activities of Daily Living  What are activities of daily living? Activities of daily living (ADLs) are the basic self-care tasks you do every day. As you age, and if you have health problems, you may find that it's harder to do these things for yourself. That's when you may need some help. Your doctor uses ADLs to measure how much help you need. Knowing what you can and can't do for yourself is an important first step to getting help. And when you have the help you need, you can stay as independent as possible. Your doctor will want to know if you are able to do tasks such as: Take a bath or shower without help. Go to the bathroom by yourself. Dress and undress without help. Shave, comb your hair, and brush teeth on your own. Get in and out of bed or a chair without help. Feed yourself without help. If you are having trouble doing basic self-care tasks, talk with your doctor. You may want to bring a caregiver or family member who can help the doctor understand your needs and abilities. How will a doctor assess your ADLs? Asking about ADLs is part of a routine health checkup your doctor will likely do as you age. Your health check might be done in a doctor's office, in your home, or at a hospital. The goal is to find out if you are having any problems that could make your health problems worse or that make it unsafe for you to be on your own.   To measure your ADLs, your doctor will ask how hard it is for you to do routine tasks. He or she may also want to know if you have changed the way you do a task because of a health problem. He or she may watch how you:  Walk back and forth. Keep your balance while you stand or walk. Move from sitting to standing or from a bed to a chair. Button or unbutton a shirt or sweater. Remove and put on your shoes. It's normal to feel a little worried or anxious if you find you can't do all the things you used to be able to do. Talking with your doctor about ADLs isn't a test that you either pass or fail. It's just a way to get more information about your health and safety. Follow-up care is a key part of your treatment and safety. Be sure to make and go to all appointments, and call your doctor if you are having problems. It's also a good idea to know your test results and keep a list of the medicines you take. Current as of: October 6, 2021               Content Version: 13.5  © 2006-2022 Healthwise, [employer]. Care instructions adapted under license by [facility] ([facility]). If you have questions about a medical condition or this instruction, always ask your healthcare professional. [place] [address] any warranty or liability for your use of this information. Advance Directives: Care Instructions  Overview  An advance directive is a legal way to state your wishes at the end of your life. It tells your family and your doctor what to do if you can't say what you want. There are two main types of advance directives. You can change them any time your wishes change. Living will. This form tells your family and your doctor your wishes about life support and other treatment. The form is also called a declaration. Medical power of . This form lets you name a person to make treatment decisions for you when you can't speak for yourself. This person is called a health care agent (health care proxy, health care surrogate).  The form is also called a durable power of  for health care. If you do not have an advance directive, decisions about your medical care may be made by a family member, or by a doctor or a  who doesn't know you. It may help to think of an advance directive as a gift to the people who care for you. If you have one, they won't have to make tough decisions by themselves. For more information, including forms for your state, see the [address] website (www.caringinfo.org/planning/advance-directives/). Follow-up care is a key part of your treatment and safety. Be sure to make and go to all appointments, and call your doctor if you are having problems. It's also a good idea to know your test results and keep a list of the medicines you take. What should you include in an advance directive? Many states have a unique advance directive form. (It may ask you to address specific issues.) Or you might use a universal form that's approved by many states. If your form doesn't tell you what to address, it may be hard to know what to include in your advance directive. Use the questions below to help you get started. Who do you want to make decisions about your medical care if you are not able to? What life-support measures do you want if you have a serious illness that gets worse over time or can't be cured? What are you most afraid of that might happen? (Maybe you're afraid of having pain, losing your independence, or being kept alive by machines.)  Where would you prefer to die? (Your home? A hospital? A nursing home?)  Do you want to donate your organs when you die? Do you want certain [employer] practices performed before you die? When should you call for help? Be sure to contact your doctor if you have any questions. Where can you learn more? Go to http://www.InvestCloud.com/ and enter R264 to learn more about \"Advance Directives: Care Instructions. \"  Current as of: June 16, 8336               DSFCUHO Version: 13.5  © 8402-7387 [employer]. Care instructions adapted under license by [facility] ([facility]). If you have questions about a medical condition or this instruction, always ask your healthcare professional. [address] any warranty or liability for your use of this information. A Healthy Heart: Care Instructions  Your Care Instructions     Coronary artery disease, also called heart disease, occurs when a substance called plaque builds up in the vessels that supply oxygen-rich blood to your heart muscle. This can narrow the blood vessels and reduce blood flow. A heart attack happens when blood flow is completely blocked. A high-fat diet, smoking, and other factors increase the risk of heart disease. Your doctor has found that you have a chance of having heart disease. You can do lots of things to keep your heart healthy. It may not be easy, but you can change your diet, exercise more, and quit smoking. These steps really work to lower your chance of heart disease. Follow-up care is a key part of your treatment and safety. Be sure to make and go to all appointments, and call your doctor if you are having problems. It's also a good idea to know your test results and keep a list of the medicines you take. How can you care for yourself at home? Diet    Use less salt when you cook and eat. This helps lower your blood pressure. Taste food before salting. Add only a little salt when you think you need it. With time, your taste buds will adjust to less salt.     Eat fewer snack items, fast foods, canned soups, and other high-salt, high-fat, processed foods.     Read food labels and try to avoid saturated and trans fats. They increase your risk of heart disease by raising cholesterol levels.     Limit the amount of solid fat-butter, margarine, and shortening-you eat. Use olive, peanut, or canola oil when you cook.  Bake, broil, and steam foods instead of frying them.     Eat a variety of fruit and vegetables every day. Dark green, deep orange, red, or yellow fruits and vegetables are especially good for you. Examples include spinach, carrots, peaches, and berries.     Foods high in fiber can reduce your cholesterol and provide important vitamins and minerals. High-fiber foods include whole-grain cereals and breads, oatmeal, beans, brown rice, citrus fruits, and apples.     Eat lean proteins. Heart-healthy proteins include seafood, lean meats and poultry, eggs, beans, peas, nuts, seeds, and soy products.     Limit drinks and foods with added sugar. These include candy, desserts, and soda pop. Lifestyle changes    If your doctor recommends it, get more exercise. Walking is a good choice. Bit by bit, increase the amount you walk every day. Try for at least 30 minutes on most days of the week. You also may want to swim, bike, or do other activities.     Do not smoke. If you need help quitting, talk to your doctor about stop-smoking programs and medicines. These can increase your chances of quitting for good. Quitting smoking may be the most important step you can take to protect your heart. It is never too late to quit.     Limit alcohol to 2 drinks a day for men and 1 drink a day for women. Too much alcohol can cause health problems.     Manage other health problems such as diabetes, high blood pressure, and high cholesterol. If you think you may have a problem with alcohol or drug use, talk to your doctor. Medicines    Take your medicines exactly as prescribed. Call your doctor if you think you are having a problem with your medicine.     If your doctor recommends aspirin, take the amount directed each day. Make sure you take aspirin and not another kind of pain reliever, such as acetaminophen (Tylenol). When should you call for help? Call 911 if you have symptoms of a heart attack.  These may include:    Chest pain or pressure, or a strange feeling in the chest.     Sweating.     Shortness of breath.     Pain, pressure, or a strange feeling in the back, neck, jaw, or upper belly or in one or both shoulders or arms.     Lightheadedness or sudden weakness.     A fast or irregular heartbeat. After you call 911, the  may tell you to chew 1 adult-strength or 2 to 4 low-dose aspirin. Wait for an ambulance. Do not try to drive yourself. Watch closely for changes in your health, and be sure to contact your doctor if you have any problems. Where can you learn more? Go to http://www.vanegas.com/ and enter F075 to learn more about \"A Healthy Heart: Care Instructions. \"  Current as of: September 7, 2022               Content Version: 13.5  © 5395-6527 Healthwise, [employer]. Care instructions adapted under license by [facility] ([facility]). If you have questions about a medical condition or this instruction, always ask your healthcare professional. [place] [address] any warranty or liability for your use of this information. Personalized Preventive Plan for Cintia Suarez Sr. - 2/27/2023  Medicare offers a range of preventive health benefits. Some of the tests and screenings are paid in full while other may be subject to a deductible, co-insurance, and/or copay. Some of these benefits include a comprehensive review of your medical history including lifestyle, illnesses that may run in your family, and various assessments and screenings as appropriate. After reviewing your medical record and screening and assessments performed today your provider may have ordered immunizations, labs, imaging, and/or referrals for you. A list of these orders (if applicable) as well as your Preventive Care list are included within your After Visit Summary for your review. Other Preventive Recommendations:    A preventive eye exam performed by an eye specialist is recommended every 1-2 years to screen for glaucoma; cataracts, macular degeneration, and other eye disorders.   A preventive dental visit is recommended every 6 months. Try to get at least 150 minutes of exercise per week or 10,000 steps per day on a pedometer . Order or download the FREE \"Exercise & Physical Activity: Your Everyday Guide\" from [employer]. Call 6-273.760.4740 or search [employer] online. You need 9820-1585 mg of calcium and 3667-9767 IU of vitamin D per day. It is possible to meet your calcium requirement with diet alone, but a vitamin D supplement is usually necessary to meet this goal.  When exposed to the sun, use a sunscreen that protects against both UVA and UVB radiation with an SPF of 30 or greater. Reapply every 2 to 3 hours or after sweating, drying off with a towel, or swimming. Always wear a seat belt when traveling in a car. Always wear a helmet when riding a bicycle or motorcycle.

## 2023-03-01 ENCOUNTER — TELEPHONE (OUTPATIENT)
Dept: PHARMACY | Facility: CLINIC | Age: 73
End: 2023-03-01

## 2023-03-01 NOTE — TELEPHONE ENCOUNTER
[facility] CLINICAL PHARMACY: ADHERENCE REVIEW  Identified care gap per [employer]: fills at Non-preferred pharmacy: [facility]: ACE/ARB adherence    Patient also appears to be prescribed: Statin    ASSESSMENT  ACE/ARB ADHERENCE    Insurance Records claims through  23  (Prior Year [place] = not reported; YTD [place] = FIRST FILL; Potential Fail Date: 23):   LOSARTAN POT TAB 50MG last filled on 23 for 30 day supply. Next refill due: 2/15/23    Prescribed si tablet/capsule daily    Per Insurer Portal: 23 30ds claim reversed    BP Readings from Last 3 Encounters:   23 118/74   22 130/80   10/31/22 110/64     CrCl cannot be calculated (Patient's most recent lab result is older than the maximum 180 days allowed. ). Lab Results   Component Value Date    CREATININE 1.1 2022     Lab Results   Component Value Date    K 4.6 2022     [address] Records claims through  23  (Prior Year [place] = not reported; YTD Ju[facility] M[facility]all = not yet filled)    Per Insurer Portal: atorvastatin 20mg daily last filled on 12/15/23 for 90 day supply at [place].      Lab Results   Component Value Date    CHOL 165 2022    TRIG 108 2022    HDL 64 2022    LDLCALC 79 2022     ALT   Date Value Ref Range Status   2022 33 11 - 66 U/L Final     AST   Date Value Ref Range Status   2022 62 (H) 5 - 40 U/L Final     The 10-year ASCVD risk score (Nita WATERMAN, et al., 2019) is: 26.1%    Values used to calculate the score:      Age: 67 years      Sex: Male      Is Non- : Yes      Diabetic: No      Tobacco smoker: Yes      Systolic Blood Pressure: 247 mmHg      Is BP treated: Yes      HDL Cholesterol: 64 mg/dL      Total Cholesterol: 165 mg/dL     PLAN  Patient found in Outcomes MTM and is not currently eligible for CMR or TIP    The following are interventions that have been identified:   - Patient overdue refilling losartan 50mg daily ( refill reversed?) and active on home medication list.   - Patient eligible for 100 day supply of rxs  - Patient filling at a non-preferred pharmacy    Attempting to reach patient to review. Left message asking for return call. Letter sent to patient.     Last Visit: 2/27/23  Next Visit: 2/29/24    Harper CalvilloD, [facility]  Department, toll free: [phone], option 1     =======================================================   For Pharmacy Admin Tracking Only    Program: [address] in place:  No  Gap Closed?: No   Time Spent (min): 15

## 2023-03-01 NOTE — LETTER
[place]  [address], [address]         03/01/23     Dear Laney Conklin.,    We tried to reach you recently regarding your losartan 50mg daily, but were unable to reach you on the telephone. If you are no longer taking or taking differently, please call us at the number below so that we can discuss this and update your medication profile. It appears that this medication has not been filled at proper times. We are worried you might be missing doses or not taking it as directed. It is important that you take your medications regularly and try not to miss a single dose. Also note, per CHAI Escudero, [facility] and [facility] are [employer] - you may save on copay costs by switching to a [employer], such as [facility], [facility] or others. Please contact us if you would like assistance in switching your prescriptions to another pharmacy.       Some ways to help you remember to take and refill your medications are to:  · Use a pill box, set an alarm, and/or keep your medication near something that you do every day  · Fill a 3-month supply of your prescription at a time to save you time and trips to the pharmacy - if you would like assistance in switching your prescriptions to a 3-month supply, please contact us  · Ask your pharmacy if they participate in [facility]", a program where you can  all of your medications on the same day  · Ask your pharmacy if you can be set up with automatic refill, where they will automatically refill your prescription when it is due and let you know it's ready to     Sincerely,   Shelvy Hatchet Just, PharmD, [facility]  Department, toll free: [phone], option 1

## 2023-03-08 ENCOUNTER — NURSE ONLY (OUTPATIENT)
Dept: LAB | Age: 73
End: 2023-03-08

## 2023-03-08 DIAGNOSIS — Z00.00 ROUTINE GENERAL MEDICAL EXAMINATION AT A HEALTH CARE FACILITY: ICD-10-CM

## 2023-03-08 LAB
ALBUMIN SERPL BCG-MCNC: 4.4 G/DL (ref 3.5–5.1)
ALP SERPL-CCNC: 79 U/L (ref 38–126)
ALT SERPL W/O P-5'-P-CCNC: 13 U/L (ref 11–66)
ANION GAP SERPL CALC-SCNC: 11 MEQ/L (ref 8–16)
AST SERPL-CCNC: 23 U/L (ref 5–40)
BASOPHILS ABSOLUTE: 0 THOU/MM3 (ref 0–0.1)
BASOPHILS NFR BLD AUTO: 0.4 %
BILIRUB SERPL-MCNC: 1.2 MG/DL (ref 0.3–1.2)
BUN SERPL-MCNC: 14 MG/DL (ref 7–22)
CALCIUM SERPL-MCNC: 9.7 MG/DL (ref 8.5–10.5)
CHLORIDE SERPL-SCNC: 104 MEQ/L (ref 98–111)
CHOLEST SERPL-MCNC: 171 MG/DL (ref 100–199)
CO2 SERPL-SCNC: 26 MEQ/L (ref 23–33)
CREAT SERPL-MCNC: 1.3 MG/DL (ref 0.4–1.2)
DEPRECATED RDW RBC AUTO: 47.7 FL (ref 35–45)
EOSINOPHIL NFR BLD AUTO: 2.3 %
EOSINOPHILS ABSOLUTE: 0.2 THOU/MM3 (ref 0–0.4)
ERYTHROCYTE [DISTWIDTH] IN BLOOD BY AUTOMATED COUNT: 12 % (ref 11.5–14.5)
GFR SERPL CREATININE-BSD FRML MDRD: 58 ML/MIN/1.73M2
GLUCOSE SERPL-MCNC: 77 MG/DL (ref 70–108)
HCT VFR BLD AUTO: 38.7 % (ref 42–52)
HDLC SERPL-MCNC: 73 MG/DL
HGB BLD-MCNC: 12.7 GM/DL (ref 14–18)
IMM GRANULOCYTES # BLD AUTO: 0.01 THOU/MM3 (ref 0–0.07)
IMM GRANULOCYTES NFR BLD AUTO: 0.1 %
LDLC SERPL CALC-MCNC: 86 MG/DL
LYMPHOCYTES ABSOLUTE: 4.4 THOU/MM3 (ref 1–4.8)
LYMPHOCYTES NFR BLD AUTO: 56.8 %
MCH RBC QN AUTO: 34.9 PG (ref 26–33)
MCHC RBC AUTO-ENTMCNC: 32.8 GM/DL (ref 32.2–35.5)
MCV RBC AUTO: 106.3 FL (ref 80–94)
MONOCYTES ABSOLUTE: 0.6 THOU/MM3 (ref 0.4–1.3)
MONOCYTES NFR BLD AUTO: 7.5 %
NEUTROPHILS NFR BLD AUTO: 32.9 %
NRBC BLD AUTO-RTO: 0 /100 WBC
PLATELET # BLD AUTO: 246 THOU/MM3 (ref 130–400)
PMV BLD AUTO: 9.5 FL (ref 9.4–12.4)
POTASSIUM SERPL-SCNC: 4.2 MEQ/L (ref 3.5–5.2)
PROT SERPL-MCNC: 7.2 G/DL (ref 6.1–8)
RBC # BLD AUTO: 3.64 MILL/MM3 (ref 4.7–6.1)
SEGMENTED NEUTROPHILS ABSOLUTE COUNT: 2.5 THOU/MM3 (ref 1.8–7.7)
SODIUM SERPL-SCNC: 141 MEQ/L (ref 135–145)
TRIGL SERPL-MCNC: 61 MG/DL (ref 0–199)
WBC # BLD AUTO: 7.7 THOU/MM3 (ref 4.8–10.8)

## 2023-03-22 ENCOUNTER — CLINICAL DOCUMENTATION (OUTPATIENT)
Dept: SPIRITUAL SERVICES | Facility: CLINIC | Age: 73
End: 2023-03-22

## 2023-03-22 NOTE — ACP (ADVANCE CARE PLANNING)
Advance Care Planning   Ambulatory ACP Specialist Patient Outreach    Date:  3/22/2023  ACP Specialist:  Germán Sampson    Outreach call to patient in follow-up to ACP Specialist referral from: Aruna Christensen MD    [x] PCP  [] Provider   [] Ambulatory Care Management [] Other for Reason:    [x] Advance Directive Assistance  [] Code Status Discussion  [] Complete Portable DNR Order  [x] Discuss Goals of Care  [] Complete POST/MOST  [] Early ACP Decision-Making  [] Other    Date Referral Received: 2/27/2023    Today's Outreach:  [] First   [x] Second  [] Third                               Third outreach made by []  phone  [] email []   Bal[place]     Intervention:  [] Spoke with Patient  [x] Left VM requesting return call      Outcome:    Gillian Denton made a 2nd attempt to contact Candelaria Munson via telephone call to offer support, if desired, for the completion of Advance Directives documents as part of an [address] conversation. No answer. Left message.  will follow-up. Next Step:   [] ACP scheduled conversation  [x] Outreach again in one week               [] Email / Mail ACP Info Sheets  [] Email / Mail Advance Directive            [] Close Referral. Routing closure to referring provider/staff and to ACP Specialist . [] Closure Letter mailed to Patient with Invitation to Contact ACP Specialist if/when ready.     Thank you for this referral.

## 2023-04-04 ENCOUNTER — CLINICAL DOCUMENTATION (OUTPATIENT)
Dept: SPIRITUAL SERVICES | Facility: CLINIC | Age: 73
End: 2023-04-04

## 2023-04-04 NOTE — ACP (ADVANCE CARE PLANNING)
Advance Care Planning   Ambulatory ACP Specialist Patient Outreach    Date:  4/4/2023  ACP Specialist:  Krystin Gaol    Outreach call to patient in follow-up to ACP Specialist referral from: Kanchan Valerio MD    [x] PCP  [] Provider   [] Ambulatory Care Management [] Other for Reason:    [x] Advance Directive Assistance  [] Code Status Discussion  [] Complete Portable DNR Order  [x] Discuss Goals of Care  [] Complete POST/MOST  [] Early ACP Decision-Making  [] Other    Date Referral Received: 2/27/2023    Today's Outreach:  [] First   [] Second  [x] Third                               Third outreach made by [x]  phone  [] email []   [employer]     Intervention:  [x] Spoke with Patient  [] Left VM requesting return call      Outcome:     made a 3rd attempt to contact Yin Coley via telephone call to offer support, if desired, for the completion of Advance Directives documents as part of an [address] conversation. Yin Coley expressed interest in the completion of documents for self, and possibly for his wife as well. He stated, \"I probably should. \"   Yin Coley stated a desire to speak with his wife about the documents prior to scheduling an appointment, and requested a call back.  will follow-up. Next Step:   [] ACP scheduled conversation  [x] Outreach again in one week               [] Email / Mail ACP Info Sheets  [] Email / Mail Advance Directive            [] Close Referral. Routing closure to referring provider/staff and to ACP Specialist . [] Closure Letter mailed to Patient with Invitation to Contact ACP Specialist if/when ready.     Thank you for this referral.

## 2023-04-17 ENCOUNTER — TELEPHONE (OUTPATIENT)
Dept: PHARMACY | Facility: CLINIC | Age: 73
End: 2023-04-17

## 2023-04-17 NOTE — TELEPHONE ENCOUNTER
PLAN  Patient found in Outcomes MTM and is not currently eligible for CMR or TIP    The following are interventions that have been identified:   Patient overdue refilling losartan 50mg daily (Feb refill reversed?) & atorvastatin 20mg daily (was due in March?) and active on home medication list.   Patient eligible for 100 day supply of both - however, per [employer] portal, hasn't filled any rxs since January? Patient filling at a non-preferred pharmacy    Attempting to reach patient to review. Left message asking for return call. Letter sent to patient.     Last Visit: 2/27/23  Next Visit: 2/29/24    John Alonso, PharmD, [facility]  Department, toll free: [phone], option 1    =======================================================   For Pharmacy Admin Tracking Only    Program: [address] in place:  No  Gap Closed?: No   Time Spent (min): 15

## 2023-05-03 RX ORDER — RIVAROXABAN 20 MG/1
TABLET, FILM COATED ORAL
Qty: 90 TABLET | Refills: 1 | Status: SHIPPED | OUTPATIENT
Start: 2023-05-03

## 2023-05-15 RX ORDER — LOSARTAN POTASSIUM 50 MG/1
50 TABLET ORAL DAILY
Qty: 90 TABLET | Refills: 2 | Status: SHIPPED | OUTPATIENT
Start: 2023-05-15

## 2023-05-15 RX ORDER — TAMSULOSIN HYDROCHLORIDE 0.4 MG/1
0.4 CAPSULE ORAL DAILY
Qty: 90 CAPSULE | Refills: 2 | Status: SHIPPED | OUTPATIENT
Start: 2023-05-15

## 2023-05-15 RX ORDER — FAMOTIDINE 20 MG/1
20 TABLET, FILM COATED ORAL 2 TIMES DAILY
Qty: 180 TABLET | Refills: 2 | Status: SHIPPED | OUTPATIENT
Start: 2023-05-15

## 2023-05-15 RX ORDER — ATORVASTATIN CALCIUM 20 MG/1
TABLET, FILM COATED ORAL
Qty: 90 TABLET | Refills: 2 | Status: SHIPPED | OUTPATIENT
Start: 2023-05-15

## 2023-05-15 RX ORDER — LORATADINE 10 MG/1
10 TABLET ORAL DAILY
Qty: 90 TABLET | Refills: 2 | Status: SHIPPED | OUTPATIENT
Start: 2023-05-15

## 2023-07-10 RX ORDER — ASPIRIN 81 MG/1
TABLET, COATED ORAL
Qty: 30 TABLET | Refills: 11 | Status: SHIPPED | OUTPATIENT
Start: 2023-07-10

## 2023-11-06 ENCOUNTER — TELEPHONE (OUTPATIENT)
Dept: FAMILY MEDICINE CLINIC | Age: 73
End: 2023-11-06

## 2023-11-06 NOTE — TELEPHONE ENCOUNTER
Patient says his Xarelto costs at least $300-400 a month. He is low on money this month. I gave him samples for #28 day supply. Can you help him with patient assistance for this medication?

## 2023-11-06 NOTE — TELEPHONE ENCOUNTER
I am not sending in anymore applications for the 4900 year only doing renewals for 2024 at this time. Patient will need to spend at least 4 percent of his household income at the pharmacy in 2024 first before he qualifies for the Xarelto.

## 2023-12-05 ENCOUNTER — OFFICE VISIT (OUTPATIENT)
Dept: FAMILY MEDICINE CLINIC | Age: 73
End: 2023-12-05

## 2023-12-05 VITALS
RESPIRATION RATE: 20 BRPM | HEART RATE: 68 BPM | SYSTOLIC BLOOD PRESSURE: 138 MMHG | HEIGHT: 67 IN | BODY MASS INDEX: 22.13 KG/M2 | DIASTOLIC BLOOD PRESSURE: 74 MMHG | WEIGHT: 141 LBS

## 2023-12-05 DIAGNOSIS — N18.30 STAGE 3 CHRONIC KIDNEY DISEASE, UNSPECIFIED WHETHER STAGE 3A OR 3B CKD (HCC): ICD-10-CM

## 2023-12-05 DIAGNOSIS — I48.91 ATRIAL FIBRILLATION WITH RVR (HCC): ICD-10-CM

## 2023-12-05 DIAGNOSIS — D68.69 SECONDARY HYPERCOAGULABLE STATE (HCC): ICD-10-CM

## 2023-12-05 DIAGNOSIS — Q80.9 XERODERMA: ICD-10-CM

## 2023-12-05 DIAGNOSIS — Q84.5 ENLARGED AND HYPERTROPHIC NAILS: Primary | ICD-10-CM

## 2023-12-05 PROBLEM — E43 SEVERE MALNUTRITION (HCC): Chronic | Status: RESOLVED | Noted: 2022-04-26 | Resolved: 2023-12-05

## 2023-12-05 RX ORDER — AMMONIUM LACTATE 12 G/100G
CREAM TOPICAL
Qty: 385 G | Refills: 2 | Status: SHIPPED | OUTPATIENT
Start: 2023-12-05 | End: 2024-01-04

## 2023-12-05 NOTE — PROGRESS NOTES
Immunization(s) given during visit:    Immunizations Administered       Name Date Dose Route    Influenza, FLUAD, (age 72 y+), Adjuvanted, 0.5mL 12/5/2023 0.5 mL Intramuscular    Site: Deltoid- Right    Lot: [id number]    NDC: 23766-514-56            Most recent Vaccine Information Sheet  given to pt

## 2023-12-07 RX ORDER — LORATADINE 10 MG/1
10 TABLET ORAL DAILY
Qty: 90 TABLET | Refills: 2 | Status: SHIPPED | OUTPATIENT
Start: 2023-12-07

## 2023-12-10 ASSESSMENT — ENCOUNTER SYMPTOMS
COUGH: 0
ABDOMINAL PAIN: 0
RHINORRHEA: 0
SORE THROAT: 0
CONSTIPATION: 0
NAUSEA: 0
DIARRHEA: 0
ABDOMINAL DISTENTION: 0
SHORTNESS OF BREATH: 0
SINUS PRESSURE: 0
EYE PAIN: 0

## 2024-01-02 NOTE — TELEPHONE ENCOUNTER
Patient says he has been out of his Xarelto 20 mg and hasn't been able to take it for several days. Says it is over $600 to get it filled. We do have a month's supply of samples of the 15 mg tablets. Ok to give him these?

## 2024-01-10 ENCOUNTER — TELEPHONE (OUTPATIENT)
Dept: FAMILY MEDICINE CLINIC | Age: 74
End: 2024-01-10

## 2024-01-10 NOTE — TELEPHONE ENCOUNTER
Received samples for patient of Xarelto 20 mg. Called and let patient know, left detailed voicemail.     Please sign pended Rx for samples

## 2024-02-05 DIAGNOSIS — I48.91 ATRIAL FIBRILLATION WITH RVR (HCC): ICD-10-CM

## 2024-02-05 DIAGNOSIS — Q80.9 XERODERMA: ICD-10-CM

## 2024-02-05 RX ORDER — AMMONIUM LACTATE 12 G/100G
CREAM TOPICAL
Qty: 385 G | Refills: 2 | Status: CANCELLED | OUTPATIENT
Start: 2024-02-05 | End: 2024-03-06

## 2024-02-05 RX ORDER — AMMONIUM LACTATE 12 G/100G
CREAM TOPICAL
Qty: 385 G | Refills: 2 | Status: SHIPPED | OUTPATIENT
Start: 2024-02-05 | End: 2024-03-06

## 2024-02-05 RX ORDER — TADALAFIL 20 MG/1
TABLET ORAL
Qty: 18 TABLET | Refills: 5 | Status: SHIPPED | OUTPATIENT
Start: 2024-02-05

## 2024-02-05 RX ORDER — ATORVASTATIN CALCIUM 20 MG/1
TABLET, FILM COATED ORAL
Qty: 90 TABLET | Refills: 2 | Status: SHIPPED | OUTPATIENT
Start: 2024-02-05

## 2024-02-15 RX ORDER — LOSARTAN POTASSIUM 50 MG/1
50 TABLET ORAL DAILY
Qty: 90 TABLET | Refills: 2 | Status: SHIPPED | OUTPATIENT
Start: 2024-02-15

## 2024-02-15 RX ORDER — TAMSULOSIN HYDROCHLORIDE 0.4 MG/1
0.4 CAPSULE ORAL DAILY
Qty: 90 CAPSULE | Refills: 2 | Status: SHIPPED | OUTPATIENT
Start: 2024-02-15

## 2024-02-15 RX ORDER — FAMOTIDINE 20 MG/1
20 TABLET, FILM COATED ORAL 2 TIMES DAILY
Qty: 180 TABLET | Refills: 2 | Status: SHIPPED | OUTPATIENT
Start: 2024-02-15

## 2024-04-16 DIAGNOSIS — I48.91 ATRIAL FIBRILLATION WITH RVR (HCC): ICD-10-CM

## 2024-04-16 RX ORDER — ATORVASTATIN CALCIUM 20 MG/1
20 TABLET, FILM COATED ORAL DAILY
Qty: 90 TABLET | Refills: 2 | Status: SHIPPED | OUTPATIENT
Start: 2024-04-16

## 2024-04-16 RX ORDER — TAMSULOSIN HYDROCHLORIDE 0.4 MG/1
0.4 CAPSULE ORAL DAILY
Qty: 90 CAPSULE | Refills: 2 | Status: SHIPPED | OUTPATIENT
Start: 2024-04-16

## 2024-04-16 RX ORDER — ASPIRIN 81 MG/1
81 TABLET ORAL DAILY
Qty: 90 TABLET | Refills: 2 | Status: SHIPPED | OUTPATIENT
Start: 2024-04-16

## 2024-04-16 RX ORDER — LOSARTAN POTASSIUM 50 MG/1
50 TABLET ORAL DAILY
Qty: 90 TABLET | Refills: 2 | Status: SHIPPED | OUTPATIENT
Start: 2024-04-16

## 2024-04-16 RX ORDER — LORATADINE 10 MG/1
10 TABLET ORAL DAILY
Qty: 90 TABLET | Refills: 2 | Status: SHIPPED | OUTPATIENT
Start: 2024-04-16

## 2024-04-16 RX ORDER — FAMOTIDINE 20 MG/1
20 TABLET, FILM COATED ORAL 2 TIMES DAILY
Qty: 180 TABLET | Refills: 2 | Status: SHIPPED | OUTPATIENT
Start: 2024-04-16

## 2024-04-16 RX ORDER — TADALAFIL 20 MG/1
20 TABLET ORAL DAILY PRN
Qty: 18 TABLET | Refills: 5 | Status: SHIPPED | OUTPATIENT
Start: 2024-04-16

## 2024-05-02 ENCOUNTER — CARE COORDINATION (OUTPATIENT)
Dept: CARE COORDINATION | Age: 74
End: 2024-05-02

## 2024-05-02 NOTE — CARE COORDINATION
Spoke with Obi and went over the PAP program for Xarelto. Patient has not spent enough at the pharmacy to qualify for the program. I am still going to send him an application so he can read over it. Patient seemed very confused on the phone.        Charisse Vázquez [facility]  CC   Medication Assistance  [facility] [place] [facility]    (P) [phone]  (F) [phone]

## 2024-05-10 ENCOUNTER — CARE COORDINATION (OUTPATIENT)
Dept: CARE COORDINATION | Age: 74
End: 2024-05-10

## 2024-05-10 NOTE — CARE COORDINATION
I was able to mail the patient a Xarelto PAP application and highlight all the needed information for the patient. He was pretty confused on our call and hope the paperwork will help him understand.        Charisse Vázquez [facility]  CC   Medication Assistance  [facility] [place] [facility] [employer]    (P) [phone]  (F) [phone]

## 2024-05-13 ENCOUNTER — TELEPHONE (OUTPATIENT)
Dept: FAMILY MEDICINE CLINIC | Age: 74
End: 2024-05-13

## 2024-05-13 NOTE — TELEPHONE ENCOUNTER
Patient called for samples of Xarelto 20 mg. We do not have that strength available but we do have the 15 mg. You have allowed us to give him that in the past. If ok, please open this encounter and sign the Rx for samples to be given to patient.

## 2024-06-06 ENCOUNTER — OFFICE VISIT (OUTPATIENT)
Dept: FAMILY MEDICINE CLINIC | Age: 74
End: 2024-06-06

## 2024-06-06 VITALS
HEIGHT: 68 IN | SYSTOLIC BLOOD PRESSURE: 120 MMHG | OXYGEN SATURATION: 96 % | BODY MASS INDEX: 20.76 KG/M2 | TEMPERATURE: 98.2 F | DIASTOLIC BLOOD PRESSURE: 66 MMHG | HEART RATE: 88 BPM | WEIGHT: 137 LBS | RESPIRATION RATE: 20 BRPM

## 2024-06-06 DIAGNOSIS — D68.69 SECONDARY HYPERCOAGULABLE STATE (HCC): ICD-10-CM

## 2024-06-06 DIAGNOSIS — N18.30 STAGE 3 CHRONIC KIDNEY DISEASE, UNSPECIFIED WHETHER STAGE 3A OR 3B CKD (HCC): ICD-10-CM

## 2024-06-06 DIAGNOSIS — Z00.00 MEDICARE ANNUAL WELLNESS VISIT, SUBSEQUENT: Primary | ICD-10-CM

## 2024-06-06 DIAGNOSIS — I48.91 ATRIAL FIBRILLATION WITH RVR (HCC): ICD-10-CM

## 2024-06-06 DIAGNOSIS — I25.10 CORONARY ARTERY DISEASE INVOLVING NATIVE HEART WITHOUT ANGINA PECTORIS, UNSPECIFIED VESSEL OR LESION TYPE: Chronic | ICD-10-CM

## 2024-06-06 PROBLEM — J96.01 ACUTE RESPIRATORY FAILURE WITH HYPOXIA (HCC): Status: RESOLVED | Noted: 2022-04-26 | Resolved: 2024-06-06

## 2024-06-06 RX ORDER — ASPIRIN 81 MG/1
81 TABLET ORAL DAILY
Qty: 90 TABLET | Refills: 3 | Status: SHIPPED | OUTPATIENT
Start: 2024-06-06

## 2024-06-06 RX ORDER — LORATADINE 10 MG/1
10 TABLET ORAL DAILY
Qty: 90 TABLET | Refills: 3 | Status: SHIPPED | OUTPATIENT
Start: 2024-06-06

## 2024-06-06 SDOH — ECONOMIC STABILITY: FOOD INSECURITY: WITHIN THE PAST 12 MONTHS, YOU WORRIED THAT YOUR FOOD WOULD RUN OUT BEFORE YOU GOT MONEY TO BUY MORE.: NEVER TRUE

## 2024-06-06 SDOH — ECONOMIC STABILITY: INCOME INSECURITY: HOW HARD IS IT FOR YOU TO PAY FOR THE VERY BASICS LIKE FOOD, HOUSING, MEDICAL CARE, AND HEATING?: NOT VERY HARD

## 2024-06-06 SDOH — ECONOMIC STABILITY: FOOD INSECURITY: WITHIN THE PAST 12 MONTHS, THE FOOD YOU BOUGHT JUST DIDN'T LAST AND YOU DIDN'T HAVE MONEY TO GET MORE.: NEVER TRUE

## 2024-06-06 ASSESSMENT — PATIENT HEALTH QUESTIONNAIRE - PHQ9
2. FEELING DOWN, DEPRESSED OR HOPELESS: NOT AT ALL
SUM OF ALL RESPONSES TO PHQ QUESTIONS 1-9: 0
SUM OF ALL RESPONSES TO PHQ QUESTIONS 1-9: 0
1. LITTLE INTEREST OR PLEASURE IN DOING THINGS: NOT AT ALL
SUM OF ALL RESPONSES TO PHQ QUESTIONS 1-9: 0
SUM OF ALL RESPONSES TO PHQ QUESTIONS 1-9: 0
SUM OF ALL RESPONSES TO PHQ9 QUESTIONS 1 & 2: 0

## 2024-06-06 ASSESSMENT — LIFESTYLE VARIABLES
HOW OFTEN DURING THE LAST YEAR HAVE YOU HAD A FEELING OF GUILT OR REMORSE AFTER DRINKING: NEVER
HOW OFTEN DO YOU HAVE A DRINK CONTAINING ALCOHOL: 4 OR MORE TIMES A WEEK
HOW OFTEN DURING THE LAST YEAR HAVE YOU BEEN UNABLE TO REMEMBER WHAT HAPPENED THE NIGHT BEFORE BECAUSE YOU HAD BEEN DRINKING: NEVER
HOW OFTEN DURING THE LAST YEAR HAVE YOU NEEDED AN ALCOHOLIC DRINK FIRST THING IN THE MORNING TO GET YOURSELF GOING AFTER A NIGHT OF HEAVY DRINKING: NEVER
HOW MANY STANDARD DRINKS CONTAINING ALCOHOL DO YOU HAVE ON A TYPICAL DAY: 1 OR 2
HAS A RELATIVE, FRIEND, DOCTOR, OR ANOTHER HEALTH PROFESSIONAL EXPRESSED CONCERN ABOUT YOUR DRINKING OR SUGGESTED YOU CUT DOWN: NO
HOW OFTEN DURING THE LAST YEAR HAVE YOU FAILED TO DO WHAT WAS NORMALLY EXPECTED FROM YOU BECAUSE OF DRINKING: NEVER
HAVE YOU OR SOMEONE ELSE BEEN INJURED AS A RESULT OF YOUR DRINKING: NO
HOW OFTEN DURING THE LAST YEAR HAVE YOU FOUND THAT YOU WERE NOT ABLE TO STOP DRINKING ONCE YOU HAD STARTED: NEVER

## 2024-06-06 NOTE — PROGRESS NOTES
Medicare Annual Wellness Visit    Obi Suarez Sr. is here for Medicare AWV, Orders (Walker with seat and wheels. ), and Discuss Medications (Cannot afford Xarelto, using samples, Metoprolol and amiodarone were prescribed 5/2023 for 4 months and 2 months.  Will call back with med list. )    Assessment & Plan   Medicare annual wellness visit, subsequent  -     Lipid Panel; Future  -     Comprehensive Metabolic Panel; Future  -     CBC with Auto Differential; Future  Coronary artery disease involving native heart without angina pectoris, unspecified vessel or lesion type  -     DME Order for Walker as OP  Atrial fibrillation with RVR (HCC)  -     DME Order for Walker as OP  Secondary hypercoagulable state (HCC)  Stage 3 chronic kidney disease, unspecified whether stage 3a or 3b CKD (HCC)    Recommendations for Preventive Services Due: see orders and patient instructions/AVS.  Recommended screening schedule for the next 5-10 years is provided to the patient in written form: see Patient Instructions/AVS.     No follow-ups on file.     Subjective   The following acute and/or chronic problems were also addressed today:   Diagnosis Orders   1. Medicare annual wellness visit, subsequent  Lipid Panel    Comprehensive Metabolic Panel    CBC with Auto Differential      2. Coronary artery disease involving native heart without angina pectoris, unspecified vessel or lesion type  DME Order for Walker as OP      3. Atrial fibrillation with RVR (HCC)  DME Order for Walker as OP      4. Secondary hypercoagulable state (HCC)        5. Stage 3 chronic kidney disease, unspecified whether stage 3a or 3b CKD (HCC)              Patient's complete Health Risk Assessment and screening values have been reviewed and are found in Flowsheets. The following problems were reviewed today and where indicated follow up appointments were made and/or referrals ordered.    Positive Risk Factor Screenings with Interventions:    Fall Risk:  Do you feel

## 2024-06-06 NOTE — PATIENT INSTRUCTIONS
Incorporated.   Care instructions adapted under license by [employer]. If you have questions about a medical condition or this instruction, always ask your healthcare professional. Healthwise, [employer] disclaims any warranty or liability for your use of this information.      Personalized Preventive Plan for Obi Suarez Sr. - 6/6/2024  Medicare offers a range of preventive health benefits. Some of the tests and screenings are paid in full while other may be subject to a deductible, co-insurance, and/or copay.    Some of these benefits include a comprehensive review of your medical history including lifestyle, illnesses that may run in your family, and various assessments and screenings as appropriate.    After reviewing your medical record and screening and assessments performed today your provider may have ordered immunizations, labs, imaging, and/or referrals for you.  A list of these orders (if applicable) as well as your Preventive Care list are included within your After Visit Summary for your review.    Other Preventive Recommendations:    A preventive eye exam performed by an eye specialist is recommended every 1-2 years to screen for glaucoma; cataracts, macular degeneration, and other eye disorders.  A preventive dental visit is recommended every 6 months.  Try to get at least 150 minutes of exercise per week or 10,000 steps per day on a pedometer .  Order or download the FREE \"Exercise & Physical Activity: Your Everyday Guide\" from [employer] [place] [employer]. Call 1-603.476.2886 or search [employer] online.  You need 2855-2296 mg of calcium and 1218-8128 IU of vitamin D per day. It is possible to meet your calcium requirement with diet alone, but a vitamin D supplement is usually necessary to meet this goal.  When exposed to the sun, use a sunscreen that protects against both UVA and UVB radiation with an SPF of 30 or greater. Reapply every 2 to 3 hours or after sweating,

## 2024-06-10 ENCOUNTER — TELEPHONE (OUTPATIENT)
Dept: FAMILY MEDICINE CLINIC | Age: 74
End: 2024-06-10

## 2024-07-17 ENCOUNTER — LAB (OUTPATIENT)
Dept: LAB | Age: 74
End: 2024-07-17

## 2024-07-17 DIAGNOSIS — Z00.00 MEDICARE ANNUAL WELLNESS VISIT, SUBSEQUENT: ICD-10-CM

## 2024-07-17 DIAGNOSIS — I48.91 ATRIAL FIBRILLATION WITH RVR (HCC): ICD-10-CM

## 2024-07-17 LAB
ALBUMIN SERPL BCG-MCNC: 4.3 G/DL (ref 3.5–5.1)
ALP SERPL-CCNC: 65 U/L (ref 38–126)
ALT SERPL W/O P-5'-P-CCNC: 10 U/L (ref 11–66)
ANION GAP SERPL CALC-SCNC: 12 MEQ/L (ref 8–16)
AST SERPL-CCNC: 18 U/L (ref 5–40)
BILIRUB SERPL-MCNC: 0.3 MG/DL (ref 0.3–1.2)
BUN SERPL-MCNC: 20 MG/DL (ref 7–22)
CALCIUM SERPL-MCNC: 9.2 MG/DL (ref 8.5–10.5)
CHLORIDE SERPL-SCNC: 107 MEQ/L (ref 98–111)
CHOLEST SERPL-MCNC: 123 MG/DL (ref 100–199)
CO2 SERPL-SCNC: 23 MEQ/L (ref 23–33)
CREAT SERPL-MCNC: 1.6 MG/DL (ref 0.4–1.2)
GFR SERPL CREATININE-BSD FRML MDRD: 45 ML/MIN/1.73M2
GLUCOSE SERPL-MCNC: 124 MG/DL (ref 70–108)
HDLC SERPL-MCNC: 67 MG/DL
LDLC SERPL CALC-MCNC: 46 MG/DL
POTASSIUM SERPL-SCNC: 4.3 MEQ/L (ref 3.5–5.2)
PROT SERPL-MCNC: 6.9 G/DL (ref 6.1–8)
SCAN OF BLOOD SMEAR: NORMAL
SODIUM SERPL-SCNC: 142 MEQ/L (ref 135–145)
T4 FREE SERPL-MCNC: 1.22 NG/DL (ref 0.93–1.68)
TRIGL SERPL-MCNC: 49 MG/DL (ref 0–199)
TSH SERPL DL<=0.005 MIU/L-ACNC: 0.69 UIU/ML (ref 0.4–4.2)

## 2024-07-18 LAB
BASOPHILS ABSOLUTE: 0 THOU/MM3 (ref 0–0.1)
BASOPHILS NFR BLD AUTO: 0.4 %
DEPRECATED RDW RBC AUTO: 56.1 FL (ref 35–45)
EOSINOPHIL NFR BLD AUTO: 1.5 %
EOSINOPHILS ABSOLUTE: 0.1 THOU/MM3 (ref 0–0.4)
ERYTHROCYTE [DISTWIDTH] IN BLOOD BY AUTOMATED COUNT: 17.6 % (ref 11.5–14.5)
HCT VFR BLD AUTO: 23.5 % (ref 42–52)
HGB BLD-MCNC: 6.8 GM/DL (ref 14–18)
HYPOCHROMIA BLD QL SMEAR: PRESENT
IMM GRANULOCYTES # BLD AUTO: 0.01 THOU/MM3 (ref 0–0.07)
IMM GRANULOCYTES NFR BLD AUTO: 0.2 %
LYMPHOCYTES ABSOLUTE: 2.2 THOU/MM3 (ref 1–4.8)
LYMPHOCYTES NFR BLD AUTO: 40.4 %
MCH RBC QN AUTO: 25.3 PG (ref 26–33)
MCHC RBC AUTO-ENTMCNC: 28.9 GM/DL (ref 32.2–35.5)
MCV RBC AUTO: 87.4 FL (ref 80–94)
MONOCYTES ABSOLUTE: 0.6 THOU/MM3 (ref 0.4–1.3)
MONOCYTES NFR BLD AUTO: 11.2 %
NEUTROPHILS ABSOLUTE: 2.5 THOU/MM3 (ref 1.8–7.7)
NEUTROPHILS NFR BLD AUTO: 46.3 %
NRBC BLD AUTO-RTO: 0 /100 WBC
PATHOLOGIST REVIEW: ABNORMAL
PLATELET # BLD AUTO: 345 THOU/MM3 (ref 130–400)
PMV BLD AUTO: 9.9 FL (ref 9.4–12.4)
POIKILOCYTES: ABNORMAL
RBC # BLD AUTO: 2.69 MILL/MM3 (ref 4.7–6.1)
SCHISTOCYTES BLD QL SMEAR: ABNORMAL
WBC # BLD AUTO: 5.4 THOU/MM3 (ref 4.8–10.8)

## 2024-07-25 ENCOUNTER — TELEPHONE (OUTPATIENT)
Dept: FAMILY MEDICINE CLINIC | Age: 74
End: 2024-07-25

## 2024-07-25 ENCOUNTER — HOSPITAL ENCOUNTER (OUTPATIENT)
Age: 74
Setting detail: OBSERVATION
Discharge: HOME | End: 2024-07-27
Attending: INTERNAL MEDICINE
Payer: MEDICARE

## 2024-07-25 DIAGNOSIS — D64.9 SYMPTOMATIC ANEMIA: Primary | ICD-10-CM

## 2024-07-25 DIAGNOSIS — N18.30 STAGE 3 CHRONIC KIDNEY DISEASE, UNSPECIFIED WHETHER STAGE 3A OR 3B CKD (HCC): ICD-10-CM

## 2024-07-25 DIAGNOSIS — D53.9 ANEMIA, MACROCYTIC: ICD-10-CM

## 2024-07-25 LAB
ABO: NORMAL
ALBUMIN SERPL BCG-MCNC: 4.4 G/DL (ref 3.5–5.1)
ALP SERPL-CCNC: 65 U/L (ref 38–126)
ALT SERPL W/O P-5'-P-CCNC: 13 U/L (ref 11–66)
ANION GAP SERPL CALC-SCNC: 13 MEQ/L (ref 8–16)
ANTIBODY SCREEN: NORMAL
AST SERPL-CCNC: 25 U/L (ref 5–40)
BACTERIA: NORMAL
BASOPHILS ABSOLUTE: 0 THOU/MM3 (ref 0–0.1)
BASOPHILS NFR BLD AUTO: 0.2 %
BILIRUB CONJ SERPL-MCNC: 0.2 MG/DL (ref 0.1–13.8)
BILIRUB SERPL-MCNC: 0.5 MG/DL (ref 0.3–1.2)
BILIRUB UR QL STRIP: NEGATIVE
BUN SERPL-MCNC: 26 MG/DL (ref 7–22)
CALCIUM SERPL-MCNC: 9.3 MG/DL (ref 8.5–10.5)
CASTS #/AREA URNS LPF: NORMAL /LPF
CASTS #/AREA URNS LPF: NORMAL /LPF
CHARACTER UR: CLEAR
CHARCOAL URNS QL MICRO: NORMAL
CHLORIDE SERPL-SCNC: 104 MEQ/L (ref 98–111)
CO2 SERPL-SCNC: 20 MEQ/L (ref 23–33)
COLOR UR: YELLOW
CREAT SERPL-MCNC: 1.6 MG/DL (ref 0.4–1.2)
CREAT UR-MCNC: 54.1 MG/DL
CRYSTALS URNS QL MICRO: NORMAL
DEPRECATED RDW RBC AUTO: 57.3 FL (ref 35–45)
EOSINOPHIL NFR BLD AUTO: 0 %
EOSINOPHILS ABSOLUTE: 0 THOU/MM3 (ref 0–0.4)
EPITHELIAL CELLS, UA: NORMAL /HPF
ERYTHROCYTE [DISTWIDTH] IN BLOOD BY AUTOMATED COUNT: 18.2 % (ref 11.5–14.5)
FERRITIN SERPL IA-MCNC: 17 NG/ML (ref 22–322)
FOLATE SERPL-MCNC: > 20 NG/ML (ref 4.8–24.2)
GFR SERPL CREATININE-BSD FRML MDRD: 45 ML/MIN/1.73M2
GLUCOSE SERPL-MCNC: 87 MG/DL (ref 70–108)
GLUCOSE UR QL STRIP.AUTO: NEGATIVE MG/DL
HCT VFR BLD AUTO: 24.5 % (ref 42–52)
HCT VFR BLD AUTO: 27.8 % (ref 42–52)
HEMOCCULT STL QL: NEGATIVE
HGB BLD-MCNC: 7.1 GM/DL (ref 14–18)
HGB BLD-MCNC: 8.3 GM/DL (ref 14–18)
HGB RETIC QN AUTO: 27.3 PG (ref 28.2–35.7)
HGB UR QL STRIP.AUTO: NEGATIVE
IMM GRANULOCYTES # BLD AUTO: 0.02 THOU/MM3 (ref 0–0.07)
IMM GRANULOCYTES NFR BLD AUTO: 0.4 %
IMM RETICS NFR: 38.4 % (ref 2.3–13.4)
INR PPP: 1.09 (ref 0.85–1.13)
IRON SATN MFR SERPL: 9 % (ref 20–50)
IRON SERPL-MCNC: 39 UG/DL (ref 65–195)
KETONES UR QL STRIP.AUTO: NEGATIVE
LEUKOCYTE ESTERASE UR QL STRIP.AUTO: NEGATIVE
LYMPHOCYTES ABSOLUTE: 1.4 THOU/MM3 (ref 1–4.8)
LYMPHOCYTES NFR BLD AUTO: 29.4 %
MCH RBC QN AUTO: 25.4 PG (ref 26–33)
MCHC RBC AUTO-ENTMCNC: 29 GM/DL (ref 32.2–35.5)
MCV RBC AUTO: 87.8 FL (ref 80–94)
MONOCYTES ABSOLUTE: 0.4 THOU/MM3 (ref 0.4–1.3)
MONOCYTES NFR BLD AUTO: 7.8 %
NEUTROPHILS ABSOLUTE: 3 THOU/MM3 (ref 1.8–7.7)
NEUTROPHILS NFR BLD AUTO: 62.2 %
NITRITE UR QL STRIP.AUTO: NEGATIVE
NRBC BLD AUTO-RTO: 0 /100 WBC
OSMOLALITY SERPL CALC.SUM OF ELEC: 277.9 MOSMOL/KG (ref 275–300)
PH UR STRIP.AUTO: 6.5 [PH] (ref 5–9)
PLATELET # BLD AUTO: 289 THOU/MM3 (ref 130–400)
PMV BLD AUTO: 9.6 FL (ref 9.4–12.4)
POTASSIUM SERPL-SCNC: 4.3 MEQ/L (ref 3.5–5.2)
PROT SERPL-MCNC: 7.1 G/DL (ref 6.1–8)
PROT UR STRIP.AUTO-MCNC: NEGATIVE MG/DL
RBC # BLD AUTO: 2.79 MILL/MM3 (ref 4.7–6.1)
RBC #/AREA URNS HPF: NORMAL /HPF
RENAL EPI CELLS #/AREA URNS HPF: NORMAL /[HPF]
RETICS # AUTO: 49 THOU/MM3 (ref 20–115)
RETICS/RBC NFR AUTO: 1.9 % (ref 0.5–2)
RH FACTOR: NORMAL
SODIUM SERPL-SCNC: 137 MEQ/L (ref 135–145)
SODIUM UR-SCNC: 77 MEQ/L
SPECIFIC GRAVITY UA: 1.01 (ref 1–1.03)
TIBC SERPL-MCNC: 435 UG/DL (ref 171–450)
UROBILINOGEN, URINE: 0.2 EU/DL (ref 0–1)
UUN 24H UR-MCNC: 403 MG/DL
VIT B12 SERPL-MCNC: 700 PG/ML (ref 211–911)
WBC # BLD AUTO: 4.9 THOU/MM3 (ref 4.8–10.8)
WBC #/AREA URNS HPF: NORMAL /HPF
YEAST LIKE FUNGI URNS QL MICRO: NORMAL

## 2024-07-25 PROCEDURE — 86901 BLOOD TYPING SEROLOGIC RH(D): CPT

## 2024-07-25 PROCEDURE — 82248 BILIRUBIN DIRECT: CPT

## 2024-07-25 PROCEDURE — 99285 EMERGENCY DEPT VISIT HI MDM: CPT

## 2024-07-25 PROCEDURE — 82746 ASSAY OF FOLIC ACID SERUM: CPT

## 2024-07-25 PROCEDURE — 96361 HYDRATE IV INFUSION ADD-ON: CPT

## 2024-07-25 PROCEDURE — 80053 COMPREHEN METABOLIC PANEL: CPT

## 2024-07-25 PROCEDURE — 82607 VITAMIN B-12: CPT

## 2024-07-25 PROCEDURE — 82570 ASSAY OF URINE CREATININE: CPT

## 2024-07-25 PROCEDURE — 85014 HEMATOCRIT: CPT

## 2024-07-25 PROCEDURE — 86850 RBC ANTIBODY SCREEN: CPT

## 2024-07-25 PROCEDURE — 36430 TRANSFUSION BLD/BLD COMPNT: CPT

## 2024-07-25 PROCEDURE — 82272 OCCULT BLD FECES 1-3 TESTS: CPT

## 2024-07-25 PROCEDURE — 83550 IRON BINDING TEST: CPT

## 2024-07-25 PROCEDURE — 84300 ASSAY OF URINE SODIUM: CPT

## 2024-07-25 PROCEDURE — P9016 RBC LEUKOCYTES REDUCED: HCPCS

## 2024-07-25 PROCEDURE — 85610 PROTHROMBIN TIME: CPT

## 2024-07-25 PROCEDURE — 82728 ASSAY OF FERRITIN: CPT

## 2024-07-25 PROCEDURE — 2580000003 HC RX 258

## 2024-07-25 PROCEDURE — 85018 HEMOGLOBIN: CPT

## 2024-07-25 PROCEDURE — 99223 1ST HOSP IP/OBS HIGH 75: CPT | Performed by: INTERNAL MEDICINE

## 2024-07-25 PROCEDURE — G0378 HOSPITAL OBSERVATION PER HR: HCPCS

## 2024-07-25 PROCEDURE — 81001 URINALYSIS AUTO W/SCOPE: CPT

## 2024-07-25 PROCEDURE — 85025 COMPLETE CBC W/AUTO DIFF WBC: CPT

## 2024-07-25 PROCEDURE — 85046 RETICYTE/HGB CONCENTRATE: CPT

## 2024-07-25 PROCEDURE — 84540 ASSAY OF URINE/UREA-N: CPT

## 2024-07-25 PROCEDURE — 86900 BLOOD TYPING SEROLOGIC ABO: CPT

## 2024-07-25 PROCEDURE — 36415 COLL VENOUS BLD VENIPUNCTURE: CPT

## 2024-07-25 PROCEDURE — 96365 THER/PROPH/DIAG IV INF INIT: CPT

## 2024-07-25 PROCEDURE — 6370000000 HC RX 637 (ALT 250 FOR IP)

## 2024-07-25 PROCEDURE — 86923 COMPATIBILITY TEST ELECTRIC: CPT

## 2024-07-25 PROCEDURE — 6360000002 HC RX W HCPCS

## 2024-07-25 PROCEDURE — 83540 ASSAY OF IRON: CPT

## 2024-07-25 RX ORDER — SODIUM CHLORIDE 9 MG/ML
INJECTION, SOLUTION INTRAVENOUS PRN
Status: DISCONTINUED | OUTPATIENT
Start: 2024-07-25 | End: 2024-07-27 | Stop reason: HOSPADM

## 2024-07-25 RX ORDER — LORAZEPAM 2 MG/ML
4 INJECTION INTRAMUSCULAR
Status: DISCONTINUED | OUTPATIENT
Start: 2024-07-25 | End: 2024-07-26

## 2024-07-25 RX ORDER — SODIUM CHLORIDE 0.9 % (FLUSH) 0.9 %
5-40 SYRINGE (ML) INJECTION EVERY 12 HOURS SCHEDULED
Status: DISCONTINUED | OUTPATIENT
Start: 2024-07-25 | End: 2024-07-27 | Stop reason: HOSPADM

## 2024-07-25 RX ORDER — SODIUM CHLORIDE 0.9 % (FLUSH) 0.9 %
5-40 SYRINGE (ML) INJECTION PRN
Status: DISCONTINUED | OUTPATIENT
Start: 2024-07-25 | End: 2024-07-27 | Stop reason: HOSPADM

## 2024-07-25 RX ORDER — ONDANSETRON 4 MG/1
4 TABLET, ORALLY DISINTEGRATING ORAL EVERY 8 HOURS PRN
Status: DISCONTINUED | OUTPATIENT
Start: 2024-07-25 | End: 2024-07-27 | Stop reason: HOSPADM

## 2024-07-25 RX ORDER — POLYETHYLENE GLYCOL 3350 17 G/17G
17 POWDER, FOR SOLUTION ORAL DAILY PRN
Status: DISCONTINUED | OUTPATIENT
Start: 2024-07-25 | End: 2024-07-27 | Stop reason: HOSPADM

## 2024-07-25 RX ORDER — SODIUM CHLORIDE 9 MG/ML
INJECTION, SOLUTION INTRAVENOUS PRN
Status: DISCONTINUED | OUTPATIENT
Start: 2024-07-25 | End: 2024-07-26

## 2024-07-25 RX ORDER — POTASSIUM CHLORIDE 20 MEQ/1
40 TABLET, EXTENDED RELEASE ORAL PRN
Status: DISCONTINUED | OUTPATIENT
Start: 2024-07-25 | End: 2024-07-27 | Stop reason: HOSPADM

## 2024-07-25 RX ORDER — MAGNESIUM SULFATE IN WATER 40 MG/ML
2000 INJECTION, SOLUTION INTRAVENOUS PRN
Status: DISCONTINUED | OUTPATIENT
Start: 2024-07-25 | End: 2024-07-27 | Stop reason: HOSPADM

## 2024-07-25 RX ORDER — LORAZEPAM 1 MG/1
2 TABLET ORAL
Status: DISCONTINUED | OUTPATIENT
Start: 2024-07-25 | End: 2024-07-26

## 2024-07-25 RX ORDER — ATORVASTATIN CALCIUM 20 MG/1
20 TABLET, FILM COATED ORAL NIGHTLY
Status: DISCONTINUED | OUTPATIENT
Start: 2024-07-25 | End: 2024-07-27 | Stop reason: HOSPADM

## 2024-07-25 RX ORDER — ACETAMINOPHEN 325 MG/1
650 TABLET ORAL EVERY 6 HOURS PRN
Status: DISCONTINUED | OUTPATIENT
Start: 2024-07-25 | End: 2024-07-27 | Stop reason: HOSPADM

## 2024-07-25 RX ORDER — LORAZEPAM 2 MG/ML
2 INJECTION INTRAMUSCULAR
Status: DISCONTINUED | OUTPATIENT
Start: 2024-07-25 | End: 2024-07-26

## 2024-07-25 RX ORDER — LORAZEPAM 2 MG/ML
1 INJECTION INTRAMUSCULAR
Status: DISCONTINUED | OUTPATIENT
Start: 2024-07-25 | End: 2024-07-26

## 2024-07-25 RX ORDER — TAMSULOSIN HYDROCHLORIDE 0.4 MG/1
0.4 CAPSULE ORAL DAILY
Status: DISCONTINUED | OUTPATIENT
Start: 2024-07-25 | End: 2024-07-27 | Stop reason: HOSPADM

## 2024-07-25 RX ORDER — POTASSIUM CHLORIDE 7.45 MG/ML
10 INJECTION INTRAVENOUS PRN
Status: DISCONTINUED | OUTPATIENT
Start: 2024-07-25 | End: 2024-07-27 | Stop reason: HOSPADM

## 2024-07-25 RX ORDER — FAMOTIDINE 20 MG/1
20 TABLET, FILM COATED ORAL DAILY
Status: DISCONTINUED | OUTPATIENT
Start: 2024-07-25 | End: 2024-07-27 | Stop reason: HOSPADM

## 2024-07-25 RX ORDER — LORAZEPAM 1 MG/1
4 TABLET ORAL
Status: DISCONTINUED | OUTPATIENT
Start: 2024-07-25 | End: 2024-07-26

## 2024-07-25 RX ORDER — SODIUM CHLORIDE, SODIUM LACTATE, POTASSIUM CHLORIDE, CALCIUM CHLORIDE 600; 310; 30; 20 MG/100ML; MG/100ML; MG/100ML; MG/100ML
INJECTION, SOLUTION INTRAVENOUS CONTINUOUS
Status: ACTIVE | OUTPATIENT
Start: 2024-07-25 | End: 2024-07-26

## 2024-07-25 RX ORDER — LORAZEPAM 1 MG/1
1 TABLET ORAL
Status: DISCONTINUED | OUTPATIENT
Start: 2024-07-25 | End: 2024-07-26

## 2024-07-25 RX ORDER — ONDANSETRON 2 MG/ML
4 INJECTION INTRAMUSCULAR; INTRAVENOUS EVERY 6 HOURS PRN
Status: DISCONTINUED | OUTPATIENT
Start: 2024-07-25 | End: 2024-07-27 | Stop reason: HOSPADM

## 2024-07-25 RX ORDER — LORAZEPAM 1 MG/1
3 TABLET ORAL
Status: DISCONTINUED | OUTPATIENT
Start: 2024-07-25 | End: 2024-07-26

## 2024-07-25 RX ORDER — LORAZEPAM 2 MG/ML
3 INJECTION INTRAMUSCULAR
Status: DISCONTINUED | OUTPATIENT
Start: 2024-07-25 | End: 2024-07-26

## 2024-07-25 RX ORDER — LOSARTAN POTASSIUM 50 MG/1
50 TABLET ORAL DAILY
Status: DISCONTINUED | OUTPATIENT
Start: 2024-07-25 | End: 2024-07-27 | Stop reason: HOSPADM

## 2024-07-25 RX ORDER — LANOLIN ALCOHOL/MO/W.PET/CERES
100 CREAM (GRAM) TOPICAL DAILY
Status: DISCONTINUED | OUTPATIENT
Start: 2024-07-25 | End: 2024-07-27 | Stop reason: HOSPADM

## 2024-07-25 RX ORDER — ACETAMINOPHEN 650 MG/1
650 SUPPOSITORY RECTAL EVERY 6 HOURS PRN
Status: DISCONTINUED | OUTPATIENT
Start: 2024-07-25 | End: 2024-07-27 | Stop reason: HOSPADM

## 2024-07-25 RX ADMIN — SODIUM CHLORIDE 100 MG: 9 INJECTION, SOLUTION INTRAVENOUS at 21:50

## 2024-07-25 RX ADMIN — ATORVASTATIN CALCIUM 20 MG: 20 TABLET, FILM COATED ORAL at 21:02

## 2024-07-25 RX ADMIN — FAMOTIDINE 20 MG: 20 TABLET, FILM COATED ORAL at 21:02

## 2024-07-25 RX ADMIN — SODIUM CHLORIDE, POTASSIUM CHLORIDE, SODIUM LACTATE AND CALCIUM CHLORIDE: 600; 310; 30; 20 INJECTION, SOLUTION INTRAVENOUS at 21:18

## 2024-07-25 RX ADMIN — ACETAMINOPHEN 650 MG: 325 TABLET ORAL at 22:07

## 2024-07-25 RX ADMIN — Medication 100 MG: at 21:01

## 2024-07-25 ASSESSMENT — PAIN - FUNCTIONAL ASSESSMENT: PAIN_FUNCTIONAL_ASSESSMENT: NONE - DENIES PAIN

## 2024-07-25 ASSESSMENT — PAIN SCALES - GENERAL: PAINLEVEL_OUTOF10: 7

## 2024-07-25 NOTE — H&P
History & Physical  Internal Medicine Resident         Patient: Obi Suarez Sr. 73 y.o. male      : 1950  Date of Admission: 2024  Date of Service: Pt seen/examined on 24 and Admitted to Observation with expected LOS less than two midnights due to medical therapy.       ASSESSMENT AND PLAN  Acute on Chronic Anemia  Hgb 7.1 on admission. Baseline Hgb 8-9.  FOBT negative.  Patient denies any melena, bright red blood per rectum, hematochezia, hematuria.  Patient denies any signs of bleeding.  Patient presented due to being told by PCP that he had low Hgb on recent routine lab work.  Iron studies consistent with iron deficiency anemia.  Patient does have underlying CKD which could also be contributory to chronic anemia.  On review of EMR, patient previously prescribed iron supplementation, however patient is that he does not take any iron supplementation at this time.  BUN creatinine ratio < 20:1 (WNL)  -Low suspicion for GI bleed at this time  -S/p 1 unit PRBC in ED  -Venofer 100 mg for 3 doses  -Urinalysis with microscopic to evaluate for possible microscopic hematuria; negative  -Nursing to monitor stools  -Hold home Xarelto and aspirin  -Repeat Hgb at approximately 11 PM  -Recommend initiating oral iron supplementation at discharge  -Recommend outpatient GI referral for follow-up colonoscopy; colonoscopy from 2019 noted multiple polyps with recommendations for repeat colonoscopy in 3 years, patient has not had this completed, potential could be contributing to current anemia    ROBSON on CKD stage 3  Baseline creatinine 1.1-1.3.  Creatinine on admission 1.6, same as on the most recent routine labs on .  No IVF given in ED. FENa 1.7% indeterminate, FEUrea 45.8, consistent with intrinsic disease. UA unremarkable.  Potential could be worsening of patient's underlying CKD.  -LR at 75 mL/h for 12 hours  -Hold nephrotoxic agents  -Hold ACE/ARB  -Monitor BMP     Alcohol abuse   Patient reportedly

## 2024-07-25 NOTE — ED PROVIDER NOTES
[facility] EMERGENCY DEPT      EMERGENCY MEDICINE     Pt Name: Obi Suarez Sr.  MRN: [id number]  Birthdate 1950  Date of evaluation: 7/25/2024  Provider: Shaheen Whatley PA-C,     CHIEF COMPLAINT       Chief Complaint   Patient presents with    low hgb     HISTORY OF PRESENT ILLNESS   Obi Suarez Sr. is a pleasant 73 y.o. male who presents to the emergency department for evaluation of low hemoglobin.  Patient states he has been more fatigued than normal.  His last hemoglobin approximately 5 months ago was 12.  He states he has never had low hemoglobin nor has he needed to have a transfusion before.  He denies having any blood disorders in the past.  He does not take iron he denies any black or bloody stool no abdominal pain.  He is on Xarelto no headache or vision problems no chest pain he states he occasionally gets fatigued and short of breath when he moves around which she attributes to old age.  Patient denies any other symptoms.    PASTMEDICAL HISTORY/Co-Morbid Conditions:     Past Medical History:   Diagnosis Date    CAD (coronary artery disease)     CVA (cerebral vascular accident) ([facility])     Erectile dysfunction     GERD (gastroesophageal reflux disease) 07/2012    Gastro ulcer    Hyperlipidemia     Hypertension        Patient Active Problem List   Diagnosis Code    CAD (coronary artery disease) I25.10    Hypertension I10    Erectile dysfunction N52.9    Hyperlipidemia E78.5    GI bleeding K92.2    Hx of asbestos exposure Z77.090    Stroke-like symptom R29.90    Septic shock ([facility]) A41.9, R65.21    Incarcerated right inguinal hernia K40.30    Small bowel perforation ([facility]) K63.1    Aspiration pneumonia ([facility]) J69.0    Acute kidney injury ([facility]) N17.9    Hypoalbuminemia E88.09    Syncope R55    Hypokalemia E87.6    Anemia, macrocytic D53.9    Atrial fibrillation with RVR ([facility]) I48.91    Dehiscence of fascia T81.30XA    Stage 3 chronic kidney disease, unspecified whether stage 3a or 3b CKD ([facility]) N18.30     or vision problems no chest pain he states he occasionally gets fatigued and short of breath when he moves around which she attributes to old age.  Patient denies any other symptoms.  Patient is pale.  He has pale eyegrounds.  Heart lung sounds are normal.  No abdominal tenderness.  Fecal occult is negative.  He has a normal neurological exam.  Hemoglobin is 7.  He was typed and crossed for 1 unit of blood.  Unsure of where he is losing this blood.  Patient is on Xarelto however.  He denies having any type of injury recently or any type of bleeding such as a nosebleed.  Patient is agreeable to the transfusion    Pt was reassessed and the results of pertinent diagnostic studies and exam findings were discussed. The patient’s provisional diagnosis and plan of care were discussed with the patient and present family utilizing shared decision-making. Any medications were reviewed and indications and risks of medications were discussed with the patient /family present. Strict verbal and written return precautions, instructions and appropriate follow-up provided to  the patient .                   ED Medications administered this visit:  (None if blank)  Medications   0.9 % sodium chloride infusion (has no administration in time range)         PROCEDURES: (None if blank)  Procedures:     CRITICAL CARE: 30 minutes separate of billable procedures      DISCHARGE PRESCRIPTIONS: (None if blank)  New Prescriptions    No medications on file       FINAL IMPRESSION      1. Symptomatic anemia          DISPOSITION/PLAN   DISPOSITION Decision To Admit 07/25/2024 04:45:25 PM      OUTPATIENT FOLLOW UP THE PATIENT:  No follow-up provider specified.    LISA Bunch Jamie M, PA-C  07/25/24 3335       Shaheen Whatley PA-C  07/25/24 6421

## 2024-07-25 NOTE — ED NOTES
ED to inpatient nurses report      Chief Complaint:  Chief Complaint   Patient presents with    low hgb     Present to ED from: home    MOA:     LOC: alert and orientated to name, place, date  Mobility: Requires assistance * 1  Oxygen Baseline: RA    Current needs required: RA     Code Status:   Prior    What abnormal results were found and what did you give/do to treat them?   Anemia; given unit of blood     Mental Status:  Level of Consciousness: Alert (0)    Psych Assessment:        Vitals:  Patient Vitals for the past 24 hrs:   BP Temp Pulse Resp SpO2 Height Weight   07/25/24 1753 133/71 98.2 °F (36.8 °C) 73 17 99 % -- --   07/25/24 1737 134/73 97.7 °F (36.5 °C) 79 17 99 % -- --   07/25/24 1705 134/73 -- 84 18 97 % -- --   07/25/24 1648 114/72 -- -- -- -- -- --   07/25/24 1541 134/72 -- 87 16 100 % -- --   07/25/24 1427 116/64 98.8 °F (37.1 °C) 86 18 97 % 1.727 m (5' 8\") 62.1 kg (137 lb)        LDAs:   Peripheral IV 07/25/24 Right Forearm (Active)   Site Assessment Clean, dry & intact 07/25/24 1704   Line Status Blood return noted;Flushed 07/25/24 1704   Phlebitis Assessment No symptoms 07/25/24 1704   Infiltration Assessment 0 07/25/24 1704   Dressing Status New dressing applied;Clean, dry & intact 07/25/24 1704   Dressing Type Transparent 07/25/24 1704       Ambulatory Status:  No data recorded    Diagnosis:  DISPOSITION Admitted 07/25/2024 05:52:33 PM   Final diagnoses:   Symptomatic anemia        Consults:  None     Pain Score:  Pain Assessment  Pain Assessment: None - Denies Pain    C-SSRS:   Risk of Suicide: No Risk    Sepsis Screening:       [place] Fall Risk:       Swallow Screening        Preferred Language:   English      ALLERGIES     Patient has no known allergies.    SURGICAL HISTORY       Past Surgical History:   Procedure Laterality Date    CARDIAC CATHETERIZATION  11/2007    COLONOSCOPY  2020    Dr. Pineda    ENDOSCOPY, COLON, DIAGNOSTIC      EYE SURGERY Left 2004    cataract    EYE SURGERY

## 2024-07-25 NOTE — TELEPHONE ENCOUNTER
"Outpatient Psychiatric Progress Note    CC: f/u depression and anxiety    HX: Adal was seen for follow-up for depression today.  Adal reports that earlier this morning her depression was a 4-5 out of 10 and her anxiety was increased due to worrying about her grandfather.  He apparently has to go back to the hospital for more tests.  The patient also talked about how she was anxious just dropping him off dialysis.  We spent some time processing this fear.  She said \"after I stated out loud, I can see how irrational it is.\"  We discussed how her own fear may be projected onto her grandfather.  The patient denies any suicidal or homicidal thoughts.  No thoughts of cutting.  No medication side effects.    Depression rating 4-5/10  Anxiety rating 7/10  Appetite- \"pretty good\"    Energy level- good  Sleep- \"pretty good\"     I have reviewed pt's allergies and current medications.     Review of Systems   Constitutional: Negative.    Cardiovascular: Negative.    Gastrointestinal: Negative.    Neurological: Negative.      BP (!) 137/89  Pulse (!) 98  Temp 98.6 °F (37 °C)  Resp 16  Wt 87.3 kg (192 lb 8 oz)    EXAM: Neatly, casually dressed, good hygeine. Gait and station stable. No psychomotor agitation/retardation. No motor tics Speech is normal rate, amount. Associations intact. Mood \"I'm okay\" affect euthymic, stable.. TC-goal directed.  Denies SI/HI/VH/AH. TP-linear.  Attention and concentration are fair. Memory is intact for recent and remote events. Insight-limited, judgement- linited      Encounter Diagnoses   Name Primary?   • Severe episode of recurrent major depressive disorder, without psychotic features Yes   • Generalized anxiety disorder        Current Outpatient Prescriptions   Medication Sig Dispense Refill   • buPROPion XL (WELLBUTRIN XL) 150 MG 24 hr tablet Take 1 tablet by mouth Every Morning. 30 tablet 1   • busPIRone (BUSPAR) 10 MG tablet Three times a day 90 tablet 1   • citalopram (CeleXA) 10 MG " Rec eval in ED given current state   tablet Take one daily 30 tablet 1   • hydrOXYzine (ATARAX) 10 MG tablet Take 1 tablet by mouth 3 (Three) Times a Day As Needed for Anxiety. 90 tablet 2   • JUNEL FE 1.5/30 1.5-30 MG-MCG tablet      • triamcinolone (KENALOG) 0.1 % cream Apply  topically 3 (Three) Times a Day. 80 g 0     No current facility-administered medications for this visit.      Plan:  1. Continue Celexa 10 mg daily and Wellbutrin  mg daily for depression  2.  Continue BuSpar 10 mg 3 times a day and Vistaril as needed for anxiety  3.  Continue IOP    TIME IN:1133U  TIME OUT:1143X

## 2024-07-25 NOTE — TELEPHONE ENCOUNTER
Tried to call Obi, voice mail not set up. Tried to call spouse, Marsha, left voice mail to return call regarding critical/urgent lab results. Called daughter, Phyllis Suarez on [employer], left voice mail asking her to try to reach out to Obi or Marsha and have them return our call, as well.

## 2024-07-25 NOTE — ED NOTES
Report received from Heidi ZARATE. Pt in bed, eyes open, respirations even and unlabored. Vital signs reassessed, no needs voiced.

## 2024-07-25 NOTE — TELEPHONE ENCOUNTER
Marsha Daniela called back, Obi is very sleepy, difficult to arouse to answer these questions. He could not answer if he was having any blood in stool, urine or coughing up blood, he just \"I don't know\". He is not having chest pain, SOB or dizziness. Send to ER or follow your recommendations from your first note?    Call Marsha back on her cell phone  [phone]

## 2024-07-25 NOTE — TELEPHONE ENCOUNTER
----- Message from Quinn Plummer MD sent at 7/25/2024  8:00 AM EDT -----  Check with patient for any signs of bleeding or blood loss.  If so he needs to stop Xarelto and notify me.  If no signs of bleeding recommend starting ferrous sulfate 3 and 25 mg twice a day.  Also recommend he get iron level, ferritin now and repeat CBC iron and ferritin in 6 weeks.  If he is having chest pain shortness of breath or dizziness recommend ER

## 2024-07-25 NOTE — TELEPHONE ENCOUNTER
Spoke with Marsha Suarez and she is in agreement to take him to ER and will go to [facility].

## 2024-07-25 NOTE — ED TRIAGE NOTES
Pt presents to the ED through lobby with c/o low hgb. Pt states he got blood work done and was told his hgb was low. Currently denies any pain

## 2024-07-26 PROBLEM — I48.0 PAROXYSMAL ATRIAL FIBRILLATION (HCC): Status: ACTIVE | Noted: 2024-07-26

## 2024-07-26 LAB
ANION GAP SERPL CALC-SCNC: 11 MEQ/L (ref 8–16)
BUN SERPL-MCNC: 21 MG/DL (ref 7–22)
CALCIUM SERPL-MCNC: 8.6 MG/DL (ref 8.5–10.5)
CHLORIDE SERPL-SCNC: 108 MEQ/L (ref 98–111)
CO2 SERPL-SCNC: 20 MEQ/L (ref 23–33)
CREAT SERPL-MCNC: 1.4 MG/DL (ref 0.4–1.2)
DEPRECATED RDW RBC AUTO: 56.7 FL (ref 35–45)
ERYTHROCYTE [DISTWIDTH] IN BLOOD BY AUTOMATED COUNT: 17.8 % (ref 11.5–14.5)
GFR SERPL CREATININE-BSD FRML MDRD: 53 ML/MIN/1.73M2
GLUCOSE SERPL-MCNC: 78 MG/DL (ref 70–108)
HCT VFR BLD AUTO: 26.6 % (ref 42–52)
HCT VFR BLD AUTO: 28.1 % (ref 42–52)
HGB BLD-MCNC: 8.1 GM/DL (ref 14–18)
HGB BLD-MCNC: 8.4 GM/DL (ref 14–18)
MCH RBC QN AUTO: 27.3 PG (ref 26–33)
MCHC RBC AUTO-ENTMCNC: 30.5 GM/DL (ref 32.2–35.5)
MCV RBC AUTO: 89.6 FL (ref 80–94)
OSMOLALITY SERPL CALC.SUM OF ELEC: 279.4 MOSMOL/KG (ref 275–300)
PLATELET # BLD AUTO: 243 THOU/MM3 (ref 130–400)
PMV BLD AUTO: 9.6 FL (ref 9.4–12.4)
POTASSIUM SERPL-SCNC: 4.2 MEQ/L (ref 3.5–5.2)
RBC # BLD AUTO: 2.97 MILL/MM3 (ref 4.7–6.1)
SODIUM SERPL-SCNC: 139 MEQ/L (ref 135–145)
WBC # BLD AUTO: 6.5 THOU/MM3 (ref 4.8–10.8)

## 2024-07-26 PROCEDURE — 85014 HEMATOCRIT: CPT

## 2024-07-26 PROCEDURE — G0378 HOSPITAL OBSERVATION PER HR: HCPCS

## 2024-07-26 PROCEDURE — 96366 THER/PROPH/DIAG IV INF ADDON: CPT

## 2024-07-26 PROCEDURE — 6370000000 HC RX 637 (ALT 250 FOR IP)

## 2024-07-26 PROCEDURE — 99232 SBSQ HOSP IP/OBS MODERATE 35: CPT | Performed by: PHYSICIAN ASSISTANT

## 2024-07-26 PROCEDURE — 85027 COMPLETE CBC AUTOMATED: CPT

## 2024-07-26 PROCEDURE — 6370000000 HC RX 637 (ALT 250 FOR IP): Performed by: PHYSICIAN ASSISTANT

## 2024-07-26 PROCEDURE — 85018 HEMOGLOBIN: CPT

## 2024-07-26 PROCEDURE — 96361 HYDRATE IV INFUSION ADD-ON: CPT

## 2024-07-26 PROCEDURE — 2580000003 HC RX 258

## 2024-07-26 PROCEDURE — 36415 COLL VENOUS BLD VENIPUNCTURE: CPT

## 2024-07-26 PROCEDURE — 6360000002 HC RX W HCPCS

## 2024-07-26 PROCEDURE — 80048 BASIC METABOLIC PNL TOTAL CA: CPT

## 2024-07-26 RX ADMIN — RIVAROXABAN 15 MG: 15 TABLET, FILM COATED ORAL at 17:32

## 2024-07-26 RX ADMIN — ACETAMINOPHEN 650 MG: 325 TABLET ORAL at 15:17

## 2024-07-26 RX ADMIN — TAMSULOSIN HYDROCHLORIDE 0.4 MG: 0.4 CAPSULE ORAL at 08:50

## 2024-07-26 RX ADMIN — ATORVASTATIN CALCIUM 20 MG: 20 TABLET, FILM COATED ORAL at 19:23

## 2024-07-26 RX ADMIN — SODIUM CHLORIDE, PRESERVATIVE FREE 10 ML: 5 INJECTION INTRAVENOUS at 19:23

## 2024-07-26 RX ADMIN — SODIUM CHLORIDE, PRESERVATIVE FREE 10 ML: 5 INJECTION INTRAVENOUS at 08:50

## 2024-07-26 RX ADMIN — SODIUM CHLORIDE 100 MG: 9 INJECTION, SOLUTION INTRAVENOUS at 19:29

## 2024-07-26 RX ADMIN — FAMOTIDINE 20 MG: 20 TABLET, FILM COATED ORAL at 08:50

## 2024-07-26 RX ADMIN — Medication 100 MG: at 08:50

## 2024-07-26 ASSESSMENT — PAIN DESCRIPTION - DESCRIPTORS: DESCRIPTORS: CRAMPING;DISCOMFORT

## 2024-07-26 ASSESSMENT — PAIN SCALES - GENERAL
PAINLEVEL_OUTOF10: 0
PAINLEVEL_OUTOF10: 3

## 2024-07-26 ASSESSMENT — PAIN - FUNCTIONAL ASSESSMENT: PAIN_FUNCTIONAL_ASSESSMENT: ACTIVITIES ARE NOT PREVENTED

## 2024-07-26 ASSESSMENT — PAIN DESCRIPTION - LOCATION: LOCATION: LEG

## 2024-07-26 ASSESSMENT — PAIN DESCRIPTION - ORIENTATION: ORIENTATION: LEFT

## 2024-07-26 NOTE — PLAN OF CARE
Problem: Safety - Adult  Goal: Free from fall injury  Outcome: Progressing   All fall precautions in place. Bed in low position, alarm activated and appropriate use of call light.     Problem: Discharge Planning  Goal: Discharge to home or other facility with appropriate resources  Outcome: Progressing     Problem: Skin/Tissue Integrity  Goal: Absence of new skin breakdown  Description: 1.  Monitor for areas of redness and/or skin breakdown  2.  Assess vascular access sites hourly  3.  Every 4-6 hours minimum:  Change oxygen saturation probe site  4.  Every 4-6 hours:  If on nasal continuous positive airway pressure, respiratory therapy assess nares and determine need for appliance change or resting period.  Outcome: Progressing     Problem: Pain  Goal: Verbalizes/displays adequate comfort level or baseline comfort level  Outcome: Progressing   Pain Assessment: None - Denies Pain          Is pain goal met at this time?  Yes          Problem: Hematologic - Adult  Goal: Maintains hematologic stability  Outcome: Progressing     Care plan reviewed with patient. Patient verbalizes understanding with the plan of care.

## 2024-07-26 NOTE — PROGRESS NOTES
Pharmacy Note - Renal dose adjustment made per P/T protocol    Original order:  Xarelto 20 mg daily    Estimated Creatinine Clearance: 41 mL/min (A) (based on SCr of 1.4 mg/dL (H)).  For Xarelto only: estimated CrCl using Actual Body Weight: 41 mL/min (based on actual weight of 63 kg)    Recent Labs     07/25/24  1453 07/26/24  0528   BUN 26* 21   CREATININE 1.6* 1.4*       Renally adjusted order:  Xarelto 15 mg daily    Please call pharmacy with any questions.    Thank you,  Kezia Barlow [facility]  7/26/2024 4:34 PM

## 2024-07-26 NOTE — PLAN OF CARE
Problem: Safety - Adult  Goal: Free from fall injury  Outcome: Progressing  Flowsheets (Taken 7/26/2024 1538)  Free From Fall Injury:   Instruct family/caregiver on patient safety   Based on caregiver fall risk screen, instruct family/caregiver to ask for assistance with transferring infant if caregiver noted to have fall risk factors     Problem: Discharge Planning  Goal: Discharge to home or other facility with appropriate resources  Outcome: Progressing  Flowsheets  Taken 7/26/2024 1538  Discharge to home or other facility with appropriate resources:   Identify barriers to discharge with patient and caregiver   Arrange for needed discharge resources and transportation as appropriate  Taken 7/26/2024 0846  Discharge to home or other facility with appropriate resources: Identify barriers to discharge with patient and caregiver     Problem: Skin/Tissue Integrity  Goal: Absence of new skin breakdown  Description: 1.  Monitor for areas of redness and/or skin breakdown  2.  Assess vascular access sites hourly  3.  Every 4-6 hours minimum:  Change oxygen saturation probe site  4.  Every 4-6 hours:  If on nasal continuous positive airway pressure, respiratory therapy assess nares and determine need for appliance change or resting period.  Outcome: Progressing  Note: Skin assessment completed.  Patient turned every 2 hours and as needed.  No skin breakdown this shift.         Problem: Pain  Goal: Verbalizes/displays adequate comfort level or baseline comfort level  Outcome: Progressing  Flowsheets (Taken 7/26/2024 1538)  Verbalizes/displays adequate comfort level or baseline comfort level:   Encourage patient to monitor pain and request assistance   Assess pain using appropriate pain scale     Problem: Hematologic - Adult  Goal: Maintains hematologic stability  Outcome: Progressing  Flowsheets  Taken 7/26/2024 1538  Maintains hematologic stability:   Assess for signs and symptoms of bleeding or hemorrhage   Monitor labs  for bleeding or clotting disorders  Taken 7/26/2024 0846  Maintains hematologic stability: Assess for signs and symptoms of bleeding or hemorrhage     Problem: Chronic Conditions and Co-morbidities  Goal: Patient's chronic conditions and co-morbidity symptoms are monitored and maintained or improved  Outcome: Progressing  Flowsheets  Taken 7/26/2024 1538  Care Plan - Patient's Chronic Conditions and Co-Morbidity Symptoms are Monitored and Maintained or Improved:   Monitor and assess patient's chronic conditions and comorbid symptoms for stability, deterioration, or improvement   Collaborate with multidisciplinary team to address chronic and comorbid conditions and prevent exacerbation or deterioration  Taken 7/26/2024 0846  Care Plan - Patient's Chronic Conditions and Co-Morbidity Symptoms are Monitored and Maintained or Improved: Monitor and assess patient's chronic conditions and comorbid symptoms for stability, deterioration, or improvement    Care plan reviewed with patient.  Patient verbalizes understanding of the plan of care and contributes to goal setting.

## 2024-07-26 NOTE — CARE COORDINATION
07/26/24 1408   Service Assessment   Patient Orientation Alert and Oriented;Person;Place;Situation;Self   Cognition Alert   History Provided By Patient;Child/Family;Medical Record   Primary Caregiver Self   Accompanied By/Relationship Son   Support Systems Spouse/Significant Other;Children   Patient's Healthcare Decision Maker is: Legal Next of Kin   PCP Verified by CM Yes   Last Visit to PCP Within last 3 months   Prior Functional Level Assistance with the following:;Cooking;Housework;Shopping;Mobility   Current Functional Level Assistance with the following:;Cooking;Housework;Shopping;Mobility   Can patient return to prior living arrangement Yes   Ability to make needs known: Good   Family able to assist with home care needs: Yes   Would you like for me to discuss the discharge plan with any other family members/significant others, and if so, who? No   Financial Resources Medicare   Community Resources None   CM/SW Referral Other (see comment)  (N/A)   Social/Functional History   Active  No   Patient's  Info Spouse, Marsha   Occupation Retired   Discharge Planning   Type of Residence House   Living Arrangements Spouse/Significant Other   Current Services Prior To Admission Durable Medical Equipment   Current DME Prior to Arrival Other (Comment)  ([employer])   Potential Assistance Needed Home Care   DME Ordered? No   Potential Assistance Purchasing Medications No   Type of Home Care Services None   Patient expects to be discharged to: House   History of falls? 0   Services At/After Discharge   Transition of Care Consult (CM Consult) Home Health   Services At/After Discharge None   Confirm Follow Up Transport Family   Condition of Participation: Discharge Planning   The Plan for Transition of Care is related to the following treatment goals: go home tomorrow   Freedom of Choice list was provided with basic dialogue that supports the patient's individualized plan of care/goals, treatment preferences,  and shares the quality data associated with the providers?  No         Case Management Assessment Initial Evaluation     Date/Time of Evaluation: 7/26/2024 8:39 AM  Assessment Completed by: Rina Hendricks RN     If patient is discharged prior to next notation, then this note serves as note for discharge by case management.     Patient Name: Obi Suarez Sr.                   YOB: 1950  Diagnosis: Anemia [D64.9]  Symptomatic anemia [D64.9]                   Date / Time: 7/25/2024  2:24 PM  [facility]      Patient Admission Status: Observation   Readmission Risk Low 0-14, Mod 15-19), High > 20: No data recorded  Current PCP: Quinn Plummer MD  Health Care Decision Makers:   Primary Decision Maker: Marsha Daniela - Spouse - [phone]    Primary Decision Maker: DanielaPhyllis - Child - [phone]     Additional Case Management Notes: Patient presents due to low hemoglobin. H/H on arrival 7.1/24.5. Patient was transfused with one unit PRBC's and IV Venofer x 1 dose. H/H this a.m. 8.1/26.6. Hospitalist following, Xarelto and Aspirin on hold, IV Venofer x 2 more doses, Thiamine, CIWA scale with Ativan, prn Tylenol and Zofran, electrolyte replacement protocol, regular diet, SCD's, fall and seizure precautions, telemetry, up with assistance.      Imaging: None     Patient Goals/Plan/Treatment Preferences: Obi resides at home with his wife. He is able to afford his medications and has the DME needed. Obi is open to home health if his wife is in agreement. His wife will drive him home at discharge.

## 2024-07-27 VITALS
BODY MASS INDEX: 21.08 KG/M2 | RESPIRATION RATE: 18 BRPM | HEIGHT: 68 IN | DIASTOLIC BLOOD PRESSURE: 71 MMHG | WEIGHT: 139.11 LBS | TEMPERATURE: 97.8 F | HEART RATE: 80 BPM | SYSTOLIC BLOOD PRESSURE: 137 MMHG | OXYGEN SATURATION: 93 %

## 2024-07-27 LAB
ANION GAP SERPL CALC-SCNC: 15 MEQ/L (ref 8–16)
BUN SERPL-MCNC: 15 MG/DL (ref 7–22)
CALCIUM SERPL-MCNC: 8.9 MG/DL (ref 8.5–10.5)
CHLORIDE SERPL-SCNC: 107 MEQ/L (ref 98–111)
CO2 SERPL-SCNC: 18 MEQ/L (ref 23–33)
CREAT SERPL-MCNC: 1.3 MG/DL (ref 0.4–1.2)
DEPRECATED RDW RBC AUTO: 57.7 FL (ref 35–45)
ERYTHROCYTE [DISTWIDTH] IN BLOOD BY AUTOMATED COUNT: 18.2 % (ref 11.5–14.5)
GFR SERPL CREATININE-BSD FRML MDRD: 57 ML/MIN/1.73M2
GLUCOSE SERPL-MCNC: 81 MG/DL (ref 70–108)
HCT VFR BLD AUTO: 27 % (ref 42–52)
HGB BLD-MCNC: 8.2 GM/DL (ref 14–18)
MCH RBC QN AUTO: 27 PG (ref 26–33)
MCHC RBC AUTO-ENTMCNC: 30.4 GM/DL (ref 32.2–35.5)
MCV RBC AUTO: 88.8 FL (ref 80–94)
PLATELET # BLD AUTO: 264 THOU/MM3 (ref 130–400)
PMV BLD AUTO: 10.1 FL (ref 9.4–12.4)
POTASSIUM SERPL-SCNC: 4.3 MEQ/L (ref 3.5–5.2)
RBC # BLD AUTO: 3.04 MILL/MM3 (ref 4.7–6.1)
SODIUM SERPL-SCNC: 140 MEQ/L (ref 135–145)
WBC # BLD AUTO: 6.5 THOU/MM3 (ref 4.8–10.8)

## 2024-07-27 PROCEDURE — 85027 COMPLETE CBC AUTOMATED: CPT

## 2024-07-27 PROCEDURE — 99239 HOSP IP/OBS DSCHRG MGMT >30: CPT | Performed by: PHYSICIAN ASSISTANT

## 2024-07-27 PROCEDURE — 6370000000 HC RX 637 (ALT 250 FOR IP)

## 2024-07-27 PROCEDURE — G0378 HOSPITAL OBSERVATION PER HR: HCPCS

## 2024-07-27 PROCEDURE — 36415 COLL VENOUS BLD VENIPUNCTURE: CPT

## 2024-07-27 PROCEDURE — 2580000003 HC RX 258

## 2024-07-27 PROCEDURE — 80048 BASIC METABOLIC PNL TOTAL CA: CPT

## 2024-07-27 RX ORDER — LANOLIN ALCOHOL/MO/W.PET/CERES
100 CREAM (GRAM) TOPICAL DAILY
Qty: 30 TABLET | Refills: 3 | Status: SHIPPED | OUTPATIENT
Start: 2024-07-28

## 2024-07-27 RX ORDER — CARVEDILOL 6.25 MG/1
6.25 TABLET ORAL 2 TIMES DAILY
Qty: 60 TABLET | Refills: 3 | Status: SHIPPED | OUTPATIENT
Start: 2024-07-27

## 2024-07-27 RX ORDER — FERROUS SULFATE 325(65) MG
325 TABLET ORAL 2 TIMES DAILY
Qty: 180 TABLET | Refills: 1 | Status: SHIPPED | OUTPATIENT
Start: 2024-07-27

## 2024-07-27 RX ORDER — DOCUSATE SODIUM 100 MG/1
100 CAPSULE, LIQUID FILLED ORAL 2 TIMES DAILY
Qty: 60 CAPSULE | Refills: 0 | Status: SHIPPED | OUTPATIENT
Start: 2024-07-27 | End: 2024-08-26

## 2024-07-27 RX ADMIN — TAMSULOSIN HYDROCHLORIDE 0.4 MG: 0.4 CAPSULE ORAL at 08:21

## 2024-07-27 RX ADMIN — ACETAMINOPHEN 650 MG: 325 TABLET ORAL at 08:24

## 2024-07-27 RX ADMIN — Medication 100 MG: at 08:21

## 2024-07-27 RX ADMIN — FAMOTIDINE 20 MG: 20 TABLET, FILM COATED ORAL at 08:21

## 2024-07-27 RX ADMIN — SODIUM CHLORIDE, PRESERVATIVE FREE 10 ML: 5 INJECTION INTRAVENOUS at 08:21

## 2024-07-27 ASSESSMENT — PAIN - FUNCTIONAL ASSESSMENT: PAIN_FUNCTIONAL_ASSESSMENT: ACTIVITIES ARE NOT PREVENTED

## 2024-07-27 ASSESSMENT — PAIN DESCRIPTION - DESCRIPTORS: DESCRIPTORS: ACHING

## 2024-07-27 ASSESSMENT — PAIN DESCRIPTION - FREQUENCY: FREQUENCY: CONTINUOUS

## 2024-07-27 ASSESSMENT — PAIN DESCRIPTION - ONSET: ONSET: ON-GOING

## 2024-07-27 ASSESSMENT — PAIN DESCRIPTION - ORIENTATION: ORIENTATION: MID

## 2024-07-27 ASSESSMENT — PAIN DESCRIPTION - LOCATION: LOCATION: HEAD

## 2024-07-27 ASSESSMENT — PAIN SCALES - GENERAL: PAINLEVEL_OUTOF10: 3

## 2024-07-27 ASSESSMENT — PAIN DESCRIPTION - PAIN TYPE: TYPE: ACUTE PAIN

## 2024-07-27 NOTE — DISCHARGE SUMMARY
completed ordered blood work which demonstrated a hemoglobin of 6.8.  He was referred to the emergency department for further management by his PCP.  On arrival his hemoglobin was 7.1 in the emergency department transfuse 1 unit PRBCs.  Patient was also found to have a mild ROBSON with a creatinine of 1.6, baseline typically around 1.2.  Patient's blood thinners were held and he was admitted for further evaluation and management.  FOBT and urinalysis were negative for any blood.  Patient asymptomatic and denied any melena, hematochezia, hematuria.  Patient does have longstanding history of alcohol use and poor diet, therefore suspect that iron deficiency is more chronic in nature due to poor diet.  Patient's Xarelto was resumed 24 hours prior to discharge and hemoglobin has remained stable in the eights.  His losartan was discontinued and kidney function improved with a creatinine of 1.3 on discharge.  Patient is currently hemodynamically stable for discharge home with close follow-up with primary care provider next week.  He will be discharged with oral iron supplementation and Coreg for blood pressure management and rate control for his known paroxysmal atrial fibrillation.    .          Exam:     Vitals:  Vitals:    07/26/24 1915 07/27/24 0351 07/27/24 0429 07/27/24 0737   BP: (!) 142/75 (!) 143/83  137/71   Pulse: 75 87  80   Resp:  16  18   Temp: 98.9 °F (37.2 °C) 98.6 °F (37 °C)  97.8 °F (36.6 °C)   TempSrc: Oral Oral  Oral   SpO2: 98% 96%  93%   Weight:   63.1 kg (139 lb 1.8 oz)    Height:         Weight: Weight - Scale: 63.1 kg (139 lb 1.8 oz)     24 hour intake/output:  Intake/Output Summary (Last 24 hours) at 7/27/2024 1017  Last data filed at 7/27/2024 0821  Gross per 24 hour   Intake 240 ml   Output 900 ml   Net -660 ml     General: No distress, appears stated age.  Neck: Supple, with full range of motion. Trachea midline.  No gross JVD appreciated.  Respiratory:  Normal effort. Clear to auscultation,  impairment of your kidney function.  Your losartan has been discontinued because of this and you have been started on Coreg for blood pressure and atrial fibrillation.  Please check your blood pressure once daily at home and bring with you to your primary care appointment next week for follow-up.     Do not resume taking aspirin until cleared by your primary care provider.  It is okay for you to continue to take your Xarelto.       Diet: ADULT DIET; Regular; No red dye      Follow-up visits:   No follow-up provider specified.       Discharge Medications:        Medication List        ASK your doctor about these medications      aspirin 81 MG EC tablet  Commonly known as: Aspirin Low Dose  Take 1 tablet by mouth daily     atorvastatin 20 MG tablet  Commonly known as: LIPITOR  Take 1 tablet by mouth daily     Combigan 0.2-0.5 % ophthalmic solution  Generic drug: brimonidine-timolol  INSTILL ONE DROP IN THE LEFT EYE EVERY TWELVE HOURS (BULK)     fluocinonide 0.05 % cream  Commonly known as: LIDEX  APPLY EXTERNALLY TO THE AFFECTED AREA TWICE DAILY     loratadine 10 MG tablet  Commonly known as: CLARITIN  Take 1 tablet by mouth daily     losartan 50 MG tablet  Commonly known as: COZAAR  Take 1 tablet by mouth daily     * rivaroxaban 20 MG Tabs tablet  Commonly known as: Xarelto  Take 1 tablet by mouth daily (with breakfast)     * rivaroxaban 15 MG Tabs tablet  Commonly known as: Xarelto  Take 1 tablet by mouth daily (with breakfast)     tadalafil 20 MG tablet  Commonly known as: CIALIS  Take 1 tablet by mouth daily as needed for Erectile Dysfunction     tamsulosin 0.4 MG capsule  Commonly known as: FLOMAX  Take 1 capsule by mouth daily           * This list has 2 medication(s) that are the same as other medications prescribed for you. Read the directions carefully, and ask your doctor or other care provider to review them with you.                  Time Spent on discharge is more than 45 minutes in the examination,

## 2024-07-27 NOTE — PLAN OF CARE
Problem: Safety - Adult  Goal: Free from fall injury  7/26/2024 2305 by Sangeeta Sims RN  Outcome: Progressing   All fall precautions in place. Bed in low position, alarm activated and appropriate use of call light.     Problem: Discharge Planning  Goal: Discharge to home or other facility with appropriate resources  7/26/2024 2305 by Sangeeta Sims RN  Outcome: Progressing     Problem: Skin/Tissue Integrity  Goal: Absence of new skin breakdown  Description: 1.  Monitor for areas of redness and/or skin breakdown  2.  Assess vascular access sites hourly  3.  Every 4-6 hours minimum:  Change oxygen saturation probe site  4.  Every 4-6 hours:  If on nasal continuous positive airway pressure, respiratory therapy assess nares and determine need for appliance change or resting period.  7/26/2024 2305 by Sangeeta Sims RN  Outcome: Progressing     Problem: Pain  Goal: Verbalizes/displays adequate comfort level or baseline comfort level  7/26/2024 2305 by Sangeeta Sims RN  Outcome: Progressing   Pain Assessment: None - Denies Pain  Pain Level: 3   Patient's Stated Pain Goal: 0 - No pain   Is pain goal met at this time?  Yes     Non-Pharmaceutical Pain Intervention(s): Repositioned, Rest    Problem: Hematologic - Adult  Goal: Maintains hematologic stability  7/26/2024 2305 by Sangeeta Sims RN  Outcome: Progressing     Problem: Chronic Conditions and Co-morbidities  Goal: Patient's chronic conditions and co-morbidity symptoms are monitored and maintained or improved  7/26/2024 2305 by Sangeeta Sims RN  Outcome: Progressing     Care plan reviewed with patient. Patient verbalizes understanding with the plan of care.

## 2024-07-27 NOTE — PROGRESS NOTES
All discharge instructions given to patient and family with no further questions at this time. Instructed patient and wife to call Monday and make follow up appointments, and to pickup medications from pharmacy. Patient sent with all belongings. Patient discharged off unit via wheelchair. Chart contents placed in yellow bin.

## 2024-07-27 NOTE — PROGRESS NOTES
Attempted to call wife and daughter who are listed on patient contacts for discharge. Wife's phone rang busy multiple times and daughters phone is no longer in service. Will continue to try to get ahold of family for discharge.

## 2024-07-27 NOTE — DISCHARGE INSTRUCTIONS
You are diagnosed with anemia.  You are given a blood transfusion on 7/25/2024.  You are given 2 doses of IV iron and prescribed oral iron for you to continue on discharge.  Iron can make you constipated therefore stool softener is also been prescribed.    You had mild impairment of your kidney function.  Your losartan has been discontinued because of this and you have been started on Coreg for blood pressure and atrial fibrillation.  Please check your blood pressure once daily at home and bring with you to your primary care appointment next week for follow-up.    Do not resume taking aspirin until cleared by your primary care provider.  It is okay for you to continue to take your Xarelto.    Please complete ordered blood work on Monday to recheck your blood counts and kidney function

## 2024-07-29 ENCOUNTER — CARE COORDINATION (OUTPATIENT)
Dept: CASE MANAGEMENT | Age: 74
End: 2024-07-29

## 2024-07-29 ENCOUNTER — LAB (OUTPATIENT)
Dept: LAB | Age: 74
End: 2024-07-29

## 2024-07-29 DIAGNOSIS — D53.9 ANEMIA, MACROCYTIC: ICD-10-CM

## 2024-07-29 DIAGNOSIS — N18.30 STAGE 3 CHRONIC KIDNEY DISEASE, UNSPECIFIED WHETHER STAGE 3A OR 3B CKD (HCC): ICD-10-CM

## 2024-07-29 LAB
ANION GAP SERPL CALC-SCNC: 13 MEQ/L (ref 8–16)
BUN SERPL-MCNC: 28 MG/DL (ref 7–22)
CALCIUM SERPL-MCNC: 9.2 MG/DL (ref 8.5–10.5)
CHLORIDE SERPL-SCNC: 105 MEQ/L (ref 98–111)
CO2 SERPL-SCNC: 22 MEQ/L (ref 23–33)
CREAT SERPL-MCNC: 1.7 MG/DL (ref 0.4–1.2)
DEPRECATED RDW RBC AUTO: 59.7 FL (ref 35–45)
ERYTHROCYTE [DISTWIDTH] IN BLOOD BY AUTOMATED COUNT: 19.2 % (ref 11.5–14.5)
GFR SERPL CREATININE-BSD FRML MDRD: 42 ML/MIN/1.73M2
GLUCOSE SERPL-MCNC: 131 MG/DL (ref 70–108)
HCT VFR BLD AUTO: 29.4 % (ref 42–52)
HGB BLD-MCNC: 8.9 GM/DL (ref 14–18)
MCH RBC QN AUTO: 27.4 PG (ref 26–33)
MCHC RBC AUTO-ENTMCNC: 30.3 GM/DL (ref 32.2–35.5)
MCV RBC AUTO: 90.5 FL (ref 80–94)
PLATELET # BLD AUTO: 301 THOU/MM3 (ref 130–400)
PMV BLD AUTO: 10 FL (ref 9.4–12.4)
POTASSIUM SERPL-SCNC: 4.3 MEQ/L (ref 3.5–5.2)
RBC # BLD AUTO: 3.25 MILL/MM3 (ref 4.7–6.1)
SODIUM SERPL-SCNC: 140 MEQ/L (ref 135–145)
WBC # BLD AUTO: 6.4 THOU/MM3 (ref 4.8–10.8)

## 2024-07-29 NOTE — CARE COORDINATION
Care Transitions Note    Initial Call - Call within 2 business days of discharge: Yes    Attempted to reach patient for transitions of care follow up. Unable to reach patient.    Outreach Attempts:   Multiple attempts to contact patient, spouse/partner  at phone numbers on file.   HIPAA compliant voicemail left for spouse/partner .   Mychart message sent.     Patient: Obi Suarez Sr.    Patient : 1950   MRN: [id number]    Reason for Admission: symptomatic anemia  Discharge Date: 24  RURS: No data recorded  Last Discharge Facility       Date Complaint Diagnosis Description Type Department Provider    24 Abnormal Lab Symptomatic anemia ... ED to Hosp-Admission (Discharged) (ADMITTED) [facility] Nori Gomes PA-C; Valdo,...            Was this an external facility discharge? No    Follow Up Appointment:   Patient does not have a follow up appointment scheduled at time of call.  Did not reach pt, sent message to PCP office  Future Appointments         Provider Specialty Dept Phone    6/10/2025 11:00 AM Quinn Plummer MD Family Medicine [phone]          Challenges to be reviewed by the provider   Additional needs identified to be addressed with provider Yes  Needs 7 day HFU, discharged , anemia, low HGB.  Did not reach today for kin.  Will try again tomorrow.  Thank you.                   Plan for follow-up on next business day.      Pat Villatoro RN

## 2024-07-30 ENCOUNTER — CARE COORDINATION (OUTPATIENT)
Dept: CASE MANAGEMENT | Age: 74
End: 2024-07-30

## 2024-07-30 NOTE — CARE COORDINATION
Care Transitions Note    Initial Call - Call within 2 business days of discharge: Yes    Attempted to reach patient for transitions of care follow up. Unable to reach patient.  If no return call, CTN will sign off-2nd attempt.    Outreach Attempts:   Multiple attempts to contact patient, spouse/partner , family, Phyllis at phone numbers on file.   HIPAA compliant voicemail left for patient, spouse/partner , family, Phyllis.   [employer] message sent.     Patient: Obi Suarez Sr.    Patient : 1950   MRN: [id number]    Reason for Admission: anemia  Discharge Date: 24  RURS: No data recorded  Last Discharge Facility       Date Complaint Diagnosis Description Type Department Provider    24 Abnormal Lab Symptomatic anemia ... ED to Hosp-Admission (Discharged) (ADMITTED) [facility] Nori Gomes PA-C; Valdo,...            Was this an external facility discharge? No    Follow Up Appointment:   Patient does not have a follow up appointment scheduled at time of call.  Did not reach  Future Appointments         Provider Specialty Dept Phone    6/10/2025 11:00 AM Quinn Plummer MD Family Medicine [phone]          Challenges to be reviewed by the provider   Additional needs identified to be addressed with provider Yes  Needs 7 day HFU, discharged , anemia, low HGB, med changes. Did not answer again today. If no return call, will sign off.  Thank you.                   No further follow-up call indicated     Pat Villatoro RN

## 2024-07-31 NOTE — PLAN OF CARE
Received follow up blood work from 7/29/2024 in my inbox.    Hemoglobin is stable at 8.9.   Creatinine did worsen. BUN is also elevated - suspect poor fluid intake. May also have resumed losartan.   Called both numbers in the chart for the patient- no answer and unable to leave voicemail.  Called and left message on spouses phone detailing the above and encouraging them to call Dr Plummer in the morning for follow up.  I also forwarded the results to Dr Plummer and encouraged ASAP follow up appointment

## 2024-08-19 RX ORDER — LOSARTAN POTASSIUM 50 MG/1
TABLET ORAL
Qty: 90 TABLET | Refills: 2 | OUTPATIENT
Start: 2024-08-19

## 2024-08-19 NOTE — TELEPHONE ENCOUNTER
Last seen 6/6/24 for AWV  Losartan 50mg was stopped at discharge 7/27/24 by Nori GRANDA.   Phyllis daughter states he is not taking losartan any longer.  Refill refused.

## 2024-09-26 ENCOUNTER — OFFICE VISIT (OUTPATIENT)
Dept: FAMILY MEDICINE CLINIC | Age: 74
End: 2024-09-26

## 2024-09-26 VITALS
HEART RATE: 66 BPM | SYSTOLIC BLOOD PRESSURE: 124 MMHG | BODY MASS INDEX: 20.22 KG/M2 | DIASTOLIC BLOOD PRESSURE: 60 MMHG | RESPIRATION RATE: 16 BRPM | WEIGHT: 133 LBS | TEMPERATURE: 97.9 F

## 2024-09-26 DIAGNOSIS — I48.0 PAROXYSMAL ATRIAL FIBRILLATION (HCC): ICD-10-CM

## 2024-09-26 DIAGNOSIS — H61.21 IMPACTED CERUMEN OF RIGHT EAR: ICD-10-CM

## 2024-09-26 DIAGNOSIS — I25.10 CORONARY ARTERY DISEASE INVOLVING NATIVE HEART WITHOUT ANGINA PECTORIS, UNSPECIFIED VESSEL OR LESION TYPE: Primary | Chronic | ICD-10-CM

## 2024-11-08 RX ORDER — ATORVASTATIN CALCIUM 20 MG/1
20 TABLET, FILM COATED ORAL DAILY
Qty: 90 TABLET | Refills: 3 | Status: SHIPPED | OUTPATIENT
Start: 2024-11-08

## 2024-11-08 RX ORDER — FERROUS SULFATE 325(65) MG
325 TABLET ORAL 2 TIMES DAILY
Qty: 180 TABLET | Refills: 3 | Status: SHIPPED | OUTPATIENT
Start: 2024-11-08

## 2024-11-08 RX ORDER — LORATADINE 10 MG/1
10 TABLET ORAL DAILY
Qty: 90 TABLET | Refills: 3 | Status: SHIPPED | OUTPATIENT
Start: 2024-11-08

## 2024-11-08 RX ORDER — TAMSULOSIN HYDROCHLORIDE 0.4 MG/1
0.4 CAPSULE ORAL DAILY
Qty: 90 CAPSULE | Refills: 3 | Status: SHIPPED | OUTPATIENT
Start: 2024-11-08

## 2024-11-08 RX ORDER — LANOLIN ALCOHOL/MO/W.PET/CERES
100 CREAM (GRAM) TOPICAL DAILY
Qty: 90 TABLET | Refills: 3 | Status: SHIPPED | OUTPATIENT
Start: 2024-11-08

## 2024-11-08 RX ORDER — CARVEDILOL 6.25 MG/1
6.25 TABLET ORAL 2 TIMES DAILY
Qty: 180 TABLET | Refills: 3 | Status: SHIPPED | OUTPATIENT
Start: 2024-11-08

## 2024-11-08 RX ORDER — TADALAFIL 20 MG/1
20 TABLET ORAL DAILY PRN
Qty: 18 TABLET | Refills: 5 | Status: SHIPPED | OUTPATIENT
Start: 2024-11-08

## 2024-11-08 NOTE — TELEPHONE ENCOUNTER
Last office visit 9/26/2024  Patient called and states that he is not currently taking ANY medications. Needs any Rx's that he needs to \"keep him alive\" sent to [facility]

## 2024-12-05 ENCOUNTER — TELEPHONE (OUTPATIENT)
Dept: FAMILY MEDICINE CLINIC | Age: 74
End: 2024-12-05

## 2024-12-05 NOTE — TELEPHONE ENCOUNTER
Marsha called bradg Obi received a letter for Jury duty. She would like Dr. Plummer to write a message stating he is physically incapable to do this. Marsha will  the letter; call her back when it is done

## 2024-12-13 RX ORDER — LORATADINE 10 MG/1
TABLET ORAL
Qty: 90 TABLET | Refills: 3 | Status: SHIPPED | OUTPATIENT
Start: 2024-12-13

## 2025-06-16 ENCOUNTER — OFFICE VISIT (OUTPATIENT)
Dept: FAMILY MEDICINE CLINIC | Age: 75
End: 2025-06-16
Payer: MEDICARE

## 2025-06-16 VITALS
HEIGHT: 68 IN | BODY MASS INDEX: 19.4 KG/M2 | RESPIRATION RATE: 16 BRPM | SYSTOLIC BLOOD PRESSURE: 114 MMHG | WEIGHT: 128 LBS | HEART RATE: 75 BPM | DIASTOLIC BLOOD PRESSURE: 70 MMHG | TEMPERATURE: 98.2 F

## 2025-06-16 DIAGNOSIS — I25.10 CORONARY ARTERY DISEASE INVOLVING NATIVE HEART WITHOUT ANGINA PECTORIS, UNSPECIFIED VESSEL OR LESION TYPE: ICD-10-CM

## 2025-06-16 DIAGNOSIS — Z12.5 SCREENING PSA (PROSTATE SPECIFIC ANTIGEN): ICD-10-CM

## 2025-06-16 DIAGNOSIS — I48.0 PAROXYSMAL ATRIAL FIBRILLATION (HCC): ICD-10-CM

## 2025-06-16 DIAGNOSIS — N18.30 STAGE 3 CHRONIC KIDNEY DISEASE, UNSPECIFIED WHETHER STAGE 3A OR 3B CKD (HCC): ICD-10-CM

## 2025-06-16 DIAGNOSIS — Z00.00 MEDICARE ANNUAL WELLNESS VISIT, SUBSEQUENT: Primary | ICD-10-CM

## 2025-06-16 PROCEDURE — 1123F ACP DISCUSS/DSCN MKR DOCD: CPT | Performed by: FAMILY MEDICINE

## 2025-06-16 PROCEDURE — 1160F RVW MEDS BY RX/DR IN RCRD: CPT | Performed by: FAMILY MEDICINE

## 2025-06-16 PROCEDURE — 3078F DIAST BP <80 MM HG: CPT | Performed by: FAMILY MEDICINE

## 2025-06-16 PROCEDURE — 3074F SYST BP LT 130 MM HG: CPT | Performed by: FAMILY MEDICINE

## 2025-06-16 PROCEDURE — 1159F MED LIST DOCD IN RCRD: CPT | Performed by: FAMILY MEDICINE

## 2025-06-16 PROCEDURE — G0439 PPPS, SUBSEQ VISIT: HCPCS | Performed by: FAMILY MEDICINE

## 2025-06-16 PROCEDURE — 3017F COLORECTAL CA SCREEN DOC REV: CPT | Performed by: FAMILY MEDICINE

## 2025-06-16 RX ORDER — TADALAFIL 20 MG/1
20 TABLET ORAL DAILY PRN
Qty: 18 TABLET | Refills: 11 | Status: SHIPPED | OUTPATIENT
Start: 2025-06-16

## 2025-06-16 SDOH — ECONOMIC STABILITY: FOOD INSECURITY: WITHIN THE PAST 12 MONTHS, YOU WORRIED THAT YOUR FOOD WOULD RUN OUT BEFORE YOU GOT MONEY TO BUY MORE.: NEVER TRUE

## 2025-06-16 SDOH — ECONOMIC STABILITY: FOOD INSECURITY: WITHIN THE PAST 12 MONTHS, THE FOOD YOU BOUGHT JUST DIDN'T LAST AND YOU DIDN'T HAVE MONEY TO GET MORE.: NEVER TRUE

## 2025-06-16 ASSESSMENT — PATIENT HEALTH QUESTIONNAIRE - PHQ9
SUM OF ALL RESPONSES TO PHQ QUESTIONS 1-9: 0
2. FEELING DOWN, DEPRESSED OR HOPELESS: NOT AT ALL
SUM OF ALL RESPONSES TO PHQ QUESTIONS 1-9: 0
1. LITTLE INTEREST OR PLEASURE IN DOING THINGS: NOT AT ALL
SUM OF ALL RESPONSES TO PHQ QUESTIONS 1-9: 0
SUM OF ALL RESPONSES TO PHQ QUESTIONS 1-9: 0

## 2025-06-16 ASSESSMENT — LIFESTYLE VARIABLES
HOW OFTEN DO YOU HAVE A DRINK CONTAINING ALCOHOL: MONTHLY OR LESS
HOW MANY STANDARD DRINKS CONTAINING ALCOHOL DO YOU HAVE ON A TYPICAL DAY: 1 OR 2

## 2025-06-17 NOTE — PATIENT INSTRUCTIONS
vaping. If you need help quitting, talk to your doctor about stop-smoking programs and medicines. These can increase your chances of quitting for good. Quitting is one of the most important things you can do to protect your heart. It is never too late to quit. Try to avoid secondhand smoke too.     Stay at a weight that's healthy for you. Talk to your doctor if you need help losing weight.     Try to get 7 to 9 hours of sleep each night.     Limit alcohol to 2 drinks a day for men and 1 drink a day for women. Too much alcohol can cause health problems.     Manage other health problems such as diabetes, high blood pressure, and high cholesterol. If you think you may have a problem with alcohol or drug use, talk to your doctor.   Medicines    Take your medicines exactly as prescribed. Call your doctor if you think you are having a problem with your medicine.     If your doctor recommends aspirin, take the amount directed each day. Make sure you take aspirin and not another kind of pain reliever, such as acetaminophen (Tylenol).   When should you call for help?   Call 911 if you have symptoms of a heart attack. These may include:    Chest pain or pressure, or a strange feeling in the chest.     Sweating.     Shortness of breath.     Pain, pressure, or a strange feeling in the back, neck, jaw, or upper belly or in one or both shoulders or arms.     Lightheadedness or sudden weakness.     A fast or irregular heartbeat.   After you call 911, the  may tell you to chew 1 adult-strength or 2 to 4 low-dose aspirin. Wait for an ambulance. Do not try to drive yourself.  Watch closely for changes in your health, and be sure to contact your doctor if you have any problems.  Where can you learn more?  Go to https://www.[email or web address].net/patientEd and enter F075 to learn more about \"A Healthy Heart: Care Instructions.\"  Current as of: July 31, 2024  Content Version: 14.5  © 2754-6952 [employer].   Care instructions

## 2025-06-17 NOTE — PROGRESS NOTES
Medicare Annual Wellness Visit    Obi PATE Daniela Smith is here for Medicare AWV and Medication Refill    Assessment & Plan   Medicare annual wellness visit, subsequent  -     Lipid Panel; Future  -     CBC with Auto Differential; Future  -     Comprehensive Metabolic Panel; Future  Coronary artery disease involving native heart without angina pectoris, unspecified vessel or lesion type  -     Lipid Panel; Future  -     CBC with Auto Differential; Future  -     Comprehensive Metabolic Panel; Future  Stage 3 chronic kidney disease, unspecified whether stage 3a or 3b CKD (HCC)  Screening PSA (prostate specific antigen)  -     PSA Screening; Future  Paroxysmal atrial fibrillation (HCC)       No follow-ups on file.     Subjective   The following acute and/or chronic problems were also addressed today:   Diagnosis Orders   1. Medicare annual wellness visit, subsequent  Lipid Panel    CBC with Auto Differential    Comprehensive Metabolic Panel      2. Coronary artery disease involving native heart without angina pectoris, unspecified vessel or lesion type  Lipid Panel    CBC with Auto Differential    Comprehensive Metabolic Panel      3. Stage 3 chronic kidney disease, unspecified whether stage 3a or 3b CKD (HCC)        4. Screening PSA (prostate specific antigen)  PSA Screening      5. Paroxysmal atrial fibrillation (HCC)          History of Present Illness  The patient is a 74-year-old male who presents for an annual physical exam.    He reports no current health concerns. He is not on any anticoagulant therapy. His bowel movements are regular, and he does not experience any urinary issues.        Patient's complete Health Risk Assessment and screening values have been reviewed and are found in Flowsheets. The following problems were reviewed today and where indicated follow up appointments were made and/or referrals ordered.    Positive Risk Factor Screenings with Interventions:    Fall Risk:  Do you feel unsteady or are

## 2025-07-09 ENCOUNTER — LAB (OUTPATIENT)
Dept: LAB | Age: 75
End: 2025-07-09

## 2025-07-09 DIAGNOSIS — Z12.5 SCREENING PSA (PROSTATE SPECIFIC ANTIGEN): ICD-10-CM

## 2025-07-09 DIAGNOSIS — I25.10 CORONARY ARTERY DISEASE INVOLVING NATIVE HEART WITHOUT ANGINA PECTORIS, UNSPECIFIED VESSEL OR LESION TYPE: ICD-10-CM

## 2025-07-09 DIAGNOSIS — Z00.00 MEDICARE ANNUAL WELLNESS VISIT, SUBSEQUENT: ICD-10-CM

## 2025-07-09 LAB
ALBUMIN SERPL BCG-MCNC: 4 G/DL (ref 3.4–4.9)
ALP SERPL-CCNC: 59 U/L (ref 40–129)
ALT SERPL W/O P-5'-P-CCNC: 14 U/L (ref 10–50)
ANION GAP SERPL CALC-SCNC: 10 MEQ/L (ref 8–16)
AST SERPL-CCNC: 23 U/L (ref 10–50)
BILIRUB SERPL-MCNC: 1.1 MG/DL (ref 0.3–1.2)
BUN SERPL-MCNC: 18 MG/DL (ref 8–23)
CALCIUM SERPL-MCNC: 9.5 MG/DL (ref 8.8–10.2)
CHLORIDE SERPL-SCNC: 112 MEQ/L (ref 98–111)
CHOLEST SERPL-MCNC: 153 MG/DL (ref 100–199)
CO2 SERPL-SCNC: 26 MEQ/L (ref 22–29)
CREAT SERPL-MCNC: 1.4 MG/DL (ref 0.7–1.2)
GFR SERPL CREATININE-BSD FRML MDRD: 52 ML/MIN/1.73M2
GLUCOSE SERPL-MCNC: 94 MG/DL (ref 74–109)
HDLC SERPL-MCNC: 65 MG/DL
LDLC SERPL CALC-MCNC: 75 MG/DL
POTASSIUM SERPL-SCNC: 4.7 MEQ/L (ref 3.5–5.2)
PROT SERPL-MCNC: 7 G/DL (ref 6.4–8.3)
PSA SERPL-MCNC: 23.6 NG/ML (ref 0–1)
SCAN OF BLOOD SMEAR: NORMAL
SODIUM SERPL-SCNC: 148 MEQ/L (ref 135–145)
TRIGL SERPL-MCNC: 65 MG/DL (ref 0–199)

## 2025-07-10 LAB
AUTO DIFF PNL BLD: ABNORMAL
BASOPHILS ABSOLUTE: 0 THOU/MM3 (ref 0–0.1)
BASOPHILS NFR BLD AUTO: 0.5 %
DEPRECATED RDW RBC AUTO: 49.6 FL (ref 35–45)
EOSINOPHIL NFR BLD AUTO: 2.4 %
EOSINOPHILS ABSOLUTE: 0.1 THOU/MM3 (ref 0–0.4)
ERYTHROCYTE [DISTWIDTH] IN BLOOD BY AUTOMATED COUNT: 12.1 % (ref 11.5–14.5)
HCT VFR BLD AUTO: 39 % (ref 42–52)
HGB BLD-MCNC: 12.6 GM/DL (ref 14–18)
IMM GRANULOCYTES # BLD AUTO: 0.01 THOU/MM3 (ref 0–0.07)
IMM GRANULOCYTES NFR BLD AUTO: 0.2 %
LYMPHOCYTES ABSOLUTE: 2.1 THOU/MM3 (ref 1–4.8)
LYMPHOCYTES NFR BLD AUTO: 36.8 %
MACROCYTES BLD QL SMEAR: PRESENT
MCH RBC QN AUTO: 35.8 PG (ref 26–33)
MCHC RBC AUTO-ENTMCNC: 32.3 GM/DL (ref 32.2–35.5)
MCV RBC AUTO: 110.8 FL (ref 80–94)
MONOCYTES ABSOLUTE: 0.5 THOU/MM3 (ref 0.4–1.3)
MONOCYTES NFR BLD AUTO: 8.6 %
NEUTROPHILS ABSOLUTE: 3 THOU/MM3 (ref 1.8–7.7)
NEUTROPHILS NFR BLD AUTO: 51.5 %
NRBC BLD AUTO-RTO: 0 /100 WBC
PATHOLOGIST REVIEW: ABNORMAL
PLATELET # BLD AUTO: 243 THOU/MM3 (ref 130–400)
PLATELET BLD QL SMEAR: ADEQUATE
PMV BLD AUTO: 9.6 FL (ref 9.4–12.4)
RBC # BLD AUTO: 3.52 MILL/MM3 (ref 4.7–6.1)
WBC # BLD AUTO: 5.8 THOU/MM3 (ref 4.8–10.8)

## 2025-07-14 ENCOUNTER — RESULTS FOLLOW-UP (OUTPATIENT)
Dept: FAMILY MEDICINE CLINIC | Age: 75
End: 2025-07-14

## 2025-07-14 DIAGNOSIS — R97.20 ELEVATED PSA: Primary | ICD-10-CM

## 2025-07-18 NOTE — TELEPHONE ENCOUNTER
Unable to reach Obi or wife, Marsha, by phone. I called Phyllis, their daughter and emergency contact. She verified that we have a wrong phone number for Obi. Phone number is updated in his chart. Phyllis was given his results and is in agreement for referral to urology for his elevated PSA and will notify Obi regarding this.

## (undated) DEVICE — RELOAD STPL L55MM OPN H3.8MM CLS H1.5MM WIRE DIA0.2MM REG

## (undated) DEVICE — DRAIN SURG 19FR 0.25IN SIL RND W/ TRCR INDIC DOT RADPQ FULL

## (undated) DEVICE — SUTURE ABSORBABLE MONOFILAMENT 0 CTX 60 IN VIO PDS + PDP990G

## (undated) DEVICE — SUTURE PERMAHAND SZ 3-0 L18IN NONABSORBABLE BLK L26MM SH C013D

## (undated) DEVICE — SPONGE LAP W18XL18IN WHT COT 4 PLY FLD STRUNG RADPQ DISP ST

## (undated) DEVICE — SUTURE VCRL + SZ 0 L18IN ABSRB TIE VCP106G

## (undated) DEVICE — SUTURE PERMAHAND SZ 2-0 L30IN NONABSORBABLE BLK SH L26MM C016D

## (undated) DEVICE — STAPLER INT L60MM REG TISS BLU B FRM 8 FIRING 2 ROW AUTO

## (undated) DEVICE — GLOVE SURG SZ 7 L12IN FNGR THK94MIL TRNSLUC YEL LTX HYDRGEL

## (undated) DEVICE — EVACUATOR SURG 100CC SIL BLB SUCT RESVR FOR CLS WND DRNGE

## (undated) DEVICE — 450 ML BOTTLE OF 0.05% CHLORHEXIDINE GLUCONATE IN 99.95% STERILE WATER FOR IRRIGATION, USP AND APPLICATOR.: Brand: IRRISEPT ANTIMICROBIAL WOUND LAVAGE

## (undated) DEVICE — YANKAUER,POOLE TIP,STERILE,50/CS: Brand: MEDLINE

## (undated) DEVICE — SUTURE VCRL SZ 3-0 L18IN ABSRB VLT L26MM SH 1/2 CIR J774D

## (undated) DEVICE — BREAST HERNIA PACK: Brand: MEDLINE INDUSTRIES, INC.

## (undated) DEVICE — STAPLER INT L55MM CUT LN L53MM STPL LN L57MM BLU B FRM 8

## (undated) DEVICE — PENCIL SMK EVAC ALL IN 1 DSGN ENH VISIBILITY IMPROVED AIR